# Patient Record
Sex: MALE | HISPANIC OR LATINO | Employment: UNEMPLOYED | URBAN - METROPOLITAN AREA
[De-identification: names, ages, dates, MRNs, and addresses within clinical notes are randomized per-mention and may not be internally consistent; named-entity substitution may affect disease eponyms.]

---

## 2017-08-09 ENCOUNTER — HOSPITAL ENCOUNTER (INPATIENT)
Facility: MEDICAL CENTER | Age: 47
LOS: 31 days | DRG: 673 | End: 2017-09-09
Attending: EMERGENCY MEDICINE | Admitting: INTERNAL MEDICINE
Payer: MEDICAID

## 2017-08-09 ENCOUNTER — APPOINTMENT (OUTPATIENT)
Dept: RADIOLOGY | Facility: MEDICAL CENTER | Age: 47
DRG: 673 | End: 2017-08-09
Attending: INTERNAL MEDICINE
Payer: MEDICAID

## 2017-08-09 ENCOUNTER — RESOLUTE PROFESSIONAL BILLING HOSPITAL PROF FEE (OUTPATIENT)
Dept: MEDSURG UNIT | Facility: MEDICAL CENTER | Age: 47
End: 2017-08-09
Payer: MEDICAID

## 2017-08-09 ENCOUNTER — APPOINTMENT (OUTPATIENT)
Dept: RADIOLOGY | Facility: MEDICAL CENTER | Age: 47
DRG: 673 | End: 2017-08-09
Attending: EMERGENCY MEDICINE
Payer: MEDICAID

## 2017-08-09 DIAGNOSIS — R79.89 ELEVATED TROPONIN: ICD-10-CM

## 2017-08-09 DIAGNOSIS — N17.9 ACUTE RENAL FAILURE, UNSPECIFIED ACUTE RENAL FAILURE TYPE (HCC): ICD-10-CM

## 2017-08-09 DIAGNOSIS — I48.91 NEW ONSET ATRIAL FIBRILLATION (HCC): ICD-10-CM

## 2017-08-09 DIAGNOSIS — E86.0 DEHYDRATION: ICD-10-CM

## 2017-08-09 PROBLEM — E87.8 ELECTROLYTE ABNORMALITY: Status: ACTIVE | Noted: 2017-08-09

## 2017-08-09 PROBLEM — R73.9 HYPERGLYCEMIA: Chronic | Status: ACTIVE | Noted: 2017-08-09

## 2017-08-09 PROBLEM — F10.20 ALCOHOLIC (HCC): Chronic | Status: ACTIVE | Noted: 2017-08-09

## 2017-08-09 PROBLEM — I63.9 CVA (CEREBRAL VASCULAR ACCIDENT) (HCC): Status: ACTIVE | Noted: 2017-08-09

## 2017-08-09 PROBLEM — J96.90 RESPIRATORY FAILURE (HCC): Status: ACTIVE | Noted: 2017-08-09

## 2017-08-09 PROBLEM — I10 ESSENTIAL HYPERTENSION: Status: ACTIVE | Noted: 2017-08-09

## 2017-08-09 LAB
ALBUMIN SERPL BCP-MCNC: 3.3 G/DL (ref 3.2–4.9)
ALBUMIN/GLOB SERPL: 1.3 G/DL
ALP SERPL-CCNC: 79 U/L (ref 30–99)
ALT SERPL-CCNC: 117 U/L (ref 2–50)
ANION GAP SERPL CALC-SCNC: 12 MMOL/L (ref 0–11.9)
APPEARANCE UR: CLEAR
AST SERPL-CCNC: 96 U/L (ref 12–45)
BACTERIA #/AREA URNS HPF: NEGATIVE /HPF
BASOPHILS # BLD AUTO: 1.1 % (ref 0–1.8)
BASOPHILS # BLD: 0.07 K/UL (ref 0–0.12)
BILIRUB SERPL-MCNC: 1.5 MG/DL (ref 0.1–1.5)
BILIRUB UR QL STRIP.AUTO: NEGATIVE
BLOOD CULTURE HOLD CXBCH: NORMAL
BNP SERPL-MCNC: 2203 PG/ML (ref 0–100)
BUN SERPL-MCNC: 65 MG/DL (ref 8–22)
CALCIUM SERPL-MCNC: 8.8 MG/DL (ref 8.5–10.5)
CHLORIDE SERPL-SCNC: 107 MMOL/L (ref 96–112)
CO2 SERPL-SCNC: 11 MMOL/L (ref 20–33)
COLOR UR: YELLOW
CREAT SERPL-MCNC: 4.38 MG/DL (ref 0.5–1.4)
CREAT UR-MCNC: 184.5 MG/DL
EKG IMPRESSION: NORMAL
EOSINOPHIL # BLD AUTO: 0.23 K/UL (ref 0–0.51)
EOSINOPHIL NFR BLD: 3.7 % (ref 0–6.9)
EPI CELLS #/AREA URNS HPF: ABNORMAL /HPF
ERYTHROCYTE [DISTWIDTH] IN BLOOD BY AUTOMATED COUNT: 51.8 FL (ref 35.9–50)
EST. AVERAGE GLUCOSE BLD GHB EST-MCNC: 117 MG/DL
GFR SERPL CREATININE-BSD FRML MDRD: 14 ML/MIN/1.73 M 2
GLOBULIN SER CALC-MCNC: 2.6 G/DL (ref 1.9–3.5)
GLUCOSE BLD-MCNC: 93 MG/DL (ref 65–99)
GLUCOSE SERPL-MCNC: 182 MG/DL (ref 65–99)
GLUCOSE UR STRIP.AUTO-MCNC: NEGATIVE MG/DL
HAV IGM SERPL QL IA: NEGATIVE
HBA1C MFR BLD: 5.7 % (ref 0–5.6)
HBV CORE IGM SER QL: NEGATIVE
HBV SURFACE AB SERPL IA-ACNC: <3.1 MIU/ML (ref 0–10)
HBV SURFACE AG SER QL: NEGATIVE
HCT VFR BLD AUTO: 43.6 % (ref 42–52)
HCV AB SER QL: NEGATIVE
HGB BLD-MCNC: 14.1 G/DL (ref 14–18)
HYALINE CASTS #/AREA URNS LPF: ABNORMAL /LPF
IMM GRANULOCYTES # BLD AUTO: 0.01 K/UL (ref 0–0.11)
IMM GRANULOCYTES NFR BLD AUTO: 0.2 % (ref 0–0.9)
INR PPP: 1.33 (ref 0.87–1.13)
KETONES UR STRIP.AUTO-MCNC: NEGATIVE MG/DL
LEUKOCYTE ESTERASE UR QL STRIP.AUTO: NEGATIVE
LV EJECT FRACT  99904: 25
LV EJECT FRACT MOD 4C 99902: 35.58
LYMPHOCYTES # BLD AUTO: 1.46 K/UL (ref 1–4.8)
LYMPHOCYTES NFR BLD: 23.7 % (ref 22–41)
MAGNESIUM SERPL-MCNC: 2.3 MG/DL (ref 1.5–2.5)
MCH RBC QN AUTO: 30.9 PG (ref 27–33)
MCHC RBC AUTO-ENTMCNC: 32.3 G/DL (ref 33.7–35.3)
MCV RBC AUTO: 95.4 FL (ref 81.4–97.8)
MICRO URNS: ABNORMAL
MONOCYTES # BLD AUTO: 0.62 K/UL (ref 0–0.85)
MONOCYTES NFR BLD AUTO: 10 % (ref 0–13.4)
NEUTROPHILS # BLD AUTO: 3.78 K/UL (ref 1.82–7.42)
NEUTROPHILS NFR BLD: 61.3 % (ref 44–72)
NITRITE UR QL STRIP.AUTO: NEGATIVE
NRBC # BLD AUTO: 0 K/UL
NRBC BLD AUTO-RTO: 0 /100 WBC
PH UR STRIP.AUTO: 5 [PH]
PHOSPHATE SERPL-MCNC: 6.1 MG/DL (ref 2.5–4.5)
PLATELET # BLD AUTO: 270 K/UL (ref 164–446)
PMV BLD AUTO: 10.2 FL (ref 9–12.9)
POTASSIUM SERPL-SCNC: 4.7 MMOL/L (ref 3.6–5.5)
PROT SERPL-MCNC: 5.9 G/DL (ref 6–8.2)
PROT UR QL STRIP: 300 MG/DL
PROT UR-MCNC: 208.1 MG/DL (ref 0–15)
PROTHROMBIN TIME: 16.9 SEC (ref 12–14.6)
RBC # BLD AUTO: 4.57 M/UL (ref 4.7–6.1)
RBC # URNS HPF: ABNORMAL /HPF
RBC UR QL AUTO: NEGATIVE
SODIUM SERPL-SCNC: 130 MMOL/L (ref 135–145)
SODIUM UR-SCNC: 11 MMOL/L
SP GR UR STRIP.AUTO: 1.02
TROPONIN I SERPL-MCNC: 0.28 NG/ML (ref 0–0.04)
TROPONIN I SERPL-MCNC: 0.29 NG/ML (ref 0–0.04)
TSH SERPL DL<=0.005 MIU/L-ACNC: 0.8 UIU/ML (ref 0.3–3.7)
TSH SERPL DL<=0.005 MIU/L-ACNC: 1.21 UIU/ML (ref 0.3–3.7)
UROBILINOGEN UR STRIP.AUTO-MCNC: 1 MG/DL
VIT B12 SERPL-MCNC: 1303 PG/ML (ref 211–911)
WBC # BLD AUTO: 6.2 K/UL (ref 4.8–10.8)
WBC #/AREA URNS HPF: ABNORMAL /HPF

## 2017-08-09 PROCEDURE — 83880 ASSAY OF NATRIURETIC PEPTIDE: CPT

## 2017-08-09 PROCEDURE — 76937 US GUIDE VASCULAR ACCESS: CPT

## 2017-08-09 PROCEDURE — 93306 TTE W/DOPPLER COMPLETE: CPT

## 2017-08-09 PROCEDURE — A9270 NON-COVERED ITEM OR SERVICE: HCPCS | Performed by: EMERGENCY MEDICINE

## 2017-08-09 PROCEDURE — 99291 CRITICAL CARE FIRST HOUR: CPT

## 2017-08-09 PROCEDURE — 5A1D60Z PERFORMANCE OF URINARY FILTRATION, MULTIPLE: ICD-10-PCS | Performed by: INTERNAL MEDICINE

## 2017-08-09 PROCEDURE — 36415 COLL VENOUS BLD VENIPUNCTURE: CPT

## 2017-08-09 PROCEDURE — 83735 ASSAY OF MAGNESIUM: CPT

## 2017-08-09 PROCEDURE — B5131ZA FLUOROSCOPY OF RIGHT JUGULAR VEINS USING LOW OSMOLAR CONTRAST, GUIDANCE: ICD-10-PCS | Performed by: RADIOLOGY

## 2017-08-09 PROCEDURE — 86706 HEP B SURFACE ANTIBODY: CPT

## 2017-08-09 PROCEDURE — 05HM33Z INSERTION OF INFUSION DEVICE INTO RIGHT INTERNAL JUGULAR VEIN, PERCUTANEOUS APPROACH: ICD-10-PCS | Performed by: RADIOLOGY

## 2017-08-09 PROCEDURE — 700101 HCHG RX REV CODE 250: Performed by: INTERNAL MEDICINE

## 2017-08-09 PROCEDURE — 81001 URINALYSIS AUTO W/SCOPE: CPT

## 2017-08-09 PROCEDURE — 90935 HEMODIALYSIS ONE EVALUATION: CPT

## 2017-08-09 PROCEDURE — 700102 HCHG RX REV CODE 250 W/ 637 OVERRIDE(OP): Performed by: INTERNAL MEDICINE

## 2017-08-09 PROCEDURE — 80074 ACUTE HEPATITIS PANEL: CPT

## 2017-08-09 PROCEDURE — 85025 COMPLETE CBC W/AUTO DIFF WBC: CPT

## 2017-08-09 PROCEDURE — 93005 ELECTROCARDIOGRAM TRACING: CPT | Performed by: EMERGENCY MEDICINE

## 2017-08-09 PROCEDURE — 82570 ASSAY OF URINE CREATININE: CPT

## 2017-08-09 PROCEDURE — 96374 THER/PROPH/DIAG INJ IV PUSH: CPT

## 2017-08-09 PROCEDURE — 700111 HCHG RX REV CODE 636 W/ 250 OVERRIDE (IP)

## 2017-08-09 PROCEDURE — 93306 TTE W/DOPPLER COMPLETE: CPT | Mod: 26 | Performed by: INTERNAL MEDICINE

## 2017-08-09 PROCEDURE — 77001 FLUOROGUIDE FOR VEIN DEVICE: CPT

## 2017-08-09 PROCEDURE — 84300 ASSAY OF URINE SODIUM: CPT

## 2017-08-09 PROCEDURE — 96361 HYDRATE IV INFUSION ADD-ON: CPT

## 2017-08-09 PROCEDURE — 84100 ASSAY OF PHOSPHORUS: CPT

## 2017-08-09 PROCEDURE — 84443 ASSAY THYROID STIM HORMONE: CPT

## 2017-08-09 PROCEDURE — 700111 HCHG RX REV CODE 636 W/ 250 OVERRIDE (IP): Performed by: INTERNAL MEDICINE

## 2017-08-09 PROCEDURE — C1752 CATH,HEMODIALYSIS,SHORT-TERM: HCPCS

## 2017-08-09 PROCEDURE — 84156 ASSAY OF PROTEIN URINE: CPT

## 2017-08-09 PROCEDURE — 82607 VITAMIN B-12: CPT

## 2017-08-09 PROCEDURE — 700105 HCHG RX REV CODE 258: Performed by: EMERGENCY MEDICINE

## 2017-08-09 PROCEDURE — 84484 ASSAY OF TROPONIN QUANT: CPT

## 2017-08-09 PROCEDURE — 71010 DX-CHEST-PORTABLE (1 VIEW): CPT

## 2017-08-09 PROCEDURE — 700102 HCHG RX REV CODE 250 W/ 637 OVERRIDE(OP): Performed by: EMERGENCY MEDICINE

## 2017-08-09 PROCEDURE — 93005 ELECTROCARDIOGRAM TRACING: CPT

## 2017-08-09 PROCEDURE — 700111 HCHG RX REV CODE 636 W/ 250 OVERRIDE (IP): Performed by: EMERGENCY MEDICINE

## 2017-08-09 PROCEDURE — 82962 GLUCOSE BLOOD TEST: CPT

## 2017-08-09 PROCEDURE — 85610 PROTHROMBIN TIME: CPT

## 2017-08-09 PROCEDURE — 70450 CT HEAD/BRAIN W/O DYE: CPT

## 2017-08-09 PROCEDURE — 770022 HCHG ROOM/CARE - ICU (200)

## 2017-08-09 PROCEDURE — A9270 NON-COVERED ITEM OR SERVICE: HCPCS | Performed by: INTERNAL MEDICINE

## 2017-08-09 PROCEDURE — 36556 INSERT NON-TUNNEL CV CATH: CPT

## 2017-08-09 PROCEDURE — 94760 N-INVAS EAR/PLS OXIMETRY 1: CPT

## 2017-08-09 PROCEDURE — 80053 COMPREHEN METABOLIC PANEL: CPT

## 2017-08-09 PROCEDURE — 83036 HEMOGLOBIN GLYCOSYLATED A1C: CPT

## 2017-08-09 RX ORDER — HEPARIN SODIUM 5000 [USP'U]/ML
5000 INJECTION, SOLUTION INTRAVENOUS; SUBCUTANEOUS EVERY 8 HOURS
Status: DISCONTINUED | OUTPATIENT
Start: 2017-08-09 | End: 2017-08-09

## 2017-08-09 RX ORDER — MIDAZOLAM HYDROCHLORIDE 1 MG/ML
.5-2 INJECTION INTRAMUSCULAR; INTRAVENOUS PRN
Status: ACTIVE | OUTPATIENT
Start: 2017-08-09 | End: 2017-08-09

## 2017-08-09 RX ORDER — DILTIAZEM HYDROCHLORIDE 5 MG/ML
17 INJECTION INTRAVENOUS ONCE
Status: COMPLETED | OUTPATIENT
Start: 2017-08-09 | End: 2017-08-09

## 2017-08-09 RX ORDER — MIDAZOLAM HYDROCHLORIDE 1 MG/ML
INJECTION INTRAMUSCULAR; INTRAVENOUS
Status: COMPLETED
Start: 2017-08-09 | End: 2017-08-09

## 2017-08-09 RX ORDER — THIAMINE MONONITRATE (VIT B1) 100 MG
100 TABLET ORAL DAILY
Status: DISCONTINUED | OUTPATIENT
Start: 2017-08-09 | End: 2017-09-09 | Stop reason: HOSPADM

## 2017-08-09 RX ORDER — HEPARIN SODIUM 1000 [USP'U]/ML
2200 INJECTION, SOLUTION INTRAVENOUS; SUBCUTANEOUS PRN
Status: DISCONTINUED | OUTPATIENT
Start: 2017-08-09 | End: 2017-08-21

## 2017-08-09 RX ORDER — ASPIRIN 81 MG/1
324 TABLET, CHEWABLE ORAL ONCE
Status: COMPLETED | OUTPATIENT
Start: 2017-08-09 | End: 2017-08-09

## 2017-08-09 RX ORDER — ASPIRIN 300 MG/1
300 SUPPOSITORY RECTAL ONCE
Status: COMPLETED | OUTPATIENT
Start: 2017-08-09 | End: 2017-08-09

## 2017-08-09 RX ORDER — POLYETHYLENE GLYCOL 3350 17 G/17G
1 POWDER, FOR SOLUTION ORAL
Status: DISCONTINUED | OUTPATIENT
Start: 2017-08-09 | End: 2017-09-09 | Stop reason: HOSPADM

## 2017-08-09 RX ORDER — HEPARIN SODIUM 1000 [USP'U]/ML
3000 INJECTION, SOLUTION INTRAVENOUS; SUBCUTANEOUS PRN
Status: DISCONTINUED | OUTPATIENT
Start: 2017-08-09 | End: 2017-08-29

## 2017-08-09 RX ORDER — SODIUM CHLORIDE 9 MG/ML
1000 INJECTION, SOLUTION INTRAVENOUS ONCE
Status: COMPLETED | OUTPATIENT
Start: 2017-08-09 | End: 2017-08-09

## 2017-08-09 RX ORDER — AMOXICILLIN 250 MG
2 CAPSULE ORAL 2 TIMES DAILY
Status: DISCONTINUED | OUTPATIENT
Start: 2017-08-09 | End: 2017-09-09 | Stop reason: HOSPADM

## 2017-08-09 RX ORDER — SODIUM CHLORIDE 9 MG/ML
500 INJECTION, SOLUTION INTRAVENOUS
Status: ACTIVE | OUTPATIENT
Start: 2017-08-09 | End: 2017-08-09

## 2017-08-09 RX ORDER — FOLIC ACID 1 MG/1
1 TABLET ORAL DAILY
Status: DISCONTINUED | OUTPATIENT
Start: 2017-08-09 | End: 2017-09-09 | Stop reason: HOSPADM

## 2017-08-09 RX ORDER — DEXTROSE MONOHYDRATE 25 G/50ML
25 INJECTION, SOLUTION INTRAVENOUS
Status: DISCONTINUED | OUTPATIENT
Start: 2017-08-09 | End: 2017-09-09 | Stop reason: HOSPADM

## 2017-08-09 RX ORDER — BISACODYL 10 MG
10 SUPPOSITORY, RECTAL RECTAL
Status: DISCONTINUED | OUTPATIENT
Start: 2017-08-09 | End: 2017-09-09 | Stop reason: HOSPADM

## 2017-08-09 RX ADMIN — DILTIAZEM HYDROCHLORIDE 5 MG/HR: 5 INJECTION INTRAVENOUS at 20:52

## 2017-08-09 RX ADMIN — METOPROLOL TARTRATE 2.5 MG: 5 INJECTION INTRAVENOUS at 20:22

## 2017-08-09 RX ADMIN — HEPARIN SODIUM 3000 UNITS: 1000 INJECTION, SOLUTION INTRAVENOUS; SUBCUTANEOUS at 20:12

## 2017-08-09 RX ADMIN — THERA TABS 1 TABLET: TAB at 19:13

## 2017-08-09 RX ADMIN — ASPIRIN 324 MG: 81 TABLET, CHEWABLE ORAL at 13:45

## 2017-08-09 RX ADMIN — MIDAZOLAM HYDROCHLORIDE 1 MG: 1 INJECTION INTRAMUSCULAR; INTRAVENOUS at 15:54

## 2017-08-09 RX ADMIN — MIDAZOLAM 1 MG: 1 INJECTION INTRAMUSCULAR; INTRAVENOUS at 15:54

## 2017-08-09 RX ADMIN — FOLIC ACID 1 MG: 1 TABLET ORAL at 19:13

## 2017-08-09 RX ADMIN — SODIUM CHLORIDE 1000 ML: 9 INJECTION, SOLUTION INTRAVENOUS at 14:15

## 2017-08-09 RX ADMIN — HEPARIN: 100 SYRINGE at 15:45

## 2017-08-09 RX ADMIN — DILTIAZEM HYDROCHLORIDE 17 MG: 5 INJECTION INTRAVENOUS at 13:45

## 2017-08-09 RX ADMIN — Medication 100 MG: at 19:13

## 2017-08-09 RX ADMIN — HEPARIN SODIUM 900 UNITS: 5000 INJECTION, SOLUTION INTRAVENOUS at 19:09

## 2017-08-09 ASSESSMENT — LIFESTYLE VARIABLES
HAVE YOU EVER FELT YOU SHOULD CUT DOWN ON YOUR DRINKING: YES
TOTAL SCORE: 3
ALCOHOL_USE: YES
HAVE PEOPLE ANNOYED YOU BY CRITICIZING YOUR DRINKING: YES
EVER HAD A DRINK FIRST THING IN THE MORNING TO STEADY YOUR NERVES TO GET RID OF A HANGOVER: NO
ON A TYPICAL DAY WHEN YOU DRINK ALCOHOL HOW MANY DRINKS DO YOU HAVE: 4
TOTAL SCORE: 3
TOTAL SCORE: 3
CONSUMPTION TOTAL: INCOMPLETE
AVERAGE NUMBER OF DAYS PER WEEK YOU HAVE A DRINK CONTAINING ALCOHOL: 4
EVER FELT BAD OR GUILTY ABOUT YOUR DRINKING: YES

## 2017-08-09 ASSESSMENT — ENCOUNTER SYMPTOMS
ABDOMINAL PAIN: 0
COUGH: 0
DEPRESSION: 0
HEMOPTYSIS: 0
DIZZINESS: 0
CONSTIPATION: 0
HEARTBURN: 0
SHORTNESS OF BREATH: 0
VOMITING: 0
BLURRED VISION: 0
NAUSEA: 0
DOUBLE VISION: 0
ORTHOPNEA: 1
NECK PAIN: 0
TREMORS: 0
SPUTUM PRODUCTION: 0
WEAKNESS: 1
PND: 0
CLAUDICATION: 0
MYALGIAS: 0
PALPITATIONS: 0
FEVER: 0
TINGLING: 0
CHILLS: 0

## 2017-08-09 ASSESSMENT — PAIN SCALES - GENERAL
PAINLEVEL_OUTOF10: 0

## 2017-08-09 NOTE — ASSESSMENT & PLAN NOTE
- New onset vs paroxysmal (vague PMHx of afib and non-compliance with meds).   - CHADS2 VASc score: 4.  - ECHO: severe LVH, global hypokinesis, EF ~20%, severe AI and moderate MR.   - LOBO: slow blood flow but no thrombus; severe AI.    - Placed on amiodarone drip.  Then amiodarone PO.  Metoprolol added.   - Subsequent LOBO done and patient successfully cardioverted.  Metoprolol titrated to 25 mg long-acting.   - Patient bridged from heparin to coumadin.

## 2017-08-09 NOTE — OR SURGEON
Immediate Post- Operative Note        PostOp Diagnosis: renal failure      Procedure(s): Right IJ temp dialysis catheter    Estimated Blood Loss: Less than 5 ml        Complications: None            8/9/2017     4:02 PM     Bert Frankel

## 2017-08-09 NOTE — H&P
[facility] Admitting History and Physical    Name Bhavesh Saini       1970   Age/Sex 47 y.o. male   MRN [id number]   Code Status FULL     After 5PM or if no immediate response to page, please call for cross-coverage  Attending/Team: [facility] team Dr Shin Call [phone] to page   1st Call - Day Intern (R1):   Dr Sandra  2nd Call - Day Sr. Resident (R2/R3):   Dr Curtis       Chief Complaint:  Fatigue/Afib with RVR    HPI:  47 year old male with hx of alcoholism, HTN non compliance with meds came with his family due to weakness and fatigue.  The patient is  and does not speak english,  according to his family the patient has not felt well for last week with some palpitation, SOB on exertion, but no chest pain or syncope and no coughing or fever.  The family has noticed  Slurred speech last week but the patient refused to go and seen by doctor.     The patient is heavy drinker beer no smoking or IV drugs.     IN the ED:  The patient is in bed no acute distress alert and oriented, but sleepy and lethargic, we found Afib with RVR. Dilt has started and then he developed bradycardia HR 20s.  And the labs showed kideny failure and accordin to the nephrologist the patient needs dialysis for severe metabolic acidosis, ((dialysis cath was inserted by IR on the admission)).      Review of Systems   Constitutional: Positive for malaise/fatigue. Negative for fever and chills.   Eyes: Negative for blurred vision and double vision.   Respiratory: Negative for cough, hemoptysis, sputum production and shortness of breath.    Cardiovascular: Positive for orthopnea. Negative for chest pain, palpitations, claudication, leg swelling and PND.   Gastrointestinal: Negative for heartburn, nausea, vomiting, abdominal pain and constipation.   Genitourinary: Negative for dysuria, urgency and frequency.   Musculoskeletal: Negative for myalgias and neck pain.   Skin: Negative for itching and rash.    Neurological: Positive for weakness. Negative for dizziness, tingling and tremors.   Psychiatric/Behavioral: Negative for depression and suicidal ideas.             Past Medical History:   No past medical history on file.    Past Surgical History:  No past surgical history on file.    Current Outpatient Medications:  Home Medications     Reviewed by Madison Oseguera (Pharmacy Tech) on 08/09/17 at 1432  Med List Status: Complete    Medication Last Dose Status          Patient Eitan Taking any Medications                        Medication Allergy/Sensitivities:  No Known Allergies      Family History:  No family history on file.    Social History:  Social History     Social History   • Marital Status:      Spouse Name: N/A   • Number of Children: N/A   • Years of Education: N/A     Occupational History   • Not on file.     Social History Main Topics   • Smoking status: Never Smoker    • Smokeless tobacco: Not on file   • Alcohol Use: 6.0 oz/week     10 Standard drinks or equivalent per week      Comment: three 40oz beers per day   • Drug Use: Not on file   • Sexual Activity: Not on file     Other Topics Concern   • Not on file     Social History Narrative   • No narrative on file         Physical Exam     Filed Vitals:    08/09/17 1605 08/09/17 1730 08/09/17 1800 08/09/17 1830   BP:       Pulse: 92 80 68 68   Temp:   36.3 °C (97.3 °F)    Resp: 20 20 20 16   Height:       Weight:   69.2 kg (152 lb 8.9 oz)    SpO2: 100%  100%      Body mass index is 29.79 kg/(m^2).  /92 mmHg  Pulse 68  Temp(Src) 36.3 °C (97.3 °F)  Resp 16  Ht 1.524 m (5')  Wt 69.2 kg (152 lb 8.9 oz)  BMI 29.79 kg/m2  SpO2 100%  O2 therapy: Pulse Oximetry: 100 %, O2 (LPM): 2, O2 Delivery: Silicone Nasal Cannula    Physical Exam   Constitutional: He is oriented to person, place, and time and well-developed, well-nourished, and in no distress. No distress.   Neck: JVD present. No tracheal deviation present. No thyromegaly  present.   Cardiovascular:   irregular chase    Pulmonary/Chest: No respiratory distress. He has no wheezes. He has no rales.   Abdominal: He exhibits no distension. There is no tenderness.   Musculoskeletal: He exhibits no edema.   Neurological: He is alert and oriented to person, place, and time.   Skin: Skin is warm. No erythema.             Data Review       Old Records Request:   Completed  Current Records review and summary: Completed    Lab Data Review:  Recent Results (from the past 24 hour(s))   CBC w/ Differential    Collection Time: 08/09/17  1:05 PM   Result Value Ref Range    WBC 6.2 4.8 - 10.8 K/uL    RBC 4.57 (L) 4.70 - 6.10 M/uL    Hemoglobin 14.1 14.0 - 18.0 g/dL    Hematocrit 43.6 42.0 - 52.0 %    MCV 95.4 81.4 - 97.8 fL    MCH 30.9 27.0 - 33.0 pg    MCHC 32.3 (L) 33.7 - 35.3 g/dL    RDW 51.8 (H) 35.9 - 50.0 fL    Platelet Count 270 164 - 446 K/uL    MPV 10.2 9.0 - 12.9 fL    Neutrophils-Polys 61.30 44.00 - 72.00 %    Lymphocytes 23.70 22.00 - 41.00 %    Monocytes 10.00 0.00 - 13.40 %    Eosinophils 3.70 0.00 - 6.90 %    Basophils 1.10 0.00 - 1.80 %    Immature Granulocytes 0.20 0.00 - 0.90 %    Nucleated RBC 0.00 /100 WBC    Neutrophils (Absolute) 3.78 1.82 - 7.42 K/uL    Lymphs (Absolute) 1.46 1.00 - 4.80 K/uL    Monos (Absolute) 0.62 0.00 - 0.85 K/uL    Eos (Absolute) 0.23 0.00 - 0.51 K/uL    Baso (Absolute) 0.07 0.00 - 0.12 K/uL    Immature Granulocytes (abs) 0.01 0.00 - 0.11 K/uL    NRBC (Absolute) 0.00 K/uL   Complete Metabolic Panel (CMP)    Collection Time: 08/09/17  1:05 PM   Result Value Ref Range    Sodium 130 (L) 135 - 145 mmol/L    Potassium 4.7 3.6 - 5.5 mmol/L    Chloride 107 96 - 112 mmol/L    Co2 11 (L) 20 - 33 mmol/L    Anion Gap 12.0 (H) 0.0 - 11.9    Glucose 182 (H) 65 - 99 mg/dL    Bun 65 (H) 8 - 22 mg/dL    Creatinine 4.38 (H) 0.50 - 1.40 mg/dL    Calcium 8.8 8.5 - 10.5 mg/dL    AST(SGOT) 96 (H) 12 - 45 U/L    ALT(SGPT) 117 (H) 2 - 50 U/L    Alkaline Phosphatase 79 30 - 99 U/L     Total Bilirubin 1.5 0.1 - 1.5 mg/dL    Albumin 3.3 3.2 - 4.9 g/dL    Total Protein 5.9 (L) 6.0 - 8.2 g/dL    Globulin 2.6 1.9 - 3.5 g/dL    A-G Ratio 1.3 g/dL   Btype Natriuretic Peptide    Collection Time: 08/09/17  1:05 PM   Result Value Ref Range    B Natriuretic Peptide 2203 (H) 0 - 100 pg/mL   Troponin STAT    Collection Time: 08/09/17  1:05 PM   Result Value Ref Range    Troponin I 0.29 (H) 0.00 - 0.04 ng/mL   Magnesium    Collection Time: 08/09/17  1:05 PM   Result Value Ref Range    Magnesium 2.3 1.5 - 2.5 mg/dL   Phosphorus    Collection Time: 08/09/17  1:05 PM   Result Value Ref Range    Phosphorus 6.1 (H) 2.5 - 4.5 mg/dL   PT/INR    Collection Time: 08/09/17  1:05 PM   Result Value Ref Range    PT 16.9 (H) 12.0 - 14.6 sec    INR 1.33 (H) 0.87 - 1.13   TSH (for screening thyroid dysfunction)    Collection Time: 08/09/17  1:05 PM   Result Value Ref Range    TSH 1.210 0.300 - 3.700 uIU/mL   ESTIMATED GFR    Collection Time: 08/09/17  1:05 PM   Result Value Ref Range    GFR If  17 (A) >60 mL/min/1.73 m 2    GFR If Non  14 (A) >60 mL/min/1.73 m 2   BLOOD CULTURE,HOLD    Collection Time: 08/09/17  1:05 PM   Result Value Ref Range    Blood Culture Hold Collected    Hemoglobin A1c    Collection Time: 08/09/17  1:05 PM   Result Value Ref Range    Glycohemoglobin 5.7 (H) 0.0 - 5.6 %    Est Avg Glucose 117 mg/dL   Hep B Surface AB    Collection Time: 08/09/17  1:05 PM   Result Value Ref Range    Hep B Surface Antibody Quant <3.10 0.00 - 10.00 mIU/mL   Hepatitis Panel Acute (4 components)    Collection Time: 08/09/17  1:05 PM   Result Value Ref Range    Hepatitis B Surface Antigen Negative Negative    Hepatitis C Antibody Negative Negative    Hepatitis B Cors Ab,IgM Negative Negative    Hepatitis A Virus Ab, IgM Negative Negative   URINE SODIUM RANDOM    Collection Time: 08/09/17  6:10 PM   Result Value Ref Range    Sodium, Urine -per volume 11 mmol/L   URINE CREATININE RANDOM     Collection Time: 08/09/17  6:10 PM   Result Value Ref Range    Creatinine, Random Urine 184.50 mg/dL   URINE PROTEIN RANDOM    Collection Time: 08/09/17  6:10 PM   Result Value Ref Range    Total Protein, Urine 208.1 (H) 0.0 - 15.0 mg/dL   URINALYSIS    Collection Time: 08/09/17  6:10 PM   Result Value Ref Range    Color Yellow     Character Clear     Specific Gravity 1.018 <1.035    Ph 5.0 5.0-8.0    Glucose Negative Negative mg/dL    Ketones Negative Negative mg/dL    Protein 300 (A) Negative mg/dL    Bilirubin Negative Negative    Urobilinogen, Urine 1.0 Negative    Nitrite Negative Negative    Leukocyte Esterase Negative Negative    Occult Blood Negative Negative    Micro Urine Req Microscopic    URINE MICROSCOPIC (W/UA)    Collection Time: 08/09/17  6:10 PM   Result Value Ref Range    WBC 0-2 (A) /hpf    RBC 2-5 (A) /hpf    Bacteria Negative None /hpf    Epithelial Cells Few /hpf    Hyaline Cast 3-5 (A) /lpf       Imaging/Procedures Review:    ndependant Imaging Review: Completed  IR-CVC NON TUNNELED > AGE 5   Final Result      1. Ultrasound and fluoroscopic guided placement of a right internal jugular 12 [place] triple lumen non-tunneled hemodialysis catheter.      2. The hemodialysis catheter may be used immediately as clinically indicated. Flushes per protocol.      CT-HEAD W/O   Final Result      1.  Cerebral atrophy.      2.  White matter lucencies most consistent with small vessel ischemic change versus demyelination or gliosis.      3.  Otherwise, Head CT without contrast with no acute findings. No evidence of acute cerebral infarction, hemorrhage or mass lesion.      DX-CHEST-PORTABLE (1 VIEW)   Final Result      Cardiomegaly. No consolidation.      US-RENAL    (Results Pending)   ECHOCARDIOGRAM COMP W/O CONT    (Results Pending)               Assessment/Plan     * Atrial fibrillation with RVR (CMS-HCC) (present on admission)  Assessment & Plan  - Previously treated but stopped medications one  year ago.  - came with  and after ditl drip >>>HR 40s >>>d/c dilt drip.  - no home meds  - [id number]: at least 4  - admitted to the ICU  - Echo  - start with heparin drip  - His Hr is controled now around 90s we will give him metoprolol  IV PRN for HR >120 and we will monitor him closely.  - Cardio consulted.   - thyroid and lipid panel.     CVA (cerebral vascular accident) (CMS-HCC) (present on admission)  Assessment & Plan  - slurred speech last week with facial droop.  - no neuro focal deficit.  - CT head no abnormalities.  - started with heparin drip.  - assess him later and order MRI brain.   - check TSH and lipid panel.     Essential hypertension (present on admission)  Assessment & Plan  - no home meds  - came with BP: 130s/80s  - follow up and start with meds if needed.     Respiratory failure (CMS-HCC) (present on admission)  Assessment & Plan  - SOB worsening last couple week with coughing no fever or sputum.  - on small distance.  - no hx of smoking  - BNP: 2203  - Chest x ray: Cardiomegaly.  - Cr 4.3  - clinically: no crackles and no edema.  - no need for lasix at this time  - echo  -- dialysis today.     Electrolyte abnormality (present on admission)  Assessment & Plan  - Na: 130 and K: 4.7  - dialysis on admission.  - Mg normal ad Po4: 6.1  - monitor with BMP.     TREASURE (acute kidney injury) (CMS-HCC) (present on admission)  Assessment & Plan  - came with Cr 4.3 no old labs.  - hx of nephrolithiasis.  - Hco3: 11 and A  - no anemia and Ca: 8.8 and Po4 6.1  - FeNa: less than 1% prerenal.   - likely acute on chronic.  - U/S renal: pending.   - nephro start on dialysis to correct  acidosis and ultrafiltrate as hypervolemic.    Alcoholic (CMS-HCC) (present on admission)  Assessment & Plan  - heavy drinker only beer.   - last time he drank Saturday   - follow up and start with phenobarbital protocol if he develops withdrawal symptoms.  - alcoholic cessation consult.   - started with thiamin and folic  acid and multivitamin.     Hyperglycemia (present on admission)  Assessment & Plan   on the admission.  A1c: 5.4  No home meds SSi and hypoglycemic protocol  Diet and exercise.           Anticipated Hospital stay:  >2 midnights        Quality Measures  Labs reviewed, EKG reviewed, Medications reviewed and Radiology images reviewed        DVT Prophylaxis: Heparin

## 2017-08-09 NOTE — PROGRESS NOTES
IR NOTE: TEMPORARY DIALYSIS CATHETER PLACED BY DR. STREET, PT TOLERATED WELL AND VSS THROUGH OUT, ETCO2 NO ACCURATE, SATS IN 90S

## 2017-08-09 NOTE — ED PROVIDER NOTES
ED Provider Note    CHIEF COMPLAINT  Chief Complaint   Patient presents with   • Tachycardia   • Shortness of Breath       HPI  Bhavesh Saini is a 47 y.o. male with a history of hypertension and heavy, daily alcohol use who presents as a walk-in from urgent care for rapid heartbeat.    Patient states he was sent here due to rapid heartbeat seen on EKG.    Patient's family also reports slurred speech that they noted last week some time. Patient did not want to come to the hospital for evaluation at this time.    Patient reports dyspnea on exertion since last week as well in addition to fatigue.    Patient denies headache, extremity weakness, extremity numbness, head trauma, chest pain, nausea, vomiting, diaphoresis, leg swelling, history of similar symptoms. Patient does not take aspirin therapy but was given aspirin prior to arrival.      ALLERGIES  No Known Allergies    CURRENT MEDICATIONS  Denies (patient noncompliant with his antihypertensive)    PAST MEDICAL HISTORY     Hypertension  Heavy alcohol use    SURGICAL HISTORY  patient denies any surgical history    SOCIAL HISTORY   with children  Lives in [place]  Drinks alcohol daily  Denies cocaine, methamphetamine, and tobacco use    Family Hx:  Denies      REVIEW OF SYSTEMS  See HPI for further details.  All other systems are negative except as above in HPI.    PHYSICAL EXAM  VITAL SIGNS: /92 mmHg  Pulse 124  Temp(Src) 36.4 °C (97.5 °F)  Resp 24  Ht 1.524 m (5')  Wt 68.2 kg (150 lb 5.7 oz)  BMI 29.36 kg/m2  SpO2 95%    General:  WDWN, nontoxic appearing; A+Ox3; V/S as above; tachycardic  Skin: warm and dry; good color; no rash  HEENT: NCAT; left facial droop; EOMs intact; PERRL; no scleral icterus   Neck: FROM; no meningismus, no thyromegaly; no bruits  Cardiovascular: tachy heart rate and irregular rhythm.  No murmurs, rubs, or gallops; pulses 2+ bilaterally radially and DP areas  Lungs: Clear to auscultation with good air movement  bilaterally.  No wheezes, rhonchi, or rales.   Abdomen: BS present; soft; NTND; no rebound, guarding, or rigidity.  No organomegaly or pulsatile mass  Extremities: LOUIS x 4; no e/o trauma; no pedal edema; neg Marybeth's  Neurologic: CNs III-XII grossly intact; speech slightly slurred; distal sensation intact; strength 5/5 UE/LEs; DTRs 2+ bilaterally in patellar/BR areas  Psychiatric: Appropriate affect, normal mood    LABS  Results for orders placed or performed during the hospital encounter of 08/09/17   CBC w/ Differential   Result Value Ref Range    WBC 6.2 4.8 - 10.8 K/uL    RBC 4.57 (L) 4.70 - 6.10 M/uL    Hemoglobin 14.1 14.0 - 18.0 g/dL    Hematocrit 43.6 42.0 - 52.0 %    MCV 95.4 81.4 - 97.8 fL    MCH 30.9 27.0 - 33.0 pg    MCHC 32.3 (L) 33.7 - 35.3 g/dL    RDW 51.8 (H) 35.9 - 50.0 fL    Platelet Count 270 164 - 446 K/uL    MPV 10.2 9.0 - 12.9 fL    Neutrophils-Polys 61.30 44.00 - 72.00 %    Lymphocytes 23.70 22.00 - 41.00 %    Monocytes 10.00 0.00 - 13.40 %    Eosinophils 3.70 0.00 - 6.90 %    Basophils 1.10 0.00 - 1.80 %    Immature Granulocytes 0.20 0.00 - 0.90 %    Nucleated RBC 0.00 /100 WBC    Neutrophils (Absolute) 3.78 1.82 - 7.42 K/uL    Lymphs (Absolute) 1.46 1.00 - 4.80 K/uL    Monos (Absolute) 0.62 0.00 - 0.85 K/uL    Eos (Absolute) 0.23 0.00 - 0.51 K/uL    Baso (Absolute) 0.07 0.00 - 0.12 K/uL    Immature Granulocytes (abs) 0.01 0.00 - 0.11 K/uL    NRBC (Absolute) 0.00 K/uL   Complete Metabolic Panel (CMP)   Result Value Ref Range    Sodium 130 (L) 135 - 145 mmol/L    Potassium 4.7 3.6 - 5.5 mmol/L    Chloride 107 96 - 112 mmol/L    Co2 11 (L) 20 - 33 mmol/L    Anion Gap 12.0 (H) 0.0 - 11.9    Glucose 182 (H) 65 - 99 mg/dL    Bun 65 (H) 8 - 22 mg/dL    Creatinine 4.38 (H) 0.50 - 1.40 mg/dL    Calcium 8.8 8.5 - 10.5 mg/dL    AST(SGOT) 96 (H) 12 - 45 U/L    ALT(SGPT) 117 (H) 2 - 50 U/L    Alkaline Phosphatase 79 30 - 99 U/L    Total Bilirubin 1.5 0.1 - 1.5 mg/dL    Albumin 3.3 3.2 - 4.9 g/dL    Total  Protein 5.9 (L) 6.0 - 8.2 g/dL    Globulin 2.6 1.9 - 3.5 g/dL    A-G Ratio 1.3 g/dL   Btype Natriuretic Peptide   Result Value Ref Range    B Natriuretic Peptide 2203 (H) 0 - 100 pg/mL   Troponin STAT   Result Value Ref Range    Troponin I 0.29 (H) 0.00 - 0.04 ng/mL   Magnesium   Result Value Ref Range    Magnesium 2.3 1.5 - 2.5 mg/dL   Phosphorus   Result Value Ref Range    Phosphorus 6.1 (H) 2.5 - 4.5 mg/dL   PT/INR   Result Value Ref Range    PT 16.9 (H) 12.0 - 14.6 sec    INR 1.33 (H) 0.87 - 1.13   TSH (for screening thyroid dysfunction)   Result Value Ref Range    TSH 1.210 0.300 - 3.700 uIU/mL   ESTIMATED GFR   Result Value Ref Range    GFR If  17 (A) >60 mL/min/1.73 m 2    GFR If Non  14 (A) >60 mL/min/1.73 m 2   BLOOD CULTURE,HOLD   Result Value Ref Range    Blood Culture Hold Collected        EKG  12 Lead EKG obtained at 1234 and interpreted by me to show:  Rhythm: atrial fib  Rate: 128  Intervals:   MN: not measurable   QRS: 98   QTC: 532   All measurable intervals are within normal limits  Normal axis  No ST changes  Clinical Impression: atrial fib with RVR, no acute ST changes  Compared to previous EKG dated earlier today which shows atrial fib at 144  EKG was not available in [facility]     IMAGING  DX-CHEST-PORTABLE (1 VIEW)   Final Result      Cardiomegaly. No consolidation.      CT-HEAD W/O    (Results Pending)     MEDICAL RECORD  I have reviewed patient's medical record and pertinent results are listed below.      COURSE & MEDICAL DECISION MAKING  I have reviewed any medical record information, laboratory studies and radiographic results as noted.    Bhavesh Saini is a 47 y.o. male who presents from urgent care with new onset A. fib and left-sided facial droop noted last week. Patient has had dyspnea on exertion but no current shortness of breath, chest pain, hypotension. Cardioversion is not indicated at this time. Cardizem was initiated to control heart rate. CT  scan was obtained to evaluate for possible stroke.    Labs demonstrate a sodium of 1:30, CO2 of 11, anion gap 12, glucose 182, BUN 65, creatinine 4.3, AST 96, , phosphorus 6.1, and a troponin of 0.29.    Chest x-ray demonstrates no evidence of consolidation, cardiomegaly, pulmonary edema, pneumothorax.    Pt was re-evaluated and found to have HR in 80s and normotensive after Cardizem bolus  Infusion held    2:19 PM  Discussed with [facility] IM resident, Dr. Parks, who agrees to admit the patient  BNP 2200s  CT head pending    The total critical care time on this patient is 40 minutes, resuscitating patient, speaking with admitting physician, and deciphering test results. This 40 minutes is exclusive of separately billable procedures.      FINAL IMPRESSION  1. New onset atrial fibrillation (CMS-HCC)    2. Acute renal failure, unspecified acute renal failure type (CMS-HCC)    3. Dehydration    4. Elevated troponin      Electronically signed by: Maya Michele, 8/9/2017 12:43 PM

## 2017-08-09 NOTE — ED NOTES
"Pt to triage, sent by Urgent for rapid a-fib, pt also incidentally has c/o \" stroke -like sx\" , pt attached to cardiac monitor , IV started, labs drawn and sent   "

## 2017-08-09 NOTE — CONSULTS
DATE OF SERVICE:  08/09/2017    NEPHROLOGY CONSULTATION    REQUESTING PHYSICIAN:  Dr. Amanuel Veloz.    REASON FOR CONSULTATION:  Evaluate patient with acute kidney injury, metabolic   acidosis.    HISTORY OF PRESENT ILLNESS:  The patient is a 47-year-old male with a history   of hypertension and known history of kidney disease who presented to the   emergency room with complaints of not feeling well with worsening shortness of   breath for the past several days.  No fever or chills.  No cough or   hemoptysis.  No sick contacts.  No chest pain.  No abdominal pain, no nausea   or vomiting.  No difficulties to urinate.  No dysuria or hematuria.  No edema.    Upon evaluation in the emergency room, he was found to be in atrial   fibrillation with low blood pressure.  Also laboratory results revealed   elevated BUN and creatinine up to 65 and 4.38, metabolic acidosis with CO2 of   11.    REVIEW OF SYSTEMS:  All 14 points reveal negative except history of present   illness per CMS and AMA criteria.    PAST MEDICAL HISTORY:  Hypertension and alcohol use.    PAST SURGICAL HISTORY:  None.    SOCIAL HISTORY:  Patient lives in [place], , positive for alcohol on a   daily basis.  No tobacco or drug use.    FAMILY HISTORY:  No history of kidney disease.    ALLERGIES:  No known drug allergies.    OUTPATIENT MEDICATIONS:  Reviewed.    PHYSICAL EXAMINATION:  VITAL SIGNS:  Blood pressure is 111/75, heart rate 65, temperature 36.4   degrees Celsius.  GENERAL APPEARANCE:  Well-developed, well-nourished male, in no acute   distress.  HEENT:  Normocephalic and atraumatic.  Pupils are equal, round, and reactive   to light.  Extraocular movements are intact.  Nares are patent.  Oropharynx is   clear, moist mucosa, no erythema or exudate.  NECK:  Supple, no lymphadenopathy, no thyromegaly appreciated.  LUNGS:  Clear to auscultation bilaterally.  _____ no rales, no wheezes.  HEART:  Irregularly irregular rate.  No rub or  gallop.  ABDOMEN:  Soft, nontender, and nondistended.  Bowel sounds are present.  EXTREMITIES:  No cyanosis, no clubbing, no edema.  NEUROLOGIC:  Alert and oriented x3, moving all extremities.    LABORATORY RESULTS:  Reviewed and revealed hemoglobin level 14.1, sodium 130,   potassium 4.7, CO2 of 11, BUN is 65 and creatinine level is 4.38.    Urinalysis is pending.    Chest x-ray is positive for cardiomegaly.  No focal consolidation.    Brain natriuretic peptide is 2200.    ASSESSMENT AND PLAN:  The patient is a 47-year-old male with a history of   hypertension, presented with the onsets of atrial fibrillation, shortness of   breath, and acute kidney injury with severe metabolic acidosis.  1.  Acute kidney injury.  We will start the patient on dialysis to correct   acidosis and ultrafiltrate as hypervolemic.  2.  Electrolytes, noticed mild hyponatremia likely due to hypervolemia.    Potassium is well controlled.  3.  Metabolic acidosis with CO2 of 11, will be correcting with dialysis.  4.  Volume elevated brain natriuretic peptide with cardiomegaly.  Consider   echocardiogram.  We will start ultrafiltration with hemodialysis as blood   pressure tolerates.  5.  Hypertension.  Blood pressure is on the lower side, avoid ACE inhibitor,   angiotensin receptor blocker at present time.    RECOMMENDATIONS:  1.  To start dialysis today, continue daily for now.  Daily basic metabolic   panel, complete urinalysis, complete urine electrolytes.  2.  To complete renal ultrasound.  3.  We will follow up the patient closely.    Thank you for the consultation.       ____________________________________     MD ELROY MULLER / RAQUEL    DD:  08/09/2017 15:11:49  DT:  08/09/2017 16:11:24    D#:  [id number]  Job#:  [id number]

## 2017-08-10 ENCOUNTER — APPOINTMENT (OUTPATIENT)
Dept: RADIOLOGY | Facility: MEDICAL CENTER | Age: 47
DRG: 673 | End: 2017-08-10
Attending: INTERNAL MEDICINE
Payer: MEDICAID

## 2017-08-10 ENCOUNTER — APPOINTMENT (OUTPATIENT)
Dept: RADIOLOGY | Facility: MEDICAL CENTER | Age: 47
DRG: 673 | End: 2017-08-10
Attending: STUDENT IN AN ORGANIZED HEALTH CARE EDUCATION/TRAINING PROGRAM
Payer: MEDICAID

## 2017-08-10 PROBLEM — R94.31 PROLONGED Q-T INTERVAL ON ECG: Status: ACTIVE | Noted: 2017-08-10

## 2017-08-10 PROBLEM — I50.23 ACUTE ON CHRONIC SYSTOLIC HEART FAILURE (HCC): Status: ACTIVE | Noted: 2017-08-10

## 2017-08-10 PROBLEM — R79.89 ELEVATED TROPONIN: Status: ACTIVE | Noted: 2017-08-10

## 2017-08-10 PROBLEM — I34.0 NON-RHEUMATIC MITRAL REGURGITATION: Status: ACTIVE | Noted: 2017-08-10

## 2017-08-10 LAB
ALBUMIN SERPL BCP-MCNC: 2.7 G/DL (ref 3.2–4.9)
ALBUMIN/GLOB SERPL: 1.3 G/DL
ALP SERPL-CCNC: 69 U/L (ref 30–99)
ALT SERPL-CCNC: 104 U/L (ref 2–50)
ANION GAP SERPL CALC-SCNC: 9 MMOL/L (ref 0–11.9)
APTT PPP: 149.8 SEC (ref 24.7–36)
APTT PPP: 222.8 SEC (ref 24.7–36)
APTT PPP: 45.3 SEC (ref 24.7–36)
AST SERPL-CCNC: 74 U/L (ref 12–45)
BASOPHILS # BLD AUTO: 0.7 % (ref 0–1.8)
BASOPHILS # BLD: 0.06 K/UL (ref 0–0.12)
BILIRUB SERPL-MCNC: 1.3 MG/DL (ref 0.1–1.5)
BUN SERPL-MCNC: 43 MG/DL (ref 8–22)
CALCIUM SERPL-MCNC: 8 MG/DL (ref 8.5–10.5)
CHLORIDE SERPL-SCNC: 105 MMOL/L (ref 96–112)
CHOLEST SERPL-MCNC: 104 MG/DL (ref 100–199)
CO2 SERPL-SCNC: 20 MMOL/L (ref 20–33)
CREAT SERPL-MCNC: 3.05 MG/DL (ref 0.5–1.4)
EOSINOPHIL # BLD AUTO: 0.67 K/UL (ref 0–0.51)
EOSINOPHIL NFR BLD: 7.8 % (ref 0–6.9)
ERYTHROCYTE [DISTWIDTH] IN BLOOD BY AUTOMATED COUNT: 50.3 FL (ref 35.9–50)
GFR SERPL CREATININE-BSD FRML MDRD: 22 ML/MIN/1.73 M 2
GLOBULIN SER CALC-MCNC: 2.1 G/DL (ref 1.9–3.5)
GLUCOSE BLD-MCNC: 68 MG/DL (ref 65–99)
GLUCOSE BLD-MCNC: 71 MG/DL (ref 65–99)
GLUCOSE BLD-MCNC: 72 MG/DL (ref 65–99)
GLUCOSE BLD-MCNC: 99 MG/DL (ref 65–99)
GLUCOSE SERPL-MCNC: 120 MG/DL (ref 65–99)
HCT VFR BLD AUTO: 38.2 % (ref 42–52)
HDLC SERPL-MCNC: 44 MG/DL
HGB BLD-MCNC: 12.6 G/DL (ref 14–18)
IMM GRANULOCYTES # BLD AUTO: 0.03 K/UL (ref 0–0.11)
IMM GRANULOCYTES NFR BLD AUTO: 0.4 % (ref 0–0.9)
LDLC SERPL CALC-MCNC: 50 MG/DL
LYMPHOCYTES # BLD AUTO: 1.72 K/UL (ref 1–4.8)
LYMPHOCYTES NFR BLD: 20.1 % (ref 22–41)
MAGNESIUM SERPL-MCNC: 2 MG/DL (ref 1.5–2.5)
MCH RBC QN AUTO: 30.9 PG (ref 27–33)
MCHC RBC AUTO-ENTMCNC: 33 G/DL (ref 33.7–35.3)
MCV RBC AUTO: 93.6 FL (ref 81.4–97.8)
MONOCYTES # BLD AUTO: 0.77 K/UL (ref 0–0.85)
MONOCYTES NFR BLD AUTO: 9 % (ref 0–13.4)
NEUTROPHILS # BLD AUTO: 5.3 K/UL (ref 1.82–7.42)
NEUTROPHILS NFR BLD: 62 % (ref 44–72)
NRBC # BLD AUTO: 0 K/UL
NRBC BLD AUTO-RTO: 0 /100 WBC
PHOSPHATE SERPL-MCNC: 4.7 MG/DL (ref 2.5–4.5)
PLATELET # BLD AUTO: 218 K/UL (ref 164–446)
PMV BLD AUTO: 10.5 FL (ref 9–12.9)
POTASSIUM SERPL-SCNC: 3.5 MMOL/L (ref 3.6–5.5)
PROT SERPL-MCNC: 4.8 G/DL (ref 6–8.2)
RBC # BLD AUTO: 4.08 M/UL (ref 4.7–6.1)
SODIUM SERPL-SCNC: 134 MMOL/L (ref 135–145)
TRIGL SERPL-MCNC: 48 MG/DL (ref 0–149)
TROPONIN I SERPL-MCNC: 0.26 NG/ML (ref 0–0.04)
WBC # BLD AUTO: 8.6 K/UL (ref 4.8–10.8)

## 2017-08-10 PROCEDURE — 84100 ASSAY OF PHOSPHORUS: CPT

## 2017-08-10 PROCEDURE — 70551 MRI BRAIN STEM W/O DYE: CPT

## 2017-08-10 PROCEDURE — 84484 ASSAY OF TROPONIN QUANT: CPT

## 2017-08-10 PROCEDURE — A9270 NON-COVERED ITEM OR SERVICE: HCPCS | Performed by: INTERNAL MEDICINE

## 2017-08-10 PROCEDURE — 80053 COMPREHEN METABOLIC PANEL: CPT

## 2017-08-10 PROCEDURE — A9270 NON-COVERED ITEM OR SERVICE: HCPCS | Performed by: STUDENT IN AN ORGANIZED HEALTH CARE EDUCATION/TRAINING PROGRAM

## 2017-08-10 PROCEDURE — 76775 US EXAM ABDO BACK WALL LIM: CPT

## 2017-08-10 PROCEDURE — 85730 THROMBOPLASTIN TIME PARTIAL: CPT | Mod: 91

## 2017-08-10 PROCEDURE — 90935 HEMODIALYSIS ONE EVALUATION: CPT

## 2017-08-10 PROCEDURE — 93321 DOPPLER ECHO F-UP/LMTD STD: CPT

## 2017-08-10 PROCEDURE — 93325 DOPPLER ECHO COLOR FLOW MAPG: CPT

## 2017-08-10 PROCEDURE — 80061 LIPID PANEL: CPT

## 2017-08-10 PROCEDURE — 700101 HCHG RX REV CODE 250: Performed by: INTERNAL MEDICINE

## 2017-08-10 PROCEDURE — 700102 HCHG RX REV CODE 250 W/ 637 OVERRIDE(OP): Performed by: INTERNAL MEDICINE

## 2017-08-10 PROCEDURE — 700111 HCHG RX REV CODE 636 W/ 250 OVERRIDE (IP): Performed by: INTERNAL MEDICINE

## 2017-08-10 PROCEDURE — 83735 ASSAY OF MAGNESIUM: CPT

## 2017-08-10 PROCEDURE — 700102 HCHG RX REV CODE 250 W/ 637 OVERRIDE(OP): Performed by: STUDENT IN AN ORGANIZED HEALTH CARE EDUCATION/TRAINING PROGRAM

## 2017-08-10 PROCEDURE — 85025 COMPLETE CBC W/AUTO DIFF WBC: CPT

## 2017-08-10 PROCEDURE — 770022 HCHG ROOM/CARE - ICU (200)

## 2017-08-10 PROCEDURE — 93312 ECHO TRANSESOPHAGEAL: CPT

## 2017-08-10 PROCEDURE — 700111 HCHG RX REV CODE 636 W/ 250 OVERRIDE (IP)

## 2017-08-10 PROCEDURE — 82962 GLUCOSE BLOOD TEST: CPT | Mod: 91

## 2017-08-10 RX ORDER — SODIUM CHLORIDE 9 MG/ML
INJECTION, SOLUTION INTRAVENOUS
Status: ACTIVE
Start: 2017-08-10 | End: 2017-08-11

## 2017-08-10 RX ORDER — PROPOFOL 10 MG/ML
200 INJECTION, EMULSION INTRAVENOUS ONCE
Status: DISCONTINUED | OUTPATIENT
Start: 2017-08-10 | End: 2017-08-10

## 2017-08-10 RX ORDER — ASPIRIN 81 MG/1
81 TABLET, CHEWABLE ORAL DAILY
Status: DISCONTINUED | OUTPATIENT
Start: 2017-08-10 | End: 2017-08-22

## 2017-08-10 RX ORDER — POTASSIUM CHLORIDE 7.45 MG/ML
10 INJECTION INTRAVENOUS
Status: COMPLETED | OUTPATIENT
Start: 2017-08-10 | End: 2017-08-10

## 2017-08-10 RX ADMIN — METOPROLOL TARTRATE 5 MG: 5 INJECTION INTRAVENOUS at 11:12

## 2017-08-10 RX ADMIN — METOPROLOL TARTRATE 25 MG: 25 TABLET, FILM COATED ORAL at 15:57

## 2017-08-10 RX ADMIN — METOPROLOL TARTRATE 25 MG: 25 TABLET, FILM COATED ORAL at 20:26

## 2017-08-10 RX ADMIN — METOPROLOL TARTRATE 2.5 MG: 5 INJECTION INTRAVENOUS at 17:14

## 2017-08-10 RX ADMIN — POTASSIUM CHLORIDE 10 MEQ: 10 INJECTION, SOLUTION INTRAVENOUS at 11:11

## 2017-08-10 RX ADMIN — STANDARDIZED SENNA CONCENTRATE AND DOCUSATE SODIUM 2 TABLET: 8.6; 5 TABLET, FILM COATED ORAL at 08:09

## 2017-08-10 RX ADMIN — Medication 100 MG: at 08:09

## 2017-08-10 RX ADMIN — POTASSIUM CHLORIDE 10 MEQ: 10 INJECTION, SOLUTION INTRAVENOUS at 10:22

## 2017-08-10 RX ADMIN — FOLIC ACID 1 MG: 1 TABLET ORAL at 08:09

## 2017-08-10 RX ADMIN — ASPIRIN 81 MG: 81 TABLET, CHEWABLE ORAL at 15:57

## 2017-08-10 RX ADMIN — HEPARIN SODIUM 2200 UNITS: 1000 INJECTION, SOLUTION INTRAVENOUS; SUBCUTANEOUS at 18:32

## 2017-08-10 RX ADMIN — THERA TABS 1 TABLET: TAB at 08:09

## 2017-08-10 RX ADMIN — POTASSIUM CHLORIDE 10 MEQ: 10 INJECTION, SOLUTION INTRAVENOUS at 09:21

## 2017-08-10 RX ADMIN — METOPROLOL TARTRATE 25 MG: 25 TABLET, FILM COATED ORAL at 10:21

## 2017-08-10 RX ADMIN — HEPARIN SODIUM 3000 UNITS: 1000 INJECTION, SOLUTION INTRAVENOUS; SUBCUTANEOUS at 12:00

## 2017-08-10 RX ADMIN — POTASSIUM CHLORIDE 10 MEQ: 10 INJECTION, SOLUTION INTRAVENOUS at 08:09

## 2017-08-10 RX ADMIN — STANDARDIZED SENNA CONCENTRATE AND DOCUSATE SODIUM 2 TABLET: 8.6; 5 TABLET, FILM COATED ORAL at 20:26

## 2017-08-10 ASSESSMENT — ENCOUNTER SYMPTOMS
HEARTBURN: 0
DIARRHEA: 0
HEADACHES: 0
VOMITING: 0
PALPITATIONS: 0
CHILLS: 0
SORE THROAT: 0
ORTHOPNEA: 1
ABDOMINAL PAIN: 0
NAUSEA: 0
HEMOPTYSIS: 0
WHEEZING: 0
COUGH: 0
EYES NEGATIVE: 1
FEVER: 0

## 2017-08-10 ASSESSMENT — COPD QUESTIONNAIRES
COPD SCREENING SCORE: 1
DO YOU EVER COUGH UP ANY MUCUS OR PHLEGM?: NO/ONLY WITH OCCASIONAL COLDS OR INFECTIONS
HAVE YOU SMOKED AT LEAST 100 CIGARETTES IN YOUR ENTIRE LIFE: NO/DON'T KNOW
DURING THE PAST 4 WEEKS HOW MUCH DID YOU FEEL SHORT OF BREATH: SOME OF THE TIME

## 2017-08-10 ASSESSMENT — PAIN SCALES - GENERAL
PAINLEVEL_OUTOF10: 0

## 2017-08-10 ASSESSMENT — LIFESTYLE VARIABLES
ON A TYPICAL DAY WHEN YOU DRINK ALCOHOL HOW MANY DRINKS DO YOU HAVE: 4
HAVE PEOPLE ANNOYED YOU BY CRITICIZING YOUR DRINKING: YES
EVER HAD A DRINK FIRST THING IN THE MORNING TO STEADY YOUR NERVES TO GET RID OF A HANGOVER: NO
TOTAL SCORE: 2
TOTAL SCORE: 2
EVER FELT BAD OR GUILTY ABOUT YOUR DRINKING: NO
AVERAGE NUMBER OF DAYS PER WEEK YOU HAVE A DRINK CONTAINING ALCOHOL: 4
HAVE YOU EVER FELT YOU SHOULD CUT DOWN ON YOUR DRINKING: YES
TOTAL SCORE: 2
CONSUMPTION TOTAL: INCOMPLETE
DO YOU DRINK ALCOHOL: YES
EVER_SMOKED: NEVER

## 2017-08-10 NOTE — ASSESSMENT & PLAN NOTE
- Resolved.   - On adm: , A1c: 5.4.   - Was on correctional insulin and hypoglycemia protocol while inpatient.

## 2017-08-10 NOTE — PROGRESS NOTES
[facility] Progress Note      Admit Date: 8/9/2017  ICU Day: 2    Resident(s): Ziggy Sandra  Attending: GHADA HERNANDEZ/ Dr. Jeremy M Gonda    Date & Time:   8/10/2017   2:56 PM       Patient ID:    Name:             Bhavesh Saini     YOB: 1970  Age:                 47 y.o.  male   MRN:               [id number]    HPI:  Mr Saini is a 47 year old male with PMH of alcoholism and HTN, for which he is currently not taking any medication. He presented to the ED with his family due to weakness and fatigue. He complains of palpitation, SOB on exertion, but denies HA, nausea, vomiting, chest pain, syncope, coughing, or fever.    He has history of similar symptoms.     The family has noticed slurred speech last week but the patient refused to go and seen by doctor.       Consultants:    PMA: Jeremy M. Gonda     Interval Events:    Patient was started on Metoprolol for rate control, Cardiology consulted.   LOBO was ordered along with MRI of the brain for possible CVA last week.  Patient on Dialysis.      Review of Systems   Unable to perform ROS: other       PHYSICAL EXAM  Filed Vitals:    08/10/17 0700 08/10/17 0715 08/10/17 0730 08/10/17 1349   BP:       Pulse: 68 67 65 69   Temp:       Resp: 20 11 16 15   Height:       Weight:       SpO2: 100% 100% 100% 100%     Body mass index is 27.34 kg/(m^2).  /112 mmHg  Pulse 69  Temp(Src) 36.6 °C (97.9 °F)  Resp 15  Ht 1.524 m (5')  Wt 63.5 kg (139 lb 15.9 oz)  BMI 27.34 kg/m2  SpO2 100%  O2 therapy: Pulse Oximetry: 100 %, O2 (LPM): 2, O2 Delivery: Silicone Nasal Cannula    Physical Exam    Respiratory:     Respiration: 15, Pulse Oximetry: 100 %    Chest Tube Drains:          Invalid input(s): [id number]    HemoDynamics:  Pulse: 69, Heart Rate (Monitored): (!) 109 Blood Pressure: 137/112 mmHg, NIBP: 129/88 mmHg      Neuro:      Fluids:    Date 08/10/17 0700 - 08/11/17 0659   Shift 5288-6596 7490-2107 7805-9470 24 Hour Total   I  N  T  A  K  E    Dialysis 400   400      Dialysis Volume (Dialysis Intake) 400   400    Shift Total 400   400   O  U  T  P  U  T   Dialysis 2900   2900      Dialysis Output (Dialysis Output) 2900   2900    Shift Total 2900   2900   NET -2500   -2500        Intake/Output Summary (Last 24 hours) at 08/10/17 1410  Last data filed at 08/10/17 1200   Gross per 24 hour   Intake   1152 ml   Output   5430 ml   Net  -4278 ml       Weight: 63.5 kg (139 lb 15.9 oz)  Body mass index is 27.34 kg/(m^2).    Recent Labs      17   1305  08/10/17   0016   SODIUM  130*  134*   POTASSIUM  4.7  3.5*   CHLORIDE  107  105   CO2  11*  20   BUN  65*  43*   CREATININE  4.38*  3.05*   MAGNESIUM  2.3  2.0   PHOSPHORUS  6.1*  4.7*   CALCIUM  8.8  8.0*       GI/Nutrition:  Recent Labs      17   1305  08/10/17   0016   ALTSGPT  117*  104*   ASTSGOT  96*  74*   ALKPHOSPHAT  79  69   TBILIRUBIN  1.5  1.3   GLUCOSE  182*  120*       Heme:  Recent Labs      17   1305  08/10/17   0016  08/10/17   0620   RBC  4.57*   --   4.08*   HEMOGLOBIN  14.1   --   12.6*   HEMATOCRIT  43.6   --   38.2*   PLATELETCT  270   --   218   PROTHROMBTM  16.9*   --    --    APTT   --   222.8*  149.8*   INR  1.33*   --    --        Infectious Disease:  Temp  Av.6 °C (97.8 °F)  Min: 36.3 °C (97.3 °F)  Max: 36.8 °C (98.2 °F)  Recent Labs      17   1305  08/10/17   0016  08/10/17   0620   WBC  6.2   --   8.6   NEUTSPOLYS  61.30   --   62.00   LYMPHOCYTES  23.70   --   20.10*   MONOCYTES  10.00   --   9.00   EOSINOPHILS  3.70   --   7.80*   BASOPHILS  1.10   --   0.70   ASTSGOT  96*  74*   --    ALTSGPT  117*  104*   --    ALKPHOSPHAT  79  69   --    TBILIRUBIN  1.5  1.3   --        Meds:  • metoprolol  25 mg     • NS       • senna-docusate  2 Tab      And   • polyethylene glycol/lytes  1 Packet      And   • magnesium hydroxide  30 mL      And   • bisacodyl  10 mg     • Respiratory Care per Protocol       • glucose 4 g  16 g      And   • dextrose 50%  25 mL     •  metoprolol  2.5 mg     • heparin 1000 units/mL  2,200 Units      And   • heparin   700 Units/hr (08/10/17 0939)   • folic acid  1 mg     • thiamine  100 mg     • multivitamin  1 Tab     • heparin 1000 units/mL  3,000 Units          Procedures:    Dialysis.     Imaging:  TRANSESOPHAGEAL ECHO W/O CONT   Preliminary Result      US-RENAL   Final Result      1.  No hydronephrosis is identified.      2.  Increased renal echotexture is consistent with medical renal disease.      3.  Left renal cysts.      4.  Small amount of free fluid in Morison's pouch.      ECHOCARDIOGRAM COMP W/O CONT   Final Result      IR-CVC NON TUNNELED > AGE 5   Final Result      1. Ultrasound and fluoroscopic guided placement of a right internal jugular 12 [place] triple lumen non-tunneled hemodialysis catheter.      2. The hemodialysis catheter may be used immediately as clinically indicated. Flushes per protocol.      CT-HEAD W/O   Final Result      1.  Cerebral atrophy.      2.  White matter lucencies most consistent with small vessel ischemic change versus demyelination or gliosis.      3.  Otherwise, Head CT without contrast with no acute findings. No evidence of acute cerebral infarction, hemorrhage or mass lesion.      DX-CHEST-PORTABLE (1 VIEW)   Final Result      Cardiomegaly. No consolidation.      MR-BRAIN-W/O    (Results Pending)       Problem and Plan:      * Atrial fibrillation with RVR (CMS-HCC) (present on admission)  Assessment & Plan  Previously treated but stopped medications one year ago   and after ditl drip >>>HR 40s >>>d/c dilt drip.  [id number]: at least 4  Patient was admitted to the ICU  Cardiology consulted, Patient has had Echo and LOBO  Patient on heparin drip  Patient currently on Metoprolol for rate control       CVA (cerebral vascular accident) (CMS-HCC) (present on admission)  Assessment & Plan  Slurred speech last week with facial droop.  No current neuro focal deficit.  CT head no  abnormalities.  Started with heparin drip.  MRI ordered       Essential hypertension (present on admission)  Assessment & Plan  Patient is currently not taking any medication  Patient on metoprolol in the ICU      Respiratory failure (CMS-HCC) (present on admission)  Assessment & Plan  SOB on exertion, worsening last couple of weeks with coughing  No hx of smoking  BNP: 2203, Cr 4.3  Chest x ray: Cardiomegaly.  Clinically: no crackles and no edema.  Patient on Dialysis    Alcoholic (CMS-HCC) (present on admission)  Assessment & Plan  Drinks beer regularly, his last drink was on Saturday, 2017   Continue to monitor for alcohol withdrawal symptoms  Started on thiamine, folic acid, and multivitamin.     Electrolyte abnormality (present on admission)  Assessment & Plan  Na: 130, K: 4.7, Mg wnl, PO4: 6.1  BMP monitoring   Patient on Dialysis    TREASURE (acute kidney injury) (CMS-HCC) (present on admission)  Assessment & Plan  Arrived with Cr 4.3 no old labs.  History of nephrolithiasis.  HCO3: 11 and A  No anemia and Ca: 8.8 and Po4 6.1  FeNa: less than 1% prerenal.   Likely acute on chronic.  Patient has Renal U/S  Patient on Dialysis       Hyperglycemia (present on admission)  Assessment & Plan  , A1c: 5.4, on admission  Patient was treated with Insulin        DISPO: Patient in A. Fib    CODE STATUS: Full Code    Quality Measures:  T[place]ong Catheter: None in place  DVT Prophylaxis: Heparin  Ulcer Prophylaxis: None   Antibiotics: None  Lines: PIV

## 2017-08-10 NOTE — PROGRESS NOTES
Nephrology Progress Note, Adult, Complex               Author: Katrina Lemusericabony Date & Time created: 8/10/2017  2:11 PM     Interval History:  48 y/o male with severe, CHF  -EF 25 %, Severe AI, A.fib, CVA in TREASURE, metabolic acidosis  Poor UOP  On HD  Doing better, less SOB  Seen and examined during HD -tolerates well  Review of Systems:  Review of Systems   Constitutional: Positive for malaise/fatigue. Negative for fever and chills.   HENT: Negative for congestion, nosebleeds and sore throat.    Eyes: Negative.    Respiratory: Negative for cough, hemoptysis and wheezing.    Cardiovascular: Positive for orthopnea. Negative for chest pain, palpitations and leg swelling.   Gastrointestinal: Negative for heartburn, nausea, vomiting, abdominal pain and diarrhea.   Genitourinary: Negative for dysuria.        Making very little of urine   Neurological: Negative for headaches.   All other systems reviewed and are negative.      Physical Exam:  Physical Exam   Constitutional: He is oriented to person, place, and time. He appears well-developed and well-nourished. No distress.   HENT:   Head: Normocephalic and atraumatic.   Nose: Nose normal.   Mouth/Throat: Oropharynx is clear and moist.   Eyes: Conjunctivae and EOM are normal. Pupils are equal, round, and reactive to light. No scleral icterus.   Neck: Normal range of motion. Neck supple. No thyromegaly present.   Cardiovascular: An irregularly irregular rhythm present. Tachycardia present.  Exam reveals no gallop and no friction rub.    Pulmonary/Chest: Effort normal and breath sounds normal. No respiratory distress. He has no wheezes. He has no rales.   Abdominal: Soft. Bowel sounds are normal. He exhibits no distension. There is no tenderness.   Musculoskeletal:   Trace pedal edema   Lymphadenopathy:     He has no cervical adenopathy.   Neurological: He is alert and oriented to person, place, and time. No cranial nerve deficit. Coordination normal.   Skin: Skin is warm. No rash  noted. No erythema.   Nursing note and vitals reviewed.      Labs:        Invalid input(s): [id number]  Recent Labs      17   1305  179  08/10/17   0016   TROPONINI  0.29*  0.28*  0.26*   BNPBTYPENAT  2203*   --    --      Recent Labs      17   1305  08/10/17   0016   SODIUM  130*  134*   POTASSIUM  4.7  3.5*   CHLORIDE  107  105   CO2  11*  20   BUN  65*  43*   CREATININE  4.38*  3.05*   MAGNESIUM  2.3  2.0   PHOSPHORUS  6.1*  4.7*   CALCIUM  8.8  8.0*     Recent Labs      17   1305  08/10/17   0016   ALTSGPT  117*  104*   ASTSGOT  96*  74*   ALKPHOSPHAT  79  69   TBILIRUBIN  1.5  1.3   GLUCOSE  182*  120*     Recent Labs      17   1305  08/10/17   0016  08/10/17   0620   RBC  4.57*   --   4.08*   HEMOGLOBIN  14.1   --   12.6*   HEMATOCRIT  43.6   --   38.2*   PLATELETCT  270   --   218   PROTHROMBTM  16.9*   --    --    APTT   --   222.8*  149.8*   INR  1.33*   --    --      Recent Labs      17   1305  08/10/17   0016  08/10/17   0620   WBC  6.2   --   8.6   NEUTSPOLYS  61.30   --   62.00   LYMPHOCYTES  23.70   --   20.10*   MONOCYTES  10.00   --   9.00   EOSINOPHILS  3.70   --   7.80*   BASOPHILS  1.10   --   0.70   ASTSGOT  96*  74*   --    ALTSGPT  117*  104*   --    ALKPHOSPHAT  79  69   --    TBILIRUBIN  1.5  1.3   --            Hemodynamics:  Temp (24hrs), Av.6 °C (97.8 °F), Min:36.3 °C (97.3 °F), Max:36.8 °C (98.2 °F)  Temperature: 36.6 °C (97.9 °F)  Pulse  Av.3  Min: 55  Max: 133Heart Rate (Monitored): (!) 109  Blood Pressure: 137/112 mmHg, NIBP: 129/88 mmHg     Respiratory:    Respiration: 15, Pulse Oximetry: 100 %     Work Of Breathing / Effort: Mild  RUL Breath Sounds: Clear, RML Breath Sounds: Clear, RLL Breath Sounds: Diminished;Clear, SABAS Breath Sounds: Clear, LLL Breath Sounds: Diminished  Fluids:    Intake/Output Summary (Last 24 hours) at 08/10/17 1411  Last data filed at 08/10/17 1200   Gross per 24 hour   Intake   1152 ml   Output   5430 ml    Net  -4278 ml     Weight: 63.5 kg (139 lb 15.9 oz)  GI/Nutrition:  Orders Placed This Encounter   Procedures   • DIET NPO     Standing Status: Standing      Number of Occurrences: 1      Standing Expiration Date:      Order Specific Question:  Type:     Answer:  Now [1]     Order Specific Question:  Restrict to:     Answer:  Sips with Medications [3]      Comments:  Except smallest sips of water to take routine meds     Medical Decision Making, by Problem:  Active Hospital Problems    Diagnosis   • *Atrial fibrillation with RVR (CMS-HCC) [I48.91]   • CVA (cerebral vascular accident) (CMS-HCC) [I63.9]   • Essential hypertension [I10]   • Respiratory failure (CMS-HCC) [J96.90]   • Electrolyte abnormality [E87.8]   • Hyperglycemia [R73.9]   • Prolonged Q-T interval on ECG [R94.31]   • Elevated troponin [R74.8]   • Acute on chronic systolic heart failure (CMS-HCC) [I50.23]   • Non-rheumatic mitral regurgitation [I34.0]   • TREASURE (acute kidney injury) (CMS-HCC) [N17.9]   • 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]       Labs reviewed and Medications reviewed                    Assessment and Plan    1.TREASURE -with urine Na 11 -likely in cardiorenal S -continue daily HD  2.HTN: BP well controlled  3.Electrolytes: hyponatremia better  4.Anemia: Hb Hb WNL -to monitor  5.Metabolic acidosis -corrected with HD  6.Volume:overloaded -UF with HD as BP tolerates  7.CHF -cardiology following  Recs: daily dialysis, low Na diet, daily BMP             Will follow

## 2017-08-10 NOTE — PROGRESS NOTES
12 Hour order review and monitor summary:  Patient tolerated first time dialysis yesterday evening as charted.   Vital signs stable. Bedside ECG monitor with heart rate/rhythm revealing atrial fibrillation/atrial flutter. Rate control with Cardizem at 5 mg/hr. [place] interval 0.12 Titrated at one point to 15 mg/hr temporarily. Heart 61 to 131 beats per minute. Heparin infusion as charted (900 units/hr) with next PTT at 0715. No obvious signs of active  bleeding.

## 2017-08-10 NOTE — ASSESSMENT & PLAN NOTE
- Acute vs. chronic.  Likely cardiorenal.   - On adm: Cr: 4.3, AGMA, Ph 6.1. Unknown baseline.  - FeNa <1%  - H/o renal disease x7 years.    - Renal US: no hydronephrosis and calculi.  Left renal cysts.  - Cr and K slowly increasing likely due to poor renal perfusion secondary to atrial fibrillation.    - Patient with temporary dialysis catheter placed and hemodialyzed per Nephrology.   - Upper extremity vein mapping done.  AV fistula placed by Vascular Surgery.    - Patient to received HD outpatient M/W/F.

## 2017-08-10 NOTE — CARE PLAN
Problem: Safety  Goal: Will remain free from falls  Intervention: Implement fall precautions  Fall precautions as charted.       Problem: Respiratory:  Goal: Respiratory status will improve  Intervention: Assess and monitor pulmonary status  Tolerating room air, no s/s resp distress.

## 2017-08-10 NOTE — ASSESSMENT & PLAN NOTE
- History of heavy alcohol abuse.  Last drink 8/5/2017.  - Discharged on thiamine and vitamin B complex/vit C.

## 2017-08-10 NOTE — PROGRESS NOTES
Hemodialysis done today, started @ 0858 and ended @ 1159  tolerated well, with net UF= 2500ml, report given to RN J Reyes, see flow sheet for details

## 2017-08-10 NOTE — ASSESSMENT & PLAN NOTE
SOB on exertion, worsening last couple of weeks with coughing  No hx of smoking  BNP: 2203, Cr 4.3  Chest x ray: Cardiomegaly.  Clinically: no crackles and no edema.  Patient on Dialysis

## 2017-08-10 NOTE — CARE PLAN
Problem: Urinary Elimination:  Goal: Ability to achieve a balanced intake and output will improve  Outcome: PROGRESSING AS EXPECTED

## 2017-08-10 NOTE — DISCHARGE PLANNING
Stroke like symptoms.  Tachy admit.    Getting IP dialysis.  Renal failure.   On room air.     No insurance for post acute needs.    Medical financial hardship.  Single male. [place] resident.     Has sister as ER contact.

## 2017-08-10 NOTE — ASSESSMENT & PLAN NOTE
-H/o left facial droop, slurred speech, and left sided weakness one week before presentation.  Resolved.    - CT: cerebral atrophy and small vessel ischemic changes.   - MRI brain: acute infarction in L frontal periventricular white matter extendeing to subinsular cortex. Punctate area of infarction in R posterior lobe. Numerous microbleeds.   - Patient on statin plus medications for atrial fibrillation.

## 2017-08-10 NOTE — H&P
DATE OF ADMISSION:  08/09/2017    REASON FOR ADMISSION:  Atrial fibrillation with rapid ventricular response,   acute kidney injury.    CHIEF COMPLAINT:  Weakness.    HISTORY OF PRESENT ILLNESS:  This is the attending physician history and   physical, see the resident's history and physical for additional details.  I   was called to the emergency room to see and evaluate this gentleman for ICU   admission.  The history is obtained through a Bengali speaking  as   this gentleman does not speak English.  This is a 47-year-old gentleman who   presented to an urgent care with tachycardia.  He was subsequently sent to the   emergency room.  Additionally, he was complaining of some stroke-like   symptoms.  According to the daughter at the bedside he has had slurred speech   for approximately a week with some left-sided facial droop for the same period   of time.  When these symptoms began, he did not seek any medical care.  He   only came in to urgent care today because he was feeling weak and tired.  When   he arrived in the emergency department, he was found to be in atrial   fibrillation with a rapid ventricular response.  His heart rate was in the   120s.  He received intravenous diltiazem, but he became profoundly   bradycardic.  Cardiology consultation was obtained.  He was found to be in   acute kidney injury.  Subsequently, he has had a dialysis catheter placed.  He   was seen by Dr. Merchant from nephrology and is now undergoing emergent dialysis.    He complains of shortness of breath and cough.  He has no sputum production or   hemoptysis.  He denies any chest pain or chest pressure.  He has no history   of heart disease or lung disease.  He denies any nausea, vomiting, abdominal   pain or diarrhea.  He denies any lower extremity edema.  He has a history of   urolithiasis, but other than that has had no other kidney problems.    CURRENT MEDICATIONS:  None.    ALLERGIES:  No known drug allergies.    PAST  SURGICAL HISTORY:  None.    ILLNESSES:  Systemic arterial hypertension.  He does not take any medications.    He also has history of alcohol abuse and urolithiasis.    SOCIAL HISTORY:  He does not smoke or use drugs.  He works in construction.    He is .  He drinks alcohol.  He only drinks beer.  He currently drinks   one 24 ounce beer every few days.  He used to be a daily drinker, but has   been cutting back for a while.    FAMILY HISTORY:  Noncontributory.    REVIEW OF SYSTEMS:  The AMA and CMS system review does not reveal any   additional significant positive findings.    PHYSICAL EXAMINATION:  VITAL SIGNS:  His temperature is 97.7, his heart rate is 106, his blood   pressure is 115/90.  His respiratory rate is 22.  GENERAL:  He is a well-developed, well-nourished gentleman.  HEENT:  Normocephalic, atraumatic.  Sinuses are nontender.  Nares patent.    Oropharynx with moist mucous membranes.  NECK:  Trachea midline, supple.  CHEST:  Symmetrical.  HEART:  Tachycardic, irregularly irregular rhythm.  LUNGS:  He is tachypneic.  There few crackles at the lung bases, but no   wheezing.  Good air movement.  No dullness to percussion.  ABDOMEN:  Soft, nondistended, nontender.  EXTREMITIES:  No clubbing, cyanosis or edema.  NEUROLOGIC:  He is awake and alert.  He has a mild left facial droop.  He is   moving all extremities.    DIAGNOSTIC DATA:  His white blood cell count is 6200, hemoglobin 14.1,   hematocrit 43.2, platelet count 270,000.  Sodium 130, potassium 4.7, chloride   107, CO2 11, BUN 65, creatinine 4.38, glucose 182, his glycohemoglobin is 5.7.    His troponin I is 0.29.  B-type natriuretic peptide is 2203.  His INR is   1.33.  Hepatitis panel is negative.  CT scan of the head without contrast   shows cerebral atrophy and some small vessel ischemic changes, but no acute   pathology.  Chest x-ray shows significant cardiomegaly.  Renal ultrasound is   pending.    IMPRESSION:  1.  Atrial fibrillation with  rapid ventricular response.  He developed   profound bradycardia with intravenous diltiazem.  2.  Slurred speech with left facial droop, query ischemic stroke approximately   1 week ago based upon his symptoms, his CT scan of the head shows no acute   pathology.  3.  Acute kidney injury.  4.  Hyperglycemia.  5.  Systemic arterial hypertension and he has not been receiving any medical   treatment.  6.  History of urolithiasis.  7.  History of alcohol abuse, apparently he has been cutting back.    PLAN AND MEDICAL DECISION MAKING:  This gentleman is critically ill and will   be admitted to the intensive care unit for continuous electrocardiographic   monitoring and hemodynamic monitoring.  Currently, his heart rate is   controlled.  His CHADS2-VASc score is at least 4 and may be 5.  He therefore   requires anticoagulation and will begin full anticoagulation with a heparin   drip on the weight based protocol.  His heart rate will be controlled.  We   will check his thyroid function as well as an echocardiogram.  We will follow   serial troponins.  Cardiology has been consulted.  Regarding his slurred   speech and left facial droop will obtain an MRI of the brain.  Regarding his   acute kidney injury.  He is undergoing emergent hemodialysis.  We will follow   his blood sugars very closely and place him on sliding scale insulin.  I am   going to place him on thiamine, folate, and multivitamins and observe him for   evidence of alcohol withdrawal.  We will additionally get a renal ultrasound.    At the current time, his prognosis is quite guarded and he is critically ill.    I have spent 35 minutes providing direct critical care services at the   bedside.  There has been no time overlap.  The time spent excludes the time   spent performing procedures ([id number]).    The case has been reviewed with the medical residents as well as with nursing.       ____________________________________     NIKKI BEAUCHAMP MD    DPD /  [facility]    DD:  08/09/2017 18:02:25  DT:  08/09/2017 18:30:41    D#:  [id number]  Job#:  [id number]

## 2017-08-10 NOTE — ASSESSMENT & PLAN NOTE
- PMH of HTN but likely non-compliant with medications.  ECHO showed severe left ventricular hypertrophy.    - On discharge, patient stable on Imdur, lasix, metoprolol.

## 2017-08-10 NOTE — CONSULTS
Reason for Consult:  Asked by Dr Curtis/Berny Shin to see this patient with afib  Patient's PCP: Pcp Pt States None    CC: dyspnea  HPI: 48 yo with CKD minimal medical follow up, prior sig etoh use, but denies dig now who presents with progressive dyspnea with recent history of left sided weakness and CVA        Medications / Drug list prior to admission:  No current facility-administered medications on file prior to encounter.     No current outpatient prescriptions on file prior to encounter.       Current list of administered Medications:    Current facility-administered medications:   •  potassium chloride in water (KCL) ivpb 10 mEq, 10 mEq, Intravenous, Q HOUR, Susanne Nicholas M.D., 10 mEq at 08/10/17 0809  •  diltiazem (CARDIZEM) 100 mg in D5W 100 mL Infusion, 0-15 mg/hr, Intravenous, Continuous, Maya Michele M.D., Last Rate: 2.5 mL/hr at 08/10/17 0608, 2.5 mg/hr at 08/10/17 0608  •  senna-docusate (PERICOLACE or SENOKOT S) 8.6-50 MG per tablet 2 Tab, 2 Tab, Oral, BID, 2 Tab at 08/10/17 0809 **AND** polyethylene glycol/lytes (MIRALAX) PACKET 1 Packet, 1 Packet, Oral, QDAY PRN **AND** magnesium hydroxide (MILK OF MAGNESIA) suspension 30 mL, 30 mL, Oral, QDAY PRN **AND** bisacodyl (DULCOLAX) suppository 10 mg, 10 mg, Rectal, QDAY PRN, Jose Alberto Pope M.D.  •  Respiratory Care per Protocol, , Nebulization, Continuous RT, Jose Alberto Pope M.D.  •  Action is required: Protocol 1073 Hypoglycemia has been implemented, , , Once **AND** Protocol 1073 Inclusion Criteria, , , CONTINUOUS **AND** Protocol 1073 NOTIFY, , , Once **AND** Protocol 1073 Initiate protocol immediately if FSBG is less than or equal to 70 mg/dL, , , CONTINUOUS **AND** glucose 4 g chewable tablet 16 g, 16 g, Oral, Q15 MIN PRN **AND** dextrose 50% (D50W) injection 25 mL, 25 mL, Intravenous, Q15 MIN Jose Alberto MERCER M.D.  •  insulin regular (HUMULIN R) injection 0-18 Units, 0-18 Units, Subcutaneous, 4X/DAY Leticia KOO M.D.,  Stopped at 08/09/17 2100  •  metoprolol (LOPRESSOR) injection 2.5 mg, 2.5 mg, Intravenous, Q6HRS PRN, Leticia Curtis M.D., 2.5 mg at 08/09/17 2022  •  heparin 1000 units/mL injection 2,200 Units, 2,200 Units, Intravenous, PRN **AND** heparin infusion 25,000 units in 500 ml 0.45% nacl, , Intravenous, Continuous, Stopped at 08/10/17 0736 **AND** Action is required: Protocol 440 Heparin Weight Based has been implemented, , , Once **AND** Protocol 440 Heparin Weight Based DO NOT GIVE ANY HEPARIN BOLUS TO STROKE PATIENT, , , CONTINUOUS **AND** Protocol 440 Heparin Weight Based Discontinue Enoxaparin (Lovenox), Dabigatran (Pradaxa), Rivaroxaban (Xarelto), Apixaban (Eliquis), Edoxaban (Savaysa, Lix[place]), Fondaparinux (Arixtra) and Argatroban prior to heparin administration, , , Once **AND** Protocol 440 Heparin Weight Based Draw baseline aPTT, PT, and platelet count if not already done, , , CONTINUOUS **AND** Protocol 440 Heparin Weight Based Draw aPTT 6 hours after beginning infusion. , , , CONTINUOUS **AND** Protocol 440 Heparin Weight Based Record Patient Data, , , CONTINUOUS **AND** Protocol 440 Heparin Weight Based INSTRUCTIONS, , , CONTINUOUS **AND** Protocol 440 Heparin Weight Based Review aPTT results 6 hours after infusion is begun as detailed, , , CONTINUOUS **AND** Protocol 440 Heparin Weight Based Draw Platelet count every three days. Contact MD if platelet is 50% lower than baseline count., , , CONTINUOUS **AND** Protocol 440 Heparin Weight Based Adjust heparin to maintain aPTT between 55-96 sec, , , CONTINUOUS **AND** Protocol 440 Heparin Weight Based Order aPTT 6 hours after any rate change or hold until aPTT is therapeutic (55-96 seconds), , , CONTINUOUS **AND** Protocol 440 Heparin Weight Based Documentation and verification, , , CONTINUOUS, Marhta Cunha, PHARMD  •  folic acid (FOLVITE) tablet 1 mg, 1 mg, Oral, DAILY, Leticia Curtis M.D., 1 mg at 08/10/17 0809  •  thiamine (THIAMINE) tablet 100  mg, 100 mg, Oral, DAILY, Leticia Curtis M.D., 100 mg at 08/10/17 0809  •  multivitamin (THERAGRAN) tablet 1 Tab, 1 Tab, Oral, DAILY, Leticia Curtis M.D., 1 Tab at 08/10/17 0809  •  heparin 1000 units/mL injection 3,000 Units, 3,000 Units, Intravenous, PRN, Katrina Merchant M.D., 3,000 Units at 08/09/17 2012    No past medical history on file. HTN, CKD    No past surgical history on file.    No family history on file. no history of heart issues possible CKD    Social History     Social History   • Marital Status:      Spouse Name: N/A   • Number of Children: N/A   • Years of Education: N/A     Occupational History   • Not on file.     Social History Main Topics   • Smoking status: Never Smoker    • Smokeless tobacco: Not on file   • Alcohol Use: 6.0 oz/week     10 Standard drinks or equivalent per week      Comment: three 40oz beers per day   • Drug Use: Not on file   • Sexual Activity: Not on file     Other Topics Concern   • Not on file     Social History Narrative   • No narrative on file       No Known Allergies    Review of systems:  A detailed review of symptoms was reviewed with patient. This is reviewed in H&P and PMH. Otherwise negative.     Physical exam:  Patient Vitals for the past 24 hrs:   BP Temp Pulse Resp SpO2 Height Weight   08/10/17 0730 - - 65 16 100 % - -   08/10/17 0715 - - 67 (!) 11 100 % - -   08/10/17 0700 - - 68 20 100 % - -   08/10/17 0645 - - 84 13 100 % - -   08/10/17 0630 - - 87 14 100 % - -   08/10/17 0615 - - 64 15 100 % - -   08/10/17 0600 - - (!) 106 14 100 % - -   08/10/17 0545 - - 69 13 100 % - -   08/10/17 0530 - - 61 14 100 % - -   08/10/17 0515 - - (!) 55 12 100 % - -   08/10/17 0500 - - 61 14 100 % - -   08/10/17 0445 - - 73 14 100 % - -   08/10/17 0415 - - 81 12 100 % - -   08/10/17 0400 - 36.6 °C (97.9 °F) 62 13 100 % - -   08/10/17 0345 - - 64 12 100 % - -   08/10/17 0330 - - 67 14 100 % - -   08/10/17 0315 - - 70 15 100 % - -   08/10/17 0300 - - 67 14 100 % -  -   08/10/17 0230 - - 61 13 100 % - -   08/10/17 0200 - - 73 18 100 % - 63.5 kg (139 lb 15.9 oz)   08/10/17 0130 - - 66 18 100 % - -   08/10/17 0100 - - 75 17 100 % - -   08/10/17 0030 - - 71 16 100 % - -   08/10/17 0000 - 36.8 °C (98.2 °F) 84 15 100 % - -   08/09/17 2300 - - 65 (!) 10 100 % - -   08/09/17 2230 - - 88 (!) 11 100 % - -   08/09/17 2200 - - (!) 125 13 100 % - -   08/09/17 2130 - - 72 18 100 % - -   08/09/17 2100 - - - 17 100 % - -   08/09/17 2030 - - 63 18 100 % - -   08/09/17 2022 137/112 mmHg - (!) 133 - - - -   08/09/17 2015 - - 72 12 100 % - -   08/09/17 2000 - - (!) 126 15 98 % - -   08/09/17 1945 - - 78 14 100 % - -   08/09/17 1930 - - 72 15 95 % - -   08/09/17 1915 - - 60 16 100 % - -   08/09/17 1900 - - 68 15 99 % - -   08/09/17 1830 - - 68 16 - - -   08/09/17 1800 - 36.3 °C (97.3 °F) 68 20 100 % - 69.2 kg (152 lb 8.9 oz)   08/09/17 1730 - - 80 20 - - -   08/09/17 1605 - - 92 20 100 % - -   08/09/17 1600 - - 77 20 100 % - -   08/09/17 1555 - - (!) 59 20 100 % - -   08/09/17 1550 - - 87 20 100 % - -   08/09/17 1400 - - 65 20 99 % - -   08/09/17 1351 - - 75 (!) 21 99 % - -   08/09/17 1323 118/92 mmHg 36.4 °C (97.5 °F) (!) 124 (!) 24 95 % - -   08/09/17 1320 - - - - - - 68.2 kg (150 lb 5.7 oz)   08/09/17 1236 110/81 mmHg 36.5 °C (97.7 °F) (!) 122 18 99 % 1.524 m (5') 68.2 kg (150 lb 5.7 oz)       General: No acute distress.   HEENT: OP clear   Neck: No bruits No JVD.   CVS: RRR. S1 + S2. No M/R/G  Resp: CTAB. No wheezing or crackles/rhonchi.  Abdomen: Soft, NT, ND,  Skin: No rashes, erythema or wounds.   Neurological: grossly within normal range   Extremities: Pulse 2+ in b/l LE. No edema. No cyanosis.     Data:  Laboratory studies:  Lab Results   Component Value Date/Time    WBC 8.6 08/10/2017 06:20 AM    RBC 4.08* 08/10/2017 06:20 AM    HEMOGLOBIN 12.6* 08/10/2017 06:20 AM    HEMATOCRIT 38.2* 08/10/2017 06:20 AM    MCV 93.6 08/10/2017 06:20 AM    MCH 30.9 08/10/2017 06:20 AM    MCHC 33.0*  08/10/2017 06:20 AM    MPV 10.5 08/10/2017 06:20 AM    NEUTROPHILS-POLYS 62.00 08/10/2017 06:20 AM    LYMPHOCYTES 20.10* 08/10/2017 06:20 AM    MONOCYTES 9.00 08/10/2017 06:20 AM    EOSINOPHILS 7.80* 08/10/2017 06:20 AM    BASOPHILS 0.70 08/10/2017 06:20 AM        Lab Results   Component Value Date/Time    SODIUM 134* 08/10/2017 12:16 AM    POTASSIUM 3.5* 08/10/2017 12:16 AM    CHLORIDE 105 08/10/2017 12:16 AM    CO2 20 08/10/2017 12:16 AM    GLUCOSE 120* 08/10/2017 12:16 AM    BUN 43* 08/10/2017 12:16 AM    CREATININE 3.05* 08/10/2017 12:16 AM        Recent Labs      08/09/17   1305  08/10/17   0016   ASTSGOT  96*  74*   ALTSGPT  117*  104*   TBILIRUBIN  1.5  1.3   ALKPHOSPHAT  79  69   GLOBULIN  2.6  2.1   INR  1.33*   --        Lab Results   Component Value Date/Time    PT 16.9* 08/09/2017 01:05 PM    INR 1.33* 08/09/2017 01:05 PM        Imaging:  US-RENAL   Final Result      1.  No hydronephrosis is identified.      2.  Increased renal echotexture is consistent with medical renal disease.      3.  Left renal cysts.      4.  Small amount of free fluid in Morison's pouch.      ECHOCARDIOGRAM COMP W/O CONT   Final Result      IR-CVC NON TUNNELED > AGE 5   Final Result      1. Ultrasound and fluoroscopic guided placement of a right internal jugular 12 [place] triple lumen non-tunneled hemodialysis catheter.      2. The hemodialysis catheter may be used immediately as clinically indicated. Flushes per protocol.      CT-HEAD W/O   Final Result      1.  Cerebral atrophy.      2.  White matter lucencies most consistent with small vessel ischemic change versus demyelination or gliosis.      3.  Otherwise, Head CT without contrast with no acute findings. No evidence of acute cerebral infarction, hemorrhage or mass lesion.      DX-CHEST-PORTABLE (1 VIEW)   Final Result      Cardiomegaly. No consolidation.      TRANSESOPHAGEAL ECHO W/O CONT    (Results Pending)       EKG : afib with RVR initially    Tele afib rate  controlled    Echo severe AI moderate MR EF 35%    All pertinent features of laboratory and imaging reviewed including primary images where applicable    Assessment / Plan:  CHF reduced EF volume status reasonable  Agree with HD  LOBO to evaluate valves, possible DCCV    AFib  Switch dilt to metop 5 mg IV then 25 PO q8 for now  Anticoagulation       It is my pleasure to participate in the care of Mr. Saini.  Please do not hesitate to contact me with questions or concerns.    Kvng Gutierrez MD PhD FACC  Cardiologist [facility]    8/10/2017

## 2017-08-10 NOTE — PROGRESS NOTES
Pulmonary Critical Care Progress Note        Date of service: 8/10/2017    Chief Complaint: Tachycardia    History of Present Illness: 47 y.o. male who presented to an urgent care with tachycardia.  He was subsequently sent to the emergency room.  Additionally, he was complaining of some stroke-like   symptoms.  According to the daughter at the bedside he has had slurred speech for approximately a week with some left-sided facial droop for the same period of time.  When these symptoms began, he did not seek any medical care.  He only came in to urgent care today because he was feeling weak and tired.  When he arrived in the emergency department, he was found to be in atrial fibrillation with a rapid ventricular response.  His heart rate was in the 120s.  He received intravenous diltiazem, but he became profoundly bradycardic.  Cardiology consultation was obtained.  He was found to be in acute kidney injury.  Subsequently, he has had a dialysis catheter placed.  He was seen by Dr. Merchant from nephrology and is now undergoing emergent dialysis.     ROS:  Respiratory: positive cough, negative shortness of breath and negative wheezing, Cardiac: negative chest pain and positive leg swelling, GI: negative nausea, negative vomiting and negative abdominal pain.  All other systems negative.    Interval Events:  24 hour interval history reviewed    - Underwent emergent hemodialysis last night and again this morning   - Remains on diltiazem drip   - Renal ultrasound showing medical disease   - Abnormal echocardiogram     PFSH:  No change.    Respiratory:    room air  Pulse Oximetry: 100 %          Exam: unlabored respirations, no intercostal retractions or accessory muscle use and rales bibasilar  ImagingCXR  I have personally reviewed the chest x-ray my impression is  enlarged cardiac silhouette with mild edema        Invalid input(s): [id number]    HemoDynamics:  Pulse: 84, Heart Rate (Monitored): 75  Blood Pressure:  137/112 mmHg, NIBP: 135/77 mmHg    diltiazem drip    Exam: atrial fibrillation  Imaging: Available data reviewed    - TTE 8/9:CONCLUSIONS  No prior study is available for comparison.   Severely reduced left ventricular systolic function.  Global hypokinesis.  Left ventricular ejection fraction is visually estimated to be 25%.  Diastolic function is difficult to assess with atrial fibrillation.  Normal right ventricular size and systolic function.  Moderate mitral regurgitation.  Severe eccentric aortic insufficiency.  Estimated right ventricular systolic pressure  is 60 mmHg.  These findings are known by cardiology consultation.  Recent Labs      08/09/17   1305  08/09/17   1959  08/10/17   0016   TROPONINI  0.29*  0.28*  0.26*   BNPBTYPENAT  2203*   --    --        Neuro:  GCS Total Salyer Coma Score: 15       Exam: ?right facial droop but CN otherwise intact and no extremity weakness mental status intact oriented for age x3  Imaging: Available data reviewed   CT head unremarkable     Fluids:  Intake/Output       08/08/17 0700 - 08/09/17 0659 (Not Admitted) 08/09/17 0700 - 08/10/17 0659 08/10/17 0700 - 08/11/17 0659      [id number] 4619-7032 Total 5233-4256 5146-8923 Total 0392-6741 5215-9734 Total       Intake    I.V.  --  -- --  --  252 252  --  -- --    Heparin Volume -- -- -- -- 198 198 -- -- --    Diltiazem Volume -- -- -- -- 54 54 -- -- --    Dialysis  --  -- --  --  500 500  --  -- --    Dialysis Volume (Dialysis Intake) -- -- -- -- 500 500 -- -- --    Total Intake -- -- -- -- 752 752 -- -- --       Output    Urine  --  -- --  30  -- 30  --  -- --    Number of Times Voided -- -- -- 1 x -- 1 x -- -- --    Void (ml) -- -- -- 30 -- 30 -- -- --    Dialysis  --  -- --  --  2500 2500  --  -- --    Dialysis Output (Dialysis Output) -- -- -- -- 2500 2500 -- -- --    Total Output -- -- -- 30 2500 7143 -- -- --       Net I/O     -- -- -- -30 -7023 -8714 -- -- --        Weight: 63.5 kg (139 lb 15.9 oz)  Recent Labs       17   1305  08/10/17   0016   SODIUM  130*  134*   POTASSIUM  4.7  3.5*   CHLORIDE  107  105   CO2  11*  20   BUN  65*  43*   CREATININE  4.38*  3.05*   MAGNESIUM  2.3  2.0   PHOSPHORUS  6.1*  4.7*   CALCIUM  8.8  8.0*       GI/Nutrition:  Exam: abdomen is soft and non-tender, normal active bowel sounds  Imaging: Available data reviewed  taking PO  Liver Function  Recent Labs      17   1305  08/10/17   0016   ALTSGPT  117*  104*   ASTSGOT  96*  74*   ALKPHOSPHAT  79  69   TBILIRUBIN  1.5  1.3   GLUCOSE  182*  120*       Heme:  Recent Labs      17   1305  08/10/17   0016  08/10/17   0620   RBC  4.57*   --   4.08*   HEMOGLOBIN  14.1   --   12.6*   HEMATOCRIT  43.6   --   38.2*   PLATELETCT  270   --   218   PROTHROMBTM  16.9*   --    --    APTT   --   222.8*  149.8*   INR  1.33*   --    --        Infectious Disease:  Temp  Av.5 °C (97.7 °F)  Min: 36.3 °C (97.3 °F)  Max: 36.8 °C (98.2 °F)  Micro: reviewed  Recent Labs      17   1305  08/10/17   0016  08/10/17   0620   WBC  6.2   --   8.6   NEUTSPOLYS  61.30   --   62.00   LYMPHOCYTES  23.70   --   20.10*   MONOCYTES  10.00   --   9.00   EOSINOPHILS  3.70   --   7.80*   BASOPHILS  1.10   --   0.70   ASTSGOT  96*  74*   --    ALTSGPT  117*  104*   --    ALKPHOSPHAT  79  69   --    TBILIRUBIN  1.5  1.3   --      Current Facility-Administered Medications   Medication Dose Frequency Provider Last Rate Last Dose   • potassium chloride in water (KCL) ivpb 10 mEq  10 mEq Q HOUR Susanne Nicholas M.D.       • diltiazem (CARDIZEM) 100 mg in D5W 100 mL Infusion  0-15 mg/hr Continuous Maya Michele M.D. 2.5 mL/hr at 08/10/17 0608 2.5 mg/hr at 08/10/17 0608   • senna-docusate (PERICOLACE or SENOKOT S) 8.6-50 MG per tablet 2 Tab  2 Tab BID Jose Alberto Pope M.D.   2 Tab at 17 2100    And   • polyethylene glycol/lytes (MIRALAX) PACKET 1 Packet  1 Packet QDAY PRN Jose Alberto Pope M.D.        And   • magnesium hydroxide (MILK OF MAGNESIA) suspension 30  mL  30 mL QDAY PRN Jose Alberto Pope M.D.        And   • bisacodyl (DULCOLAX) suppository 10 mg  10 mg QDAY PRN Jose Alberto Pope M.D.       • Respiratory Care per Protocol   Continuous RT Jose Alberto Pope M.D.       • glucose 4 g chewable tablet 16 g  16 g Q15 MIN PRN Jose Alberto Pope M.D.        And   • dextrose 50% (D50W) injection 25 mL  25 mL Q15 MIN PRN Jose Alberto Pope M.D.       • insulin regular (HUMULIN R) injection 0-18 Units  0-18 Units 4X/DAY ACHS Leticia Curtis M.D.   Stopped at 08/09/17 2100   • metoprolol (LOPRESSOR) injection 2.5 mg  2.5 mg Q6HRS PRN Leticia Curtis M.D.   2.5 mg at 08/09/17 2022   • heparin 1000 units/mL injection 2,200 Units  2,200 Units PRN Martha Cunha PHARMD        And   • heparin infusion 25,000 units in 500 ml 0.45% nacl   Continuous Martha Cunha PHARMD 18 mL/hr at 08/10/17 0123 900 Units/hr at 08/10/17 0123   • folic acid (FOLVITE) tablet 1 mg  1 mg DAILY Leticia Curtis M.D.   1 mg at 08/09/17 1913   • thiamine (THIAMINE) tablet 100 mg  100 mg DAILY Leticia Curtis M.D.   100 mg at 08/09/17 1913   • multivitamin (THERAGRAN) tablet 1 Tab  1 Tab DAILY Leticia Curtis M.D.   1 Tab at 08/09/17 1913   • heparin 1000 units/mL injection 3,000 Units  3,000 Units PRN Katrina Merchant M.D.   3,000 Units at 08/09/17 2012     Last reviewed on 8/9/2017  2:32 PM by Madison Oseguera    Quality  Measures:  Radiology images reviewed, Labs reviewed and Medications reviewed  [place] catheter: Critically Ill - Requiring Accurate Measurement of Urinary Output  Central line in place: Need for access and Dialysis    DVT Prophylaxis: Heparin  DVT prophylaxis - mechanical: SCDs  Ulcer prophylaxis: Not indicated    Assessed for rehab: Patient returned to prior level of function, rehabilitation not indicated at this time    Assessment/Plan:  New onset Atrial fibrillation with rapid ventricular response   - cont dilt gtt, load with BB   - hep gtt to transition to coumadin   -  cardiology following  Slurred speech with left facial droop   - MRI brain, carotid duplex   - Antiplatelets, statin  Acute on chronic (stage III) kidney injury - likely multifactorial including poor profusion/ATN from CHF/A. fib, uncontrolled hypertension   - Nephrology following, HD   - Monitor and replace elect lites as needed  Hyperglycemia without pre-existing history of diabetes - insulin sliding scale  Uncontrolled Systemic arterial hypertension noncompliant with medication ×2 months   - Systolic blood pressure goal less than 160   History of urolithiasis  History of alcohol abuse, apparently he has been cutting back   - Monitor for alcohol withdrawal symptoms, vitamin therapy  Acute decompensated systolic heart failure (EF 25%) with moderate MR and severe AI   - Beta blocker, ACE inhibitor contraindicated given renal failure   - Volume removal with dialysis and eventually diuretics  Acute cardiogenic pulmonary edema   - PUF, diuresis  Moderate pulmonary arterial hypertension  Prophylaxis, diet    Patient remains critically ill at this time requiring active management of his heart rate, blood pressure, fluid management. To remaining intensive care unit for now.    Discussed patient condition and risk of morbidity and/or mortality with RN, RT, Pharmacy, Charge nurse / hot rounds, Patient and [facility].    The patient remains critically ill.  Critical care time = 32 minutes in directly providing and coordinating critical care and extensive data review.  No time overlap and excludes procedures.

## 2017-08-10 NOTE — PROGRESS NOTES
Pt to T604 at 1700. HR 80-90's, vital signs stable. Dialysis nurse at bedside to begin dialysis. Dr. Mercedes at bedside to discuss plan of care. 2 RN skin check complete, bed in low position, bed alarm on, daughter at bedside. Pt and family oriented to CIC. No further questions at this time.

## 2017-08-10 NOTE — CARE PLAN
Problem: Communication  Goal: The ability to communicate needs accurately and effectively will improve  Intervention: [place] patient and significant other/support system to call light to alert staff of needs  Pt and family oriented to floor and current plan of care.       Problem: Safety  Goal: Will remain free from injury  Intervention: Educate patient and significant other/support system about adaptive mobility strategies and safe transfers  Bed alarm on and in low position. Pt educated on use of call light.

## 2017-08-10 NOTE — PROGRESS NOTES
Prior to HD, waited for pt to get a room or to dialyze in ER as ordered by Dr Merchant. Waited for Lisa MCCLELLAND in ER for pts room. Finally got the room room after an hour of wait time. Right IJ newly placed in IR prior to HD was patent with good blood flow rate x 2. Pt in A-fib entire treatment, medications was given by primary RN Jorge as ordered.  Started treatment at 1740 and ended at 2012 with net UF of 2 liters as ordered. See flow sheet for details.

## 2017-08-11 PROBLEM — I35.1 AORTIC REGURGITATION: Status: ACTIVE | Noted: 2017-08-11

## 2017-08-11 PROBLEM — I50.20 HFREF (HEART FAILURE WITH REDUCED EJECTION FRACTION) (HCC): Chronic | Status: ACTIVE | Noted: 2017-08-10

## 2017-08-11 PROBLEM — I10 ESSENTIAL HYPERTENSION: Chronic | Status: ACTIVE | Noted: 2017-08-09

## 2017-08-11 PROBLEM — R73.9 HYPERGLYCEMIA: Status: ACTIVE | Noted: 2017-08-09

## 2017-08-11 PROBLEM — E87.20 METABOLIC ACIDOSIS: Status: ACTIVE | Noted: 2017-08-11

## 2017-08-11 LAB
ALBUMIN SERPL BCP-MCNC: 2.6 G/DL (ref 3.2–4.9)
ALBUMIN/GLOB SERPL: 1 G/DL
ALP SERPL-CCNC: 67 U/L (ref 30–99)
ALT SERPL-CCNC: 98 U/L (ref 2–50)
ANION GAP SERPL CALC-SCNC: 6 MMOL/L (ref 0–11.9)
APTT PPP: 148.2 SEC (ref 24.7–36)
APTT PPP: 37.9 SEC (ref 24.7–36)
APTT PPP: 97 SEC (ref 24.7–36)
AST SERPL-CCNC: 52 U/L (ref 12–45)
BILIRUB SERPL-MCNC: 0.9 MG/DL (ref 0.1–1.5)
BUN SERPL-MCNC: 35 MG/DL (ref 8–22)
CALCIUM SERPL-MCNC: 8.3 MG/DL (ref 8.5–10.5)
CHLORIDE SERPL-SCNC: 104 MMOL/L (ref 96–112)
CO2 SERPL-SCNC: 25 MMOL/L (ref 20–33)
CREAT SERPL-MCNC: 2.5 MG/DL (ref 0.5–1.4)
GFR SERPL CREATININE-BSD FRML MDRD: 28 ML/MIN/1.73 M 2
GLOBULIN SER CALC-MCNC: 2.6 G/DL (ref 1.9–3.5)
GLUCOSE SERPL-MCNC: 101 MG/DL (ref 65–99)
MAGNESIUM SERPL-MCNC: 2.2 MG/DL (ref 1.5–2.5)
PHOSPHATE SERPL-MCNC: 3.1 MG/DL (ref 2.5–4.5)
POTASSIUM SERPL-SCNC: 3.9 MMOL/L (ref 3.6–5.5)
PROT SERPL-MCNC: 5.2 G/DL (ref 6–8.2)
SODIUM SERPL-SCNC: 135 MMOL/L (ref 135–145)
TROPONIN I SERPL-MCNC: 0.14 NG/ML (ref 0–0.04)

## 2017-08-11 PROCEDURE — 700102 HCHG RX REV CODE 250 W/ 637 OVERRIDE(OP): Performed by: INTERNAL MEDICINE

## 2017-08-11 PROCEDURE — A9270 NON-COVERED ITEM OR SERVICE: HCPCS | Performed by: INTERNAL MEDICINE

## 2017-08-11 PROCEDURE — 93010 ELECTROCARDIOGRAM REPORT: CPT | Performed by: INTERNAL MEDICINE

## 2017-08-11 PROCEDURE — 84100 ASSAY OF PHOSPHORUS: CPT

## 2017-08-11 PROCEDURE — 84484 ASSAY OF TROPONIN QUANT: CPT

## 2017-08-11 PROCEDURE — 93005 ELECTROCARDIOGRAM TRACING: CPT | Performed by: INTERNAL MEDICINE

## 2017-08-11 PROCEDURE — 85730 THROMBOPLASTIN TIME PARTIAL: CPT

## 2017-08-11 PROCEDURE — 83735 ASSAY OF MAGNESIUM: CPT

## 2017-08-11 PROCEDURE — 80053 COMPREHEN METABOLIC PANEL: CPT

## 2017-08-11 PROCEDURE — 700111 HCHG RX REV CODE 636 W/ 250 OVERRIDE (IP)

## 2017-08-11 PROCEDURE — A9270 NON-COVERED ITEM OR SERVICE: HCPCS | Performed by: STUDENT IN AN ORGANIZED HEALTH CARE EDUCATION/TRAINING PROGRAM

## 2017-08-11 PROCEDURE — 770022 HCHG ROOM/CARE - ICU (200)

## 2017-08-11 PROCEDURE — 700102 HCHG RX REV CODE 250 W/ 637 OVERRIDE(OP): Performed by: STUDENT IN AN ORGANIZED HEALTH CARE EDUCATION/TRAINING PROGRAM

## 2017-08-11 PROCEDURE — 700101 HCHG RX REV CODE 250: Performed by: STUDENT IN AN ORGANIZED HEALTH CARE EDUCATION/TRAINING PROGRAM

## 2017-08-11 RX ORDER — METOPROLOL TARTRATE 50 MG/1
50 TABLET, FILM COATED ORAL EVERY 8 HOURS
Status: DISCONTINUED | OUTPATIENT
Start: 2017-08-11 | End: 2017-08-13

## 2017-08-11 RX ORDER — ATORVASTATIN CALCIUM 40 MG/1
40 TABLET, FILM COATED ORAL
Status: DISCONTINUED | OUTPATIENT
Start: 2017-08-11 | End: 2017-09-09 | Stop reason: HOSPADM

## 2017-08-11 RX ADMIN — STANDARDIZED SENNA CONCENTRATE AND DOCUSATE SODIUM 2 TABLET: 8.6; 5 TABLET, FILM COATED ORAL at 20:59

## 2017-08-11 RX ADMIN — HEPARIN SODIUM 1100 UNITS/HR: 5000 INJECTION, SOLUTION INTRAVENOUS at 03:44

## 2017-08-11 RX ADMIN — METOPROLOL TARTRATE 5 MG: 5 INJECTION INTRAVENOUS at 02:28

## 2017-08-11 RX ADMIN — ATORVASTATIN CALCIUM 40 MG: 40 TABLET, FILM COATED ORAL at 20:59

## 2017-08-11 RX ADMIN — STANDARDIZED SENNA CONCENTRATE AND DOCUSATE SODIUM 2 TABLET: 8.6; 5 TABLET, FILM COATED ORAL at 08:55

## 2017-08-11 RX ADMIN — METOPROLOL TARTRATE 5 MG: 5 INJECTION INTRAVENOUS at 09:49

## 2017-08-11 RX ADMIN — FOLIC ACID 1 MG: 1 TABLET ORAL at 08:55

## 2017-08-11 RX ADMIN — THERA TABS 1 TABLET: TAB at 08:55

## 2017-08-11 RX ADMIN — DILTIAZEM HYDROCHLORIDE 30 MG: 30 TABLET, FILM COATED ORAL at 12:58

## 2017-08-11 RX ADMIN — DILTIAZEM HYDROCHLORIDE 30 MG: 30 TABLET, FILM COATED ORAL at 17:48

## 2017-08-11 RX ADMIN — METOPROLOL TARTRATE 25 MG: 25 TABLET, FILM COATED ORAL at 05:21

## 2017-08-11 RX ADMIN — ASPIRIN 81 MG: 81 TABLET, CHEWABLE ORAL at 08:55

## 2017-08-11 RX ADMIN — METOPROLOL TARTRATE 50 MG: 50 TABLET, FILM COATED ORAL at 21:00

## 2017-08-11 RX ADMIN — HEPARIN SODIUM 2200 UNITS: 1000 INJECTION, SOLUTION INTRAVENOUS; SUBCUTANEOUS at 03:44

## 2017-08-11 RX ADMIN — Medication 100 MG: at 08:55

## 2017-08-11 RX ADMIN — DILTIAZEM HYDROCHLORIDE 30 MG: 30 TABLET, FILM COATED ORAL at 08:55

## 2017-08-11 RX ADMIN — METOPROLOL TARTRATE 50 MG: 50 TABLET, FILM COATED ORAL at 15:23

## 2017-08-11 ASSESSMENT — ENCOUNTER SYMPTOMS
PALPITATIONS: 0
NAUSEA: 0
ABDOMINAL PAIN: 0
ORTHOPNEA: 1
SHORTNESS OF BREATH: 1
HEADACHES: 0
DIZZINESS: 1
DIZZINESS: 0
SHORTNESS OF BREATH: 0
HEMOPTYSIS: 0
BLURRED VISION: 0
PALPITATIONS: 1
VOMITING: 0
HEARTBURN: 0
DIARRHEA: 0
BRUISES/BLEEDS EASILY: 0
EYES NEGATIVE: 1
CHILLS: 0
COUGH: 0
SORE THROAT: 0
FEVER: 0
FLANK PAIN: 0
WHEEZING: 0

## 2017-08-11 ASSESSMENT — PAIN SCALES - GENERAL
PAINLEVEL_OUTOF10: 0

## 2017-08-11 NOTE — ASSESSMENT & PLAN NOTE
- Severe aortic insufficiency.    - ECHO:  severe LVH, global hypokinesis, EF ~20%, severe AI and moderate MR.  Findings were confirmed by LOBO.  - Fits the criteria for valve replacement.    - Patient plans to return to [facility] for valvular replacement.

## 2017-08-11 NOTE — PROGRESS NOTES
Hospital Medicine Progress Note, Adult, Complex               Author: Kvng Gutierrez Date & Time created: 8/11/2017  11:09 AM     Interval History:  HR remains elevated    Tolerate HD well yesterday    awaiting CTS eval    Review of Systems:  Review of Systems   Constitutional: Positive for malaise/fatigue.   Respiratory: Positive for shortness of breath.    Cardiovascular: Negative for chest pain and palpitations.   Neurological: Negative for dizziness and headaches.   Endo/Heme/Allergies: Does not bruise/bleed easily.       Physical Exam:  Physical Exam  NAD awakable  Chest minmla rales  CV irreg with diastolic murmur  abd s NT  Ext minimal edema    Labs:        Invalid input(s): [id number]  Recent Labs      08/09/17   1305  08/09/17   1959  08/10/17   0016   TROPONINI  0.29*  0.28*  0.26*   BNPBTYPENAT  2203*   --    --      Recent Labs      08/09/17   1305  08/10/17   0016  08/11/17   0649   SODIUM  130*  134*  135   POTASSIUM  4.7  3.5*  3.9   CHLORIDE  107  105  104   CO2  11*  20  25   BUN  65*  43*  35*   CREATININE  4.38*  3.05*  2.50*   MAGNESIUM  2.3  2.0  2.2   PHOSPHORUS  6.1*  4.7*  3.1   CALCIUM  8.8  8.0*  8.3*     Recent Labs      08/09/17   1305  08/10/17   0016  08/11/17   0649   ALTSGPT  117*  104*  98*   ASTSGOT  96*  74*  52*   ALKPHOSPHAT  79  69  67   TBILIRUBIN  1.5  1.3  0.9   GLUCOSE  182*  120*  101*     Recent Labs      08/09/17   1305   08/10/17   0620  08/10/17   1637  08/11/17   0234   RBC  4.57*   --   4.08*   --    --    HEMOGLOBIN  14.1   --   12.6*   --    --    HEMATOCRIT  43.6   --   38.2*   --    --    PLATELETCT  270   --   218   --    --    PROTHROMBTM  16.9*   --    --    --    --    APTT   --    < >  149.8*  45.3*  37.9*   INR  1.33*   --    --    --    --     < > = values in this interval not displayed.     Recent Labs      08/09/17   1305  08/10/17   0016  08/10/17   0620  08/11/17   0649   WBC  6.2   --   8.6   --    NEUTSPOLYS  61.30   --   62.00   --     LYMPHOCYTES  23.70   --   20.10*   --    MONOCYTES  10.00   --   9.00   --    EOSINOPHILS  3.70   --   7.80*   --    BASOPHILS  1.10   --   0.70   --    ASTSGOT  96*  74*   --   52*   ALTSGPT  117*  104*   --   98*   ALKPHOSPHAT  79  69   --   67   TBILIRUBIN  1.5  1.3   --   0.9           Hemodynamics:  Temp (24hrs), Av.7 °C (98 °F), Min:36.6 °C (97.8 °F), Max:36.7 °C (98.1 °F)  Temperature: 36.6 °C (97.8 °F)  Pulse  Av.9  Min: 50  Max: 148Heart Rate (Monitored): (!) 135  NIBP: 133/100 mmHg     Respiratory:    Respiration: 19, Pulse Oximetry: 97 %     Work Of Breathing / Effort: Mild  RUL Breath Sounds: Clear, RML Breath Sounds: Clear, RLL Breath Sounds: Diminished, SABAS Breath Sounds: Clear, LLL Breath Sounds: Diminished  Fluids:    Intake/Output Summary (Last 24 hours) at 17 1109  Last data filed at 17 0200   Gross per 24 hour   Intake 655.93 ml   Output   3900 ml   Net -3244.07 ml        GI/Nutrition:  Orders Placed This Encounter   Procedures   • DIET ORDER     Standing Status: Standing      Number of Occurrences: 1      Standing Expiration Date:      Order Specific Question:  Diet:     Answer:  2 Gram Sodium [7]     Order Specific Question:  Diet:     Answer:  Cardiac [6]     Medical Decision Making, by Problem:  Active Hospital Problems    Diagnosis   • *Atrial fibrillation with RVR (CMS-HCC) [I48.91]   • Aortic regurgitation [I35.1]   • HFrEF (heart failure with reduced ejection fraction) (CMS-HCC) [I50.20]   • CVA (cerebral vascular accident) (CMS-HCC) [I63.9]   • TREASURE (acute kidney injury) (CMS-HCC) [N17.9]   • Prolonged Q-T interval on ECG [R94.31]   • Electrolyte abnormality [E87.8]   • Metabolic acidosis [E87.2]   • Elevated troponin [R74.8]   • Non-rheumatic mitral regurgitation [I34.0]   • Essential hypertension [I10]   • 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]   • Hyperglycemia [R73.9]     SEVERE AI/CHF acute  CTS eval    Afib  Heparin liekly cardioversion with surgery  No clear thrombus on  recent LOBO but did have SEC in the ANDREA, discussed with dtr higher risk for recurrent CVA    ETOH  dtr reports ig use still monitor for withdrawals    It is my pleasure to participate in the care of Mr. Saini.  Please do not hesitate to contact me with questions or concerns.    Kvng Gutierrez MD PhD FAC  Cardiologist [facility]      Core Measures

## 2017-08-11 NOTE — PROGRESS NOTES
Lab called with critical ptt of 148.2, read back to lab.  Patient on heparin gtt protocol, following protocol orders

## 2017-08-11 NOTE — PROGRESS NOTES
Late entry:  0700- MD bedside, notified of HR and prns not affecting rate.  Orders placed and implemented.  HR noted, patient blood pressure stable.  Declines dizziness.  Will continue to monitor.

## 2017-08-11 NOTE — ASSESSMENT & PLAN NOTE
-Presented with SOB, orthopnea, fatique and palpitations. Has h/o HTN and alcoholism in the past. On exam: JVD +., murmurs + but no volume overload.   -BNP 2203 >> 1753 > 2211.   -CXR: cardiomegaly. ECHO: severe LVH, global hypokinesis, EF ~20%, severe atrial insufficiency and moderate mitral insufficiency.  - Patient needs aortic valve replacement, but renal status and recent stroke with hemorrhage prevented replacement in hospital.    - Patient has follow up appointment with Dr. Gutierrez, Cardiology, on September 11.  - Patient's family plans to fly him to [place] for further care after discharge.

## 2017-08-11 NOTE — PROGRESS NOTES
"[facility] ICU Progress Note      Admit Date: 8/9/2017  ICU Day: 3    Resident(s): Dr Nicholas/ Dr Sandra  Attending: GHADA HERNANDEZ/ Dr. Gonda    Date & Time:   8/11/2017   10:17 AM       Patient ID:    Name:             Bhavesh Saini     YOB: 1970  Age:                 47 y.o.  male   MRN:               [id number]    HPI:  48yo M with PMH of alcoholism, HTN and ? afib presented with weakness, fatigue, SOB and palpitations. The family has noticed slurred speech, left facial droop and mild left sided weakness last week but the patient refused to go and seen by doctor. Has been non-compliant [facility] HTN meds.     Consultants:  Nephrology: Dr Merchant  Cardiology: Dr Gutierrez  PMA: Jeremy M. Gonda     Interval Events:  Overnight events: HR 100s to 140s. PRN metoprolol was increased to 5mg q6h. Intermttent hypotension  Subjective: No chest pain, N/V. C/o mild dizziness on standing    - Afib: Per cardiology, increased metoprolol to 50 q8h and diltiazem q6h  - Cardiac surgeon is yet to see the patient to discuss about aortic valve replacement.  - CVA: Started ASA and statin  - MRI brain shows: Small areas of acute infarction involving the left frontal periventricular white matter extending inferiorly to the subinsular cortex. Punctate area of infarction involving the cortex in the right posterior occipital lobe.  Numerous chronic \"microbleed's\" are noted throughout the brain parenchyma in both the supra and infratentorial compartments. Gyriform/curvilinear regions of chronic \"microbleed\" noted in the right posterior frontal region and also the left frontal periventricular white matter.  - Electrolytes stable  - Elevated transaminases, likely congestive hepatopathy.    Review of Systems   Constitutional: Negative for fever and chills.   Eyes: Negative for blurred vision.   Respiratory: Negative for cough and shortness of breath.    Cardiovascular: Positive for palpitations. Negative for chest pain and leg swelling. "   Gastrointestinal: Negative for heartburn and abdominal pain.   Genitourinary: Negative for dysuria.   Neurological: Positive for dizziness. Negative for headaches.       PHYSICAL EXAM  Filed Vitals:    08/11/17 0500 08/11/17 0521 08/11/17 0600 08/11/17 0700   BP:       Pulse: 71 137 68 54   Temp:       Resp: 13  25 12   Height:       Weight:       SpO2: 98%  97% 93%     Body mass index is 27.34 kg/(m^2).  /112 mmHg  Pulse 54  Temp(Src) 36.7 °C (98 °F)  Resp 12  Ht 1.524 m (5')  Wt 63.5 kg (139 lb 15.9 oz)  BMI 27.34 kg/m2  SpO2 93%  O2 therapy: Pulse Oximetry: 93 %, O2 Delivery: None (Room Air)    Physical Exam   Constitutional: He is oriented to person, place, and time and well-developed, well-nourished, and in no distress.   HENT:   Head: Normocephalic and atraumatic.   Eyes: Pupils are equal, round, and reactive to light.   Cardiovascular:   Tachycardia, irregularly irregular. Palpable thrill in the apex. Holosystolic murmur in apex. AI murmur is not appreciable due to tachycardia. JVD +. HJR could not appreciated due to central line. No pedal edema.    Pulmonary/Chest: Effort normal and breath sounds normal.   Abdominal: Soft. Bowel sounds are normal.   Musculoskeletal: Normal range of motion.   Neurological: He is alert and oriented to person, place, and time. He displays normal reflexes. No cranial nerve deficit.   Skin: Skin is warm.       Respiratory:     Respiration: 12, Pulse Oximetry: 93 %    Chest Tube Drains:          Invalid input(s): [id number]    HemoDynamics:  Pulse: (!) 54, Heart Rate (Monitored): (!) 145 NIBP: (!) 97/29 mmHg      Neuro:      Fluids:        Intake/Output Summary (Last 24 hours) at 08/10/17 1410  Last data filed at 08/10/17 1200   Gross per 24 hour   Intake   1152 ml   Output   5430 ml   Net  -4278 ml          Body mass index is 27.34 kg/(m^2).    Recent Labs      08/09/17   1305  08/10/17   0016  08/11/17   0649   SODIUM  130*  134*  135   POTASSIUM  4.7  3.5*   3.9   CHLORIDE  107  105  104   CO2  11*  20  25   BUN  65*  43*  35*   CREATININE  4.38*  3.05*  2.50*   MAGNESIUM  2.3  2.0  2.2   PHOSPHORUS  6.1*  4.7*  3.1   CALCIUM  8.8  8.0*  8.3*       GI/Nutrition:  Recent Labs      17   1305  08/10/17   0016  17   0649   ALTSGPT  117*  104*  98*   ASTSGOT  96*  74*  52*   ALKPHOSPHAT  79  69  67   TBILIRUBIN  1.5  1.3  0.9   GLUCOSE  182*  120*  101*       Heme:  Recent Labs      17   1305   08/10/17   0620  08/10/17   1637  17   0234   RBC  4.57*   --   4.08*   --    --    HEMOGLOBIN  14.1   --   12.6*   --    --    HEMATOCRIT  43.6   --   38.2*   --    --    PLATELETCT  270   --   218   --    --    PROTHROMBTM  16.9*   --    --    --    --    APTT   --    < >  149.8*  45.3*  37.9*   INR  1.33*   --    --    --    --     < > = values in this interval not displayed.       Infectious Disease:  Temp  Av.7 °C (98 °F)  Min: 36.6 °C (97.9 °F)  Max: 36.7 °C (98.1 °F)  Recent Labs      17   1305  08/10/17   0016  08/10/17   0620  17   0649   WBC  6.2   --   8.6   --    NEUTSPOLYS  61.30   --   62.00   --    LYMPHOCYTES  23.70   --   20.10*   --    MONOCYTES  10.00   --   9.00   --    EOSINOPHILS  3.70   --   7.80*   --    BASOPHILS  1.10   --   0.70   --    ASTSGOT  96*  74*   --   52*   ALTSGPT  117*  104*   --   98*   ALKPHOSPHAT  79  69   --   67   TBILIRUBIN  1.5  1.3   --   0.9       Meds:  • atorvastatin  40 mg     • metoprolol  50 mg     • diltiazem  30 mg     • aspirin  81 mg     • metoprolol  5 mg     • senna-docusate  2 Tab      And   • polyethylene glycol/lytes  1 Packet      And   • magnesium hydroxide  30 mL      And   • bisacodyl  10 mg     • Respiratory Care per Protocol       • glucose 4 g  16 g      And   • dextrose 50%  25 mL     • heparin 1000 units/mL  2,200 Units      And   • heparin   1,100 Units/hr (17 0705)   • folic acid  1 mg     • thiamine  100 mg     • multivitamin  1 Tab     • heparin 1000 units/mL   "3,000 Units          Procedures:  Dialysis catheter 8/9  LOBO 8/10    Imaging:  MR-BRAIN-W/O   Final Result         1.  Mild diffuse cerebral atrophy.      2.  Small areas of acute infarction involving the left frontal periventricular white matter extending inferiorly to the subinsular cortex. Punctate area of infarction involving the cortex in the right posterior occipital lobe.      3.  Moderate periventricular white matter changes consistent with chronic microvascular ischemic gliosis.      4.  Small chronic focus of lacunar type infarction in the right frontal periventricular white matter.      5.  Numerous chronic \"microbleed's\" are noted throughout the brain parenchyma in both the supra and infratentorial compartments. Gyriform/curvilinear regions of chronic \"microbleed\" noted in the right posterior frontal region and also the left frontal    periventricular white matter.      TRANSESOPHAGEAL ECHO W/O CONT   Final Result      US-RENAL   Final Result      1.  No hydronephrosis is identified.      2.  Increased renal echotexture is consistent with medical renal disease.      3.  Left renal cysts.      4.  Small amount of free fluid in Morison's pouch.      ECHOCARDIOGRAM COMP W/O CONT   Final Result      IR-CVC NON TUNNELED > AGE 5   Final Result      1. Ultrasound and fluoroscopic guided placement of a right internal jugular 12 [place] triple lumen non-tunneled hemodialysis catheter.      2. The hemodialysis catheter may be used immediately as clinically indicated. Flushes per protocol.      CT-HEAD W/O   Final Result      1.  Cerebral atrophy.      2.  White matter lucencies most consistent with small vessel ischemic change versus demyelination or gliosis.      3.  Otherwise, Head CT without contrast with no acute findings. No evidence of acute cerebral infarction, hemorrhage or mass lesion.      DX-CHEST-PORTABLE (1 VIEW)   Final Result      Cardiomegaly. No consolidation.          Problem and " Plan:      * Atrial fibrillation with RVR (CMS-HCC) (present on admission)  Assessment & Plan  - New onset vs paroxysmal (vague PMH of afib and non-compliance with meds)  - Presented with palpitations and HR in 120s. He had HR in 20s when dilt IV push was given. Later HR stabilized and was started on dilt drip and moved to ICU. When HR was controlled, changed to PO metoprolol 25 q8H. However, HR has been in 110-140s.  - H/O left facial droop and left sided weakness in last week.  - CHADS2: 4  - ECHO: severe LVH, global hypokinesis, EF ~20%, severe AI and mod MR  - LOBO: slow blood flow but no thrombus. Severe AI  - MRI brain: results pending   Plan  - cardilogy on board  - metoprolol 25 q8h, PRN 5mg metoprolol q6h  - On therapeutic heparin  - likely cardioversion during AV replacement.     CVA (cerebral vascular accident) (CMS-HCC) (present on admission)  Assessment & Plan  - h/o left facial droop, slurred speech and left sided weakness. However no neuro deficits currently  - CT: cerebral atrophy and small vessel ischemic changes   - MRI brain: acute infarction in L frontal periventricular white matter extendeing to subinsular cortex. Punctate area of infarction in R posterior lobe. Numerous microbleeds.   Assessment: Afib could have contributed (LOBO showed 'sludge' in the heart)  Plan  - ASA, statin  - Mx of Afib as stated above.    TREASURE (acute kidney injury) (CMS-HCC) (present on admission)  Assessment & Plan  - On adm: BUN/Cr:  /4.3, AGMA, Ph 6.1. No old labs to compare.   - FeNa <1%  - H/o renal disease since 7yrs, FH kidney disease in his father. Has been non-complaint with HTN medications. Has CHF.  - Renal US: no hydronephrosis and calculi. Left renal cysts.  - Acute on chronic. Likely pre-renal vs cardiorenal. HTN is likely the cause of CKD  Plan  - Nephrology on board  - Daily dialysis, low Na diet    HFrEF (heart failure with reduced ejection fraction) (CMS-HCC) (present on admission)  Assessment & Plan  -  Presented with SOB, orthopnea, fatique and palpitations. Has h/o HTN and alcoholism in the past. On exam: JVD +., murmurs + but no volume overload.   - On adm:  BNP 2203   - CXR: cardiomegaly. ECHO showed: severe LVH, global hypokinesis, EF ~20%, severe AI and mod MR  Assessment: Compensated HF likely due to alcoholism vs tachycardia induced heart failure.   Plan  - cardiology on board  - Na restricted diet  - On metoprolol for Afib which helps CHF. Cannot start ACEI and aldactone due to acute on CKD  - Likely AI valve replacement   - He fits the criteria for defibrillator placement but has to be medically optimized for atleast 3 months    Aortic regurgitation (present on admission)  Assessment & Plan  - Severe AR.   - ECHO:  severe LVH, global hypokinesis, EF ~20%, severe AI and mod MR. Findings were confirmed by LOBO  - Fits the criteria for valve replacement. Cardiology and cardiac surgery on board.    Electrolyte abnormality (present on admission)  Assessment & Plan  - Low Na, elevated Ph. Low K. Likely renal causes.  - On dialysis    Prolonged Q-T interval on ECG (present on admission)  Assessment & Plan  - QTc ~530s. Will avoid QTc prolonging meds.    Essential hypertension (present on admission)  Assessment & Plan  - PMH of HTN but likely non-compliant. ECHO showed severe LVH  - On BB for Afib. Will optimize meds when he stabilizes.    Alcoholic (CMS-HCC) (present on admission)  Assessment & Plan  - History is vague but looks like he was drinking heavily in the past. Last drink was 8/5/2017.  - s/p rally bag in ER. On thiamine and folate.   -Tachycardia due to ?alcohol withdrawal symptoms.    Hyperglycemia (present on admission)  Assessment & Plan  - Resolved  - On adm: , A1c: 5.4  - He was started on ISS and hypoglycemia protocol which is discontinued.  CTM with accuchecks    Elevated troponin  Assessment & Plan  - trops ~0.2 w/o ST-TW changes. Likely elevated due to renal disease    Non-rheumatic  mitral regurgitation  Assessment & Plan  - Moderate MR on ECHO    Metabolic acidosis (present on admission)  Assessment & Plan  - AGMA on admission likely due to uremia  - Resolved with dialysis  - nephrology on board      DISPO: In ICU for Afib Mx    CODE STATUS: Full Code    Quality Measures:  Truong Catheter: No  DVT Prophylaxis: Heparin drip  Ulcer Prophylaxis: None   Antibiotics: None  Lines: PIV, RIJ dialysis cath

## 2017-08-11 NOTE — PROGRESS NOTES
"Pulmonary Critical Care Progress Note        Date of service: 8/11/2017    Chief Complaint: Tachycardia    History of Present Illness: 47 y.o. male who presented to an urgent care with tachycardia.  He was subsequently sent to the emergency room.  Additionally, he was complaining of some stroke-like   symptoms.  According to the daughter at the bedside he has had slurred speech for approximately a week with some left-sided facial droop for the same period of time.  When these symptoms began, he did not seek any medical care.  He only came in to urgent care today because he was feeling weak and tired.  When he arrived in the emergency department, he was found to be in atrial fibrillation with a rapid ventricular response.  His heart rate was in the 120s.  He received intravenous diltiazem, but he became profoundly bradycardic.  Cardiology consultation was obtained.  He was found to be in acute kidney injury.  Subsequently, he has had a dialysis catheter placed.  He was seen by Dr. Merchant from nephrology and is now undergoing emergent dialysis.     ROS:  Respiratory: negative cough, negative shortness of breath and negative wheezing, Cardiac: negative chest pain and negative leg swelling, GI: negative nausea, negative vomiting and negative abdominal pain.  All other systems negative.    Interval Events:  24 hour interval history reviewed    - LOBO confirming valve abnormalities and EF 20%, no obvious clot seen --> CVS consulted   - remains on heparin gtt   - MRI showing acute CVA with \"microhemmorhages\"   - remains in A-fib with RVR but off dilt gtt per cards    PFSH:  No change.    Respiratory:    room air  Pulse Oximetry: 93 %          Exam: unlabored respirations, no intercostal retractions or accessory muscle use and rales bibasilar  ImagingCXR  I have personally reviewed the chest x-ray my impression is no film today        Invalid input(s): [id number]    HemoDynamics:  Pulse: (!) 54, Heart Rate (Monitored): (!) " "145  NIBP: (!) 97/29 mmHg    diltiazem and metop PO    Exam: atrial fibrillation, tachycardic, murmur, trace LE edema  Imaging: Available data reviewed    - LOBO 8/10: CONCLUSIONS  Trileaflet aortic valve. The left coronary cusp has a nodule   calcification with poor coaptation resulting in severe central   eccentric aortic regurgitation.  Severely reduced left ventricular systolic function.  Left ventricular ejection fraction is visually estimated to be 20%.  Severe concentric left ventricular hypertrophy.  Normal right ventricular size and systolic function.  Mild mitral regurgitation.  These findings were communicated to the ICU service.  Recent Labs      08/09/17   1305  08/09/17   1959  08/10/17   0016   TROPONINI  0.29*  0.28*  0.26*   BNPBTYPENAT  2203*   --    --        Neuro:  GCS Total Newberg Coma Score: 15       Exam: ?left facial droop but CN otherwise intact and no extremity weakness mental status intact oriented for age x3  Imaging: Available data reviewed   CT head unremarkable   MRI brain 8/10:  1.  Mild diffuse cerebral atrophy.  2.  Small areas of acute infarction involving the left frontal periventricular white matter extending inferiorly to the subinsular cortex. Punctate area of infarction involving the cortex in the right posterior occipital lobe.  3.  Moderate periventricular white matter changes consistent with chronic microvascular ischemic gliosis.  4.  Small chronic focus of lacunar type infarction in the right frontal periventricular white matter.  5.  Numerous chronic \"microbleed's\" are noted throughout the brain parenchyma in both the supra and infratentorial compartments. Gyriform/curvilinear regions of chronic \"microbleed\" noted in the right posterior frontal region and also the left frontal   periventricular white matter.     Fluids:  Intake/Output       08/09/17 0700 - 08/10/17 0659 08/10/17 0700 - 08/11/17 0659 08/11/17 0700 - 08/12/17 0659      1630-2461 3034-3153 Total 7063-1171 " 8066-9873 Total 2463-0737 1968-4003 Total       Intake    I.V.  --  252 252  196  143.9 339.9  --  -- --    Heparin Volume -- 198 198 176 143.9 319.9 -- -- --    Diltiazem Volume -- 54 54 20 -- 20 -- -- --    Dialysis  --  500 500  400  -- 400  --  -- --    Dialysis Volume (Dialysis Intake) -- 500 500 400 -- 400 -- -- --    Total Intake -- 752 752 596 143.9 739.9 -- -- --       Output    Urine  30  -- 30  1150  150 1300  --  -- --    Number of Times Voided 1 x -- 1 x -- -- -- -- -- --    Void (ml) 30 -- 30 9006 875 9685 -- -- --    Dialysis  --  2500 2500  2900  -- 2900  --  -- --    Dialysis Output (Dialysis Output) -- 2500 2500 2900 -- 2900 -- -- --    Total Output 30 2500 2530 4050 150 4200 -- -- --       Net I/O     -30 -1748 -1778 -3454 -6.1 -3460.1 -- -- --           Recent Labs      17   1305  08/10/17   0016   SODIUM  130*  134*   POTASSIUM  4.7  3.5*   CHLORIDE  107  105   CO2  11*  20   BUN  65*  43*   CREATININE  4.38*  3.05*   MAGNESIUM  2.3  2.0   PHOSPHORUS  6.1*  4.7*   CALCIUM  8.8  8.0*       GI/Nutrition:  Exam: abdomen is soft and non-tender, normal active bowel sounds  Imaging: Available data reviewed  taking PO  Liver Function  Recent Labs      17   1305  08/10/17   0016   ALTSGPT  117*  104*   ASTSGOT  96*  74*   ALKPHOSPHAT  79  69   TBILIRUBIN  1.5  1.3   GLUCOSE  182*  120*       Heme:  Recent Labs      17   1305   08/10/17   0620  08/10/17   1637  17   0234   RBC  4.57*   --   4.08*   --    --    HEMOGLOBIN  14.1   --   12.6*   --    --    HEMATOCRIT  43.6   --   38.2*   --    --    PLATELETCT  270   --   218   --    --    PROTHROMBTM  16.9*   --    --    --    --    APTT   --    < >  149.8*  45.3*  37.9*   INR  1.33*   --    --    --    --     < > = values in this interval not displayed.       Infectious Disease:  Temp  Av.7 °C (98.1 °F)  Min: 36.6 °C (97.9 °F)  Max: 36.8 °C (98.3 °F)  Micro: reviewed  Recent Labs      17   1305  08/10/17   0016   08/10/17   0620   WBC  6.2   --   8.6   NEUTSPOLYS  61.30   --   62.00   LYMPHOCYTES  23.70   --   20.10*   MONOCYTES  10.00   --   9.00   EOSINOPHILS  3.70   --   7.80*   BASOPHILS  1.10   --   0.70   ASTSGOT  96*  74*   --    ALTSGPT  117*  104*   --    ALKPHOSPHAT  79  69   --    TBILIRUBIN  1.5  1.3   --      Current Facility-Administered Medications   Medication Dose Frequency Provider Last Rate Last Dose   • atorvastatin (LIPITOR) tablet 40 mg  40 mg QHS Susanne Nicholas M.D.       • metoprolol (LOPRESSOR) tablet 25 mg  25 mg Q8HRS Susanne Nicholas M.D.   25 mg at 08/11/17 0521   • aspirin (ASA) chewable tab 81 mg  81 mg DAILY Susanne Nicholas M.D.   81 mg at 08/10/17 1557   • metoprolol (LOPRESSOR) injection 5 mg  5 mg Q6HRS PRN Wesley Sanchez M.D.   5 mg at 08/11/17 0228   • senna-docusate (PERICOLACE or SENOKOT S) 8.6-50 MG per tablet 2 Tab  2 Tab BID Jose Alberto Pope M.D.   2 Tab at 08/10/17 2026    And   • polyethylene glycol/lytes (MIRALAX) PACKET 1 Packet  1 Packet QDAY PRN Jose Alberto Pope M.D.        And   • magnesium hydroxide (MILK OF MAGNESIA) suspension 30 mL  30 mL QDAY PRN Jose Alberto Pope M.D.        And   • bisacodyl (DULCOLAX) suppository 10 mg  10 mg QDAY PRN Jose Alberto Pope M.D.       • Respiratory Care per Protocol   Continuous RT Jose Alberto Pope M.D.       • glucose 4 g chewable tablet 16 g  16 g Q15 MIN PRN Jose Alberto Pope M.D.        And   • dextrose 50% (D50W) injection 25 mL  25 mL Q15 MIN PRN Jose Alberto Pope M.D.       • heparin 1000 units/mL injection 2,200 Units  2,200 Units PRN Martha Cunha PHARMD   2,200 Units at 08/11/17 0344    And   • heparin infusion 25,000 units in 500 ml 0.45% nacl   Continuous Martha Cunha PHARMD 22 mL/hr at 08/11/17 0705 1,100 Units/hr at 08/11/17 0705   • folic acid (FOLVITE) tablet 1 mg  1 mg DAILY Leticia Curtis M.D.   1 mg at 08/10/17 0809   • thiamine (THIAMINE) tablet 100 mg  100 mg DAILY Leticia Curtis M.D.   100 mg at 08/10/17 0809   •  multivitamin (THERAGRAN) tablet 1 Tab  1 Tab DAILY Leticia Curtis M.D.   1 Tab at 08/10/17 0809   • heparin 1000 units/mL injection 3,000 Units  3,000 Units PRN Katrina Merchant M.D.   3,000 Units at 08/10/17 1200     Last reviewed on 8/9/2017  2:32 PM by Gisele Gaitan, T    Quality  Measures:  Radiology images reviewed, Labs reviewed and Medications reviewed  Truong catheter: No Truong  Central line in place: Need for access and Dialysis    DVT Prophylaxis: Heparin  DVT prophylaxis - mechanical: SCDs  Ulcer prophylaxis: Not indicated    Assessed for rehab: Patient returned to prior level of function, rehabilitation not indicated at this time    Assessment/Plan:  New onset Atrial fibrillation with rapid ventricular response - persistent   - cont dilt and BB PO per cards   - cont hep gtt   - cardiology following  Acute ischemic stroke with diffuse microhemorrhage   - carotid duplex, therapies   - Antiplatelets, statin  Acute on chronic (stage III) kidney injury - cardiorenal   - Nephrology following, HD prn   - avoid nephrotoxins  Hyperglycemia without pre-existing history of diabetes - insulin sliding scale  Uncontrolled Systemic arterial hypertension noncompliant with medication ×2 months   - Systolic blood pressure goal less than 160   History of urolithiasis  History of alcohol abuse, apparently he has been cutting back   - Monitor for alcohol withdrawal symptoms, vitamin therapy  Acute decompensated systolic heart failure (EF 25%) with moderate MR and severe AI   - Beta blocker, ACE inhibitor contraindicated given renal failure   - Volume removal with dialysis and eventually diuretics   - CVS consultation  Acute cardiogenic pulmonary edema   - PUF, diuresis  Moderate pulmonary arterial hypertension  Prophylaxis, diet, therapies    Patient remains critically ill at this time requiring active management of his heart rate, blood pressure, fluid management. To remaining intensive care unit for now.    Discussed  patient condition and risk of morbidity and/or mortality with RN, RT, Pharmacy, Charge nurse / hot rounds, Patient and cardiology, CVS, nephrology and [facility].    The patient remains critically ill.  Critical care time = 34 minutes in directly providing and coordinating critical care and extensive data review.  No time overlap and excludes procedures.

## 2017-08-12 LAB
ALBUMIN SERPL BCP-MCNC: 2.8 G/DL (ref 3.2–4.9)
ALBUMIN/GLOB SERPL: 1.2 G/DL
ALP SERPL-CCNC: 54 U/L (ref 30–99)
ALT SERPL-CCNC: 81 U/L (ref 2–50)
ANION GAP SERPL CALC-SCNC: 8 MMOL/L (ref 0–11.9)
APTT PPP: 82.6 SEC (ref 24.7–36)
APTT PPP: 94.9 SEC (ref 24.7–36)
AST SERPL-CCNC: 38 U/L (ref 12–45)
BASOPHILS # BLD AUTO: 0.6 % (ref 0–1.8)
BASOPHILS # BLD: 0.05 K/UL (ref 0–0.12)
BILIRUB SERPL-MCNC: 0.7 MG/DL (ref 0.1–1.5)
BNP SERPL-MCNC: 2254 PG/ML (ref 0–100)
BUN SERPL-MCNC: 44 MG/DL (ref 8–22)
CALCIUM SERPL-MCNC: 8.4 MG/DL (ref 8.5–10.5)
CHLORIDE SERPL-SCNC: 107 MMOL/L (ref 96–112)
CO2 SERPL-SCNC: 22 MMOL/L (ref 20–33)
CREAT SERPL-MCNC: 3.02 MG/DL (ref 0.5–1.4)
EKG IMPRESSION: NORMAL
EOSINOPHIL # BLD AUTO: 0.81 K/UL (ref 0–0.51)
EOSINOPHIL NFR BLD: 9.7 % (ref 0–6.9)
ERYTHROCYTE [DISTWIDTH] IN BLOOD BY AUTOMATED COUNT: 51.9 FL (ref 35.9–50)
GFR SERPL CREATININE-BSD FRML MDRD: 22 ML/MIN/1.73 M 2
GLOBULIN SER CALC-MCNC: 2.3 G/DL (ref 1.9–3.5)
GLUCOSE SERPL-MCNC: 96 MG/DL (ref 65–99)
HCT VFR BLD AUTO: 38.6 % (ref 42–52)
HGB BLD-MCNC: 12.6 G/DL (ref 14–18)
IMM GRANULOCYTES # BLD AUTO: 0.01 K/UL (ref 0–0.11)
IMM GRANULOCYTES NFR BLD AUTO: 0.1 % (ref 0–0.9)
LYMPHOCYTES # BLD AUTO: 2.55 K/UL (ref 1–4.8)
LYMPHOCYTES NFR BLD: 30.4 % (ref 22–41)
MCH RBC QN AUTO: 30.7 PG (ref 27–33)
MCHC RBC AUTO-ENTMCNC: 32.6 G/DL (ref 33.7–35.3)
MCV RBC AUTO: 94.1 FL (ref 81.4–97.8)
MONOCYTES # BLD AUTO: 0.76 K/UL (ref 0–0.85)
MONOCYTES NFR BLD AUTO: 9.1 % (ref 0–13.4)
NEUTROPHILS # BLD AUTO: 4.21 K/UL (ref 1.82–7.42)
NEUTROPHILS NFR BLD: 50.1 % (ref 44–72)
NRBC # BLD AUTO: 0 K/UL
NRBC BLD AUTO-RTO: 0 /100 WBC
PLATELET # BLD AUTO: 208 K/UL (ref 164–446)
PMV BLD AUTO: 10.4 FL (ref 9–12.9)
POTASSIUM SERPL-SCNC: 4.3 MMOL/L (ref 3.6–5.5)
PROT SERPL-MCNC: 5.1 G/DL (ref 6–8.2)
RBC # BLD AUTO: 4.1 M/UL (ref 4.7–6.1)
SODIUM SERPL-SCNC: 137 MMOL/L (ref 135–145)
WBC # BLD AUTO: 8.4 K/UL (ref 4.8–10.8)

## 2017-08-12 PROCEDURE — 700111 HCHG RX REV CODE 636 W/ 250 OVERRIDE (IP): Performed by: INTERNAL MEDICINE

## 2017-08-12 PROCEDURE — 700102 HCHG RX REV CODE 250 W/ 637 OVERRIDE(OP): Performed by: INTERNAL MEDICINE

## 2017-08-12 PROCEDURE — 85025 COMPLETE CBC W/AUTO DIFF WBC: CPT

## 2017-08-12 PROCEDURE — 80053 COMPREHEN METABOLIC PANEL: CPT

## 2017-08-12 PROCEDURE — 700102 HCHG RX REV CODE 250 W/ 637 OVERRIDE(OP): Performed by: STUDENT IN AN ORGANIZED HEALTH CARE EDUCATION/TRAINING PROGRAM

## 2017-08-12 PROCEDURE — 90935 HEMODIALYSIS ONE EVALUATION: CPT

## 2017-08-12 PROCEDURE — A9270 NON-COVERED ITEM OR SERVICE: HCPCS | Performed by: INTERNAL MEDICINE

## 2017-08-12 PROCEDURE — 93880 EXTRACRANIAL BILAT STUDY: CPT

## 2017-08-12 PROCEDURE — 770020 HCHG ROOM/CARE - TELE (206)

## 2017-08-12 PROCEDURE — 85730 THROMBOPLASTIN TIME PARTIAL: CPT | Mod: 91

## 2017-08-12 PROCEDURE — 700111 HCHG RX REV CODE 636 W/ 250 OVERRIDE (IP)

## 2017-08-12 PROCEDURE — A9270 NON-COVERED ITEM OR SERVICE: HCPCS | Performed by: STUDENT IN AN ORGANIZED HEALTH CARE EDUCATION/TRAINING PROGRAM

## 2017-08-12 PROCEDURE — 83880 ASSAY OF NATRIURETIC PEPTIDE: CPT

## 2017-08-12 RX ADMIN — DILTIAZEM HYDROCHLORIDE 30 MG: 30 TABLET, FILM COATED ORAL at 15:03

## 2017-08-12 RX ADMIN — ASPIRIN 81 MG: 81 TABLET, CHEWABLE ORAL at 07:25

## 2017-08-12 RX ADMIN — ATORVASTATIN CALCIUM 40 MG: 40 TABLET, FILM COATED ORAL at 20:38

## 2017-08-12 RX ADMIN — Medication 100 MG: at 07:25

## 2017-08-12 RX ADMIN — FOLIC ACID 1 MG: 1 TABLET ORAL at 07:25

## 2017-08-12 RX ADMIN — HEPARIN SODIUM 3000 UNITS: 1000 INJECTION, SOLUTION INTRAVENOUS; SUBCUTANEOUS at 16:14

## 2017-08-12 RX ADMIN — STANDARDIZED SENNA CONCENTRATE AND DOCUSATE SODIUM 2 TABLET: 8.6; 5 TABLET, FILM COATED ORAL at 20:38

## 2017-08-12 RX ADMIN — DILTIAZEM HYDROCHLORIDE 30 MG: 30 TABLET, FILM COATED ORAL at 18:17

## 2017-08-12 RX ADMIN — DILTIAZEM HYDROCHLORIDE 30 MG: 30 TABLET, FILM COATED ORAL at 05:03

## 2017-08-12 RX ADMIN — THERA TABS 1 TABLET: TAB at 07:25

## 2017-08-12 RX ADMIN — METOPROLOL TARTRATE 50 MG: 50 TABLET, FILM COATED ORAL at 05:03

## 2017-08-12 RX ADMIN — HEPARIN SODIUM 800 UNITS/HR: 5000 INJECTION, SOLUTION INTRAVENOUS at 10:12

## 2017-08-12 RX ADMIN — DILTIAZEM HYDROCHLORIDE 30 MG: 30 TABLET, FILM COATED ORAL at 00:24

## 2017-08-12 RX ADMIN — METOPROLOL TARTRATE 50 MG: 50 TABLET, FILM COATED ORAL at 20:38

## 2017-08-12 ASSESSMENT — PAIN SCALES - GENERAL
PAINLEVEL_OUTOF10: 0

## 2017-08-12 ASSESSMENT — ENCOUNTER SYMPTOMS
DIZZINESS: 0
PALPITATIONS: 0
HEADACHES: 0
MYALGIAS: 0
COUGH: 0
CHILLS: 0
SPUTUM PRODUCTION: 0
FOCAL WEAKNESS: 1
PALPITATIONS: 1
ORTHOPNEA: 1
WHEEZING: 0
DIZZINESS: 1
SORE THROAT: 0
VOMITING: 0
HEARTBURN: 0
ORTHOPNEA: 0
SHORTNESS OF BREATH: 0
HEMOPTYSIS: 0
NAUSEA: 0
FEVER: 0
EYES NEGATIVE: 1
BRUISES/BLEEDS EASILY: 0
WEAKNESS: 1
DIARRHEA: 0
ABDOMINAL PAIN: 0
SHORTNESS OF BREATH: 1

## 2017-08-12 NOTE — PROGRESS NOTES
CERTIFICATE OF SCHOOL    5/26/2017      Re:   Aliza Funes   [address] [place] [address]      This is to certify that Aliza Funes has been under my care from 5/26/2017 and can return to school on 5/27/2017    RESTRICTIONS: none      REMARKS: none        SIGNATURE:___________________________________________,   5/26/2017      Alcides Vazquez MD          [facility]  [address]  [place] [address] [phone]             Patient seen and films reviewed. Patient needs AVR/MAZE w/ LOBO. I do not recommend early intervention due to recent stroke with hemorrhage and new onset ARF with HD. Will need cardiac catheterization. Will follow-up with him in our office in 6 weeks.

## 2017-08-12 NOTE — PROGRESS NOTES
ICU transfer note    Mr Saini is a 48yo [place]ammarinese speaking male with PMH of alcoholism (vague history, was drinking heavily few years ago), CKD (since 7years), HTN (ran out of medications few months ago) and questionable h/o Afib presented with c/o generalized weakness, fatigue, SOB and palpitations. His family had noticed left facial droop, slurred speech and mild left sided weakness. He had refused to see a doctor. In ER he had Afib w/ RVR. He was given diltiazem IV and immediately he turned bradycardic which improved after few minutes. Cardiology was consulted and was started on Diltiazem drip. He also had Cr 4.38 w/ EGFR 14. Dr Merchant was consulted and arrangements for emergent dialysis was made. Also had AGMA, elevated AST/ALT, , A1C 5.7, BNP 2203. He was transferred to ICU for close monitoring.     In ICU:   Diltiazem drip was changed to oral metoprolol when rate was controlled. However, he continued to have RVR, metoprolol dose was increased and started diltiazem PO. HR has been stable with occasional tachycardia. He was started on heparin drip. ECHO showed EF 25% with severe LVH, severe AI, moderate MR. Cardiology wanted LOBO to plan for cardioversion. LOBO confirmed EF of 20% and showed 'sluggish' blood flow in heart but not thrombus and severe AI. Patient was informed that he will need AVR and cardiac suregry was consulted. As part of work-up of stroke and to plan cardiac surgery, CT head and MRI brain was ordered. CT head showed cerebral atrophy and small vessel ischemic changes. MRI brain showed acute infarction in L frontal periventricular white matter extendeing to subinsular cortex, punctate area of infarction in R posterior lobe, numerous microbleeds. Cardiac surgery opted for elective AVR in 6 weeks given his recent stroke. Cardiology might do PCI on Monday, he will be on heparin drip till then. Have to f/u as to when he will be cardioverted.  He is being followed by Dr Merchant for ESRD, has been  getting daily dialysis. Likely will need permanent dialysis.  Regarding CVA, has no residual neurological deficits currently. ASA and lipitor was started.   Electrolytes were repleted and he got rally bag, folic acid, thiamine and MV for alcoholism.    Of note: He dose not have a good insurance coverage. SW was asked to discuss about options for financial support.     F/U  - with cardiology about PCI, cardioversion and heparin drip. Can bridge to warfarin after PCI.  - SW about his financial issues.   - F/U with cardiac surgery in 6weeks.   - With Dr Merchant about long term dialysis.

## 2017-08-12 NOTE — PROGRESS NOTES
12 hour chart check     12 hour monitor summery:    Rhythm: A fib -/.10/-  Rate:   Ectopy: occasional PVC

## 2017-08-12 NOTE — CARE PLAN
Problem: Safety  Goal: Will remain free from injury  Patient ambulates well.  Calls appropriately.  No assistance needed, educated patient on importance of calling RN only for assistance of disconnecting/reconnecting cables and attaching tele box.      Problem: Respiratory:  Goal: Respiratory status will improve  Patient remains on room air.  Tolerates well.  Declines shortness of breath

## 2017-08-12 NOTE — PROGRESS NOTES
Hemodialysis done today, started @ 1313 and ended @ 1614 with net UF= 3000 ml, report given to KRYSTIN Charles, see flow sheet for details

## 2017-08-12 NOTE — CARE PLAN
Problem: Communication  Goal: The ability to communicate needs accurately and effectively will improve  Outcome: PROGRESSING AS EXPECTED  Assessed pt communication needs. Citizen of [place] is his first language. He can understand english but doesn't speak English well.  Citizen of [place] language educational metrial at bedside. Speech is clear and appropriate. Pt is A & O x 4. Pt is oriented to environment, to his room and the unit. Pt reminded to use call light when needs help/ before getting out of bed.  Call light and personal belonging within reach. Spoke with pt about concerns and safety measurers.  Went over plan of care, long term goal (avoiding additional hospitalizations, reducing his alcohol intake) and answered any questions.         Problem: Safety  Goal: Will remain free from falls  Outcome: PROGRESSING AS EXPECTED  Assessed risk for falls and implmented precautions in order to keep pt safe. Unnessery line are SL/ disconnected. Pt reminded to call for help when wanting to get out of bed.  Went over plan of care with pt and wife and answered any questions. Call light and personal belonging within reach at all time. Bed locked, in low position, and 3/4 side rails up, bed alarm on. Pt re-oriented and offered fluids/ go to bethroom frequently. 1 staff s/b assist while ambulating.  Room near nursing station. PT was free of falls while ambulating.

## 2017-08-12 NOTE — PROGRESS NOTES
Pulmonary Critical Care Progress Note        Date of service: 8/12/2017    Chief Complaint: Tachycardia    History of Present Illness: 47 y.o. male who presented to an urgent care with tachycardia.  He was subsequently sent to the emergency room.  Additionally, he was complaining of some stroke-like   symptoms.  According to the daughter at the bedside he has had slurred speech for approximately a week with some left-sided facial droop for the same period of time.  When these symptoms began, he did not seek any medical care.  He only came in to urgent care today because he was feeling weak and tired.  When he arrived in the emergency department, he was found to be in atrial fibrillation with a rapid ventricular response.  His heart rate was in the 120s.  He received intravenous diltiazem, but he became profoundly bradycardic.  Cardiology consultation was obtained.  He was found to be in acute kidney injury.  Subsequently, he has had a dialysis catheter placed.  He was seen by Dr. Merchant from nephrology and is now undergoing emergent dialysis.     ROS:  Respiratory: negative cough, negative shortness of breath and negative wheezing, Cardiac: negative chest pain and negative leg swelling, GI: negative nausea, negative vomiting and negative abdominal pain.  All other systems negative.    Interval Events:  24 hour interval history reviewed    - Evaluated by cardiothoracic surgery and deemed not to be an immediate operative candidate given recent stroke and petechial hemorrhage   - Remains alert and oriented ×4    PFSH:  No change.    Respiratory:    room air  Pulse Oximetry: 99 %          Exam: unlabored respirations, no intercostal retractions or accessory muscle use and clear to auscultation without rales or wheezes  ImagingCXR  I have personally reviewed the chest x-ray my impression is no film today        Invalid input(s): [id number]    HemoDynamics:  Pulse: 70, Heart Rate (Monitored): 92  Blood Pressure: 105/85  "mmHg, NIBP: 141/82 mmHg    diltiazem and metop PO    Exam: atrial fibrillation, rate controlled, murmur, trace LE edema  Imaging: Available data reviewed    - LOBO 8/10: CONCLUSIONS  Trileaflet aortic valve. The left coronary cusp has a nodule   calcification with poor coaptation resulting in severe central   eccentric aortic regurgitation.  Severely reduced left ventricular systolic function.  Left ventricular ejection fraction is visually estimated to be 20%.  Severe concentric left ventricular hypertrophy.  Normal right ventricular size and systolic function.  Mild mitral regurgitation.  These findings were communicated to the ICU service.  Recent Labs      08/09/17   1305  08/09/17   1959  08/10/17   0016  08/11/17   1931  08/12/17   0357   TROPONINI  0.29*  0.28*  0.26*  0.14*   --    BNPBTYPENAT  2203*   --    --    --   2254*       Neuro:  GCS Total Lizzie Coma Score: 15       Exam: ?left facial droop but CN otherwise intact and no extremity weakness mental status intact oriented for age x3  Imaging: Available data reviewed   CT head unremarkable   MRI brain 8/10:  1.  Mild diffuse cerebral atrophy.  2.  Small areas of acute infarction involving the left frontal periventricular white matter extending inferiorly to the subinsular cortex. Punctate area of infarction involving the cortex in the right posterior occipital lobe.  3.  Moderate periventricular white matter changes consistent with chronic microvascular ischemic gliosis.  4.  Small chronic focus of lacunar type infarction in the right frontal periventricular white matter.  5.  Numerous chronic \"microbleed's\" are noted throughout the brain parenchyma in both the supra and infratentorial compartments. Gyriform/curvilinear regions of chronic \"microbleed\" noted in the right posterior frontal region and also the left frontal   periventricular white matter.     Fluids:  Intake/Output       08/10/17 0700 - 08/11/17 0659 08/11/17 0700 - 08/12/17 0659 08/12/17 " 0700 - 08/13/17 0659      0700-1859 1900-0659 Total 0700-1859 1900-0659 Total 0700-1859 1900-0659 Total       Intake    P.O.  --  -- --  500  300 800  --  -- --    P.O. -- -- -- 500 300 800 -- -- --    I.V.  196  143.9 339.9  125.8  257.3 383.1  --  -- --    Heparin Volume 176 143.9 319.9 125.8 257.3 383.1 -- -- --    Diltiazem Volume 20 -- 20 -- -- -- -- -- --    Other  --  -- --  --  60 60  --  -- --    Medications (P.O./ Enteral Liquids) -- -- -- -- 60 60 -- -- --    Dialysis  400  -- 400  --  -- --  --  -- --    Dialysis Volume (Dialysis Intake) 400 -- 400 -- -- -- -- -- --    Total Intake 596 143.9 739.9 625.8 617.3 1243.1 -- -- --       Output    Urine  1150  150 1300  75  155 230  --  -- --    Number of Times Voided -- -- -- 2 x 1 x 3 x -- -- --    Void (ml) 0275 866 7902 75 155 230 -- -- --    Dialysis  2900  -- 2900  --  -- --  --  -- --    Dialysis Output (Dialysis Output) 2900 -- 2900 -- -- -- -- -- --    Stool  --  -- --  --  -- --  --  -- --    Number of Times Stooled -- -- -- -- 0 x 0 x -- -- --    Total Output 4050 150 4200 75 155 230 -- -- --       Net I/O     -3454 -6.1 -3460.1 550.8 462.3 1013.1 -- -- --        Weight: 65.9 kg (145 lb 4.5 oz)  Recent Labs      08/09/17   1305  08/10/17   0016  08/11/17   0649  08/12/17   0357   SODIUM  130*  134*  135  137   POTASSIUM  4.7  3.5*  3.9  4.3   CHLORIDE  107  105  104  107   CO2  11*  20  25  22   BUN  65*  43*  35*  44*   CREATININE  4.38*  3.05*  2.50*  3.02*   MAGNESIUM  2.3  2.0  2.2   --    PHOSPHORUS  6.1*  4.7*  3.1   --    CALCIUM  8.8  8.0*  8.3*  8.4*       GI/Nutrition:  Exam: abdomen is soft and non-tender, normal active bowel sounds  Imaging: Available data reviewed  taking PO  Liver Function  Recent Labs      08/10/17   0016  08/11/17   0649  08/12/17   0357   ALTSGPT  104*  98*  81*   ASTSGOT  74*  52*  38   ALKPHOSPHAT  69  67  54   TBILIRUBIN  1.3  0.9  0.7   GLUCOSE  120*  101*  96       Heme:  Recent Labs      08/09/17   1305    08/10/17   0620   17   0945  17   1804  17   0054  17   0357   RBC  4.57*   --   4.08*   --    --    --    --   4.10*   HEMOGLOBIN  14.1   --   12.6*   --    --    --    --   12.6*   HEMATOCRIT  43.6   --   38.2*   --    --    --    --   38.6*   PLATELETCT  270   --   218   --    --    --    --   208   PROTHROMBTM  16.9*   --    --    --    --    --    --    --    APTT   --    < >  149.8*   < >  148.2*  97.0*  94.9*   --    INR  1.33*   --    --    --    --    --    --    --     < > = values in this interval not displayed.       Infectious Disease:  Temp  Av.8 °C (98.2 °F)  Min: 36.4 °C (97.5 °F)  Max: 36.9 °C (98.4 °F)  Micro: reviewed  Recent Labs      17   1305  08/10/17   0016  08/10/17   0620  17   0649  17   0357   WBC  6.2   --   8.6   --   8.4   NEUTSPOLYS  61.30   --   62.00   --   50.10   LYMPHOCYTES  23.70   --   20.10*   --   30.40   MONOCYTES  10.00   --   9.00   --   9.10   EOSINOPHILS  3.70   --   7.80*   --   9.70*   BASOPHILS  1.10   --   0.70   --   0.60   ASTSGOT  96*  74*   --   52*  38   ALTSGPT  117*  104*   --   98*  81*   ALKPHOSPHAT  79  69   --   67  54   TBILIRUBIN  1.5  1.3   --   0.9  0.7     Current Facility-Administered Medications   Medication Dose Frequency Provider Last Rate Last Dose   • atorvastatin (LIPITOR) tablet 40 mg  40 mg QHS Susanne Nicholas M.D.   40 mg at 17   • metoprolol (LOPRESSOR) tablet 50 mg  50 mg Q8HRS Kvng Gutierrez M.D.   50 mg at 17 0503   • diltiazem (CARDIZEM) tablet 30 mg  30 mg Q6HRS Kvng Gutierrez M.D.   30 mg at 17 0503   • aspirin (ASA) chewable tab 81 mg  81 mg DAILY Susanne Nicholas M.D.   81 mg at 17 0725   • metoprolol (LOPRESSOR) injection 5 mg  5 mg Q6HRS PRN Wesley Sanchez M.D.   5 mg at 17 0949   • senna-docusate (PERICOLACE or SENOKOT S) 8.6-50 MG per tablet 2 Tab  2 Tab BID Jose Alberto Pope M.D.   2 Tab at 17    And   • polyethylene glycol/lytes  (MIRALAX) PACKET 1 Packet  1 Packet QDAY PRN Jose Alberto Pope M.D.        And   • magnesium hydroxide (MILK OF MAGNESIA) suspension 30 mL  30 mL QDAY PRN Jose Alberto Pope M.D.        And   • bisacodyl (DULCOLAX) suppository 10 mg  10 mg QDAY PRN Jose Alberto Pope M.D.       • Respiratory Care per Protocol   Continuous RT Jose Alberto Pope M.D.       • glucose 4 g chewable tablet 16 g  16 g Q15 MIN PRN Jose Alberto Pope M.D.        And   • dextrose 50% (D50W) injection 25 mL  25 mL Q15 MIN PRN Jose Alberto Pope M.D.       • heparin 1000 units/mL injection 2,200 Units  2,200 Units PRN Martha Cunha PHARMD   2,200 Units at 08/11/17 0344    And   • heparin infusion 25,000 units in 500 ml 0.45% nacl   Continuous Martha Cunha PHARMD 16 mL/hr at 08/12/17 0706 800 Units/hr at 08/12/17 0706   • folic acid (FOLVITE) tablet 1 mg  1 mg DAILY Leticia Curtis M.D.   1 mg at 08/12/17 0725   • thiamine (THIAMINE) tablet 100 mg  100 mg DAILY Leticia Curtis M.D.   100 mg at 08/12/17 0725   • multivitamin (THERAGRAN) tablet 1 Tab  1 Tab DAILY Leticia Curtis M.D.   1 Tab at 08/12/17 0725   • heparin 1000 units/mL injection 3,000 Units  3,000 Units PRN Katrina Merchant M.D.   3,000 Units at 08/10/17 1200     Last reviewed on 8/9/2017  2:32 PM by Madison Oseguera    Quality  Measures:  Radiology images reviewed, Labs reviewed and Medications reviewed  Truong catheter: No Truong  Central line in place: Need for access and Dialysis    DVT Prophylaxis: Heparin  DVT prophylaxis - mechanical: SCDs  Ulcer prophylaxis: Not indicated    Assessed for rehab: Patient returned to prior level of function, rehabilitation not indicated at this time    Assessment/Plan:  New onset Atrial fibrillation with rapid ventricular response - improved   - cont dilt and BB PO per cards   - cont hep gtt --> transition to Coumadin   - cardiology following  Acute ischemic stroke with diffuse microhemorrhage   - carotid duplex, therapies   - Antiplatelets,  statin  Acute on chronic (stage III) kidney injury - cardiorenal   - Nephrology following, HD prn   - avoid nephrotoxins  Hyperglycemia without pre-existing history of diabetes - insulin sliding scale  Uncontrolled Systemic arterial hypertension noncompliant with medication ×2 months   - Systolic blood pressure goal less than 160   History of urolithiasis  History of alcohol abuse, apparently he has been cutting back   - Monitor for alcohol withdrawal symptoms, vitamin therapy  Acute decompensated systolic heart failure (EF 25%) with moderate MR and severe AI   - Beta blocker, ACE inhibitor contraindicated given renal failure   - Volume removal with dialysis and eventually diuretics   - [facility] plans for operative intervention in approximately 6 weeks, will need cath  Acute cardiogenic pulmonary edema   - PUF, diuresis  Moderate pulmonary arterial hypertension  Prophylaxis, diet, therapies    Ok to transfer patient out of ICU today. [facility] will sign off at transfer. Please call with questions.    Discussed patient condition and risk of morbidity and/or mortality with RN, RT, Pharmacy, Charge nurse / hot rounds, Patient and cardiology, CVS, nephrology and [facility].

## 2017-08-12 NOTE — PROGRESS NOTES
[facility] Progress Note      Admit Date: 8/9/2017  ICU Day: 4    Resident(s): Ziggy Sandra  Attending: GHADA HERNANDEZ/ Dr. Jeremy M Gonda    Date & Time:   8/12/2017   11:12 AM       Patient ID:    Name:             Bhavesh Saini     YOB: 1970  Age:                 47 y.o.  male   MRN:               [id number]    HPI:    Mr Saini is a 47 year old male with PMH of alcoholism and HTN, for which he is currently not taking any medication. He presented to the ED with his family due to weakness and fatigue. He complains of palpitation, SOB on exertion, but denies HA, nausea, vomiting, chest pain, syncope, coughing, or fever. He has history of similar symptoms. The family has noticed slurred speech last week but the patient refused to go and seen by doctor.     Consultants:  Cardiology, Cardiac Surgery, and Nephrology  PMA: Dr. Jeremy M. Gonda    Interval Events:    Patient states he is feeling good, no issues overnight.     His BP was in the 100-140 range for SBP and HR .    Cardiac surgery spoke with Mr Saini and family, he needs AVR/MAZE w LOBO, but due to his renal status and recent stroke with hemorrhage, they do not recommend at this time. They will follow up outpatient in 6 weeks, but will have a heart cath done during his hospital stay.      MRI scan revealed several infarctions and chronic micro-bleeds, (see MRI results for full description). Along with his TREASURE, this could hinder his surgery.      was contacted by nurse to help family with insurance options.     Review of Systems   Constitutional: Negative for fever and chills.   HENT: Negative for hearing loss.    Respiratory: Negative for cough, sputum production and shortness of breath.    Cardiovascular: Positive for palpitations. Negative for chest pain, orthopnea and leg swelling.   Gastrointestinal: Negative for heartburn.   Musculoskeletal: Negative for myalgias.   Neurological: Positive for dizziness and weakness.  Negative for headaches.       PHYSICAL EXAM  Filed Vitals:    08/12/17 0300 08/12/17 0400 08/12/17 0431 08/12/17 0500   BP:       Pulse: 71 61  70   Temp:  36.7 °C (98.1 °F)  36.4 °C (97.5 °F)   Resp: 16 23     Height:       Weight:   65.9 kg (145 lb 4.5 oz)    SpO2:  99%       Body mass index is 28.37 kg/(m^2).  /85 mmHg  Pulse 70  Temp(Src) 36.4 °C (97.5 °F)  Resp 23  Ht 1.524 m (5')  Wt 65.9 kg (145 lb 4.5 oz)  BMI 28.37 kg/m2  SpO2 99%  O2 therapy: Pulse Oximetry: 99 %, O2 Delivery: None (Room Air)    Physical Exam   Constitutional: He is oriented to person, place, and time and well-developed, well-nourished, and in no distress.   HENT:   Head: Normocephalic and atraumatic.   Right Ear: External ear normal.   Left Ear: External ear normal.   Eyes: EOM are normal. Pupils are equal, round, and reactive to light. Right eye exhibits no discharge. Left eye exhibits no discharge.   Neck: Normal range of motion. Neck supple.   Cardiovascular: An irregularly irregular rhythm present.   Murmur heard.  Pulmonary/Chest: Effort normal and breath sounds normal. No respiratory distress. He has no wheezes. He has no rales. He exhibits no tenderness.   Abdominal: Soft. Bowel sounds are normal. He exhibits distension. There is no tenderness. There is no rebound and no guarding.   Neurological: He is alert and oriented to person, place, and time.   Skin: Skin is warm.   Psychiatric: Mood, affect and judgment normal.       Respiratory:     Respiration: (!) 23, Pulse Oximetry: 99 %    Chest Tube Drains:          Invalid input(s): [id number]    HemoDynamics:  Pulse: 70, Heart Rate (Monitored): 92 Blood Pressure: 105/85 mmHg, NIBP: 141/82 mmHg      Neuro:      Fluids:        Intake/Output Summary (Last 24 hours) at 08/12/17 0923  Last data filed at 08/12/17 0400   Gross per 24 hour   Intake 1199.07 ml   Output    230 ml   Net 969.07 ml       Weight: 65.9 kg (145 lb 4.5 oz)  Body mass index is 28.37  kg/(m^2).    Recent Labs      17   1305  08/10/17   0016  1749  177   SODIUM  130*  134*  135  137   POTASSIUM  4.7  3.5*  3.9  4.3   CHLORIDE  107  105  104  107   CO2  11*  20  25  22   BUN  65*  43*  35*  44*   CREATININE  4.38*  3.05*  2.50*  3.02*   MAGNESIUM  2.3  2.0  2.2   --    PHOSPHORUS  6.1*  4.7*  3.1   --    CALCIUM  8.8  8.0*  8.3*  8.4*       GI/Nutrition:  Recent Labs      08/10/17   0016  17   0649  17   ALTSGPT  104*  98*  81*   ASTSGOT  74*  52*  38   ALKPHOSPHAT  69  67  54   TBILIRUBIN  1.3  0.9  0.7   GLUCOSE  120*  101*  96       Heme:  Recent Labs      17   1305   08/10/17   0620   17   1804  17   0054  17   0357  17   0745   RBC  4.57*   --   4.08*   --    --    --   4.10*   --    HEMOGLOBIN  14.1   --   12.6*   --    --    --   12.6*   --    HEMATOCRIT  43.6   --   38.2*   --    --    --   38.6*   --    PLATELETCT  270   --   218   --    --    --   208   --    PROTHROMBTM  16.9*   --    --    --    --    --    --    --    APTT   --    < >  149.8*   < >  97.0*  94.9*   --   82.6*   INR  1.33*   --    --    --    --    --    --    --     < > = values in this interval not displayed.       Infectious Disease:  Temp  Av.8 °C (98.2 °F)  Min: 36.4 °C (97.5 °F)  Max: 36.9 °C (98.4 °F)  Recent Labs      17   1305  08/10/17   0016  08/10/17   0620  1749  177   WBC  6.2   --   8.6   --   8.4   NEUTSPOLYS  61.30   --   62.00   --   50.10   LYMPHOCYTES  23.70   --   20.10*   --   30.40   MONOCYTES  10.00   --   9.00   --   9.10   EOSINOPHILS  3.70   --   7.80*   --   9.70*   BASOPHILS  1.10   --   0.70   --   0.60   ASTSGOT  96*  74*   --   52*  38   ALTSGPT  117*  104*   --   98*  81*   ALKPHOSPHAT  79  69   --   67  54   TBILIRUBIN  1.5  1.3   --   0.9  0.7       Meds:  • atorvastatin  40 mg     • metoprolol  50 mg     • diltiazem  30 mg     • aspirin  81 mg     • metoprolol  5 mg     •  "senna-docusate  2 Tab      And   • polyethylene glycol/lytes  1 Packet      And   • magnesium hydroxide  30 mL      And   • bisacodyl  10 mg     • Respiratory Care per Protocol       • glucose 4 g  16 g      And   • dextrose 50%  25 mL     • heparin 1000 units/mL  2,200 Units      And   • heparin   800 Units/hr (08/12/17 1012)   • folic acid  1 mg     • thiamine  100 mg     • multivitamin  1 Tab     • heparin 1000 units/mL  3,000 Units          Procedures:      Imaging:  MR-BRAIN-W/O   Final Result         1.  Mild diffuse cerebral atrophy.      2.  Small areas of acute infarction involving the left frontal periventricular white matter extending inferiorly to the subinsular cortex. Punctate area of infarction involving the cortex in the right posterior occipital lobe.      3.  Moderate periventricular white matter changes consistent with chronic microvascular ischemic gliosis.      4.  Small chronic focus of lacunar type infarction in the right frontal periventricular white matter.      5.  Numerous chronic \"microbleed's\" are noted throughout the brain parenchyma in both the supra and infratentorial compartments. Gyriform/curvilinear regions of chronic \"microbleed\" noted in the right posterior frontal region and also the left frontal    periventricular white matter.      TRANSESOPHAGEAL ECHO W/O CONT   Final Result      US-RENAL   Final Result      1.  No hydronephrosis is identified.      2.  Increased renal echotexture is consistent with medical renal disease.      3.  Left renal cysts.      4.  Small amount of free fluid in Morison's pouch.      ECHOCARDIOGRAM COMP W/O CONT   Final Result      IR-CVC NON TUNNELED > AGE 5   Final Result      1. Ultrasound and fluoroscopic guided placement of a right internal jugular 12 [place] Mahurkar triple lumen non-tunneled hemodialysis catheter.      2. The hemodialysis catheter may be used immediately as clinically indicated. Flushes per protocol.      CT-HEAD W/O   Final " Result      1.  Cerebral atrophy.      2.  White matter lucencies most consistent with small vessel ischemic change versus demyelination or gliosis.      3.  Otherwise, Head CT without contrast with no acute findings. No evidence of acute cerebral infarction, hemorrhage or mass lesion.      DX-CHEST-PORTABLE (1 VIEW)   Final Result      Cardiomegaly. No consolidation.      CAROTID DUPLEX ([facility] Only)    (Results Pending)       Problem and Plan:      * Atrial fibrillation with RVR (CMS-HCC) (present on admission)  Assessment & Plan  - New onset vs paroxysmal (vague PMH of afib and non-compliance with meds)  - Presented with palpitations and HR in 120s. He had HR in 20s when dilt IV push was given. Later HR stabilized and was started on dilt drip and moved to ICU. Currently on 50 mg of metoprolol. However, HR has been in 80-140s.  - H/O left facial droop and left sided weakness in the week prior to arrival at ED.  - CHADS2: 4  - ECHO: severe LVH, global hypokinesis, EF ~20%, severe AI and mod MR  - LOBO: slow blood flow but no thrombus. Severe AI  - MRI brain: acute infarction in L frontal periventricular white matter extendeing to subinsular cortex. Punctate area of infarction in R posterior lobe. Numerous microbleeds.  Plan  - Cardiac surgery spoke with Mr Saini and family, he needs AVR/MAZE w LOBO, but due to his renal status and recent stroke with hemorrhage, they do not recommend at this time. They will follow up outpatient in 6 weeks, but will have a heart cath done during his hospital stay.   - Na restricted diet  - metoprolol increased to 50 q8h, PRN 5mg metoprolol q6h. Started carvedilol 30mg q6h  - On therapeutic heparin dose, will consult with Dr. Gonda as to when to switch to coumadin  -Cardioversion will likely be done during AV replacement.     CVA (cerebral vascular accident) (CMS-HCC) (present on admission)  Assessment & Plan  - h/o left facial droop, slurred speech and left sided  weakness. However no neuro deficits currently  - CT: cerebral atrophy and small vessel ischemic changes   - MRI brain: acute infarction in L frontal periventricular white matter extendeing to subinsular cortex. Punctate area of infarction in R posterior lobe. Numerous microbleeds.   Assessment: Afib could have contributed (LOBO showed 'sludge' in the heart)  Plan  - ASA, statin  - Mx of Afib as stated above.    TREASURE (acute kidney injury) (CMS-HCC) (present on admission)  Assessment & Plan  - On adm: BUN/Cr:  /4.3, AGMA, Ph 6.1. No old labs to compare.   - FeNa <1%  - H/o renal disease since 7yrs, FH kidney disease in his father. Has been non-complaint with HTN medications. Has CHF.  - Renal US: no hydronephrosis and calculi. Left renal cysts.  - Acute on chronic. Likely pre-renal vs cardiorenal. HTN is likely the cause of CKD  Plan  - Nephrology on board  - Daily dialysis, low Na diet  -Electrolytes wnl today    HFrEF (heart failure with reduced ejection fraction) (CMS-HCC) (present on admission)  Assessment & Plan  - Presented with SOB, orthopnea, fatique and palpitations. Has h/o HTN and alcoholism in the past. On exam: JVD +., murmurs + but no volume overload.   - On adm:  BNP 2203, 2254 on 08/12/17  - CXR: cardiomegaly. ECHO showed: severe LVH, global hypokinesis, EF ~20%, severe AI and mod MR  Assessment: Compensated HF likely due to alcoholism vs tachycardia induced heart failure.   Plan  - Cardiac surgery spoke with Mr Saini and family, he needs AVR/MAZE w LOBO, but due to his renal status and recent stroke with hemorrhage, they do not recommend at this time. They will follow up outpatient in 6 weeks, but will have a heart cath done during his hospital stay.  - Na restricted diet  - On metoprolol for Afib which helps CHF. Cannot start ACEI and aldactone due to acute on CKD  - Likely AI valve replacement  -Nurse contacted to Social work to help with insurance   - He fits the criteria for defibrillator placement  but has to be medically optimized for atleast 3 months    Aortic regurgitation (present on admission)  Assessment & Plan  - Severe AR.   - ECHO:  severe LVH, global hypokinesis, EF ~20%, severe AI and mod MR. Findings were confirmed by LOBO  - Fits the criteria for valve replacement. Cardiology and cardiac surgery on board.    Electrolyte abnormality (present on admission)  Assessment & Plan  -Electrolytes wnl today  - On dialysis    Prolonged Q-T interval on ECG (present on admission)  Assessment & Plan  - QTc ~530s. Will avoid QTc prolonging meds.    Essential hypertension (present on admission)  Assessment & Plan  - PMH of HTN but likely non-compliant. ECHO showed severe LVH  - On BB for Afib. Will optimize meds when he stabilizes.    Alcoholic (CMS-HCC) (present on admission)  Assessment & Plan  - History is vague but looks like he was drinking heavily in the past. Last drink was 8/5/2017.  - s/p rally bag in ER. On thiamine and folate.   -Tachycardia due to ?alcohol withdrawal symptoms.    Hyperglycemia (present on admission)  Assessment & Plan  - Resolved  - On adm: , A1c: 5.4  - He was started on ISS and hypoglycemia protocol which is discontinued.  CTM with accuchecks    Elevated troponin  Assessment & Plan  - trops ~0.2 w/o ST-TW changes. Likely elevated due to renal disease    Non-rheumatic mitral regurgitation  Assessment & Plan  - Moderate MR on ECHO    Metabolic acidosis (present on admission)  Assessment & Plan  - AGMA on admission likely due to uremia  - Resolved with dialysis  - nephrology on board        DISPO: was in ICU awating possible surgery, consider transfer to floor    CODE STATUS: Full Code    Quality Measures:  T[place]ong Catheter: None in place  DVT Prophylaxis: Heparin  Ulcer Prophylaxis: None  Antibiotics: None  Lines: PIV

## 2017-08-12 NOTE — PROGRESS NOTES
Pt is is reporting 6/10 new pressure chest pain. Stat EKG ordered, troponin sent to lab. Compered EKG with previous EKG, no change is noted at this time. Called Dr Zamora who said he was familiar with pt. Dr Zamora informed me that his troponin is probably going to remain elevated (was elevated on previous lab) and that he will continue to monitor pt, asked to not call back about elevated troponin results. Pt pain is now resolved. Will continue to closely monitor.

## 2017-08-12 NOTE — CARE PLAN
Problem: Venous Thromboembolism (VTW)/Deep Vein Thrombosis (DVT) Prevention:  Goal: Patient will participate in Venous Thrombosis (VTE)/Deep Vein Thrombosis (DVT)Prevention Measures  Heparin gtt per protocol.  See MAR for details.  q6h appt checks.     Problem: Skin Integrity  Goal: Risk for impaired skin integrity will decrease  Patient self turns adequately.    Waffle cushion in use for chair while patient in chair.

## 2017-08-12 NOTE — PROGRESS NOTES
Nephrology Progress Note, Adult, Complex               Author: Katrina Lemusericabony Date & Time created: 8/12/2017  2:20 PM     Interval History:  48 y/o male with severe, CHF  -EF 25 %, Severe AI, A.fib, CVA in TREASURE, metabolic acidosis  Poor UOP  On HD  Doing better, less SOB  Seen and examined during HD -tolerates well  Review of Systems:  Review of Systems   Constitutional: Positive for malaise/fatigue. Negative for fever and chills.   HENT: Negative for congestion, nosebleeds and sore throat.    Eyes: Negative.    Respiratory: Negative for cough, hemoptysis and wheezing.    Cardiovascular: Positive for orthopnea. Negative for chest pain, palpitations and leg swelling.   Gastrointestinal: Negative for heartburn, nausea, vomiting, abdominal pain and diarrhea.   Genitourinary: Negative for dysuria.   Neurological: Negative for headaches.   All other systems reviewed and are negative.      Physical Exam:  Physical Exam   Constitutional: He is oriented to person, place, and time. He appears well-developed and well-nourished. No distress.   HENT:   Head: Normocephalic and atraumatic.   Nose: Nose normal.   Mouth/Throat: Oropharynx is clear and moist.   Eyes: Conjunctivae and EOM are normal. Pupils are equal, round, and reactive to light. No scleral icterus.   Neck: Normal range of motion. Neck supple. No thyromegaly present.   Cardiovascular: An irregularly irregular rhythm present. Tachycardia present.  Exam reveals no gallop and no friction rub.    Pulmonary/Chest: Effort normal and breath sounds normal. No respiratory distress. He has no wheezes. He has no rales.   Abdominal: Soft. Bowel sounds are normal. He exhibits no distension. There is no tenderness.   Musculoskeletal:   Trace pedal edema   Lymphadenopathy:     He has no cervical adenopathy.   Neurological: He is alert and oriented to person, place, and time. No cranial nerve deficit. Coordination normal.   Skin: Skin is warm. No rash noted. No erythema.   Nursing note  and vitals reviewed.      Labs:        Invalid input(s): [id number]  Recent Labs      08/09/17   1959  08/10/17   0016  08/11/17   1931  17   TROPONINI  0.28*  0.26*  0.14*   --    BNPBTYPENAT   --    --    --   2254*     Recent Labs      08/10/17   0016  08/11/17   0649  17   SODIUM  134*  135  137   POTASSIUM  3.5*  3.9  4.3   CHLORIDE  105  104  107   CO2  20  25  22   BUN  43*  35*  44*   CREATININE  3.05*  2.50*  3.02*   MAGNESIUM  2.0  2.2   --    PHOSPHORUS  4.7*  3.1   --    CALCIUM  8.0*  8.3*  8.4*     Recent Labs      08/10/17   0016  08/11/17   0649  17   ALTSGPT  104*  98*  81*   ASTSGOT  74*  52*  38   ALKPHOSPHAT  69  67  54   TBILIRUBIN  1.3  0.9  0.7   GLUCOSE  120*  101*  96     Recent Labs      08/10/17   0620   08/11/17   1804  17   0054  177  17   0745   RBC  4.08*   --    --    --   4.10*   --    HEMOGLOBIN  12.6*   --    --    --   12.6*   --    HEMATOCRIT  38.2*   --    --    --   38.6*   --    PLATELETCT  218   --    --    --   208   --    APTT  149.8*   < >  97.0*  94.9*   --   82.6*    < > = values in this interval not displayed.     Recent Labs      08/10/17   0016  08/10/17   0620  08/11/17   0649  08/12/17   0357   WBC   --   8.6   --   8.4   NEUTSPOLYS   --   62.00   --   50.10   LYMPHOCYTES   --   20.10*   --   30.40   MONOCYTES   --   9.00   --   9.10   EOSINOPHILS   --   7.80*   --   9.70*   BASOPHILS   --   0.70   --   0.60   ASTSGOT  74*   --   52*  38   ALTSGPT  104*   --   98*  81*   ALKPHOSPHAT  69   --   67  54   TBILIRUBIN  1.3   --   0.9  0.7           Hemodynamics:  Temp (24hrs), Av.8 °C (98.2 °F), Min:36.4 °C (97.5 °F), Max:36.9 °C (98.4 °F)  Temperature: 36.4 °C (97.5 °F)  Pulse  Av.9  Min: 50  Max: 148Heart Rate (Monitored): (!) 130  Blood Pressure: 105/85 mmHg, NIBP: 153/88 mmHg     Respiratory:    Respiration: 18, Pulse Oximetry: 99 %        RUL Breath Sounds: Clear, RML Breath Sounds: Clear,  RLL Breath Sounds: Clear, SABAS Breath Sounds: Clear, LLL Breath Sounds: Clear  Fluids:    Intake/Output Summary (Last 24 hours) at 08/12/17 1420  Last data filed at 08/12/17 1200   Gross per 24 hour   Intake 1063.3 ml   Output    305 ml   Net  758.3 ml     Weight: 65.9 kg (145 lb 4.5 oz)  GI/Nutrition:  Orders Placed This Encounter   Procedures   • DIET ORDER     Standing Status: Standing      Number of Occurrences: 1      Standing Expiration Date:      Order Specific Question:  Diet:     Answer:  2 Gram Sodium [7]     Order Specific Question:  Diet:     Answer:  Cardiac [6]     Medical Decision Making, by Problem:  Active Hospital Problems    Diagnosis   • *Atrial fibrillation with RVR (CMS-HCC) [I48.91]   • CVA (cerebral vascular accident) (CMS-HCC) [I63.9]   • Essential hypertension [I10]   • Respiratory failure (CMS-HCC) [J96.90]   • Electrolyte abnormality [E87.8]   • Hyperglycemia [R73.9]   • Prolonged Q-T interval on ECG [R94.31]   • Elevated troponin [R74.8]   • Acute on chronic systolic heart failure (CMS-HCC) [I50.23]   • Non-rheumatic mitral regurgitation [I34.0]   • TREASURE (acute kidney injury) (CMS-HCC) [N17.9]   • 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]       Labs reviewed and Medications reviewed                    Assessment and Plan    1.TREASURE / cardiorenal S /HD -seen and examined during dialysis -please see dialysis flow sheet for details  2.HTN: BP well controlled  3.Electrolytes: well controlled  4.Anemia: Hb Hb WNL -to monitor  5.Metabolic acidosis -corrected with HD  6.Volume:overloaded -UF with HD as BP tolerates  7.CHF -cardiology following  Recs: HD TTS , PUF prn, daily BMP             Will follow

## 2017-08-12 NOTE — PROGRESS NOTES
Received report from KRYSTIN Vital and assumed care of pt. . Spoke with pt and wife about concerns and safety measurers.  Went over plan of care with pt and wife and answered any questions. Verified lines and ensured patency, drips are set at the correct rate. Safety measurers implemented. Call light and personal belonging within reach

## 2017-08-13 PROBLEM — K76.7 HEPATORENAL SYNDROME (HCC): Status: ACTIVE | Noted: 2017-08-13

## 2017-08-13 PROBLEM — I13.10 CARDIORENAL SYNDROME: Status: ACTIVE | Noted: 2017-08-13

## 2017-08-13 LAB
ANION GAP SERPL CALC-SCNC: 5 MMOL/L (ref 0–11.9)
APTT PPP: 53.2 SEC (ref 24.7–36)
APTT PPP: 87.4 SEC (ref 24.7–36)
APTT PPP: 97.1 SEC (ref 24.7–36)
BASOPHILS # BLD AUTO: 0.4 % (ref 0–1.8)
BASOPHILS # BLD: 0.03 K/UL (ref 0–0.12)
BNP SERPL-MCNC: 2737 PG/ML (ref 0–100)
BUN SERPL-MCNC: 27 MG/DL (ref 8–22)
CALCIUM SERPL-MCNC: 8.5 MG/DL (ref 8.5–10.5)
CHLORIDE SERPL-SCNC: 100 MMOL/L (ref 96–112)
CO2 SERPL-SCNC: 28 MMOL/L (ref 20–33)
CREAT SERPL-MCNC: 2.26 MG/DL (ref 0.5–1.4)
EOSINOPHIL # BLD AUTO: 0.74 K/UL (ref 0–0.51)
EOSINOPHIL NFR BLD: 10.6 % (ref 0–6.9)
ERYTHROCYTE [DISTWIDTH] IN BLOOD BY AUTOMATED COUNT: 51.5 FL (ref 35.9–50)
GFR SERPL CREATININE-BSD FRML MDRD: 31 ML/MIN/1.73 M 2
GLUCOSE SERPL-MCNC: 112 MG/DL (ref 65–99)
HCT VFR BLD AUTO: 38.3 % (ref 42–52)
HGB BLD-MCNC: 12.5 G/DL (ref 14–18)
IMM GRANULOCYTES # BLD AUTO: 0.03 K/UL (ref 0–0.11)
IMM GRANULOCYTES NFR BLD AUTO: 0.4 % (ref 0–0.9)
INR PPP: 1.04 (ref 0.87–1.13)
LYMPHOCYTES # BLD AUTO: 1.54 K/UL (ref 1–4.8)
LYMPHOCYTES NFR BLD: 22.1 % (ref 22–41)
MCH RBC QN AUTO: 31.1 PG (ref 27–33)
MCHC RBC AUTO-ENTMCNC: 32.6 G/DL (ref 33.7–35.3)
MCV RBC AUTO: 95.3 FL (ref 81.4–97.8)
MONOCYTES # BLD AUTO: 0.55 K/UL (ref 0–0.85)
MONOCYTES NFR BLD AUTO: 7.9 % (ref 0–13.4)
NEUTROPHILS # BLD AUTO: 4.08 K/UL (ref 1.82–7.42)
NEUTROPHILS NFR BLD: 58.6 % (ref 44–72)
NRBC # BLD AUTO: 0 K/UL
NRBC BLD AUTO-RTO: 0 /100 WBC
PLATELET # BLD AUTO: 211 K/UL (ref 164–446)
PMV BLD AUTO: 10.6 FL (ref 9–12.9)
POTASSIUM SERPL-SCNC: 3.9 MMOL/L (ref 3.6–5.5)
PROTHROMBIN TIME: 13.9 SEC (ref 12–14.6)
RBC # BLD AUTO: 4.02 M/UL (ref 4.7–6.1)
SODIUM SERPL-SCNC: 133 MMOL/L (ref 135–145)
WBC # BLD AUTO: 7 K/UL (ref 4.8–10.8)

## 2017-08-13 PROCEDURE — 85610 PROTHROMBIN TIME: CPT

## 2017-08-13 PROCEDURE — 700102 HCHG RX REV CODE 250 W/ 637 OVERRIDE(OP): Performed by: INTERNAL MEDICINE

## 2017-08-13 PROCEDURE — 80048 BASIC METABOLIC PNL TOTAL CA: CPT

## 2017-08-13 PROCEDURE — A9270 NON-COVERED ITEM OR SERVICE: HCPCS | Performed by: INTERNAL MEDICINE

## 2017-08-13 PROCEDURE — 85025 COMPLETE CBC W/AUTO DIFF WBC: CPT

## 2017-08-13 PROCEDURE — 700102 HCHG RX REV CODE 250 W/ 637 OVERRIDE(OP): Performed by: STUDENT IN AN ORGANIZED HEALTH CARE EDUCATION/TRAINING PROGRAM

## 2017-08-13 PROCEDURE — A9270 NON-COVERED ITEM OR SERVICE: HCPCS | Performed by: STUDENT IN AN ORGANIZED HEALTH CARE EDUCATION/TRAINING PROGRAM

## 2017-08-13 PROCEDURE — 770020 HCHG ROOM/CARE - TELE (206)

## 2017-08-13 PROCEDURE — 700111 HCHG RX REV CODE 636 W/ 250 OVERRIDE (IP): Performed by: INTERNAL MEDICINE

## 2017-08-13 PROCEDURE — 85730 THROMBOPLASTIN TIME PARTIAL: CPT | Mod: 91

## 2017-08-13 PROCEDURE — 83880 ASSAY OF NATRIURETIC PEPTIDE: CPT

## 2017-08-13 PROCEDURE — 700111 HCHG RX REV CODE 636 W/ 250 OVERRIDE (IP): Performed by: STUDENT IN AN ORGANIZED HEALTH CARE EDUCATION/TRAINING PROGRAM

## 2017-08-13 PROCEDURE — 700101 HCHG RX REV CODE 250: Performed by: STUDENT IN AN ORGANIZED HEALTH CARE EDUCATION/TRAINING PROGRAM

## 2017-08-13 PROCEDURE — 700111 HCHG RX REV CODE 636 W/ 250 OVERRIDE (IP)

## 2017-08-13 PROCEDURE — 90935 HEMODIALYSIS ONE EVALUATION: CPT

## 2017-08-13 RX ORDER — FUROSEMIDE 10 MG/ML
40 INJECTION INTRAMUSCULAR; INTRAVENOUS ONCE
Status: COMPLETED | OUTPATIENT
Start: 2017-08-13 | End: 2017-08-13

## 2017-08-13 RX ORDER — WARFARIN SODIUM 5 MG/1
5 TABLET ORAL
Status: DISCONTINUED | OUTPATIENT
Start: 2017-08-13 | End: 2017-08-13

## 2017-08-13 RX ADMIN — DILTIAZEM HYDROCHLORIDE 30 MG: 30 TABLET, FILM COATED ORAL at 23:45

## 2017-08-13 RX ADMIN — FOLIC ACID 1 MG: 1 TABLET ORAL at 09:45

## 2017-08-13 RX ADMIN — STANDARDIZED SENNA CONCENTRATE AND DOCUSATE SODIUM 2 TABLET: 8.6; 5 TABLET, FILM COATED ORAL at 09:45

## 2017-08-13 RX ADMIN — FUROSEMIDE 40 MG: 10 INJECTION, SOLUTION INTRAMUSCULAR; INTRAVENOUS at 12:40

## 2017-08-13 RX ADMIN — HEPARIN SODIUM 900 UNITS/HR: 5000 INJECTION, SOLUTION INTRAVENOUS at 17:16

## 2017-08-13 RX ADMIN — DILTIAZEM HYDROCHLORIDE 30 MG: 30 TABLET, FILM COATED ORAL at 12:40

## 2017-08-13 RX ADMIN — ATORVASTATIN CALCIUM 40 MG: 40 TABLET, FILM COATED ORAL at 21:34

## 2017-08-13 RX ADMIN — ASPIRIN 81 MG: 81 TABLET, CHEWABLE ORAL at 09:45

## 2017-08-13 RX ADMIN — Medication 100 MG: at 09:45

## 2017-08-13 RX ADMIN — DILTIAZEM HYDROCHLORIDE 30 MG: 30 TABLET, FILM COATED ORAL at 00:34

## 2017-08-13 RX ADMIN — METOPROLOL TARTRATE 75 MG: 25 TABLET, FILM COATED ORAL at 21:34

## 2017-08-13 RX ADMIN — METOPROLOL TARTRATE 50 MG: 50 TABLET, FILM COATED ORAL at 05:43

## 2017-08-13 RX ADMIN — DILTIAZEM HYDROCHLORIDE 30 MG: 30 TABLET, FILM COATED ORAL at 05:51

## 2017-08-13 RX ADMIN — HEPARIN SODIUM 3000 UNITS: 1000 INJECTION, SOLUTION INTRAVENOUS; SUBCUTANEOUS at 15:45

## 2017-08-13 RX ADMIN — METOPROLOL TARTRATE 5 MG: 5 INJECTION INTRAVENOUS at 13:47

## 2017-08-13 RX ADMIN — THERA TABS 1 TABLET: TAB at 09:45

## 2017-08-13 RX ADMIN — HEPARIN SODIUM 2200 UNITS: 1000 INJECTION, SOLUTION INTRAVENOUS; SUBCUTANEOUS at 05:44

## 2017-08-13 RX ADMIN — DILTIAZEM HYDROCHLORIDE 30 MG: 30 TABLET, FILM COATED ORAL at 17:19

## 2017-08-13 ASSESSMENT — ENCOUNTER SYMPTOMS
HEMOPTYSIS: 0
EYES NEGATIVE: 1
WHEEZING: 0
BACK PAIN: 0
SHORTNESS OF BREATH: 0
COUGH: 0
NECK PAIN: 0
PALPITATIONS: 0
SORE THROAT: 0
FEVER: 0
DIARRHEA: 0
CONSTIPATION: 0
HEADACHES: 0
COUGH: 1
ORTHOPNEA: 1
MYALGIAS: 0
WEAKNESS: 0
NAUSEA: 0
ABDOMINAL PAIN: 0
HEARTBURN: 0
CHILLS: 0
VOMITING: 0

## 2017-08-13 ASSESSMENT — CHA2DS2 SCORE
AGE 65 TO 74: NO
AGE 75 OR GREATER: NO
CHF OR LEFT VENTRICULAR DYSFUNCTION: YES
HYPERTENSION: YES
SEX: MALE
CHA2DS2 VASC SCORE: 4
DIABETES: NO
PRIOR STROKE OR TIA OR THROMBOEMBOLISM: YES
VASCULAR DISEASE: NO

## 2017-08-13 ASSESSMENT — PAIN SCALES - GENERAL
PAINLEVEL_OUTOF10: 0

## 2017-08-13 ASSESSMENT — COPD QUESTIONNAIRES
HAVE YOU SMOKED AT LEAST 100 CIGARETTES IN YOUR ENTIRE LIFE: NO/DON'T KNOW
DURING THE PAST 4 WEEKS HOW MUCH DID YOU FEEL SHORT OF BREATH: SOME OF THE TIME
DO YOU EVER COUGH UP ANY MUCUS OR PHLEGM?: NO/ONLY WITH OCCASIONAL COLDS OR INFECTIONS
COPD SCREENING SCORE: 1

## 2017-08-13 ASSESSMENT — LIFESTYLE VARIABLES
TOTAL SCORE: 2
DO YOU DRINK ALCOHOL: YES
HAVE PEOPLE ANNOYED YOU BY CRITICIZING YOUR DRINKING: YES
EVER FELT BAD OR GUILTY ABOUT YOUR DRINKING: NO
HAVE YOU EVER FELT YOU SHOULD CUT DOWN ON YOUR DRINKING: YES
CONSUMPTION TOTAL: INCOMPLETE
ON A TYPICAL DAY WHEN YOU DRINK ALCOHOL HOW MANY DRINKS DO YOU HAVE: 4
TOTAL SCORE: 2
AVERAGE NUMBER OF DAYS PER WEEK YOU HAVE A DRINK CONTAINING ALCOHOL: 4
TOTAL SCORE: 2
EVER HAD A DRINK FIRST THING IN THE MORNING TO STEADY YOUR NERVES TO GET RID OF A HANGOVER: NO

## 2017-08-13 NOTE — PROGRESS NOTES
"On assessment pt was asked what month it is and his answer was January. He was unable to name the [place] president. Per family, this is baseline for him, even when he is not activity drinking. Daughter stated that he gets confused at home and ask \"what day it is\" a few times a week. Per daughter his speech is less slurred and almost normal. Pt is Citizen of [place] speaking and therefore assessing his orientation was done with assistance of family members.   "

## 2017-08-13 NOTE — PROGRESS NOTES
Pt had PUF tx today from 1340 to 1540. Net UF 2.6L. Pt tolerated tx well. CVC dsg dry and intact. See paper dialysis flow sheet for details.  Report given to LUIS ENRIQUE Bustillo RN.

## 2017-08-13 NOTE — PROGRESS NOTES
Hospital Medicine Progress Note, Adult, Complex               Author: Kvng Gutierrez Date & Time created: 8/12/2017  11:06 PM     Interval History:      No sig events was up in bathroom this AM dtr at bedside    Review of Systems:  Review of Systems   Constitutional: Positive for malaise/fatigue.   Respiratory: Positive for shortness of breath.    Cardiovascular: Negative for chest pain and palpitations.   Neurological: Positive for focal weakness and weakness. Negative for dizziness.   Endo/Heme/Allergies: Does not bruise/bleed easily.       Physical Exam:  Physical Exam  NAD  No edema    Labs:        Invalid input(s): [id number]  Recent Labs      08/10/17   0016  08/11/17   1931  08/12/17 0357   TROPONINI  0.26*  0.14*   --    BNPBTYPENAT   --    --   2254*     Recent Labs      08/10/17   0016  08/11/17 0649  08/12/17 0357   SODIUM  134*  135  137   POTASSIUM  3.5*  3.9  4.3   CHLORIDE  105  104  107   CO2  20  25  22   BUN  43*  35*  44*   CREATININE  3.05*  2.50*  3.02*   MAGNESIUM  2.0  2.2   --    PHOSPHORUS  4.7*  3.1   --    CALCIUM  8.0*  8.3*  8.4*     Recent Labs      08/10/17   0016  08/11/17 0649  08/12/17 0357   ALTSGPT  104*  98*  81*   ASTSGOT  74*  52*  38   ALKPHOSPHAT  69  67  54   TBILIRUBIN  1.3  0.9  0.7   GLUCOSE  120*  101*  96     Recent Labs      08/10/17   0620   08/11/17   1804  08/12/17   0054  08/12/17   0357  08/12/17   0745   RBC  4.08*   --    --    --   4.10*   --    HEMOGLOBIN  12.6*   --    --    --   12.6*   --    HEMATOCRIT  38.2*   --    --    --   38.6*   --    PLATELETCT  218   --    --    --   208   --    APTT  149.8*   < >  97.0*  94.9*   --   82.6*    < > = values in this interval not displayed.     Recent Labs      08/10/17   0016  08/10/17   0620  08/11/17 0649  08/12/17 0357   WBC   --   8.6   --   8.4   NEUTSPOLYS   --   62.00   --   50.10   LYMPHOCYTES   --   20.10*   --   30.40   MONOCYTES   --   9.00   --   9.10   EOSINOPHILS   --   7.80*    --   9.70*   BASOPHILS   --   0.70   --   0.60   ASTSGOT  74*   --   52*  38   ALTSGPT  104*   --   98*  81*   ALKPHOSPHAT  69   --   67  54   TBILIRUBIN  1.3   --   0.9  0.7           Hemodynamics:  Temp (24hrs), Av.7 °C (98.1 °F), Min:36.4 °C (97.5 °F), Max:36.9 °C (98.4 °F)  Temperature: 36.6 °C (97.8 °F)  Pulse  Av.9  Min: 50  Max: 148Heart Rate (Monitored): (!) 139  NIBP: 143/103 mmHg     Respiratory:    Respiration: 20, Pulse Oximetry: 100 %        RUL Breath Sounds: Clear, RML Breath Sounds: Clear, RLL Breath Sounds: Clear, SABAS Breath Sounds: Clear, LLL Breath Sounds: Clear  Fluids:    Intake/Output Summary (Last 24 hours) at 17 2306  Last data filed at 17 2000   Gross per 24 hour   Intake   1803 ml   Output   3705 ml   Net  -1902 ml     Weight: 65.9 kg (145 lb 4.5 oz)  GI/Nutrition:  Orders Placed This Encounter   Procedures   • DIET ORDER     Standing Status: Standing      Number of Occurrences: 1      Standing Expiration Date:      Order Specific Question:  Diet:     Answer:  2 Gram Sodium [7]     Order Specific Question:  Diet:     Answer:  Cardiac [6]     Medical Decision Making, by Problem:  Active Hospital Problems    Diagnosis   • *Atrial fibrillation with RVR (CMS-HCC) [I48.91]   • Aortic regurgitation [I35.1]   • HFrEF (heart failure with reduced ejection fraction) (CMS-HCC) [I50.20]   • CVA (cerebral vascular accident) (CMS-HCC) [I63.9]   • TREASURE (acute kidney injury) (CMS-HCC) [N17.9]   • Prolonged Q-T interval on ECG [R94.31]   • Electrolyte abnormality [E87.8]   • Metabolic acidosis [E87.2]   • Elevated troponin [R74.8]   • Non-rheumatic mitral regurgitation [I34.0]   • Essential hypertension [I10]   • 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 Saini.  Please do not hesitate to contact me with questions or concerns.    Kvng Gutierrez MD PhD [facility]  Cardiologist [facility]    Core Measures

## 2017-08-13 NOTE — PROGRESS NOTES
Pulmonary Critical Care Progress Note        Date of service: 8/13/2017    Chief Complaint: Tachycardia    History of Present Illness: 47 y.o. male who presented to an urgent care with tachycardia.  He was subsequently sent to the emergency room.  Additionally, he was complaining of some stroke-like   symptoms.  According to the daughter at the bedside he has had slurred speech for approximately a week with some left-sided facial droop for the same period of time.  When these symptoms began, he did not seek any medical care.  He only came in to urgent care today because he was feeling weak and tired.  When he arrived in the emergency department, he was found to be in atrial fibrillation with a rapid ventricular response.  His heart rate was in the 120s.  He received intravenous diltiazem, but he became profoundly bradycardic.  Cardiology consultation was obtained.  He was found to be in acute kidney injury.  Subsequently, he has had a dialysis catheter placed.  He was seen by Dr. Merchant from nephrology and is now undergoing emergent dialysis.     ROS:  Respiratory: negative cough, negative shortness of breath and negative wheezing, Cardiac: negative chest pain and negative leg swelling, GI: negative nausea, negative vomiting and negative abdominal pain.  All other systems negative.    Interval Events:  24 hour interval history reviewed    - occasional tachycardic with A-fib   - heparin gtt   - afebrile    PFSH:  No change.    Respiratory:    room air  Pulse Oximetry: 98 %          Exam: unlabored respirations, no intercostal retractions or accessory muscle use and clear to auscultation without rales or wheezes  ImagingCXR  I have personally reviewed the chest x-ray my impression is no film today        Invalid input(s): [id number]    HemoDynamics:  Pulse: (!) 58, Heart Rate (Monitored): (!) 139  NIBP: 101/69 mmHg    diltiazem and metop PO    Exam: atrial fibrillation, occasional tachycardia, murmur, no LE  "edema  Imaging: Available data reviewed    - LOBO 8/10: CONCLUSIONS  Trileaflet aortic valve. The left coronary cusp has a nodule   calcification with poor coaptation resulting in severe central   eccentric aortic regurgitation.  Severely reduced left ventricular systolic function.  Left ventricular ejection fraction is visually estimated to be 20%.  Severe concentric left ventricular hypertrophy.  Normal right ventricular size and systolic function.  Mild mitral regurgitation.  These findings were communicated to the ICU service.  Recent Labs      08/11/17   1931  08/12/17   0357  08/13/17   0415   TROPONINI  0.14*   --    --    BNPBTYPENAT   --   2254*  2737*       Neuro:  GCS Total Lizzie Coma Score: 15       Exam: ?left facial droop but CN otherwise intact and no extremity weakness mental status intact oriented for age x3  Imaging: Available data reviewed   CT head unremarkable   MRI brain 8/10:  1.  Mild diffuse cerebral atrophy.  2.  Small areas of acute infarction involving the left frontal periventricular white matter extending inferiorly to the subinsular cortex. Punctate area of infarction involving the cortex in the right posterior occipital lobe.  3.  Moderate periventricular white matter changes consistent with chronic microvascular ischemic gliosis.  4.  Small chronic focus of lacunar type infarction in the right frontal periventricular white matter.  5.  Numerous chronic \"microbleed's\" are noted throughout the brain parenchyma in both the supra and infratentorial compartments. Gyriform/curvilinear regions of chronic \"microbleed\" noted in the right posterior frontal region and also the left frontal   periventricular white matter.     Fluids:  Intake/Output       08/11/17 0700 - 08/12/17 0659 08/12/17 0700 - 08/13/17 0659 08/13/17 0700 - 08/14/17 0659      7121-1419 2124-9263 Total 0316-9677 3923-2494 Total 5488-7339 0423-4598 Total       Intake    P.O.  500  300 800  600  500 1100  --  -- --    P.O. " 500 300 800  -- -- --    I.V.  125.8  257.3 383.1  195  160 355  --  -- --    Heparin Volume 125.8 257.3 383.1 195 160 355 -- -- --    Other  --  60 60  --  -- --  --  -- --    Medications (P.O./ Enteral Liquids) -- 60 60 -- -- -- -- -- --    Dialysis  --  -- --  400  -- 400  --  -- --    Dialysis Volume (Dialysis Intake) -- -- -- 400 -- 400 -- -- --    Total Intake 625.8 617.3 1243.1 6633 189 0291 -- -- --       Output    Urine  75  155 230  150  60 210  --  -- --    Number of Times Voided 2 x 1 x 3 x -- 2 x 2 x -- -- --    Void (ml) 75 155 230 150 60 210 -- -- --    Dialysis  --  -- --  3400  -- 3400  --  -- --    Dialysis Output (Dialysis Output) -- -- -- 3400 -- 3400 -- -- --    Stool  --  -- --  --  -- --  --  -- --    Number of Times Stooled -- 0 x 0 x -- 1 x 1 x -- -- --    Total Output 75  60 3610 -- -- --       Net I/O     550.8 462.3 1013.1 -2355 600 -1755 -- -- --        Weight: 63.9 kg (140 lb 14 oz)  Recent Labs      08/11/17 0649 08/12/17 0357 08/13/17 0415   SODIUM  135  137  133*   POTASSIUM  3.9  4.3  3.9   CHLORIDE  104  107  100   CO2  25  22  28   BUN  35*  44*  27*   CREATININE  2.50*  3.02*  2.26*   MAGNESIUM  2.2   --    --    PHOSPHORUS  3.1   --    --    CALCIUM  8.3*  8.4*  8.5       GI/Nutrition:  Exam: abdomen is soft and non-tender, normal active bowel sounds  Imaging: Available data reviewed  taking PO  Liver Function  Recent Labs      08/11/17 0649 08/12/17 0357 08/13/17 0415   ALTSGPT  98*  81*   --    ASTSGOT  52*  38   --    ALKPHOSPHAT  67  54   --    TBILIRUBIN  0.9  0.7   --    GLUCOSE  101*  96  112*       Heme:  Recent Labs      08/10/17   0620   08/12/17   0054  08/12/17   0357  08/12/17   0745  08/13/17   0415  08/13/17   0445   RBC  4.08*   --    --   4.10*   --   4.02*   --    HEMOGLOBIN  12.6*   --    --   12.6*   --   12.5*   --    HEMATOCRIT  38.2*   --    --   38.6*   --   38.3*   --    PLATELETCT  218   --    --   208   --   211    --    APTT  149.8*   < >  94.9*   --   82.6*   --   53.2*    < > = values in this interval not displayed.       Infectious Disease:  Temp  Av.7 °C (98.1 °F)  Min: 36.6 °C (97.8 °F)  Max: 36.9 °C (98.4 °F)  Micro: reviewed  Recent Labs      08/10/17   0620  17   0649  17   0357  17   0415   WBC  8.6   --   8.4  7.0   NEUTSPOLYS  62.00   --   50.10  58.60   LYMPHOCYTES  20.10*   --   30.40  22.10   MONOCYTES  9.00   --   9.10  7.90   EOSINOPHILS  7.80*   --   9.70*  10.60*   BASOPHILS  0.70   --   0.60  0.40   ASTSGOT   --   52*  38   --    ALTSGPT   --   98*  81*   --    ALKPHOSPHAT   --   67  54   --    TBILIRUBIN   --   0.9  0.7   --      Current Facility-Administered Medications   Medication Dose Frequency Provider Last Rate Last Dose   • atorvastatin (LIPITOR) tablet 40 mg  40 mg QHS Susanne Nicholas M.D.   40 mg at 17   • metoprolol (LOPRESSOR) tablet 50 mg  50 mg Q8HRS Kvng Gutierrez M.D.   50 mg at 17 0543   • diltiazem (CARDIZEM) tablet 30 mg  30 mg Q6HRS Kvng Gutierrez M.D.   30 mg at 17 0551   • aspirin (ASA) chewable tab 81 mg  81 mg DAILY Susanne Nicholas M.D.   81 mg at 17 0725   • metoprolol (LOPRESSOR) injection 5 mg  5 mg Q6HRS PRN Wesley Sanchez M.D.   5 mg at 17 0949   • senna-docusate (PERICOLACE or SENOKOT S) 8.6-50 MG per tablet 2 Tab  2 Tab BID Jose Alberto Pope M.D.   2 Tab at 17    And   • polyethylene glycol/lytes (MIRALAX) PACKET 1 Packet  1 Packet QDAY PRN Jose Alberto Pope M.D.        And   • magnesium hydroxide (MILK OF MAGNESIA) suspension 30 mL  30 mL QDAY PRN Jose Alberto Pope M.D.        And   • bisacodyl (DULCOLAX) suppository 10 mg  10 mg QDAY PRN Jose lAberto Pope M.D.       • Respiratory Care per Protocol   Continuous RT Jose Alberto Pope M.D.       • glucose 4 g chewable tablet 16 g  16 g Q15 MIN PRN Jose Alberto Pope M.D.        And   • dextrose 50% (D50W) injection 25 mL  25 mL Q15 MIN PRN Jose Alberto Pope M.D.        • heparin 1000 units/mL injection 2,200 Units  2,200 Units PRN Martha Cunha PHARMD   2,200 Units at 08/13/17 0544    And   • heparin infusion 25,000 units in 500 ml 0.45% nacl   Continuous Martha Cunha PHARMD 18 mL/hr at 08/13/17 0547 900 Units/hr at 08/13/17 0547   • folic acid (FOLVITE) tablet 1 mg  1 mg DAILY Leticia Curtis M.D.   1 mg at 08/12/17 0725   • thiamine (THIAMINE) tablet 100 mg  100 mg DAILY Leticia Curtis M.D.   100 mg at 08/12/17 0725   • multivitamin (THERAGRAN) tablet 1 Tab  1 Tab DAILY Leticia Curtis M.D.   1 Tab at 08/12/17 0725   • heparin 1000 units/mL injection 3,000 Units  3,000 Units PRN Katrina Merchant M.D.   3,000 Units at 08/12/17 1614     Last reviewed on 8/9/2017  2:32 PM by Gisele Gaitan [facility]    Quality  Measures:  Radiology images reviewed, Labs reviewed and Medications reviewed  Truong catheter: No Truong  Central line in place: Need for access and Dialysis    DVT Prophylaxis: Heparin  DVT prophylaxis - mechanical: SCDs  Ulcer prophylaxis: Not indicated    Assessed for rehab: Patient returned to prior level of function, rehabilitation not indicated at this time    Assessment/Plan:  New onset Atrial fibrillation with rapid ventricular response - improving   - cont dilt and BB PO per cards (may need to increase given tachycardia today)   - cont hep gtt --> transition to Coumadin   - cardiology following  Acute ischemic stroke with diffuse microhemorrhage   - carotid duplex ok, therapies   - Antiplatelets, statin  Acute on chronic (stage III) kidney injury - cardiorenal   - Nephrology following, HD prn   - avoid nephrotoxins  Hyperglycemia without pre-existing history of diabetes - insulin sliding scale  Uncontrolled Systemic arterial hypertension noncompliant with medication ×2 months   - Systolic blood pressure goal less than 160   History of urolithiasis  History of alcohol abuse, apparently he has been cutting back   - vitamin therapy  Acute decompensated  systolic heart failure (EF 25%) with moderate MR and severe AI   - Beta blocker, ACE inhibitor contraindicated given renal failure   - Volume removal with dialysis   - CVS plans for operative intervention in approximately 6 weeks, will need cath  Acute cardiogenic pulmonary edema   - PUF, diuresis  Moderate pulmonary arterial hypertension  Prophylaxis, diet, therapies    Awaiting transfer of patient out of ICU today. [facility] will sign off at transfer. Please call with questions.    Discussed patient condition and risk of morbidity and/or mortality with RN, RT, Pharmacy, Charge nurse / hot rounds, Patient and cardiology, CVS, nephrology and [facility].

## 2017-08-13 NOTE — CARE PLAN
Problem: Infection  Goal: Will remain free from infection  Outcome: PROGRESSING AS EXPECTED  Assessed pt for S & S of infection, none apparent at this time. VSS and no fever.   Performed hand hygiene before / after pt contact and when entering the room, and reminded pt to wash his hands after using the bathroom.   Pt central line dressing and CHG was done by AM nurse. Dressing observed- clean, dry, intact.         Review lab results. Assessed for removal of potential routes of infection, central line is to stay in place at this time.    Problem: Venous Thromboembolism (VTW)/Deep Vein Thrombosis (DVT) Prevention:  Goal: Patient will participate in Venous Thrombosis (VTE)/Deep Vein Thrombosis (DVT)Prevention Measures  Assessed and monitored for anticoagulation medication complication/ contraindications. None apparent at this time. Is on heparin drip at 800 units per hour ans has been therapeutic x 2. APTT lab scheduled at am per per protocol.   Pt is able to preform active ROM. Mobilized to bathroom. Educated pt about importance of Q 2 turning, foot exercises every hour while awake.

## 2017-08-13 NOTE — ASSESSMENT & PLAN NOTE
- On adm: Cr: 4.3, AGMA, Ph 6.1. Unknown baseline.  - BNP 2203>> 2039   - FeNa <1%  - H/o renal disease x7 years.    - Renal US: no hydronephrosis and calculi.  Left renal cysts.  - Cr and K slowly increasing likely due to poor renal perfusion secondary to atrial fibrillation.    - Patient with temporary dialysis catheter placed and hemodialyzed per Nephrology.   - Upper extremity vein mapping done.  AV fistula placed by Vascular Surgery.    - Patient to received HD outpatient M/W/F.

## 2017-08-13 NOTE — PROGRESS NOTES
Nephrology Progress Note, Adult, Complex               Author: Katrina Lemusericabony Date & Time created: 8/13/2017  11:44 AM     Interval History:  48 y/o male with severe, CHF  -EF 25 %, Severe AI, A.fib, CVA in TREASURE, metabolic acidosis  Poor UOP  On HD  Doing well , no complaints  With sig elevated BNP scheduled PUF today    Review of Systems:  Review of Systems   Constitutional: Positive for malaise/fatigue. Negative for fever and chills.   HENT: Negative for congestion, nosebleeds and sore throat.    Eyes: Negative.    Respiratory: Negative for cough, hemoptysis and wheezing.    Cardiovascular: Positive for orthopnea. Negative for chest pain, palpitations and leg swelling.   Gastrointestinal: Negative for heartburn, nausea, vomiting, abdominal pain and diarrhea.   Genitourinary: Negative for dysuria.   Neurological: Negative for headaches.   All other systems reviewed and are negative.      Physical Exam:  Physical Exam   Constitutional: He is oriented to person, place, and time. He appears well-developed and well-nourished. No distress.   HENT:   Head: Normocephalic and atraumatic.   Nose: Nose normal.   Mouth/Throat: Oropharynx is clear and moist.   Eyes: Conjunctivae and EOM are normal. Pupils are equal, round, and reactive to light. No scleral icterus.   Neck: Normal range of motion. Neck supple. No thyromegaly present.   Cardiovascular: An irregularly irregular rhythm present. Tachycardia present.  Exam reveals no gallop and no friction rub.    Pulmonary/Chest: Effort normal and breath sounds normal. No respiratory distress. He has no wheezes. He has no rales.   Abdominal: Soft. Bowel sounds are normal. He exhibits no distension. There is no tenderness.   Musculoskeletal:   Trace pedal edema   Lymphadenopathy:     He has no cervical adenopathy.   Neurological: He is alert and oriented to person, place, and time. No cranial nerve deficit. Coordination normal.   Skin: Skin is warm. No rash noted. No erythema.   Nursing  note and vitals reviewed.      Labs:        Invalid input(s): [id number]  Recent Labs      17   1931  17   TROPONINI  0.14*   --    --    BNPBTYPENAT   --   2254*  2737*     Recent Labs      1749  17   SODIUM  135  137  133*   POTASSIUM  3.9  4.3  3.9   CHLORIDE  104  107  100   CO2  25  22  28   BUN  35*  44*  27*   CREATININE  2.50*  3.02*  2.26*   MAGNESIUM  2.2   --    --    PHOSPHORUS  3.1   --    --    CALCIUM  8.3*  8.4*  8.5     Recent Labs      17   ALTSGPT  98*  81*   --    ASTSGOT  52*  38   --    ALKPHOSPHAT  67  54   --    TBILIRUBIN  0.9  0.7   --    GLUCOSE  101*  96  112*     Recent Labs      17   0054  17   0745  17   RBC   --   4.10*   --   4.02*   --    HEMOGLOBIN   --   12.6*   --   12.5*   --    HEMATOCRIT   --   38.6*   --   38.3*   --    PLATELETCT   --   208   --   211   --    APTT  94.9*   --   82.6*   --   53.2*     Recent Labs      17   WBC   --   8.4  7.0   NEUTSPOLYS   --   50.10  58.60   LYMPHOCYTES   --   30.40  22.10   MONOCYTES   --   9.10  7.90   EOSINOPHILS   --   9.70*  10.60*   BASOPHILS   --   0.60  0.40   ASTSGOT  52*  38   --    ALTSGPT  98*  81*   --    ALKPHOSPHAT  67  54   --    TBILIRUBIN  0.9  0.7   --            Hemodynamics:  Temp (24hrs), Av.7 °C (98.1 °F), Min:36.6 °C (97.8 °F), Max:36.9 °C (98.4 °F)  Temperature: 36.8 °C (98.2 °F)  Pulse  Av.6  Min: 50  Max: 148Heart Rate (Monitored): (!) 139  NIBP: 135/89 mmHg     Respiratory:    Respiration: 20, Pulse Oximetry: 98 %     Work Of Breathing / Effort: Mild  RUL Breath Sounds: Clear, RML Breath Sounds: Clear, RLL Breath Sounds: Clear, SABAS Breath Sounds: Clear, LLL Breath Sounds: Clear  Fluids:    Intake/Output Summary (Last 24 hours) at 17 1144  Last data filed at 17  0800   Gross per 24 hour   Intake 1831.63 ml   Output   3560 ml   Net -1728.37 ml     Weight: 63.9 kg (140 lb 14 oz)  GI/Nutrition:  Orders Placed This Encounter   Procedures   • DIET ORDER     Standing Status: Standing      Number of Occurrences: 1      Standing Expiration Date:      Order Specific Question:  Diet:     Answer:  2 Gram Sodium [7]     Order Specific Question:  Diet:     Answer:  Cardiac [6]     Medical Decision Making, by Problem:  Active Hospital Problems    Diagnosis   • *Atrial fibrillation with RVR (CMS-HCC) [I48.91]   • CVA (cerebral vascular accident) (CMS-HCC) [I63.9]   • Essential hypertension [I10]   • Respiratory failure (CMS-HCC) [J96.90]   • Electrolyte abnormality [E87.8]   • Hyperglycemia [R73.9]   • Prolonged Q-T interval on ECG [R94.31]   • Elevated troponin [R74.8]   • Acute on chronic systolic heart failure (CMS-HCC) [I50.23]   • Non-rheumatic mitral regurgitation [I34.0]   • TREASURE (acute kidney injury) (CMS-HCC) [N17.9]   • 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

## 2017-08-13 NOTE — PROGRESS NOTES
Inpatient Anticoagulation Service Note    Date: 8/13/2017  Reason for Anticoagulation: Stroke, Atrial Fibrillation   [id number] VASc Score: 4    Hemoglobin Value: 12.5  Hematocrit Value: 38.3  Lab Platelet Value: 211  Target INR: 2.0 to 3.0    INR from last 7 days     Date/Time INR Value    08/09/17 1305 (!)1.33        Dose from last 7 days     Date/Time Dose (mg)    08/13/17 0900 5        Average Dose (mg):  (new start this admission)  Significant Interactions: Aspirin  Bridge Therapy: Yes (heparin weight based protocol )    Comments: New start warfarin for new onset stroke/atrial fibrillation. Warfarin and heparin drip in setting of TREASURE on CKD, now dialysis dependent. Eventually needs AVR and MAZE procedure, but not planned acutely. No S/S bleeding currently noted. Will start with 5 mg daily and trend INR. Warfarin protocol ordered. Interactions noted. Will follow.     Plan:  Initiate 5 mg daily     Pharmacist suggested discharge dosing: TBD     Edilia Abraham, PHARMD      ADDENDUM:  Hold warfarin tonight per nephrology. Unlikely one dose of warfarin will make a difference in placement of tunneled catheter tomorrow, but will hold per their request.    Edilia Abraham, PHARMD

## 2017-08-13 NOTE — PROGRESS NOTES
[facility] Progress Note      Admit Date: 8/9/2017  ICU Day: 5    Resident(s): Ziggy Sandra  Attending: GHADA HERNANDEZ/ Dr. Jeremy M. Gonda    Date & Time:   8/13/2017   10:47 AM       Patient ID:    Name:             Bhavesh Saini     YOB: 1970  Age:                 47 y.o.  male   MRN:               [id number]    HPI:  Mr Saini is a 47 year old male with PMH of alcoholism and HTN, for which he is currently not taking any medication. He presented to the ED with his family due to weakness and fatigue. He complains of palpitation, SOB on exertion, but denies HA, nausea, vomiting, chest pain, syncope, coughing, or fever. He has history of similar symptoms. The family has noticed slurred speech last week but the patient refused to go and seen by doctor.      Consultants:    Consultants:  Cardiology, Cardiac Surgery, Nephrology, and Pulmonology   PMA: Dr. Jeremy M Gonda    Interval Events:  Patient states he feels great, a lot better than before.  No complaints over night.     Patient was transitioned to Coumadin from heparin.  Patient was tachycardic this morning, considered increasing metoprolol or diltiazem. Patient stable at the moment, patient has lopressor PRN.      Per Cardiology, patient to have Heart Cath in a couple of weeks.   Per Cardia Surgery, patient to follow up in 6 weeks as outpatient.  Per Nephrology, patient to have tunnel catheter for HD. Patient will be on HD for the for seeable future until his valve is replaced, at which time his renal status can be reevaluated. Patient to be started on a trial of Lasix, 40 mg, to see if Urine output is increased.     Review of Systems   Constitutional: Negative for fever, chills and malaise/fatigue.   HENT: Negative for sore throat.    Respiratory: Positive for cough. Negative for shortness of breath and wheezing.    Cardiovascular: Negative for chest pain and palpitations.   Gastrointestinal: Negative for heartburn, nausea, vomiting,  diarrhea and constipation.   Musculoskeletal: Negative for myalgias, back pain, joint pain and neck pain.   Neurological: Negative for weakness and headaches.       PHYSICAL EXAM  Filed Vitals:    08/13/17 0000 08/13/17 0400 08/13/17 0800 08/13/17 1000   BP:       Pulse: 96 85 88 98   Temp: 36.7 °C (98.1 °F) 36.8 °C (98.2 °F) 36.8 °C (98.2 °F)    Resp:       Height:       Weight:  63.9 kg (140 lb 14 oz)     SpO2: 99% 98% 98%      Body mass index is 27.51 kg/(m^2).  /85 mmHg  Pulse 98  Temp(Src) 36.8 °C (98.2 °F)  Resp 20  Ht 1.524 m (5')  Wt 63.9 kg (140 lb 14 oz)  BMI 27.51 kg/m2  SpO2 98%  O2 therapy: Pulse Oximetry: 98 %, O2 Delivery: None (Room Air)    Physical Exam   Constitutional: He is oriented to person, place, and time and well-developed, well-nourished, and in no distress. No distress.   HENT:   Head: Normocephalic and atraumatic.   Right Ear: External ear normal.   Left Ear: External ear normal.   Nose: Nose normal.   Eyes: EOM are normal. Pupils are equal, round, and reactive to light. Right eye exhibits no discharge. Left eye exhibits no discharge. No scleral icterus.   Neck: Normal range of motion. Neck supple.   Cardiovascular: An irregularly irregular rhythm present.   Murmur heard.  Pulmonary/Chest: Effort normal. No respiratory distress. He has wheezes. He has no rales. He exhibits no tenderness.   Abdominal: Soft. Bowel sounds are normal. He exhibits no distension. There is no tenderness.   Neurological: He is alert and oriented to person, place, and time.   Skin: He is not diaphoretic.   Psychiatric: Mood, memory, affect and judgment normal.       Respiratory:     Respiration: 20, Pulse Oximetry: 98 %    Chest Tube Drains:          Invalid input(s): [id number]    HemoDynamics:  Pulse: 98, Heart Rate (Monitored): (!) 139 NIBP: 135/89 mmHg      Neuro:      Fluids:    Date 08/13/17 0700 - 08/14/17 0659   Shift 8747-6308 0021-7068 5924-4833 24 Hour Total   I  N  T  A  K  E   P.O.  120   120      P.O. 120   120    I.V. 36   36      Heparin Volume 36   36    Shift Total 156   156   O  U  T  P  U  T   Urine          Number of Times Voided 2 x   2 x    Shift Total          156        Intake/Output Summary (Last 24 hours) at 17 1015  Last data filed at 17 0800   Gross per 24 hour   Intake 1831.63 ml   Output   3560 ml   Net -1728.37 ml       Weight: 63.9 kg (140 lb 14 oz)  Body mass index is 27.51 kg/(m^2).    Recent Labs      17   SODIUM  135  137  133*   POTASSIUM  3.9  4.3  3.9   CHLORIDE  104  107  100   CO2  25  22  28   BUN  35*  44*  27*   CREATININE  2.50*  3.02*  2.26*   MAGNESIUM  2.2   --    --    PHOSPHORUS  3.1   --    --    CALCIUM  8.3*  8.4*  8.5       GI/Nutrition:  Recent Labs      17   ALTSGPT  98*  81*   --    ASTSGOT  52*  38   --    ALKPHOSPHAT  67  54   --    TBILIRUBIN  0.9  0.7   --    GLUCOSE  101*  96  112*       Heme:  Recent Labs      17   0054  17   0745  175   RBC   --   4.10*   --   4.02*   --    HEMOGLOBIN   --   12.6*   --   12.5*   --    HEMATOCRIT   --   38.6*   --   38.3*   --    PLATELETCT   --   208   --   211   --    APTT  94.9*   --   82.6*   --   53.2*       Infectious Disease:  Temp  Av.7 °C (98.1 °F)  Min: 36.6 °C (97.8 °F)  Max: 36.9 °C (98.4 °F)  Recent Labs      17   WBC   --   8.4  7.0   NEUTSPOLYS   --   50.10  58.60   LYMPHOCYTES   --   30.40  22.10   MONOCYTES   --   9.10  7.90   EOSINOPHILS   --   9.70*  10.60*   BASOPHILS   --   0.60  0.40   ASTSGOT  52*  38   --    ALTSGPT  98*  81*   --    ALKPHOSPHAT  67  54   --    TBILIRUBIN  0.9  0.7   --        Meds:  • MD ALERT... warfarin       • warfarin  5 mg     • atorvastatin  40 mg     • metoprolol  50 mg     • diltiazem  30 mg     • aspirin  81 mg     • metoprolol  5 mg     •  "senna-docusate  2 Tab      And   • polyethylene glycol/lytes  1 Packet      And   • magnesium hydroxide  30 mL      And   • bisacodyl  10 mg     • Respiratory Care per Protocol       • glucose 4 g  16 g      And   • dextrose 50%  25 mL     • heparin 1000 units/mL  2,200 Units      And   • heparin   900 Units/hr (08/13/17 0547)   • folic acid  1 mg     • thiamine  100 mg     • multivitamin  1 Tab     • heparin 1000 units/mL  3,000 Units          Procedures:   Nephrology - HD    Imaging:  CAROTID DUPLEX ([facility] Only)   Preliminary Result      MR-BRAIN-W/O   Final Result         1.  Mild diffuse cerebral atrophy.      2.  Small areas of acute infarction involving the left frontal periventricular white matter extending inferiorly to the subinsular cortex. Punctate area of infarction involving the cortex in the right posterior occipital lobe.      3.  Moderate periventricular white matter changes consistent with chronic microvascular ischemic gliosis.      4.  Small chronic focus of lacunar type infarction in the right frontal periventricular white matter.      5.  Numerous chronic \"microbleed's\" are noted throughout the brain parenchyma in both the supra and infratentorial compartments. Gyriform/curvilinear regions of chronic \"microbleed\" noted in the right posterior frontal region and also the left frontal    periventricular white matter.      TRANSESOPHAGEAL ECHO W/O CONT   Final Result      US-RENAL   Final Result      1.  No hydronephrosis is identified.      2.  Increased renal echotexture is consistent with medical renal disease.      3.  Left renal cysts.      4.  Small amount of free fluid in Morison's pouch.      ECHOCARDIOGRAM COMP W/O CONT   Final Result      IR-CVC NON TUNNELED > AGE 5   Final Result      1. Ultrasound and fluoroscopic guided placement of a right internal jugular 12 [place] triple lumen non-tunneled hemodialysis catheter.      2. The hemodialysis catheter may be " used immediately as clinically indicated. Flushes per protocol.      CT-HEAD W/O   Final Result      1.  Cerebral atrophy.      2.  White matter lucencies most consistent with small vessel ischemic change versus demyelination or gliosis.      3.  Otherwise, Head CT without contrast with no acute findings. No evidence of acute cerebral infarction, hemorrhage or mass lesion.      DX-CHEST-PORTABLE (1 VIEW)   Final Result      Cardiomegaly. No consolidation.          Problem and Plan:      * Atrial fibrillation with RVR (CMS-[facility]) (present on admission)  Assessment & Plan  -New onset vs paroxysmal (vague PMH of afib and non-compliance with meds)  -Presented with palpitations and HR in 120s. He had HR in 20s when dilt IV push was given. Later HR stabilized and was started on dilt drip and moved to ICU. Currently on 50 mg of metoprolol. However, HR has been in 80-140s.  -H/O left facial droop and left sided weakness in the week prior to arrival at ED.  -CHADS2: 4  -ECHO: severe LVH, global hypokinesis, EF ~20%, severe AI and mod MR  -LOBO: slow blood flow but no thrombus. Severe AI  -MRI brain: acute infarction in L frontal periventricular white matter extendeing to subinsular cortex. Punctate area of infarction in R posterior lobe. Numerous microbleeds.  Plan  -Cardiac surgery spoke with Mr Saini and family, he needs AVR/MAZE w LOBO, but due to his renal status and recent stroke with hemorrhage, they do not recommend at this time. They will follow up outpatient in 6 weeks.   -Per Cardiology, he is to have left heart cath in a couple orf weeks, and can be switched to coumadin.   -Na restricted diet  -Metoprolol increased to 50 q8h, PRN 5mg metoprolol q6h. Started diltiazem 30mg q6h. Patient was tachycardic today, will CTM for possible increase in dose.    -Cardioversion will likely be done during AV replacement.     CVA (cerebral vascular accident) (CMS-[facility]) (present on admission)  Assessment & Plan  -h/o left facial  droop, slurred speech and left sided weakness. However no neuro deficits currently  -CT: cerebral atrophy and small vessel ischemic changes   -MRI brain: acute infarction in L frontal periventricular white matter extendeing to subinsular cortex. Punctate area of infarction in R posterior lobe. Numerous microbleeds.   Assessment: Afib could have contributed (LOBO showed 'sludge' in the heart)  Plan  -ASA, statin  -Mx of Afib as stated above.    TREASURE (acute kidney injury) (CMS-HCC) (present on admission)  Assessment & Plan  -On adm: BUN/Cr:  /4.3, AGMA, Ph 6.1. No old labs to compare.   -FeNa <1%  -H/o renal disease since 5yrs, FH kidney disease in his father. Has been non-complaint with HTN medications. Has CHF.  -Renal US: no hydronephrosis and calculi. Left renal cysts.  -Acute on chronic. Likely pre-renal vs cardiorenal. HTN is likely the cause of CKD  Plan  -Nephrology on board, discussed with Dr. Merchant possibility of starting Ace Inh and spironolactone. She does not believe they will be beneficial at this time, due to his poor urine output. Patient will be on dialysis for the foreseeable future.    -Daily dialysis, low Na diet  -Electrolytes wnl today    HFrEF (heart failure with reduced ejection fraction) (CMS-HCC) (present on admission)  Assessment & Plan  -Presented with SOB, orthopnea, fatique and palpitations. Has h/o HTN and alcoholism in the past. On exam: JVD +., murmurs + but no volume overload.   -BNP 2203 on admission, 2254 on 08/12/17, 2737 on 08/13/17  -CXR: cardiomegaly. ECHO showed: severe LVH, global hypokinesis, EF ~20%, severe AI and mod MR  Assessment: Compensated HF likely due to alcoholism vs tachycardia induced heart failure.   Plan  -Cardiac surgery spoke with Mr Saini and family, he needs AVR/MAZE w LOBO, but due to his renal status and recent stroke with hemorrhage, they do not recommend at this time. They will follow up outpatient in 6 weeks, but will have a heart cath done during his  hospital stay.  -Per Cardiology, he is to have left heart cath in a couple of weeks, and can be switched to coumadin.   -Na restricted diet  -On metoprolol for Afib which helps CHF. Cannot start ACEI and aldactone due to acute on CKD, will contact Nephro about renal status and starting these meds.   -Nurse contacted to [employer] work to help with insurance       Aortic regurgitation (present on admission)  Assessment & Plan  -Severe AR.   -ECHO:  severe LVH, global hypokinesis, EF ~20%, severe AI and mod MR. Findings were confirmed by LOBO  -Fits the criteria for valve replacement. Cardiology and cardiac surgery on board.    Cardiorenal syndrome  Assessment & Plan  -Diagnosis given by Nephrology  -Patient is on HD, and is urinated <500 ml yesterday. Per Nephrology will try a single dose of Lasix 40mg, to see if urine output increases  -Creatinine was 4.38 on arrival to ED. Total protein Urine 208. Estimated GFR was 14. BUN/Cr  65/4.38 FENA 0.2%  -Presented with SOB, orthopnea, fatique and palpitations. Has h/o HTN and alcoholism in the past. On exam: JVD +., murmurs + but no volume overload.   -BNP 2203 on admission, 2254 on 08/12/17, 2737 on 08/13/17  -CXR: cardiomegaly. ECHO showed: severe LVH, global hypokinesis, EF ~20%, AR and mod MR  Assessment: Cardiorenal syndrome  Plan  -Patient needs AVR/MAZE w LOBO per Cardiac Surgery, but due to his renal status and recent stroke with hemorrhage, they do not recommend at this time. They will follow up outpatient in 6 weeks.   -Patient will be counseled on cessation of alcohol intake.   -Per Nephrology, patient will likely be on HD for the forseeable future until his valve is replaced. At which time his renal status can be reassessed.     Electrolyte abnormality (present on admission)  Assessment & Plan  -Electrolytes wnl today  -On dialysis    Prolonged Q-T interval on ECG (present on admission)  Assessment & Plan  -QTc ~530s. Will avoid QTc prolonging meds.    Essential  hypertension (present on admission)  Assessment & Plan  -PMH of HTN but likely non-compliant. ECHO showed severe LVH  -On BB for Afib. Will optimize meds when he stabilizes.  -SBP today ranged from 100-140    Alcoholic (CMS-HCC) (present on admission)  Assessment & Plan  -History is vague but looks like he was drinking heavily in the past. Last drink was 8/5/2017.  -S/p rally bag in ER. On thiamine and folate.   -Tachycardia due to ?alcohol withdrawal symptoms.    Hyperglycemia (present on admission)  Assessment & Plan  -Resolved  -On adm: , A1c: 5.4  -He was started on ISS and hypoglycemia protocol which is discontinued.  CTM with accuchecks    Elevated troponin  Assessment & Plan  -Trops ~0.2 w/o ST-TW changes. Likely elevated due to renal disease    Non-rheumatic mitral regurgitation  Assessment & Plan  -Moderate MR on ECHO    Metabolic acidosis (present on admission)  Assessment & Plan  -AGMA on admission likely due to uremia  -Resolved with dialysis  -Nephrology on board        DISPO: Transfer to the floor    CODE STATUS: Full Code    Quality Measures:  T[place]ong Catheter: None  DVT Prophylaxis: Coumadin  Ulcer Prophylaxis: None  Antibiotics: None  Lines: PIV

## 2017-08-13 NOTE — PROGRESS NOTES
Received report from KRYSTIN Vital and assumed care of pt. . Spoke with pt and family about concerns and safety measurers. Went over plan of care with pt and answered any questions. Verified lines and ensured patency, drips are set at the correct rate. Safety measurers implemented. Call light and personal belonging within reach. All needs met at this time

## 2017-08-14 LAB
ALBUMIN SERPL BCP-MCNC: 3.2 G/DL (ref 3.2–4.9)
ALBUMIN/GLOB SERPL: 1.2 G/DL
ALP SERPL-CCNC: 61 U/L (ref 30–99)
ALT SERPL-CCNC: 53 U/L (ref 2–50)
ANION GAP SERPL CALC-SCNC: 9 MMOL/L (ref 0–11.9)
APTT PPP: 61.8 SEC (ref 24.7–36)
APTT PPP: 68.3 SEC (ref 24.7–36)
AST SERPL-CCNC: 18 U/L (ref 12–45)
BILIRUB SERPL-MCNC: 0.8 MG/DL (ref 0.1–1.5)
BNP SERPL-MCNC: 2755 PG/ML (ref 0–100)
BUN SERPL-MCNC: 33 MG/DL (ref 8–22)
CALCIUM SERPL-MCNC: 8.8 MG/DL (ref 8.5–10.5)
CHLORIDE SERPL-SCNC: 103 MMOL/L (ref 96–112)
CO2 SERPL-SCNC: 23 MMOL/L (ref 20–33)
CREAT SERPL-MCNC: 2.82 MG/DL (ref 0.5–1.4)
GFR SERPL CREATININE-BSD FRML MDRD: 24 ML/MIN/1.73 M 2
GLOBULIN SER CALC-MCNC: 2.7 G/DL (ref 1.9–3.5)
GLUCOSE SERPL-MCNC: 144 MG/DL (ref 65–99)
INR PPP: 1.06 (ref 0.87–1.13)
INR PPP: 1.06 (ref 0.87–1.13)
POTASSIUM SERPL-SCNC: 3.6 MMOL/L (ref 3.6–5.5)
PROT SERPL-MCNC: 5.9 G/DL (ref 6–8.2)
PROTHROMBIN TIME: 14.1 SEC (ref 12–14.6)
PROTHROMBIN TIME: 14.1 SEC (ref 12–14.6)
SODIUM SERPL-SCNC: 135 MMOL/L (ref 135–145)

## 2017-08-14 PROCEDURE — A9270 NON-COVERED ITEM OR SERVICE: HCPCS | Performed by: INTERNAL MEDICINE

## 2017-08-14 PROCEDURE — 700102 HCHG RX REV CODE 250 W/ 637 OVERRIDE(OP): Performed by: STUDENT IN AN ORGANIZED HEALTH CARE EDUCATION/TRAINING PROGRAM

## 2017-08-14 PROCEDURE — A9270 NON-COVERED ITEM OR SERVICE: HCPCS | Performed by: STUDENT IN AN ORGANIZED HEALTH CARE EDUCATION/TRAINING PROGRAM

## 2017-08-14 PROCEDURE — 85730 THROMBOPLASTIN TIME PARTIAL: CPT

## 2017-08-14 PROCEDURE — 80053 COMPREHEN METABOLIC PANEL: CPT

## 2017-08-14 PROCEDURE — 85610 PROTHROMBIN TIME: CPT

## 2017-08-14 PROCEDURE — 770020 HCHG ROOM/CARE - TELE (206)

## 2017-08-14 PROCEDURE — A9270 NON-COVERED ITEM OR SERVICE: HCPCS

## 2017-08-14 PROCEDURE — 700111 HCHG RX REV CODE 636 W/ 250 OVERRIDE (IP): Performed by: STUDENT IN AN ORGANIZED HEALTH CARE EDUCATION/TRAINING PROGRAM

## 2017-08-14 PROCEDURE — 700102 HCHG RX REV CODE 250 W/ 637 OVERRIDE(OP)

## 2017-08-14 PROCEDURE — 700102 HCHG RX REV CODE 250 W/ 637 OVERRIDE(OP): Performed by: INTERNAL MEDICINE

## 2017-08-14 PROCEDURE — 83880 ASSAY OF NATRIURETIC PEPTIDE: CPT

## 2017-08-14 RX ORDER — WARFARIN SODIUM 5 MG/1
5 TABLET ORAL
Status: DISCONTINUED | OUTPATIENT
Start: 2017-08-14 | End: 2017-08-17

## 2017-08-14 RX ORDER — FUROSEMIDE 10 MG/ML
40 INJECTION INTRAMUSCULAR; INTRAVENOUS ONCE
Status: COMPLETED | OUTPATIENT
Start: 2017-08-14 | End: 2017-08-14

## 2017-08-14 RX ADMIN — THERA TABS 1 TABLET: TAB at 08:16

## 2017-08-14 RX ADMIN — FOLIC ACID 1 MG: 1 TABLET ORAL at 08:16

## 2017-08-14 RX ADMIN — METOPROLOL TARTRATE 75 MG: 25 TABLET, FILM COATED ORAL at 06:23

## 2017-08-14 RX ADMIN — METOPROLOL TARTRATE 75 MG: 25 TABLET, FILM COATED ORAL at 21:24

## 2017-08-14 RX ADMIN — METOPROLOL TARTRATE 75 MG: 25 TABLET, FILM COATED ORAL at 14:36

## 2017-08-14 RX ADMIN — FUROSEMIDE 40 MG: 10 INJECTION, SOLUTION INTRAMUSCULAR; INTRAVENOUS at 11:26

## 2017-08-14 RX ADMIN — WARFARIN SODIUM 5 MG: 5 TABLET ORAL at 17:46

## 2017-08-14 RX ADMIN — DILTIAZEM HYDROCHLORIDE 30 MG: 30 TABLET, FILM COATED ORAL at 11:22

## 2017-08-14 RX ADMIN — DILTIAZEM HYDROCHLORIDE 30 MG: 30 TABLET, FILM COATED ORAL at 17:45

## 2017-08-14 RX ADMIN — DILTIAZEM HYDROCHLORIDE 30 MG: 30 TABLET, FILM COATED ORAL at 05:25

## 2017-08-14 RX ADMIN — Medication 100 MG: at 08:16

## 2017-08-14 RX ADMIN — ATORVASTATIN CALCIUM 40 MG: 40 TABLET, FILM COATED ORAL at 21:24

## 2017-08-14 RX ADMIN — ASPIRIN 81 MG: 81 TABLET, CHEWABLE ORAL at 08:16

## 2017-08-14 ASSESSMENT — ENCOUNTER SYMPTOMS
BACK PAIN: 0
WEAKNESS: 0
MYALGIAS: 0
COUGH: 0
FEVER: 0
DIZZINESS: 0
EYE PAIN: 0
WEAKNESS: 1
HEADACHES: 0
SORE THROAT: 0
SHORTNESS OF BREATH: 1
BRUISES/BLEEDS EASILY: 0
SHORTNESS OF BREATH: 0
HEMOPTYSIS: 0
ABDOMINAL PAIN: 0
CONSTIPATION: 0
VOMITING: 0
NAUSEA: 0
ORTHOPNEA: 0
COUGH: 1
CHILLS: 0
NECK PAIN: 0
DIARRHEA: 0
HEARTBURN: 0
BLURRED VISION: 0
PALPITATIONS: 0

## 2017-08-14 ASSESSMENT — PAIN SCALES - GENERAL
PAINLEVEL_OUTOF10: 0

## 2017-08-14 NOTE — PROGRESS NOTES
Hospital Medicine Progress Note, Adult, Complex               Author: Kvng Gutierrez Date & Time created: 8/13/2017  11:48 PM     Interval History:    No significant events he doesn't make a little bit of urine today    Review of Systems:  Review of Systems   Constitutional: Positive for malaise/fatigue.   Respiratory: Positive for shortness of breath.    Cardiovascular: Negative for chest pain and palpitations.   Neurological: Negative for dizziness and weakness.   Endo/Heme/Allergies: Does not bruise/bleed easily.       Physical Exam:  Physical Exam  No acute distress  Dialysis cath or clean dry intact  Heart irregular irregular diastolic murmur  Abdomen soft nontender  Extremity minimal edema  Labs:        Invalid input(s): [id number]  Recent Labs      08/11/17   1931 08/12/17 0357 08/13/17 0415   TROPONINI  0.14*   --    --    BNPBTYPENAT   --   2254*  2737*     Recent Labs      08/11/17   0649  08/12/17 0357 08/13/17 0415   SODIUM  135  137  133*   POTASSIUM  3.9  4.3  3.9   CHLORIDE  104  107  100   CO2  25  22  28   BUN  35*  44*  27*   CREATININE  2.50*  3.02*  2.26*   MAGNESIUM  2.2   --    --    PHOSPHORUS  3.1   --    --    CALCIUM  8.3*  8.4*  8.5     Recent Labs      08/11/17 0649 08/12/17 0357 08/13/17 0415   ALTSGPT  98*  81*   --    ASTSGOT  52*  38   --    ALKPHOSPHAT  67  54   --    TBILIRUBIN  0.9  0.7   --    GLUCOSE  101*  96  112*     Recent Labs      08/12/17 0357 08/13/17   0415  08/13/17   0445  08/13/17   1240  08/13/17   1315  08/13/17 2012   RBC  4.10*   --   4.02*   --    --    --    --    HEMOGLOBIN  12.6*   --   12.5*   --    --    --    --    HEMATOCRIT  38.6*   --   38.3*   --    --    --    --    PLATELETCT  208   --   211   --    --    --    --    PROTHROMBTM   --    --    --    --   13.9   --    --    APTT   --    < >   --   53.2*   --   87.4*  97.1*   INR   --    --    --    --   1.04   --    --     < > = values in this interval not displayed.      Recent Labs      17   0649  17   0357  17   0415   WBC   --   8.4  7.0   NEUTSPOLYS   --   50.10  58.60   LYMPHOCYTES   --   30.40  22.10   MONOCYTES   --   9.10  7.90   EOSINOPHILS   --   9.70*  10.60*   BASOPHILS   --   0.60  0.40   ASTSGOT  52*  38   --    ALTSGPT  98*  81*   --    ALKPHOSPHAT  67  54   --    TBILIRUBIN  0.9  0.7   --            Hemodynamics:  Temp (24hrs), Av.8 °C (98.2 °F), Min:36.7 °C (98.1 °F), Max:37.1 °C (98.7 °F)  Temperature: 37.1 °C (98.7 °F)  Pulse  Av.8  Min: 50  Max: 148Heart Rate (Monitored): (!) 113  NIBP: 125/78 mmHg     Respiratory:    Respiration: 20, Pulse Oximetry: 100 %     Work Of Breathing / Effort: Mild  RUL Breath Sounds: Clear, RML Breath Sounds: Clear, RLL Breath Sounds: Clear, SABAS Breath Sounds: Clear, LLL Breath Sounds: Clear  Fluids:    Intake/Output Summary (Last 24 hours) at 17 2348  Last data filed at 17 2200   Gross per 24 hour   Intake 1376.63 ml   Output   3950 ml   Net -2573.37 ml     Weight: 63.9 kg (140 lb 14 oz)  GI/Nutrition:  Orders Placed This Encounter   Procedures   • DIET ORDER     Standing Status: Standing      Number of Occurrences: 1      Standing Expiration Date:      Order Specific Question:  Diet:     Answer:  2 Gram Sodium [7]     Order Specific Question:  Diet:     Answer:  Cardiac [6]     Medical Decision Making, by Problem:  Active Hospital Problems    Diagnosis   • *Atrial fibrillation with RVR (CMS-[facility]) [I48.91]   • Cardiorenal syndrome [I13.10]   • Aortic regurgitation [I35.1]   • HFrEF (heart failure with reduced ejection fraction) (CMS-[facility]) [I50.20]   • CVA (cerebral vascular accident) (CMS-HCC) [I63.9]   • TREASURE (acute kidney injury) (CMS-HCC) [N17.9]   • Prolonged Q-T interval on ECG [R94.31]   • Electrolyte abnormality [E87.8]   • Metabolic acidosis [E87.2]   • Elevated troponin [R74.8]   • Non-rheumatic mitral regurgitation [I34.0]   • Essential hypertension [I10]   • Alcoholic (CMS-HCC)  [F10.20]   • Hyperglycemia [R73.9]     Sever AI,    Heparin to coumadin  Given some urine output I would defer left heart catheterization for at least a couple weeks to maximize potential for kidney improvement  Follow up with CT surgery and 6 weeks    afib  Start coumadin  Rate controlled with metop and dilt    chf  Volume management per hd    dispo pending hd spot    Will sign off but I left a message to arrange close follow-up with cardiology clinic    I did discuss with his daughter the concerns about his undocumented immigration status they may need to pursue care in [place] near [facility] she understands the complexities around his medical care    It is my pleasure to participate in the care of Mr. Saini.  Please do not hesitate to contact me with questions or concerns.    Kvng Gutierrez MD PhD FACC  Cardiologist [facility]  Core Measures

## 2017-08-14 NOTE — PROGRESS NOTES
Pulmonary Critical Care Progress Note        Date of service: 8/14/2017    Chief Complaint: Tachycardia    History of Present Illness: 47 y.o. male who presented to an urgent care with tachycardia.  He was subsequently sent to the emergency room.  Additionally, he was complaining of some stroke-like   symptoms.  According to the daughter at the bedside he has had slurred speech for approximately a week with some left-sided facial droop for the same period of time.  When these symptoms began, he did not seek any medical care.  He only came in to urgent care today because he was feeling weak and tired.  When he arrived in the emergency department, he was found to be in atrial fibrillation with a rapid ventricular response.  His heart rate was in the 120s.  He received intravenous diltiazem, but he became profoundly bradycardic.  Cardiology consultation was obtained.  He was found to be in acute kidney injury.  Subsequently, he has had a dialysis catheter placed.  He was seen by Dr. Merchant from nephrology and is now undergoing emergent dialysis.     ROS:    Respiratory: negative cough, negative shortness of breath and negative wheezing,   Cardiac: negative chest pain and negative leg swelling,   GI: negative nausea, negative vomiting and negative abdominal pain.      Interval Events:  24 hour interval history reviewed      - no bleeding on H drip   - neuro checks ok - starting to mobilize   - hemodynamics ok   - remains in AF with controlled rate   - BMP pending    PFSH:  No change.    Respiratory:    room air  Pulse Oximetry: 100 %          Exam: unlabored respirations, no intercostal retractions or accessory muscle use and clear to auscultation without rales or wheezes  ImagingAvailable data reviewed   no film today        Invalid input(s): [id number]    HemoDynamics:  Pulse: (!) 56, Heart Rate (Monitored): 69  NIBP: 110/71 mmHg    diltiazem and metop PO    Exam: atrial fibrillation, occasional tachycardia, murmur,  "no LE edema  Imaging: Available data reviewed    - LOBO 8/10: CONCLUSIONS  Trileaflet aortic valve. The left coronary cusp has a nodule   calcification with poor coaptation resulting in severe central   eccentric aortic regurgitation.  Severely reduced left ventricular systolic function.  Left ventricular ejection fraction is visually estimated to be 20%.  Severe concentric left ventricular hypertrophy.  Normal right ventricular size and systolic function.  Mild mitral regurgitation.  These findings were communicated to the ICU service.  Recent Labs      08/11/17   1931  08/12/17   0357  08/13/17   0415   TROPONINI  0.14*   --    --    BNPBTYPENAT   --   2254*  2737*       Neuro:  GCS Total [place] Coma Score: 15       Exam: ?left facial droop but CN otherwise intact and no extremity weakness mental status intact oriented for age x3  Imaging: Available data reviewed   CT head unremarkable   MRI brain 8/10:  1.  Mild diffuse cerebral atrophy.  2.  Small areas of acute infarction involving the left frontal periventricular white matter extending inferiorly to the subinsular cortex. Punctate area of infarction involving the cortex in the right posterior occipital lobe.  3.  Moderate periventricular white matter changes consistent with chronic microvascular ischemic gliosis.  4.  Small chronic focus of lacunar type infarction in the right frontal periventricular white matter.  5.  Numerous chronic \"microbleed's\" are noted throughout the brain parenchyma in both the supra and infratentorial compartments. Gyriform/curvilinear regions of chronic \"microbleed\" noted in the right posterior frontal region and also the left frontal   periventricular white matter.     Fluids:  Intake/Output       08/12/17 0700 - 08/13/17 0659 08/13/17 0700 - 08/14/17 0659 08/14/17 0700 - 08/15/17 0659      [id number] 8540-6587 Total 4057-4842 4091-9977 Total 5560-4182 9369-6298 Total       Intake    P.O.  600  500 1100  360  150 510  --  -- --    " P.O.  360 150 510 -- -- --    I.V.  195  194.6 389.6  36  148 184  --  -- --    Heparin Volume 195 194.6 389.6 36 148 184 -- -- --    Dialysis  400  -- 400  500  -- 500  --  -- --    Dialysis Volume (Dialysis Intake) 400 -- 400 500 -- 500 -- -- --    Total Intake 1195 694.6 1889.6  -- -- --       Output    Urine  150  60 210  600  375 975  --  -- --    Number of Times Voided -- 4 x 4 x 6 x 4 x 10 x -- -- --    Void (ml) 150 60 210 600 375 975 -- -- --    Dialysis  3400  -- 3400  3100  -- 3100  --  -- --    Dialysis Output (Dialysis Output) 3400 -- 3400 3100 -- 3100 -- -- --    Stool  --  -- --  --  -- --  --  -- --    Number of Times Stooled -- 1 x 1 x -- 2 x 2 x -- -- --    Total Output 3550 60 3610 3700 375 4075 -- -- --       Net I/O     -2355 634.6 -1720.4 -2804 -77 -2881 -- -- --           Recent Labs      08/11/17   0649  08/12/17 0357 08/13/17 0415   SODIUM  135  137  133*   POTASSIUM  3.9  4.3  3.9   CHLORIDE  104  107  100   CO2  25  22  28   BUN  35*  44*  27*   CREATININE  2.50*  3.02*  2.26*   MAGNESIUM  2.2   --    --    PHOSPHORUS  3.1   --    --    CALCIUM  8.3*  8.4*  8.5       GI/Nutrition:  Exam: abdomen is soft and non-tender, normal active bowel sounds  Imaging: Available data reviewed  taking PO  Liver Function  Recent Labs      08/11/17   0649  08/12/17 0357 08/13/17 0415   ALTSGPT  98*  81*   --    ASTSGOT  52*  38   --    ALKPHOSPHAT  67  54   --    TBILIRUBIN  0.9  0.7   --    GLUCOSE  101*  96  112*       Heme:  Recent Labs      08/12/17 0357   08/13/17   0415   08/13/17   1240  08/13/17   1315  08/13/17 2012 08/14/17   0322   RBC  4.10*   --   4.02*   --    --    --    --    --    HEMOGLOBIN  12.6*   --   12.5*   --    --    --    --    --    HEMATOCRIT  38.6*   --   38.3*   --    --    --    --    --    PLATELETCT  208   --   211   --    --    --    --    --    PROTHROMBTM   --    --    --    --   13.9   --    --   14.1   APTT   --    < >   --    < >    --   87.4*  97.1*  68.3*   INR   --    --    --    --   1.04   --    --   1.06    < > = values in this interval not displayed.       Infectious Disease:  Temp  Av.9 °C (98.4 °F)  Min: 36.7 °C (98.1 °F)  Max: 37.2 °C (98.9 °F)  Micro: reviewed  Recent Labs      17   0649  17   0357  17   0415   WBC   --   8.4  7.0   NEUTSPOLYS   --   50.10  58.60   LYMPHOCYTES   --   30.40  22.10   MONOCYTES   --   9.10  7.90   EOSINOPHILS   --   9.70*  10.60*   BASOPHILS   --   0.60  0.40   ASTSGOT  52*  38   --    ALTSGPT  98*  81*   --    ALKPHOSPHAT  67  54   --    TBILIRUBIN  0.9  0.7   --      Current Facility-Administered Medications   Medication Dose Frequency Provider Last Rate Last Dose   • MD ALERT... warfarin (COUMADIN) per pharmacy protocol   pharmacy to dose Ziggy Sandra M.D.       • metoprolol (LOPRESSOR) tablet 75 mg  75 mg Q8HRS Ziggy Sandra M.D.   75 mg at 17   • atorvastatin (LIPITOR) tablet 40 mg  40 mg QHS Susanne Nicholas M.D.   40 mg at 174   • diltiazem (CARDIZEM) tablet 30 mg  30 mg Q6HRS Kvng Gutierrez M.D.   30 mg at 17 0525   • aspirin (ASA) chewable tab 81 mg  81 mg DAILY Susanne Nicholas M.D.   81 mg at 17 0945   • metoprolol (LOPRESSOR) injection 5 mg  5 mg Q6HRS PRN Wesley Sanchez M.D.   5 mg at 17 1347   • senna-docusate (PERICOLACE or SENOKOT S) 8.6-50 MG per tablet 2 Tab  2 Tab BID Jose Alberto Pope M.D.   Stopped at 17 2100    And   • polyethylene glycol/lytes (MIRALAX) PACKET 1 Packet  1 Packet QDAY PRN Jose Alberto Pope M.D.        And   • magnesium hydroxide (MILK OF MAGNESIA) suspension 30 mL  30 mL QDAY PRN Jose Alberto Pope M.D.        And   • bisacodyl (DULCOLAX) suppository 10 mg  10 mg QDAY PRN Jose Alberto Pope M.D.       • Respiratory Care per Protocol   Continuous RT Jose Alberto Pope M.D.       • glucose 4 g chewable tablet 16 g  16 g Q15 MIN PRN Jose Alberto Pope M.D.        And   • dextrose 50% (D50W) injection 25  mL  25 mL Q15 MIN PRN Jose Alberto Pope M.D.       • heparin 1000 units/mL injection 2,200 Units  2,200 Units PRN Martha Cunha PHARMD   2,200 Units at 08/13/17 0544    And   • heparin infusion 25,000 units in 500 ml 0.45% nacl   Continuous Martha Cunha PHARMD 16 mL/hr at 08/14/17 0420 800 Units/hr at 08/14/17 0420   • folic acid (FOLVITE) tablet 1 mg  1 mg DAILY Leticia Curtis M.D.   1 mg at 08/13/17 0945   • thiamine (THIAMINE) tablet 100 mg  100 mg DAILY Leticia Curtis M.D.   100 mg at 08/13/17 0945   • multivitamin (THERAGRAN) tablet 1 Tab  1 Tab DAILY Leticia Curtis M.D.   1 Tab at 08/13/17 0945   • heparin 1000 units/mL injection 3,000 Units  3,000 Units PRN Katrina Merchant M.D.   3,000 Units at 08/13/17 1545     Last reviewed on 8/9/2017  2:32 PM by Gisele Gaitan, [facility]    Quality  Measures:  Radiology images reviewed, Labs reviewed and Medications reviewed  Truong catheter: No Truong  Central line in place: Need for access and Dialysis    DVT Prophylaxis: Heparin  DVT prophylaxis - mechanical: SCDs  Ulcer prophylaxis: Not indicated    Assessed for rehab: Patient returned to prior level of function, rehabilitation not indicated at this time    Assessment/Plan:  New onset Atrial fibrillation with rapid ventricular response - improving   - cont dilt and BB PO per cards (may need to increase given tachycardia today)   - cont hep gtt per Cards --> transition to Coumadin - monitor for bleeding   - cardiology following  Acute ischemic stroke with diffuse microhemorrhage   - carotid duplex ok, therapies   - Antiplatelets, statin  Acute on chronic (stage III) kidney injury - cardiorenal   - Nephrology following, HD prn   - avoid nephrotoxins  Hyperglycemia without pre-existing history of diabetes - insulin sliding scale  Uncontrolled Systemic arterial hypertension noncompliant with medication ×2 months   - Systolic blood pressure goal less than 160   History of urolithiasis  History of alcohol abuse,  apparently he has been cutting back   - vitamin therapy   - monitor for DTs   - cessation to be encouraged  Acute decompensated systolic heart failure (EF 25%) with moderate MR and severe AI   - Beta blocker, ACE inhibitor contraindicated given renal failure   - Volume removal with dialysis   - CVS plans for operative intervention in approximately 6 weeks, will need cath  Acute cardiogenic pulmonary edema   - PUF, diuresis, LATASHA-renal diet  Moderate pulmonary arterial hypertension  Prophylaxis, diet, therapies  Mobilize    Awaiting transfer of patient out of ICU today.   [facility] will sign off at transfer.   Please call with questions.    No HD cath planned  BMP pending - renal function worse    Discussed patient condition and risk of morbidity and/or mortality with RN, RT, Pharmacy, Charge nurse / hot rounds, Patient and cardiology, CVS, nephrology and [facility].

## 2017-08-14 NOTE — DISCHARGE PLANNING
Up to chair.  No issues overnight.  Hep drip.  AFIB controlled.   Reg diet.  Room air.     No insurance for post acute needs.    DC to home. [place].   Has Family.

## 2017-08-14 NOTE — CARE PLAN
Problem: Safety  Goal: Will remain free from falls  Outcome: PROGRESSING AS EXPECTED  Pt. Is ambulatory and steady. Standby assist. Pt. Remains free from falls at this time.     Problem: Venous Thromboembolism (VTW)/Deep Vein Thrombosis (DVT) Prevention:  Goal: Patient will participate in Venous Thrombosis (VTE)/Deep Vein Thrombosis (DVT)Prevention Measures  Outcome: PROGRESSING AS EXPECTED  Patient is being anticoagulated with a heparin drip. Pt. Is standby assist and walks occasionally

## 2017-08-14 NOTE — PROGRESS NOTES
Inpatient Anticoagulation Service Note    Date: 8/14/2017  Reason for Anticoagulation: Stroke, Atrial Fibrillation   [id number] VASc Score: 4    Hemoglobin Value: 12.5  Hematocrit Value: 38.3  Lab Platelet Value: 211  Target INR: 2.0 to 3.0    INR from last 7 days     Date/Time INR Value    08/14/17 0855 1.06    08/14/17 0322 1.06    08/13/17 1240 1.04    08/09/17 1305 (!)1.33        Dose from last 7 days     Date/Time Dose (mg)    08/14/17 1100 5    08/13/17 0900 0        Average Dose (mg):  (new start this admission)  Significant Interactions: Aspirin  Bridge Therapy: Yes (heparin weight based protocol )     Comments: Warfarin held yesterday per the request of nephrology for possible permanent dialysis catheter placement. However, UOP now improving. Holding off on permanent catheter placement for now, okay to start warfarin tonight. No changes to interacting medications. Remains on heparin drip. No S/S bleeding noted. Will start 5 mg daily. Will continue to follow.    Plan:  5 mg tonight, trend INR     Pharmacist suggested discharge dosing: TBD, likely 5 mg daily at this point with INR within 72 hours of dicharge     Edilia Abraham, PHARMD

## 2017-08-14 NOTE — CARE PLAN
Problem: Communication  Goal: The ability to communicate needs accurately and effectively will improve  Outcome: PROGRESSING AS EXPECTED  Able to communicate with patient with Montenegrin      Problem: Infection  Goal: Will remain free from infection  Outcome: PROGRESSING AS EXPECTED  No nosocomial infection this admission    Problem: Bowel/Gastric:  Goal: Normal bowel function is maintained or improved  Outcome: PROGRESSING AS EXPECTED  Patient had BM today

## 2017-08-14 NOTE — PROGRESS NOTES
Nephrology Progress Note, Adult, Complex               Author:Oni Avilesjjar Date & Time created: 8/14/2017  2:22 PM     Interval History:  48 y/o male with severe, CHF  -EF 25 %, Severe AI, A.fib, CVA in TREASURE, metabolic acidosis  Poor UOP  Doing well , no complaints      Review of Systems:  Review of Systems   Constitutional: Positive for malaise/fatigue. Negative for fever and chills.   Respiratory: Negative for cough and hemoptysis.    Cardiovascular: Negative for chest pain, palpitations and orthopnea.   Gastrointestinal: Negative for heartburn, nausea, vomiting, abdominal pain and diarrhea.   Genitourinary: Negative for dysuria.       Physical Exam:  Physical Exam   Constitutional: He is oriented to person, place, and time. No distress.   HENT:   Head: Normocephalic and atraumatic.   Nose: Nose normal.   Eyes: No scleral icterus.   Cardiovascular: An irregularly irregular rhythm present. Exam reveals no gallop and no friction rub.    Pulmonary/Chest: Effort normal and breath sounds normal. No respiratory distress. He has no wheezes.   Musculoskeletal: He exhibits no edema.   Trace pedal edema   Neurological: He is alert and oriented to person, place, and time. A cranial nerve deficit is present.   Skin: Skin is warm. He is not diaphoretic.   Nursing note and vitals reviewed.      Labs:        Invalid input(s): [id number]  Recent Labs      08/11/17   1931 08/12/17 0357 08/13/17 0415 08/14/17   0855   TROPONINI  0.14*   --    --    --    BNPBTYPENAT   --   2254*  2737*  2755*     Recent Labs      08/12/17 0357 08/13/17 0415  08/14/17   0855   SODIUM  137  133*  135   POTASSIUM  4.3  3.9  3.6   CHLORIDE  107  100  103   CO2  22  28  23   BUN  44*  27*  33*   CREATININE  3.02*  2.26*  2.82*   CALCIUM  8.4*  8.5  8.8     Recent Labs      08/12/17 0357 08/13/17 0415  08/14/17   0855   ALTSGPT  81*   --   53*   ASTSGOT  38   --   18   ALKPHOSPHAT  54   --   61   TBILIRUBIN  0.7   --   0.8   GLUCOSE   96  112*  144*     Recent Labs      17   1240   17   0322  17   0855   RBC  4.10*   --   4.02*   --    --    --    --    --    --    HEMOGLOBIN  12.6*   --   12.5*   --    --    --    --    --    --    HEMATOCRIT  38.6*   --   38.3*   --    --    --    --    --    --    PLATELETCT  208   --   211   --    --    --    --    --    --    PROTHROMBTM   --    --    --    --   13.9   --    --   14.1  14.1   APTT   --    < >   --    < >   --    < >  97.1*  68.3*  61.8*   INR   --    --    --    --   1.04   --    --   1.06  1.06    < > = values in this interval not displayed.     Recent Labs      17   0855   WBC  8.4  7.0   --    NEUTSPOLYS  50.10  58.60   --    LYMPHOCYTES  30.40  22.10   --    MONOCYTES  9.10  7.90   --    EOSINOPHILS  9.70*  10.60*   --    BASOPHILS  0.60  0.40   --    ASTSGOT  38   --   18   ALTSGPT  81*   --   53*   ALKPHOSPHAT  54   --   61   TBILIRUBIN  0.7   --   0.8           Hemodynamics:  Temp (24hrs), Av.6 °C (97.9 °F), Min:35.9 °C (96.6 °F), Max:37.2 °C (98.9 °F)  Temperature: 36.1 °C (97 °F)  Pulse  Av.6  Min: 50  Max: 148Heart Rate (Monitored): 85  NIBP: 127/91 mmHg     Respiratory:    Respiration: 14, Pulse Oximetry: 100 %        RUL Breath Sounds: Clear, RML Breath Sounds: Clear, RLL Breath Sounds: Clear, SABAS Breath Sounds: Clear, LLL Breath Sounds: Clear  Fluids:    Intake/Output Summary (Last 24 hours) at 17 1422  Last data filed at 17 1300   Gross per 24 hour   Intake   1442 ml   Output   4475 ml   Net  -3033 ml        GI/Nutrition:  Orders Placed This Encounter   Procedures   • DIET ORDER     Standing Status: Standing      Number of Occurrences: 1      Standing Expiration Date:      Order Specific Question:  Diet:     Answer:  2 Gram Sodium [7]     Order Specific Question:  Diet:     Answer:  Cardiac [6]     Medical Decision Making, by Problem:  Active  Hospital Problems    Diagnosis   • *Atrial fibrillation with RVR (CMS-HCC) [I48.91]   • CVA (cerebral vascular accident) (CMS-HCC) [I63.9]   • Essential hypertension [I10]   • Respiratory failure (CMS-HCC) [J96.90]   • Electrolyte abnormality [E87.8]   • Hyperglycemia [R73.9]   • Prolonged Q-T interval on ECG [R94.31]   • Elevated troponin [R74.8]   • Acute on chronic systolic heart failure (CMS-HCC) [I50.23]   • Non-rheumatic mitral regurgitation [I34.0]   • TREASURE (acute kidney injury) (CMS-HCC) [N17.9]   • 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  D/W Dr Sandra  Over 50% of this 35 minute visit was spent on counseling and coordination of care regarding diet, side effects, and complication.

## 2017-08-14 NOTE — PROGRESS NOTES
Assumed care at 1900, bedside report received from day shift RNMalvin. Patient is AOx4 and Sinhala speaking. Daughter at bedside to translate. No signs of distress noted. Pt. Is atrial fib on the monitor. Initial assessment completed, orders reviewed, call light within reach, bed alarm in use, and hourly rounding in place. POC addressed with patient, no additional questions at this time.

## 2017-08-14 NOTE — CARE PLAN
Problem: Safety  Goal: Will remain free from injury  Outcome: PROGRESSING AS EXPECTED  Safety maintained: Bed locked/lowest position/alarm on, safety checks complete, night light on, no slip footwear on, belongings/call light in reach, no injuries during shift; will continue to monitor.

## 2017-08-14 NOTE — PROGRESS NOTES
[facility] Progress Note      Admit Date: 8/9/2017  ICU Day: 5    Resident(s): Ziggy Sandra  Attending: GHADA HERNANDEZ/ Dr. Jan Vaz    Date & Time:   8/14/2017   11:26 AM       Patient ID:    Name:             Bhavesh Saini     YOB: 1970  Age:                 47 y.o.  male   MRN:               [id number]    HPI:  Mr Saini is a 47 year old male with PMH of alcoholism and HTN, for which he is currently not taking any medication. He presented to the ED with his family due to weakness and fatigue. He complains of palpitation, SOB on exertion, but denies HA, nausea, vomiting, chest pain, syncope, coughing, or fever. He has history of similar symptoms. The family has noticed slurred speech last week but the patient refused to go and seen by doctor.    Consultants:  Cardiology, Cardiac Surgery, and Nephrology  PMA: Jan Vaz    Interval Events:    Patient states he feels good, has no complaints.     Per Nursing patient was tachycardic over night, his SBP was 110-150. He has been under 100's for HR since midnight.     Patient is to be bridged to coumadin today.    Nephrology on board, discussed with Dr. Merchant, she suggested giving him a one time dose of Lasix, 40mg, to see if his urine output would increase. Urine output was about ~1L yesterday, up from ~400mL the day before. Spoke with Dr. Najjar today, he suggests giving his another dose of lasix today and monitor his kidney function, depending on results will evaluate need for tunnel catheter for dialysis.    Patient to be transferred today to the floor.        Review of Systems   Constitutional: Negative for fever and chills.   HENT: Negative for sore throat.    Eyes: Negative for blurred vision and pain.   Respiratory: Positive for cough. Negative for hemoptysis and shortness of breath.    Cardiovascular: Negative for chest pain, palpitations, orthopnea and leg swelling.   Gastrointestinal: Negative for heartburn, nausea,  vomiting, diarrhea and constipation.   Musculoskeletal: Negative for myalgias, back pain and neck pain.   Neurological: Positive for weakness. Negative for dizziness and headaches.       PHYSICAL EXAM  Filed Vitals:    08/14/17 0500 08/14/17 0600 08/14/17 0700 08/14/17 0800   BP:       Pulse:   67 81   Temp:    35.9 °C (96.6 °F)   Resp:   10 36   Height:       Weight:       SpO2: 95% 99% 99% 99%     Body mass index is 27.51 kg/(m^2).  /85 mmHg  Pulse 81  Temp(Src) 35.9 °C (96.6 °F)  Resp 36  Ht 1.524 m (5')  Wt 63.9 kg (140 lb 14 oz)  BMI 27.51 kg/m2  SpO2 99%  O2 therapy: Pulse Oximetry: 99 %, O2 (LPM): 0, O2 Delivery: None (Room Air)    Physical Exam   Constitutional: He is oriented to person, place, and time. No distress.   HENT:   Head: Normocephalic and atraumatic.   Right Ear: External ear normal.   Left Ear: External ear normal.   Nose: Nose normal.   Eyes: EOM are normal. Pupils are equal, round, and reactive to light. Right eye exhibits no discharge. Left eye exhibits no discharge.   Neck: Normal range of motion. Neck supple.   Cardiovascular: Normal rate and normal heart sounds.  An irregularly irregular rhythm present.   Abdominal: Soft. Bowel sounds are normal. He exhibits no distension. There is no tenderness. There is no rebound and no guarding.   Musculoskeletal: Normal range of motion.   Neurological: He is alert and oriented to person, place, and time.   Skin: Skin is warm and dry. He is not diaphoretic.   Psychiatric: Memory, affect and judgment normal.       Respiratory:     Respiration: (!) 36, Pulse Oximetry: 99 %    Chest Tube Drains:          Invalid input(s): [id number]    HemoDynamics:  Pulse: 81, Heart Rate (Monitored): 86 NIBP: 125/91 mmHg      Neuro:      Fluids:    Date 08/14/17 0700 - 08/15/17 0659   Shift 7182-9410 6728-3985 7681-9187 24 Hour Total   I  N  T  A  K  E   I.V. 32   32      Heparin Volume 32   32    Shift Total 32   32   O  U  T  P  U  T   Shift Total        NET 32   32        Intake/Output Summary (Last 24 hours) at 17 1113  Last data filed at 17 0800   Gross per 24 hour   Intake   1202 ml   Output   4075 ml   Net  -2873 ml          Body mass index is 27.51 kg/(m^2).    Recent Labs      17   08   SODIUM  137  133*  135   POTASSIUM  4.3  3.9  3.6   CHLORIDE  107  100  103   CO2  22  28  23   BUN  44*  27*  33*   CREATININE  3.02*  2.26*  2.82*   CALCIUM  8.4*  8.5  8.8       GI/Nutrition:  Recent Labs      17   0855   ALTSGPT  81*   --   53*   ASTSGOT  38   --   18   ALKPHOSPHAT  54   --   61   TBILIRUBIN  0.7   --   0.8   GLUCOSE  96  112*  144*       Heme:  Recent Labs      17   1240   17   0855   RBC  4.10*   --   4.02*   --    --    --    --    --    --    HEMOGLOBIN  12.6*   --   12.5*   --    --    --    --    --    --    HEMATOCRIT  38.6*   --   38.3*   --    --    --    --    --    --    PLATELETCT  208   --   211   --    --    --    --    --    --    PROTHROMBTM   --    --    --    --   13.9   --    --   14.1  14.1   APTT   --    < >   --    < >   --    < >  97.1*  68.3*  61.8*   INR   --    --    --    --   1.04   --    --   1.06  1.06    < > = values in this interval not displayed.       Infectious Disease:  Temp  Av.7 °C (98.1 °F)  Min: 35.9 °C (96.6 °F)  Max: 37.2 °C (98.9 °F)  Recent Labs      17   08   WBC  8.4  7.0   --    NEUTSPOLYS  50.10  58.60   --    LYMPHOCYTES  30.40  22.10   --    MONOCYTES  9.10  7.90   --    EOSINOPHILS  9.70*  10.60*   --    BASOPHILS  0.60  0.40   --    ASTSGOT  38   --   18   ALTSGPT  81*   --   53*   ALKPHOSPHAT  54   --   61   TBILIRUBIN  0.7   --   0.8       Meds:  • furosemide  40 mg     • warfarin  5 mg     • MD ALERT... warfarin       • metoprolol  75 mg     • atorvastatin  40 mg     • diltiazem   "30 mg     • aspirin  81 mg     • metoprolol  5 mg     • senna-docusate  2 Tab      And   • polyethylene glycol/lytes  1 Packet      And   • magnesium hydroxide  30 mL      And   • bisacodyl  10 mg     • Respiratory Care per Protocol       • glucose 4 g  16 g      And   • dextrose 50%  25 mL     • heparin 1000 units/mL  2,200 Units      And   • heparin   800 Units/hr (08/14/17 1022)   • folic acid  1 mg     • thiamine  100 mg     • multivitamin  1 Tab     • heparin 1000 units/mL  3,000 Units          Procedures:      Imaging:  CAROTID DUPLEX ([facility] Only)   Preliminary Result      MR-BRAIN-W/O   Final Result         1.  Mild diffuse cerebral atrophy.      2.  Small areas of acute infarction involving the left frontal periventricular white matter extending inferiorly to the subinsular cortex. Punctate area of infarction involving the cortex in the right posterior occipital lobe.      3.  Moderate periventricular white matter changes consistent with chronic microvascular ischemic gliosis.      4.  Small chronic focus of lacunar type infarction in the right frontal periventricular white matter.      5.  Numerous chronic \"microbleed's\" are noted throughout the brain parenchyma in both the supra and infratentorial compartments. Gyriform/curvilinear regions of chronic \"microbleed\" noted in the right posterior frontal region and also the left frontal    periventricular white matter.      TRANSESOPHAGEAL ECHO W/O CONT   Final Result      US-RENAL   Final Result      1.  No hydronephrosis is identified.      2.  Increased renal echotexture is consistent with medical renal disease.      3.  Left renal cysts.      4.  Small amount of free fluid in Morison's pouch.      ECHOCARDIOGRAM COMP W/O CONT   Final Result      IR-CVC NON TUNNELED > AGE 5   Final Result      1. Ultrasound and fluoroscopic guided placement of a right internal jugular 12 [place] of [place] triple lumen non-tunneled hemodialysis catheter.    "   2. The hemodialysis catheter may be used immediately as clinically indicated. Flushes per protocol.      CT-HEAD W/O   Final Result      1.  Cerebral atrophy.      2.  White matter lucencies most consistent with small vessel ischemic change versus demyelination or gliosis.      3.  Otherwise, Head CT without contrast with no acute findings. No evidence of acute cerebral infarction, hemorrhage or mass lesion.      DX-CHEST-PORTABLE (1 VIEW)   Final Result      Cardiomegaly. No consolidation.          Problem and Plan:      * Atrial fibrillation with RVR (CMS-[facility]) (present on admission)  Assessment & Plan  -New onset vs paroxysmal (vague PMH of afib and non-compliance with meds)  -Presented with palpitations and HR in 120s. He had HR in 20s when dilt IV push was given. Later HR stabilized and was started on dilt drip and moved to ICU. Currently on 50 mg of metoprolol. However, HR has been in 80-140s.  -H/O left facial droop and left sided weakness in the week prior to arrival at ED.  -CHADS2: 4  -ECHO: severe LVH, global hypokinesis, EF ~20%, severe AI and mod MR  -LOBO: slow blood flow but no thrombus. Severe AI  -MRI brain: acute infarction in L frontal periventricular white matter extendeing to subinsular cortex. Punctate area of infarction in R posterior lobe. Numerous microbleeds.  Plan  -Cardiac surgery spoke with Mr Saini and family, he needs AVR/MAZE w LOBO, but due to his renal status and recent stroke with hemorrhage, they do not recommend at this time. They will follow up outpatient in 6 weeks.   -Per Cardiology, he is to have left heart cath in a couple orf weeks, and can be switched to coumadin.   -Na restricted diet  -Metoprolol increased to 50 q8h, PRN 5mg metoprolol q6h. Started diltiazem 30mg q6h. Patient was tachycardic today, will CTM for possible increase in dose.    -Cardioversion will likely be done during AV replacement.     CVA (cerebral vascular accident) (CMS-HCC) (present on  admission)  Assessment & Plan  -h/o left facial droop, slurred speech and left sided weakness. However no neuro deficits currently  -CT: cerebral atrophy and small vessel ischemic changes   -MRI brain: acute infarction in L frontal periventricular white matter extendeing to subinsular cortex. Punctate area of infarction in R posterior lobe. Numerous microbleeds.   Assessment: Afib could have contributed (LOBO showed 'sludge' in the heart)  Plan  -ASA, statin  -Mx of Afib as stated above.    TREASURE (acute kidney injury) (CMS-HCC) (present on admission)  Assessment & Plan  -On adm: BUN/Cr:  /4.3, AGMA, Ph 6.1. No old labs to compare.   -FeNa <1%  -H/o renal disease since 7yrs, FH kidney disease in his father. Has been non-complaint with HTN medications. Has CHF.  -Renal US: no hydronephrosis and calculi. Left renal cysts.  -Acute on chronic. Likely pre-renal vs cardiorenal. HTN is likely the cause of CKD  Plan  -Nephrology on board, discussed with Dr. Merchant, she suggested giving him a one time dose of Lasix, 40mg, to see if his urine output would increase. Urine output was about ~1L yesterday, up from ~400mL the day before. Spoke with Dr. Najjar today, he suggests giving his another dose of lasix today and monitor his kidney function, depending on results will evaluate need for dialysis.   -On dialysis, low Na diet  -Electrolytes wnl today    HFrEF (heart failure with reduced ejection fraction) (CMS-HCC) (present on admission)  Assessment & Plan  -Presented with SOB, orthopnea, fatique and palpitations. Has h/o HTN and alcoholism in the past. On exam: JVD +., murmurs + but no volume overload.   -BNP 2203 on admission, 2254 on 08/12/17, 2737 on 08/13/17, 2755 on 08/14/17  -CXR: cardiomegaly. ECHO showed: severe LVH, global hypokinesis, EF ~20%, severe AI and mod MR  Assessment: Compensated HF likely due to alcoholism vs tachycardia induced heart failure.   Plan  -Cardiac surgery spoke with Mr Saini and family, he needs  AVR/MAZE w LOBO, but due to his renal status and recent stroke with hemorrhage, they do not recommend at this time. They will follow up outpatient in 6 weeks, but will have a heart cath done during his hospital stay.  -Per Cardiology, he is to have left heart cath in a couple of weeks, and can be switched to coumadin.   -Na restricted diet  -On metoprolol for Afib which helps CHF. Cannot start ACEI and aldactone due to acute on CKD, will contact Nephro about renal status and starting these meds.   -Nurse contacted to [employer] to help with insurance       Aortic regurgitation (present on admission)  Assessment & Plan  -Severe AR.   -ECHO:  severe LVH, global hypokinesis, EF ~20%, severe AI and mod MR. Findings were confirmed by LOBO  -Fits the criteria for valve replacement. Cardiology and cardiac surgery on board.    Cardiorenal syndrome  Assessment & Plan  -Diagnosis given by Nephrology  -Patient is on HD, and is urinated <500 ml yesterday. Per Nephrology will try a single dose of Lasix 40mg, to see if urine output increases  -Creatinine was 4.38 on arrival to ED. Total protein Urine 208. Estimated GFR was 14. BUN/Cr  65/4.38 FENA 0.2%  -Presented with SOB, orthopnea, fatique and palpitations. Has h/o HTN and alcoholism in the past. On exam: JVD +., murmurs + but no volume overload.   -BNP 2203 on admission, 2254 on 08/12/17, 2737 on 08/13/17  -CXR: cardiomegaly. ECHO showed: severe LVH, global hypokinesis, EF ~20%, AR and mod MR  Assessment: Cardiorenal syndrome  Plan  -Patient needs AVR/MAZE w LOBO per Cardiac Surgery, but due to his renal status and recent stroke with hemorrhage, they do not recommend at this time.  They will follow up outpatient in 6 weeks.   -Patient will be counseled on cessation of alcohol intake.   -Nephrology on board, discussed with Dr. Merchant, she suggested giving him a one time dose of Lasix, 40mg, to see if his urine output would increase. Urine output was about ~1L yesterday, up from  ~400mL the day before. Spoke with Dr. Najjar today, he suggests giving him another dose of lasix today and monitor his kidney function, depending on results will evaluate need for dialysis.       Electrolyte abnormality (present on admission)  Assessment & Plan  -Electrolytes wnl today  -On dialysis    Prolonged Q-T interval on ECG (present on admission)  Assessment & Plan  -QTc ~530s. Will avoid QTc prolonging meds.    Essential hypertension (present on admission)  Assessment & Plan  -PMH of HTN but likely non-compliant. ECHO showed severe LVH  -On BB for Afib. Will optimize meds when he stabilizes.  -SBP today ranged from 100-140    Alcoholic (CMS-HCC) (present on admission)  Assessment & Plan  -History is vague but looks like he was drinking heavily in the past. Last drink was 8/5/2017.  -S/p rally bag in ER. On thiamine and folate.   -Tachycardia due to ?alcohol withdrawal symptoms.    Hyperglycemia (present on admission)  Assessment & Plan  -Resolved  -On adm: , A1c: 5.4  -He was started on ISS and hypoglycemia protocol which is discontinued.  CTM with accuchecks    Elevated troponin  Assessment & Plan  -Trops ~0.2 w/o ST-TW changes. Likely elevated due to renal disease    Non-rheumatic mitral regurgitation  Assessment & Plan  -Moderate MR on ECHO    Metabolic acidosis (present on admission)  Assessment & Plan  -AGMA on admission likely due to uremia  -Resolved with dialysis  -Nephrology on board        DISPO: Patient to be transferred to the floor    CODE STATUS: Full Code    Quality Measures:  [place] Catheter: None in place  DVT Prophylaxis: Heparin, bridging to Coumadin  Ulcer Prophylaxis: None  Antibiotics: None  Lines: PIV

## 2017-08-15 LAB
ALBUMIN SERPL BCP-MCNC: 3.2 G/DL (ref 3.2–4.9)
ALBUMIN/GLOB SERPL: 1.2 G/DL
ALP SERPL-CCNC: 60 U/L (ref 30–99)
ALT SERPL-CCNC: 43 U/L (ref 2–50)
ANION GAP SERPL CALC-SCNC: 7 MMOL/L (ref 0–11.9)
ANION GAP SERPL CALC-SCNC: 8 MMOL/L (ref 0–11.9)
APTT PPP: 53.7 SEC (ref 24.7–36)
APTT PPP: 61 SEC (ref 24.7–36)
AST SERPL-CCNC: 16 U/L (ref 12–45)
BILIRUB SERPL-MCNC: 0.7 MG/DL (ref 0.1–1.5)
BNP SERPL-MCNC: 1753 PG/ML (ref 0–100)
BUN SERPL-MCNC: 43 MG/DL (ref 8–22)
BUN SERPL-MCNC: 45 MG/DL (ref 8–22)
CALCIUM SERPL-MCNC: 8.6 MG/DL (ref 8.5–10.5)
CALCIUM SERPL-MCNC: 8.9 MG/DL (ref 8.5–10.5)
CHLORIDE SERPL-SCNC: 100 MMOL/L (ref 96–112)
CHLORIDE SERPL-SCNC: 103 MMOL/L (ref 96–112)
CO2 SERPL-SCNC: 24 MMOL/L (ref 20–33)
CO2 SERPL-SCNC: 28 MMOL/L (ref 20–33)
CREAT SERPL-MCNC: 2.65 MG/DL (ref 0.5–1.4)
CREAT SERPL-MCNC: 2.83 MG/DL (ref 0.5–1.4)
GFR SERPL CREATININE-BSD FRML MDRD: 24 ML/MIN/1.73 M 2
GFR SERPL CREATININE-BSD FRML MDRD: 26 ML/MIN/1.73 M 2
GLOBULIN SER CALC-MCNC: 2.6 G/DL (ref 1.9–3.5)
GLUCOSE SERPL-MCNC: 115 MG/DL (ref 65–99)
GLUCOSE SERPL-MCNC: 83 MG/DL (ref 65–99)
INR PPP: 1.04 (ref 0.87–1.13)
POTASSIUM SERPL-SCNC: 4.3 MMOL/L (ref 3.6–5.5)
POTASSIUM SERPL-SCNC: 4.5 MMOL/L (ref 3.6–5.5)
PROT SERPL-MCNC: 5.8 G/DL (ref 6–8.2)
PROTHROMBIN TIME: 13.9 SEC (ref 12–14.6)
SODIUM SERPL-SCNC: 135 MMOL/L (ref 135–145)
SODIUM SERPL-SCNC: 135 MMOL/L (ref 135–145)

## 2017-08-15 PROCEDURE — 80048 BASIC METABOLIC PNL TOTAL CA: CPT

## 2017-08-15 PROCEDURE — A9270 NON-COVERED ITEM OR SERVICE: HCPCS | Performed by: INTERNAL MEDICINE

## 2017-08-15 PROCEDURE — 99233 SBSQ HOSP IP/OBS HIGH 50: CPT | Mod: GC | Performed by: INTERNAL MEDICINE

## 2017-08-15 PROCEDURE — 700102 HCHG RX REV CODE 250 W/ 637 OVERRIDE(OP)

## 2017-08-15 PROCEDURE — 700111 HCHG RX REV CODE 636 W/ 250 OVERRIDE (IP)

## 2017-08-15 PROCEDURE — 700102 HCHG RX REV CODE 250 W/ 637 OVERRIDE(OP): Performed by: STUDENT IN AN ORGANIZED HEALTH CARE EDUCATION/TRAINING PROGRAM

## 2017-08-15 PROCEDURE — 83880 ASSAY OF NATRIURETIC PEPTIDE: CPT

## 2017-08-15 PROCEDURE — 80053 COMPREHEN METABOLIC PANEL: CPT

## 2017-08-15 PROCEDURE — A9270 NON-COVERED ITEM OR SERVICE: HCPCS | Performed by: STUDENT IN AN ORGANIZED HEALTH CARE EDUCATION/TRAINING PROGRAM

## 2017-08-15 PROCEDURE — 36415 COLL VENOUS BLD VENIPUNCTURE: CPT

## 2017-08-15 PROCEDURE — 85610 PROTHROMBIN TIME: CPT

## 2017-08-15 PROCEDURE — 700102 HCHG RX REV CODE 250 W/ 637 OVERRIDE(OP): Performed by: INTERNAL MEDICINE

## 2017-08-15 PROCEDURE — A9270 NON-COVERED ITEM OR SERVICE: HCPCS

## 2017-08-15 PROCEDURE — 85730 THROMBOPLASTIN TIME PARTIAL: CPT

## 2017-08-15 PROCEDURE — 770020 HCHG ROOM/CARE - TELE (206)

## 2017-08-15 RX ADMIN — METOPROLOL TARTRATE 75 MG: 25 TABLET, FILM COATED ORAL at 06:39

## 2017-08-15 RX ADMIN — DILTIAZEM HYDROCHLORIDE 30 MG: 30 TABLET, FILM COATED ORAL at 00:58

## 2017-08-15 RX ADMIN — HEPARIN SODIUM 800 UNITS/HR: 5000 INJECTION, SOLUTION INTRAVENOUS at 04:07

## 2017-08-15 RX ADMIN — METOPROLOL TARTRATE 75 MG: 25 TABLET, FILM COATED ORAL at 22:59

## 2017-08-15 RX ADMIN — THERA TABS 1 TABLET: TAB at 07:55

## 2017-08-15 RX ADMIN — ASPIRIN 81 MG: 81 TABLET, CHEWABLE ORAL at 07:55

## 2017-08-15 RX ADMIN — Medication 100 MG: at 07:55

## 2017-08-15 RX ADMIN — ATORVASTATIN CALCIUM 40 MG: 40 TABLET, FILM COATED ORAL at 19:42

## 2017-08-15 RX ADMIN — DILTIAZEM HYDROCHLORIDE 30 MG: 30 TABLET, FILM COATED ORAL at 17:30

## 2017-08-15 RX ADMIN — FOLIC ACID 1 MG: 1 TABLET ORAL at 07:55

## 2017-08-15 RX ADMIN — WARFARIN SODIUM 5 MG: 5 TABLET ORAL at 18:00

## 2017-08-15 RX ADMIN — METOPROLOL TARTRATE 75 MG: 25 TABLET, FILM COATED ORAL at 15:25

## 2017-08-15 RX ADMIN — DILTIAZEM HYDROCHLORIDE 30 MG: 30 TABLET, FILM COATED ORAL at 12:30

## 2017-08-15 RX ADMIN — DILTIAZEM HYDROCHLORIDE 30 MG: 30 TABLET, FILM COATED ORAL at 05:34

## 2017-08-15 RX ADMIN — STANDARDIZED SENNA CONCENTRATE AND DOCUSATE SODIUM 2 TABLET: 8.6; 5 TABLET, FILM COATED ORAL at 07:55

## 2017-08-15 ASSESSMENT — ENCOUNTER SYMPTOMS
DIARRHEA: 0
HEARTBURN: 0
SHORTNESS OF BREATH: 0
NAUSEA: 0
HEMOPTYSIS: 0
COUGH: 0
WEAKNESS: 0
DIZZINESS: 0
ABDOMINAL PAIN: 0
CHILLS: 0
HEADACHES: 0
VOMITING: 0
PALPITATIONS: 0
ORTHOPNEA: 0
FEVER: 0

## 2017-08-15 ASSESSMENT — COGNITIVE AND FUNCTIONAL STATUS - GENERAL
MOBILITY SCORE: 24
DAILY ACTIVITIY SCORE: 24
SUGGESTED CMS G CODE MODIFIER DAILY ACTIVITY: CH
SUGGESTED CMS G CODE MODIFIER MOBILITY: CH

## 2017-08-15 ASSESSMENT — PAIN SCALES - GENERAL
PAINLEVEL_OUTOF10: 0

## 2017-08-15 NOTE — PROGRESS NOTES
Nephrology Progress Note, Adult, Complex               Author:Oni Avilesjjar Date & Time created: 8/15/2017  3:26 PM     Interval History:  48 y/o male with severe, CHF  -EF 25 %, Severe AI, A.fib, CVA in TREASURE, metabolic acidosis  Poor UOP  Doing well , no complaints    Review of Systems:  Review of Systems   Constitutional: Positive for malaise/fatigue. Negative for fever and chills.   Respiratory: Negative for cough and hemoptysis.    Cardiovascular: Negative for chest pain, palpitations and orthopnea.   Gastrointestinal: Negative for heartburn, nausea, vomiting, abdominal pain and diarrhea.   Genitourinary: Negative for dysuria.   All other systems reviewed and are negative.      Physical Exam:  Physical Exam   Constitutional: He is oriented to person, place, and time. No distress.   HENT:   Head: Normocephalic and atraumatic.   Nose: Nose normal.   Eyes: Right eye exhibits no discharge. Left eye exhibits no discharge. No scleral icterus.   Neck: No JVD present. No tracheal deviation present. No thyromegaly present.   Cardiovascular: An irregularly irregular rhythm present. Exam reveals no gallop and no friction rub.    Pulmonary/Chest: Effort normal and breath sounds normal. No respiratory distress. He has no wheezes.   Abdominal: Soft. Bowel sounds are normal. He exhibits no distension. There is no tenderness. There is no rebound and no guarding.   Musculoskeletal: He exhibits no edema or tenderness.   Trace pedal edema   Neurological: He is alert and oriented to person, place, and time. A cranial nerve deficit is present.   Skin: Skin is warm. He is not diaphoretic.   Psychiatric: He has a normal mood and affect. His behavior is normal.   Nursing note and vitals reviewed.      Labs:        Invalid input(s): [id number]  Recent Labs      08/13/17   0415  08/14/17   0855  08/15/17   0913   BNPBTYPENAT  2737*  2755*  1753*     Recent Labs      08/14/17   0855  08/15/17   0441  08/15/17   0913   SODIUM  135  135   135   POTASSIUM  3.6  4.3  4.5   CHLORIDE  103  103  100   CO2  23  24  28   BUN  33*  45*  43*   CREATININE  2.82*  2.65*  2.83*   CALCIUM  8.8  8.6  8.9     Recent Labs      08/14/17   0855  08/15/17   0441  08/15/17   0913   ALTSGPT  53*   --   43   ASTSGOT  18   --   16   ALKPHOSPHAT  61   --   60   TBILIRUBIN  0.8   --   0.7   GLUCOSE  144*  83  115*     Recent Labs      17   0322  08/14/17   0855  08/15/17   0441  08/15/17   0913   RBC  4.02*   --    --    --    --    --    HEMOGLOBIN  12.5*   --    --    --    --    --    HEMATOCRIT  38.3*   --    --    --    --    --    PLATELETCT  211   --    --    --    --    --    PROTHROMBTM   --    < >  14.1  14.1  13.9   --    APTT   --    < >  68.3*  61.8*  53.7*  61.0*   INR   --    < >  1.06  1.06  1.04   --     < > = values in this interval not displayed.     Recent Labs      08/13/17   0415  08/14/17   0855  08/15/17   0913   WBC  7.0   --    --    NEUTSPOLYS  58.60   --    --    LYMPHOCYTES  22.10   --    --    MONOCYTES  7.90   --    --    EOSINOPHILS  10.60*   --    --    BASOPHILS  0.40   --    --    ASTSGOT   --   18  16   ALTSGPT   --   53*  43   ALKPHOSPHAT   --   61  60   TBILIRUBIN   --   0.8  0.7           Hemodynamics:  Temp (24hrs), Av.4 °C (97.5 °F), Min:36 °C (96.8 °F), Max:37.2 °C (98.9 °F)  Temperature: 36.3 °C (97.3 °F)  Pulse  Av.1  Min: 50  Max: 148Heart Rate (Monitored): 66  Blood Pressure: 126/84 mmHg, NIBP: 126/85 mmHg     Respiratory:    Respiration: 19, Pulse Oximetry: 96 %        RUL Breath Sounds: Clear, RML Breath Sounds: Clear, RLL Breath Sounds: Clear, SABAS Breath Sounds: Clear, LLL Breath Sounds: Clear  Fluids:    Intake/Output Summary (Last 24 hours) at 08/15/17 1526  Last data filed at 08/15/17 0700   Gross per 24 hour   Intake    624 ml   Output   1100 ml   Net   -476 ml     Weight: 62.8 kg (138 lb 7.2 oz)  GI/Nutrition:  Orders Placed This Encounter   Procedures   • DIET ORDER     Standing Status:  Standing      Number of Occurrences: 1      Standing Expiration Date:      Order Specific Question:  Diet:     Answer:  2 Gram Sodium [7]     Order Specific Question:  Diet:     Answer:  Cardiac [6]     Medical Decision Making, by Problem:  Active Hospital Problems    Diagnosis   • *Atrial fibrillation with RVR (CMS-HCC) [I48.91]   • CVA (cerebral vascular accident) (CMS-HCC) [I63.9]   • Essential hypertension [I10]   • Respiratory failure (CMS-HCC) [J96.90]   • Electrolyte abnormality [E87.8]   • Hyperglycemia [R73.9]   • Prolonged Q-T interval on ECG [R94.31]   • Elevated troponin [R74.8]   • Acute on chronic systolic heart failure (CMS-HCC) [I50.23]   • Non-rheumatic mitral regurgitation [I34.0]   • TREASURE (acute kidney injury) (CMS-HCC) [N17.9]   • 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]       Labs reviewed and Medications reviewed    Central line in place: Dialysis                Assessment and Plan    1.TREASURE :sec to cardiorenal Syndrome, UO increasing  2.HTN: BP well controlled  3.Electrolytes: well controlled  4.Anemia: Hb WNL -to monitor  5.Metabolic acidosis -corrected with HD  6.Volume overloaded:improving with Lasix  7.CHF -cardiology following  Plan  no acute need for HD  Continue Lasix  Daily labs  Renal dose all meds  Avoid nephrotoxins  Prognosis guarded

## 2017-08-15 NOTE — PROGRESS NOTES
Shauna Downing Fall Risk Assessment:     Last Known Fall: No falls  Mobility: No limitations  Medications: Cardiovascular or central nervous system meds  Mental Status/LOC/Awareness: Awake, alert, and oriented to date, place, and person  Toileting Needs: No needs  Volume/Electrolyte Status: Use of IV fluids/tube feeds  Communication/Sensory: Non-English patient/unable to speak/slurred speech  Behavior: Appropriate behavior  Shauna Downing Fall Risk Total: 7  Fall Risk Level: LOW RISK    Universal Fall Precautions:  call light/belongings in reach, bed in low position and locked, wheelchairs and assistive devices out of sight, siderails up x 2, use non-slip footwear, educate on level of risk, educate to call for assistance, clutter free and spill free environment, adequate lighting    Fall Risk Level Interventions:   TRIAL (TELE 8, NEURO, MED AZALEA 5) Low Fall Risk Interventions  Place yellow fall risk ID band on patient: refused  Provide patient/family education based on risk assessment: completed  Educate patient/family to call staff for assistance when getting out of bed: completed  Place fall precaution signage outside patient door: verified      Patient Specific Interventions:     Medication: not applicable  Mental Status/LOC/Awareness: check on patient hourly and reinforce the use of call light  Toileting: instruct patient/family on the need to call for assistance when toileting  Volume/Electrolyte Status: ensure patient remains hydrated and monitor abnormal lab values  Communication/Sensory: update plan of care on whiteboard  Behavioral: not applicable  Mobility: ensure bed is locked and in lowest position

## 2017-08-15 NOTE — CARE PLAN
Problem: Safety  Goal: Will remain free from injury  Outcome: PROGRESSING AS EXPECTED  Pt remains free from falls or injuries. Pt up self and calls for assistance appropriately.     Problem: Venous Thromboembolism (VTW)/Deep Vein Thrombosis (DVT) Prevention:  Goal: Patient will participate in Venous Thrombosis (VTE)/Deep Vein Thrombosis (DVT)Prevention Measures  Outcome: PROGRESSING AS EXPECTED  Pt free from s/sx of DVT. Pt receiving heparin drip for VTE prophylaxis and Afib.

## 2017-08-15 NOTE — PROGRESS NOTES
Received report from nightshift RN, assumed care of patient. Patient is A&O x 4, no bed alarm indicated at this time. Patient educated on importance of calling if in need of assistance. Verbalizes understanding. Patient declines pain at this time. Patient updated on plan of care, voices no concerns at this time. Will continue to monitor for safety and comfort.

## 2017-08-15 NOTE — PROGRESS NOTES
Pt arrived to floor by wheelchair via ICU nurse. Monitor room notified. Pt care assumed. Patient assessed. Pt lying comfortably in bed. A&O x 4. Pt Syriac speaking, phone  used for communication. No distress present; no pain. Call light, phone, and bedside table within reach. White board updated and plan of care discussed with patient. Bed alarm and strip alarm set and in place. Pt calls for assistance appropriately. No concerns present at this time. Will continue to monitor.

## 2017-08-16 PROBLEM — R79.89 ELEVATED TROPONIN: Status: RESOLVED | Noted: 2017-08-10 | Resolved: 2017-08-16

## 2017-08-16 LAB
ALBUMIN SERPL BCP-MCNC: 3.3 G/DL (ref 3.2–4.9)
ALBUMIN/GLOB SERPL: 1.3 G/DL
ALP SERPL-CCNC: 61 U/L (ref 30–99)
ALT SERPL-CCNC: 37 U/L (ref 2–50)
ANION GAP SERPL CALC-SCNC: 9 MMOL/L (ref 0–11.9)
APTT PPP: 53.2 SEC (ref 24.7–36)
APTT PPP: 71.6 SEC (ref 24.7–36)
AST SERPL-CCNC: 19 U/L (ref 12–45)
BILIRUB SERPL-MCNC: 0.6 MG/DL (ref 0.1–1.5)
BNP SERPL-MCNC: 2034 PG/ML (ref 0–100)
BUN SERPL-MCNC: 48 MG/DL (ref 8–22)
CALCIUM SERPL-MCNC: 8.9 MG/DL (ref 8.5–10.5)
CHLORIDE SERPL-SCNC: 104 MMOL/L (ref 96–112)
CO2 SERPL-SCNC: 23 MMOL/L (ref 20–33)
CREAT SERPL-MCNC: 2.79 MG/DL (ref 0.5–1.4)
GFR SERPL CREATININE-BSD FRML MDRD: 24 ML/MIN/1.73 M 2
GLOBULIN SER CALC-MCNC: 2.5 G/DL (ref 1.9–3.5)
GLUCOSE SERPL-MCNC: 184 MG/DL (ref 65–99)
INR PPP: 1.04 (ref 0.87–1.13)
POTASSIUM SERPL-SCNC: 4.9 MMOL/L (ref 3.6–5.5)
PROT SERPL-MCNC: 5.8 G/DL (ref 6–8.2)
PROTHROMBIN TIME: 13.9 SEC (ref 12–14.6)
SODIUM SERPL-SCNC: 136 MMOL/L (ref 135–145)

## 2017-08-16 PROCEDURE — 700102 HCHG RX REV CODE 250 W/ 637 OVERRIDE(OP): Performed by: INTERNAL MEDICINE

## 2017-08-16 PROCEDURE — 83880 ASSAY OF NATRIURETIC PEPTIDE: CPT

## 2017-08-16 PROCEDURE — 700102 HCHG RX REV CODE 250 W/ 637 OVERRIDE(OP): Performed by: STUDENT IN AN ORGANIZED HEALTH CARE EDUCATION/TRAINING PROGRAM

## 2017-08-16 PROCEDURE — 80053 COMPREHEN METABOLIC PANEL: CPT

## 2017-08-16 PROCEDURE — A9270 NON-COVERED ITEM OR SERVICE: HCPCS | Performed by: INTERNAL MEDICINE

## 2017-08-16 PROCEDURE — 36415 COLL VENOUS BLD VENIPUNCTURE: CPT

## 2017-08-16 PROCEDURE — A9270 NON-COVERED ITEM OR SERVICE: HCPCS | Performed by: STUDENT IN AN ORGANIZED HEALTH CARE EDUCATION/TRAINING PROGRAM

## 2017-08-16 PROCEDURE — 700111 HCHG RX REV CODE 636 W/ 250 OVERRIDE (IP)

## 2017-08-16 PROCEDURE — 85730 THROMBOPLASTIN TIME PARTIAL: CPT | Mod: 91

## 2017-08-16 PROCEDURE — 99233 SBSQ HOSP IP/OBS HIGH 50: CPT | Mod: GC | Performed by: INTERNAL MEDICINE

## 2017-08-16 PROCEDURE — 700102 HCHG RX REV CODE 250 W/ 637 OVERRIDE(OP)

## 2017-08-16 PROCEDURE — 770020 HCHG ROOM/CARE - TELE (206)

## 2017-08-16 PROCEDURE — A9270 NON-COVERED ITEM OR SERVICE: HCPCS

## 2017-08-16 PROCEDURE — 700101 HCHG RX REV CODE 250: Performed by: STUDENT IN AN ORGANIZED HEALTH CARE EDUCATION/TRAINING PROGRAM

## 2017-08-16 PROCEDURE — 85610 PROTHROMBIN TIME: CPT

## 2017-08-16 RX ORDER — FUROSEMIDE 40 MG/1
40 TABLET ORAL
Status: DISCONTINUED | OUTPATIENT
Start: 2017-08-16 | End: 2017-09-09 | Stop reason: HOSPADM

## 2017-08-16 RX ORDER — HYDRALAZINE HYDROCHLORIDE 10 MG/1
10 TABLET, FILM COATED ORAL EVERY 8 HOURS
Status: DISCONTINUED | OUTPATIENT
Start: 2017-08-16 | End: 2017-08-18

## 2017-08-16 RX ORDER — ISOSORBIDE MONONITRATE 30 MG/1
30 TABLET, EXTENDED RELEASE ORAL
Status: DISCONTINUED | OUTPATIENT
Start: 2017-08-16 | End: 2017-08-19

## 2017-08-16 RX ADMIN — FUROSEMIDE 40 MG: 40 TABLET ORAL at 12:16

## 2017-08-16 RX ADMIN — HEPARIN SODIUM 800 UNITS/HR: 5000 INJECTION, SOLUTION INTRAVENOUS at 08:12

## 2017-08-16 RX ADMIN — METOPROLOL TARTRATE 75 MG: 25 TABLET, FILM COATED ORAL at 21:43

## 2017-08-16 RX ADMIN — DILTIAZEM HYDROCHLORIDE 30 MG: 30 TABLET, FILM COATED ORAL at 01:00

## 2017-08-16 RX ADMIN — METOPROLOL TARTRATE 75 MG: 25 TABLET, FILM COATED ORAL at 13:46

## 2017-08-16 RX ADMIN — STANDARDIZED SENNA CONCENTRATE AND DOCUSATE SODIUM 2 TABLET: 8.6; 5 TABLET, FILM COATED ORAL at 08:16

## 2017-08-16 RX ADMIN — FOLIC ACID 1 MG: 1 TABLET ORAL at 08:16

## 2017-08-16 RX ADMIN — STANDARDIZED SENNA CONCENTRATE AND DOCUSATE SODIUM 2 TABLET: 8.6; 5 TABLET, FILM COATED ORAL at 21:47

## 2017-08-16 RX ADMIN — ISOSORBIDE MONONITRATE 30 MG: 30 TABLET, EXTENDED RELEASE ORAL at 17:19

## 2017-08-16 RX ADMIN — Medication 100 MG: at 08:15

## 2017-08-16 RX ADMIN — ATORVASTATIN CALCIUM 40 MG: 40 TABLET, FILM COATED ORAL at 21:43

## 2017-08-16 RX ADMIN — HYDRALAZINE HYDROCHLORIDE 10 MG: 10 TABLET, FILM COATED ORAL at 17:19

## 2017-08-16 RX ADMIN — DILTIAZEM HYDROCHLORIDE 30 MG: 30 TABLET, FILM COATED ORAL at 12:16

## 2017-08-16 RX ADMIN — METOPROLOL TARTRATE 5 MG: 5 INJECTION INTRAVENOUS at 06:32

## 2017-08-16 RX ADMIN — HYDRALAZINE HYDROCHLORIDE 10 MG: 10 TABLET, FILM COATED ORAL at 21:43

## 2017-08-16 RX ADMIN — DILTIAZEM HYDROCHLORIDE 30 MG: 30 TABLET, FILM COATED ORAL at 06:04

## 2017-08-16 RX ADMIN — WARFARIN SODIUM 5 MG: 5 TABLET ORAL at 17:19

## 2017-08-16 RX ADMIN — METOPROLOL TARTRATE 75 MG: 25 TABLET, FILM COATED ORAL at 06:04

## 2017-08-16 RX ADMIN — THERA TABS 1 TABLET: TAB at 08:16

## 2017-08-16 RX ADMIN — ASPIRIN 81 MG: 81 TABLET, CHEWABLE ORAL at 08:15

## 2017-08-16 ASSESSMENT — ENCOUNTER SYMPTOMS
VOMITING: 0
ABDOMINAL PAIN: 0
FOCAL WEAKNESS: 0
NAUSEA: 0
DIZZINESS: 0
FEVER: 0
DIARRHEA: 0
HEARTBURN: 0
PALPITATIONS: 0
HEADACHES: 0
HEMOPTYSIS: 0
ORTHOPNEA: 0
COUGH: 0
CHILLS: 0

## 2017-08-16 ASSESSMENT — PAIN SCALES - GENERAL
PAINLEVEL_OUTOF10: 0

## 2017-08-16 ASSESSMENT — PATIENT HEALTH QUESTIONNAIRE - PHQ9
2. FEELING DOWN, DEPRESSED, IRRITABLE, OR HOPELESS: NOT AT ALL
SUM OF ALL RESPONSES TO PHQ9 QUESTIONS 1 AND 2: 0
1. LITTLE INTEREST OR PLEASURE IN DOING THINGS: NOT AT ALL
SUM OF ALL RESPONSES TO PHQ QUESTIONS 1-9: 0

## 2017-08-16 NOTE — PROGRESS NOTES
Internal Medicine Interval Note    Name Bhavesh Saini       1970   Age/Sex 47 y.o. male   MRN [id number]   Code Status FULL.     After 5PM or if no immediate response to page, please call for cross-coverage  Attending/Team: Dr. Barnard/[place]. See Patient List for primary contact information  Call [phone] to page    1st Call - Day Intern (R1):   Dr. Porter 2nd Call - Day Sr. Resident (R2/R3):   Dr. Jackson         Reason for interval visit  (Principal Problem)   Atrial fibrillation with RVR (CMS-[facility])    Interval Problem Daily Status Update  (24 hours)   Patient transferred from ICU early this morning.  No complaints overnight.  Denies chest pain, abdominal pain, difficulty breathing.  Eating well.      Per Dr. Gutierrez, Cardiology, patient has follow up appointment with him on , which is needed before any cardiac procedure is done.  However, patient has significant barrier to care in his undocumented alien status.      Review of Systems   Constitutional: Negative for fever and chills.   Respiratory: Negative for cough and shortness of breath.    Cardiovascular: Negative for chest pain.   Gastrointestinal: Negative for nausea, vomiting and abdominal pain.   Skin: Negative for rash.   Neurological: Negative for dizziness, weakness and headaches.       Consultants/Specialty  Cardiology  Cardiac Surgery  Nephrology    Disposition  Home.    Quality Measures  Labs reviewed and Medications reviewed  Truong catheter: No Truong      DVT Prophylaxis: Warfarin (Coumadin)                  Physical Exam       Filed Vitals:    08/15/17 0713 08/15/17 1119 08/15/17 1526 08/15/17 2016   BP: 134/89 126/84 97/66 128/80   Pulse: 92 88 95 70   Temp: 36.2 °C (97.2 °F) 36.3 °C (97.3 °F) 36.6 °C (97.9 °F) 36.1 °C (96.9 °F)   Resp: 20 19 20 18   Height:       Weight:    63.5 kg (139 lb 15.9 oz)   SpO2: 97% 96% 96% 99%     Body mass index is 27.34 kg/(m^2). Weight: 63.5 kg (139 lb 15.9 oz)  Oxygen Therapy:  Pulse  Oximetry: 99 %, O2 (LPM): 0, O2 Delivery: None (Room Air)    Physical Exam   Constitutional: He is oriented to person, place, and time.   Lying in hospital bed, in no apparent distress.    Eyes: EOM are normal. Pupils are equal, round, and reactive to light.   Cardiovascular:   Has an irregularly irregular rhythm.     Pulmonary/Chest: Breath sounds normal. No respiratory distress. He has no wheezes. He has no rales.   Abdominal: Soft. Bowel sounds are normal. He exhibits no distension. There is no tenderness.   Musculoskeletal: He exhibits no edema.   Neurological: He is alert and oriented to person, place, and time. He has normal reflexes. No cranial nerve deficit. Coordination normal.   Upper and lower extremity b/l: motor +5/5.  Sensation intact.    Psychiatric: Affect normal.       Lab Data Review:     8/15/2017  11:32 PM    Recent Labs      08/14/17   0855  08/15/17   0441  08/15/17   0913   SODIUM  135  135  135   POTASSIUM  3.6  4.3  4.5   CHLORIDE  103  103  100   CO2  23  24  28   BUN  33*  45*  43*   CREATININE  2.82*  2.65*  2.83*   CALCIUM  8.8  8.6  8.9       Recent Labs      08/14/17   0855  08/15/17   0441  08/15/17   0913   ALTSGPT  53*   --   43   ASTSGOT  18   --   16   ALKPHOSPHAT  61   --   60   TBILIRUBIN  0.8   --   0.7   GLUCOSE  144*  83  115*       Recent Labs      08/13/17 0415 08/14/17   0322  08/14/17   0855  08/15/17   0441  08/15/17   0913   RBC  4.02*   --    --    --    --    --    HEMOGLOBIN  12.5*   --    --    --    --    --    HEMATOCRIT  38.3*   --    --    --    --    --    PLATELETCT  211   --    --    --    --    --    PROTHROMBTM   --    < >  14.1  14.1  13.9   --    APTT   --    < >  68.3*  61.8*  53.7*  61.0*   INR   --    < >  1.06  1.06  1.04   --     < > = values in this interval not displayed.       Recent Labs      08/13/17   0415  08/14/17   0855  08/15/17   0913   WBC  7.0   --    --    NEUTSPOLYS  58.60   --    --    LYMPHOCYTES  22.10   --    --    MONOCYTES   7.90   --    --    EOSINOPHILS  10.60*   --    --    BASOPHILS  0.40   --    --    ASTSGOT   --   18  16   ALTSGPT   --   53*  43   ALKPHOSPHAT   --   61  60   TBILIRUBIN   --   0.8  0.7           Assessment/Plan     * Atrial fibrillation with RVR (CMS-HCC) (present on admission)  Assessment & Plan  -New onset vs paroxysmal (vague PMH of afib and non-compliance with meds)  -Presented with palpitations and HR in 120s. He had HR in 20s when dilt IV push was given. Later HR stabilized and was started on dilt drip and moved to ICU. Currently on 50 mg of metoprolol. However, HR has been in 80-140s.  -H/O left facial droop and left sided weakness in the week prior to arrival at ED.  -CHADS2: 4  -ECHO: severe LVH, global hypokinesis, EF ~20%, severe AI and mod MR  -LOBO: slow blood flow but no thrombus. Severe AI  -MRI brain: acute infarction in L frontal periventricular white matter extendeing to subinsular cortex. Punctate area of infarction in R posterior lobe. Numerous microbleeds.  Plan  -Cardiac surgery spoke with Mr Saini and family, he needs AVR/MAZE w LOBO, but due to his renal status and recent stroke with hemorrhage, they do not recommend at this time. They will follow up outpatient in 6 weeks.   - Will need to discuss options with Cardiac Surgery in light of patient's alien status.  -Na restricted diet  -Metoprolol increased to 75 q8h, PRN 5mg metoprolol q6h. Started diltiazem 30mg q6h. Patient was tachycardic today, will CTM for possible increase in dose.    -Cardioversion will likely be done during AV replacement.     CVA (cerebral vascular accident) (CMS-HCC) (present on admission)  Assessment & Plan  -h/o left facial droop, slurred speech and left sided weakness. However no neuro deficits currently  -CT: cerebral atrophy and small vessel ischemic changes   -MRI brain: acute infarction in L frontal periventricular white matter extendeing to subinsular cortex. Punctate area of infarction in R posterior lobe.  Numerous microbleeds.   Assessment: Afib could have contributed (LOBO showed 'sludge' in the heart)  Plan  -ASA, statin  -Mx of Afib as stated above.    TREASURE (acute kidney injury) (CMS-HCC) (present on admission)  Assessment & Plan  -On adm: BUN/Cr:  /4.3, AGMA, Ph 6.1. No old labs to compare.   -FeNa <1%  -H/o renal disease since 7yrs, FH kidney disease in his father. Has been non-complaint with HTN medications. Has CHF.  -Renal US: no hydronephrosis and calculi. Left renal cysts.  -Acute on chronic. Likely pre-renal vs cardiorenal. HTN is likely the cause of CKD  Plan  -Nephrology on board, discussed with Dr. Merchant, she suggested giving him a one time dose of Lasix, 40mg, to see if his urine output would increase. Urine output was about ~1L yesterday, up from ~400mL the day before. Spoke with Dr. Najjar today, he suggests giving his another dose of lasix today and monitor his kidney function, depending on results will evaluate need for dialysis.   -On dialysis, low Na diet  -Electrolytes wnl today    HFrEF (heart failure with reduced ejection fraction) (CMS-HCC) (present on admission)  Assessment & Plan  -Presented with SOB, orthopnea, fatique and palpitations. Has h/o HTN and alcoholism in the past. On exam: JVD +., murmurs + but no volume overload.   -BNP 2203 on admission, 2254 on 08/12/17, 2737 on 08/13/17, 2755 on 08/14/17  -CXR: cardiomegaly. ECHO showed: severe LVH, global hypokinesis, EF ~20%, severe AI and mod MR  Assessment: Compensated HF likely due to alcoholism vs tachycardia induced heart failure.   Plan  -Cardiac surgery spoke with Mr Saini and family, he needs AVR/MAZE w LOBO, but due to his renal status and recent stroke with hemorrhage, they do not recommend at this time. They will follow up outpatient in 6 weeks, but will have a heart cath done during his hospital stay.  -Per Dr. Gutierrez, Cardiology, patient has follow up appointment with him on September 11, which is needed before any cardiac  procedure is done.  However, patient has significant barrier to care in his undocumented alien status.    -Na restricted diet  -On metoprolol for Afib which helps CHF. Cannot start ACEI and aldactone due to acute on CKD, will contact Nephro about renal status and starting these meds.   -Nurse contacted to Social work to help with insurance.    Aortic regurgitation (present on admission)  Assessment & Plan  -Severe AR.   -ECHO:  severe LVH, global hypokinesis, EF ~20%, severe AI and mod MR. Findings were confirmed by LOBO  -Fits the criteria for valve replacement. Cardiology and cardiac surgery on board.    Cardiorenal syndrome  Assessment & Plan  -Diagnosis given by Nephrology  -Patient is on HD, and is urinated <500 ml yesterday. Per Nephrology will try a single dose of Lasix 40mg, to see if urine output increases  -Creatinine was 4.38 on arrival to ED. Total protein Urine 208. Estimated GFR was 14. BUN/Cr  65/4.38 FENA 0.2%  -Presented with SOB, orthopnea, fatique and palpitations. Has h/o HTN and alcoholism in the past. On exam: JVD +., murmurs + but no volume overload.   -BNP 2203 on admission, 2254 on 08/12/17, 2737 on 08/13/17  -CXR: cardiomegaly. ECHO showed: severe LVH, global hypokinesis, EF ~20%, AR and mod MR  Assessment: Cardiorenal syndrome  Plan  -Patient needs AVR/MAZE w LOBO per Cardiac Surgery, but due to his renal status and recent stroke with hemorrhage, they do not recommend at this time.  They will follow up outpatient in 6 weeks.   -Patient will be counseled on cessation of alcohol intake.   -Nephrology on board, discussed with Dr. Merchant, she suggested giving him a one time dose of Lasix, 40mg, to see if his urine output would increase. Urine output was about ~1L yesterday, up from ~400mL the day before. Spoke with Dr. Najjar today, he suggests giving him another dose of lasix today and monitor his kidney function, depending on results will evaluate need for dialysis.       Electrolyte abnormality  (present on admission)  Assessment & Plan  -Electrolytes wnl today  -On dialysis    Prolonged Q-T interval on ECG (present on admission)  Assessment & Plan  -QTc ~530s. Will avoid QTc prolonging meds.    Essential hypertension (present on admission)  Assessment & Plan  -PMH of HTN but likely non-compliant. ECHO showed severe LVH  -On BB for Afib. Will optimize meds when he stabilizes.  -SBP today ranged from 100-140    Alcoholic (CMS-HCC) (present on admission)  Assessment & Plan  -History is vague but looks like he was drinking heavily in the past. Last drink was 8/5/2017.  -S/p rally bag in ER. On thiamine and folate.   -Tachycardia due to ?alcohol withdrawal symptoms.    Hyperglycemia (present on admission)  Assessment & Plan  -Resolved  -On adm: , A1c: 5.4  -He was started on ISS and hypoglycemia protocol which is discontinued.  CTM with accuchecks    Elevated troponin  Assessment & Plan  -Trops ~0.2 w/o ST-TW changes. Likely elevated due to renal disease    Non-rheumatic mitral regurgitation  Assessment & Plan  -Moderate MR on ECHO    Metabolic acidosis (present on admission)  Assessment & Plan  -AGMA on admission likely due to uremia  -Resolved with dialysis  -Nephrology on board

## 2017-08-16 NOTE — PROGRESS NOTES
[facility] [place] ATTENDING NOTE:   Brendan Barnard MD      Visit Time:   Attending/resident bedside rounds 9-11:30 AM     PATIENT ID  Name:             Bhavesh Saini     YOB: 1970  Age:                 47 y.o.  male   MRN:               [id number]  Admit:  8/9/2017    The patient was evaluated with the resident staff.  I reviewed the resident's note and agree with the resident's findings and plan as documented in the resident's note except as documented in the attending note. Please reference resident daily note for complete information.    The chart was reviewed and summarized.  Available labs, imaging, O2 sats ,  EKGs were reviewed. Available nursing, consultant, and resident notes were reviewed. I am actively involved in the patient's care.                                                                          ________________________________________________________________________             47(  admit Aug 9th   )  INTERVAL:  Chart reviewed/summarized,       Aug 15PM AF, H R88 (109),   NURSING: AFIB VR , low 42/pauses , [facility]      AUG 15AM: AF, HR 92, , 97% RA  DATA: Na 135, K 4.3, CO2 24, BS 83 (144), Bun 45, C R2.65 (2.26), ALT 53, AST 18, AKP 61, alb 3.2, GLOB 2.7, INR 1.04   NURSING: permacath RIGHT IJ, PIV X 2 , Neuro: OA4, Left 4/5, Right 5/5, no mobility score      Aug 14PM: AF, RH 60, -130s, 99% RA  DATA: WBC 7, HB 12, , Na 137 , K 3.6, CO2 23, , Bun 33, CR 2.82 (3.02), LT nwl , INR 1.06     Impression:  1) multiple acute/chronic cerebral infarcts, microhemorrhages, ASVD/AFIB/DM2  (no carotid surgical disease, has AFIB/cardiomyopathy as possible source, HTN for lacunar/thrombotic disease)  -- ASA/statin , coumadin  2) AFIB with RVR -- fluid/lyte repair, rate control, watch for tachy chase syndrome  --> lopressor,  PACER/MAZE procedure? (if cant tolerate rate control meds) , on coumadin  -- higher AV risks with BBL + CCB   3) HFrEF ,  20s,  decompensated  hypertensive cardiomyopathy ?, valvular pathology vs myopathy , mod pulm HTN --> AVR , rate control   4) TREASURE vs CKD (DM, HTN) , on HD  5) DM2 by hx, controlled without meds, HBA1C < 6      MEDS: CV (UFH, coumadin, asa, statin, Lopresor, CCB)      CORE:  Code Status (   FULL  --------------------------------------------------------------------------------------------------  Hospital Summary/ Patient System Review      NP:   *admit(  dysarthria, transient facial droop --> resolved , MRI: acute/crhonic infarcts, micorbleeds, lacunes, carotid duplex wnl  Impresison: cerebral infarct (AFIB) , acute/chronic, chronic ETOH disorder  Plan: ASA, statin --> coumadin     MRI brain 8/10: mild diffuse atrophy, small areas of acute infarction left frontal periventricular white matter, extending inferiorly to subinsular cortex, punctate infarction involving the cortex in rigth posterior occipital lobe, SVID, small chronic lacunar infarction right frontal perventricular white matter, chronic lacune right frntal white matter, chronic mocrobleeds, supra/infratentorial comparmetns  carotids US: (8/12): ASVD, nonstenotic      EENT:   *admit(       MSK/PAIN:   *admit(       CVS:   *admit(  tachycardia --> AFIB/RVR, SB post Diltiazem, trop 0.29 --> 0.14  , , INR 1.06 , EKG: AFIB, QTc 532  Impression: AFIB, acute RVR  (BB, CCB, coumadin/UFH bridge) , HFrEF (25%), mod MR, severe AI  Plan: elective cath, BBL     ECHO LOBO (8/10): left coronary cusp nodule calcification --> severe eccentric aortic regurgitation, trileaflet aortic valve, EF 20s, severe concentric LVH, normal RV, 1+MR, moderate PULM HTN     PULM:   *admit(  pCXR: CM: no infiltrates     GI:   *admit(  AST 96, , ALP 79, BR 1.5, alb 3.3, glob 2.6     RENAL:   *admit(  TREASURE/HD, na 130, K 4.7, CO2 11 -> 20, , BUN 65, CR 4.38, Jessica 11, , UPROT  208/184 = 1.13 grams, UA: -2 WBc, 2-5 RBC, US: no hydro, RK 10.2cm, LK 9.29cm, echogenic, multiple LK  cysts largest 1.9cm , small perihepatic fluid  Imrpession: CKD3 vs cardiorenal, chronic nephrolithiasis  Plan: HD     HEME/ONC:   *admit(  WBC 6.2, BH 14, , B12 1303,      ENDO:   *admit(  BS 90s,  HBA1C 5.7H, CHOL 104, TG 48, HDL 44, LDL 50, TSH 1.201  Imrpession: DM2     DERM:   *admit(       ID:   *admit(  Hep ABC negative

## 2017-08-16 NOTE — PROGRESS NOTES
Nephrology Progress Note, Adult, Complex               Author:Oni Najjar Date & Time created: 8/16/2017  3:08 PM     Interval History:  48 y/o male with severe, CHF  -EF 25 %, Severe AI, A.fib, CVA in TREASURE, metabolic acidosis  Poor UOP  Doing well, no complaints  Events last 24h noted  Review of Systems:  Review of Systems   Constitutional: Positive for malaise/fatigue. Negative for fever and chills.   Respiratory: Negative for cough and hemoptysis.    Cardiovascular: Negative for chest pain, palpitations and orthopnea.   Gastrointestinal: Negative for heartburn, nausea, vomiting, abdominal pain and diarrhea.   Genitourinary: Negative for dysuria.   All other systems reviewed and are negative.      Physical Exam:  Physical Exam   Constitutional: He is oriented to person, place, and time. No distress.   HENT:   Head: Normocephalic and atraumatic.   Nose: Nose normal.   Eyes: Right eye exhibits no discharge. Left eye exhibits no discharge. No scleral icterus.   Neck: Neck supple. No JVD present. No tracheal deviation present. No thyromegaly present.   Cardiovascular: An irregularly irregular rhythm present. Exam reveals no gallop and no friction rub.    Pulmonary/Chest: Effort normal and breath sounds normal. No stridor. No respiratory distress. He has no wheezes.   Abdominal: Soft. Bowel sounds are normal. He exhibits no distension. There is no tenderness. There is no rebound and no guarding.   Musculoskeletal: He exhibits no edema or tenderness.   Trace pedal edema   Lymphadenopathy:     He has no cervical adenopathy.   Neurological: He is alert and oriented to person, place, and time. A cranial nerve deficit is present.   Skin: Skin is warm and dry. No rash noted. He is not diaphoretic. No erythema.   Psychiatric: He has a normal mood and affect. His behavior is normal.   Nursing note and vitals reviewed.      Labs:        Invalid input(s): [id number]  Recent Labs      08/14/17   0855  08/15/17   0913   17   0854   BNPBTYPENAT  2755*  1753*  2034*     Recent Labs      08/15/17   0441  08/15/17   0913  17   0854   SODIUM  135  135  136   POTASSIUM  4.3  4.5  4.9   CHLORIDE  103  100  104   CO2  24  28  23   BUN  45*  43*  48*   CREATININE  2.65*  2.83*  2.79*   CALCIUM  8.6  8.9  8.9     Recent Labs      17   0855  08/15/17   0441  08/15/17   0913  17   0854   ALTSGPT  53*   --   43  37   ASTSGOT  18   --   16  19   ALKPHOSPHAT  61   --   60  61   TBILIRUBIN  0.8   --   0.7  0.6   GLUCOSE  144*  83  115*  184*     Recent Labs      17   0855  08/15/17   0441  08/15/17   0913  17   0547  17   0854   PROTHROMBTM  14.1  13.9   --   13.9   --    APTT  61.8*  53.7*  61.0*  53.2*  71.6*   INR  1.06  1.04   --   1.04   --      Recent Labs      17   0855  08/15/17   0913  17   0854   ASTSGOT  18  16  19   ALTSGPT  53*  43  37   ALKPHOSPHAT  61  60  61   TBILIRUBIN  0.8  0.7  0.6           Hemodynamics:  Temp (24hrs), Av.4 °C (97.5 °F), Min:36.1 °C (96.9 °F), Max:36.7 °C (98 °F)  Temperature: 36.7 °C (98 °F)  Pulse  Av  Min: 50  Max: 148   Blood Pressure: 133/93 mmHg     Respiratory:    Respiration: 14, Pulse Oximetry: 98 %        RUL Breath Sounds: Diminished, RML Breath Sounds: Diminished, RLL Breath Sounds: Diminished, SABAS Breath Sounds: Diminished, LLL Breath Sounds: Diminished  Fluids:    Intake/Output Summary (Last 24 hours) at 17 1508  Last data filed at 17 1400   Gross per 24 hour   Intake    780 ml   Output    900 ml   Net   -120 ml     Weight: 63.5 kg (139 lb 15.9 oz)  GI/Nutrition:  Orders Placed This Encounter   Procedures   • DIET ORDER     Standing Status: Standing      Number of Occurrences: 1      Standing Expiration Date:      Order Specific Question:  Diet:     Answer:  2 Gram Sodium [7]     Order Specific Question:  Diet:     Answer:  Cardiac [6]     Medical Decision Making, by Problem:  Active Hospital Problems    Diagnosis   •  *Atrial fibrillation with RVR (CMS-HCC) [I48.91]   • CVA (cerebral vascular accident) (CMS-HCC) [I63.9]   • Essential hypertension [I10]   • Respiratory failure (CMS-HCC) [J96.90]   • Electrolyte abnormality [E87.8]   • Hyperglycemia [R73.9]   • Prolonged Q-T interval on ECG [R94.31]   • Elevated troponin [R74.8]   • Acute on chronic systolic heart failure (CMS-HCC) [I50.23]   • Non-rheumatic mitral regurgitation [I34.0]   • TREASURE (acute kidney injury) (CMS-HCC) [N17.9]   • 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]       Labs reviewed and Medications reviewed    Central line in place: Dialysis                Assessment and Plan    1.TREASURE :sec to cardiorenal Syndrome, UO increasing,cr still elevated at 2.79 mg /dl  2.HTN: BP well controlled  3.Electrolytes: well controlled  4.Anemia: Hb WNL -to monitor  5.Metabolic acidosis -corrected with HD  6.Volume overloaded:improving with Lasix  7.CHF -cardiology following  Plan  no acute need for HD, reevaluate tomorrow   Continue Lasix  Daily labs  Renal dose all meds  Avoid nephrotoxins  Prognosis guarded

## 2017-08-16 NOTE — PROGRESS NOTES
Internal Medicine Interval Note    Name Bhavesh Saini       1970   Age/Sex 47 y.o. male   MRN [id number]   Code Status FULL.      After 5PM or if no immediate response to page, please call for cross-coverage  Attending/Team: Dr. Barnard/[place]. See Patient List for primary contact information  Call [phone] to page    1st Call - Day Intern (R1):   Dr. Porter 2nd Call - Day Sr. Resident (R2/R3):   Dr. Jackson         Reason for interval visit  (Principal Problem)   Atrial fibrillation with RVR (CMS-[facility])    Interval Problem Daily Status Update  (24 hours)   No acute problems overnight.  This morning, patient continues to deny pain.  Endorses walking stably, but HR sometimes increases to 160s with ambulation.  HR has also dropped to 40-50s transiently.  Cardiology on call will assist with determining if patient's rate control medications need to be modified.      Nephrology saw patient today, reporting no acute need for HD; they will reevaluate tomorrow.  They also continued Lasix today.      Patient eating well.  Denies chest pain, abdominal pain, dyspnea, headaches.      Review of Systems   Constitutional: Negative for fever and chills.   Respiratory: Negative for cough.    Cardiovascular: Negative for chest pain and palpitations.   Gastrointestinal: Negative for abdominal pain.   Neurological: Negative for dizziness, focal weakness and headaches.       Consultants/Specialty  Cardiology  Cardiac Surgery  Nephrology    Disposition  Home    Quality Measures  Labs reviewed, Medications reviewed and Radiology images reviewed  Trunog catheter: No Truong      DVT Prophylaxis: Warfarin (Coumadin) and Heparin                  Physical Exam       Filed Vitals:    08/15/17 2316 17 0322 17 0835 17 1225   BP: 122/79 125/72 126/76 133/93   Pulse: 67 75 64 66   Temp: 36.3 °C (97.4 °F) 36.1 °C (97 °F) 36.6 °C (97.8 °F) 36.7 °C (98 °F)   Resp: 16 15 14 14   Height:       Weight:       SpO2: 93%  96% 97% 98%     Body mass index is 27.34 kg/(m^2). Weight: 63.5 kg (139 lb 15.9 oz)  Oxygen Therapy:  Pulse Oximetry: 98 %, O2 (LPM): 0, O2 Delivery: None (Room Air)    Physical Exam   Constitutional: He is oriented to person, place, and time.   Lying in bed, in no apparent distress.    Eyes: EOM are normal. Pupils are equal, round, and reactive to light.   Cardiovascular:   Has an irregularly irregular rhythm.     Pulmonary/Chest: Effort normal and breath sounds normal. No respiratory distress. He has no wheezes. He has no rales.   Abdominal: Soft. Bowel sounds are normal. He exhibits no distension. There is no tenderness.   Musculoskeletal: He exhibits no edema.   Neurological: He is alert and oriented to person, place, and time. No cranial nerve deficit.   Psychiatric: Affect normal.       Lab Data Review:       8/16/2017  3:30 PM    Recent Labs      08/15/17   0441  08/15/17   0913  08/16/17   0854   SODIUM  135  135  136   POTASSIUM  4.3  4.5  4.9   CHLORIDE  103  100  104   CO2  24  28  23   BUN  45*  43*  48*   CREATININE  2.65*  2.83*  2.79*   CALCIUM  8.6  8.9  8.9       Recent Labs      08/14/17   0855  08/15/17   0441  08/15/17   0913  08/16/17   0854   ALTSGPT  53*   --   43  37   ASTSGOT  18   --   16  19   ALKPHOSPHAT  61   --   60  61   TBILIRUBIN  0.8   --   0.7  0.6   GLUCOSE  144*  83  115*  184*       Recent Labs      08/14/17   0855  08/15/17   0441  08/15/17   0913  08/16/17   0547  08/16/17   0854   PROTHROMBTM  14.1  13.9   --   13.9   --    APTT  61.8*  53.7*  61.0*  53.2*  71.6*   INR  1.06  1.04   --   1.04   --        Recent Labs      08/14/17   0855  08/15/17   0913  08/16/17   0854   ASTSGOT  18  16  19   ALTSGPT  53*  43  37   ALKPHOSPHAT  61  60  61   TBILIRUBIN  0.8  0.7  0.6           Assessment/Plan     * Atrial fibrillation with RVR (CMS-HCC) (present on admission)  Assessment & Plan  -New onset vs paroxysmal (vague PMH of afib and non-compliance with meds)  -Presented with  palpitations and HR in 120s. He had HR in 20s when dilt IV push was given. Later HR stabilized and was started on dilt drip and moved to ICU. Currently on 50 mg of metoprolol. However, HR has been in 80-140s.  -H/O left facial droop and left sided weakness in the week prior to arrival at ED.  -CHADS2: 4  -ECHO: severe LVH, global hypokinesis, EF ~20%, severe AI and mod MR  -LOBO: slow blood flow but no thrombus. Severe AI  -MRI brain: acute infarction in L frontal periventricular white matter extendeing to subinsular cortex. Punctate area of infarction in R posterior lobe. Numerous microbleeds.  Plan  -Cardiac surgery spoke with Mr Saini and family, he needs AVR/MAZE w LOBO, but due to his renal status and recent stroke with hemorrhage, they do not recommend at this time. They will follow up outpatient in 6 weeks.   - Will need to discuss options with Cardiac Surgery in light of patient's alien status.  - Na restricted diet  - Continue metoprolol 75 q8h, PRN 5mg metoprolol q6h.  Presently on diltiazem 30mg q8h, but Cardiology advising against.  Awaiting Cardiology's note re: recommendations.    -Cardioversion will likely be done during AV replacement.     CVA (cerebral vascular accident) (CMS-[facility]) (present on admission)  Assessment & Plan  -h/o left facial droop, slurred speech and left sided weakness. However no neuro deficits currently  -CT: cerebral atrophy and small vessel ischemic changes   -MRI brain: acute infarction in L frontal periventricular white matter extendeing to subinsular cortex. Punctate area of infarction in R posterior lobe. Numerous microbleeds.   Assessment: Afib could have contributed (LOBO showed 'sludge' in the heart)  Plan  -ASA, statin  -Mx of Afib as stated above.    TREASURE (acute kidney injury) (CMS-HCC) (present on admission)  Assessment & Plan  -On adm: BUN/Cr:  /4.3, AGMA, Ph 6.1. No old labs to compare.   -FeNa <1%  -H/o renal disease since 7yrs, FH kidney disease in his father. Has  been non-complaint with HTN medications. Has CHF.  -Renal US: no hydronephrosis and calculi. Left renal cysts.  -Acute on chronic. Likely pre-renal vs cardiorenal. HTN is likely the cause of CKD  Plan  -Nephrology on board, discussed with Dr. Merchant, she suggested giving him a one time dose of Lasix, 40mg, to see if his urine output would increase. Urine output was about ~1L yesterday, up from ~400mL the day before. Spoke with Dr. Najjar today, he suggests giving his another dose of lasix today and monitor his kidney function, depending on results will evaluate need for dialysis.   -On dialysis, low Na diet  -Electrolytes wnl today    HFrEF (heart failure with reduced ejection fraction) (CMS-[facility]) (present on admission)  Assessment & Plan  -Presented with SOB, orthopnea, fatique and palpitations. Has h/o HTN and alcoholism in the past. On exam: JVD +., murmurs + but no volume overload.   -BNP 2203 on admission, now 1753.   -CXR: cardiomegaly. ECHO showed: severe LVH, global hypokinesis, EF ~20%, severe AI and mod MR  Assessment: Compensated HF likely due to alcoholism vs tachycardia induced heart failure.   Plan  -Cardiac surgery spoke with Mr Saini and family, he needs AVR/MAZE w LOBO, but due to his renal status and recent stroke with hemorrhage, they do not recommend at this time. They will follow up outpatient in 6 weeks, but will first need a heart cath done during his hospital stay.  - Given patient's undocumented yamileth status, patient likely not able to get heart cath.  Awaiting Cardiology's input regarding obtaining nuclear myocardial perfusion imaging during this admission in lieu of heart cath.    - Patient does have follow up appointment with Dr. Gutierrez, Cardiology, on September 11.  - Na restricted diet    Aortic regurgitation (present on admission)  Assessment & Plan  -Severe AR.   -ECHO:  severe LVH, global hypokinesis, EF ~20%, severe AI and mod MR. Findings were confirmed by LOBO  -Fits the criteria for  valve replacement. Cardiology and cardiac surgery on board.    Cardiorenal syndrome  Assessment & Plan  -Diagnosis given by Nephrology  -Patient is on HD, and is urinated <500 ml yesterday. Per Nephrology will try a single dose of Lasix 40mg, to see if urine output increases  -Creatinine was 4.38 on arrival to ED. Total protein Urine 208. Estimated GFR was 14. BUN/Cr  65/4.38 FENA 0.2%  -Presented with SOB, orthopnea, fatique and palpitations. Has h/o HTN and alcoholism in the past. On exam: JVD +., murmurs + but no volume overload.   -BNP 2203 on admission, 2254 on 08/12/17, 2737 on 08/13/17  -CXR: cardiomegaly. ECHO showed: severe LVH, global hypokinesis, EF ~20%, AR and mod MR  Assessment: Cardiorenal syndrome  Plan  -Patient needs AVR/MAZE w LOBO per Cardiac Surgery, but due to his renal status and recent stroke with hemorrhage, they do not recommend at this time.  They will follow up outpatient in 6 weeks.   -Patient will be counseled on cessation of alcohol intake.   -Nephrology on board.  Lasix restarted today.        Electrolyte abnormality (present on admission)  Assessment & Plan  -Electrolytes wnl today  -On dialysis    Prolonged Q-T interval on ECG (present on admission)  Assessment & Plan  -QTc ~530s. Will avoid QTc prolonging meds.    Essential hypertension (present on admission)  Assessment & Plan  -PMH of HTN but likely non-compliant. ECHO showed severe LVH  -On BB for Afib. Will optimize meds when he stabilizes.  -SBP today ranged from 100-140    Alcoholic (CMS-HCC) (present on admission)  Assessment & Plan  -History is vague but looks like he was drinking heavily in the past. Last drink was 8/5/2017.  -S/p rally bag in ER. On thiamine and folate.   -Tachycardia due to ?alcohol withdrawal symptoms.    Hyperglycemia (present on admission)  Assessment & Plan  -Resolved  -On adm: , A1c: 5.4  -He was started on ISS and hypoglycemia protocol which is discontinued.  CTM with  accuchecks    Non-rheumatic mitral regurgitation  Assessment & Plan  -Moderate MR on ECHO    Metabolic acidosis (present on admission)  Assessment & Plan  -AGMA on admission likely due to uremia  -Resolved with dialysis  -Nephrology on board

## 2017-08-16 NOTE — PROGRESS NOTES
Cardiology Progress Note      ID:   47 year old male who was admitted for TREASURE, afib RVR s/p IV diltiazem, acute CVA, found to have severe AI, CHF EF 20%. Cardiology was involved in this patient's care last week then signed off.       Interval Update:   We revisited this patient per primary team request.     Afib,  with rare PVCs, HR up to 160s with ambulation.     TREASURE improving, BUN, Cr still high, patient probably has CKD.    Denies chest pain, palpitation or dyspnea.       Physical Exam       Filed Vitals:    08/16/17 0322 08/16/17 0835 08/16/17 1225 08/16/17 1552   BP: 125/72 126/76 133/93 120/80   Pulse: 75 64 66 72   Temp: 36.1 °C (97 °F) 36.6 °C (97.8 °F) 36.7 °C (98 °F) 35.9 °C (96.7 °F)   Resp: 15 14 14 14   Height:       Weight:       SpO2: 96% 97% 98% 95%     Body mass index is 27.34 kg/(m^2). Weight: 63.5 kg (139 lb 15.9 oz)  Oxygen Therapy:  Pulse Oximetry: 95 %, O2 (LPM): 0, O2 Delivery: None (Room Air)    Physical Exam   Constitutional: He is oriented to person, place, and time. No distress.   Cardiovascular: Normal rate.    Irregularly irregular rhythm. Loud S2, EDM over aortic area.    Pulmonary/Chest: Effort normal and breath sounds normal. No respiratory distress.   Abdominal: Soft. Bowel sounds are normal. He exhibits no distension. There is no tenderness.   Musculoskeletal: He exhibits no edema.   Neurological: He is alert and oriented to person, place, and time.   Skin: He is not diaphoretic.   Nursing note and vitals reviewed.        Intake/Output Summary (Last 24 hours) at 08/16/17 1737  Last data filed at 08/16/17 1400   Gross per 24 hour   Intake    780 ml   Output    600 ml   Net    180 ml         Lab Data Review:       Recent Labs      08/15/17   0441  08/15/17   0913  08/16/17   0854   SODIUM  135  135  136   POTASSIUM  4.3  4.5  4.9   CHLORIDE  103  100  104   CO2  24  28  23   BUN  45*  43*  48*   CREATININE  2.65*  2.83*   2.79*   CALCIUM  8.6  8.9  8.9       Recent Labs      08/14/17   0855  08/15/17   0441  08/15/17   0913  08/16/17   0854   ALTSGPT  53*   --   43  37   ASTSGOT  18   --   16  19   ALKPHOSPHAT  61   --   60  61   TBILIRUBIN  0.8   --   0.7  0.6   GLUCOSE  144*  83  115*  184*       Recent Labs      08/14/17   0855  08/15/17   0441  08/15/17   0913  08/16/17   0547  08/16/17   0854   PROTHROMBTM  14.1  13.9   --   13.9   --    APTT  61.8*  53.7*  61.0*  53.2*  71.6*   INR  1.06  1.04   --   1.04   --        Recent Labs      08/14/17   0855  08/15/17   0913  08/16/17   0854   ASTSGOT  18  16  19   ALTSGPT  53*  43  37   ALKPHOSPHAT  61  60  61   TBILIRUBIN  0.8  0.7  0.6         Inpatient Medications :  Current Facility-Administered Medications   Medication Last Dose   • furosemide (LASIX) tablet 40 mg 40 mg at 08/16/17 1216   • hydrALAZINE (APRESOLINE) tablet 10 mg 10 mg at 08/16/17 1719   • isosorbide mononitrate SR (IMDUR) tablet 30 mg 30 mg at 08/16/17 1719   • warfarin (COUMADIN) tablet 5 mg 5 mg at 08/16/17 1719   • MD ALERT... warfarin (COUMADIN) per pharmacy protocol     • metoprolol (LOPRESSOR) tablet 75 mg 75 mg at 08/16/17 1346   • atorvastatin (LIPITOR) tablet 40 mg 40 mg at 08/15/17 1942   • aspirin (ASA) chewable tab 81 mg 81 mg at 08/16/17 0815   • metoprolol (LOPRESSOR) injection 5 mg 5 mg at 08/16/17 0632   • senna-docusate (PERICOLACE or SENOKOT S) 8.6-50 MG per tablet 2 Tab 2 Tab at 08/16/17 0816    And   • polyethylene glycol/lytes (MIRALAX) PACKET 1 Packet      And   • magnesium hydroxide (MILK OF MAGNESIA) suspension 30 mL      And   • bisacodyl (DULCOLAX) suppository 10 mg     • Respiratory Care per Protocol     • glucose 4 g chewable tablet 16 g      And   • dextrose 50% (D50W) injection 25 mL     • heparin 1000 units/mL injection 2,200 Units 2,200 Units at 08/13/17 0544    And   • heparin infusion 25,000 units in 500 ml 0.45% nacl 800 Units/hr at 08/16/17 1030   • folic acid (FOLVITE) tablet 1  mg 1 mg at 08/16/17 0816   • thiamine (THIAMINE) tablet 100 mg 100 mg at 08/16/17 0815   • multivitamin (THERAGRAN) tablet 1 Tab 1 Tab at 08/16/17 0816   • heparin 1000 units/mL injection 3,000 Units 3,000 Units at 08/13/17 1545         Medications reviewed and Labs reviewed        DVT Prophylaxis: Warfarin (Coumadin)            Assessment/Plan     47 year old male who was admitted for TREASURE, afib RVR s/p IV diltiazem, acute CVA, found to have severe AI, CHF EF 20%.     Afib,  with rare PVCs, HR up to 160s with ambulation. Currently on po diltiazem and lopressor.    CHF severely reduced LV systolic function, LV EF 20%, severe concentric LVH, severe central eccentric aortic regurgitation.   BNP 2034.     TREASURE improving, BUN 48, Cr 1.79, patient probably has CKD.      Impression:   - Atrial fibrillation  - HFrEF  EF 20%  - Severe AI   - TREASURE vs CKD  - Acute CVA      Recommendation:   - continue lopressor for rate control for afib, currently on 75 mg tid.   - discontinued po diltiazem given his EF 25%.  - started on po hydralazine and Imdur for after-load reduction.      Hydralazine 10 mg tid, Imdur 30 mg qd.   - continue warfarin.  - continue ASA 81 mg qd and Lipitor 40 mg qhs.   - no plan for diagnostic cardiac cath at this point given patient's kidney dysfunction and he is probably not a candidate for CT surgery because of acute CVA.    - will follow.         Maria Magaña M.D.  PGY 2  Attending addendum/additions to follow.

## 2017-08-16 NOTE — PROGRESS NOTES
Inpatient Anticoagulation Service Note    Date: 8/15/2017  Reason for Anticoagulation: Atrial Fibrillation, Stroke   [id number] VASc Score: 4    Hemoglobin Value: 12.5  Hematocrit Value: 38.3  Lab Platelet Value: 211  Target INR: 2.0 to 3.0    INR from last 7 days     Date/Time INR Value    08/15/17 0441 1.04    08/14/17 0855 1.06    08/14/17 0322 1.06    08/13/17 1240 1.04    08/09/17 1305 (!)1.33        Dose from last 7 days     Date/Time Dose (mg)    08/15/17 1600 5    08/14/17 1100 5    08/13/17 0900 0        Average Dose (mg):  (new start this admission)  Significant Interactions: Aspirin, Statin, Other (Comments) (MVI)  Bridge Therapy: Yes (heparin weight based protocol )  Bridge Therapy Start Date: 08/14/17  Days of Overlap Therapy: 1   INR Value Greater than 2 Prior to Discontinuation of Parenteral Anticoagulation: Not Applicable     Reversal Agent Administered: Not Applicable  Comments: INR subtherapeutic as expected given warfarin was started yestereday. Continue Warfarin 5mg and heparin bridge. DDI identified above. NNL to assess H/H; no indication of bleeding noted per chart reveiw.     Plan:  Warfarin 5mg with INR check tomorrow  Education Material Provided?: No  Pharmacist suggested discharge dosing: Warfarin 5mg PO daily with close f/u within 3 days       Delma Collier, PharmD.

## 2017-08-16 NOTE — PROGRESS NOTES
Shauna Downing Fall Risk Assessment:     Last Known Fall: No falls  Mobility: No limitations  Medications: Cardiovascular or central nervous system meds  Mental Status/LOC/Awareness: Awake, alert, and oriented to date, place, and person  Toileting Needs: No needs  Volume/Electrolyte Status: No problems  Communication/Sensory: Non-English patient/unable to speak/slurred speech  Behavior: Appropriate behavior  Shauna Downing Fall Risk Total: 5  Fall Risk Level: NO RISK    Universal Fall Precautions:  call light/belongings in reach, bed in low position and locked, wheelchairs and assistive devices out of sight, siderails up x 2, use non-slip footwear, adequate lighting, clutter free and spill free environment, educate on level of risk, educate to call for assistance    Fall Risk Level Interventions:   TRIAL (TELE 8, NEURO, MED AZALEA 5) Low Fall Risk Interventions  Place yellow fall risk ID band on patient: refused  Provide patient/family education based on risk assessment: completed  Educate patient/family to call staff for assistance when getting out of bed: completed  Place fall precaution signage outside patient door: verified      Patient Specific Interventions:     Medication: review medications with patient and family  Mental Status/LOC/Awareness: reinforce the use of call light  Toileting: not applicable  Volume/Electrolyte Status: monitor abnormal lab values  Communication/Sensory: update plan of care on whiteboard  Behavioral: not applicable  Mobility: ensure bed is locked and in lowest position

## 2017-08-16 NOTE — PROGRESS NOTES
Inpatient Anticoagulation Service Note    Date: 8/16/2017  Reason for Anticoagulation: Atrial Fibrillation, Stroke   [id number] VASc Score: 4    Hemoglobin Value: 12.5  Hematocrit Value: 38.3  Lab Platelet Value: 211  Target INR: 2.0 to 3.0    INR from last 7 days     Date/Time INR Value    08/16/17 0547 1.04    08/15/17 0441 1.04    08/14/17 0855 1.06    08/14/17 0322 1.06    08/13/17 1240 1.04    08/09/17 1305 (!)1.33        Dose from last 7 days     Date/Time Dose (mg)    08/16/17 1000 5    08/15/17 1600 5    08/14/17 1100 5    08/13/17 0900 0        Average Dose (mg):  (new start)  Significant Interactions: Aspirin, Statin, Other (Comments) (MVI)  Bridge Therapy: Yes (heparin weight based protocol )  Bridge Therapy Start Date: 08/14/17  Days of Overlap Therapy: 2   INR Value Greater than 2 Prior to Discontinuation of Parenteral Anticoagulation: Not Applicable     Reversal Agent Administered: Not Applicable  Comments: INR subtherapeutic and relatively unchanged. NNL to assess H/H; no indication of bleeding noted. No new DDI. Continue 5mg daily dosing with heparin bridge. Consider increasing dose tomorrow if INR remains unchanged.     Plan:  Warfarin 5mg with INR check tomorrow  Education Material Provided?: No  Pharmacist suggested discharge dosing: Warfarin 5mg PO daily with close f/u within 3 days       Delma Collier, PharmD.

## 2017-08-16 NOTE — PROGRESS NOTES
Received bedside report. Assumed patient care. White board updated. Heparin drip verified at bedside. Assessment complete. Medications given per MAR. Pt denies pain. Pt reports all needs are met at this time. Fall precautions in place, treaded socks on pt, bed in low position.  Pt educated on use of call light.  Call light within reach.

## 2017-08-17 PROBLEM — R73.9 HYPERGLYCEMIA: Status: RESOLVED | Noted: 2017-08-09 | Resolved: 2017-08-17

## 2017-08-17 PROBLEM — E87.20 METABOLIC ACIDOSIS: Status: RESOLVED | Noted: 2017-08-11 | Resolved: 2017-08-17

## 2017-08-17 LAB
ALBUMIN SERPL BCP-MCNC: 3.3 G/DL (ref 3.2–4.9)
ALBUMIN/GLOB SERPL: 1.2 G/DL
ALP SERPL-CCNC: 60 U/L (ref 30–99)
ALT SERPL-CCNC: 40 U/L (ref 2–50)
ANION GAP SERPL CALC-SCNC: 6 MMOL/L (ref 0–11.9)
APPEARANCE UR: CLEAR
APTT PPP: 59.2 SEC (ref 24.7–36)
AST SERPL-CCNC: 25 U/L (ref 12–45)
BACTERIA #/AREA URNS HPF: NEGATIVE /HPF
BILIRUB SERPL-MCNC: 0.6 MG/DL (ref 0.1–1.5)
BILIRUB UR QL STRIP.AUTO: NEGATIVE
BNP SERPL-MCNC: 2039 PG/ML (ref 0–100)
BUN SERPL-MCNC: 52 MG/DL (ref 8–22)
CALCIUM SERPL-MCNC: 9 MG/DL (ref 8.5–10.5)
CHLORIDE SERPL-SCNC: 103 MMOL/L (ref 96–112)
CO2 SERPL-SCNC: 26 MMOL/L (ref 20–33)
COLOR UR: YELLOW
CREAT SERPL-MCNC: 3.21 MG/DL (ref 0.5–1.4)
EPI CELLS #/AREA URNS HPF: NEGATIVE /HPF
GFR SERPL CREATININE-BSD FRML MDRD: 21 ML/MIN/1.73 M 2
GLOBULIN SER CALC-MCNC: 2.7 G/DL (ref 1.9–3.5)
GLUCOSE SERPL-MCNC: 137 MG/DL (ref 65–99)
GLUCOSE UR STRIP.AUTO-MCNC: NEGATIVE MG/DL
HYALINE CASTS #/AREA URNS LPF: ABNORMAL /LPF
INR PPP: 1.1 (ref 0.87–1.13)
KETONES UR STRIP.AUTO-MCNC: NEGATIVE MG/DL
LEUKOCYTE ESTERASE UR QL STRIP.AUTO: NEGATIVE
MICRO URNS: ABNORMAL
NITRITE UR QL STRIP.AUTO: NEGATIVE
PH UR STRIP.AUTO: 5.5 [PH]
POTASSIUM SERPL-SCNC: 5.2 MMOL/L (ref 3.6–5.5)
PROT SERPL-MCNC: 6 G/DL (ref 6–8.2)
PROT UR QL STRIP: 100 MG/DL
PROTHROMBIN TIME: 14.6 SEC (ref 12–14.6)
RBC # URNS HPF: ABNORMAL /HPF
RBC UR QL AUTO: NEGATIVE
SODIUM SERPL-SCNC: 135 MMOL/L (ref 135–145)
SP GR UR STRIP.AUTO: 1.01
UROBILINOGEN UR STRIP.AUTO-MCNC: 0.2 MG/DL
WBC #/AREA URNS HPF: ABNORMAL /HPF

## 2017-08-17 PROCEDURE — 99233 SBSQ HOSP IP/OBS HIGH 50: CPT | Mod: GC | Performed by: INTERNAL MEDICINE

## 2017-08-17 PROCEDURE — 83735 ASSAY OF MAGNESIUM: CPT

## 2017-08-17 PROCEDURE — A9270 NON-COVERED ITEM OR SERVICE: HCPCS

## 2017-08-17 PROCEDURE — 700102 HCHG RX REV CODE 250 W/ 637 OVERRIDE(OP): Performed by: INTERNAL MEDICINE

## 2017-08-17 PROCEDURE — 700102 HCHG RX REV CODE 250 W/ 637 OVERRIDE(OP): Performed by: STUDENT IN AN ORGANIZED HEALTH CARE EDUCATION/TRAINING PROGRAM

## 2017-08-17 PROCEDURE — A9270 NON-COVERED ITEM OR SERVICE: HCPCS | Performed by: INTERNAL MEDICINE

## 2017-08-17 PROCEDURE — 82575 CREATININE CLEARANCE TEST: CPT

## 2017-08-17 PROCEDURE — 80053 COMPREHEN METABOLIC PANEL: CPT

## 2017-08-17 PROCEDURE — 82570 ASSAY OF URINE CREATININE: CPT

## 2017-08-17 PROCEDURE — 84156 ASSAY OF PROTEIN URINE: CPT

## 2017-08-17 PROCEDURE — 81050 URINALYSIS VOLUME MEASURE: CPT

## 2017-08-17 PROCEDURE — 81001 URINALYSIS AUTO W/SCOPE: CPT

## 2017-08-17 PROCEDURE — 85730 THROMBOPLASTIN TIME PARTIAL: CPT

## 2017-08-17 PROCEDURE — A9270 NON-COVERED ITEM OR SERVICE: HCPCS | Performed by: STUDENT IN AN ORGANIZED HEALTH CARE EDUCATION/TRAINING PROGRAM

## 2017-08-17 PROCEDURE — 700102 HCHG RX REV CODE 250 W/ 637 OVERRIDE(OP)

## 2017-08-17 PROCEDURE — 700111 HCHG RX REV CODE 636 W/ 250 OVERRIDE (IP): Performed by: HOSPITALIST

## 2017-08-17 PROCEDURE — 700101 HCHG RX REV CODE 250: Performed by: STUDENT IN AN ORGANIZED HEALTH CARE EDUCATION/TRAINING PROGRAM

## 2017-08-17 PROCEDURE — 80048 BASIC METABOLIC PNL TOTAL CA: CPT

## 2017-08-17 PROCEDURE — 85027 COMPLETE CBC AUTOMATED: CPT

## 2017-08-17 PROCEDURE — 85610 PROTHROMBIN TIME: CPT

## 2017-08-17 PROCEDURE — 770020 HCHG ROOM/CARE - TELE (206)

## 2017-08-17 PROCEDURE — 700111 HCHG RX REV CODE 636 W/ 250 OVERRIDE (IP)

## 2017-08-17 PROCEDURE — 83880 ASSAY OF NATRIURETIC PEPTIDE: CPT

## 2017-08-17 PROCEDURE — 36415 COLL VENOUS BLD VENIPUNCTURE: CPT

## 2017-08-17 RX ORDER — DILTIAZEM HYDROCHLORIDE 5 MG/ML
0.25 INJECTION INTRAVENOUS ONCE
Status: COMPLETED | OUTPATIENT
Start: 2017-08-17 | End: 2017-08-17

## 2017-08-17 RX ORDER — METOPROLOL TARTRATE 50 MG/1
100 TABLET, FILM COATED ORAL EVERY 8 HOURS
Status: DISCONTINUED | OUTPATIENT
Start: 2017-08-17 | End: 2017-08-17

## 2017-08-17 RX ORDER — WARFARIN SODIUM 10 MG/1
10 TABLET ORAL
Status: COMPLETED | OUTPATIENT
Start: 2017-08-17 | End: 2017-08-17

## 2017-08-17 RX ORDER — WARFARIN SODIUM 7.5 MG/1
7.5 TABLET ORAL
Status: DISCONTINUED | OUTPATIENT
Start: 2017-08-17 | End: 2017-08-17

## 2017-08-17 RX ADMIN — ATORVASTATIN CALCIUM 40 MG: 40 TABLET, FILM COATED ORAL at 21:09

## 2017-08-17 RX ADMIN — FUROSEMIDE 40 MG: 40 TABLET ORAL at 08:57

## 2017-08-17 RX ADMIN — HYDRALAZINE HYDROCHLORIDE 10 MG: 10 TABLET, FILM COATED ORAL at 21:09

## 2017-08-17 RX ADMIN — METOPROLOL TARTRATE 75 MG: 25 TABLET, FILM COATED ORAL at 21:09

## 2017-08-17 RX ADMIN — METOPROLOL TARTRATE 75 MG: 25 TABLET, FILM COATED ORAL at 06:00

## 2017-08-17 RX ADMIN — METOPROLOL TARTRATE 75 MG: 25 TABLET, FILM COATED ORAL at 13:09

## 2017-08-17 RX ADMIN — Medication 100 MG: at 08:56

## 2017-08-17 RX ADMIN — FOLIC ACID 1 MG: 1 TABLET ORAL at 08:57

## 2017-08-17 RX ADMIN — METOPROLOL TARTRATE 5 MG: 5 INJECTION INTRAVENOUS at 23:24

## 2017-08-17 RX ADMIN — ASPIRIN 81 MG: 81 TABLET, CHEWABLE ORAL at 08:56

## 2017-08-17 RX ADMIN — HYDRALAZINE HYDROCHLORIDE 10 MG: 10 TABLET, FILM COATED ORAL at 06:00

## 2017-08-17 RX ADMIN — DILTIAZEM HYDROCHLORIDE 15.8 MG: 5 INJECTION INTRAVENOUS at 04:06

## 2017-08-17 RX ADMIN — THERA TABS 1 TABLET: TAB at 08:56

## 2017-08-17 RX ADMIN — STANDARDIZED SENNA CONCENTRATE AND DOCUSATE SODIUM 2 TABLET: 8.6; 5 TABLET, FILM COATED ORAL at 21:09

## 2017-08-17 RX ADMIN — METOPROLOL TARTRATE 5 MG: 5 INJECTION INTRAVENOUS at 01:01

## 2017-08-17 RX ADMIN — WARFARIN SODIUM 10 MG: 10 TABLET ORAL at 17:25

## 2017-08-17 RX ADMIN — HYDRALAZINE HYDROCHLORIDE 10 MG: 10 TABLET, FILM COATED ORAL at 13:08

## 2017-08-17 RX ADMIN — HEPARIN SODIUM 800 UNITS/HR: 5000 INJECTION, SOLUTION INTRAVENOUS at 17:25

## 2017-08-17 RX ADMIN — ISOSORBIDE MONONITRATE 30 MG: 30 TABLET, EXTENDED RELEASE ORAL at 08:56

## 2017-08-17 ASSESSMENT — ENCOUNTER SYMPTOMS
FOCAL WEAKNESS: 0
COUGH: 0
ORTHOPNEA: 0
ABDOMINAL PAIN: 0
VOMITING: 0
DIZZINESS: 0
HEADACHES: 0
HEMOPTYSIS: 0
NAUSEA: 0
FEVER: 0
CHILLS: 0
DIARRHEA: 0
PALPITATIONS: 0
HEARTBURN: 0

## 2017-08-17 ASSESSMENT — PAIN SCALES - GENERAL
PAINLEVEL_OUTOF10: 0

## 2017-08-17 NOTE — PROGRESS NOTES
Inpatient Anticoagulation Service Note    Date: 2017  Reason for Anticoagulation: Atrial Fibrillation, Stroke   [id number] VASc Score: 4    Hemoglobin Value: 12.5  Hematocrit Value: 38.3  Lab Platelet Value: 211  Target INR: 2.0 to 3.0    INR from last 7 days     Date/Time INR Value    17 0928 1.1    17 0547 1.04    08/15/17 0441 1.04    17 0855 1.06    17 0322 1.06    17 1240 1.04        Dose from last 7 days     Date/Time Dose (mg)    17 1200 10    17 1000 5    08/15/17 1600 5    17 1100 5    17 0900 0        Average Dose (mg):  (new start)  Significant Interactions: Aspirin, Statin  Bridge Therapy: Yes (heparin weight based)  Bridge Therapy Start Date: 17  Days of Overlap Therapy: 3     Reversal Agent Administered: Not Applicable  Comments: No change in INR following 3 days of new start warfarin. No new CBC x4 days, will order for AM labs. Heparin bridge in place, needs to continue at least 5 days and until INR >2 at least 24 hours. No bleeding reported, no new drug interactions. Will provide bolus dose for tonight and follow INRs.    Plan:  Warfarin 10 mg tonight. INR in AM.  Education Material Provided?: No  Pharmacist suggested discharge dosin mg daily and check INR within 48 hours of discharge    Gordon Nassar, VitorD

## 2017-08-17 NOTE — PROGRESS NOTES
Pts HR sustaining at 120's - 160's w/ afib, despite scheduled and PRN metoprolol doses given.   [facility] Resident on call notified, will wait for new orders.

## 2017-08-17 NOTE — PROGRESS NOTES
Nephrology Progress Note, Adult, Complex               Author:Oni Avilesjjar Date & Time created: 8/17/2017  2:14 PM     Interval History:  48 y/o male with severe, CHF  -EF 25 %, Severe AI, A.fib, CVA in TREASURE, metabolic acidosis  Poor UOP  Doing well, no complaints  Events last 24h noted    Review of Systems:  Review of Systems   Constitutional: Positive for malaise/fatigue. Negative for fever and chills.   Respiratory: Negative for cough and hemoptysis.    Cardiovascular: Negative for chest pain, palpitations and orthopnea.   Gastrointestinal: Negative for heartburn, nausea, vomiting, abdominal pain and diarrhea.   Genitourinary: Negative for dysuria.   All other systems reviewed and are negative.      Physical Exam:  Physical Exam   Constitutional: He is oriented to person, place, and time. No distress.   HENT:   Head: Normocephalic and atraumatic.   Nose: Nose normal.   Eyes: Right eye exhibits no discharge. Left eye exhibits no discharge. No scleral icterus.   Neck: Neck supple. No JVD present. No tracheal deviation present. No thyromegaly present.   Cardiovascular: An irregularly irregular rhythm present. Exam reveals no gallop and no friction rub.    Pulmonary/Chest: Effort normal and breath sounds normal. No stridor. No respiratory distress. He has no wheezes.   Abdominal: Soft. Bowel sounds are normal. He exhibits no distension. There is no tenderness. There is no rebound and no guarding.   Musculoskeletal: He exhibits no edema or tenderness.   Trace pedal edema   Lymphadenopathy:     He has no cervical adenopathy.   Neurological: He is alert and oriented to person, place, and time. A cranial nerve deficit is present.   Skin: Skin is warm and dry. No rash noted. He is not diaphoretic. No erythema.   Psychiatric: He has a normal mood and affect. His behavior is normal.   Nursing note and vitals reviewed.      Labs:        Invalid input(s): [id number]  Recent Labs      08/15/17   0913  08/16/17   0854   17   0928   BNPBTYPENAT  1753*  2034*  2039*     Recent Labs      08/15/17   0913  17   0854  17   0928   SODIUM  135  136  135   POTASSIUM  4.5  4.9  5.2   CHLORIDE  100  104  103   CO2  28  23  26   BUN  43*  48*  52*   CREATININE  2.83*  2.79*  3.21*   CALCIUM  8.9  8.9  9.0     Recent Labs      08/15/17   0913  17   0854  17   0928   ALTSGPT  43  37  40   ASTSGOT  16  19  25   ALKPHOSPHAT  60  61  60   TBILIRUBIN  0.7  0.6  0.6   GLUCOSE  115*  184*  137*     Recent Labs      08/15/17   0441   17   0547  17   0854  17   0928   PROTHROMBTM  13.9   --   13.9   --   14.6   APTT  53.7*   < >  53.2*  71.6*  59.2*   INR  1.04   --   1.04   --   1.10    < > = values in this interval not displayed.     Recent Labs      08/15/17   0913  17   0854  17   0928   ASTSGOT  16  19  25   ALTSGPT  43  37  40   ALKPHOSPHAT  60  61  60   TBILIRUBIN  0.7  0.6  0.6           Hemodynamics:  Temp (24hrs), Av.5 °C (97.7 °F), Min:35.9 °C (96.7 °F), Max:36.8 °C (98.3 °F)  Temperature: 36.6 °C (97.9 °F)  Pulse  Av.7  Min: 50  Max: 148   Blood Pressure: 115/62 mmHg     Respiratory:    Respiration: 16, Pulse Oximetry: 99 %        RUL Breath Sounds: Clear, RML Breath Sounds: Diminished, RLL Breath Sounds: Diminished, SABAS Breath Sounds: Clear, LLL Breath Sounds: Diminished  Fluids:    Intake/Output Summary (Last 24 hours) at 17 1414  Last data filed at 17 0400   Gross per 24 hour   Intake    120 ml   Output    425 ml   Net   -305 ml     Weight: 63.2 kg (139 lb 5.3 oz)  GI/Nutrition:  Orders Placed This Encounter   Procedures   • DIET ORDER     Standing Status: Standing      Number of Occurrences: 1      Standing Expiration Date:      Order Specific Question:  Diet:     Answer:  2 Gram Sodium [7]     Order Specific Question:  Diet:     Answer:  Cardiac [6]     Medical Decision Making, by Problem:  Active Hospital Problems    Diagnosis   • *Atrial fibrillation with  RVR (CMS-HCC) [I48.91]   • CVA (cerebral vascular accident) (CMS-HCC) [I63.9]   • Essential hypertension [I10]   • Respiratory failure (CMS-HCC) [J96.90]   • Electrolyte abnormality [E87.8]   • Hyperglycemia [R73.9]   • Prolonged Q-T interval on ECG [R94.31]   • Elevated troponin [R74.8]   • Acute on chronic systolic heart failure (CMS-HCC) [I50.23]   • Non-rheumatic mitral regurgitation [I34.0]   • TREASURE (acute kidney injury) (CMS-HCC) [N17.9]   • 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

## 2017-08-17 NOTE — CARE PLAN
Problem: Infection  Goal: Will remain free from infection  Intervention: Assess signs and symptoms of infection  Pt exhibits no s/sx of infection.

## 2017-08-17 NOTE — PROGRESS NOTES
Assumed care of pt after receiving report from NOC nurse. A&O x 4, no c/o pain. No grimacing noted. Fluent in Kinyarwanda only. Pt ambulated to sofa in room, steady gait. Heparin still infusing, verified Heparin with another RN (see MAR).

## 2017-08-17 NOTE — PROGRESS NOTES
Assumed care at 1900. Bedside report received from Marlee. Patient's chart and MAR reviewed. Pt denies pain at this time. Pt is A & O 4, currently on heparin to coumadin bridge for A-fib. Patient was updated on plan of care for the day. Questions answered and concerns addressed.  Pt denies any additional needs at this time. White board updated. Call light, phone and personal belongings within reach.

## 2017-08-17 NOTE — CARE PLAN
Problem: Urinary Elimination:  Goal: Ability to achieve a balanced intake and output will improve  Intervention: Monitor amount and/or characteristics of urine  Monitoring I&O's since pt is on Lasix.

## 2017-08-17 NOTE — PROGRESS NOTES
Monitor Summary  A fib   with non-sustaining HR 160s  R PVC  1.1 sec pause at 0000; 1.9 sec pause at 0615  -08-

## 2017-08-17 NOTE — PROGRESS NOTES
Cardiology Progress Note      ID:   47 year old male who was admitted for TREASURE, afib RVR s/p IV diltiazem, acute CVA, found to have severe AI, CHF EF 20%.       Interval Update:   Overnight tele : Afib , non-sustained , 1.9 sec pause.   IV metoprolol push and IV diltiazem was given overnight as his HR sustaining at 120s - 160's. Patient was asymptomatic.   BUN, Cr trending up.   Denies chest pain, palpitation or dyspnea.       Physical Exam       Filed Vitals:    08/17/17 0408 08/17/17 0747 08/17/17 1200 08/17/17 1600   BP: 113/79 124/90 115/62 133/61   Pulse: 147 54 132 131   Temp: 36.6 °C (97.9 °F) 36.8 °C (98.3 °F) 36.6 °C (97.9 °F) 36.7 °C (98 °F)   Resp: 16 16 16 16   Height:       Weight:       SpO2: 97% 98% 99% 96%     Body mass index is 27.21 kg/(m^2). Weight: 63.2 kg (139 lb 5.3 oz)  Oxygen Therapy:  Pulse Oximetry: 96 %, O2 (LPM): 0, O2 Delivery: None (Room Air)    Physical Exam   Constitutional: He is oriented to person, place, and time. No distress.   Cardiovascular: Normal rate.    Irregularly irregular rhythm. Loud S2, EDM over aortic area.    Pulmonary/Chest: Effort normal and breath sounds normal. No respiratory distress.   Abdominal: Soft. Bowel sounds are normal. He exhibits no distension. There is no tenderness.   Musculoskeletal: He exhibits no edema.   Neurological: He is alert and oriented to person, place, and time.   Skin: He is not diaphoretic.   Nursing note and vitals reviewed.        Intake/Output Summary (Last 24 hours) at 08/17/17 1736  Last data filed at 08/17/17 0400   Gross per 24 hour   Intake    120 ml   Output    425 ml   Net   -305 ml         Lab Data Review:       Recent Labs      08/15/17   0913  08/16/17   0854  08/17/17   0928   SODIUM  135  136  135   POTASSIUM  4.5  4.9  5.2   CHLORIDE  100  104  103   CO2  28  23  26   BUN  43*  48*  52*   CREATININE  2.83*  2.79*  3.21*   CALCIUM  8.9  8.9  9.0        Recent Labs      08/15/17   0913  08/16/17   0854  08/17/17   0928   ALTSGPT  43  37  40   ASTSGOT  16  19  25   ALKPHOSPHAT  60  61  60   TBILIRUBIN  0.7  0.6  0.6   GLUCOSE  115*  184*  137*       Recent Labs      08/15/17   0441   08/16/17   0547  08/16/17   0854  08/17/17   0928   PROTHROMBTM  13.9   --   13.9   --   14.6   APTT  53.7*   < >  53.2*  71.6*  59.2*   INR  1.04   --   1.04   --   1.10    < > = values in this interval not displayed.       Recent Labs      08/15/17   0913  08/16/17   0854  08/17/17   0928   ASTSGOT  16  19  25   ALTSGPT  43  37  40   ALKPHOSPHAT  60  61  60   TBILIRUBIN  0.7  0.6  0.6         Inpatient Medications :  Current Facility-Administered Medications   Medication Last Dose   • metoprolol (LOPRESSOR) tablet 75 mg 75 mg at 08/17/17 1309   • furosemide (LASIX) tablet 40 mg 40 mg at 08/17/17 0857   • hydrALAZINE (APRESOLINE) tablet 10 mg 10 mg at 08/17/17 1308   • isosorbide mononitrate SR (IMDUR) tablet 30 mg 30 mg at 08/17/17 0856   • MD ALERT... warfarin (COUMADIN) per pharmacy protocol     • atorvastatin (LIPITOR) tablet 40 mg 40 mg at 08/16/17 2143   • aspirin (ASA) chewable tab 81 mg 81 mg at 08/17/17 0856   • metoprolol (LOPRESSOR) injection 5 mg 5 mg at 08/17/17 0101   • senna-docusate (PERICOLACE or SENOKOT S) 8.6-50 MG per tablet 2 Tab 2 Tab at 08/16/17 2147    And   • polyethylene glycol/lytes (MIRALAX) PACKET 1 Packet      And   • magnesium hydroxide (MILK OF MAGNESIA) suspension 30 mL      And   • bisacodyl (DULCOLAX) suppository 10 mg     • Respiratory Care per Protocol     • glucose 4 g chewable tablet 16 g      And   • dextrose 50% (D50W) injection 25 mL     • heparin 1000 units/mL injection 2,200 Units 2,200 Units at 08/13/17 0544    And   • heparin infusion 25,000 units in 500 ml 0.45% nacl 800 Units/hr at 08/17/17 1725   • folic acid (FOLVITE) tablet 1 mg 1 mg at 08/17/17 0857   • thiamine (THIAMINE) tablet 100 mg 100 mg at 08/17/17 0856   • multivitamin  (THERAGRAN) tablet 1 Tab 1 Tab at 08/17/17 0856   • heparin 1000 units/mL injection 3,000 Units 3,000 Units at 08/13/17 1545         Medications reviewed and Labs reviewed        DVT Prophylaxis: Warfarin (Coumadin)            Assessment/Plan     47 year old male who was admitted for TREASURE, afib RVR s/p IV diltiazem, acute CVA, found to have severe AI, CHF EF 20%.     Echo (8/10/2017) severely reduced LV systolic function, LV EF 20%, severe concentric LVH, severe central eccentric aortic regurgitation.   BNP 2034.     Kidney dysfunction: s/p hemodialysis, now on lasix per nephro       Impression:   - Atrial fibrillation  - HFrEF  EF 20%  - Severe AI   - TREASURE vs CKD  - Acute CVA      Recommendation:   - continue lopressor for rate control for afib, currently on 75 mg tid.   - continue hydralazine and Imdur for after-load reduction.      Hydralazine 10 mg tid, Imdur 30 mg qd.   - continue warfarin.  - continue ASA 81 mg qd and Lipitor 40 mg qhs.   - no plan for stress test or diagnostic cardiac cath at this point given patient's kidney dysfunction and he is probably not a candidate for CT surgery because of acute CVA.    - will follow.         Maria Magaña M.D.  PGY 2  Attending addendum/additions to follow.

## 2017-08-17 NOTE — PROGRESS NOTES
Internal Medicine Interval Note    Name Bhavesh Saini       1970   Age/Sex 47 y.o. male   MRN [id number]   Code Status FULL.      After 5PM or if no immediate response to page, please call for cross-coverage  Attending/Team: Dr. Barnard/[place]. See Patient List for primary contact information  Call [phone] to page    1st Call - Day Intern (R1):   Dr. Porter 2nd Call - Day Sr. Resident (R2/R3):   Dr. Jackson         Reason for interval visit  (Principal Problem)   Atrial fibrillation with RVR (CMS-[facility])    Interval Problem Daily Status Update  (24 hours)   -overnight He was given diltiazem by NF due to Afib with nonsustained HR in 160s. - now in SR  -Discussed going to [place] to complete his therapy due to insurance issues here and he understands and wants to go back to [place].   -INR  1.1   -Nephro ordered 24 hr urine Cr  -pending cards recs    Review of Systems   Constitutional: Negative for fever and chills.   Respiratory: Negative for cough.    Cardiovascular: Negative for chest pain and palpitations.   Gastrointestinal: Negative for abdominal pain.   Neurological: Negative for dizziness, focal weakness and headaches.       Consultants/Specialty  Cardiology  Cardiac Surgery  Nephrology    Disposition  Home    Quality Measures  Labs reviewed, Medications reviewed and Radiology images reviewed  Truong catheter: No Truong      DVT Prophylaxis: Warfarin (Coumadin) and Heparin            Physical Exam       Filed Vitals:    17 2355 17 0408 17 0747 17 1200   BP: 134/62 113/79 124/90 115/62   Pulse: 62 147 54 132   Temp: 36.3 °C (97.4 °F) 36.6 °C (97.9 °F) 36.8 °C (98.3 °F) 36.6 °C (97.9 °F)   Resp: 16 16 16 16   Height:       Weight:       SpO2: 95% 97% 98% 99%     Body mass index is 27.21 kg/(m^2). Weight: 63.2 kg (139 lb 5.3 oz)  Oxygen Therapy:  Pulse Oximetry: 99 %, O2 (LPM): 0, O2 Delivery: None (Room Air)    Physical Exam   Constitutional: He is oriented to person, place,  and time.   Lying in bed, in no apparent distress.    Eyes: EOM are normal. Pupils are equal, round, and reactive to light.   Cardiovascular:   Has an irregularly irregular rhythm.     Pulmonary/Chest: Effort normal and breath sounds normal. No respiratory distress. He has no wheezes. He has no rales.   Abdominal: Soft. Bowel sounds are normal. He exhibits no distension. There is no tenderness.   Musculoskeletal: He exhibits no edema.   Neurological: He is alert and oriented to person, place, and time. No cranial nerve deficit.   Psychiatric: Affect normal.       Lab Data Review:       8/16/2017  3:30 PM    Recent Labs      08/15/17   0913  08/16/17   0854  08/17/17   0928   SODIUM  135  136  135   POTASSIUM  4.5  4.9  5.2   CHLORIDE  100  104  103   CO2  28  23  26   BUN  43*  48*  52*   CREATININE  2.83*  2.79*  3.21*   CALCIUM  8.9  8.9  9.0       Recent Labs      08/15/17   0913  08/16/17   0854  08/17/17   0928   ALTSGPT  43  37  40   ASTSGOT  16  19  25   ALKPHOSPHAT  60  61  60   TBILIRUBIN  0.7  0.6  0.6   GLUCOSE  115*  184*  137*       Recent Labs      08/15/17   0441   08/16/17   0547  08/16/17   0854  08/17/17   0928   PROTHROMBTM  13.9   --   13.9   --   14.6   APTT  53.7*   < >  53.2*  71.6*  59.2*   INR  1.04   --   1.04   --   1.10    < > = values in this interval not displayed.       Recent Labs      08/15/17   0913  08/16/17   0854  08/17/17   0928   ASTSGOT  16  19  25   ALTSGPT  43  37  40   ALKPHOSPHAT  60  61  60   TBILIRUBIN  0.7  0.6  0.6           Assessment/Plan     * Atrial fibrillation with RVR (CMS-HCC) (present on admission)  Assessment & Plan  -New onset vs paroxysmal (vague PMH of afib and non-compliance with meds)  -overnight He was given diltiazem by NF due to Afib with nonsustained HR in 160s. - now in SR  -CHADS2: 4  -ECHO: severe LVH, global hypokinesis, EF ~20%, severe AI and mod MR  -LOBO: slow blood flow but no thrombus. Severe AI    Plan  - Na restricted diet  - Continue  scheduled metoprolol, PRN 5mg metoprolol q6h.  No longer on diltiazem.   - Awaiting Cardiology's note re: recommendations.    -Cardioversion will likely be done during AV replacement.     CVA (cerebral vascular accident) (CMS-HCC) (present on admission)  Assessment & Plan  -h/o left facial droop, slurred speech and left sided weakness. However no neuro deficits currently  -CT: cerebral atrophy and small vessel ischemic changes   -MRI brain: acute infarction in L frontal periventricular white matter extendeing to subinsular cortex. Punctate area of infarction in R posterior lobe. Numerous microbleeds.   Assessment: Afib could have contributed (LOBO showed 'sludge' in the heart)  Plan  -ASA, statin  -Mx of Afib as stated above.    TREASURE (acute kidney injury) (CMS-HCC) (present on admission)  Assessment & Plan  -Acute on chronic. Likely pre-renal vs cardiorenal. HTN is likely the cause of CKD  -On adm: Cr: 4.3, AGMA, Ph 6.1. Unknown baseline...  -FeNa <1%  -H/o renal disease since 7yrs, FH kidney disease in his father. Has been non-complaint with HTN medications. - has CHF  -Renal US: no hydronephrosis and calculi. Left renal cysts.  - Cr and K slowly increasing    Plan  -Nephrology on board - no HD for now    HFrEF (heart failure with reduced ejection fraction) (CMS-HCC) (present on admission)  Assessment & Plan  -Presented with SOB, orthopnea, fatique and palpitations. Has h/o HTN and alcoholism in the past. On exam: JVD +., murmurs + but no volume overload.   -BNP 2203 >> 1753.   -CXR: cardiomegaly. ECHO: severe LVH, global hypokinesis, EF ~20%, severe AI and mod MR  Assessment: Compensated HF likely due to alcoholism vs tachycardia induced heart failure.   Plan  -Cardiac surgery spoke with Mr Saini and family, he needs ischemic work up and AVR/MAZE w LOBO, but due to his renal status and recent stroke with hemorrhage, they do not recommend it at this time.   - Patient's undocumented yamileth status is another reason  having a heart cath is diifficult.    - Patient does have follow up appointment with Dr. Gutierrez, Cardiology, on September 11.  - Na restricted diet    Aortic regurgitation (present on admission)  Assessment & Plan  -Severe AR.   -ECHO:  severe LVH, global hypokinesis, EF ~20%, severe AI and mod MR. Findings were confirmed by LOBO  -Fits the criteria for valve replacement. Cardiology and cardiac surgery on board.    Cardiorenal syndrome  Assessment & Plan  -Diagnosis given by Nephrology - likely since urine Na is low  -Patient is on HD, and is urinated <500 ml yesterday.  -Creatinine in ED 4.38, Total protein Urine 208. Estimated GFR was 14.   -FENA 0.2%  -BNP 2203>> 2039   -CXR: cardiomegaly. ECHO showed: severe LVH, global hypokinesis, EF ~20%, AR and mod MR  Assessment: Cardiorenal syndrome  Plan  -Patient needs AVR/MAZE w LOBO per Cardiac Surgery, but due to his renal status and recent stroke with hemorrhage, they do not recommend at this time.  They will follow up outpatient in 6 weeks.   -Patient will be counseled on cessation of alcohol intake.   -Nephrology on board.         Electrolyte abnormality (present on admission)  Assessment & Plan  -Electrolytes wnl today  -On dialysis    Prolonged Q-T interval on ECG (present on admission)  Assessment & Plan  -QTc ~530s. Will avoid QTc prolonging meds.    Essential hypertension (present on admission)  Assessment & Plan  -PMH of HTN but likely non-compliant. ECHO showed severe LVH  -Stable on BB, hydralazine, nitrate, lasix     Alcoholic (CMS-HCC) (present on admission)  Assessment & Plan  -History is vague but looks like he was drinking heavily in the past. Last drink was 8/5/2017.  -On thiamine and folate.       Hyperglycemia (present on admission)  Assessment & Plan  -Resolved  -On adm: , A1c: 5.4  -He was started on ISS and hypoglycemia protocol which is discontinued.  CTM with accuchecks    Non-rheumatic mitral regurgitation  Assessment & Plan  -Moderate MR  on ECHO

## 2017-08-17 NOTE — PROGRESS NOTES
Shauna Downing Fall Risk Assessment:     Last Known Fall: No falls  Mobility: Dizziness/generalized weakness  Medications: Cardiovascular or central nervous system meds  Mental Status/LOC/Awareness: Awake, alert, and oriented to date, place, and person  Toileting Needs: No needs  Volume/Electrolyte Status: No problems  Communication/Sensory: Non-English patient/unable to speak/slurred speech  Behavior: Appropriate behavior  Shauna Downing Fall Risk Total: 6  Fall Risk Level: NO RISK    Universal Fall Precautions:  call light/belongings in reach, bed in low position and locked, use non-slip footwear, siderails up x 2, adequate lighting, clutter free and spill free environment, educate on level of risk, educate to call for assistance    Fall Risk Level Interventions:   TRIAL (TELE 8, NEURO, MED AZALEA 5) Low Fall Risk Interventions  Place yellow fall risk ID band on patient: refused  Provide patient/family education based on risk assessment: completed  Educate patient/family to call staff for assistance when getting out of bed: completed  Place fall precaution signage outside patient door: verified      Patient Specific Interventions:     Medication: limit combination of prn medications  Mental Status/LOC/Awareness: reinforce falls education, check on patient hourly and reinforce the use of call light  Toileting: provide frquent toileting and instruct male patients prone to dizziness to void while sitting  Volume/Electrolyte Status: ensure patient remains hydrated and monitor abnormal lab values  Communication/Sensory: update plan of care on whiteboard and ensure proper positioning when transferrng/ambulating  Behavioral: encourage patient to voice feelings and administer medication as ordered  Mobility: schedule physical activity throughout the day, dangle prior to standing, ensure bed is locked and in lowest position and provide appropriate assistive device

## 2017-08-17 NOTE — PROGRESS NOTES
Monitor Summary:  A-fib 67-96, up to 154, down to 43  R PVC, 2.0 second pause, 1.5 second pause  -/08/-

## 2017-08-18 LAB
ALBUMIN SERPL BCP-MCNC: 3.3 G/DL (ref 3.2–4.9)
ALBUMIN/GLOB SERPL: 1.3 G/DL
ALP SERPL-CCNC: 59 U/L (ref 30–99)
ALT SERPL-CCNC: 35 U/L (ref 2–50)
ANION GAP SERPL CALC-SCNC: 8 MMOL/L (ref 0–11.9)
ANION GAP SERPL CALC-SCNC: 9 MMOL/L (ref 0–11.9)
APTT PPP: 81.5 SEC (ref 24.7–36)
AST SERPL-CCNC: 24 U/L (ref 12–45)
BILIRUB SERPL-MCNC: 0.5 MG/DL (ref 0.1–1.5)
BNP SERPL-MCNC: 2211 PG/ML (ref 0–100)
BUN SERPL-MCNC: 55 MG/DL (ref 8–22)
BUN SERPL-MCNC: 59 MG/DL (ref 8–22)
CALCIUM SERPL-MCNC: 8.8 MG/DL (ref 8.5–10.5)
CALCIUM SERPL-MCNC: 8.9 MG/DL (ref 8.5–10.5)
CHLORIDE SERPL-SCNC: 105 MMOL/L (ref 96–112)
CHLORIDE SERPL-SCNC: 105 MMOL/L (ref 96–112)
CO2 SERPL-SCNC: 20 MMOL/L (ref 20–33)
CO2 SERPL-SCNC: 22 MMOL/L (ref 20–33)
CREAT 24H UR-MSRATE: 964 MG/24 HR (ref 1000–2000)
CREAT SERPL-MCNC: 3.01 MG/DL (ref 0.5–1.4)
CREAT SERPL-MCNC: 3.24 MG/DL (ref 0.5–1.4)
CREAT UR-MCNC: 51.4 MG/DL
CREAT UR-MCNC: 52.1 MG/DL
ERYTHROCYTE [DISTWIDTH] IN BLOOD BY AUTOMATED COUNT: 49 FL (ref 35.9–50)
GFR SERPL CREATININE-BSD FRML MDRD: 20 ML/MIN/1.73 M 2
GFR SERPL CREATININE-BSD FRML MDRD: 22 ML/MIN/1.73 M 2
GLOBULIN SER CALC-MCNC: 2.5 G/DL (ref 1.9–3.5)
GLUCOSE SERPL-MCNC: 155 MG/DL (ref 65–99)
GLUCOSE SERPL-MCNC: 93 MG/DL (ref 65–99)
HCT VFR BLD AUTO: 42 % (ref 42–52)
HGB BLD-MCNC: 13.6 G/DL (ref 14–18)
INR PPP: 1.22 (ref 0.87–1.13)
MAGNESIUM SERPL-MCNC: 2 MG/DL (ref 1.5–2.5)
MCH RBC QN AUTO: 29.7 PG (ref 27–33)
MCHC RBC AUTO-ENTMCNC: 32.4 G/DL (ref 33.7–35.3)
MCV RBC AUTO: 91.7 FL (ref 81.4–97.8)
PLATELET # BLD AUTO: 221 K/UL (ref 164–446)
PMV BLD AUTO: 10.5 FL (ref 9–12.9)
POTASSIUM SERPL-SCNC: 4.6 MMOL/L (ref 3.6–5.5)
POTASSIUM SERPL-SCNC: 4.7 MMOL/L (ref 3.6–5.5)
PROT SERPL-MCNC: 5.8 G/DL (ref 6–8.2)
PROT UR-MCNC: 28.2 MG/DL (ref 0–15)
PROT/CREAT UR: 549 MG/G (ref 15–68)
PROTHROMBIN TIME: 15.8 SEC (ref 12–14.6)
RBC # BLD AUTO: 4.58 M/UL (ref 4.7–6.1)
SODIUM SERPL-SCNC: 133 MMOL/L (ref 135–145)
SODIUM SERPL-SCNC: 136 MMOL/L (ref 135–145)
SPECIMEN VOL UR: 1850 ML
WBC # BLD AUTO: 8 K/UL (ref 4.8–10.8)

## 2017-08-18 PROCEDURE — 99233 SBSQ HOSP IP/OBS HIGH 50: CPT | Mod: GC | Performed by: INTERNAL MEDICINE

## 2017-08-18 PROCEDURE — A9270 NON-COVERED ITEM OR SERVICE: HCPCS | Performed by: STUDENT IN AN ORGANIZED HEALTH CARE EDUCATION/TRAINING PROGRAM

## 2017-08-18 PROCEDURE — 700102 HCHG RX REV CODE 250 W/ 637 OVERRIDE(OP)

## 2017-08-18 PROCEDURE — 36415 COLL VENOUS BLD VENIPUNCTURE: CPT

## 2017-08-18 PROCEDURE — 700102 HCHG RX REV CODE 250 W/ 637 OVERRIDE(OP): Performed by: STUDENT IN AN ORGANIZED HEALTH CARE EDUCATION/TRAINING PROGRAM

## 2017-08-18 PROCEDURE — A9270 NON-COVERED ITEM OR SERVICE: HCPCS | Performed by: INTERNAL MEDICINE

## 2017-08-18 PROCEDURE — 85730 THROMBOPLASTIN TIME PARTIAL: CPT

## 2017-08-18 PROCEDURE — 700111 HCHG RX REV CODE 636 W/ 250 OVERRIDE (IP): Performed by: INTERNAL MEDICINE

## 2017-08-18 PROCEDURE — 700102 HCHG RX REV CODE 250 W/ 637 OVERRIDE(OP): Performed by: INTERNAL MEDICINE

## 2017-08-18 PROCEDURE — 80053 COMPREHEN METABOLIC PANEL: CPT

## 2017-08-18 PROCEDURE — 770020 HCHG ROOM/CARE - TELE (206)

## 2017-08-18 PROCEDURE — 83880 ASSAY OF NATRIURETIC PEPTIDE: CPT

## 2017-08-18 PROCEDURE — A9270 NON-COVERED ITEM OR SERVICE: HCPCS

## 2017-08-18 RX ORDER — WARFARIN SODIUM 7.5 MG/1
7.5 TABLET ORAL
Status: DISCONTINUED | OUTPATIENT
Start: 2017-08-18 | End: 2017-08-23

## 2017-08-18 RX ORDER — DILTIAZEM HYDROCHLORIDE 5 MG/ML
10 INJECTION INTRAVENOUS
Status: DISCONTINUED | OUTPATIENT
Start: 2017-08-18 | End: 2017-08-18

## 2017-08-18 RX ORDER — HYDRALAZINE HYDROCHLORIDE 25 MG/1
25 TABLET, FILM COATED ORAL EVERY 8 HOURS
Status: DISCONTINUED | OUTPATIENT
Start: 2017-08-18 | End: 2017-08-19

## 2017-08-18 RX ORDER — METOPROLOL SUCCINATE 50 MG/1
250 TABLET, EXTENDED RELEASE ORAL
Status: DISCONTINUED | OUTPATIENT
Start: 2017-08-18 | End: 2017-08-29

## 2017-08-18 RX ADMIN — METOPROLOL SUCCINATE 250 MG: 50 TABLET, EXTENDED RELEASE ORAL at 09:59

## 2017-08-18 RX ADMIN — ASPIRIN 81 MG: 81 TABLET, CHEWABLE ORAL at 08:03

## 2017-08-18 RX ADMIN — HYDRALAZINE HYDROCHLORIDE 10 MG: 10 TABLET, FILM COATED ORAL at 06:01

## 2017-08-18 RX ADMIN — METOPROLOL TARTRATE 75 MG: 25 TABLET, FILM COATED ORAL at 06:01

## 2017-08-18 RX ADMIN — ATORVASTATIN CALCIUM 40 MG: 40 TABLET, FILM COATED ORAL at 22:25

## 2017-08-18 RX ADMIN — STANDARDIZED SENNA CONCENTRATE AND DOCUSATE SODIUM 2 TABLET: 8.6; 5 TABLET, FILM COATED ORAL at 08:05

## 2017-08-18 RX ADMIN — ISOSORBIDE MONONITRATE 30 MG: 30 TABLET, EXTENDED RELEASE ORAL at 08:04

## 2017-08-18 RX ADMIN — FOLIC ACID 1 MG: 1 TABLET ORAL at 08:05

## 2017-08-18 RX ADMIN — WARFARIN SODIUM 7.5 MG: 7.5 TABLET ORAL at 18:24

## 2017-08-18 RX ADMIN — Medication 100 MG: at 08:04

## 2017-08-18 RX ADMIN — HYDRALAZINE HYDROCHLORIDE 25 MG: 25 TABLET, FILM COATED ORAL at 22:26

## 2017-08-18 RX ADMIN — STANDARDIZED SENNA CONCENTRATE AND DOCUSATE SODIUM 2 TABLET: 8.6; 5 TABLET, FILM COATED ORAL at 22:25

## 2017-08-18 RX ADMIN — DILTIAZEM HYDROCHLORIDE 10 MG: 5 INJECTION INTRAVENOUS at 01:49

## 2017-08-18 RX ADMIN — HYDRALAZINE HYDROCHLORIDE 25 MG: 25 TABLET, FILM COATED ORAL at 15:10

## 2017-08-18 RX ADMIN — THERA TABS 1 TABLET: TAB at 08:04

## 2017-08-18 RX ADMIN — FUROSEMIDE 40 MG: 40 TABLET ORAL at 08:05

## 2017-08-18 ASSESSMENT — ENCOUNTER SYMPTOMS
ORTHOPNEA: 0
HEARTBURN: 0
PALPITATIONS: 0
VOMITING: 0
NAUSEA: 0
COUGH: 0
ABDOMINAL PAIN: 0
HEADACHES: 0
DIARRHEA: 0
HEMOPTYSIS: 0
FOCAL WEAKNESS: 0
SEIZURES: 0
DIZZINESS: 0
FEVER: 0
CHILLS: 0

## 2017-08-18 ASSESSMENT — PAIN SCALES - GENERAL
PAINLEVEL_OUTOF10: 0

## 2017-08-18 NOTE — PROGRESS NOTES
Pt 's - 160's, not responding to oral or prn meds per MAR. [employer] Resident on call notified. No new orders rec'd. Pt in no distress, currently sleeping.

## 2017-08-18 NOTE — DISCHARGE PLANNING
SW received consult from cardiovascular nurse navigator requesting assistance with medications until pt can be set up with [facility]. SW spoke to GHADA ROBLEDO, who stated that pt is not discharging today and MD is unsure what meds pt will be discharging on at this point. SW expressed that it will be challenging (if not impossible) to provide meds to the pt over the weekend as the [facility] is closed. MD to follow up with her team and call this SW back.     Nurse Navigator's note also indicates that pt should apply for medicaid, however [facility] has already screened this pt and found him to be in excess of income as well as an undocumented immigrant so he will not qualify for medicaid.

## 2017-08-18 NOTE — PROGRESS NOTES
Internal Medicine Interval Note    Name Bhavesh Saini       1970   Age/Sex 47 y.o. male   MRN [id number]   Code Status FULL.     After 5PM or if no immediate response to page, please call for cross-coverage  Attending/Team: Akil/Troy See Patient List for primary contact information  Call [phone] to page    1st Call - Day Intern (R1):   Charlotte 2nd Call - Day Sr. Resident (R2/R3):   Manuel         Reason for interval visit  (Principal Problem)   Atrial fibrillation with RVR (CMS-HCC)    Interval Problem Daily Status Update  (24 hours)   - Given diltiazem overnight due to Afib with non-sustained HR in 130-140s.  Patient remains asymptomatic.      Review of Systems   Constitutional: Negative for fever and chills.   Respiratory: Negative for cough.    Cardiovascular: Negative for chest pain and palpitations.   Gastrointestinal: Negative for abdominal pain.   Neurological: Negative for dizziness, focal weakness, seizures and headaches.       Consultants/Specialty  Cardiology  Cardiac Surgery  Nephrology    Disposition  Home    Quality Measures  Medications reviewed, Labs reviewed and Radiology images reviewed  Truong catheter: No Truong      DVT Prophylaxis: Heparin and Warfarin (Coumadin)                  Physical Exam       Filed Vitals:    17 1937 17 2330 17 0324 17 0800   BP: 112/63 119/53 122/87 121/64   Pulse: 71 130 98 129   Temp: 36.6 °C (97.8 °F) 36.4 °C (97.6 °F) 36.3 °C (97.4 °F) 36.9 °C (98.4 °F)   Resp: 15 16 17 16   Height:       Weight:       SpO2: 98% 99% 95% 98%     Body mass index is 26.69 kg/(m^2). Weight: 62 kg (136 lb 11 oz)  Oxygen Therapy:  Pulse Oximetry: 98 %, O2 (LPM): 0, O2 Delivery: None (Room Air)    Physical Exam   Constitutional:   Sitting up, in no apparent distress.  Alert and oriented to person, place, time, and situation.     Eyes: EOM are normal. Pupils are equal, round, and reactive to light.   Cardiovascular:   Irregularly irregular  rhythm.     Pulmonary/Chest: Effort normal and breath sounds normal. No respiratory distress. He has no wheezes. He has no rales.   Abdominal: Soft. Bowel sounds are normal. He exhibits no distension. There is no tenderness.   Neurological: No cranial nerve deficit.   Psychiatric: Affect normal.       Lab Data Review:         8/18/2017  9:05 AM    Recent Labs      08/16/17   0854  08/17/17   0928  08/17/17   2346   SODIUM  136  135  133*   POTASSIUM  4.9  5.2  4.7   CHLORIDE  104  103  105   CO2  23  26  20   BUN  48*  52*  59*   CREATININE  2.79*  3.21*  3.24*   MAGNESIUM   --    --   2.0   CALCIUM  8.9  9.0  8.9       Recent Labs      08/15/17   0913  08/16/17   0854  08/17/17   0928  08/17/17   2346   ALTSGPT  43  37  40   --    ASTSGOT  16  19  25   --    ALKPHOSPHAT  60  61  60   --    TBILIRUBIN  0.7  0.6  0.6   --    GLUCOSE  115*  184*  137*  93       Recent Labs      08/16/17   0547  08/16/17   0854  08/17/17   0928  08/17/17   2346   RBC   --    --    --   4.58*   HEMOGLOBIN   --    --    --   13.6*   HEMATOCRIT   --    --    --   42.0   PLATELETCT   --    --    --   221   PROTHROMBTM  13.9   --   14.6  15.8*   APTT  53.2*  71.6*  59.2*   --    INR  1.04   --   1.10  1.22*       Recent Labs      08/15/17   0913  08/16/17   0854  08/17/17   0928 08/17/17   2346   WBC   --    --    --   8.0   ASTSGOT  16  19  25   --    ALTSGPT  43  37  40   --    ALKPHOSPHAT  60  61  60   --    TBILIRUBIN  0.7  0.6  0.6   --            Assessment/Plan     * Atrial fibrillation with RVR (CMS-HCC) (present on admission)  Assessment & Plan  -New onset vs paroxysmal (vague PMH of afib and non-compliance with meds)  -overnight He was given diltiazem by NF due to Afib with nonsustained HR in 160s. - now in SR  -CHADS2: 4  -ECHO: severe LVH, global hypokinesis, EF ~20%, severe AI and mod MR  -LOBO: slow blood flow but no thrombus. Severe AI    Plan  - Na restricted diet  - Continue scheduled metoprolol, PRN 5mg metoprolol q6h.     - If HR < 50 or > 120s , is PERSISTENT and SYMPTOMATIC, will administer diltiazem 10 mg.   -Cardioversion will likely be done during AV replacement.     CVA (cerebral vascular accident) (CMS-HCC) (present on admission)  Assessment & Plan  -h/o left facial droop, slurred speech and left sided weakness. However no neuro deficits currently  -CT: cerebral atrophy and small vessel ischemic changes   -MRI brain: acute infarction in L frontal periventricular white matter extendeing to subinsular cortex. Punctate area of infarction in R posterior lobe. Numerous microbleeds.   Assessment: Afib could have contributed (LOBO showed 'sludge' in the heart)  Plan  -ASA, statin  -Mx of Afib as stated above.    TREASURE (acute kidney injury) (CMS-HCC) (present on admission)  Assessment & Plan  -Acute on chronic. Likely pre-renal vs cardiorenal. HTN is likely the cause of CKD  -On adm: Cr: 4.3, AGMA, Ph 6.1. Unknown baseline.  -FeNa <1%  -H/o renal disease since 7yrs, FH kidney disease in his father. Has been non-complaint with HTN medications. - has CHF  -Renal US: no hydronephrosis and calculi. Left renal cysts.  - Cr and K slowly increasing    Plan  -Nephrology on board - no HD for now.  Awaiting recommendations re: need for/frequency of HD.      HFrEF (heart failure with reduced ejection fraction) (CMS-HCC) (present on admission)  Assessment & Plan  -Presented with SOB, orthopnea, fatique and palpitations. Has h/o HTN and alcoholism in the past. On exam: JVD +., murmurs + but no volume overload.   -BNP 2203 >> 1753 > 2211.   -CXR: cardiomegaly. ECHO: severe LVH, global hypokinesis, EF ~20%, severe AI and mod MR  -Assessment: Compensated HF likely due to alcoholism vs tachycardia induced heart failure.   -Cardiac surgery spoke with Mr Saini and family, he needs ischemic work up and AVR/MAZE w LOBO, but due to his renal status and recent stroke with hemorrhage, they do not recommend it at this time.   - Patient's undocumented yamileth  status is another reason having a heart cath is diifficult.    - Team spoke with patient yesterday re: going home to [place] for procedure.    Plan  - Na restricted diet.   - Patient does have follow up appointment with Dr. Gutierrez, Cardiology, on September 11.  - Patient can likely move back to [place] in 3 months.  Will coordinate with Social Work to see if this can be expedited.        Aortic regurgitation (present on admission)  Assessment & Plan  -Severe AR.   -ECHO:  severe LVH, global hypokinesis, EF ~20%, severe AI and mod MR. Findings were confirmed by LOBO  -Fits the criteria for valve replacement. Cardiology and cardiac surgery on board.    Cardiorenal syndrome  Assessment & Plan  -Diagnosis given by Nephrology - likely since urine Na is low  -Patient is on HD, and is urinated <500 ml yesterday.  -Creatinine in ED 4.38, Total protein Urine 208. Estimated GFR was 14.   -FENA 0.2%  -BNP 2203>> 2039   -CXR: cardiomegaly. ECHO showed: severe LVH, global hypokinesis, EF ~20%, AR and mod MR  Assessment: Cardiorenal syndrome  Plan  -Patient needs AVR/MAZE w LOBO per Cardiac Surgery, but due to his renal status and recent stroke with hemorrhage, they do not recommend at this time.  They will follow up outpatient in 6 weeks.   -Patient will be counseled on cessation of alcohol intake.   -Nephrology on board.         Electrolyte abnormality (present on admission)  Assessment & Plan  -Electrolytes wnl today.     Prolonged Q-T interval on ECG (present on admission)  Assessment & Plan  -QTc ~530s. Will avoid QTc prolonging meds.    Essential hypertension (present on admission)  Assessment & Plan  -PMH of HTN but likely non-compliant. ECHO showed severe LVH  -Stable on BB, hydralazine, nitrate, lasix     Alcoholic (CMS-HCC) (present on admission)  Assessment & Plan  -History is vague but looks like he was drinking heavily in the past. Last drink was 8/5/2017.  -On thiamine and folate.       Hyperglycemia (present on  admission)  Assessment & Plan  -Resolved  -On adm: , A1c: 5.4  -He was started on ISS and hypoglycemia protocol which is discontinued.  CTM with accuchecks    Non-rheumatic mitral regurgitation  Assessment & Plan  -Moderate MR on ECHO

## 2017-08-18 NOTE — PROGRESS NOTES
Monitor Summary  A fib  w/ many jumps to 160's/170's  /08/    Note:  HR did not drop below 1 teens until after IV diltiazem administered per MAR.

## 2017-08-18 NOTE — PROGRESS NOTES
Assumed care of pt after receiving report from NOC nurse. A&O x 4, no c/o pain. No grimacing noted. Bed locked and in lowest position with controls on, upper bed rails up, treaded socks on, call light in place. Assessment completed.

## 2017-08-18 NOTE — PROGRESS NOTES
Assumed care at 1900. Bedside report received from Ariane. Patient's chart and MAR reviewed. Pt denies pain at this time. Pt is A & O 3, initially stating the year was 2016. Pt re-oriented w/out issue.  Patient was updated on plan of care for the day. Questions answered and concerns addressed.  Pt denies any additional needs at this time. White board updated. Call light, phone and personal belongings within reach.

## 2017-08-18 NOTE — PROGRESS NOTES
Cardiology Progress Note               Author: Vu Hale Date & Time created: 2017  4:45 PM     Interval History:      Consultation for atrial fib RVR, developed profound bradycardia with IV diltiazem    Admitted with progressive dyspnea, slurred speech ×1 week, left-sided facial droop ×1 week, found to be in A. fib, heart rate 120, acute kidney injury (Cr= 4.3) requiring dialysis    History of  hypertension, kidney stones, alcohol abuse, medical noncompliance    Labs reviewed  Na, K stable   Cr=3.16  EZF=2740    BP =128/69  HR =ATrial fib,  120-130, touched 170's       Echocardiogram 17, LVEF 25% moderate MR, severe aortic insufficiency, RVSP 60 mmHg        LOBO 8/10/17 aortic valve (left coronary cusp has a nodule calcification with poor coaptation) resulting in severe central eccentric aortic regurgitation    Review of Systems   Respiratory: Negative for cough and shortness of breath.    Cardiovascular: Negative for chest pain, palpitations, orthopnea, claudication, leg swelling and PND.   Swedish speaking only  Used interpretor service    Physical Exam   Constitutional: He is oriented to person, place, and time.   HENT:   Head: Normocephalic.   Neck: No JVD present. No thyromegaly present.   Cardiovascular: An irregularly irregular rhythm present. Tachycardia present.    Pulses:       Carotid pulses are 2+ on the right side, and 2+ on the left side.       Radial pulses are 2+ on the right side, and 2+ on the left side.   Pulmonary/Chest: He has no wheezes.   Abdominal: Soft.   Musculoskeletal: He exhibits no edema.   Neurological: He is alert and oriented to person, place, and time.   Skin: Skin is warm and dry.       Hemodynamics:  Temp (24hrs), Av.6 °C (97.9 °F), Min:36.3 °C (97.4 °F), Max:36.9 °C (98.4 °F)  Temperature: 36.7 °C (98.1 °F)  Pulse  Av  Min: 50  Max: 148   Blood Pressure: 117/76 mmHg     Respiratory:    Respiration: 16, Pulse Oximetry: 95 %        RUL Breath Sounds: Clear,  RML Breath Sounds: Clear, RLL Breath Sounds: Clear, SABAS Breath Sounds: Clear, LLL Breath Sounds: Clear  Fluids:  Date 08/18/17 0700 - 08/19/17 0659   Shift 6228-9235 2935-0287 9706-4996 24 Hour Total   I  N  T  A  K  E   Shift Total       O  U  T  P  U  T   Urine  120  120    Shift Total  120  120   Weight (kg) 62 62 62 62       Weight: 62 kg (136 lb 11 oz)  GI/Nutrition:  Orders Placed This Encounter   Procedures   • DIET ORDER     Standing Status: Standing      Number of Occurrences: 1      Standing Expiration Date:      Order Specific Question:  Diet:     Answer:  2 Gram Sodium [7]     Order Specific Question:  Diet:     Answer:  Cardiac [6]     Lab Results:  Recent Labs      08/17/17 2346   WBC  8.0   RBC  4.58*   HEMOGLOBIN  13.6*   HEMATOCRIT  42.0   MCV  91.7   MCH  29.7   MCHC  32.4*   RDW  49.0   PLATELETCT  221   MPV  10.5     Recent Labs      08/17/17   0928  08/17/17   2346  08/18/17   0919   SODIUM  135  133*  136   POTASSIUM  5.2  4.7  4.6   CHLORIDE  103  105  105   CO2  26  20  22   GLUCOSE  137*  93  155*   BUN  52*  59*  55*   CREATININE  3.21*  3.24*  3.01*   CALCIUM  9.0  8.9  8.8     Recent Labs      08/16/17   0547  08/16/17   0854  08/17/17   0928  08/17/17   2346  08/18/17   0919   APTT  53.2*  71.6*  59.2*   --   81.5*   INR  1.04   --   1.10  1.22*   --      Recent Labs      08/16/17   0854  08/17/17   0928  08/18/17   0919   BNPBTYPENAT  2034*  2039*  2211*     Recent Labs      08/16/17   0854  08/17/17   0928  08/18/17   0919   BNPBTYPENAT  2034*  2039*  2211*             Medical Decision Making, by Problem:  Active Hospital Problems    Diagnosis   • *Atrial fibrillation with RVR (CMS-[facility]) [I48.91]   • Cardiorenal syndrome [I13.10]   • Aortic regurgitation [I35.1]   • HFrEF (heart failure with reduced ejection fraction) (CMS-HCC) [I50.20]   • CVA (cerebral vascular accident) (CMS-HCC) [I63.9]   • TREASURE (acute kidney injury) (CMS-[facility]) [N17.9]   • Prolonged Q-T interval on ECG [R94.31]   •  Electrolyte abnormality [E87.8]   • Non-rheumatic mitral regurgitation [I34.0]   • Essential hypertension [I10]   • 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]   • Hyperglycemia [R73.9]       Plan:      Atrial fibrillation RVR, new diagnosis, on IV heparin, Coumadin, on Toprol- mg  , remains in RVR, D/W RN will continue with PRN  IV Metoprolol , asymptomatic      Severe cardiomyopathy, EF 25%, on hydralazine/Imdur       Severe aortic insufficiency by recent LOBO on 8/10/17, appreciate CTS, hold for surgery at this time secondary to new CVA with hemorrhage    Systolic heart failure, new, on furosemide 40, I/O= - 10,000 since admit, - 600 x 24 h , at baseline, no dyspnea, no LE edema    TREASURE, appreciate nephrology, secondary to cardiorenal syndrome    Hypertension, controlled 128/69    CVA, (MRI brain, acute infarction in left frontal periventricular white matter extending to subinsular cortex, punctate area of infarction in right posterior lobe, numerous microbleed        History of significant alcohol use      No plan for ischemic workup secondary to recent CVA and kidney injury    Medications reviewed and Labs reviewed

## 2017-08-18 NOTE — PROGRESS NOTES
Nephrology Progress Note, Adult, Complex               Author:Oni Avilesjjar Date & Time created: 8/18/2017  2:10 PM     Interval History:  46 y/o male with severe, CHF  -EF 25 %, Severe AI, A.fib, CVA in TREASURE, metabolic acidosis  Poor UOP  Doing well, no complaints  Events last 24h noted    Review of Systems:  Review of Systems   Constitutional: Positive for malaise/fatigue. Negative for fever and chills.   Respiratory: Negative for cough and hemoptysis.    Cardiovascular: Negative for chest pain, palpitations and orthopnea.   Gastrointestinal: Negative for heartburn, nausea, vomiting, abdominal pain and diarrhea.   Genitourinary: Negative for dysuria.   All other systems reviewed and are negative.      Physical Exam:  Physical Exam   Constitutional: He is oriented to person, place, and time. No distress.   HENT:   Head: Normocephalic and atraumatic.   Nose: Nose normal.   Eyes: Right eye exhibits no discharge. Left eye exhibits no discharge. No scleral icterus.   Neck: Neck supple. No JVD present. No tracheal deviation present. No thyromegaly present.   Cardiovascular: An irregularly irregular rhythm present. Exam reveals no gallop and no friction rub.    Pulmonary/Chest: Effort normal and breath sounds normal. No stridor. No respiratory distress. He has no wheezes.   Abdominal: Soft. Bowel sounds are normal. He exhibits no distension. There is no tenderness. There is no rebound and no guarding.   Musculoskeletal: He exhibits no edema or tenderness.   Trace pedal edema   Lymphadenopathy:     He has no cervical adenopathy.   Neurological: He is alert and oriented to person, place, and time. A cranial nerve deficit is present.   Skin: Skin is warm and dry. No rash noted. He is not diaphoretic. No erythema.   Psychiatric: He has a normal mood and affect. His behavior is normal.   Nursing note and vitals reviewed.      Labs:        Invalid input(s): [id number]  Recent Labs      08/16/17   0854  08/17/17   0954   17   BNPBTYPENAT  2034*  2039*  2211*     Recent Labs      17   0928  17   SODIUM  135  133*  136   POTASSIUM  5.2  4.7  4.6   CHLORIDE  103  105  105   CO2  26  20  22   BUN  52*  59*  55*   CREATININE  3.21*  3.24*  3.01*   MAGNESIUM   --   2.0   --    CALCIUM  9.0  8.9  8.8     Recent Labs      17   0854  17   0928  17   ALTSGPT  37  40   --   35   ASTSGOT  19  25   --   24   ALKPHOSPHAT  61  60   --   59   TBILIRUBIN  0.6  0.6   --   0.5   GLUCOSE  184*  137*  93  155*     Recent Labs      17   0547  17   0854  1728  17   RBC   --    --    --   4.58*   --    HEMOGLOBIN   --    --    --   13.6*   --    HEMATOCRIT   --    --    --   42.0   --    PLATELETCT   --    --    --   221   --    PROTHROMBTM  13.9   --   14.6  15.8*   --    APTT  53.2*  71.6*  59.2*   --   81.5*   INR  1.04   --   1.10  1.22*   --      Recent Labs      1754  17   WBC   --    --   8.0   --    ASTSGOT  19  25   --   24   ALTSGPT  37  40   --   35   ALKPHOSPHAT  61  60   --   59   TBILIRUBIN  0.6  0.6   --   0.5           Hemodynamics:  Temp (24hrs), Av.6 °C (97.9 °F), Min:36.3 °C (97.4 °F), Max:36.9 °C (98.4 °F)  Temperature: 36.7 °C (98.1 °F)  Pulse  Av  Min: 50  Max: 148   Blood Pressure: 117/76 mmHg     Respiratory:    Respiration: 16, Pulse Oximetry: 95 %        RUL Breath Sounds: Clear, RML Breath Sounds: Clear, RLL Breath Sounds: Clear, SABAS Breath Sounds: Clear, LLL Breath Sounds: Clear  Fluids:    Intake/Output Summary (Last 24 hours) at 17 1410  Last data filed at 17 2100   Gross per 24 hour   Intake      0 ml   Output    350 ml   Net   -350 ml     Weight: 62 kg (136 lb 11 oz)  GI/Nutrition:  Orders Placed This Encounter   Procedures   • DIET ORDER     Standing Status: Standing      Number of Occurrences: 1       Standing Expiration Date:      Order Specific Question:  Diet:     Answer:  2 Gram Sodium [7]     Order Specific Question:  Diet:     Answer:  Cardiac [6]     Medical Decision Making, by Problem:  Active Hospital Problems    Diagnosis   • *Atrial fibrillation with RVR (CMS-HCC) [I48.91]   • CVA (cerebral vascular accident) (CMS-HCC) [I63.9]   • Essential hypertension [I10]   • Respiratory failure (CMS-HCC) [J96.90]   • Electrolyte abnormality [E87.8]   • Hyperglycemia [R73.9]   • Prolonged Q-T interval on ECG [R94.31]   • Elevated troponin [R74.8]   • Acute on chronic systolic heart failure (CMS-HCC) [I50.23]   • Non-rheumatic mitral regurgitation [I34.0]   • TREASURE (acute kidney injury) (CMS-HCC) [N17.9]   • 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]       Labs reviewed and Medications reviewed    Central line in place: Dialysis                Assessment and Plan    1.TREASURE :sec to cardiorenal Syndrome, UO increasing,cr still elevated at 3.01 mg /dl  2.HTN: BP well controlled  3.Electrolytes: well controlled  4.Anemia: Hb WNL -to monitor  5.Metabolic acidosis -corrected with HD  6.Volume overloaded:improving with Lasix  7.CHF -cardiology following  Plan  no acute need for HD, reevaluate daily  Continue Lasix  Daily labs  Renal dose all meds  Avoid nephrotoxins  Prognosis guarded  24h urine for cr clearence pending

## 2017-08-18 NOTE — PROGRESS NOTES
Notified [facility] resident that pt was sustaining in a fib. from 150's - 160's for an hour, after the administration of Metoprolol SR (see MAR). Pt asymptomatic. Denies SOB, palpitations, chest pain. No new orders received at this time. Per [facility] resident, notify if pt becomes symptomatic. Will continue to monitor pt closely.

## 2017-08-18 NOTE — PROGRESS NOTES
Inpatient Anticoagulation Service Note    Date: 2017  Reason for Anticoagulation: Atrial Fibrillation, Stroke   [id number] VASc Score: 4    Hemoglobin Value: 13.6  Hematocrit Value: 42  Lab Platelet Value: 221  Target INR: 2.0 to 3.0    INR from last 7 days     Date/Time INR Value    17 2346 (!)1.22    17 0928 1.1    17 0547 1.04    08/15/17 0441 1.04    17 0855 1.06    17 0322 1.06    17 1240 1.04        Dose from last 7 days     Date/Time Dose (mg)    17 1100 7.5    17 1200 10    17 1000 5    08/15/17 1600 5    17 1100 5    17 0900 0        Average Dose (mg):  (new start)  Significant Interactions: Aspirin, Statin  Bridge Therapy: Yes (heparin weight based)  Bridge Therapy Start Date: 17  Days of Overlap Therapy: 4       Comments: INR beginning to increase following dose increase yesterday. CBC improved from previous. No active bleeding reported and no changes to drug interactions. Will not repeat 10 mg dose due to recent intracranial bleed but patient did not respond quickly to 5 mg daily - will plan to give 7.5 mg daily until INR closer to goal range.    Plan:  Warfarin 7.5 mg tonight. INR in AM.  Education Material Provided?: No  Pharmacist suggested discharge dosin-6 mg daily and check INR within 48 hours of discharge    Gordon Nassar, VitorD

## 2017-08-18 NOTE — PROGRESS NOTES
Cardiology Progress Note      ID:   47 year old male who was admitted for TREASURE, afib RVR s/p IV diltiazem, acute CVA, found to have severe AI, CHF EF 20%.       Interval Update:   Overnight tele : Afib 120 - 170. Patient was asymptomatic. IV diltiazem 10 mg inj and IV metoprolol push was given.   Currently MI ~110.  Kidney dysfunction not improving.   Denies chest pain, palpitation or dyspnea.       Physical Exam       Filed Vitals:    08/17/17 2330 08/18/17 0324 08/18/17 0800 08/18/17 1155   BP: 119/53 122/87 121/64 117/76   Pulse: 130 98 129 113   Temp: 36.4 °C (97.6 °F) 36.3 °C (97.4 °F) 36.9 °C (98.4 °F) 36.7 °C (98.1 °F)   Resp: 16 17 16    Height:       Weight:       SpO2: 99% 95% 98% 95%     Body mass index is 26.69 kg/(m^2). Weight: 62 kg (136 lb 11 oz)  Oxygen Therapy:  Pulse Oximetry: 95 %, O2 (LPM): 0, O2 Delivery: None (Room Air)    Physical Exam   Constitutional: He is oriented to person, place, and time. No distress.   Cardiovascular: Normal rate.    Irregularly irregular rhythm. Loud S2, EDM over aortic area.    Pulmonary/Chest: Effort normal and breath sounds normal. No respiratory distress.   Abdominal: Soft. Bowel sounds are normal. He exhibits no distension. There is no tenderness.   Musculoskeletal: He exhibits no edema.   Neurological: He is alert and oriented to person, place, and time.   Skin: He is not diaphoretic.   Nursing note and vitals reviewed.        Intake/Output Summary (Last 24 hours) at 08/18/17 1433  Last data filed at 08/17/17 2100   Gross per 24 hour   Intake      0 ml   Output    350 ml   Net   -350 ml         Lab Data Review:       Recent Labs      08/17/17   0928  08/17/17   2346  08/18/17   0919   SODIUM  135  133*  136   POTASSIUM  5.2  4.7  4.6   CHLORIDE  103  105  105   CO2  26  20  22   BUN  52*  59*  55*   CREATININE  3.21*  3.24*  3.01*   MAGNESIUM   --   2.0   --    CALCIUM  9.0  8.9  8.8       Recent Labs       08/16/17   0854  08/17/17   0928  08/17/17   2346  08/18/17 0919   ALTSGPT  37  40   --   35   ASTSGOT  19  25   --   24   ALKPHOSPHAT  61  60   --   59   TBILIRUBIN  0.6  0.6   --   0.5   GLUCOSE  184*  137*  93  155*       Recent Labs      08/16/17   0547  08/16/17   0854  08/17/17   0928  08/17/17   2346  08/18/17 0919   RBC   --    --    --   4.58*   --    HEMOGLOBIN   --    --    --   13.6*   --    HEMATOCRIT   --    --    --   42.0   --    PLATELETCT   --    --    --   221   --    PROTHROMBTM  13.9   --   14.6  15.8*   --    APTT  53.2*  71.6*  59.2*   --   81.5*   INR  1.04   --   1.10  1.22*   --        Recent Labs      08/16/17   0854  08/17/17 0928  08/17/17 2346 08/18/17 0919   WBC   --    --   8.0   --    ASTSGOT  19  25   --   24   ALTSGPT  37  40   --   35   ALKPHOSPHAT  61  60   --   59   TBILIRUBIN  0.6  0.6   --   0.5         Inpatient Medications :  Current Facility-Administered Medications   Medication Last Dose   • metoprolol SR (TOPROL XL) tablet 250 mg 250 mg at 08/18/17 0959   • hydrALAZINE (APRESOLINE) tablet 25 mg     • warfarin (COUMADIN) tablet 7.5 mg     • furosemide (LASIX) tablet 40 mg 40 mg at 08/18/17 0805   • isosorbide mononitrate SR (IMDUR) tablet 30 mg 30 mg at 08/18/17 0804   • MD ALERT... warfarin (COUMADIN) per pharmacy protocol     • atorvastatin (LIPITOR) tablet 40 mg 40 mg at 08/17/17 2109   • aspirin (ASA) chewable tab 81 mg 81 mg at 08/18/17 0803   • metoprolol (LOPRESSOR) injection 5 mg 5 mg at 08/17/17 2324   • senna-docusate (PERICOLACE or SENOKOT S) 8.6-50 MG per tablet 2 Tab 2 Tab at 08/18/17 0805    And   • polyethylene glycol/lytes (MIRALAX) PACKET 1 Packet      And   • magnesium hydroxide (MILK OF MAGNESIA) suspension 30 mL      And   • bisacodyl (DULCOLAX) suppository 10 mg     • Respiratory Care per Protocol     • glucose 4 g chewable tablet 16 g      And   • dextrose 50% (D50W) injection 25 mL     • heparin 1000 units/mL injection 2,200 Units 2,200  Units at 08/13/17 0544    And   • heparin infusion 25,000 units in 500 ml 0.45% nacl 800 Units/hr at 08/18/17 0900   • folic acid (FOLVITE) tablet 1 mg 1 mg at 08/18/17 0805   • thiamine (THIAMINE) tablet 100 mg 100 mg at 08/18/17 0804   • multivitamin (THERAGRAN) tablet 1 Tab 1 Tab at 08/18/17 0804   • heparin 1000 units/mL injection 3,000 Units 3,000 Units at 08/13/17 1545         Medications reviewed and Labs reviewed        DVT Prophylaxis: Warfarin (Coumadin)            Assessment/Plan     47 year old male who was admitted for TREASURE, afib RVR s/p IV diltiazem, acute CVA, found to have severe AI, CHF EF 20%.     Echo (8/10/2017) severely reduced LV systolic function, LV EF 20%, severe concentric LVH, severe central eccentric aortic regurgitation.   BNP 2211.     Kidney dysfunction: s/p hemodialysis, now on lasix, nephrology following.       Impression:   - Atrial fibrillation  - HFrEF  EF 20%  - Severe AI   - TREASURE vs CKD  - Acute CVA      Recommendation:   - change Lopressor to Toprol  mg qd.   - cannot give ACEI / ARB and Aldactone because of kidney dysfunction.   - continue hydralazine and Imdur for after-load reduction.      Increase Hydralazine to 25 mg tid, continue Imdur 30 mg qd.   - continue warfarin.  - continue ASA 81 mg qd and Lipitor 40 mg qhs.   - no plan for diagnostic cardiac cath at this point given patient's kidney dysfunction and he is probably not a candidate for CT surgery because of acute CVA.    - will follow.         Maria Magaña M.D.  PGY 2  Attending addendum/additions to follow.

## 2017-08-18 NOTE — HEART FAILURE PROGRAM
"Cardiovascular Nurse Navigator () Progress Note:     Please note this is a HF pt. Patient is newly diagnosed with HF, severe AI, AF and is uninsured. Pt resides in [place]. This [facility] has requested that the hospital schedulers arrange follow up for him at the [facility].     This [facility] has also placed an order for social work to see patient. He will require medications in hand upon discharge until he can get established with the [facility] at which time they can provide medications on a sliding scale fee.     It would also be a good idea for patient to begin application for medicaid given that in addition to the abovementioned diagnoses, patient has also suffered an acute CVA and has TREASURE vs CKD.    Pt must have an appointment scheduled within 7 days of discharge.      Bedside nursing to please provide patient with HF packet and begin teaching and documenting in education tab, each shift should teach sections until all are covered.     When completing the after visit summary (discharge instructions) please select \"Cardiac Diagnosis, and Heart Failure\" in the special instructions section so that the heart failure discharge instructions will populate.     Thank you and please call CHRIS Haile with any questions: [phone].   "

## 2017-08-19 LAB
ALBUMIN SERPL BCP-MCNC: 3.5 G/DL (ref 3.2–4.9)
ALBUMIN/GLOB SERPL: 1.3 G/DL
ALP SERPL-CCNC: 67 U/L (ref 30–99)
ALT SERPL-CCNC: 35 U/L (ref 2–50)
ANION GAP SERPL CALC-SCNC: 12 MMOL/L (ref 0–11.9)
ANION GAP SERPL CALC-SCNC: 6 MMOL/L (ref 0–11.9)
ANION GAP SERPL CALC-SCNC: 9 MMOL/L (ref 0–11.9)
APTT PPP: 113.9 SEC (ref 24.7–36)
APTT PPP: 115.8 SEC (ref 24.7–36)
APTT PPP: 85.9 SEC (ref 24.7–36)
AST SERPL-CCNC: 23 U/L (ref 12–45)
BILIRUB SERPL-MCNC: 0.5 MG/DL (ref 0.1–1.5)
BUN SERPL-MCNC: 57 MG/DL (ref 8–22)
BUN SERPL-MCNC: 59 MG/DL (ref 8–22)
BUN SERPL-MCNC: 63 MG/DL (ref 8–22)
CALCIUM SERPL-MCNC: 8.9 MG/DL (ref 8.5–10.5)
CALCIUM SERPL-MCNC: 8.9 MG/DL (ref 8.5–10.5)
CALCIUM SERPL-MCNC: 9.1 MG/DL (ref 8.5–10.5)
CHLORIDE SERPL-SCNC: 103 MMOL/L (ref 96–112)
CHLORIDE SERPL-SCNC: 104 MMOL/L (ref 96–112)
CHLORIDE SERPL-SCNC: 105 MMOL/L (ref 96–112)
CO2 SERPL-SCNC: 19 MMOL/L (ref 20–33)
CO2 SERPL-SCNC: 20 MMOL/L (ref 20–33)
CO2 SERPL-SCNC: 21 MMOL/L (ref 20–33)
COLLECT DURATION TIME UR: 24 HR
CREAT CL/1.73 SQ M ?TM UR+SERPL-ARVRAT: 21 ML/MIN (ref 97–137)
CREAT SERPL-MCNC: 3.16 MG/DL (ref 0.5–1.4)
CREAT SERPL-MCNC: 3.24 MG/DL (ref 0.5–1.4)
CREAT SERPL-MCNC: 3.28 MG/DL (ref 0.5–1.4)
CREAT SERPL-MCNC: 3.5 MG/DL (ref 0.5–1.4)
CREAT UR-MCNC: 48 MG/DL
GFR SERPL CREATININE-BSD FRML MDRD: 19 ML/MIN/1.73 M 2
GFR SERPL CREATININE-BSD FRML MDRD: 20 ML/MIN/1.73 M 2
GFR SERPL CREATININE-BSD FRML MDRD: 21 ML/MIN/1.73 M 2
GLOBULIN SER CALC-MCNC: 2.7 G/DL (ref 1.9–3.5)
GLUCOSE SERPL-MCNC: 218 MG/DL (ref 65–99)
GLUCOSE SERPL-MCNC: 84 MG/DL (ref 65–99)
GLUCOSE SERPL-MCNC: 85 MG/DL (ref 65–99)
INR PPP: 1.8 (ref 0.87–1.13)
INR PPP: 2.15 (ref 0.87–1.13)
POTASSIUM SERPL-SCNC: 4.7 MMOL/L (ref 3.6–5.5)
POTASSIUM SERPL-SCNC: 4.7 MMOL/L (ref 3.6–5.5)
POTASSIUM SERPL-SCNC: 4.8 MMOL/L (ref 3.6–5.5)
PROT SERPL-MCNC: 6.2 G/DL (ref 6–8.2)
PROTHROMBIN TIME: 21.4 SEC (ref 12–14.6)
PROTHROMBIN TIME: 24.7 SEC (ref 12–14.6)
SODIUM SERPL-SCNC: 131 MMOL/L (ref 135–145)
SODIUM SERPL-SCNC: 133 MMOL/L (ref 135–145)
SODIUM SERPL-SCNC: 135 MMOL/L (ref 135–145)
SPECIMEN VOL UR: 1850 ML
URINE CREATININE EXCRETED 1125: 888 MG/24 HR

## 2017-08-19 PROCEDURE — 700102 HCHG RX REV CODE 250 W/ 637 OVERRIDE(OP): Performed by: INTERNAL MEDICINE

## 2017-08-19 PROCEDURE — 700111 HCHG RX REV CODE 636 W/ 250 OVERRIDE (IP)

## 2017-08-19 PROCEDURE — 85730 THROMBOPLASTIN TIME PARTIAL: CPT | Mod: 91

## 2017-08-19 PROCEDURE — A9270 NON-COVERED ITEM OR SERVICE: HCPCS | Performed by: INTERNAL MEDICINE

## 2017-08-19 PROCEDURE — A9270 NON-COVERED ITEM OR SERVICE: HCPCS

## 2017-08-19 PROCEDURE — 99233 SBSQ HOSP IP/OBS HIGH 50: CPT | Mod: GC | Performed by: INTERNAL MEDICINE

## 2017-08-19 PROCEDURE — 700102 HCHG RX REV CODE 250 W/ 637 OVERRIDE(OP)

## 2017-08-19 PROCEDURE — 85610 PROTHROMBIN TIME: CPT | Mod: 91

## 2017-08-19 PROCEDURE — 700101 HCHG RX REV CODE 250: Performed by: NURSE PRACTITIONER

## 2017-08-19 PROCEDURE — 770020 HCHG ROOM/CARE - TELE (206)

## 2017-08-19 PROCEDURE — A9270 NON-COVERED ITEM OR SERVICE: HCPCS | Performed by: STUDENT IN AN ORGANIZED HEALTH CARE EDUCATION/TRAINING PROGRAM

## 2017-08-19 PROCEDURE — 700102 HCHG RX REV CODE 250 W/ 637 OVERRIDE(OP): Performed by: STUDENT IN AN ORGANIZED HEALTH CARE EDUCATION/TRAINING PROGRAM

## 2017-08-19 PROCEDURE — 36415 COLL VENOUS BLD VENIPUNCTURE: CPT

## 2017-08-19 PROCEDURE — 80053 COMPREHEN METABOLIC PANEL: CPT

## 2017-08-19 PROCEDURE — 700101 HCHG RX REV CODE 250: Performed by: STUDENT IN AN ORGANIZED HEALTH CARE EDUCATION/TRAINING PROGRAM

## 2017-08-19 PROCEDURE — 80048 BASIC METABOLIC PNL TOTAL CA: CPT

## 2017-08-19 RX ORDER — ISOSORBIDE MONONITRATE 30 MG/1
60 TABLET, EXTENDED RELEASE ORAL
Status: DISCONTINUED | OUTPATIENT
Start: 2017-08-19 | End: 2017-09-09 | Stop reason: HOSPADM

## 2017-08-19 RX ORDER — HYDRALAZINE HYDROCHLORIDE 50 MG/1
50 TABLET, FILM COATED ORAL EVERY 8 HOURS
Status: DISCONTINUED | OUTPATIENT
Start: 2017-08-19 | End: 2017-08-21

## 2017-08-19 RX ORDER — METOPROLOL TARTRATE 1 MG/ML
5 INJECTION, SOLUTION INTRAVENOUS EVERY 4 HOURS PRN
Status: DISCONTINUED | OUTPATIENT
Start: 2017-08-19 | End: 2017-09-07

## 2017-08-19 RX ADMIN — FUROSEMIDE 40 MG: 40 TABLET ORAL at 08:06

## 2017-08-19 RX ADMIN — HYDRALAZINE HYDROCHLORIDE 50 MG: 50 TABLET, FILM COATED ORAL at 13:44

## 2017-08-19 RX ADMIN — Medication 100 MG: at 08:06

## 2017-08-19 RX ADMIN — STANDARDIZED SENNA CONCENTRATE AND DOCUSATE SODIUM 2 TABLET: 8.6; 5 TABLET, FILM COATED ORAL at 08:06

## 2017-08-19 RX ADMIN — METOPROLOL SUCCINATE 250 MG: 50 TABLET, EXTENDED RELEASE ORAL at 08:05

## 2017-08-19 RX ADMIN — METOPROLOL TARTRATE 5 MG: 5 INJECTION INTRAVENOUS at 08:05

## 2017-08-19 RX ADMIN — FOLIC ACID 1 MG: 1 TABLET ORAL at 08:05

## 2017-08-19 RX ADMIN — HYDRALAZINE HYDROCHLORIDE 50 MG: 50 TABLET, FILM COATED ORAL at 21:25

## 2017-08-19 RX ADMIN — METOPROLOL TARTRATE 5 MG: 5 INJECTION INTRAVENOUS at 14:17

## 2017-08-19 RX ADMIN — ASPIRIN 81 MG: 81 TABLET, CHEWABLE ORAL at 08:05

## 2017-08-19 RX ADMIN — HYDRALAZINE HYDROCHLORIDE 25 MG: 25 TABLET, FILM COATED ORAL at 05:06

## 2017-08-19 RX ADMIN — METOPROLOL TARTRATE 5 MG: 5 INJECTION INTRAVENOUS at 21:27

## 2017-08-19 RX ADMIN — STANDARDIZED SENNA CONCENTRATE AND DOCUSATE SODIUM 2 TABLET: 8.6; 5 TABLET, FILM COATED ORAL at 21:00

## 2017-08-19 RX ADMIN — THERA TABS 1 TABLET: TAB at 08:06

## 2017-08-19 RX ADMIN — ISOSORBIDE MONONITRATE 60 MG: 30 TABLET ORAL at 08:05

## 2017-08-19 RX ADMIN — ATORVASTATIN CALCIUM 40 MG: 40 TABLET, FILM COATED ORAL at 21:25

## 2017-08-19 RX ADMIN — WARFARIN SODIUM 7.5 MG: 7.5 TABLET ORAL at 16:48

## 2017-08-19 RX ADMIN — HEPARIN SODIUM 800 UNITS/HR: 5000 INJECTION, SOLUTION INTRAVENOUS at 01:29

## 2017-08-19 ASSESSMENT — ENCOUNTER SYMPTOMS
CARDIOVASCULAR NEGATIVE: 1
HEARTBURN: 0
CONSTITUTIONAL NEGATIVE: 1
HEADACHES: 0
PND: 0
NAUSEA: 0
CHILLS: 0
VOMITING: 0
SEIZURES: 0
RESPIRATORY NEGATIVE: 1
FEVER: 0
SHORTNESS OF BREATH: 0
DIARRHEA: 0
GASTROINTESTINAL NEGATIVE: 1
DIZZINESS: 0
ORTHOPNEA: 0
HEMOPTYSIS: 0
MUSCULOSKELETAL NEGATIVE: 1
EYES NEGATIVE: 1
ABDOMINAL PAIN: 0
NEUROLOGICAL NEGATIVE: 1
COUGH: 0
PALPITATIONS: 0
CLAUDICATION: 0

## 2017-08-19 ASSESSMENT — PAIN SCALES - GENERAL
PAINLEVEL_OUTOF10: 0

## 2017-08-19 NOTE — PROGRESS NOTES
Inpatient Anticoagulation Service Note    Date: 8/19/2017  Reason for Anticoagulation: Atrial Fibrillation, Stroke   [id number] VASc Score: 4    Hemoglobin Value: 13.6  Hematocrit Value: 42  Lab Platelet Value: 221  Target INR: 2.0 to 3.0    INR from last 7 days     Date/Time INR Value    08/19/17 0226 (!)1.8    08/17/17 2346 (!)1.22    08/17/17 0928 1.1    08/16/17 0547 1.04    08/15/17 0441 1.04    08/14/17 0855 1.06    08/14/17 0322 1.06    08/13/17 1240 1.04        Dose from last 7 days     Date/Time Dose (mg)    08/19/17 1000 7.5    08/18/17 1100 7.5    08/17/17 1200 10    08/16/17 1000 5    08/15/17 1600 5    08/14/17 1100 5    08/13/17 0900 0        Average Dose (mg):  (new start)  Significant Interactions: Aspirin, Statin (MVI)  Bridge Therapy: Yes (heparin weight based)  Bridge Therapy Start Date: 08/14/17  Days of Overlap Therapy: 5  INR Value Greater than 2 Prior to Discontinuation of Parenteral Anticoagulation: Not Applicable     Reversal Agent Administered: Not Applicable  Comments: INR subtherapeutic but increased signficantly. Continue 7.5mg daily dosing and heparin bridge at this time. NNL to assess H/H; no indication of bleeding noted. New DDI identified with MVI as noted above.     Plan:  Warfarin 7.5mg with INR check tomorrow  Education Material Provided?: No  Pharmacist suggested discharge dosing: Warfarin 5-7.5mg PO daily with close f/u within 3 days       Delma Collier, PharmD.

## 2017-08-19 NOTE — CARE PLAN
Problem: Safety  Goal: Will remain free from injury  Outcome: PROGRESSING AS EXPECTED  Pt educated on the importance fall prevention methods, such as treaded socks. Ambulatory ability assessed, treaded socks in place, bed locked and in low position, frequent trips to bathroom offered, and call light and phone within reach.    Problem: Knowledge Deficit  Goal: Knowledge of the prescribed therapeutic regimen will improve  Outcome: PROGRESSING AS EXPECTED  Pt educated regarding plan of care and medications. All questions answered.

## 2017-08-19 NOTE — PROGRESS NOTES
Internal Medicine Interval Note    Name Bhavesh Saini       1970   Age/Sex 47 y.o. male   MRN [id number]   Code Status FULL.     After 5PM or if no immediate response to page, please call for cross-coverage  Attending/Team: Akil/Troy. See Patient List for primary contact information  Call [phone] to page    1st Call - Day Intern (R1):   Charlotte 2nd Call - Day Sr. Resident (R2/R3):   Fish         Reason for interval visit  (Principal Problem)   Atrial fibrillation with RVR (CMS-HCC)    Interval Problem Daily Status Update  (24 hours)   - Overnight, patient with HRs mostly in 120s-140s, peaks to 170.  Remained asymptomatic.    - No complaints this morning.  Denies chest pain, palpitations.    - Tolerating regular diet.  Ambulating around hospital floors.      Review of Systems   Constitutional: Negative for fever and chills.   Respiratory: Negative for cough.    Cardiovascular: Negative for chest pain and palpitations.   Gastrointestinal: Negative for abdominal pain.   Neurological: Negative for dizziness, seizures and headaches.       Consultants/Specialty  Cardiology  Cardiac Surgery  Nephrology    Disposition  Home    Quality Measures  Medications reviewed, Labs reviewed and Radiology images reviewed  Truong catheter: No Truong      DVT Prophylaxis: Warfarin (Coumadin) and Heparin                  Physical Exam       Filed Vitals:    17 0059 17 0354 17 0800 17 0805   BP: 140/92 127/87  128/69   Pulse: 109 101  155   Temp: 36.2 °C (97.1 °F) 36.1 °C (96.9 °F) 36.2 °C (97.2 °F)    Resp: 16 16 17    Height:       Weight:       SpO2: 96% 98% 100%      Body mass index is 27.47 kg/(m^2). Weight: 63.8 kg (140 lb 10.5 oz)  Oxygen Therapy:  Pulse Oximetry: 100 %, O2 (LPM): 0, O2 Delivery: None (Room Air)    Physical Exam   Constitutional:   Sitting up, in no apparent distress.  Alert and oriented to person, place, time, and situation.     Eyes: Pupils are equal, round, and  reactive to light.   Cardiovascular:   Irregularly, irregular rhythm.     Pulmonary/Chest: Effort normal and breath sounds normal. No respiratory distress.   Abdominal: Soft. Bowel sounds are normal. He exhibits no distension. There is no tenderness.   Psychiatric: Affect normal.     Lab Data Review:       8/19/2017  11:12 AM    Recent Labs      08/17/17 2346 08/18/17 0919 08/19/17 0226 08/19/17   0836   SODIUM  133*  136  133*  135   POTASSIUM  4.7  4.6  4.7  4.7   CHLORIDE  105  105  103  104   CO2  20  22  21  19*   BUN  59*  55*  59*  57*   CREATININE  3.24*  3.01*  3.50*  3.16*   MAGNESIUM  2.0   --    --    --    CALCIUM  8.9  8.8  9.1  8.9       Recent Labs      08/17/17 0928 08/18/17 0919 08/19/17 0226 08/19/17   0836   ALTSGPT  40   --   35   --   35   ASTSGOT  25   --   24   --   23   ALKPHOSPHAT  60   --   59   --   67   TBILIRUBIN  0.6   --   0.5   --   0.5   GLUCOSE  137*   < >  155*  84  218*    < > = values in this interval not displayed.       Recent Labs      08/17/17 0928 08/17/17 2346 08/18/17 0919 08/19/17 0226 08/19/17   0836   RBC   --   4.58*   --    --    --    HEMOGLOBIN   --   13.6*   --    --    --    HEMATOCRIT   --   42.0   --    --    --    PLATELETCT   --   221   --    --    --    PROTHROMBTM  14.6  15.8*   --   21.4*   --    APTT  59.2*   --   81.5*   --   113.9*   INR  1.10  1.22*   --   1.80*   --        Recent Labs      08/17/17 0928 08/17/17 2346 08/18/17 0919 08/19/17   0836   WBC   --   8.0   --    --    ASTSGOT  25   --   24  23   ALTSGPT  40   --   35  35   ALKPHOSPHAT  60   --   59  67   TBILIRUBIN  0.6   --   0.5  0.5           Assessment/Plan     * Atrial fibrillation with RVR (CMS-HCC) (present on admission)  Assessment & Plan  -New onset vs paroxysmal (vague PMH of afib and non-compliance with meds)  -overnight He was given diltiazem by NF due to Afib with nonsustained HR in 160s. - now in SR  -CHADS2: 4  -ECHO: severe LVH, global  hypokinesis, EF ~20%, severe AI and mod MR  -LOBO: slow blood flow but no thrombus. Severe AI    Plan  - Na restricted diet  - Continue scheduled metoprolol, PRN 5mg metoprolol q6h.    - If HR < 50 or > 120s , is PERSISTENT and SYMPTOMATIC, will administer diltiazem 10 mg.   -Cardioversion will likely be done during AV replacement.     CVA (cerebral vascular accident) (CMS-HCC) (present on admission)  Assessment & Plan  -h/o left facial droop, slurred speech and left sided weakness. However no neuro deficits currently  -CT: cerebral atrophy and small vessel ischemic changes   -MRI brain: acute infarction in L frontal periventricular white matter extendeing to subinsular cortex. Punctate area of infarction in R posterior lobe. Numerous microbleeds.   Assessment: Afib could have contributed (LOBO showed 'sludge' in the heart)  Plan  -ASA, statin  -Mx of Afib as stated above.    TREASURE (acute kidney injury) (CMS-HCC) (present on admission)  Assessment & Plan  -Acute on chronic. Likely pre-renal vs cardiorenal. HTN is likely the cause of CKD  -On adm: Cr: 4.3, AGMA, Ph 6.1. Unknown baseline.  -FeNa <1%  -H/o renal disease since 7yrs, FH kidney disease in his father. Has been non-complaint with HTN medications. - has CHF  -Renal US: no hydronephrosis and calculi. Left renal cysts.  - Cr and K slowly increasing    Plan  -Nephrology on board - no HD for now.  Awaiting recommendations re: need for/frequency of HD.      HFrEF (heart failure with reduced ejection fraction) (CMS-HCC) (present on admission)  Assessment & Plan  -Presented with SOB, orthopnea, fatique and palpitations. Has h/o HTN and alcoholism in the past. On exam: JVD +., murmurs + but no volume overload.   -BNP 2203 >> 1753 > 2211.   -CXR: cardiomegaly. ECHO: severe LVH, global hypokinesis, EF ~20%, severe AI and mod MR  -Assessment: Compensated HF likely due to alcoholism vs tachycardia induced heart failure.   -Cardiac surgery spoke with Mr Saini and family,  he needs ischemic work up and AVR/MAZE w LOBO, but due to his renal status and recent stroke with hemorrhage, they do not recommend it at this time.   - Patient's undocumented yamileth status is another reason having a heart cath is diifficult.      Plan  - Na restricted diet.   - Patient does have follow up appointment with Dr. Gutierrez, Cardiology, on September 11.  - Patient unable to move back to [place] for another 3-4 months due to finances.  Will coordinate with Social Work to see if this can be expedited.        Aortic regurgitation (present on admission)  Assessment & Plan  -Severe AR.   -ECHO:  severe LVH, global hypokinesis, EF ~20%, severe AI and mod MR. Findings were confirmed by LOBO  -Fits the criteria for valve replacement. Cardiology and cardiac surgery on board.    Cardiorenal syndrome  Assessment & Plan  -Diagnosis given by Nephrology - likely since urine Na is low  -Patient is on HD, and is urinated <500 ml yesterday.  -Creatinine in ED 4.38, Total protein Urine 208. Estimated GFR was 14.   -FENA 0.2%  -BNP 2203>> 2039   -CXR: cardiomegaly. ECHO showed: severe LVH, global hypokinesis, EF ~20%, AR and mod MR  Assessment: Cardiorenal syndrome  Plan  -Patient needs AVR/MAZE w LOBO per Cardiac Surgery, but due to his renal status and recent stroke with hemorrhage, they do not recommend at this time.  They will follow up outpatient in 6 weeks.   -Patient will be counseled on cessation of alcohol intake.   -Nephrology on board.         Electrolyte abnormality (present on admission)  Assessment & Plan  -Electrolytes wnl today.     Prolonged Q-T interval on ECG (present on admission)  Assessment & Plan  -QTc ~530s. Will avoid QTc prolonging meds.    Essential hypertension (present on admission)  Assessment & Plan  -PMH of HTN but likely non-compliant. ECHO showed severe LVH  -Stable on BB, hydralazine, nitrate, lasix     Alcoholic (CMS-HCC) (present on admission)  Assessment & Plan  -History is vague but looks  like he was drinking heavily in the past. Last drink was 8/5/2017.  -On thiamine and folate.       Hyperglycemia (present on admission)  Assessment & Plan  -Resolved  -On adm: , A1c: 5.4  -He was started on ISS and hypoglycemia protocol which is discontinued.  CTM with accuchecks    Non-rheumatic mitral regurgitation  Assessment & Plan  -Moderate MR on ECHO

## 2017-08-19 NOTE — PROGRESS NOTES
Monitor Summary:  Afib 95 - 124  HR hit 180, non sustained. Pt continues to be asymptomatic throughout shift.   -/.08/-    Per MD, notify team if pt sustains over 120 HR and is symptomatic.

## 2017-08-19 NOTE — PROGRESS NOTES
Notified by monitor room, patient has been sustaining above 120 for greater than 10 minutes. Checked monitor. Patient sustaining 140's, touching into 160's. Will give PRN dose IV metoprolol.

## 2017-08-19 NOTE — PROGRESS NOTES
Internal Medicine Medical Student Note    Name Bhavesh Saini       1970   Age/Sex 47 y.o. male   MRN [id number]   Code Status Full      After 5PM or if no immediate response to page, please call for cross-coverage  Attending/Team: Akil/Oniel  See Patient List for primary contact information  Call [phone] to page after hours   1st Call - Day Intern (R1):   Charlotte  2nd Call - Day Sr. Resident (R2/R3):   Manuel          Reason for interval visit  (Principal Problem)   Atrial fibrillation with RVR (CMS-[facility])    Interval Problem Daily Status Update  (24 hours)   Afib w/ RVR - -170's asymptomatic - no dilt required (to be given only >120 w/ symptoms); lopressor -->Toprol XL 250mg qd per cards; continue ASA 81mg, atorvastatin 40mg, coumadin 7.5mg (INR 1.8 today); metoprolol 5mg q6h prn  HFrEF - asymptomatic;  continue Toprol XL; increased hydralazine 10-->25-->50mg tid for afterload reduction + Imdur 30mg qd for preload/afterload reduction; no plan for stress test or cardiac cath given renal dysfunction/CVA per cards; pt plans to return home to [place] for AVR/possible MAZE procedure   TREASURE on CKD - Cr 3.5 today; UA shows proteinuria (100) - likely chronic nephrolithiasis from DM/HTN;  24hr urine Cr clearance ~20ml/min; no need for HD today per neph  Discharge barriers - pt does not qualify for medicaid - wishes to return to [place] for elective valve repair but will have to wait 4-5 months in order to save enough money to do so; pt can receive medications from [facility] in [place] in meantime; SW to assist with discharge meds and expediting return to [place]?; pending cards recs for discharge meds; pending nephro recs for outpt CKD mgmt (currently HD TTS)     Review of Systems   Constitutional: Negative.    HENT: Negative.    Eyes: Negative.    Respiratory: Negative.    Cardiovascular: Negative.    Gastrointestinal: Negative.    Genitourinary: Negative.    Musculoskeletal: Negative.    Skin:  Negative.    Neurological: Negative.    Endo/Heme/Allergies: Negative.                Physical Exam       Filed Vitals:    08/19/17 0059 08/19/17 0354 08/19/17 0800 08/19/17 0805   BP: 140/92 127/87  128/69   Pulse: 109 101  155   Temp: 36.2 °C (97.1 °F) 36.1 °C (96.9 °F) 36.2 °C (97.2 °F)    Resp: 16 16 17    Height:       Weight:       SpO2: 96% 98% 100%      Body mass index is 27.47 kg/(m^2). Weight: 63.8 kg (140 lb 10.5 oz)  Oxygen Therapy:  Pulse Oximetry: 100 %, O2 (LPM): 0, O2 Delivery: None (Room Air)    Physical Exam   Constitutional: He is oriented to person, place, and time and well-developed, well-nourished, and in no distress.   HENT:   Head: Normocephalic and atraumatic.   Eyes: Conjunctivae and EOM are normal.   Neck: Normal range of motion.   Cardiovascular: Intact distal pulses.    Tachycardic, irregularly irregular rhythm    Pulmonary/Chest: Effort normal and breath sounds normal. No respiratory distress. He has no wheezes. He has no rales.   Abdominal: Soft. Bowel sounds are normal. He exhibits no distension. There is no tenderness. There is no rebound and no guarding.   Musculoskeletal: He exhibits no edema.   Neurological: He is alert and oriented to person, place, and time.   Skin: Skin is warm and dry.             Assessment/Plan     46yo M with h/o alcoholism and HTN noncompliant with meds presented with afib w/ RVR. Found to have HFrEF (20%), TREASURE on CKD, cerebrovascular disease.     Afib with RVR  -New onset vs paroxysmal (vague PMH of afib and non-compliance with meds) vs persistent (if nonremitting >7 days)   -continued rate control failure on BBL- asymptomatic tachy chase problems 120-170's overnight  -CHADS2: 4  -ECHO: severe LVH, global hypokinesis, EF ~20%, severe AI and mod MR  -LOBO: slow blood flow but no thrombus. Severe AI  -INR 1.8 today    Plan:   -switch Lopressor 75mg TID-->Toprol XL 250mg qd per cards  -continue metoprolol 5mg q6h PRN   -diltiazem only for persistent HR >120  with symptoms (avoid CCB with EF 20%)  -continue anticoagulation on warfarin and UF    HFrEF   -likely decompensated hypertensive vs tachycardia induced vs alcoholic cardiomyopathy vs calcific aortic valve disease (LOBO shows left coronary cusp with nodule calcification resulting in severe central eccentric aortic regurg)      -JVD +., murmurs + but no volume overload.    -BNP 2203 >> 1753 > 2211   -CXR: cardiomegaly. ECHO: severe LVH, global hypokinesis, EF ~20%, severe AI and mod MR  -Cardiac surgery spoke with Mr Saini and family, he needs ischemic work up and AVR/MAZE w LOBO, but due to his renal status and recent stroke with hemorrhage, they do not recommend it at this time.       Plan:  - Na restricted diet.    -continue to follow cardiology recommendations   -pt plans to move back to [place] in 4-5mo once he has saved enough money to undergo elective valve repair   -continue lasix per nephrology     TREASURE on CKD  -likely pre-renal vs cardiorenal on CKD (h/o HTN noncompliant with meds)  -On adm: Cr: 3.5 today - Unknown baseline.  -FeNa <1%  -Renal US: no hydronephrosis and calculi. Left renal cysts.    Plan  -Nephrology on board - no HD for now.  Awaiting recommendations re: need for/frequency of HD.    -continue to monitor K     CVA   -possibly related to afib (LOBO showed sludge in heart) vs HTN  -h/o left facial droop, slurred speech and left sided weakness. However no neuro deficits currently  -CT: cerebral atrophy and small vessel ischemic changes    -MRI brain: acute infarction in L frontal periventricular white matter extendeing to subinsular cortex. Punctate area of infarction in R posterior lobe. Numerous microbleeds.      Plan:  -ASA, statin    Essential hypertension   -PMH of HTN but likely non-compliant. ECHO showed severe LVH  -Stable (-140)    Plan:   -continue hydralazine, imdur, BBL, lasix     Disposition:   Inpatient for afib medication optimization and HD.

## 2017-08-19 NOTE — PROGRESS NOTES
Monitor Summary  Afib 101-140  Numerous peaks in 120-170 range throughout the night; pt remained asymptomatic  /10/

## 2017-08-19 NOTE — CARE PLAN
Problem: Mobility  Goal: Risk for activity intolerance will decrease  Intervention: Encourage patient to increase activity level in collaboration with Interdisciplinary Team  Pt ambulated earlier; up-self. Steady gait. No s/sx of shortness of breath or dizziness exhibited.

## 2017-08-19 NOTE — PROGRESS NOTES
Nephrology Progress Note, Adult, Complex               Author:Oni Najjar Date & Time created: 8/19/2017  2:00 PM     Interval History:  48 y/o male with severe, CHF  -EF 25 %, Severe AI, A.fib, CVA in TREASURE, metabolic acidosis  Poor UOP  Doing well, no complaints  Events last 24h noted    Review of Systems:  Review of Systems   Constitutional: Positive for malaise/fatigue. Negative for fever and chills.   Respiratory: Negative for cough and hemoptysis.    Cardiovascular: Negative for chest pain, palpitations and orthopnea.   Gastrointestinal: Negative for heartburn, nausea, vomiting, abdominal pain and diarrhea.   Genitourinary: Negative for dysuria.   All other systems reviewed and are negative.      Physical Exam:  Physical Exam   Constitutional: He is oriented to person, place, and time. No distress.   HENT:   Head: Normocephalic and atraumatic.   Nose: Nose normal.   Eyes: Right eye exhibits no discharge. Left eye exhibits no discharge. No scleral icterus.   Neck: Neck supple. No JVD present. No tracheal deviation present. No thyromegaly present.   Cardiovascular: An irregularly irregular rhythm present. Exam reveals no gallop and no friction rub.    Pulmonary/Chest: Effort normal and breath sounds normal. No stridor. No respiratory distress. He has no wheezes.   Abdominal: Soft. Bowel sounds are normal. He exhibits no distension. There is no tenderness. There is no rebound and no guarding.   Musculoskeletal: He exhibits no edema or tenderness.   Trace pedal edema   Lymphadenopathy:     He has no cervical adenopathy.   Neurological: He is alert and oriented to person, place, and time. A cranial nerve deficit is present.   Skin: Skin is warm and dry. No rash noted. He is not diaphoretic. No erythema.   Psychiatric: He has a normal mood and affect. His behavior is normal.   Nursing note and vitals reviewed.      Labs:        Invalid input(s): [id number]  Recent Labs      08/17/17   0928  08/18/17   0919    BNPBTYPENAT  2039*  2211*     Recent Labs      17   0836   SODIUM  133*  136  133*  135   POTASSIUM  4.7  4.6  4.7  4.7   CHLORIDE  105  105  103  104   CO2  20  22  21  19*   BUN  59*  55*  59*  57*   CREATININE  3.24*  3.01*  3.50*  3.16*   MAGNESIUM  2.0   --    --    --    CALCIUM  8.9  8.8  9.1  8.9     Recent Labs      17   0836   ALTSGPT  40   --   35   --   35   ASTSGOT  25   --   24   --   23   ALKPHOSPHAT  60   --   59   --   67   TBILIRUBIN  0.6   --   0.5   --   0.5   GLUCOSE  137*   < >  155*  84  218*    < > = values in this interval not displayed.     Recent Labs      1736   RBC   --   4.58*   --    --    --    HEMOGLOBIN   --   13.6*   --    --    --    HEMATOCRIT   --   42.0   --    --    --    PLATELETCT   --   221   --    --    --    PROTHROMBTM  14.6  15.8*   --   21.4*   --    APTT  59.2*   --   81.5*   --   113.9*   INR  1.10  1.22*   --   1.80*   --      Recent Labs      17   0836   WBC   --   8.0   --    --    ASTSGOT  25   --   24  23   ALTSGPT  40   --   35  35   ALKPHOSPHAT  60   --   59  67   TBILIRUBIN  0.6   --   0.5  0.5           Hemodynamics:  Temp (24hrs), Av.2 °C (97.2 °F), Min:36.1 °C (96.9 °F), Max:36.5 °C (97.7 °F)  Temperature: 36.1 °C (96.9 °F)  Pulse  Av  Min: 50  Max: 155   Blood Pressure: 105/70 mmHg     Respiratory:    Respiration: 16, Pulse Oximetry: 96 %        RUL Breath Sounds: Clear, RML Breath Sounds: Clear, RLL Breath Sounds: Diminished, SABAS Breath Sounds: Clear, LLL Breath Sounds: Diminished  Fluids:    Intake/Output Summary (Last 24 hours) at 17 1400  Last data filed at 17 0100   Gross per 24 hour   Intake    120 ml   Output    720 ml   Net   -600 ml     Weight: 63.8 kg (140 lb 10.5  oz)  GI/Nutrition:  Orders Placed This Encounter   Procedures   • DIET ORDER     Standing Status: Standing      Number of Occurrences: 1      Standing Expiration Date:      Order Specific Question:  Diet:     Answer:  2 Gram Sodium [7]     Order Specific Question:  Diet:     Answer:  Cardiac [6]     Medical Decision Making, by Problem:  Active Hospital Problems    Diagnosis   • *Atrial fibrillation with RVR (CMS-HCC) [I48.91]   • CVA (cerebral vascular accident) (CMS-HCC) [I63.9]   • Essential hypertension [I10]   • Respiratory failure (CMS-HCC) [J96.90]   • Electrolyte abnormality [E87.8]   • Hyperglycemia [R73.9]   • Prolonged Q-T interval on ECG [R94.31]   • Elevated troponin [R74.8]   • Acute on chronic systolic heart failure (CMS-HCC) [I50.23]   • Non-rheumatic mitral regurgitation [I34.0]   • TREASURE (acute kidney injury) (CMS-HCC) [N17.9]   • 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]       Labs reviewed and Medications reviewed    Central line in place: Dialysis                Assessment and Plan    1.TREASURE :sec to cardiorenal Syndrome, UO increasing,cr still elevated at 3.01 mg /dl, but 24 h urine showed cr clearance ~20ml/min  2.HTN: BP well controlled  3.Electrolytes: well controlled  4.Anemia: Hb WNL -to monitor  5.Metabolic acidosis -corrected with HD  6.Volume overloaded:improving with Lasix  7.CHF -cardiology following  Plan  no acute need for HD, reevaluate daily  Continue Lasix  Daily labs  Renal dose all meds  Avoid nephrotoxins  Prognosis guarded  Remove HD cath

## 2017-08-19 NOTE — PROGRESS NOTES
hSauna Downing Fall Risk Assessment:     Last Known Fall: No falls  Mobility: No limitations  Medications: Cardiovascular or central nervous system meds  Mental Status/LOC/Awareness: Awake, alert, and oriented to date, place, and person  Toileting Needs: No needs  Volume/Electrolyte Status: No problems  Communication/Sensory: Non-English patient/unable to speak/slurred speech  Behavior: Appropriate behavior  Shauna Downing Fall Risk Total: 5  Fall Risk Level: NO RISK    Universal Fall Precautions:  call light/belongings in reach, bed in low position and locked, siderails up x 2, wheelchairs and assistive devices out of sight, use non-slip footwear, adequate lighting, clutter free and spill free environment, educate on level of risk, educate to call for assistance    Fall Risk Level Interventions:   TRIAL (TELE 8, NEURO, MED AZALEA 5) Low Fall Risk Interventions  Place yellow fall risk ID band on patient: verified  Provide patient/family education based on risk assessment: completed  Educate patient/family to call staff for assistance when getting out of bed: completed  Place fall precaution signage outside patient door: verified      Patient Specific Interventions:     Medication: not applicable  Mental Status/LOC/Awareness: check on patient hourly  Toileting: not applicable  Volume/Electrolyte Status: ensure patient remains hydrated  Communication/Sensory: update plan of care on whiteboard and provide communication alternatives/  Behavioral: not applicable  Mobility: ensure bed is locked and in lowest position

## 2017-08-19 NOTE — PROGRESS NOTES
Assumed care at 1900. Bedside report received from Mamie. Patient's chart and MAR reviewed. Pt denies pain at this time. Pt is A & O x 3, disoriented to the year (2016).  Pt in Afib w/ RVR, but denies chest pain and does not seem to notice when his HR increases.  Patient was updated on plan of care for the day. Questions answered and concerns addressed.  Pt denies any additional needs at this time. White board updated. Call light, phone and personal belongings within reach.

## 2017-08-19 NOTE — CARE PLAN
Problem: Acute Care of the Heart Failure Patient  Goal: Optimal Outcome for the HF Patient  Intervention: Medication management (Aspirin, Beta Blockers, Diuretics, ACE, ARBs, Statins, Anticoagulants)  Pt was educated on medication indications and therapeutic effect(s).

## 2017-08-20 ENCOUNTER — PATIENT OUTREACH (OUTPATIENT)
Dept: HEALTH INFORMATION MANAGEMENT | Facility: OTHER | Age: 47
End: 2017-08-20

## 2017-08-20 LAB
ALBUMIN SERPL BCP-MCNC: 3.6 G/DL (ref 3.2–4.9)
ALBUMIN/GLOB SERPL: 1.3 G/DL
ALP SERPL-CCNC: 66 U/L (ref 30–99)
ALT SERPL-CCNC: 32 U/L (ref 2–50)
ANION GAP SERPL CALC-SCNC: 9 MMOL/L (ref 0–11.9)
APTT PPP: 72.4 SEC (ref 24.7–36)
AST SERPL-CCNC: 21 U/L (ref 12–45)
BILIRUB SERPL-MCNC: 0.5 MG/DL (ref 0.1–1.5)
BUN SERPL-MCNC: 58 MG/DL (ref 8–22)
CALCIUM SERPL-MCNC: 9.1 MG/DL (ref 8.5–10.5)
CHLORIDE SERPL-SCNC: 106 MMOL/L (ref 96–112)
CO2 SERPL-SCNC: 18 MMOL/L (ref 20–33)
CREAT SERPL-MCNC: 2.98 MG/DL (ref 0.5–1.4)
GFR SERPL CREATININE-BSD FRML MDRD: 23 ML/MIN/1.73 M 2
GLOBULIN SER CALC-MCNC: 2.7 G/DL (ref 1.9–3.5)
GLUCOSE SERPL-MCNC: 126 MG/DL (ref 65–99)
POTASSIUM SERPL-SCNC: 4.9 MMOL/L (ref 3.6–5.5)
PROT SERPL-MCNC: 6.3 G/DL (ref 6–8.2)
SODIUM SERPL-SCNC: 133 MMOL/L (ref 135–145)

## 2017-08-20 PROCEDURE — A9270 NON-COVERED ITEM OR SERVICE: HCPCS | Performed by: INTERNAL MEDICINE

## 2017-08-20 PROCEDURE — 700102 HCHG RX REV CODE 250 W/ 637 OVERRIDE(OP): Performed by: INTERNAL MEDICINE

## 2017-08-20 PROCEDURE — 99233 SBSQ HOSP IP/OBS HIGH 50: CPT | Mod: GC | Performed by: INTERNAL MEDICINE

## 2017-08-20 PROCEDURE — A9270 NON-COVERED ITEM OR SERVICE: HCPCS | Performed by: STUDENT IN AN ORGANIZED HEALTH CARE EDUCATION/TRAINING PROGRAM

## 2017-08-20 PROCEDURE — 85730 THROMBOPLASTIN TIME PARTIAL: CPT

## 2017-08-20 PROCEDURE — A9270 NON-COVERED ITEM OR SERVICE: HCPCS

## 2017-08-20 PROCEDURE — 36415 COLL VENOUS BLD VENIPUNCTURE: CPT

## 2017-08-20 PROCEDURE — 700102 HCHG RX REV CODE 250 W/ 637 OVERRIDE(OP)

## 2017-08-20 PROCEDURE — 700111 HCHG RX REV CODE 636 W/ 250 OVERRIDE (IP)

## 2017-08-20 PROCEDURE — 700101 HCHG RX REV CODE 250: Performed by: NURSE PRACTITIONER

## 2017-08-20 PROCEDURE — 80053 COMPREHEN METABOLIC PANEL: CPT

## 2017-08-20 PROCEDURE — 770020 HCHG ROOM/CARE - TELE (206)

## 2017-08-20 PROCEDURE — 700102 HCHG RX REV CODE 250 W/ 637 OVERRIDE(OP): Performed by: STUDENT IN AN ORGANIZED HEALTH CARE EDUCATION/TRAINING PROGRAM

## 2017-08-20 RX ADMIN — THERA TABS 1 TABLET: TAB at 08:38

## 2017-08-20 RX ADMIN — Medication 100 MG: at 08:39

## 2017-08-20 RX ADMIN — HEPARIN SODIUM 25000 UNITS: 5000 INJECTION, SOLUTION INTRAVENOUS at 14:43

## 2017-08-20 RX ADMIN — WARFARIN SODIUM 7.5 MG: 7.5 TABLET ORAL at 17:59

## 2017-08-20 RX ADMIN — ISOSORBIDE MONONITRATE 60 MG: 30 TABLET ORAL at 08:38

## 2017-08-20 RX ADMIN — METOPROLOL TARTRATE 5 MG: 5 INJECTION INTRAVENOUS at 01:49

## 2017-08-20 RX ADMIN — STANDARDIZED SENNA CONCENTRATE AND DOCUSATE SODIUM 2 TABLET: 8.6; 5 TABLET, FILM COATED ORAL at 08:40

## 2017-08-20 RX ADMIN — ATORVASTATIN CALCIUM 40 MG: 40 TABLET, FILM COATED ORAL at 20:35

## 2017-08-20 RX ADMIN — FUROSEMIDE 40 MG: 40 TABLET ORAL at 08:38

## 2017-08-20 RX ADMIN — HYDRALAZINE HYDROCHLORIDE 50 MG: 50 TABLET, FILM COATED ORAL at 14:35

## 2017-08-20 RX ADMIN — STANDARDIZED SENNA CONCENTRATE AND DOCUSATE SODIUM 2 TABLET: 8.6; 5 TABLET, FILM COATED ORAL at 20:39

## 2017-08-20 RX ADMIN — HYDRALAZINE HYDROCHLORIDE 50 MG: 50 TABLET, FILM COATED ORAL at 23:10

## 2017-08-20 RX ADMIN — ASPIRIN 81 MG: 81 TABLET, CHEWABLE ORAL at 08:38

## 2017-08-20 RX ADMIN — METOPROLOL SUCCINATE 250 MG: 50 TABLET, EXTENDED RELEASE ORAL at 08:40

## 2017-08-20 RX ADMIN — HYDRALAZINE HYDROCHLORIDE 50 MG: 50 TABLET, FILM COATED ORAL at 06:25

## 2017-08-20 RX ADMIN — FOLIC ACID 1 MG: 1 TABLET ORAL at 08:38

## 2017-08-20 ASSESSMENT — ENCOUNTER SYMPTOMS
CLAUDICATION: 0
PND: 0
PALPITATIONS: 0
VOMITING: 0
SHORTNESS OF BREATH: 0
DIZZINESS: 0
FEVER: 0
DIARRHEA: 0
NAUSEA: 0
CHILLS: 0
COUGH: 0
HEMOPTYSIS: 0
HEARTBURN: 0
SEIZURES: 0
ORTHOPNEA: 0
HEADACHES: 0
ABDOMINAL PAIN: 0

## 2017-08-20 ASSESSMENT — PAIN SCALES - GENERAL
PAINLEVEL_OUTOF10: 0

## 2017-08-20 NOTE — PROGRESS NOTES
Cardiology Progress Note               Author: Vu Hale Date & Time created: 2017  1:07 PM     Interval History:      Consultation for atrial fib RVR, developed profound bradycardia with IV diltiazem    Admitted with progressive dyspnea, slurred speech ×1 week, left-sided facial droop ×1 week, found to be in A. fib, heart rate 120, acute kidney injury (Cr= 4.3) requiring dialysis    History of  hypertension, kidney stones, alcohol abuse, medical noncompliance    Labs reviewed  Na, K stable   Cr=2.98  LHY=3394 17     BP =103/60  HR =ATrial fib,  120-130, touched 170's       Echocardiogram 17, LVEF 25% moderate MR, severe aortic insufficiency, RVSP 60 mmHg        LOBO 8/10/17 aortic valve (left coronary cusp has a nodule calcification with poor coaptation) resulting in severe central eccentric aortic regurgitation    Review of Systems   Respiratory: Negative for cough and shortness of breath.    Cardiovascular: Negative for chest pain, palpitations, orthopnea, claudication, leg swelling and PND.   Thai speaking only  Used interpretor service    Physical Exam   Constitutional: He is oriented to person, place, and time.   HENT:   Head: Normocephalic.   Neck: No JVD present. No thyromegaly present.   Cardiovascular: An irregularly irregular rhythm present. Tachycardia present.    Pulses:       Carotid pulses are 2+ on the right side, and 2+ on the left side.       Radial pulses are 2+ on the right side, and 2+ on the left side.   Pulmonary/Chest: He has no wheezes.   Abdominal: Soft.   Musculoskeletal: He exhibits no edema.   Neurological: He is alert and oriented to person, place, and time.   Skin: Skin is warm and dry.       Hemodynamics:  Temp (24hrs), Av.5 °C (97.7 °F), Min:36.3 °C (97.3 °F), Max:36.7 °C (98.1 °F)  Temperature: 36.6 °C (97.9 °F)  Pulse  Av.1  Min: 50  Max: 155   Blood Pressure: 103/62 mmHg     Respiratory:    Respiration: 18, Pulse Oximetry: 97 %        RUL Breath  Sounds: Clear, RML Breath Sounds: Clear, RLL Breath Sounds: Clear, SABAS Breath Sounds: Clear, LLL Breath Sounds: Clear  Fluids:  Date 08/18/17 0700 - 08/19/17 0659   Shift 3825-6623 6362-3528 7413-8838 24 Hour Total   I  N  T  A  K  E   Shift Total       O  U  T  P  U  T   Urine  120  120    Shift Total  120  120   Weight (kg) 62 62 62 62       Weight: 62.5 kg (137 lb 12.6 oz)  GI/Nutrition:  Orders Placed This Encounter   Procedures   • DIET ORDER     Standing Status: Standing      Number of Occurrences: 1      Standing Expiration Date:      Order Specific Question:  Diet:     Answer:  2 Gram Sodium [7]     Order Specific Question:  Diet:     Answer:  Cardiac [6]     Lab Results:  Recent Labs      08/17/17 2346   WBC  8.0   RBC  4.58*   HEMOGLOBIN  13.6*   HEMATOCRIT  42.0   MCV  91.7   MCH  29.7   MCHC  32.4*   RDW  49.0   PLATELETCT  221   MPV  10.5     Recent Labs      08/19/17   0836  08/19/17   2314  08/20/17   0826   SODIUM  135  131*  133*   POTASSIUM  4.7  4.8  4.9   CHLORIDE  104  105  106   CO2  19*  20  18*   GLUCOSE  218*  85  126*   BUN  57*  63*  58*   CREATININE  3.16*  3.28*  2.98*   CALCIUM  8.9  8.9  9.1     Recent Labs      08/17/17   2346   08/19/17   0226   08/19/17   1636  08/19/17   2314  08/20/17   0517   APTT   --    < >   --    < >  115.8*  85.9*  72.4*   INR  1.22*   --   1.80*   --    --   2.15*   --     < > = values in this interval not displayed.     Recent Labs      08/18/17   0919   BNPBTYPENAT  2211*     Recent Labs      08/18/17   0919   BNPBTYPENAT  2211*             Medical Decision Making, by Problem:  Active Hospital Problems    Diagnosis   • *Atrial fibrillation with RVR (CMS-HCC) [I48.91]   • Cardiorenal syndrome [I13.10]   • Aortic regurgitation [I35.1]   • HFrEF (heart failure with reduced ejection fraction) (CMS-HCC) [I50.20]   • CVA (cerebral vascular accident) (CMS-HCC) [I63.9]   • TREASURE (acute kidney injury) (CMS-[facility]) [N17.9]   • Prolonged Q-T interval on ECG [R94.31]    • Electrolyte abnormality [E87.8]   • Non-rheumatic mitral regurgitation [I34.0]   • Essential hypertension [I10]   • 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]   • Hyperglycemia [R73.9]       Plan:  Overnight RVR at rest w/o symptoms ( ZV=430-422, touched 170 )    Atrial fibrillation RVR, new diagnosis, on IV heparin, Coumadin, on Toprol- mg  , remains in RVR, D/W RN will continue with PRN  IV Metoprolol      On coumadin ( 8/13/17 ), on IV Heparin INR=2 8/19/17 at 2300     Severe cardiomyopathy, EF 25%, on hydralazine/Imdur /Toprol XL, stable    Severe aortic insufficiency by recent LOBO on 8/10/17, appreciate CTS, hold for surgery at this time secondary to new CVA with hemorrhage    Systolic heart failure, new, on furosemide 40, I/O= - 10,000 since admit, - 900 x 24 h , at baseline, no dyspnea, no LE edema    TREASURE, appreciate nephrology,  cardiorenal syndrome, Cr=2.98, (  last HD on 8/14/17 )    Hypertension, controlled 103/60    CVA, (MRI brain, acute infarction in left frontal periventricular white matter extending to subinsular cortex, punctate area of infarction in right posterior lobe, numerous microbleed, on ASA, Lipitor, ? neuro consult      History of significant alcohol use, needs reenforcement on abstinence, thiamine and folate       No plan for ischemic workup secondary to recent CVA and kidney injury    Will revaluate rate control with Dr Pyle    Medications reviewed and Labs reviewed

## 2017-08-20 NOTE — PROGRESS NOTES
Nephrology Progress Note, Adult, Complex               Author:Oni Najjar Date & Time created: 8/20/2017  1:12 PM     Interval History:  46 y/o male with severe, CHF  -EF 25 %, Severe AI, A.fib, CVA in TREASURE, metabolic acidosis  Poor UOP  Doing well, no complaints  Events last 24h noted    Review of Systems:  Review of Systems   Constitutional: Positive for malaise/fatigue. Negative for fever and chills.   Respiratory: Negative for cough and hemoptysis.    Cardiovascular: Negative for chest pain, palpitations and orthopnea.   Gastrointestinal: Negative for heartburn, nausea, vomiting, abdominal pain and diarrhea.   Genitourinary: Negative for dysuria.   All other systems reviewed and are negative.      Physical Exam:  Physical Exam   Constitutional: He is oriented to person, place, and time. No distress.   HENT:   Head: Normocephalic and atraumatic.   Nose: Nose normal.   Eyes: Left eye exhibits no discharge. No scleral icterus.   Cardiovascular: An irregularly irregular rhythm present.   Pulmonary/Chest: Effort normal and breath sounds normal. No respiratory distress. He has no wheezes.   Musculoskeletal: He exhibits no edema or tenderness.   Trace pedal edema   Neurological: He is alert and oriented to person, place, and time.   Skin: Skin is warm and dry. He is not diaphoretic.   Psychiatric: He has a normal mood and affect.   Nursing note and vitals reviewed.      Labs:        Invalid input(s): [id number]  Recent Labs      08/18/17   0919   BNPBTYPENAT  2211*     Recent Labs      08/17/17   2346   08/19/17   0836  08/19/17   2314  08/20/17   0826   SODIUM  133*   < >  135  131*  133*   POTASSIUM  4.7   < >  4.7  4.8  4.9   CHLORIDE  105   < >  104  105  106   CO2  20   < >  19*  20  18*   BUN  59*   < >  57*  63*  58*   CREATININE  3.24*   < >  3.16*  3.28*  2.98*   MAGNESIUM  2.0   --    --    --    --    CALCIUM  8.9   < >  8.9  8.9  9.1    < > = values in this interval not displayed.     Recent Labs       17   0919   17   0836  17   2314  17   0826   ALTSGPT  35   --   35   --   32   ASTSGOT  24   --   23   --   21   ALKPHOSPHAT  59   --   67   --   66   TBILIRUBIN  0.5   --   0.5   --   0.5   GLUCOSE  155*   < >  218*  85  126*    < > = values in this interval not displayed.     Recent Labs      17   2346   17   0226   17   1636  17   2314  17   0517   RBC  4.58*   --    --    --    --    --    --    HEMOGLOBIN  13.6*   --    --    --    --    --    --    HEMATOCRIT  42.0   --    --    --    --    --    --    PLATELETCT  221   --    --    --    --    --    --    PROTHROMBTM  15.8*   --   21.4*   --    --   24.7*   --    APTT   --    < >   --    < >  115.8*  85.9*  72.4*   INR  1.22*   --   1.80*   --    --   2.15*   --     < > = values in this interval not displayed.     Recent Labs      17   2346  1736  17   0826   WBC  8.0   --    --    --    ASTSGOT   --   24  23  21   ALTSGPT   --   35  35  32   ALKPHOSPHAT   --   59  67  66   TBILIRUBIN   --   0.5  0.5  0.5           Hemodynamics:  Temp (24hrs), Av.5 °C (97.7 °F), Min:36.3 °C (97.3 °F), Max:36.7 °C (98.1 °F)  Temperature: 36.6 °C (97.9 °F)  Pulse  Av.1  Min: 50  Max: 155   Blood Pressure: 103/62 mmHg     Respiratory:    Respiration: 18, Pulse Oximetry: 97 %        RUL Breath Sounds: Clear, RML Breath Sounds: Clear, RLL Breath Sounds: Clear, SABAS Breath Sounds: Clear, LLL Breath Sounds: Clear  Fluids:    Intake/Output Summary (Last 24 hours) at 17 1312  Last data filed at 17 0600   Gross per 24 hour   Intake      0 ml   Output    700 ml   Net   -700 ml     Weight: 62.5 kg (137 lb 12.6 oz)  GI/Nutrition:  Orders Placed This Encounter   Procedures   • DIET ORDER     Standing Status: Standing      Number of Occurrences: 1      Standing Expiration Date:      Order Specific Question:  Diet:     Answer:  2 Gram Sodium [7]     Order Specific Question:   Diet:     Answer:  Cardiac [6]     Medical Decision Making, by Problem:  Active Hospital Problems    Diagnosis   • *Atrial fibrillation with RVR (CMS-HCC) [I48.91]   • CVA (cerebral vascular accident) (CMS-HCC) [I63.9]   • Essential hypertension [I10]   • Respiratory failure (CMS-HCC) [J96.90]   • Electrolyte abnormality [E87.8]   • Hyperglycemia [R73.9]   • Prolonged Q-T interval on ECG [R94.31]   • Elevated troponin [R74.8]   • Acute on chronic systolic heart failure (CMS-HCC) [I50.23]   • Non-rheumatic mitral regurgitation [I34.0]   • TREASURE (acute kidney injury) (CMS-HCC) [N17.9]   • 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]       Labs reviewed                    Assessment and Plan    1.TREASURE :sec to cardiorenal Syndrome, UO increasing,cr improving.  2.HTN: BP well controlled  3.Electrolytes: well controlled  4.Anemia: Hb WNL -to monitor  5.Metabolic acidosis -corrected with HD  6.Volume overloaded:improving with Lasix  7.CHF -cardiology following  Plan  no acute need for HD.  Continue Lasix  Daily labs  Renal dose all meds  Avoid nephrotoxins  Prognosis guarded

## 2017-08-20 NOTE — PROGRESS NOTES
Pt HR sustaining above 120 despite prn medication per MAR, with HR touching 170s.  Pt remains asymptomatic. Cardiology updated, no new orders rec'd.

## 2017-08-20 NOTE — PROGRESS NOTES
Inpatient Anticoagulation Service Note    Date: 8/20/2017  Reason for Anticoagulation: Atrial Fibrillation, Stroke   [id number] VASc Score: 4    Hemoglobin Value: 13.6  Hematocrit Value: 42  Lab Platelet Value: 221  Target INR: 2.0 to 3.0    INR from last 7 days     Date/Time INR Value    08/19/17 2314 (!)2.15    08/19/17 0226 (!)1.8    08/17/17 2346 (!)1.22    08/17/17 0928 1.1    08/16/17 0547 1.04    08/15/17 0441 1.04    08/14/17 0855 1.06    08/14/17 0322 1.06        Dose from last 7 days     Date/Time Dose (mg)    08/20/17 1400 7.5    08/19/17 1000 7.5    08/18/17 1100 7.5    08/17/17 1200 10    08/16/17 1000 5    08/15/17 1600 5    08/14/17 1100 5        Average Dose (mg):  (new start - appears to be ~ 7.5mg daily)  Significant Interactions: Aspirin, Statin (MVI)  Bridge Therapy: Yes (heparin weight based)  Bridge Therapy Start Date: 08/14/17  Days of Overlap Therapy: 6   INR Value Greater than 2 Prior to Discontinuation of Parenteral Anticoagulation: Not Applicable     Reversal Agent Administered: Not Applicable  Comments: INR therapeutic x1 day. If INR therapeutic tomorrow - discontinue the heparin drip. Give 7.5mg dose tonight. No new DDI identified. NNL to assess H/H; No indication of bleeding noted. Provided pt with booklet and print out in [place]. Used  line for education.     Plan:  Warfarin 7.5mg with INR check tomorrow  Education Material Provided?: Yes (SG - 8/20)  Pharmacist suggested discharge dosing: Warfarin 7.5mg PO daily with close f/u within 3 days       Delma Collier, PharmD.

## 2017-08-20 NOTE — CARE PLAN
Problem: Knowledge Deficit  Goal: Knowledge of disease process/condition, treatment plan, diagnostic tests, and medications will improve  Outcome: PROGRESSING AS EXPECTED  Discussed plan of care with patient. Pt verbalized understanding.

## 2017-08-20 NOTE — CARE PLAN
Problem: Mobility  Goal: Risk for activity intolerance will decrease  Outcome: PROGRESSING AS EXPECTED  Pt encouraged to continue to get out of bed for meals.

## 2017-08-20 NOTE — PROGRESS NOTES
Assumed care at 1900. Bedside report received from Marlee. Patient's chart and MAR reviewed. Pt denies pain at this time. Pt is A & O 4. Patient was updated on plan of care for the day. Questions answered and concerns addressed.  Pt denies any additional needs at this time. White board updated. Call light, phone and personal belongings within reach.

## 2017-08-20 NOTE — PROGRESS NOTES
Internal Medicine Interval Note    Name Bhavesh Saini       1970   Age/Sex 47 y.o. male   MRN [id number]   Code Status FULL.     After 5PM or if no immediate response to page, please call for cross-coverage  Attending/Team: Akil/Troy. See Patient List for primary contact information  Call [phone] to page    1st Call - Day Intern (R1):   Charlotte 2nd Call - Day Sr. Resident (R2/R3):   Manuel         Reason for interval visit  (Principal Problem)   Atrial fibrillation with RVR (CMS-[facility])    Interval Problem Daily Status Update  (24 hours)   - Overnight patient with -150s.  Remained asymptomatic.   - No complaints this morning.  Continues to deny chest pain, palpitations.    - Tolerating regular diet.  Continuing to ambulate on hospital floors.    - INR 2.15 today.       Review of Systems   Constitutional: Negative for fever and chills.   Respiratory: Negative for cough and shortness of breath.    Cardiovascular: Negative for chest pain and palpitations.   Gastrointestinal: Negative for abdominal pain.   Neurological: Negative for dizziness, seizures and headaches.       Consultants/Specialty  Cardiology  Cardiac Surgery  Nephrology    Disposition  Home.     Quality Measures  Labs reviewed, Medications reviewed and Radiology images reviewed  Truong catheter: No Truong      DVT Prophylaxis: Warfarin (Coumadin) and Heparin                Physical Exam       Filed Vitals:    17 0130 17 0430 17 0615 17 0714   BP: 130/70 116/91 112/61 137/75   Pulse: 152 120  154   Temp:  36.3 °C (97.3 °F)  36.6 °C (97.9 °F)   Resp:  16  16   Height:       Weight:       SpO2:  96%  95%     Body mass index is 26.91 kg/(m^2). Weight: 62.5 kg (137 lb 12.6 oz)  Oxygen Therapy:  Pulse Oximetry: 95 %, O2 (LPM): 0, O2 Delivery: None (Room Air)    Physical Exam   Constitutional: He is oriented to person, place, and time.   Lying in bed, in no apparent distress.     Eyes: Pupils are equal, round,  and reactive to light.   Neck: No JVD present.   Cardiovascular:   Irregularly irregular rhythm.    Pulmonary/Chest: Breath sounds normal. No respiratory distress. He has no wheezes. He has no rales.   Abdominal: Soft. Bowel sounds are normal. He exhibits no distension. There is no tenderness.   Neurological: He is alert and oriented to person, place, and time.   Psychiatric: Affect normal.         Lab Data Review:         8/20/2017  12:54 PM    Recent Labs      08/17/17   2346   08/19/17   0836  08/19/17   2314  08/20/17   0826   SODIUM  133*   < >  135  131*  133*   POTASSIUM  4.7   < >  4.7  4.8  4.9   CHLORIDE  105   < >  104  105  106   CO2  20   < >  19*  20  18*   BUN  59*   < >  57*  63*  58*   CREATININE  3.24*   < >  3.16*  3.28*  2.98*   MAGNESIUM  2.0   --    --    --    --    CALCIUM  8.9   < >  8.9  8.9  9.1    < > = values in this interval not displayed.       Recent Labs      08/18/17   0919   08/19/17   0836  08/19/17   2314  08/20/17   0826   ALTSGPT  35   --   35   --   32   ASTSGOT  24   --   23   --   21   ALKPHOSPHAT  59   --   67   --   66   TBILIRUBIN  0.5   --   0.5   --   0.5   GLUCOSE  155*   < >  218*  85  126*    < > = values in this interval not displayed.       Recent Labs      08/17/17   2346   08/19/17   0226   08/19/17   1636  08/19/17   2314  08/20/17   0517   RBC  4.58*   --    --    --    --    --    --    HEMOGLOBIN  13.6*   --    --    --    --    --    --    HEMATOCRIT  42.0   --    --    --    --    --    --    PLATELETCT  221   --    --    --    --    --    --    PROTHROMBTM  15.8*   --   21.4*   --    --   24.7*   --    APTT   --    < >   --    < >  115.8*  85.9*  72.4*   INR  1.22*   --   1.80*   --    --   2.15*   --     < > = values in this interval not displayed.       Recent Labs      08/17/17   2346  08/18/17   0919  08/19/17   0836  08/20/17   0826   WBC  8.0   --    --    --    ASTSGOT   --   24  23  21   ALTSGPT   --   35  35  32   ALKPHOSPHAT   --   59  67  66    TBILIRUBIN   --   0.5  0.5  0.5           Assessment/Plan     * Atrial fibrillation with RVR (CMS-HCC) (present on admission)  Assessment & Plan  -New onset vs paroxysmal (vague PMH of afib and non-compliance with meds)  -overnight He was given diltiazem by NF due to Afib with nonsustained HR in 160s. - now in SR  -CHADS2: 4  -ECHO: severe LVH, global hypokinesis, EF ~20%, severe AI and mod MR  -LOBO: slow blood flow but no thrombus. Severe AI    Plan  - Na restricted diet  - Continue scheduled metoprolol, PRN 5mg metoprolol q6h.    - If HR < 50 or > 120s , is PERSISTENT and SYMPTOMATIC, will administer diltiazem 10 mg.   -Cardioversion will likely be done during AV replacement.     CVA (cerebral vascular accident) (CMS-HCC) (present on admission)  Assessment & Plan  -h/o left facial droop, slurred speech and left sided weakness. However no neuro deficits currently  -CT: cerebral atrophy and small vessel ischemic changes   -MRI brain: acute infarction in L frontal periventricular white matter extendeing to subinsular cortex. Punctate area of infarction in R posterior lobe. Numerous microbleeds.   Assessment: Afib could have contributed (LOBO showed 'sludge' in the heart)  Plan  -ASA, statin  -Mx of Afib as stated above.    TREASURE (acute kidney injury) (CMS-HCC) (present on admission)  Assessment & Plan  -Acute on chronic. Likely pre-renal vs cardiorenal. HTN is likely the cause of CKD  -On adm: Cr: 4.3, AGMA, Ph 6.1. Unknown baseline.  -FeNa <1%  -H/o renal disease since 7yrs, FH kidney disease in his father. Has been non-complaint with HTN medications. - has CHF  -Renal US: no hydronephrosis and calculi. Left renal cysts.  - Cr and K slowly increasing    Plan  -Nephrology on board - no need for HD today.  Awaiting recommendations re: need for/frequency of HD.    - Continue Lasix.    HFrEF (heart failure with reduced ejection fraction) (CMS-HCC) (present on admission)  Assessment & Plan  -Presented with SOB, orthopnea,  fatique and palpitations. Has h/o HTN and alcoholism in the past. On exam: JVD +., murmurs + but no volume overload.   -BNP 2203 >> 1753 > 2211.   -CXR: cardiomegaly. ECHO: severe LVH, global hypokinesis, EF ~20%, severe AI and mod MR  -Assessment: Compensated HF likely due to alcoholism vs tachycardia induced heart failure.   -Cardiac surgery spoke with Mr Saini and family, he needs ischemic work up and AVR/MAZE w LOBO, but due to his renal status and recent stroke with hemorrhage, they do not recommend it at this time.   - Patient's undocumented alien status is another reason having a heart cath is diifficult.      Plan  - Na restricted diet.   - Patient does have follow up appointment with Dr. Gutierrez, Cardiology, on September 11.  - Patient unable to move back to [place] for another 3-4 months due to finances.  Will coordinate with Social Work to see if this can be expedited.        Aortic regurgitation (present on admission)  Assessment & Plan  -Severe AR.   -ECHO:  severe LVH, global hypokinesis, EF ~20%, severe AI and mod MR. Findings were confirmed by LOBO  -Fits the criteria for valve replacement. Cardiology and cardiac surgery on board.    Cardiorenal syndrome  Assessment & Plan  -Diagnosis given by Nephrology - likely since urine Na is low  -Patient is on HD, and is urinated <500 ml yesterday.  -Creatinine in ED 4.38, Total protein Urine 208. Estimated GFR was 14.   -FENA 0.2%  -BNP 2203>> 2039   -CXR: cardiomegaly. ECHO showed: severe LVH, global hypokinesis, EF ~20%, AR and mod MR  Assessment: Cardiorenal syndrome  Plan  -Patient needs AVR/MAZE w LOBO per Cardiac Surgery, but due to his renal status and recent stroke with hemorrhage, they do not recommend at this time.  They will follow up outpatient in 6 weeks.   -Patient will be counseled on cessation of alcohol intake.   -Nephrology on board.         Electrolyte abnormality (present on admission)  Assessment & Plan  -Electrolytes wnl today.      Prolonged Q-T interval on ECG (present on admission)  Assessment & Plan  -QTc ~530s. Will avoid QTc prolonging meds.    Essential hypertension (present on admission)  Assessment & Plan  -PMH of HTN but likely non-compliant. ECHO showed severe LVH  -Stable on BB, hydralazine, nitrate, lasix     Alcoholic (CMS-HCC) (present on admission)  Assessment & Plan  -History is vague but looks like he was drinking heavily in the past. Last drink was 8/5/2017.  -On thiamine and folate.       Hyperglycemia (present on admission)  Assessment & Plan  -Resolved  -On adm: , A1c: 5.4  -He was started on ISS and hypoglycemia protocol which is discontinued.  CTM with accuchecks    Non-rheumatic mitral regurgitation  Assessment & Plan  -Moderate MR on ECHO

## 2017-08-21 LAB
ALBUMIN SERPL BCP-MCNC: 3.5 G/DL (ref 3.2–4.9)
ALBUMIN/GLOB SERPL: 1.3 G/DL
ALP SERPL-CCNC: 67 U/L (ref 30–99)
ALT SERPL-CCNC: 32 U/L (ref 2–50)
ANION GAP SERPL CALC-SCNC: 10 MMOL/L (ref 0–11.9)
APTT PPP: 57.8 SEC (ref 24.7–36)
APTT PPP: 98.5 SEC (ref 24.7–36)
AST SERPL-CCNC: 24 U/L (ref 12–45)
BILIRUB SERPL-MCNC: 0.4 MG/DL (ref 0.1–1.5)
BUN SERPL-MCNC: 61 MG/DL (ref 8–22)
CALCIUM SERPL-MCNC: 9.1 MG/DL (ref 8.5–10.5)
CHLORIDE SERPL-SCNC: 104 MMOL/L (ref 96–112)
CO2 SERPL-SCNC: 18 MMOL/L (ref 20–33)
CREAT SERPL-MCNC: 3.3 MG/DL (ref 0.5–1.4)
GFR SERPL CREATININE-BSD FRML MDRD: 20 ML/MIN/1.73 M 2
GLOBULIN SER CALC-MCNC: 2.6 G/DL (ref 1.9–3.5)
GLUCOSE SERPL-MCNC: 73 MG/DL (ref 65–99)
INR PPP: 2.48 (ref 0.87–1.13)
POTASSIUM SERPL-SCNC: 4.5 MMOL/L (ref 3.6–5.5)
PROT SERPL-MCNC: 6.1 G/DL (ref 6–8.2)
PROTHROMBIN TIME: 27.6 SEC (ref 12–14.6)
SODIUM SERPL-SCNC: 132 MMOL/L (ref 135–145)

## 2017-08-21 PROCEDURE — A9270 NON-COVERED ITEM OR SERVICE: HCPCS

## 2017-08-21 PROCEDURE — 700102 HCHG RX REV CODE 250 W/ 637 OVERRIDE(OP): Performed by: INTERNAL MEDICINE

## 2017-08-21 PROCEDURE — 85610 PROTHROMBIN TIME: CPT

## 2017-08-21 PROCEDURE — 80053 COMPREHEN METABOLIC PANEL: CPT

## 2017-08-21 PROCEDURE — 700102 HCHG RX REV CODE 250 W/ 637 OVERRIDE(OP): Performed by: STUDENT IN AN ORGANIZED HEALTH CARE EDUCATION/TRAINING PROGRAM

## 2017-08-21 PROCEDURE — 36415 COLL VENOUS BLD VENIPUNCTURE: CPT

## 2017-08-21 PROCEDURE — A9270 NON-COVERED ITEM OR SERVICE: HCPCS | Performed by: INTERNAL MEDICINE

## 2017-08-21 PROCEDURE — 770020 HCHG ROOM/CARE - TELE (206)

## 2017-08-21 PROCEDURE — 85730 THROMBOPLASTIN TIME PARTIAL: CPT

## 2017-08-21 PROCEDURE — 99232 SBSQ HOSP IP/OBS MODERATE 35: CPT | Mod: GC | Performed by: INTERNAL MEDICINE

## 2017-08-21 PROCEDURE — A9270 NON-COVERED ITEM OR SERVICE: HCPCS | Performed by: STUDENT IN AN ORGANIZED HEALTH CARE EDUCATION/TRAINING PROGRAM

## 2017-08-21 PROCEDURE — 700102 HCHG RX REV CODE 250 W/ 637 OVERRIDE(OP)

## 2017-08-21 RX ADMIN — STANDARDIZED SENNA CONCENTRATE AND DOCUSATE SODIUM 2 TABLET: 8.6; 5 TABLET, FILM COATED ORAL at 08:09

## 2017-08-21 RX ADMIN — METOPROLOL SUCCINATE 250 MG: 50 TABLET, EXTENDED RELEASE ORAL at 08:09

## 2017-08-21 RX ADMIN — Medication 100 MG: at 08:09

## 2017-08-21 RX ADMIN — ATORVASTATIN CALCIUM 40 MG: 40 TABLET, FILM COATED ORAL at 20:32

## 2017-08-21 RX ADMIN — FUROSEMIDE 40 MG: 40 TABLET ORAL at 08:09

## 2017-08-21 RX ADMIN — WARFARIN SODIUM 7.5 MG: 7.5 TABLET ORAL at 17:56

## 2017-08-21 RX ADMIN — THERA TABS 1 TABLET: TAB at 08:09

## 2017-08-21 RX ADMIN — FOLIC ACID 1 MG: 1 TABLET ORAL at 08:09

## 2017-08-21 RX ADMIN — STANDARDIZED SENNA CONCENTRATE AND DOCUSATE SODIUM 2 TABLET: 8.6; 5 TABLET, FILM COATED ORAL at 20:32

## 2017-08-21 RX ADMIN — ASPIRIN 81 MG: 81 TABLET, CHEWABLE ORAL at 08:09

## 2017-08-21 RX ADMIN — HYDRALAZINE HYDROCHLORIDE 50 MG: 50 TABLET, FILM COATED ORAL at 05:47

## 2017-08-21 RX ADMIN — ISOSORBIDE MONONITRATE 60 MG: 30 TABLET ORAL at 08:09

## 2017-08-21 ASSESSMENT — PAIN SCALES - GENERAL
PAINLEVEL_OUTOF10: 0

## 2017-08-21 ASSESSMENT — ENCOUNTER SYMPTOMS
ABDOMINAL PAIN: 0
CLAUDICATION: 0
MUSCULOSKELETAL NEGATIVE: 1
VOMITING: 0
SHORTNESS OF BREATH: 0
FEVER: 0
COUGH: 0
CHILLS: 0
NAUSEA: 0
PND: 0
PSYCHIATRIC NEGATIVE: 1
HEADACHES: 0
GASTROINTESTINAL NEGATIVE: 1
EYES NEGATIVE: 1
ORTHOPNEA: 0
CARDIOVASCULAR NEGATIVE: 1
DIZZINESS: 0
HEARTBURN: 0
RESPIRATORY NEGATIVE: 1
CONSTITUTIONAL NEGATIVE: 1
NEUROLOGICAL NEGATIVE: 1
DIARRHEA: 0
HEMOPTYSIS: 0
PALPITATIONS: 0

## 2017-08-21 NOTE — PROGRESS NOTES
Inpatient Anticoagulation Service Note    Date: 8/21/2017  Reason for Anticoagulation: Atrial Fibrillation, Stroke   [id number] VASc Score: 4    Hemoglobin Value: 13.6  Hematocrit Value: 42  Lab Platelet Value: 221  Target INR: 2.0 to 3.0    INR from last 7 days     Date/Time INR Value    08/21/17 0041 (!)2.48    08/19/17 2314 (!)2.15    08/19/17 0226 (!)1.8    08/17/17 2346 (!)1.22    08/17/17 0928 1.1    08/16/17 0547 1.04    08/15/17 0441 1.04        Dose from last 7 days     Date/Time Dose (mg)    08/21/17 1100 7.5    08/20/17 1400 7.5    08/19/17 1000 7.5    08/18/17 1100 7.5    08/17/17 1200 10    08/16/17 1000 5    08/15/17 1600 5        Average Dose (mg):  (new start - appears to be ~ 7.5mg daily)  Significant Interactions: Aspirin, Statin (MVI)  Bridge Therapy: Yes (complete)  Bridge Therapy Start Date: 08/14/17  Days of Overlap Therapy: 6   INR Value Greater than 2 Prior to Discontinuation of Parenteral Anticoagulation: Yes     Reversal Agent Administered: Not Applicable  Comments: INR therapeutic for 2 days in a row and has received at least 5 days of bridge therapy. Heparin bridge was discontinued this AM. Continue 7.5mg daily dosing. No new DDI identified. NNL to assess H/H; no indication of bleeding noted.     Plan:  Warfarin 7.5mg with INR check tomorrow   Education Material Provided?: Yes (SG - 8/20)  Pharmacist suggested discharge dosing: Warfarin 7.5mg PO daily with close f/u within 3 days       Delma Collier, PharmD.

## 2017-08-21 NOTE — PROGRESS NOTES
Assumed care of patient at 0715, received bedside report from night shift RN. Bed is locked and in lowest position with call light within reach. Treaded socks in place. Patient updated on plan of care, no complaints or pain at this time. White board updated. Assessment completed. Pt A&Ox4- Bulgarian speaking.  service used. Tele monitor in place and cardiac rhythm being monitored. All needs met at this time.

## 2017-08-21 NOTE — PROGRESS NOTES
Internal Medicine Medical Student Note    Name Bhavesh Saini       1970   Age/Sex 47 y.o. male   MRN [id number]   Code Status Full     After 5PM or if no immediate response to page, please call for cross-coverage  Attending/Team: Akil/Oniel  See Patient List for primary contact information  Call [phone] to page after hours   1st Call - Day Intern (R1):  Charlotte  2nd Call - Day Sr. Resident (R2/R3):   Nando         Reason for interval visit  (Principal Problem)   Atrial fibrillation with RVR (CMS-[facility])    Interval Problem Daily Status Update  (24 hours)   AFIB w/ RVR - HR 's overnight; asymptomatic; INR 2.48 today; SBP 's;  per cards: continue with lopressor until BP improves then titrate up with Toprol XL, will consider amiodarone if continued failure of rate control - (refer to cardiology addendum by Dr. Marrufo); no cardioversion indicated due to recent stroke; continue warfarin 7.5mg qd  Acute/chronic HFrEF - Tachycardia-induced cardiomyopathy versus valvular heart disease versus ischemic cardiomyopathy; discontinue hydralazine for now per cards due to low bp; not currently candidate for AVR given recent stroke; discussed with SW who will investigate resources to expedite return to [place] for elective valve replacement   TREASURE - likely cardiorenal syndrome; Cr up slightly to 3.3 today; continue lasix 40mg qd; per nephrology - no further HD planned, will need outpt f/u with CKD clinic  CVA - no neurologic deficits; continue ASA/lipitor       Review of Systems   Constitutional: Negative.    HENT: Negative.    Eyes: Negative.    Respiratory: Negative.    Cardiovascular: Negative.    Gastrointestinal: Negative.    Genitourinary: Negative.    Musculoskeletal: Negative.    Skin: Negative.    Neurological: Negative.    Endo/Heme/Allergies: Negative.                Physical Exam       Filed Vitals:    17 0437 17 0758 17 1105 17 1312   BP: 117/77 119/85 88/67  118/72   Pulse: 70 104 75 120   Temp: 36.3 °C (97.4 °F) 36.4 °C (97.6 °F) 36.7 °C (98 °F) 36.7 °C (98 °F)   Resp: 16 16 16 16   Height:       Weight:       SpO2: 98% 93% 97% 97%     Body mass index is 27.25 kg/(m^2). Weight: 63.3 kg (139 lb 8.8 oz)  Oxygen Therapy:  Pulse Oximetry: 97 %, O2 (LPM): 0, O2 Delivery: None (Room Air)    Physical Exam   Constitutional: He is oriented to person, place, and time and well-developed, well-nourished, and in no distress.   HENT:   Head: Normocephalic and atraumatic.   Eyes: Conjunctivae and EOM are normal.   Neck: Normal range of motion. Neck supple.   Cardiovascular:   Tachycardic, irregularly irregular    Pulmonary/Chest: Effort normal and breath sounds normal. No respiratory distress. He has no wheezes. He has no rales.   Abdominal: Soft. Bowel sounds are normal. He exhibits no distension. There is no tenderness. There is no rebound.   Musculoskeletal: He exhibits no edema or tenderness.   Neurological: He is alert and oriented to person, place, and time.   Skin: Skin is warm and dry.             Assessment/Plan       48yo M with h/o alcoholism and HTN noncompliant with meds presented with afib w/ RVR. Found to have HFrEF (20%), TREASURE on CKD, cerebrovascular disease.     Afib with RVR  -New onset vs paroxysmal (vague PMH of afib and non-compliance with meds) vs persistent (if nonremitting >7 days)    -continued rate control failure on BBL- asymptomatic tachy chase problems    - 's overnight  -CHADS2: 4  -ECHO: severe LVH, global hypokinesis, EF ~20%, severe AI and mod MR  -LOBO: slow blood flow but no thrombus. Severe AI  -INR 2.48 today    Plan:    -per cards: continue with lopressor until BP improves then titrate up with Toprol XL, will consider amiodarone if continued failure of rate control - (refer to cardiology addendum by Dr. Marrufo)  -continue metoprolol 5mg q6h PRN    -diltiazem only for persistent HR >120 with symptoms (avoid CCB with EF 20%)  -continue  anticoagulation on warfarin and UF    HFrEF    -likely decompensated hypertensive vs tachycardia induced vs alcoholic cardiomyopathy vs calcific aortic valve disease (LOBO shows left coronary cusp with nodule calcification resulting in severe central eccentric aortic regurg)       -JVD + but no volume overload (no pulm edema, ascites, pedal edema)  -BNP 2203 >> 1753 > 2211   -ECHO: severe LVH, global hypokinesis, EF ~20%, severe AI and mod MR  -Cardiac surgery spoke with Mr Saini and family, he needs ischemic work up and AVR/MAZE w LOBO, but due to his renal status and recent stroke with hemorrhage, they do not recommend it at this time.       Plan:  - Na restricted diet.    -continue to follow cardiology recommendations    -pt plans to move back to [place] in 4-5mo once he has saved enough money to undergo elective valve repair - SW looking in to expediting process   -continue lasix per nephrology     TREASURE on CKD  -likely pre-renal vs cardiorenal on CKD (h/o HTN noncompliant with meds)  -On adm: Cr: 3.3 today - Unknown baseline.  -FeNa <1%  -Renal US: no hydronephrosis and calculi. Left renal cysts.    Plan  -Nephrology on board - no further HD, continue lasix, will need outpatient follow up with CKD clinic  -continue to monitor K     CVA    -possibly related to afib (LOBO showed sludge in heart) vs HTN  -h/o left facial droop, slurred speech and left sided weakness. However no neuro deficits currently  -CT: cerebral atrophy and small vessel ischemic changes    -MRI brain: acute infarction in L frontal periventricular white matter extendeing to subinsular cortex. Punctate area of infarction in R posterior lobe. Numerous microbleeds.      Plan:  -ASA, statin    Essential hypertension    -PMH of HTN but likely non-compliant. ECHO showed severe LVH  -SBP     Plan:    -hold hydralazine until BP improves (88/62 this am)  -continue  imdur, BBL, lasix     Disposition:    Inpatient for afib medication optimization and  social barriers to discharge.

## 2017-08-21 NOTE — PROGRESS NOTES
Shauna Downing Fall Risk Assessment:     Last Known Fall: No falls  Mobility: No limitations  Medications: Cardiovascular or central nervous system meds  Mental Status/LOC/Awareness: Awake, alert, and oriented to date, place, and person  Toileting Needs: No needs  Volume/Electrolyte Status: No problems  Communication/Sensory: Non-English patient/unable to speak/slurred speech  Behavior: Appropriate behavior  Shauna Downing Fall Risk Total: 5  Fall Risk Level: NO RISK    Universal Fall Precautions:  call light/belongings in reach, bed in low position and locked, wheelchairs and assistive devices out of sight, siderails up x 2, use non-slip footwear, adequate lighting, clutter free and spill free environment, educate on level of risk, educate to call for assistance    Fall Risk Level Interventions:   TRIAL (TELE 8, NEURO, MED AZALEA 5) Low Fall Risk Interventions  Place yellow fall risk ID band on patient: verified  Provide patient/family education based on risk assessment: completed  Educate patient/family to call staff for assistance when getting out of bed: completed  Place fall precaution signage outside patient door: verified      Patient Specific Interventions:     Medication: review medications with patient and family  Mental Status/LOC/Awareness: reinforce falls education  Toileting: monitor intake and output/use of appropriate interventions  Volume/Electrolyte Status: advance diet as tolerated  Communication/Sensory: update plan of care on whiteboard  Behavioral: engage patient in daily activities  Mobility: dangle prior to standing

## 2017-08-21 NOTE — PROGRESS NOTES
Nephrology Progress Note, Adult, Complex               Author:Bandar Saavedra   Date & Time created: 8/21/2017  12:09 PM     Interval History:  48 y/o male with severe, CHF  -EF 25 %, Severe AI, A.fib, CVA in TREASURE, metabolic acidosis  Poor UOP  Cr up slightly to 3.3  Feeling about the same    Review of Systems:  Review of Systems   Constitutional: Positive for malaise/fatigue. Negative for fever and chills.   Respiratory: Negative for cough and hemoptysis.    Cardiovascular: Negative for chest pain, palpitations and orthopnea.   Gastrointestinal: Negative for heartburn, nausea, vomiting, abdominal pain and diarrhea.   Genitourinary: Negative for dysuria.   All other systems reviewed and are negative.      Physical Exam:  Physical Exam   Constitutional: He is oriented to person, place, and time. No distress.   HENT:   Head: Normocephalic and atraumatic.   Nose: Nose normal.   Eyes: Left eye exhibits no discharge. No scleral icterus.   Cardiovascular: An irregularly irregular rhythm present.   Pulmonary/Chest: Effort normal and breath sounds normal. No respiratory distress. He has no wheezes.   Musculoskeletal: He exhibits no edema or tenderness.   Trace pedal edema   Neurological: He is alert and oriented to person, place, and time.   Skin: Skin is warm and dry. He is not diaphoretic.   Psychiatric: He has a normal mood and affect.   Nursing note and vitals reviewed.      Labs:        Invalid input(s): [id number]      Recent Labs      08/19/17   2314  08/20/17 0826 08/21/17   0041   SODIUM  131*  133*  132*   POTASSIUM  4.8  4.9  4.5   CHLORIDE  105  106  104   CO2  20  18*  18*   BUN  63*  58*  61*   CREATININE  3.28*  2.98*  3.30*   CALCIUM  8.9  9.1  9.1     Recent Labs      08/19/17   0836  08/19/17   2314  08/20/17   0826  08/21/17   0041   ALTSGPT  35   --   32  32   ASTSGOT  23   --   21  24   ALKPHOSPHAT  67   --   66  67   TBILIRUBIN  0.5   --   0.5  0.4   GLUCOSE  218*  85  126*  73     Recent Labs       17   0226   17   2314  17   0517  17   0041  17   0705   PROTHROMBTM  21.4*   --   24.7*   --   27.6*   --    APTT   --    < >  85.9*  72.4*  57.8*  98.5*   INR  1.80*   --   2.15*   --   2.48*   --     < > = values in this interval not displayed.     Recent Labs      17   0836  17   0826  17   0041   ASTSGOT  23  21  24   ALTSGPT  35  32  32   ALKPHOSPHAT  67  66  67   TBILIRUBIN  0.5  0.5  0.4           Hemodynamics:  Temp (24hrs), Av.6 °C (97.8 °F), Min:36.3 °C (97.4 °F), Max:36.7 °C (98.1 °F)  Temperature: 36.7 °C (98 °F)  Pulse  Av.5  Min: 50  Max: 155   Blood Pressure: (!) 88/67 mmHg (notified Rn)     Respiratory:    Respiration: 16, Pulse Oximetry: 97 %        RUL Breath Sounds: Clear, RML Breath Sounds: Clear, RLL Breath Sounds: Clear, SABAS Breath Sounds: Clear, LLL Breath Sounds: Clear  Fluids:  No intake or output data in the 24 hours ending 17 1209  Weight: 63.3 kg (139 lb 8.8 oz)  GI/Nutrition:  Orders Placed This Encounter   Procedures   • DIET ORDER     Standing Status: Standing      Number of Occurrences: 1      Standing Expiration Date:      Order Specific Question:  Diet:     Answer:  2 Gram Sodium [7]     Order Specific Question:  Diet:     Answer:  Cardiac [6]     Medical Decision Making, by Problem:  Active Hospital Problems    Diagnosis   • *Atrial fibrillation with RVR (CMS-HCC) [I48.91]   • CVA (cerebral vascular accident) (CMS-HCC) [I63.9]   • Essential hypertension [I10]   • Respiratory failure (CMS-HCC) [J96.90]   • Electrolyte abnormality [E87.8]   • Hyperglycemia [R73.9]   • Prolonged Q-T interval on ECG [R94.31]   • Elevated troponin [R74.8]   • Acute on chronic systolic heart failure (CMS-HCC) [I50.23]   • Non-rheumatic mitral regurgitation [I34.0]   • TREASURE (acute kidney injury) (CMS-[facility]) [N17.9]   • Alcoholic (CMS-[facility]) [F10.20]       Labs reviewed                    Assessment and Plan    1.TREASURE :sec to cardiorenal Syndrome,  Cr up slightly to 3.3   2.HTN: BP well controlled  3.Electrolytes: mild hyponatremia  4.Anemia: Hb WNL -to monitor  5.Metabolic acidosis -corrected with HD  6.Volume overloaded:improving with Lasix  7.CHF -cardiology following  Plan  No further HD planned  Monitoring Cr  Will need outpatient follow up with CKD clinic

## 2017-08-21 NOTE — PROGRESS NOTES
Cardiology Progress Note               Author: Bee Marrufo Date & Time created: 2017  11:08 AM     Interval History:   no acute overnight events. Patient daughter translated for us today denied any complaints of chest pain with activity.    Telemetry: Atrial fibrillation with heart rate in 140s.      Review of Systems   Constitutional: Negative.    HENT: Negative.    Eyes: Negative.    Respiratory: Negative for cough and shortness of breath.    Cardiovascular: Negative for chest pain, palpitations, orthopnea, claudication, leg swelling and PND.   Gastrointestinal: Negative.    Genitourinary: Negative.    Musculoskeletal: Negative.    Skin: Negative.    Neurological: Negative.    Psychiatric/Behavioral: Negative.       Physical Exam   Constitutional: He is oriented to person, place, and time.   HENT:   Head: Normocephalic.   Neck: No JVD present. No thyromegaly present.   Cardiovascular: An irregularly irregular rhythm present. Tachycardia present.    Pulses:       Carotid pulses are 2+ on the right side, and 2+ on the left side.       Radial pulses are 2+ on the right side, and 2+ on the left side.   Pulmonary/Chest: He has no wheezes.   Abdominal: Soft.   Musculoskeletal: He exhibits no edema.   Neurological: He is alert and oriented to person, place, and time.   Skin: Skin is warm and dry.       Hemodynamics:  Temp (24hrs), Av.6 °C (97.8 °F), Min:36.3 °C (97.4 °F), Max:36.7 °C (98.1 °F)  Temperature: 36.7 °C (98 °F)  Pulse  Av.5  Min: 50  Max: 155   Blood Pressure: (!) 88/67 mmHg (notified Rn)     Respiratory:    Respiration: 16, Pulse Oximetry: 97 %        RUL Breath Sounds: Clear, RML Breath Sounds: Clear, RLL Breath Sounds: Clear, SABAS Breath Sounds: Clear, LLL Breath Sounds: Clear  Fluids:  Date 17 0700 - 17 0659   Shift 1660-2271 [id number] 24 Hour Total   I  N  T  A  K  E   Shift Total       O  U  T  P  U  T   Urine  120  120    Shift Total  120  120   Weight (kg) 62  62 62 62       Weight: 63.3 kg (139 lb 8.8 oz)  GI/Nutrition:  Orders Placed This Encounter   Procedures   • DIET ORDER     Standing Status: Standing      Number of Occurrences: 1      Standing Expiration Date:      Order Specific Question:  Diet:     Answer:  2 Gram Sodium [7]     Order Specific Question:  Diet:     Answer:  Cardiac [6]     Lab Results:      Recent Labs      08/19/17   2314  08/20/17   0826  08/21/17   0041   SODIUM  131*  133*  132*   POTASSIUM  4.8  4.9  4.5   CHLORIDE  105  106  104   CO2  20  18*  18*   GLUCOSE  85  126*  73   BUN  63*  58*  61*   CREATININE  3.28*  2.98*  3.30*   CALCIUM  8.9  9.1  9.1     Recent Labs      08/19/17   0226   08/19/17   2314  08/20/17   0517  08/21/17   0041  08/21/17   0705   APTT   --    < >  85.9*  72.4*  57.8*  98.5*   INR  1.80*   --   2.15*   --   2.48*   --     < > = values in this interval not displayed.                   TTE: 8/10/17  Trileaflet aortic valve. The left coronary cusp has a nodule calcification with poor coaptation resulting in severe central eccentric aortic regurgitation.  Severely reduced left ventricular systolic function.  Left ventricular ejection fraction is visually estimated to be 20%.  Severe concentric left ventricular hypertrophy.  Normal right ventricular size and systolic function.  Mild mitral regurgitation.  These findings were communicated to the ICU service.    TTE: 8/9/17  No prior study is available for comparison.   Severely reduced left ventricular systolic function. Global hypokinesis.  Left ventricular ejection fraction is visually estimated to be 25%.  Diastolic function is difficult to assess with atrial fibrillation.  Normal right ventricular size and systolic function.  Moderate mitral regurgitation.  Severe eccentric aortic insufficiency.  Estimated right ventricular systolic pressure  is 60 mmHg.  These findings are known by cardiology consultation    Brain MRI: 8/10/17  1.  Mild diffuse cerebral atrophy.  2.  " Small areas of acute infarction involving the left frontal periventricular white matter extending inferiorly to the subinsular cortex. Punctate area of infarction involving the cortex in the right posterior occipital lobe.  3.  Moderate periventricular white matter changes consistent with chronic microvascular ischemic gliosis.  4.  Small chronic focus of lacunar type infarction in the right frontal periventricular white matter.  5.  Numerous chronic \"microbleed's\" are noted throughout the brain parenchyma in both the supra and infratentorial compartments. Gyriform/curvilinear regions of chronic \"microbleed\" noted in the right posterior frontal region and also the left frontal   periventricular white matter.  Medical Decision Making, by Problem:  Active Hospital Problems    Diagnosis   • *Atrial fibrillation with RVR (CMS-HCC) [I48.91]   • Cardiorenal syndrome [I13.10]   • Aortic regurgitation [I35.1]   • HFrEF (heart failure with reduced ejection fraction) (CMS-HCC) [I50.20]   • CVA (cerebral vascular accident) (CMS-HCC) [I63.9]   • TREASURE (acute kidney injury) (CMS-HCC) [N17.9]   • Prolonged Q-T interval on ECG [R94.31]   • Electrolyte abnormality [E87.8]   • Non-rheumatic mitral regurgitation [I34.0]   • Essential hypertension [I10]   • 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]   • Hyperglycemia [R73.9]       Plan:    Atrial fibrillation RVR:  We'll discontinue hydralazine for now.  Continue Toprol-XL to 50 mg daily.  Once blood pressure improves will titrate beta blockers up.  Secondary to recent stroke cardioversion was not performed.  Continue Coumadin with target INR of 2-3.    Cardiomyopathy LVEF of 20-25%:  Tachycardia-induced cardiomyopathy versus valvular heart disease versus ischemic cardiomyopathy.  Unfortunately unable to control his heart rate.  For now will discontinue hydralazine once blood pressure improves will titrate beta blockers up.    Severe aortic insufficiency:  Due to recent stroke with hemorrhage not a " candidate for surgery now.    TREASURE, appreciate nephrology,  cardiorenal syndrome, Cr=2.98, (  last HD on 8/14/17 )    CVA, (MRI brain, acute infarction in left frontal periventricular white matter extending to subinsular cortex, punctate area of infarction in right posterior lobe, numerous microbleed, on ASA, Lipitor, ? neuro consult    Thank you for allowing me to participate in taking care of patient.    Bee Marrufo. MD.    Medications reviewed and Labs reviewed

## 2017-08-21 NOTE — PROGRESS NOTES
Internal Medicine Interval Note    Name Bhavesh Saini       1970   Age/Sex 47 y.o. male   MRN [id number]   Code Status FULL.     After 5PM or if no immediate response to page, please call for cross-coverage  Attending/Team: Akil/Troy.  See Patient List for primary contact information  Call [phone] to page    1st Call - Day Intern (R1):   Charlotte 2nd Call - Day Sr. Resident (R2/R3):   Nando         Reason for interval visit  (Principal Problem)   Atrial fibrillation with RVR (CMS-HCC)    Interval Problem Daily Status Update  (24 hours)     - No acute events overnight.  Denies chest pain, palpitations.  - Per Nephrology, no further HD planned.  Patient will need outpatient follow up with CKD clinic.   - Per Cardiology, discontinue hydralazine for now; will make further recommendations tonight/tomorrow.  - INR today 2.48; therapeutic.     Review of Systems   Constitutional: Negative for fever and chills.   Respiratory: Negative for cough and shortness of breath.    Cardiovascular: Negative for chest pain and palpitations.   Gastrointestinal: Negative for abdominal pain.   Neurological: Negative for dizziness and headaches.       Consultants/Specialty  Cardiology  Cardiac Surgery  Nephrology    Disposition  Home    Quality Measures  Labs reviewed, Medications reviewed and Radiology images reviewed  Truong catheter: No Truong      DVT Prophylaxis: Warfarin (Coumadin)                Physical Exam       Filed Vitals:    17 1523 17 2057 17 2349 17 0437   BP: 114/73 135/93 115/70 117/77   Pulse: 100 109 99 70   Temp: 36.7 °C (98 °F) 36.7 °C (98.1 °F) 36.6 °C (97.9 °F) 36.3 °C (97.4 °F)   Resp: 18 16 16 16   Height:       Weight:  63.3 kg (139 lb 8.8 oz)     SpO2: 99% 100% 97% 98%     Body mass index is 27.25 kg/(m^2). Weight: 63.3 kg (139 lb 8.8 oz)  Oxygen Therapy:  Pulse Oximetry: 98 %, O2 (LPM): 0, O2 Delivery: None (Room Air)    Physical Exam   Constitutional: He is  oriented to person, place, and time.   Lying in bed, in no apparent distress.     Neck: No JVD present.   Cardiovascular: Intact distal pulses.    Tachycardic.  Irregularly irregular rhythm.     Pulmonary/Chest: Effort normal and breath sounds normal. No respiratory distress. He has no wheezes. He has no rales.   Abdominal: Soft. Bowel sounds are normal. He exhibits no distension. There is no tenderness.   Neurological: He is alert and oriented to person, place, and time.   Psychiatric: Affect normal.           Lab Data Review:         8/21/2017  5:15 AM    Recent Labs      08/19/17   0836  08/19/17   2314  08/20/17   0826   SODIUM  135  131*  133*   POTASSIUM  4.7  4.8  4.9   CHLORIDE  104  105  106   CO2  19*  20  18*   BUN  57*  63*  58*   CREATININE  3.16*  3.28*  2.98*   CALCIUM  8.9  8.9  9.1       Recent Labs      08/18/17   0919   08/19/17 0836  08/19/17   2314  08/20/17   0826   ALTSGPT  35   --   35   --   32   ASTSGOT  24   --   23   --   21   ALKPHOSPHAT  59   --   67   --   66   TBILIRUBIN  0.5   --   0.5   --   0.5   GLUCOSE  155*   < >  218*  85  126*    < > = values in this interval not displayed.       Recent Labs      08/19/17   0226   08/19/17   2314  08/20/17   0517  08/21/17   0041   PROTHROMBTM  21.4*   --   24.7*   --   27.6*   APTT   --    < >  85.9*  72.4*  57.8*   INR  1.80*   --   2.15*   --   2.48*    < > = values in this interval not displayed.       Recent Labs      08/18/17   0919  08/19/17   0836  08/20/17   0826   ASTSGOT  24  23  21   ALTSGPT  35  35  32   ALKPHOSPHAT  59  67  66   TBILIRUBIN  0.5  0.5  0.5           Assessment/Plan     * Atrial fibrillation with RVR (CMS-HCC) (present on admission)  Assessment & Plan  -New onset vs paroxysmal (vague PMH of afib and non-compliance with meds)  -overnight He was given diltiazem by NF due to Afib with nonsustained HR in 160s. - now in SR  -CHADS2: 4  -ECHO: severe LVH, global hypokinesis, EF ~20%, severe AI and mod MR  -LOBO: slow  blood flow but no thrombus. Severe AI    Plan  - Na restricted diet.  - Continue scheduled metoprolol, PRN 5mg metoprolol q6h.    - Hydralazine discontinued.  Follow up with Cardiology re: further recommendations tonight/tomorrow.  - If HR < 50 or > 120s , is PERSISTENT and SYMPTOMATIC, will administer diltiazem 10 mg.   -Cardioversion will likely be done during AV replacement.     CVA (cerebral vascular accident) (CMS-HCC) (present on admission)  Assessment & Plan  -h/o left facial droop, slurred speech and left sided weakness. However no neuro deficits currently  -CT: cerebral atrophy and small vessel ischemic changes   -MRI brain: acute infarction in L frontal periventricular white matter extendeing to subinsular cortex. Punctate area of infarction in R posterior lobe. Numerous microbleeds.   Assessment: Afib could have contributed (LOBO showed 'sludge' in the heart)  Plan  -ASA, statin  -Mx of Afib as stated above.    TREASURE (acute kidney injury) (CMS-HCC) (present on admission)  Assessment & Plan  -Acute on chronic. Likely pre-renal vs cardiorenal. HTN is likely the cause of CKD  -On adm: Cr: 4.3, AGMA, Ph 6.1. Unknown baseline.  -FeNa <1%  -H/o renal disease since 7yrs, FH kidney disease in his father. Has been non-complaint with HTN medications. - has CHF  -Renal US: no hydronephrosis and calculi. Left renal cysts.  - Cr and K slowly increasing    Plan  - Per Nephrology, no further HD planned inpatient.  Outpatient follow up in CKD clinic.   - Continue Lasix.    HFrEF (heart failure with reduced ejection fraction) (CMS-HCC) (present on admission)  Assessment & Plan  -Presented with SOB, orthopnea, fatique and palpitations. Has h/o HTN and alcoholism in the past. On exam: JVD +., murmurs + but no volume overload.   -BNP 2203 >> 1753 > 2211.   -CXR: cardiomegaly. ECHO: severe LVH, global hypokinesis, EF ~20%, severe AI and mod MR  -Assessment: Compensated HF likely due to alcoholism vs tachycardia induced heart  failure.   -Cardiac surgery spoke with Mr Saini and family, he needs ischemic work up and AVR/MAZE w LOBO, but due to his renal status and recent stroke with hemorrhage, they do not recommend it at this time.   - Patient's undocumented alien status is another reason having a heart cath is diifficult.      Plan  - Na restricted diet.   - Patient does have follow up appointment with Dr. Gutierrez, Cardiology, on September 11.  - Patient unable to move back to [place] for further treatment for another 3-4 months due to finances.      Aortic regurgitation (present on admission)  Assessment & Plan  -Severe AR.   -ECHO:  severe LVH, global hypokinesis, EF ~20%, severe AI and mod MR. Findings were confirmed by LOBO  -Fits the criteria for valve replacement. Cardiology and cardiac surgery on board.    Cardiorenal syndrome  Assessment & Plan  -Diagnosis given by Nephrology - likely since urine Na is low  -Patient is on HD, and is urinated <500 ml yesterday.  -Creatinine in ED 4.38, Total protein Urine 208. Estimated GFR was 14.   -FENA 0.2%  -BNP 2203>> 2039   -CXR: cardiomegaly. ECHO showed: severe LVH, global hypokinesis, EF ~20%, AR and mod MR  Assessment: Cardiorenal syndrome  Plan  -Patient needs AVR/MAZE w LOBO per Cardiac Surgery, but due to his renal status and recent stroke with hemorrhage, they do not recommend at this time.  They will follow up outpatient in 6 weeks.   -Patient will be counseled on cessation of alcohol intake.   -Nephrology on board.         Electrolyte abnormality (present on admission)  Assessment & Plan  -Electrolytes wnl today.     Prolonged Q-T interval on ECG (present on admission)  Assessment & Plan  -QTc ~530s. Will avoid QTc prolonging meds.    Essential hypertension (present on admission)  Assessment & Plan  -PMH of HTN but likely non-compliant. ECHO showed severe LVH  -Stable on BB, hydralazine, nitrate, lasix     Alcoholic (CMS-HCC) (present on admission)  Assessment & Plan  -History is  vague but looks like he was drinking heavily in the past. Last drink was 8/5/2017.  -On thiamine and folate.       Hyperglycemia (present on admission)  Assessment & Plan  -Resolved  -On adm: , A1c: 5.4  -He was started on ISS and hypoglycemia protocol which is discontinued.  CTM with accuchecks    Non-rheumatic mitral regurgitation  Assessment & Plan  -Moderate MR on ECHO

## 2017-08-22 LAB
ANION GAP SERPL CALC-SCNC: 7 MMOL/L (ref 0–11.9)
BUN SERPL-MCNC: 61 MG/DL (ref 8–22)
CALCIUM SERPL-MCNC: 9.5 MG/DL (ref 8.5–10.5)
CHLORIDE SERPL-SCNC: 104 MMOL/L (ref 96–112)
CO2 SERPL-SCNC: 22 MMOL/L (ref 20–33)
CREAT SERPL-MCNC: 3.55 MG/DL (ref 0.5–1.4)
GFR SERPL CREATININE-BSD FRML MDRD: 18 ML/MIN/1.73 M 2
GLUCOSE SERPL-MCNC: 140 MG/DL (ref 65–99)
INR PPP: 2.83 (ref 0.87–1.13)
POTASSIUM SERPL-SCNC: 5.3 MMOL/L (ref 3.6–5.5)
PROTHROMBIN TIME: 30.6 SEC (ref 12–14.6)
SODIUM SERPL-SCNC: 133 MMOL/L (ref 135–145)

## 2017-08-22 PROCEDURE — 700102 HCHG RX REV CODE 250 W/ 637 OVERRIDE(OP): Performed by: INTERNAL MEDICINE

## 2017-08-22 PROCEDURE — A9270 NON-COVERED ITEM OR SERVICE: HCPCS | Performed by: INTERNAL MEDICINE

## 2017-08-22 PROCEDURE — 700102 HCHG RX REV CODE 250 W/ 637 OVERRIDE(OP): Performed by: STUDENT IN AN ORGANIZED HEALTH CARE EDUCATION/TRAINING PROGRAM

## 2017-08-22 PROCEDURE — 700102 HCHG RX REV CODE 250 W/ 637 OVERRIDE(OP)

## 2017-08-22 PROCEDURE — 36415 COLL VENOUS BLD VENIPUNCTURE: CPT

## 2017-08-22 PROCEDURE — 770020 HCHG ROOM/CARE - TELE (206)

## 2017-08-22 PROCEDURE — 80048 BASIC METABOLIC PNL TOTAL CA: CPT

## 2017-08-22 PROCEDURE — 85610 PROTHROMBIN TIME: CPT

## 2017-08-22 PROCEDURE — A9270 NON-COVERED ITEM OR SERVICE: HCPCS

## 2017-08-22 PROCEDURE — A9270 NON-COVERED ITEM OR SERVICE: HCPCS | Performed by: STUDENT IN AN ORGANIZED HEALTH CARE EDUCATION/TRAINING PROGRAM

## 2017-08-22 PROCEDURE — 99232 SBSQ HOSP IP/OBS MODERATE 35: CPT | Mod: GC | Performed by: INTERNAL MEDICINE

## 2017-08-22 RX ADMIN — METOPROLOL SUCCINATE 250 MG: 50 TABLET, EXTENDED RELEASE ORAL at 08:01

## 2017-08-22 RX ADMIN — STANDARDIZED SENNA CONCENTRATE AND DOCUSATE SODIUM 2 TABLET: 8.6; 5 TABLET, FILM COATED ORAL at 08:01

## 2017-08-22 RX ADMIN — Medication 100 MG: at 08:01

## 2017-08-22 RX ADMIN — ATORVASTATIN CALCIUM 40 MG: 40 TABLET, FILM COATED ORAL at 21:47

## 2017-08-22 RX ADMIN — FUROSEMIDE 40 MG: 40 TABLET ORAL at 08:01

## 2017-08-22 RX ADMIN — FOLIC ACID 1 MG: 1 TABLET ORAL at 08:01

## 2017-08-22 RX ADMIN — ISOSORBIDE MONONITRATE 60 MG: 30 TABLET ORAL at 08:01

## 2017-08-22 RX ADMIN — THERA TABS 1 TABLET: TAB at 08:01

## 2017-08-22 RX ADMIN — WARFARIN SODIUM 7.5 MG: 7.5 TABLET ORAL at 17:23

## 2017-08-22 ASSESSMENT — ENCOUNTER SYMPTOMS
PSYCHIATRIC NEGATIVE: 1
HEADACHES: 0
CONSTITUTIONAL NEGATIVE: 1
DIZZINESS: 0
EYES NEGATIVE: 1
CARDIOVASCULAR NEGATIVE: 1
NEUROLOGICAL NEGATIVE: 1
HEARTBURN: 0
VOMITING: 0
SHORTNESS OF BREATH: 0
HEMOPTYSIS: 0
PALPITATIONS: 0
PND: 0
WHEEZING: 0
CHILLS: 0
ABDOMINAL PAIN: 0
COUGH: 0
NAUSEA: 0
DIARRHEA: 0
ORTHOPNEA: 0
GASTROINTESTINAL NEGATIVE: 1
RESPIRATORY NEGATIVE: 1
CLAUDICATION: 0
MUSCULOSKELETAL NEGATIVE: 1
FEVER: 0

## 2017-08-22 ASSESSMENT — PAIN SCALES - GENERAL
PAINLEVEL_OUTOF10: 0

## 2017-08-22 NOTE — CARE PLAN
"Problem: Infection  Goal: Will remain free from infection  Outcome: PROGRESSING AS EXPECTED  VSS, afebrile, no s/s new infection, pt feels \"fine\"         "

## 2017-08-22 NOTE — PROGRESS NOTES
Shauna Downing Fall Risk Assessment:     Last Known Fall: No falls  Mobility: No limitations  Medications: Cardiovascular or central nervous system meds  Mental Status/LOC/Awareness: Awake, alert, and oriented to date, place, and person  Toileting Needs: No needs  Volume/Electrolyte Status: No problems  Communication/Sensory: No deficits  Behavior: Appropriate behavior  Shauna Downing Fall Risk Total: 3  Fall Risk Level: NO RISK    Universal Fall Precautions:  bed in low position and locked, call light/belongings in reach, siderails up x 2, wheelchairs and assistive devices out of sight, use non-slip footwear, adequate lighting, clutter free and spill free environment, educate on level of risk, educate to call for assistance    Fall Risk Level Interventions:   TRIAL (TELE 8, NEURO, MED AZALEA 5) Low Fall Risk Interventions  Place yellow fall risk ID band on patient: verified  Provide patient/family education based on risk assessment: verified  Educate patient/family to call staff for assistance when getting out of bed: verified  Place fall precaution signage outside patient door: verified      Patient Specific Interventions:     Medication: review medications with patient and family  Mental Status/LOC/Awareness: reinforce falls education  Toileting: monitor intake and output/use of appropriate interventions  Volume/Electrolyte Status: ensure patient remains hydrated  Communication/Sensory: update plan of care on whiteboard  Behavioral: engage patient in daily activities  Mobility: dangle prior to standing

## 2017-08-22 NOTE — PROGRESS NOTES
Nephrology Progress Note, Adult, Complex               Author:Katrina Lemusericabony   Date & Time created: 8/22/2017  3:19 PM     Interval History:  48 y/o male with severe, CHF  -EF 25 %, Severe AI, A.fib, CVA in TREASURE, metabolic acidosis  Off HD  Cr up slightly to 3.3 -3.5  No acute events, no complaints    Review of Systems:  Review of Systems   Constitutional: Positive for malaise/fatigue. Negative for fever and chills.   Respiratory: Negative for cough and hemoptysis.    Cardiovascular: Negative for chest pain, palpitations and orthopnea.   Gastrointestinal: Negative for heartburn, nausea, vomiting, abdominal pain and diarrhea.   Genitourinary: Negative for dysuria.   All other systems reviewed and are negative.      Physical Exam:  Physical Exam   Constitutional: He is oriented to person, place, and time. No distress.   HENT:   Head: Normocephalic and atraumatic.   Nose: Nose normal.   Eyes: Left eye exhibits no discharge. No scleral icterus.   Cardiovascular: An irregularly irregular rhythm present.   Pulmonary/Chest: Effort normal and breath sounds normal. No respiratory distress. He has no wheezes.   Musculoskeletal: He exhibits no edema or tenderness.   Trace pedal edema   Neurological: He is alert and oriented to person, place, and time.   Skin: Skin is warm and dry. He is not diaphoretic.   Psychiatric: He has a normal mood and affect.   Nursing note and vitals reviewed.      Labs:        Invalid input(s): [id number]      Recent Labs      08/20/17   0826 08/21/17   0041  08/22/17   0837   SODIUM  133*  132*  133*   POTASSIUM  4.9  4.5  5.3   CHLORIDE  106  104  104   CO2  18*  18*  22   BUN  58*  61*  61*   CREATININE  2.98*  3.30*  3.55*   CALCIUM  9.1  9.1  9.5     Recent Labs      08/20/17   0826  08/21/17   0041  08/22/17   0837   ALTSGPT  32  32   --    ASTSGOT  21  24   --    ALKPHOSPHAT  66  67   --    TBILIRUBIN  0.5  0.4   --    GLUCOSE  126*  73  140*     Recent Labs      08/19/17   2314  08/20/17    0517  17   0041  17   0705  17   0233   PROTHROMBTM  24.7*   --   27.6*   --   30.6*   APTT  85.9*  72.4*  57.8*  98.5*   --    INR  2.15*   --   2.48*   --   2.83*     Recent Labs      17   0826  17   0041   ASTSGOT  21  24   ALTSGPT  32  32   ALKPHOSPHAT  66  67   TBILIRUBIN  0.5  0.4           Hemodynamics:  Temp (24hrs), Av.7 °C (98 °F), Min:36.4 °C (97.6 °F), Max:36.9 °C (98.4 °F)  Temperature: 36.7 °C (98 °F)  Pulse  Av.3  Min: 50  Max: 155   Blood Pressure: 146/73 mmHg     Respiratory:    Respiration: 16, Pulse Oximetry: 94 %        RUL Breath Sounds: Clear, RML Breath Sounds: Clear, RLL Breath Sounds: Clear, SABAS Breath Sounds: Clear, LLL Breath Sounds: Clear  Fluids:    Intake/Output Summary (Last 24 hours) at 17 1519  Last data filed at 17 0800   Gross per 24 hour   Intake      0 ml   Output    600 ml   Net   -600 ml     Weight: 63 kg (138 lb 14.2 oz)  GI/Nutrition:  Orders Placed This Encounter   Procedures   • DIET ORDER     Standing Status: Standing      Number of Occurrences: 1      Standing Expiration Date:      Order Specific Question:  Diet:     Answer:  2 Gram Sodium [7]     Order Specific Question:  Diet:     Answer:  Cardiac [6]     Medical Decision Making, by Problem:  Active Hospital Problems    Diagnosis   • *Atrial fibrillation with RVR (CMS-[facility]) [I48.91]   • CVA (cerebral vascular accident) (CMS-HCC) [I63.9]   • Essential hypertension [I10]   • Respiratory failure (CMS-[facility]) [J96.90]   • Electrolyte abnormality [E87.8]   • Hyperglycemia [R73.9]   • Prolonged Q-T interval on ECG [R94.31]   • Elevated troponin [R74.8]   • Acute on chronic systolic heart failure (CMS-[facility]) [I50.23]   • Non-rheumatic mitral regurgitation [I34.0]   • TREASURE (acute kidney injury) (CMS-HCC) [N17.9]   • 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]       Labs reviewed                    Assessment and Plan    1.TREASURE :sec to cardiorenal Syndrome, Cr up slightly to 3.3   2.HTN: BP well  controlled  3.Electrolytes: mild hyponatremia  4.Anemia: Hb WNL -to monitor  5.Metabolic acidosis -corrected with HD  6.Volume overloaded:improving with Lasix  7.CHF -cardiology following  Plan  No further HD planned  Monitoring Cr  Will need outpatient follow up with nephrology clinic

## 2017-08-22 NOTE — PROGRESS NOTES
Internal Medicine Interval Note    Name Bhavesh Saini       1970   Age/Sex 47 y.o. male   MRN [id number]   Code Status FULL.     After 5PM or if no immediate response to page, please call for cross-coverage  Attending/Team: Akil/Troy.  See Patient List for primary contact information  Call [phone] to page    1st Call - Day Intern (R1):   Charlotte 2nd Call - Day Sr. Resident (R2/R3):   Nando         Reason for interval visit  (Principal Problem)   Atrial fibrillation with RVR (CMS-HCC)    Subjective: Patient reported feeling fine with no complaints.    Interval Problem Daily Status Update  (24 hours)     - No acute events overnight.  Heart rate still running fast intermittently with a range from .  - Per Nephrology, no further HD planned.  Patient will need outpatient follow up with CKD clinic.   - Per Cardiology, discontinue hydralazine for now. Continue beta blockers at current dose. Plan on chemical cardioversion after reviewing LOBO results.  - INR therapeutic. Aspirin stopped    Review of Systems   Constitutional: Negative for fever and chills.   Respiratory: Negative for cough and shortness of breath.    Cardiovascular: Negative for chest pain and palpitations.   Gastrointestinal: Negative for abdominal pain.   Neurological: Negative for dizziness and headaches.       Consultants/Specialty  Cardiology  Cardiac Surgery  Nephrology    Disposition  Inpatient for atrial fibrillation with RVR    Quality Measures  Labs reviewed, Medications reviewed and Radiology images reviewed  Truong catheter: No Truong      DVT Prophylaxis: Warfarin (Coumadin)                Physical Exam       Filed Vitals:    17 2300 17 0400 17 0658 17 1221   BP: 93/66 106/74 116/70 146/73   Pulse: 115 93 110 89   Temp: 36.7 °C (98 °F) 36.6 °C (97.8 °F) 36.4 °C (97.6 °F) 36.7 °C (98 °F)   Resp: 16 16 18 16   Height:       Weight:       SpO2: 98% 99% 99% 94%     Body mass index is 27.13  kg/(m^2). Weight: 63 kg (138 lb 14.2 oz)  Oxygen Therapy:  Pulse Oximetry: 94 %, O2 (LPM): 0, O2 Delivery: None (Room Air)    Physical Exam   Constitutional: He is oriented to person, place, and time.   Lying in bed, in no apparent distress.     Neck: No JVD present.   Cardiovascular: Intact distal pulses.    Tachycardic.  Irregularly irregular rhythm.     Pulmonary/Chest: Effort normal and breath sounds normal. No respiratory distress. He has no wheezes. He has no rales.   Abdominal: Soft. Bowel sounds are normal. He exhibits no distension. There is no tenderness.   Neurological: He is alert and oriented to person, place, and time.   Psychiatric: Affect normal.           Lab Data Review:         8/21/2017  5:15 AM    Recent Labs      08/20/17   0826 08/21/17 0041  08/22/17   0837   SODIUM  133*  132*  133*   POTASSIUM  4.9  4.5  5.3   CHLORIDE  106  104  104   CO2  18*  18*  22   BUN  58*  61*  61*   CREATININE  2.98*  3.30*  3.55*   CALCIUM  9.1  9.1  9.5       Recent Labs      08/20/17   0826  08/21/17   0041  08/22/17   0837   ALTSGPT  32  32   --    ASTSGOT  21  24   --    ALKPHOSPHAT  66  67   --    TBILIRUBIN  0.5  0.4   --    GLUCOSE  126*  73  140*       Recent Labs      08/19/17   2314  08/20/17   0517  08/21/17   0041  08/21/17   0705  08/22/17   0233   PROTHROMBTM  24.7*   --   27.6*   --   30.6*   APTT  85.9*  72.4*  57.8*  98.5*   --    INR  2.15*   --   2.48*   --   2.83*       Recent Labs      08/20/17   0826  08/21/17   0041   ASTSGOT  21  24   ALTSGPT  32  32   ALKPHOSPHAT  66  67   TBILIRUBIN  0.5  0.4           Assessment/Plan     * Atrial fibrillation with RVR (CMS-HCC) (present on admission)  Assessment & Plan  -New onset vs paroxysmal (vague PMH of afib and non-compliance with meds)  -Still goes into episodes of fast ventricular rate up to 150.  -CHADS2: 4  -ECHO: severe LVH, global hypokinesis, EF ~20%, severe AI and mod MR  -LOBO: slow blood flow but no thrombus. Severe AI    Plan  - Na  restricted diet.  - Continue scheduled metoprolol, PRN 5mg metoprolol q6h.  when necessary diltiazem with control.  - Hydralazine discontinued per cardiology. Plan also to review LOBO images and probably starting amiodarone infusion for chemical cardioversion/rate control.  -Anticoagulation with warfarin. INR therapeutic.    CVA (cerebral vascular accident) (CMS-HCC) (present on admission)  Assessment & Plan  -h/o left facial droop, slurred speech and left sided weakness. However no neuro deficits currently  -CT: cerebral atrophy and small vessel ischemic changes   -MRI brain: acute infarction in L frontal periventricular white matter extendeing to subinsular cortex. Punctate area of infarction in R posterior lobe. Numerous microbleeds.   Assessment: Afib could have contributed (LOBO showed 'sludge' in the heart)  Plan  -Started on ASA, statin initially. Aspirin stopped as his INR therapeutic now on Coumadin.  -Mx of Afib as stated above.    TREASURE (acute kidney injury) (CMS-HCC) (present on admission)  Assessment & Plan  -Acute on chronic. Likely pre-renal vs cardiorenal. HTN is likely the cause of CKD  -On adm: Cr: 4.3, AGMA, Ph 6.1. Unknown baseline.  -FeNa <1%  -H/o renal disease since 7yrs, FH kidney disease in his father. Has been non-complaint with HTN medications. - has CHF  -Renal US: no hydronephrosis and calculi. Left renal cysts.  - Cr and K slowly increasing    Plan  - Per Nephrology, no further HD planned inpatient.  Outpatient follow up in CKD clinic.   - Continue Lasix.    HFrEF (heart failure with reduced ejection fraction) (CMS-HCC) (present on admission)  Assessment & Plan  -Presented with SOB, orthopnea, fatique and palpitations. Has h/o HTN and alcoholism in the past. On exam: JVD +., murmurs + but no volume overload.   -BNP 2203 >> 1753 > 2211.   -CXR: cardiomegaly. ECHO: severe LVH, global hypokinesis, EF ~20%, severe AI and mod MR  -Assessment: Compensated HF likely due to alcoholism vs  tachycardia induced heart failure.   -Cardiac surgery spoke with Mr Saini and family, he needs ischemic work up and AVR/MAZE w LOBO, but due to his renal status and recent stroke with hemorrhage, they do not recommend it at this time.   - Patient's undocumented alien status is another reason having a heart cath is diifficult.      Plan  - Na restricted diet.   - Patient does have follow up appointment with Dr. Gutierrez, Cardiology, on September 11.  - Patient unable to move back to [place] for further treatment for another 3-4 months due to finances.      Aortic regurgitation (present on admission)  Assessment & Plan  -Severe AR.   -ECHO:  severe LVH, global hypokinesis, EF ~20%, severe AI and mod MR. Findings were confirmed by LOBO  -Fits the criteria for valve replacement. Cardiology and cardiac surgery on board.    Cardiorenal syndrome  Assessment & Plan  -Diagnosis given by Nephrology - likely since urine Na is low  -Patient is on HD, and is urinated <500 ml yesterday.  -Creatinine in ED 4.38, Total protein Urine 208. Estimated GFR was 14.   -FENA 0.2%  -BNP 2203>> 2039   -CXR: cardiomegaly. ECHO showed: severe LVH, global hypokinesis, EF ~20%, AR and mod MR  Assessment: Cardiorenal syndrome  Plan  -Patient needs AVR/MAZE w LOBO per Cardiac Surgery, but due to his renal status and recent stroke with hemorrhage, they do not recommend at this time.  They will follow up outpatient in 6 weeks.   -Patient will be counseled on cessation of alcohol intake.   -Nephrology on board.         Electrolyte abnormality (present on admission)  Assessment & Plan  -Monitoring and replacement as needed.    Prolonged Q-T interval on ECG (present on admission)  Assessment & Plan  -QTc ~530s. Will avoid QTc prolonging meds.    Essential hypertension (present on admission)  Assessment & Plan  -PMH of HTN but likely non-compliant. ECHO showed severe LVH  -Stable on BB, nitrate, lasix     Alcoholic (CMS-HCC) (present on  admission)  Assessment & Plan  -History is vague but looks like he was drinking heavily in the past. Last drink was 8/5/2017.  -On thiamine and folate.       Hyperglycemia (present on admission)  Assessment & Plan  -Resolved  -On adm: , A1c: 5.4  -He was started on ISS and hypoglycemia protocol which is discontinued.  CTM with accuchecks    Aortic insufficiency:  -Cardiology following.  -No plans for surgery until stabilization of acute issue.    Non-rheumatic mitral regurgitation  Assessment & Plan  -Moderate MR on ECHO

## 2017-08-22 NOTE — PROGRESS NOTES
Inpatient Anticoagulation Service Note    Date: 8/22/2017  Reason for Anticoagulation: Atrial Fibrillation, Stroke   [id number] VASc Score: 4    Hemoglobin Value: 13.6  Hematocrit Value: 42  Lab Platelet Value: 221  Target INR: 2.0 to 3.0    INR from last 7 days     Date/Time INR Value    08/22/17 0233 (!)2.83    08/21/17 0041 (!)2.48    08/19/17 2314 (!)2.15    08/19/17 0226 (!)1.8    08/17/17 2346 (!)1.22    08/17/17 0928 1.1    08/16/17 0547 1.04        Dose from last 7 days     Date/Time Dose (mg)    08/22/17 1200 7.5    08/21/17 1100 7.5    08/20/17 1400 7.5    08/19/17 1000 7.5    08/18/17 1100 7.5    08/17/17 1200 10    08/16/17 1000 5    08/15/17 1600 5        Average Dose (mg):  (new start - appears to be ~ 7.5mg daily)  Significant Interactions: Aspirin, Statin (MVI)  Bridge Therapy: Yes (complete)  Bridge Therapy Start Date: 08/14/17  Days of Overlap Therapy: 6   INR Value Greater than 2 Prior to Discontinuation of Parenteral Anticoagulation: Yes     Reversal Agent Administered: Not Applicable  Comments: INR therapeutic. Continue current dose. No new DDI. NNL to assess h/h; no indication of bleeding noted. If INR continues to be therapeutic, recommend twice weekly INR's.     Plan:  Warfarin 7.5mg with INR check tomorrow  Education Material Provided?: Yes (SG - 8/20)  Pharmacist suggested discharge dosing: Warfarin 7.5mg PO daily with close f/u within 3 days       Delma Collier, PharmD.

## 2017-08-22 NOTE — PROGRESS NOTES
Internal Medicine Medical Student Note    Name Bhavesh Saini       1970   Age/Sex 47 y.o. male   MRN [id number]   Code Status Full      After 5PM or if no immediate response to page, please call for cross-coverage  Attending/Team: Akil/Oniel  See Patient List for primary contact information  Call [phone] to page after hours   1st Call - Day Intern (R1):   [place]  2nd Call - Day Sr. Resident (R2/R3):   Nando          Reason for interval visit  (Principal Problem)   Atrial fibrillation with RVR (CMS-HCC)    Interval Problem Daily Status Update  (24 hours)   Afib w/ RVR - HR 's; asymptomatic; INR 2.83; per cards - dc hydralazine, continue Toprol-XL 250mg daily; consider pharmacologic cardioversion with amiodarone drip after discussion of LOBO with Dr Gutierrez; continue heparin and warfarin   HFrEF - asymptomatic; euvolemic; continue BBL, lasix, Imdur  TREASURE - no further HD per nephrology; continue to monitor Cr; arrange follow up with nephrology clinic   Difficult Discharge - pt has appt at [facility] on  to establish and receive medications; pending discharge meds per cards      Review of Systems   Constitutional: Negative.    HENT: Negative.    Eyes: Negative.    Respiratory: Negative.    Cardiovascular: Negative.    Gastrointestinal: Negative.    Genitourinary: Negative.    Musculoskeletal: Negative.    Skin: Negative.    Neurological: Negative.    Endo/Heme/Allergies: Negative.                Physical Exam       Filed Vitals:    17 2300 17 0400 17 0658 17 1221   BP: 93/66 106/74 116/70 146/73   Pulse: 115 93 110 89   Temp: 36.7 °C (98 °F) 36.6 °C (97.8 °F) 36.4 °C (97.6 °F) 36.7 °C (98 °F)   Resp: 16 16 18 16   Height:       Weight:       SpO2: 98% 99% 99% 94%     Body mass index is 27.13 kg/(m^2). Weight: 63 kg (138 lb 14.2 oz)  Oxygen Therapy:  Pulse Oximetry: 94 %, O2 (LPM): 0, O2 Delivery: None (Room Air)    Physical Exam   Constitutional: He is oriented to  person, place, and time and well-developed, well-nourished, and in no distress.   HENT:   Head: Normocephalic and atraumatic.   Eyes: Conjunctivae and EOM are normal.   Neck: Normal range of motion. Neck supple.   Cardiovascular:   Tachycardic, irregularly irregular    Pulmonary/Chest: Effort normal and breath sounds normal. No respiratory distress. He has no wheezes. He has no rales.   Abdominal: Soft. Bowel sounds are normal. He exhibits no distension. There is no tenderness. There is no rebound and no guarding.   Musculoskeletal: He exhibits no edema.   Neurological: He is alert and oriented to person, place, and time.   Skin: Skin is warm and dry.             Assessment/Plan     46yo M with HFrEF due to alcoholism vs uncontrolled HTN presents with afib RVR uncontrolled with rate control therapy.     Afib with RVR  -New onset vs paroxysmal (vague PMH of afib and non-compliance with meds) vs persistent (if nonremitting >7 days)    -continued rate control failure on BBL- asymptomatic tachy chase problems     - 's overnight  -CHADS2: 4  -ECHO: severe LVH, global hypokinesis, EF ~20%, severe AI and mod MR  -LOBO: slow blood flow but no thrombus. Severe AI  -INR 2.83 today    Plan:    -per cards: dc hydralazine, continue Toprol XL 250mg, consider amiodarone after discussing risk based on LOBO interpretation with Dr. Gutierrez   -continue metoprolol 5mg q6h PRN    -continue anticoagulation on warfarin and UF    HFrEF    -likely decompensated hypertensive vs tachycardia induced vs alcoholic cardiomyopathy vs calcific aortic valve disease (LOBO shows left coronary cusp with nodule calcification resulting in severe central eccentric aortic regurg)       -JVD + but no volume overload (no pulm edema, ascites, pedal edema)  -BNP 2203 >> 1753 > 2211   -ECHO: severe LVH, global hypokinesis, EF ~20%, severe AI and mod MR  -Cardiac surgery spoke with Mr Saini and family, he needs ischemic work up and AVR/MAZE w LOBO, but due to  his renal status and recent stroke with hemorrhage, they do not recommend it at this time     Plan:  - Na restricted diet.    -continue to follow cardiology recommendations    -pt plans to move back to [place] in 4-5mo once he has saved enough money to undergo elective valve repair  -continue lasix per nephrology     TREASURE on CKD  -likely pre-renal vs cardiorenal on CKD (h/o HTN noncompliant with meds)  -On adm: Cr: 3.55 today- Unknown baseline.  -FeNa <1%  -Renal US: no hydronephrosis and calculi. Left renal cysts.    Plan  -Nephrology on board - no further HD, continue lasix, will need outpatient follow up with nephrology clinic  -continue to monitor K     CVA    -possibly related to afib (LOBO showed sludge in heart) vs HTN  -h/o left facial droop, slurred speech and left sided weakness. However no neuro deficits currently  -CT: cerebral atrophy and small vessel ischemic changes    -MRI brain: acute infarction in L frontal periventricular white matter extendeing to subinsular cortex. Punctate area of infarction in R posterior lobe. Numerous microbleeds.      Plan:  -ASA, statin    Essential hypertension    -PMH of HTN but likely non-compliant. ECHO showed severe LVH  -SBP     Plan:    -dc hydralazine   -continue  imdur, BBL, lasix     Disposition:    Inpatient for afib medication optimization and social barriers to discharge.

## 2017-08-22 NOTE — PROGRESS NOTES
Shauna Downing Fall Risk Assessment:     Last Known Fall: No falls  Mobility: No limitations  Medications: Cardiovascular or central nervous system meds, Diuretics  Mental Status/LOC/Awareness: Oriented to person and place  Toileting Needs: No needs  Volume/Electrolyte Status: No problems  Communication/Sensory: No deficits  Behavior: Appropriate behavior  Shauna Downing Fall Risk Total: 4  Fall Risk Level: NO RISK    Universal Fall Precautions:  use non-slip footwear, clutter free and spill free environment, educate on level of risk, bed in low position and locked, call light/belongings in reach    Fall Risk Level Interventions:   TRIAL (TELE 8, NEURO, MED AZALEA 5) Low Fall Risk Interventions  Place yellow fall risk ID band on patient: verified  Provide patient/family education based on risk assessment: verified  Educate patient/family to call staff for assistance when getting out of bed: verified  Place fall precaution signage outside patient door: verified      Patient Specific Interventions:     Medication: review medications with patient and family  Mental Status/LOC/Awareness: reinforce the use of call light  Toileting: do not leave patient unattended in bathroom/refer to toileting scripting  Volume/Electrolyte Status: ensure patient remains hydrated  Communication/Sensory: update plan of care on whiteboard  Behavioral: encourage patient to voice feelings  Mobility: ensure bed is locked and in lowest position

## 2017-08-22 NOTE — PROGRESS NOTES
"Cardiology Progress Note               Author: Ifrah Flores Date & Time created: 8/22/2017  9:00 AM     Interval History:  47 years old male present with dyspnea. He has history of CKD and prior sig etoh use. Recent history of left sided weakness and CVA.      Consultation: dyspnea     Echocardiography Laboratory  Trileaflet aortic valve. The left coronary cusp has a nodule   calcification with poor coaptation resulting in severe central   eccentric aortic regurgitation.  Severely reduced left ventricular systolic function.  Left ventricular ejection fraction is visually estimated to be 20%.  Severe concentric left ventricular hypertrophy.  Normal right ventricular size and systolic function.  Mild mitral regurgitation.  These findings were communicated to the ICU service.    Brain MRI: 8/10/17  1.  Mild diffuse cerebral atrophy.  2.  Small areas of acute infarction involving the left frontal periventricular white matter extending inferiorly to the subinsular cortex. Punctate area of infarction involving the cortex in the right posterior occipital lobe.  3.  Moderate periventricular white matter changes consistent with chronic microvascular ischemic gliosis.  4.  Small chronic focus of lacunar type infarction in the right frontal periventricular white matter.  5.  Numerous chronic \"microbleed's\" are noted throughout the brain parenchyma in both the supra and infratentorial compartments. Gyriform/curvilinear regions of chronic \"microbleed\" noted in the right posterior frontal region and also the left frontal   periventricular white matter.    8/22/17:  /70      telemetry monitor afib     Review of Systems   Respiratory: Negative for cough, shortness of breath and wheezing.    Cardiovascular: Negative for chest pain, palpitations, orthopnea and leg swelling.   All other systems reviewed and are negative.      Physical Exam   Constitutional: He is oriented to person, place, and time. He appears " well-developed and well-nourished.   HENT:   Head: Normocephalic.   Cardiovascular: Normal rate and normal heart sounds.  An irregularly irregular rhythm present.   No murmur heard.  Pulmonary/Chest: Effort normal and breath sounds normal. No respiratory distress. He has no wheezes.   Abdominal: Bowel sounds are normal.   Musculoskeletal: He exhibits no edema or tenderness.   Neurological: He is alert and oriented to person, place, and time.   Skin: Skin is warm and dry.   Psychiatric: His behavior is normal. Judgment normal.   Nursing note and vitals reviewed.      Hemodynamics:  Temp (24hrs), Av.6 °C (97.9 °F), Min:36.4 °C (97.5 °F), Max:36.9 °C (98.4 °F)  Temperature: 36.4 °C (97.6 °F)  Pulse  Av.2  Min: 50  Max: 155   Blood Pressure: 116/70 mmHg     Respiratory:    Respiration: 18, Pulse Oximetry: 99 %        RUL Breath Sounds: Clear, RML Breath Sounds: Clear, RLL Breath Sounds: Clear, SABAS Breath Sounds: Clear, LLL Breath Sounds: Clear  Fluids:     Weight: 63 kg (138 lb 14.2 oz)  GI/Nutrition:  Orders Placed This Encounter   Procedures   • DIET ORDER     Standing Status: Standing      Number of Occurrences: 1      Standing Expiration Date:      Order Specific Question:  Diet:     Answer:  2 Gram Sodium [7]     Order Specific Question:  Diet:     Answer:  Cardiac [6]     Lab Results:      Recent Labs      17   2314  17   0826  17   0041   SODIUM  131*  133*  132*   POTASSIUM  4.8  4.9  4.5   CHLORIDE  105  106  104   CO2  20  18*  18*   GLUCOSE  85  126*  73   BUN  63*  58*  61*   CREATININE  3.28*  2.98*  3.30*   CALCIUM  8.9  9.1  9.1     Recent Labs      17   2314  17   0517  17   0041  17   0705  17   0233   APTT  85.9*  72.4*  57.8*  98.5*   --    INR  2.15*   --   2.48*   --   2.83*                     Medical Decision Making, by Problem:  Active Hospital Problems    Diagnosis   • *Atrial fibrillation with RVR (CMS-HCC) [I48.91]   • Cardiorenal  syndrome [I13.10]   • Aortic regurgitation [I35.1]   • HFrEF (heart failure with reduced ejection fraction) (CMS-HCC) [I50.20]   • CVA (cerebral vascular accident) (CMS-HCC) [I63.9]   • TREASURE (acute kidney injury) (CMS-HCC) [N17.9]   • Prolonged Q-T interval on ECG [R94.31]   • Electrolyte abnormality [E87.8]   • Non-rheumatic mitral regurgitation [I34.0]   • Essential hypertension [I10]   • 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 Atrial fibrillation:  - continue toprol XL 250mg qd  - telemetry monitor afib   - oral anticoagulation with coumadin, started heparin gtt on 8/9 then bridged over to coumadin       2. Cardiomyopathy:  - ECHO ef 20-25%, Severe concentric left ventricular hypertrophy.  - diuresis furosemide 40mg qd  - I/O -11k    3. Severe aortic insufficiency:  - recent CVA not a candidate for surgery now    4. TREASURE:  - followed by nephrology   - Cr: 3.30    5. CVA:  - MRI brain, acute infarction in left frontal periventricular white matter extending to subinsular cortex, punctate area of infarction in right posterior lobe, numerous microbleed    Core Measures

## 2017-08-22 NOTE — PROGRESS NOTES
Patient A+Ox4, independently walking around room with steady gait. Macedonian speaking only, used phone interpretor services this AM with this RN. Pt denies any discomfort. Using call bell approp, neuros strong bilaterally, no deficits. Bed locked and in lowest position. RN and CNA numbers on the board. Hourly rounding in place.

## 2017-08-22 NOTE — PROGRESS NOTES
Cardiology Progress Note               Author: Bee Marrufo Date & Time created: 2017  3:10 PM     Interval History:   no acute overnight events. Patient daughter translated for us today denied any complaints of chest pain with activity.   In A. fib with heart rates up to 120s.  Telemetry: Atrial fibrillation       Review of Systems   Constitutional: Negative.    HENT: Negative.    Eyes: Negative.    Respiratory: Negative for cough and shortness of breath.    Cardiovascular: Negative for chest pain, palpitations, orthopnea, claudication, leg swelling and PND.   Gastrointestinal: Negative.    Genitourinary: Negative.    Musculoskeletal: Negative.    Skin: Negative.    Neurological: Negative.    Psychiatric/Behavioral: Negative.       Physical Exam   Constitutional: He is oriented to person, place, and time.   HENT:   Head: Normocephalic.   Neck: No JVD present. No thyromegaly present.   Cardiovascular: An irregularly irregular rhythm present. Tachycardia present.    Pulses:       Carotid pulses are 2+ on the right side, and 2+ on the left side.       Radial pulses are 2+ on the right side, and 2+ on the left side.   Pulmonary/Chest: He has no wheezes.   Abdominal: Soft.   Musculoskeletal: He exhibits no edema.   Neurological: He is alert and oriented to person, place, and time.   Skin: Skin is warm and dry.       Hemodynamics:  Temp (24hrs), Av.6 °C (97.9 °F), Min:36.4 °C (97.5 °F), Max:36.9 °C (98.4 °F)  Temperature: 36.7 °C (98 °F)  Pulse  Av.3  Min: 50  Max: 155   Blood Pressure: 146/73 mmHg     Respiratory:    Respiration: 16, Pulse Oximetry: 94 %        RUL Breath Sounds: Clear, RML Breath Sounds: Clear, RLL Breath Sounds: Clear, SABAS Breath Sounds: Clear, LLL Breath Sounds: Clear  Fluids:  Date 17 0700 - 17 0659   Shift 3675-3741 5142-6299 5751-2200 24 Hour Total   I  N  T  A  K  E   Shift Total       O  U  T  P  U  T   Urine  120  120    Shift Total  120  120   Weight (kg) 62  62 62 62       Weight: 63 kg (138 lb 14.2 oz)  GI/Nutrition:  Orders Placed This Encounter   Procedures   • DIET ORDER     Standing Status: Standing      Number of Occurrences: 1      Standing Expiration Date:      Order Specific Question:  Diet:     Answer:  2 Gram Sodium [7]     Order Specific Question:  Diet:     Answer:  Cardiac [6]     Lab Results:      Recent Labs      08/20/17   0826  08/21/17   0041  08/22/17   0837   SODIUM  133*  132*  133*   POTASSIUM  4.9  4.5  5.3   CHLORIDE  106  104  104   CO2  18*  18*  22   GLUCOSE  126*  73  140*   BUN  58*  61*  61*   CREATININE  2.98*  3.30*  3.55*   CALCIUM  9.1  9.1  9.5     Recent Labs      08/19/17   2314  08/20/17   0517  08/21/17   0041  08/21/17   0705  08/22/17   0233   APTT  85.9*  72.4*  57.8*  98.5*   --    INR  2.15*   --   2.48*   --   2.83*                   TTE: 8/10/17  Trileaflet aortic valve. The left coronary cusp has a nodule calcification with poor coaptation resulting in severe central eccentric aortic regurgitation.  Severely reduced left ventricular systolic function.  Left ventricular ejection fraction is visually estimated to be 20%.  Severe concentric left ventricular hypertrophy.  Normal right ventricular size and systolic function.  Mild mitral regurgitation.  These findings were communicated to the ICU service.    TTE: 8/9/17  No prior study is available for comparison.   Severely reduced left ventricular systolic function. Global hypokinesis.  Left ventricular ejection fraction is visually estimated to be 25%.  Diastolic function is difficult to assess with atrial fibrillation.  Normal right ventricular size and systolic function.  Moderate mitral regurgitation.  Severe eccentric aortic insufficiency.  Estimated right ventricular systolic pressure  is 60 mmHg.  These findings are known by cardiology consultation    Brain MRI: 8/10/17  1.  Mild diffuse cerebral atrophy.  2.  Small areas of acute infarction involving the left frontal  "periventricular white matter extending inferiorly to the subinsular cortex. Punctate area of infarction involving the cortex in the right posterior occipital lobe.  3.  Moderate periventricular white matter changes consistent with chronic microvascular ischemic gliosis.  4.  Small chronic focus of lacunar type infarction in the right frontal periventricular white matter.  5.  Numerous chronic \"microbleed's\" are noted throughout the brain parenchyma in both the supra and infratentorial compartments. Gyriform/curvilinear regions of chronic \"microbleed\" noted in the right posterior frontal region and also the left frontal   periventricular white matter.  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  Continue Coumadin with target INR of 2-3.  Will discuss LOBO images with Dr. Gutierrez if no evidence of left atrial appendage clot plan to start IV amiodarone drip.    Cardiomyopathy LVEF of 20-25%:  Tachycardia-induced cardiomyopathy versus valvular heart disease versus ischemic cardiomyopathy.  Unfortunately unable to control his heart rate.    Severe aortic insufficiency:  Due to recent stroke with hemorrhage not a candidate for surgery now.    TREASURE, appreciate nephrology,  cardiorenal syndrome, Cr=2.98, (  last HD on 8/14/17 )    CVA, (MRI brain, acute infarction in left frontal periventricular white matter " extending to subinsular cortex, punctate area of infarction in right posterior lobe, numerous microbleed, on ASA, Lipitor, ? neuro consult    Thank you for allowing me to participate in taking care of patient.    Bee Marrufo. MD.    Medications reviewed and Labs reviewed

## 2017-08-22 NOTE — HEART FAILURE PROGRAM
Cardiovascular Nurse Navigator () Progress Note:     Patient has the following appointment in three days:    DAVID Siu Go on 8/25/2017 Follow up with primary care. Please arrive at 1:00 pm for a 1:30 pm appointment. Thank you. [facility]  [address]  [phone]    Disposition is still unclear. There are no therapy notes and no discharge planning notes since 8/18.    Thank you and please call CHRIS Haile with any questions: [phone].

## 2017-08-23 LAB
ANION GAP SERPL CALC-SCNC: 11 MMOL/L (ref 0–11.9)
BUN SERPL-MCNC: 65 MG/DL (ref 8–22)
CALCIUM SERPL-MCNC: 8.8 MG/DL (ref 8.5–10.5)
CHLORIDE SERPL-SCNC: 106 MMOL/L (ref 96–112)
CO2 SERPL-SCNC: 16 MMOL/L (ref 20–33)
CREAT SERPL-MCNC: 3.57 MG/DL (ref 0.5–1.4)
GFR SERPL CREATININE-BSD FRML MDRD: 18 ML/MIN/1.73 M 2
GLUCOSE SERPL-MCNC: 84 MG/DL (ref 65–99)
INR PPP: 3.14 (ref 0.87–1.13)
LV EJECT FRACT  99904: 20
MAGNESIUM SERPL-MCNC: 2.2 MG/DL (ref 1.5–2.5)
PHOSPHATE SERPL-MCNC: 5.8 MG/DL (ref 2.5–4.5)
POTASSIUM SERPL-SCNC: 4.8 MMOL/L (ref 3.6–5.5)
PROTHROMBIN TIME: 33.2 SEC (ref 12–14.6)
SODIUM SERPL-SCNC: 133 MMOL/L (ref 135–145)

## 2017-08-23 PROCEDURE — 36415 COLL VENOUS BLD VENIPUNCTURE: CPT

## 2017-08-23 PROCEDURE — 80048 BASIC METABOLIC PNL TOTAL CA: CPT

## 2017-08-23 PROCEDURE — 770020 HCHG ROOM/CARE - TELE (206)

## 2017-08-23 PROCEDURE — 700102 HCHG RX REV CODE 250 W/ 637 OVERRIDE(OP): Performed by: INTERNAL MEDICINE

## 2017-08-23 PROCEDURE — 700111 HCHG RX REV CODE 636 W/ 250 OVERRIDE (IP): Performed by: INTERNAL MEDICINE

## 2017-08-23 PROCEDURE — 99232 SBSQ HOSP IP/OBS MODERATE 35: CPT | Mod: GC | Performed by: INTERNAL MEDICINE

## 2017-08-23 PROCEDURE — 83735 ASSAY OF MAGNESIUM: CPT

## 2017-08-23 PROCEDURE — 84100 ASSAY OF PHOSPHORUS: CPT

## 2017-08-23 PROCEDURE — A9270 NON-COVERED ITEM OR SERVICE: HCPCS | Performed by: STUDENT IN AN ORGANIZED HEALTH CARE EDUCATION/TRAINING PROGRAM

## 2017-08-23 PROCEDURE — 85610 PROTHROMBIN TIME: CPT

## 2017-08-23 PROCEDURE — A9270 NON-COVERED ITEM OR SERVICE: HCPCS | Performed by: INTERNAL MEDICINE

## 2017-08-23 PROCEDURE — 700105 HCHG RX REV CODE 258: Performed by: INTERNAL MEDICINE

## 2017-08-23 PROCEDURE — 700102 HCHG RX REV CODE 250 W/ 637 OVERRIDE(OP): Performed by: STUDENT IN AN ORGANIZED HEALTH CARE EDUCATION/TRAINING PROGRAM

## 2017-08-23 RX ORDER — DEXTROSE MONOHYDRATE 50 MG/ML
500 INJECTION, SOLUTION INTRAVENOUS CONTINUOUS
Status: DISCONTINUED | OUTPATIENT
Start: 2017-08-23 | End: 2017-09-05 | Stop reason: ALTCHOICE

## 2017-08-23 RX ORDER — WARFARIN SODIUM 5 MG/1
5 TABLET ORAL
Status: DISCONTINUED | OUTPATIENT
Start: 2017-08-24 | End: 2017-08-24

## 2017-08-23 RX ADMIN — Medication 100 MG: at 07:31

## 2017-08-23 RX ADMIN — THERA TABS 1 TABLET: TAB at 07:32

## 2017-08-23 RX ADMIN — METOPROLOL SUCCINATE 250 MG: 50 TABLET, EXTENDED RELEASE ORAL at 07:31

## 2017-08-23 RX ADMIN — AMIODARONE HYDROCHLORIDE 1 MG/MIN: 50 INJECTION, SOLUTION INTRAVENOUS at 14:53

## 2017-08-23 RX ADMIN — FUROSEMIDE 40 MG: 40 TABLET ORAL at 07:32

## 2017-08-23 RX ADMIN — DEXTROSE MONOHYDRATE 500 ML: 50 INJECTION, SOLUTION INTRAVENOUS at 14:29

## 2017-08-23 RX ADMIN — AMIODARONE HYDROCHLORIDE 150 MG: 50 INJECTION, SOLUTION INTRAVENOUS at 14:28

## 2017-08-23 RX ADMIN — FOLIC ACID 1 MG: 1 TABLET ORAL at 07:32

## 2017-08-23 RX ADMIN — ISOSORBIDE MONONITRATE 60 MG: 30 TABLET ORAL at 07:32

## 2017-08-23 RX ADMIN — ATORVASTATIN CALCIUM 40 MG: 40 TABLET, FILM COATED ORAL at 20:18

## 2017-08-23 RX ADMIN — STANDARDIZED SENNA CONCENTRATE AND DOCUSATE SODIUM 2 TABLET: 8.6; 5 TABLET, FILM COATED ORAL at 20:18

## 2017-08-23 ASSESSMENT — ENCOUNTER SYMPTOMS
ORTHOPNEA: 0
ABDOMINAL PAIN: 0
RESPIRATORY NEGATIVE: 1
CLAUDICATION: 0
CHILLS: 0
WHEEZING: 0
CARDIOVASCULAR NEGATIVE: 1
VOMITING: 0
HEADACHES: 0
NAUSEA: 0
MUSCULOSKELETAL NEGATIVE: 1
NEUROLOGICAL NEGATIVE: 1
DIARRHEA: 0
COUGH: 0
PND: 0
FEVER: 0
PSYCHIATRIC NEGATIVE: 1
CONSTITUTIONAL NEGATIVE: 1
HEMOPTYSIS: 0
HEARTBURN: 0
SHORTNESS OF BREATH: 0
PALPITATIONS: 0
EYES NEGATIVE: 1
GASTROINTESTINAL NEGATIVE: 1

## 2017-08-23 ASSESSMENT — PAIN SCALES - GENERAL
PAINLEVEL_OUTOF10: 0
PAINLEVEL_OUTOF10: 0

## 2017-08-23 NOTE — PROGRESS NOTES
Inpatient Anticoagulation Service Note    Date: 8/23/2017  Reason for Anticoagulation: Atrial Fibrillation   [id number] VASc Score: 4    Hemoglobin Value: 13.6  Hematocrit Value: 42  Lab Platelet Value: 221  Target INR: 2.0 to 3.0    INR from last 7 days     Date/Time INR Value    08/23/17 0214 (!)3.14    08/22/17 0233 (!)2.83    08/21/17 0041 (!)2.48    08/19/17 2314 (!)2.15    08/19/17 0226 (!)1.8    08/17/17 2346 (!)1.22    08/17/17 0928 1.1        Dose from last 7 days     Date/Time Dose (mg)    08/22/17 1200 7.5    08/21/17 1100 7.5    08/20/17 1400 7.5    08/19/17 1000 7.5    08/18/17 1100 7.5    08/17/17 1200 10        Average Dose (mg):  (new start - appears to be ~ 7.5mg daily)  Significant Interactions: Amiodarone, Aspirin, Statin (MVI)  Bridge Therapy: Yes (complete)  Bridge Therapy Start Date: 08/14/17  Days of Overlap Therapy: 6   INR Value Greater than 2 Prior to Discontinuation of Parenteral Anticoagulation: Yes    Reversal Agent Administered: Not Applicable  Comments: INR slightly supra-therapeutic. Cardiology started a Amiodarone gtt protocol today. Will hold today's dose and decrease daily dosing to 5mg. New DDI with amiodarone. NNL to assess H/H; no indication of bleeding noted.     Plan:  Hold warfarin x1 dose with INR check tomorrow  Education Material Provided?: Yes (SG - 8/20)  Pharmacist suggested discharge dosing: Warfarin 5mg PO daily with close f/u within 3 days       Delma Collier, PharmD.

## 2017-08-23 NOTE — PROGRESS NOTES
Amiodarone drip initiated, pharmacy aware and Charge RN Brenda aware. Peripheral site without s/s extravasation or phlebitis. /71, HR ranges from , AFib. Pt asymptomatic

## 2017-08-23 NOTE — CARE PLAN
Problem: Communication  Goal: The ability to communicate needs accurately and effectively will improve  Outcome: PROGRESSING AS EXPECTED  Ongoing communication concerning tests, labs, procedures and disease process.         Problem: Safety  Goal: Will remain free from injury  Outcome: PROGRESSING AS EXPECTED  Hourly rounding, bed low and locked. Call light within reach. Alarms on for safety.

## 2017-08-23 NOTE — PROGRESS NOTES
Patient A+Ox4, sitting up on couch eating breakfast. Used interpretor services this AM, patient understands that his heart rate is high and we are treating it with medication. HR at time of AM meds hitting 160's but not sustaining, pt asymptomatic. Using call bell approp for needs, walking independently in hallway. Bed locked and in lowest position, RN and CNA numbers on whiteboard. Hourly rounding in place, this RN spoke with [employer] resident and updated him on cardiac status.

## 2017-08-23 NOTE — CARE PLAN
Problem: Skin Integrity  Goal: Risk for impaired skin integrity will decrease  Outcome: PROGRESSING AS EXPECTED  Skin intact, patient repositioning herself in bed, turning by staff, frequent skin checks and gaby care given, nutrition has been optimized, patient drinking 100% of Boost

## 2017-08-23 NOTE — PROGRESS NOTES
"Cardiology Progress Note               Author: Ifrah Flores Date & Time created: 8/23/2017  9:57 AM     Interval History:  47 years old male present with dyspnea. He has history of CKD and prior sig etoh use. Recent history of left sided weakness and CVA.      Consultation: dyspnea     Echocardiography Laboratory  Trileaflet aortic valve. The left coronary cusp has a nodule   calcification with poor coaptation resulting in severe central   eccentric aortic regurgitation.  Severely reduced left ventricular systolic function.  Left ventricular ejection fraction is visually estimated to be 20%.  Severe concentric left ventricular hypertrophy.  Normal right ventricular size and systolic function.  Mild mitral regurgitation.  These findings were communicated to the ICU service.    Brain MRI: 8/10/17  1.  Mild diffuse cerebral atrophy.  2.  Small areas of acute infarction involving the left frontal periventricular white matter extending inferiorly to the subinsular cortex. Punctate area of infarction involving the cortex in the right posterior occipital lobe.  3.  Moderate periventricular white matter changes consistent with chronic microvascular ischemic gliosis.  4.  Small chronic focus of lacunar type infarction in the right frontal periventricular white matter.  5.  Numerous chronic \"microbleed's\" are noted throughout the brain parenchyma in both the supra and infratentorial compartments. Gyriform/curvilinear regions of chronic \"microbleed\" noted in the right posterior frontal region and also the left frontal   periventricular white matter.    8/23/17: denies any chest pain or CHAVARRIA  /78  HR 88    telemetry monitor afib 110-161    Labs reviewed   Na 133  K 4.8  Creatinine 3.57  BUN 65    Review of Systems   Respiratory: Negative for cough, shortness of breath and wheezing.    Cardiovascular: Negative for chest pain, palpitations, orthopnea and leg swelling.   All other systems reviewed and are " negative.      Physical Exam   Constitutional: He is oriented to person, place, and time. He appears well-developed and well-nourished.   HENT:   Head: Normocephalic.   Cardiovascular: Normal rate and normal heart sounds.  An irregularly irregular rhythm present.   No murmur heard.  Pulmonary/Chest: Effort normal and breath sounds normal. No respiratory distress. He has no wheezes.   Abdominal: Bowel sounds are normal.   Musculoskeletal: He exhibits no edema or tenderness.   Neurological: He is alert and oriented to person, place, and time.   Skin: Skin is warm and dry.   Psychiatric: His behavior is normal. Judgment normal.   Nursing note and vitals reviewed.      Hemodynamics:  Temp (24hrs), Av.6 °C (97.9 °F), Min:36.4 °C (97.5 °F), Max:36.9 °C (98.4 °F)  Temperature: 36.4 °C (97.5 °F)  Pulse  Av.5  Min: 50  Max: 155   Blood Pressure: 109/78 mmHg     Respiratory:    Respiration: 16, Pulse Oximetry: 98 %        RUL Breath Sounds: Clear, RML Breath Sounds: Clear, RLL Breath Sounds: Clear, SABAS Breath Sounds: Clear, LLL Breath Sounds: Clear  Fluids:     Weight: 63 kg (138 lb 14.2 oz)  GI/Nutrition:  Orders Placed This Encounter   Procedures   • DIET ORDER     Standing Status: Standing      Number of Occurrences: 1      Standing Expiration Date:      Order Specific Question:  Diet:     Answer:  2 Gram Sodium [7]     Order Specific Question:  Diet:     Answer:  Cardiac [6]     Lab Results:      Recent Labs      17   00417   0837  17   0214   SODIUM  132*  133*  133*   POTASSIUM  4.5  5.3  4.8   CHLORIDE  104  104  106   CO2  18*  22  16*   GLUCOSE  73  140*  84   BUN  61*  61*  65*   CREATININE  3.30*  3.55*  3.57*   CALCIUM  9.1  9.5  8.8     Recent Labs      17   0041  17   0705  17   0233  17   0214   APTT  57.8*  98.5*   --    --    INR  2.48*   --   2.83*  3.14*                     Medical Decision Making, by Problem:  Active Hospital Problems    Diagnosis   •  *Atrial fibrillation with RVR (CMS-HCC) [I48.91]   • Cardiorenal syndrome [I13.10]   • Aortic regurgitation [I35.1]   • HFrEF (heart failure with reduced ejection fraction) (CMS-HCC) [I50.20]   • CVA (cerebral vascular accident) (CMS-HCC) [I63.9]   • TREASURE (acute kidney injury) (CMS-HCC) [N17.9]   • Prolonged Q-T interval on ECG [R94.31]   • Electrolyte abnormality [E87.8]   • Non-rheumatic mitral regurgitation [I34.0]   • Essential hypertension [I10]   • 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 Atrial fibrillation:  - continue toprol XL 250mg qd  - telemetry monitor afib   - oral anticoagulation with coumadin, started heparin gtt on 8/9 then bridged over to coumadin   - pending IV amiodarone drip if there is no evidence of left atrial appendage clot       2. Cardiomyopathy:  - ECHO ef 20-25%, Severe concentric left ventricular hypertrophy.  - diuresis furosemide 40mg qd  - I/O -11k    3. Severe aortic insufficiency:  - recent CVA not a candidate for surgery now    4. TREASURE:  - followed by nephrology   - Cr: 3.30    5. CVA:  - MRI brain, acute infarction in left frontal periventricular white matter extending to subinsular cortex, punctate area of infarction in right posterior lobe, numerous microbleed    Core Measures

## 2017-08-23 NOTE — CARE PLAN
"Problem: Infection  Goal: Will remain free from infection  Outcome: PROGRESSING AS EXPECTED  VSS, afebrile, no s/s infection, patient feels \"good\"        "

## 2017-08-23 NOTE — HEART FAILURE PROGRAM
Cardiovascular Nurse Navigator () Progress Note:  Requested that hospital schedulers push patient's appointment with the [facility] [place] until next week. Was scheduled for 8/25 but with difficulties controlling HR, seems unlikely that this is a reasonable appointment.    Thank you and please call with questions, Lazara

## 2017-08-23 NOTE — PROGRESS NOTES
Internal Medicine Interval Note    Name Bhavesh Saini       1970   Age/Sex 47 y.o. male   MRN [id number]   Code Status FULL.     After 5PM or if no immediate response to page, please call for cross-coverage  Attending/Team: Akil/Troy. See Patient List for primary contact information  Call [phone] to page    1st Call - Day Intern (R1):   Charlotte 2nd Call - Day Sr. Resident (R2/R3):   Nando         Reason for interval visit  (Principal Problem)   Atrial fibrillation with RVR (CMS-[facility])    Interval Problem Daily Status Update  (24 hours)   - No acute events overnight.  Heart rate 70s-161.  Remained asymptomatic.    - Per Nephrology, no further HD planned.  Patient will need outpatient follow up with CKD clinic.   - Per Cardiology, discontinue hydralazine for now.  Began amiodarone drip.    - INR 3.14.    - Ambulating and eating well.      Review of Systems   Constitutional: Negative for fever and chills.   Respiratory: Negative for cough and shortness of breath.    Cardiovascular: Negative for chest pain and palpitations.   Gastrointestinal: Negative for abdominal pain.   Neurological: Negative for headaches.       Consultants/Specialty  Cardiology  Cardiac Surgery  Nephrology    Disposition  Inpatient for atrial fibrillation with RVR.    Quality Measures  Radiology images reviewed, Labs reviewed and Medications reviewed  Truong catheter: No Truong      DVT Prophylaxis: Warfarin (Coumadin)                  Physical Exam       Filed Vitals:    17 1625 17 1902 17 0002 17 0402   BP: 113/88 128/107 116/79 109/85   Pulse: 147 74 74 88   Temp: 36.4 °C (97.6 °F) 36.6 °C (97.9 °F) 36.9 °C (98.4 °F) 36.6 °C (97.8 °F)   Resp: 18 16 16 16   Height:       Weight:  63 kg (138 lb 14.2 oz)     SpO2: 98% 92% 94% 99%     Body mass index is 27.13 kg/(m^2). Weight: 63 kg (138 lb 14.2 oz)  Oxygen Therapy:  Pulse Oximetry: 99 %, O2 (LPM): 0, O2 Delivery: None (Room Air)    Physical Exam    Constitutional: He is oriented to person, place, and time.   Lying in bed, in no apparent distress.    Neck: No JVD present.   Cardiovascular:   Tachycardic.   Irregularly irregular rhythm.   Pulmonary/Chest: Effort normal and breath sounds normal. No respiratory distress.   Abdominal: Soft. Bowel sounds are normal. He exhibits no distension.   Musculoskeletal: He exhibits no edema.   Neurological: He is alert and oriented to person, place, and time.   Psychiatric: Affect normal.        Lab Data Review:         8/23/2017  5:00 AM    Recent Labs      08/21/17 0041 08/22/17   0837  08/23/17   0214   SODIUM  132*  133*  133*   POTASSIUM  4.5  5.3  4.8   CHLORIDE  104  104  106   CO2  18*  22  16*   BUN  61*  61*  65*   CREATININE  3.30*  3.55*  3.57*   MAGNESIUM   --    --   2.2   PHOSPHORUS   --    --   5.8*   CALCIUM  9.1  9.5  8.8       Recent Labs      08/20/17 0826 08/21/17 0041 08/22/17 0837  08/23/17   0214   ALTSGPT  32  32   --    --    ASTSGOT  21  24   --    --    ALKPHOSPHAT  66  67   --    --    TBILIRUBIN  0.5  0.4   --    --    GLUCOSE  126*  73  140*  84       Recent Labs      08/20/17   0517  08/21/17   0041  08/21/17   0705  08/22/17   0233  08/23/17   0214   PROTHROMBTM   --   27.6*   --   30.6*  33.2*   APTT  72.4*  57.8*  98.5*   --    --    INR   --   2.48*   --   2.83*  3.14*       Recent Labs      08/20/17   0826  08/21/17   0041   ASTSGOT  21  24   ALTSGPT  32  32   ALKPHOSPHAT  66  67   TBILIRUBIN  0.5  0.4           Assessment/Plan     * Atrial fibrillation with RVR (CMS-HCC) (present on admission)  Assessment & Plan  -New onset vs paroxysmal (vague PMH of afib and non-compliance with meds)  -overnight He was given diltiazem by NF due to Afib with nonsustained HR in 160s. - now in SR  -CHADS2: 4  -ECHO: severe LVH, global hypokinesis, EF ~20%, severe AI and mod MR  -LOBO: slow blood flow but no thrombus. Severe AI    Plan  - Na restricted diet.  - Continue scheduled metoprolol,  PRN 5mg metoprolol q6h.    - Hydralazine discontinued.  Patient started on amiodarone drip.   - If HR < 50 or > 120s , is PERSISTENT and SYMPTOMATIC, will administer diltiazem 10 mg.   -Cardioversion will likely be done during AV replacement.     CVA (cerebral vascular accident) (CMS-HCC) (present on admission)  Assessment & Plan  -h/o left facial droop, slurred speech and left sided weakness. However no neuro deficits currently  -CT: cerebral atrophy and small vessel ischemic changes   -MRI brain: acute infarction in L frontal periventricular white matter extendeing to subinsular cortex. Punctate area of infarction in R posterior lobe. Numerous microbleeds.   Assessment: Afib could have contributed (LOBO showed 'sludge' in the heart)  Plan  -ASA, statin  -Mx of Afib as stated above.    TREASURE (acute kidney injury) (CMS-HCC) (present on admission)  Assessment & Plan  -Acute on chronic. Likely pre-renal vs cardiorenal. HTN is likely the cause of CKD  -On adm: Cr: 4.3, AGMA, Ph 6.1. Unknown baseline.  -FeNa <1%  -H/o renal disease since 7yrs, FH kidney disease in his father. Has been non-complaint with HTN medications. - has CHF  -Renal US: no hydronephrosis and calculi. Left renal cysts.  - Cr and K slowly increasing    Plan  - Per Nephrology, no further HD planned inpatient.  Outpatient follow up in CKD clinic.   - Continue Lasix.    HFrEF (heart failure with reduced ejection fraction) (CMS-HCC) (present on admission)  Assessment & Plan  -Presented with SOB, orthopnea, fatique and palpitations. Has h/o HTN and alcoholism in the past. On exam: JVD +., murmurs + but no volume overload.   -BNP 2203 >> 1753 > 2211.   -CXR: cardiomegaly. ECHO: severe LVH, global hypokinesis, EF ~20%, severe AI and mod MR  -Assessment: Compensated HF likely due to alcoholism vs tachycardia induced heart failure.   -Cardiac surgery spoke with Mr Saini and family, he needs ischemic work up and AVR/MAZE w LOBO, but due to his renal status and  recent stroke with hemorrhage, they do not recommend it at this time.   - Patient's undocumented alien status is another reason having a heart cath is diifficult.      Plan  - Na restricted diet.   - Patient does have follow up appointment with Dr. Gutierrez, Cardiology, on September 11.  - Patient's family will fly him to [place] for further care after discharge.      Aortic regurgitation (present on admission)  Assessment & Plan  -Severe AR.   -ECHO:  severe LVH, global hypokinesis, EF ~20%, severe AI and mod MR. Findings were confirmed by LOBO  -Fits the criteria for valve replacement. Cardiology and cardiac surgery on board.    Cardiorenal syndrome  Assessment & Plan  -Diagnosis given by Nephrology - likely since urine Na is low  -Patient is on HD, and is urinated <500 ml yesterday.  -Creatinine in ED 4.38, Total protein Urine 208. Estimated GFR was 14.   -FENA 0.2%  -BNP 2203>> 2039   -CXR: cardiomegaly. ECHO showed: severe LVH, global hypokinesis, EF ~20%, AR and mod MR  Assessment: Cardiorenal syndrome  Plan  -Patient needs AVR/MAZE w LOBO per Cardiac Surgery, but due to his renal status and recent stroke with hemorrhage, they do not recommend at this time.  They will follow up outpatient in 6 weeks.   -Patient will be counseled on cessation of alcohol intake.   -Nephrology on board.         Electrolyte abnormality (present on admission)  Assessment & Plan  -Electrolytes wnl today.     Prolonged Q-T interval on ECG (present on admission)  Assessment & Plan  -QTc ~530s. Will avoid QTc prolonging meds.    Essential hypertension (present on admission)  Assessment & Plan  -PMH of HTN but likely non-compliant. ECHO showed severe LVH  -Stable on BB, hydralazine, nitrate, lasix     Alcoholic (CMS-HCC) (present on admission)  Assessment & Plan  -History is vague but looks like he was drinking heavily in the past. Last drink was 8/5/2017.  -On thiamine and folate.       Hyperglycemia (present on admission)  Assessment &  Plan  -Resolved  -On adm: , A1c: 5.4  -He was started on ISS and hypoglycemia protocol which is discontinued.  CTM with accuchecks    Non-rheumatic mitral regurgitation  Assessment & Plan  -Moderate MR on ECHO

## 2017-08-23 NOTE — PROGRESS NOTES
Shauna Downing Fall Risk Assessment:     Last Known Fall: No falls  Mobility: No limitations  Medications: Cardiovascular or central nervous system meds  Mental Status/LOC/Awareness: Awake, alert, and oriented to date, place, and person  Toileting Needs: No needs  Volume/Electrolyte Status: No problems  Communication/Sensory: No deficits  Behavior: Appropriate behavior  Shauna Downing Fall Risk Total: 3  Fall Risk Level: NO RISK    Universal Fall Precautions:  call light/belongings in reach, bed in low position and locked, adequate lighting, use non-slip footwear, clutter free and spill free environment    Fall Risk Level Interventions:   TRIAL (TELE 8, NEURO, MED AZALEA 5) Low Fall Risk Interventions  Place yellow fall risk ID band on patient: verified  Provide patient/family education based on risk assessment: verified  Educate patient/family to call staff for assistance when getting out of bed: verified  Place fall precaution signage outside patient door: verified      Patient Specific Interventions:     Medication: review medications with patient and family  Mental Status/LOC/Awareness: provide activity  Toileting: do not leave patient unattended in bathroom/refer to toileting scripting  Volume/Electrolyte Status: monitor abnormal lab values  Communication/Sensory: update plan of care on whiteboard  Behavioral: engage patient in daily activities  Mobility: ensure bed is locked and in lowest position

## 2017-08-23 NOTE — PROGRESS NOTES
Pt awakened for bp. Pt hR increased to 161- non sustained. Pt denies pain, denies nausea, cont to monitor.

## 2017-08-23 NOTE — OR SURGEON
Met with patient during bedside report. Pt denies pain, no needs at this time. Bed low and locked, call light within reach. Instructed to call for assistance.

## 2017-08-23 NOTE — PROGRESS NOTES
Cardiology Progress Note               Author: Bee Marrufo Date & Time created: 2017  1:26 PM     Interval History:   no acute overnight events. Patient daughter translated for us today denied any complaints of chest pain with activity.   In A. fib with heart rates up to 120s.   no acute overnight events. Still A. fib with heart rates up to 120.  Telemetry: Atrial fibrillation       Review of Systems   Constitutional: Negative.    HENT: Negative.    Eyes: Negative.    Respiratory: Negative for cough and shortness of breath.    Cardiovascular: Negative for chest pain, palpitations, orthopnea, claudication, leg swelling and PND.   Gastrointestinal: Negative.    Genitourinary: Negative.    Musculoskeletal: Negative.    Skin: Negative.    Neurological: Negative.    Psychiatric/Behavioral: Negative.       Physical Exam   Constitutional: He is oriented to person, place, and time.   HENT:   Head: Normocephalic.   Neck: No JVD present. No thyromegaly present.   Cardiovascular: An irregularly irregular rhythm present. Tachycardia present.    Pulses:       Carotid pulses are 2+ on the right side, and 2+ on the left side.       Radial pulses are 2+ on the right side, and 2+ on the left side.   Pulmonary/Chest: He has no wheezes.   Abdominal: Soft.   Musculoskeletal: He exhibits no edema.   Neurological: He is alert and oriented to person, place, and time.   Skin: Skin is warm and dry.       Hemodynamics:  Temp (24hrs), Av.6 °C (97.9 °F), Min:36.4 °C (97.5 °F), Max:36.9 °C (98.4 °F)  Temperature: 36.9 °C (98.4 °F)  Pulse  Av.9  Min: 50  Max: 155   Blood Pressure: 108/81 mmHg     Respiratory:    Respiration: 18, Pulse Oximetry: 98 %        RUL Breath Sounds: Clear, RML Breath Sounds: Clear, RLL Breath Sounds: Clear, SABAS Breath Sounds: Clear, LLL Breath Sounds: Clear  Fluids:  Date 17 0700 - 17 0659   Shift 4606-1313 0684-0181 7859-9906 24 Hour Total   I  N  T  A  K  E   Shift Total        O  U  T  P  U  T   Urine  120  120    Shift Total  120  120   Weight (kg) 62 62 62 62       Weight: 63 kg (138 lb 14.2 oz)  GI/Nutrition:  Orders Placed This Encounter   Procedures   • DIET ORDER     Standing Status: Standing      Number of Occurrences: 1      Standing Expiration Date:      Order Specific Question:  Diet:     Answer:  2 Gram Sodium [7]     Order Specific Question:  Diet:     Answer:  Cardiac [6]     Lab Results:      Recent Labs      08/21/17   0041  08/22/17   0837  08/23/17   0214   SODIUM  132*  133*  133*   POTASSIUM  4.5  5.3  4.8   CHLORIDE  104  104  106   CO2  18*  22  16*   GLUCOSE  73  140*  84   BUN  61*  61*  65*   CREATININE  3.30*  3.55*  3.57*   CALCIUM  9.1  9.5  8.8     Recent Labs      08/21/17   0041  08/21/17   0705  08/22/17   0233  08/23/17   0214   APTT  57.8*  98.5*   --    --    INR  2.48*   --   2.83*  3.14*                   TTE: 8/10/17  Trileaflet aortic valve. The left coronary cusp has a nodule calcification with poor coaptation resulting in severe central eccentric aortic regurgitation.  Severely reduced left ventricular systolic function.  Left ventricular ejection fraction is visually estimated to be 20%.  Severe concentric left ventricular hypertrophy.  Normal right ventricular size and systolic function.  Mild mitral regurgitation.  These findings were communicated to the ICU service.    TTE: 8/9/17  No prior study is available for comparison.   Severely reduced left ventricular systolic function. Global hypokinesis.  Left ventricular ejection fraction is visually estimated to be 25%.  Diastolic function is difficult to assess with atrial fibrillation.  Normal right ventricular size and systolic function.  Moderate mitral regurgitation.  Severe eccentric aortic insufficiency.  Estimated right ventricular systolic pressure  is 60 mmHg.  These findings are known by cardiology consultation    Brain MRI: 8/10/17  1.  Mild diffuse cerebral atrophy.  2.  Small areas  "of acute infarction involving the left frontal periventricular white matter extending inferiorly to the subinsular cortex. Punctate area of infarction involving the cortex in the right posterior occipital lobe.  3.  Moderate periventricular white matter changes consistent with chronic microvascular ischemic gliosis.  4.  Small chronic focus of lacunar type infarction in the right frontal periventricular white matter.  5.  Numerous chronic \"microbleed's\" are noted throughout the brain parenchyma in both the supra and infratentorial compartments. Gyriform/curvilinear regions of chronic \"microbleed\" noted in the right posterior frontal region and also the left frontal   periventricular white matter.  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  Continue Coumadin with target INR of 2-3.  Reviewed the images with Dr. Gutierrez evidence of left atrial smoke but no evidence of clot   Patient has been on anticoagulation either on heparin or on Coumadin since LOBO was done.  Start IV amiodarone drip today more for rate control.    Cardiomyopathy valvular heart disease versus A. fib with RVR  Patient appears euvolemic.  He will also be started on IV amiodarone for A. fib rate control     Severe aortic insufficiency   Due to acute CVA with hemorrhage not a candidate for surgery now.    TREASURE, appreciate nephrology,  " cardiorenal syndrome, Cr=2.98, (  last HD on 8/14/17 )    CVA, (MRI brain, acute infarction in left frontal periventricular white matter extending to subinsular cortex, punctate area of infarction in right posterior lobe, numerous microbleed, on ASA, Lipitor, ? neuro consult    Thank you for allowing me to participate in taking care of patient.    Bee Marrufo. MD.    Medications reviewed and Labs reviewed

## 2017-08-23 NOTE — PROGRESS NOTES
Internal Medicine Medical Student Note    Name Bhavesh Saini       1970   Age/Sex 47 y.o. male   MRN [id number]   Code Status Full      After 5PM or if no immediate response to page, please call for cross-coverage  Attending/Team: Akil/Oniel  See Patient List for primary contact information  Call [phone] to page after hours   1st Call - Day Intern (R1):   Charlotte  2nd Call - Day Sr. Resident (R2/R3):   Nando         Reason for interval visit  (Principal Problem)   Atrial fibrillation with RVR (CMS-HCC)    Interval Problem Daily Status Update  (24 hours)   Afib RVR - HR tele 101-161; asymptomatic; INR therapeutic (3.14); DC ASA; continue Toprol-XL 250mg qd; per cards will start chemical cardioversion with amiodarone IV as no evidence of atrial appendage on LOBO  HFrEF - asymptomatic; euvolemic; continue BBL, lasix, Imdur; continue to hold hydralazine  CKD - Cr increased to 3.57 today; Phos 5.8; pending nephrology evaluation for need for HD    Review of Systems   Constitutional: Negative.    HENT: Negative.    Eyes: Negative.    Respiratory: Negative.    Cardiovascular: Negative.    Gastrointestinal: Negative.    Musculoskeletal: Negative.    Skin: Negative.    Neurological: Negative.    Endo/Heme/Allergies: Negative.                Physical Exam       Filed Vitals:    17 1625 17 1902 17 0002 17 0402   BP: 113/88 128/107 116/79 109/85   Pulse: 147 74 74 88   Temp: 36.4 °C (97.6 °F) 36.6 °C (97.9 °F) 36.9 °C (98.4 °F) 36.6 °C (97.8 °F)   Resp: 18 16 16 16   Height:       Weight:  63 kg (138 lb 14.2 oz)     SpO2: 98% 92% 94% 99%     Body mass index is 27.13 kg/(m^2). Weight: 63 kg (138 lb 14.2 oz)  Oxygen Therapy:  Pulse Oximetry: 99 %, O2 (LPM): 0, O2 Delivery: None (Room Air)    Physical Exam   Constitutional: He is oriented to person, place, and time and well-developed, well-nourished, and in no distress.   HENT:   Head: Normocephalic and atraumatic.   Eyes:  Conjunctivae and EOM are normal.   Neck: Normal range of motion.   Cardiovascular:   Irregularly irregular    Pulmonary/Chest: Effort normal and breath sounds normal. No respiratory distress. He has no wheezes. He has no rales.   Abdominal: Soft. Bowel sounds are normal. He exhibits no distension. There is no tenderness. There is no rebound and no guarding.   Musculoskeletal: He exhibits no edema.   Neurological: He is alert and oriented to person, place, and time.   Skin: Skin is warm and dry.             Assessment/Plan     48yo M with HFrEF secondary to alcoholism vs uncontrolled HTN inpatient for persistent afib w/ RVR uncontrolled on BBL and CKD.     Afib with RVR  -Paroxysmal (vague PMH of afib and non-compliance with meds) vs persistent ( nonremitting >7 days)    -continued rate control failure on BBL- asymptomatic tachy chase problems     - 101-160's overnight  -CHADS2: 4  -ECHO: severe LVH, global hypokinesis, EF ~20%, severe AI and mod MR  -LOBO: slow blood flow but no thrombus. Severe AI  -INR 3.14 today    Plan:    -per cards: start IV amiodarone after discussing risk based on LOBO interpretation with Dr. Gutierrez, no evidence of left atrial appendage clots   -continue Toprol-XL 250mg qd,  metoprolol 5mg q6h PRN    -continue anticoagulation on warfarin 5mg discharge dose, DC ASA    HFrEF    -likely decompensated hypertensive vs tachycardia induced vs alcoholic cardiomyopathy vs calcific aortic valve disease (LOBO shows left coronary cusp with nodule calcification resulting in severe central eccentric aortic regurg)       -JVD + but no volume overload (no pulm edema, ascites, pedal edema)  -BNP 2203 >> 1753 > 2211   -ECHO: severe LVH, global hypokinesis, EF ~20%, severe AI and mod MR  -needs ischemic work up and AVR/MAZE w LOBO, but due to his renal status and recent stroke with hemorrhage, cards does not recommend it at this time     Plan:  - Na restricted diet.    -continue to follow cardiology  recommendations    -pt plans to move back to [place] in 4-5mo once he has saved enough money to undergo elective valve repair  -continue lasix per nephrology     TREASURE on CKD  -likely pre-renal vs cardiorenal on CKD (h/o HTN noncompliant with meds)  -Cr: 3.57 today- Unknown baseline.  -UA proteinuria (500mg - 1.2 Grams) - likely hypertensive nephrosclerosis   -Phos 5.8 today  -Renal US: no hydronephrosis and calculi. Left renal cysts.    Plan  -Nephrology on board - pending recs for further HD  -continue lasix, will need outpatient follow up with nephrology clinic  -continue to monitor K     CVA    -possibly related to afib (LOBO showed sludge in heart) vs HTN  -h/o left facial droop, slurred speech and left sided weakness. However no neuro deficits currently  -CT: cerebral atrophy and small vessel ischemic changes    -MRI brain: acute infarction in L frontal periventricular white matter extendeing to subinsular cortex. Punctate area of infarction in R posterior lobe. Numerous microbleeds.      Plan:  -DC ASA as INR therapeutic on warfarin and UF - risks of bleeding>benefit on dual therapy with no ACS or stent/prosthetic valve  -continue statin       Essential hypertension    -PMH of HTN but likely non-compliant. ECHO showed severe LVH  --128 - stable    Plan:    -dc hydralazine    -continue  imdur, BBL, lasix       Disposition:   Inpatient for medication optimization for rate/rhythm control.

## 2017-08-24 ENCOUNTER — APPOINTMENT (OUTPATIENT)
Dept: RADIOLOGY | Facility: MEDICAL CENTER | Age: 47
DRG: 673 | End: 2017-08-24
Attending: STUDENT IN AN ORGANIZED HEALTH CARE EDUCATION/TRAINING PROGRAM
Payer: MEDICAID

## 2017-08-24 PROBLEM — E87.5 HYPERKALEMIA: Status: ACTIVE | Noted: 2017-08-24

## 2017-08-24 LAB
ANION GAP SERPL CALC-SCNC: 10 MMOL/L (ref 0–11.9)
ANION GAP SERPL CALC-SCNC: 11 MMOL/L (ref 0–11.9)
BUN SERPL-MCNC: 77 MG/DL (ref 8–22)
BUN SERPL-MCNC: 78 MG/DL (ref 8–22)
CALCIUM SERPL-MCNC: 9 MG/DL (ref 8.5–10.5)
CALCIUM SERPL-MCNC: 9.1 MG/DL (ref 8.5–10.5)
CHLORIDE SERPL-SCNC: 100 MMOL/L (ref 96–112)
CHLORIDE SERPL-SCNC: 100 MMOL/L (ref 96–112)
CO2 SERPL-SCNC: 18 MMOL/L (ref 20–33)
CO2 SERPL-SCNC: 18 MMOL/L (ref 20–33)
CREAT SERPL-MCNC: 4.15 MG/DL (ref 0.5–1.4)
CREAT SERPL-MCNC: 4.54 MG/DL (ref 0.5–1.4)
EKG IMPRESSION: NORMAL
GFR SERPL CREATININE-BSD FRML MDRD: 14 ML/MIN/1.73 M 2
GFR SERPL CREATININE-BSD FRML MDRD: 15 ML/MIN/1.73 M 2
GLUCOSE SERPL-MCNC: 104 MG/DL (ref 65–99)
GLUCOSE SERPL-MCNC: 91 MG/DL (ref 65–99)
INR PPP: 2.99 (ref 0.87–1.13)
POTASSIUM SERPL-SCNC: 5 MMOL/L (ref 3.6–5.5)
POTASSIUM SERPL-SCNC: 6.5 MMOL/L (ref 3.6–5.5)
POTASSIUM SERPL-SCNC: 6.5 MMOL/L (ref 3.6–5.5)
PROTHROMBIN TIME: 32 SEC (ref 12–14.6)
SODIUM SERPL-SCNC: 128 MMOL/L (ref 135–145)
SODIUM SERPL-SCNC: 129 MMOL/L (ref 135–145)

## 2017-08-24 PROCEDURE — 700111 HCHG RX REV CODE 636 W/ 250 OVERRIDE (IP): Performed by: INTERNAL MEDICINE

## 2017-08-24 PROCEDURE — 700105 HCHG RX REV CODE 258: Performed by: INTERNAL MEDICINE

## 2017-08-24 PROCEDURE — A9270 NON-COVERED ITEM OR SERVICE: HCPCS | Performed by: STUDENT IN AN ORGANIZED HEALTH CARE EDUCATION/TRAINING PROGRAM

## 2017-08-24 PROCEDURE — A9270 NON-COVERED ITEM OR SERVICE: HCPCS | Performed by: INTERNAL MEDICINE

## 2017-08-24 PROCEDURE — 36556 INSERT NON-TUNNEL CV CATH: CPT

## 2017-08-24 PROCEDURE — 36558 INSERT TUNNELED CV CATH: CPT

## 2017-08-24 PROCEDURE — 93930 UPPER EXTREMITY STUDY: CPT | Mod: 26 | Performed by: SURGERY

## 2017-08-24 PROCEDURE — 90935 HEMODIALYSIS ONE EVALUATION: CPT

## 2017-08-24 PROCEDURE — 700102 HCHG RX REV CODE 250 W/ 637 OVERRIDE(OP): Performed by: INTERNAL MEDICINE

## 2017-08-24 PROCEDURE — 93970 EXTREMITY STUDY: CPT | Mod: 26 | Performed by: SURGERY

## 2017-08-24 PROCEDURE — 770020 HCHG ROOM/CARE - TELE (206)

## 2017-08-24 PROCEDURE — 05HM33Z INSERTION OF INFUSION DEVICE INTO RIGHT INTERNAL JUGULAR VEIN, PERCUTANEOUS APPROACH: ICD-10-PCS | Performed by: RADIOLOGY

## 2017-08-24 PROCEDURE — 93005 ELECTROCARDIOGRAM TRACING: CPT | Performed by: STUDENT IN AN ORGANIZED HEALTH CARE EDUCATION/TRAINING PROGRAM

## 2017-08-24 PROCEDURE — 93010 ELECTROCARDIOGRAM REPORT: CPT | Performed by: INTERNAL MEDICINE

## 2017-08-24 PROCEDURE — 94760 N-INVAS EAR/PLS OXIMETRY 1: CPT

## 2017-08-24 PROCEDURE — 85610 PROTHROMBIN TIME: CPT

## 2017-08-24 PROCEDURE — B5131ZA FLUOROSCOPY OF RIGHT JUGULAR VEINS USING LOW OSMOLAR CONTRAST, GUIDANCE: ICD-10-PCS | Performed by: RADIOLOGY

## 2017-08-24 PROCEDURE — 84132 ASSAY OF SERUM POTASSIUM: CPT

## 2017-08-24 PROCEDURE — 700111 HCHG RX REV CODE 636 W/ 250 OVERRIDE (IP)

## 2017-08-24 PROCEDURE — 83735 ASSAY OF MAGNESIUM: CPT

## 2017-08-24 PROCEDURE — 700101 HCHG RX REV CODE 250: Performed by: STUDENT IN AN ORGANIZED HEALTH CARE EDUCATION/TRAINING PROGRAM

## 2017-08-24 PROCEDURE — 700102 HCHG RX REV CODE 250 W/ 637 OVERRIDE(OP): Performed by: STUDENT IN AN ORGANIZED HEALTH CARE EDUCATION/TRAINING PROGRAM

## 2017-08-24 PROCEDURE — 93970 EXTREMITY STUDY: CPT

## 2017-08-24 PROCEDURE — 77001 FLUOROGUIDE FOR VEIN DEVICE: CPT

## 2017-08-24 PROCEDURE — 700105 HCHG RX REV CODE 258: Performed by: STUDENT IN AN ORGANIZED HEALTH CARE EDUCATION/TRAINING PROGRAM

## 2017-08-24 PROCEDURE — 99233 SBSQ HOSP IP/OBS HIGH 50: CPT | Mod: GC | Performed by: INTERNAL MEDICINE

## 2017-08-24 PROCEDURE — 700111 HCHG RX REV CODE 636 W/ 250 OVERRIDE (IP): Performed by: STUDENT IN AN ORGANIZED HEALTH CARE EDUCATION/TRAINING PROGRAM

## 2017-08-24 PROCEDURE — 76937 US GUIDE VASCULAR ACCESS: CPT

## 2017-08-24 PROCEDURE — 99152 MOD SED SAME PHYS/QHP 5/>YRS: CPT

## 2017-08-24 PROCEDURE — 36415 COLL VENOUS BLD VENIPUNCTURE: CPT

## 2017-08-24 PROCEDURE — 80048 BASIC METABOLIC PNL TOTAL CA: CPT

## 2017-08-24 RX ORDER — CALCIUM GLUCONATE 94 MG/ML
1 INJECTION, SOLUTION INTRAVENOUS ONCE
Status: DISCONTINUED | OUTPATIENT
Start: 2017-08-24 | End: 2017-08-24

## 2017-08-24 RX ORDER — HEPARIN SODIUM 1000 [USP'U]/ML
INJECTION, SOLUTION INTRAVENOUS; SUBCUTANEOUS
Status: COMPLETED
Start: 2017-08-24 | End: 2017-08-24

## 2017-08-24 RX ORDER — MIDAZOLAM HYDROCHLORIDE 1 MG/ML
.5-2 INJECTION INTRAMUSCULAR; INTRAVENOUS PRN
Status: ACTIVE | OUTPATIENT
Start: 2017-08-24 | End: 2017-08-24

## 2017-08-24 RX ORDER — SODIUM CHLORIDE 9 MG/ML
500 INJECTION, SOLUTION INTRAVENOUS PRN
Status: COMPLETED | OUTPATIENT
Start: 2017-08-24 | End: 2017-08-27

## 2017-08-24 RX ORDER — SODIUM POLYSTYRENE SULFONATE 15 G/60ML
15 SUSPENSION ORAL; RECTAL ONCE
Status: COMPLETED | OUTPATIENT
Start: 2017-08-24 | End: 2017-08-24

## 2017-08-24 RX ORDER — DEXTROSE MONOHYDRATE 25 G/50ML
25 INJECTION, SOLUTION INTRAVENOUS ONCE
Status: COMPLETED | OUTPATIENT
Start: 2017-08-24 | End: 2017-08-24

## 2017-08-24 RX ORDER — WARFARIN SODIUM 7.5 MG/1
3.75 TABLET ORAL
Status: DISCONTINUED | OUTPATIENT
Start: 2017-08-24 | End: 2017-08-26

## 2017-08-24 RX ORDER — MIDAZOLAM HYDROCHLORIDE 1 MG/ML
INJECTION INTRAMUSCULAR; INTRAVENOUS
Status: COMPLETED
Start: 2017-08-24 | End: 2017-08-24

## 2017-08-24 RX ADMIN — DEXTROSE MONOHYDRATE 50 ML: 25 INJECTION, SOLUTION INTRAVENOUS at 07:00

## 2017-08-24 RX ADMIN — HEPARIN SODIUM 3000 UNITS: 1000 INJECTION, SOLUTION INTRAVENOUS; SUBCUTANEOUS at 22:18

## 2017-08-24 RX ADMIN — MIDAZOLAM 1 MG: 1 INJECTION INTRAMUSCULAR; INTRAVENOUS at 17:46

## 2017-08-24 RX ADMIN — FENTANYL CITRATE 50 MCG: 50 INJECTION, SOLUTION INTRAMUSCULAR; INTRAVENOUS at 17:46

## 2017-08-24 RX ADMIN — FUROSEMIDE 40 MG: 40 TABLET ORAL at 08:28

## 2017-08-24 RX ADMIN — FOLIC ACID 1 MG: 1 TABLET ORAL at 09:00

## 2017-08-24 RX ADMIN — ISOSORBIDE MONONITRATE 60 MG: 30 TABLET ORAL at 08:27

## 2017-08-24 RX ADMIN — Medication 100 MG: at 08:28

## 2017-08-24 RX ADMIN — THERA TABS 1 TABLET: TAB at 08:27

## 2017-08-24 RX ADMIN — SODIUM POLYSTYRENE SULFONATE 15 G: 15 SUSPENSION ORAL; RECTAL at 09:44

## 2017-08-24 RX ADMIN — WARFARIN SODIUM 3.75 MG: 7.5 TABLET ORAL at 18:37

## 2017-08-24 RX ADMIN — AMIODARONE HYDROCHLORIDE 0.5 MG/MIN: 50 INJECTION, SOLUTION INTRAVENOUS at 16:09

## 2017-08-24 RX ADMIN — MIDAZOLAM HYDROCHLORIDE 1 MG: 1 INJECTION INTRAMUSCULAR; INTRAVENOUS at 17:46

## 2017-08-24 RX ADMIN — AMIODARONE HYDROCHLORIDE 0.51 MG/MIN: 50 INJECTION, SOLUTION INTRAVENOUS at 00:43

## 2017-08-24 RX ADMIN — METOPROLOL SUCCINATE 250 MG: 50 TABLET, EXTENDED RELEASE ORAL at 08:27

## 2017-08-24 RX ADMIN — CALCIUM GLUCONATE 1 G: 94 INJECTION, SOLUTION INTRAVENOUS at 06:49

## 2017-08-24 RX ADMIN — HEPARIN: 100 SYRINGE at 17:30

## 2017-08-24 RX ADMIN — INSULIN HUMAN 10 UNITS: 100 INJECTION, SOLUTION PARENTERAL at 07:00

## 2017-08-24 ASSESSMENT — ENCOUNTER SYMPTOMS
DIARRHEA: 0
FEVER: 0
SHORTNESS OF BREATH: 0
COUGH: 0
HEADACHES: 0
NEUROLOGICAL NEGATIVE: 1
ORTHOPNEA: 0
HEARTBURN: 0
ABDOMINAL PAIN: 0
PND: 0
EYES NEGATIVE: 1
CHILLS: 0
HEMOPTYSIS: 0
VOMITING: 0
PSYCHIATRIC NEGATIVE: 1
CONSTITUTIONAL NEGATIVE: 1
PALPITATIONS: 0
GASTROINTESTINAL NEGATIVE: 1
TREMORS: 0
DIZZINESS: 0
NAUSEA: 0
TINGLING: 0
CLAUDICATION: 0
MUSCULOSKELETAL NEGATIVE: 1

## 2017-08-24 ASSESSMENT — PAIN SCALES - GENERAL
PAINLEVEL_OUTOF10: 0

## 2017-08-24 NOTE — PROGRESS NOTES
Nephrology Progress Note, Adult, Complex               Author:Katrina Merchant   Date & Time created: 8/24/2017  4:41 PM     Interval History:  48 y/o male with severe, CHF  -EF 25 %, Severe AI, A.fib, CVA in TREASURE, metabolic acidosis  Off HD  Cr at 3.5  No edema  No difficulties to urinate  No acute events, no complaints    Review of Systems:  Review of Systems   Constitutional: Positive for malaise/fatigue. Negative for fever and chills.   Respiratory: Negative for cough and hemoptysis.    Cardiovascular: Negative for chest pain, palpitations and orthopnea.   Gastrointestinal: Negative for heartburn, nausea, vomiting, abdominal pain and diarrhea.   Genitourinary: Negative for dysuria.   All other systems reviewed and are negative.      Physical Exam:  Physical Exam   Constitutional: He is oriented to person, place, and time. No distress.   HENT:   Head: Normocephalic and atraumatic.   Nose: Nose normal.   Eyes: Left eye exhibits no discharge. No scleral icterus.   Cardiovascular: An irregularly irregular rhythm present.   Pulmonary/Chest: Effort normal and breath sounds normal. No respiratory distress. He has no wheezes.   Musculoskeletal: He exhibits no edema or tenderness.   Trace pedal edema   Neurological: He is alert and oriented to person, place, and time.   Skin: Skin is warm and dry. He is not diaphoretic.   Psychiatric: He has a normal mood and affect.   Nursing note and vitals reviewed.      Labs:        Invalid input(s): [id number]      Recent Labs      08/23/17 0214 08/24/17 0213 08/24/17   0621  08/24/17   0816   SODIUM  133*  129*  128*   --    POTASSIUM  4.8  6.5*  6.5*  5.0   CHLORIDE  106  100  100   --    CO2  16*  18*  18*   --    BUN  65*  78*  77*   --    CREATININE  3.57*  4.15*  4.54*   --    MAGNESIUM  2.2   --    --    --    PHOSPHORUS  5.8*   --    --    --    CALCIUM  8.8  9.0  9.1   --      Recent Labs      08/23/17 0214 08/24/17   0213  08/24/17   0621   GLUCOSE  84  91  104*      Recent Labs      17   0233  17   0214  17   0213   PROTHROMBTM  30.6*  33.2*  32.0*   INR  2.83*  3.14*  2.99*               Hemodynamics:  Temp (24hrs), Av.3 °C (97.4 °F), Min:36.1 °C (96.9 °F), Max:36.7 °C (98 °F)  Temperature: 36.1 °C (96.9 °F)  Pulse  Av.6  Min: 50  Max: 155   Blood Pressure: 138/84 mmHg     Respiratory:    Respiration: 14, Pulse Oximetry: 100 %     Work Of Breathing / Effort: Mild  RUL Breath Sounds: Clear, RML Breath Sounds: Clear, RLL Breath Sounds: Diminished, SABAS Breath Sounds: Clear, LLL Breath Sounds: Diminished  Fluids:    Intake/Output Summary (Last 24 hours) at 17 1641  Last data filed at 17 0820   Gross per 24 hour   Intake    120 ml   Output      0 ml   Net    120 ml     Weight: 62.1 kg (136 lb 14.5 oz)  GI/Nutrition:  Orders Placed This Encounter   Procedures   • DIET NPO     Standing Status: Standing      Number of Occurrences: 1      Standing Expiration Date:      Order Specific Question:  Restrict to:     Answer:  Strict [1]     Medical Decision Making, by Problem:  Active Hospital Problems    Diagnosis   • *Atrial fibrillation with RVR (CMS-HCC) [I48.91]   • CVA (cerebral vascular accident) (CMS-HCC) [I63.9]   • Essential hypertension [I10]   • Respiratory failure (CMS-HCC) [J96.90]   • Electrolyte abnormality [E87.8]   • Hyperglycemia [R73.9]   • Prolonged Q-T interval on ECG [R94.31]   • Elevated troponin [R74.8]   • Acute on chronic systolic heart failure (CMS-HCC) [I50.23]   • Non-rheumatic mitral regurgitation [I34.0]   • TREASURE (acute kidney injury) (CMS-HCC) [N17.9]   • 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

## 2017-08-24 NOTE — PROGRESS NOTES
Nephrology Progress Note, Adult, Complex               Author:Katrina Lemusericabony   Date & Time created: 8/23/2017  5:18 PM     Interval History:  46 y/o male with severe, CHF  -EF 25 %, Severe AI, A.fib, CVA in TREASURE, metabolic acidosis  Off HD  Cr at 3.5  No edema  No difficulties to urinate  No acute events, no complaints    Review of Systems:  Review of Systems   Constitutional: Positive for malaise/fatigue. Negative for fever and chills.   Respiratory: Negative for cough and hemoptysis.    Cardiovascular: Negative for chest pain, palpitations and orthopnea.   Gastrointestinal: Negative for heartburn, nausea, vomiting, abdominal pain and diarrhea.   Genitourinary: Negative for dysuria.   All other systems reviewed and are negative.      Physical Exam:  Physical Exam   Constitutional: He is oriented to person, place, and time. No distress.   HENT:   Head: Normocephalic and atraumatic.   Nose: Nose normal.   Eyes: Left eye exhibits no discharge. No scleral icterus.   Cardiovascular: An irregularly irregular rhythm present.   Pulmonary/Chest: Effort normal and breath sounds normal. No respiratory distress. He has no wheezes.   Musculoskeletal: He exhibits no edema or tenderness.   Trace pedal edema   Neurological: He is alert and oriented to person, place, and time.   Skin: Skin is warm and dry. He is not diaphoretic.   Psychiatric: He has a normal mood and affect.   Nursing note and vitals reviewed.      Labs:        Invalid input(s): [id number]      Recent Labs      08/21/17 0041 08/22/17 0837 08/23/17 0214   SODIUM  132*  133*  133*   POTASSIUM  4.5  5.3  4.8   CHLORIDE  104  104  106   CO2  18*  22  16*   BUN  61*  61*  65*   CREATININE  3.30*  3.55*  3.57*   MAGNESIUM   --    --   2.2   PHOSPHORUS   --    --   5.8*   CALCIUM  9.1  9.5  8.8     Recent Labs      08/21/17   0041  08/22/17 0837  08/23/17 0214   ALTSGPT  32   --    --    ASTSGOT  24   --    --    ALKPHOSPHAT  67   --    --    TBILIRUBIN  0.4    --    --    GLUCOSE  73  140*  84     Recent Labs      17   0041  17   0705  17   0233  17   0214   PROTHROMBTM  27.6*   --   30.6*  33.2*   APTT  57.8*  98.5*   --    --    INR  2.48*   --   2.83*  3.14*     Recent Labs      17   0041   ASTSGOT  24   ALTSGPT  32   ALKPHOSPHAT  67   TBILIRUBIN  0.4           Hemodynamics:  Temp (24hrs), Av.7 °C (98 °F), Min:36.4 °C (97.5 °F), Max:36.9 °C (98.4 °F)  Temperature: 36.7 °C (98.1 °F)  Pulse  Av.3  Min: 50  Max: 155   Blood Pressure: 102/80 mmHg     Respiratory:    Respiration: 16, Pulse Oximetry: 100 %        RUL Breath Sounds: Clear, RML Breath Sounds: Clear, RLL Breath Sounds: Clear, SABAS Breath Sounds: Clear, LLL Breath Sounds: Clear  Fluids:    Intake/Output Summary (Last 24 hours) at 17 1718  Last data filed at 17 0800   Gross per 24 hour   Intake   1240 ml   Output      0 ml   Net   1240 ml     Weight: 63 kg (138 lb 14.2 oz)  GI/Nutrition:  Orders Placed This Encounter   Procedures   • DIET ORDER     Standing Status: Standing      Number of Occurrences: 1      Standing Expiration Date:      Order Specific Question:  Diet:     Answer:  2 Gram Sodium [7]     Order Specific Question:  Diet:     Answer:  Cardiac [6]     Medical Decision Making, by Problem:  Active Hospital Problems    Diagnosis   • *Atrial fibrillation with RVR (CMS-[facility]) [I48.91]   • CVA (cerebral vascular accident) (CMS-HCC) [I63.9]   • Essential hypertension [I10]   • Respiratory failure (CMS-HCC) [J96.90]   • Electrolyte abnormality [E87.8]   • Hyperglycemia [R73.9]   • Prolonged Q-T interval on ECG [R94.31]   • Elevated troponin [R74.8]   • Acute on chronic systolic heart failure (CMS-HCC) [I50.23]   • Non-rheumatic mitral regurgitation [I34.0]   • TREASURE (acute kidney injury) (CMS-HCC) [N17.9]   • 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

## 2017-08-24 NOTE — PROGRESS NOTES
Report received from PM RN, care of patient assumed. POC discussed at bedside, no immediate concerns stated at this time. Safety measures in place/call light in reach. Will continue to round hourly.

## 2017-08-24 NOTE — CARE PLAN
Problem: Communication  Goal: The ability to communicate needs accurately and effectively will improve  Outcome: PROGRESSING AS EXPECTED  POC discussed at bedside - patient verbalizes understanding.  Education on medications and procedures provided.   White board updated, patient encouraged to call for all needs. Calls appropriately. No immediate concerns at this time.         Problem: Safety  Goal: Will remain free from falls  Outcome: PROGRESSING AS EXPECTED  Shauna Downing Fall Risk Assessment:     Last Known Fall: No falls  Mobility: No limitations  Medications: Cardiovascular or central nervous system meds  Mental Status/LOC/Awareness: Awake, alert, and oriented to date, place, and person  Toileting Needs: No needs  Volume/Electrolyte Status: No problems  Communication/Sensory: No deficits  Behavior: Appropriate behavior  Shauna Downing Fall Risk Total: 3  Fall Risk Level: NO RISK    Universal Fall Precautions:  call light/belongings in reach, bed in low position and locked, siderails up x 2, use non-slip footwear, adequate lighting, clutter free and spill free environment, educate to call for assistance, educate on level of risk    Fall Risk Level Interventions:   TRIAL (TELE 8, NEURO, MED AZALEA 5) Low Fall Risk Interventions  Place yellow fall risk ID band on patient:  (NO RISK )  Provide patient/family education based on risk assessment: verified  Educate patient/family to call staff for assistance when getting out of bed: verified  Place fall precaution signage outside patient door: verified      Patient Specific Interventions:     Medication: review medications with patient and family  Mental Status/LOC/Awareness: check on patient hourly  Toileting: not applicable  Volume/Electrolyte Status: ensure patient remains hydrated  Communication/Sensory: update plan of care on whiteboard and provide communication alternatives/  Behavioral: not applicable  Mobility: ensure bed is locked and in lowest  position

## 2017-08-24 NOTE — PROGRESS NOTES
Cardiology Progress Note               Author: Dr. Nuñez Date & Time created: 2017  7:36 AM     Interval History:  No acute events overnight.  No telemetry events. Still in atrial fib.    Review of Systems   Constitutional: Negative.    HENT: Negative.    Eyes: Negative.    Respiratory: Negative for cough and shortness of breath.    Cardiovascular: Negative for chest pain, palpitations, orthopnea, claudication, leg swelling and PND.   Gastrointestinal: Negative.    Genitourinary: Negative.    Musculoskeletal: Negative.    Skin: Negative.    Neurological: Negative.    Psychiatric/Behavioral: Negative.       Physical Exam   Constitutional: He is oriented to person, place, and time.   HENT:   Head: Normocephalic.   Neck: No JVD present. No thyromegaly present.   Cardiovascular: An irregularly irregular rhythm present. Tachycardia present.    Pulses:       Carotid pulses are 2+ on the right side, and 2+ on the left side.       Radial pulses are 2+ on the right side, and 2+ on the left side.   Pulmonary/Chest: He has no wheezes.   Abdominal: Soft.   Musculoskeletal: He exhibits no edema.   Neurological: He is alert and oriented to person, place, and time.   Skin: Skin is warm and dry.       Hemodynamics:  Temp (24hrs), Av.6 °C (97.9 °F), Min:36.2 °C (97.2 °F), Max:36.9 °C (98.4 °F)  Temperature: 36.5 °C (97.7 °F)  Pulse  Av.5  Min: 50  Max: 155   Blood Pressure: 121/81 mmHg     Respiratory:    Respiration: 16, Pulse Oximetry: 98 %     Work Of Breathing / Effort: Mild  RUL Breath Sounds: Clear, RML Breath Sounds: Clear, RLL Breath Sounds: Diminished, SABAS Breath Sounds: Clear, LLL Breath Sounds: Diminished  Fluids:  Date 17 0700 - 17 0659   Shift 8391-1030 0758-8170 6148-8561 24 Hour Total   I  N  T  A  K  E   Shift Total       O  U  T  P  U  T   Urine  120  120    Shift Total  120  120   Weight (kg) 62 62 62 62       Weight: 62.1 kg (136 lb 14.5 oz)  GI/Nutrition:  Orders Placed This Encounter    Procedures   • DIET ORDER     Standing Status: Standing      Number of Occurrences: 1      Standing Expiration Date:      Order Specific Question:  Diet:     Answer:  2 Gram Sodium [7]     Order Specific Question:  Diet:     Answer:  Cardiac [6]     Lab Results:      Recent Labs      08/23/17   0214  08/24/17   0213  08/24/17   0621   SODIUM  133*  129*  128*   POTASSIUM  4.8  6.5*  6.5*   CHLORIDE  106  100  100   CO2  16*  18*  18*   GLUCOSE  84  91  104*   BUN  65*  78*  77*   CREATININE  3.57*  4.15*  4.54*   CALCIUM  8.8  9.0  9.1     Recent Labs      08/22/17   0233  08/23/17   0214  08/24/17   0213   INR  2.83*  3.14*  2.99*                   LOBO: 8/10/17  Trileaflet aortic valve. The left coronary cusp has a nodule calcification with poor coaptation resulting in severe central eccentric aortic regurgitation.  Severely reduced left ventricular systolic function.  Left ventricular ejection fraction is visually estimated to be 20%.  Severe concentric left ventricular hypertrophy.  Normal right ventricular size and systolic function.  Mild mitral regurgitation.  These findings were communicated to the ICU service.    TTE: 8/9/17  No prior study is available for comparison.   Severely reduced left ventricular systolic function. Global hypokinesis.  Left ventricular ejection fraction is visually estimated to be 25%.  Diastolic function is difficult to assess with atrial fibrillation.  Normal right ventricular size and systolic function.  Moderate mitral regurgitation.  Severe eccentric aortic insufficiency.  Estimated right ventricular systolic pressure  is 60 mmHg.  These findings are known by cardiology consultation    Brain MRI: 8/10/17  1.  Mild diffuse cerebral atrophy.  2.  Small areas of acute infarction involving the left frontal periventricular white matter extending inferiorly to the subinsular cortex. Punctate area of infarction involving the cortex in the right posterior occipital lobe.  3.  Moderate  "periventricular white matter changes consistent with chronic microvascular ischemic gliosis.  4.  Small chronic focus of lacunar type infarction in the right frontal periventricular white matter.  5.  Numerous chronic \"microbleed's\" are noted throughout the brain parenchyma in both the supra and infratentorial compartments. Gyriform/curvilinear regions of chronic \"microbleed\" noted in the right posterior frontal region and also the left frontal   periventricular white matter.  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  Continue Coumadin with target INR of 2-3.  Continue IV amiodarone.    Cardiomyopathy valvular heart disease versus A. fib with RVR  Patient appears euvolemic.  He will also be started on IV amiodarone for A. fib rate control   No ACE-I and Spironolactone due to renal issue.    Severe aortic insufficiency   Due to acute CVA with hemorrhage not a candidate for surgery now.    Thank you for referring this patient to our cardiology service.  We will follow patient with you.    Edson Nuñez MD.  [facility].      Medications reviewed and Labs reviewed                    "

## 2017-08-24 NOTE — ASSESSMENT & PLAN NOTE
- K 3.7 today after hemodialysis last night.    - Patient now on low K diet.   - Continue to monitor.

## 2017-08-24 NOTE — PROGRESS NOTES
Inpatient Anticoagulation Service Note  Date: 8/24/2017  Reason for Anticoagulation: Atrial Fibrillation   [id number] VASc Score: 4  Hemoglobin Value: 13.6  Hematocrit Value: 42  Lab Platelet Value: 221  Target INR: 2.0 to 3.0  INR from last 7 days     Date/Time INR Value    08/24/17 0213 (!)2.99    08/23/17 0214 (!)3.14    08/22/17 0233 (!)2.83    08/21/17 0041 (!)2.48    08/19/17 2314 (!)2.15    08/19/17 0226 (!)1.8    08/17/17 2346 (!)1.22        Dose from last 7 days     Date/Time Dose (mg)    08/24/17 1100 3.75    08/22/17 1200 7.5    08/21/17 1100 7.5    08/20/17 1400 7.5    08/19/17 1000 7.5    08/18/17 1100 7.5    08/17/17 1200 10        Average Dose (mg): ~7.5 (New start VKA this admission, dosing ~7.5 mg daily )  Significant Interactions: Amiodarone, Aspirin, Statin (MVI)  Bridge Therapy: Yes (heparin weight based protocol)  Bridge Therapy Start Date: 08/14/17  Days of Overlap Therapy: 6  INR Value Greater than 2 Prior to Discontinuation of Parenteral Anticoagulation: Yes   Reversal Agent Administered: Not Applicable  Comments: INR at goal today (dose 8/23 held). New addition of amiodarone noted. No new H/H or PLT. No overt bleeding noted. Given new DDI noted yesterday will decrease dose by ~50% and trend INR with AM labs. Likely to need dose adjustments.    Plan:  Warfarin 3.75 mg 8/24/17  Education Material Provided?: No (given prior)  Pharmacist suggested discharge dosing: warfarin 3.75 mg daily (recommend INR within 24 hours of discharge)    Darío Li, PHARMD

## 2017-08-24 NOTE — DISCHARGE PLANNING
Outpatient Dialysis Placement Notification    Received notification from Dr. Merchant for outpatient dialysis placement. Will meet with patient tomorrow 8/24 and provided education materials and list of HD centers.     Dialysis Coordinator to follow up.    Dialysis Coordinator- [facility],  Damon Hammer  [phone]

## 2017-08-24 NOTE — PROGRESS NOTES
Internal Medicine Interval Note    Name Bhavesh Saini       1970   Age/Sex 47 y.o. male   MRN [id number]   Code Status FULL.     After 5PM or if no immediate response to page, please call for cross-coverage  Attending/Team: Akil/Troy. See Patient List for primary contact information  Call [phone] to page    1st Call - Day Intern (R1):   [place] 2nd Call - Day Sr. Resident (R2/R3):   Nando         Reason for interval visit  (Principal Problem)   Atrial fibrillation with RVR (CMS-[facility])    Interval Problem Daily Status Update  (24 hours)   - Heart rate 70s-110s overnight.  BMP this morning showed K 6.5, up from 4.8 yesterday.  Patient remained asymptomatic.  EKG showed some T wave peaking.  Patient given calcium gluconate, insulin with dextrose, and Kayexalate.  Follow up K was 5.0.  Spoke with Dr. Merchant, Nephrology, re: hemodialysis.  Patient currently has no HD access.  Placed order for IR-CVC tunneled catheter placement.  Confirmed order with Dr. Valentin, IR.    - Amiodarone drip began yesterday per Cardiology.  - INR 2.99.     Review of Systems   Constitutional: Negative for fever and chills.   Respiratory: Negative for shortness of breath.    Cardiovascular: Negative for chest pain and palpitations.   Gastrointestinal: Negative for abdominal pain.   Neurological: Negative for dizziness, tingling, tremors and headaches.       Consultants/Specialty  Cardiology  Cardiac Surgery  Nephrology    Disposition  Inpatient for atrial fibrillation with RVR.    Quality Measures  Labs reviewed, Medications reviewed, EKG reviewed and Radiology images reviewed  Truong catheter: No Truong      DVT Prophylaxis: Warfarin (Coumadin)              Physical Exam       Filed Vitals:    17 2342 17 0314 17 0800 17 1210   BP: 117/84 121/81 145/91 138/84   Pulse: 96 98 100 80   Temp: 36.2 °C (97.2 °F) 36.5 °C (97.7 °F) 36.3 °C (97.3 °F) 36.1 °C (96.9 °F)   Resp: 15 16 14 14   Height:        Weight:       SpO2: 93% 98% 100% 100%     Body mass index is 26.74 kg/(m^2). Weight: 62.1 kg (136 lb 14.5 oz)  Oxygen Therapy:  Pulse Oximetry: 100 %, O2 (LPM): 0, O2 Delivery: None (Room Air)    Physical Exam   Constitutional: He is oriented to person, place, and time.   Lying in bed, in no apparent distress.    Neck: No JVD present.   Cardiovascular:   Irregularly irregular rhythm.     Pulmonary/Chest: Effort normal and breath sounds normal. No respiratory distress.   Abdominal: Soft. Bowel sounds are normal. He exhibits no distension. There is no tenderness.   Neurological: He is alert and oriented to person, place, and time.   Skin: Skin is warm.   Psychiatric: Affect normal.         Lab Data Review:         8/24/2017  4:03 PM    Recent Labs      08/23/17 0214 08/24/17 0213 08/24/17   0621  08/24/17   0816   SODIUM  133*  129*  128*   --    POTASSIUM  4.8  6.5*  6.5*  5.0   CHLORIDE  106  100  100   --    CO2  16*  18*  18*   --    BUN  65*  78*  77*   --    CREATININE  3.57*  4.15*  4.54*   --    MAGNESIUM  2.2   --    --    --    PHOSPHORUS  5.8*   --    --    --    CALCIUM  8.8  9.0  9.1   --        Recent Labs      08/23/17 0214 08/24/17 0213  08/24/17   0621   GLUCOSE  84  91  104*       Recent Labs      08/22/17   0233  08/23/17 0214 08/24/17 0213   PROTHROMBTM  30.6*  33.2*  32.0*   INR  2.83*  3.14*  2.99*                 Assessment/Plan     * Atrial fibrillation with RVR (CMS-HCC) (present on admission)  Assessment & Plan  -New onset vs paroxysmal (vague PMH of afib and non-compliance with meds)  -overnight He was given diltiazem by NF due to Afib with nonsustained HR in 160s. - now in SR  -CHADS2: 4  -ECHO: severe LVH, global hypokinesis, EF ~20%, severe AI and mod MR  -LOBO: slow blood flow but no thrombus. Severe AI    Plan  - Na restricted diet.  - Continue scheduled metoprolol, PRN 5mg metoprolol q6h.    - Hydralazine discontinued.  Patient continued on amiodarone drip.   - If HR <  50 or > 120s , is PERSISTENT and SYMPTOMATIC, will administer diltiazem 10 mg.   -Cardioversion will likely be done during AV replacement.     Hyperkalemia  Assessment & Plan  - K 6.5 this morning, up from 4.8.  Creat 4.15.  Blood glucose 91.  Patient remained asymptomatic.  Likely due to worsening kidney perfusion secondary to atrial fibrillation.    - EKG showed some T wave peaking.    - Patient given calcium gluconate, insulin with dextrose, and Kayexalate.    - Follow up K was 5.0.    - Spoke with Dr. Merchant, Nephrology, re: hemodialysis.  Patient currently has no HD access.  Placed order for IR-CVC tunneled catheter placement.    - Confirmed order with Dr. Valentin, IR.  Patient will get procedure this afternoon.  If procedure delayed, patient will get temporary access bedside.  - Hemodialysis after access established.        CVA (cerebral vascular accident) (CMS-HCC) (present on admission)  Assessment & Plan  -h/o left facial droop, slurred speech and left sided weakness. However no neuro deficits currently  -CT: cerebral atrophy and small vessel ischemic changes   -MRI brain: acute infarction in L frontal periventricular white matter extendeing to subinsular cortex. Punctate area of infarction in R posterior lobe. Numerous microbleeds.   Assessment: Afib could have contributed (LOBO showed 'sludge' in the heart)  Plan  -ASA, statin  -Mx of Afib as stated above.    TREASURE (acute kidney injury) (CMS-HCC) (present on admission)  Assessment & Plan  -Acute on chronic. Likely pre-renal vs cardiorenal. HTN is likely the cause of CKD  -On adm: Cr: 4.3, AGMA, Ph 6.1. Unknown baseline.  -FeNa <1%  -H/o renal disease since 7yrs, FH kidney disease in his father. Has been non-complaint with HTN medications. - has CHF  -Renal US: no hydronephrosis and calculi. Left renal cysts.  - Cr and K slowly increasing    Plan  - Per Nephrology, no further HD planned inpatient.  Outpatient follow up in CKD clinic.   - Continue  Lasix.    Prolonged Q-T interval on ECG (present on admission)  Assessment & Plan  -QTc ~530s. Will avoid QTc prolonging meds.    HFrEF (heart failure with reduced ejection fraction) (CMS-[facility]) (present on admission)  Assessment & Plan  -Presented with SOB, orthopnea, fatique and palpitations. Has h/o HTN and alcoholism in the past. On exam: JVD +., murmurs + but no volume overload.   -BNP 2203 >> 1753 > 2211.   -CXR: cardiomegaly. ECHO: severe LVH, global hypokinesis, EF ~20%, severe AI and mod MR  -Assessment: Compensated HF likely due to alcoholism vs tachycardia induced heart failure.   -Cardiac surgery spoke with Mr Saini and family, he needs ischemic work up and AVR/MAZE w LOBO, but due to his renal status and recent stroke with hemorrhage, they do not recommend it at this time.   - Patient's undocumented alien status is another reason having a heart cath is diifficult.      Plan  - Na restricted diet.   - Patient does have follow up appointment with Dr. Gutierrez, Cardiology, on September 11.  - Patient's family will fly him to [place] for further care after discharge.      Aortic regurgitation (present on admission)  Assessment & Plan  -Severe AR.   -ECHO:  severe LVH, global hypokinesis, EF ~20%, severe AI and mod MR. Findings were confirmed by LOBO  -Fits the criteria for valve replacement. Cardiology and cardiac surgery on board.    Cardiorenal syndrome  Assessment & Plan  -Diagnosis given by Nephrology - likely since urine Na is low  -Patient is on HD, and is urinated <500 ml yesterday.  -Creatinine in ED 4.38, Total protein Urine 208. Estimated GFR was 14.   -FENA 0.2%  -BNP 2203>> 2039   -CXR: cardiomegaly. ECHO showed: severe LVH, global hypokinesis, EF ~20%, AR and mod MR  Assessment: Cardiorenal syndrome  Plan  -Patient needs AVR/MAZE w LOBO per Cardiac Surgery, but due to his renal status and recent stroke with hemorrhage, they do not recommend at this time.  They will follow up outpatient in 6 weeks.    -Patient will be counseled on cessation of alcohol intake.   -Nephrology on board.         Essential hypertension (present on admission)  Assessment & Plan  -PMH of HTN but likely non-compliant. ECHO showed severe LVH  -Stable on BB, hydralazine, nitrate, lasix     Alcoholic (CMS-HCC) (present on admission)  Assessment & Plan  -History is vague but looks like he was drinking heavily in the past. Last drink was 8/5/2017.  -On thiamine and folate.       Hyperglycemia (present on admission)  Assessment & Plan  -Resolved  -On adm: , A1c: 5.4  -He was started on ISS and hypoglycemia protocol which is discontinued.  CTM with accuchecks    Non-rheumatic mitral regurgitation  Assessment & Plan  -Moderate MR on ECHO

## 2017-08-25 ENCOUNTER — PATIENT OUTREACH (OUTPATIENT)
Dept: HEALTH INFORMATION MANAGEMENT | Facility: OTHER | Age: 47
End: 2017-08-25

## 2017-08-25 LAB
ANION GAP SERPL CALC-SCNC: 9 MMOL/L (ref 0–11.9)
BUN SERPL-MCNC: 33 MG/DL (ref 8–22)
CALCIUM SERPL-MCNC: 8.8 MG/DL (ref 8.5–10.5)
CHLORIDE SERPL-SCNC: 100 MMOL/L (ref 96–112)
CO2 SERPL-SCNC: 25 MMOL/L (ref 20–33)
CREAT SERPL-MCNC: 2.39 MG/DL (ref 0.5–1.4)
GFR SERPL CREATININE-BSD FRML MDRD: 29 ML/MIN/1.73 M 2
GLUCOSE SERPL-MCNC: 88 MG/DL (ref 65–99)
INR PPP: 2.93 (ref 0.87–1.13)
MAGNESIUM SERPL-MCNC: 1.9 MG/DL (ref 1.5–2.5)
POTASSIUM SERPL-SCNC: 3.2 MMOL/L (ref 3.6–5.5)
PROTHROMBIN TIME: 31.5 SEC (ref 12–14.6)
SODIUM SERPL-SCNC: 134 MMOL/L (ref 135–145)

## 2017-08-25 PROCEDURE — 700105 HCHG RX REV CODE 258: Performed by: INTERNAL MEDICINE

## 2017-08-25 PROCEDURE — A9270 NON-COVERED ITEM OR SERVICE: HCPCS | Performed by: STUDENT IN AN ORGANIZED HEALTH CARE EDUCATION/TRAINING PROGRAM

## 2017-08-25 PROCEDURE — 99233 SBSQ HOSP IP/OBS HIGH 50: CPT | Mod: GC | Performed by: INTERNAL MEDICINE

## 2017-08-25 PROCEDURE — 700102 HCHG RX REV CODE 250 W/ 637 OVERRIDE(OP): Performed by: INTERNAL MEDICINE

## 2017-08-25 PROCEDURE — 700111 HCHG RX REV CODE 636 W/ 250 OVERRIDE (IP): Performed by: INTERNAL MEDICINE

## 2017-08-25 PROCEDURE — 700102 HCHG RX REV CODE 250 W/ 637 OVERRIDE(OP): Performed by: STUDENT IN AN ORGANIZED HEALTH CARE EDUCATION/TRAINING PROGRAM

## 2017-08-25 PROCEDURE — 36415 COLL VENOUS BLD VENIPUNCTURE: CPT

## 2017-08-25 PROCEDURE — 86480 TB TEST CELL IMMUN MEASURE: CPT

## 2017-08-25 PROCEDURE — A9270 NON-COVERED ITEM OR SERVICE: HCPCS | Performed by: INTERNAL MEDICINE

## 2017-08-25 PROCEDURE — 770020 HCHG ROOM/CARE - TELE (206)

## 2017-08-25 RX ORDER — HEPARIN SODIUM 1000 [USP'U]/ML
INJECTION, SOLUTION INTRAVENOUS; SUBCUTANEOUS
Status: DISPENSED
Start: 2017-08-25 | End: 2017-08-25

## 2017-08-25 RX ADMIN — ISOSORBIDE MONONITRATE 60 MG: 30 TABLET ORAL at 08:41

## 2017-08-25 RX ADMIN — ATORVASTATIN CALCIUM 40 MG: 40 TABLET, FILM COATED ORAL at 00:32

## 2017-08-25 RX ADMIN — AMIODARONE HYDROCHLORIDE 0.5 MG/MIN: 50 INJECTION, SOLUTION INTRAVENOUS at 00:30

## 2017-08-25 RX ADMIN — FOLIC ACID 1 MG: 1 TABLET ORAL at 08:41

## 2017-08-25 RX ADMIN — AMIODARONE HYDROCHLORIDE 0.51 MG/MIN: 50 INJECTION, SOLUTION INTRAVENOUS at 13:43

## 2017-08-25 RX ADMIN — Medication 100 MG: at 08:40

## 2017-08-25 RX ADMIN — THERA TABS 1 TABLET: TAB at 08:41

## 2017-08-25 RX ADMIN — STANDARDIZED SENNA CONCENTRATE AND DOCUSATE SODIUM 2 TABLET: 8.6; 5 TABLET, FILM COATED ORAL at 22:27

## 2017-08-25 RX ADMIN — STANDARDIZED SENNA CONCENTRATE AND DOCUSATE SODIUM 2 TABLET: 8.6; 5 TABLET, FILM COATED ORAL at 08:40

## 2017-08-25 RX ADMIN — METOPROLOL SUCCINATE 250 MG: 50 TABLET, EXTENDED RELEASE ORAL at 08:41

## 2017-08-25 RX ADMIN — FUROSEMIDE 40 MG: 40 TABLET ORAL at 08:41

## 2017-08-25 RX ADMIN — ATORVASTATIN CALCIUM 40 MG: 40 TABLET, FILM COATED ORAL at 22:27

## 2017-08-25 RX ADMIN — DEXTROSE MONOHYDRATE 500 ML: 50 INJECTION, SOLUTION INTRAVENOUS at 00:30

## 2017-08-25 RX ADMIN — WARFARIN SODIUM 3.75 MG: 7.5 TABLET ORAL at 16:57

## 2017-08-25 ASSESSMENT — ENCOUNTER SYMPTOMS
PSYCHIATRIC NEGATIVE: 1
CHILLS: 0
HEMOPTYSIS: 0
ABDOMINAL PAIN: 0
COUGH: 0
PND: 0
HEARTBURN: 0
NEUROLOGICAL NEGATIVE: 1
BLURRED VISION: 0
TREMORS: 0
VOMITING: 0
CONSTITUTIONAL NEGATIVE: 1
DIARRHEA: 0
TINGLING: 0
NAUSEA: 0
FEVER: 0
GASTROINTESTINAL NEGATIVE: 1
EYES NEGATIVE: 1
SHORTNESS OF BREATH: 0
DIZZINESS: 0
CLAUDICATION: 0
MUSCULOSKELETAL NEGATIVE: 1
PALPITATIONS: 0
ORTHOPNEA: 0
HEADACHES: 0

## 2017-08-25 ASSESSMENT — COGNITIVE AND FUNCTIONAL STATUS - GENERAL
DAILY ACTIVITIY SCORE: 24
SUGGESTED CMS G CODE MODIFIER MOBILITY: CH
MOBILITY SCORE: 24
SUGGESTED CMS G CODE MODIFIER DAILY ACTIVITY: CH

## 2017-08-25 ASSESSMENT — PAIN SCALES - GENERAL
PAINLEVEL_OUTOF10: 0

## 2017-08-25 NOTE — PROGRESS NOTES
Internal Medicine Interval Note    Name Bhavesh Saini       1970   Age/Sex 47 y.o. male   MRN [id number]   Code Status FULL.     After 5PM or if no immediate response to page, please call for cross-coverage  Attending/Team: Akil/Troy. See Patient List for primary contact information  Call [phone] to page    1st Call - Day Intern (R1):   Charlotte 2nd Call - Day Sr. Resident (R2/R3):   Nando         Reason for interval visit  (Principal Problem)   Atrial fibrillation with RVR (CMS-[facility])    Interval Problem Daily Status Update  (24 hours)   - Temporary dialysis catheter placed via IR yesterday afternoon.  - Upper extremity vein mapping completed in anticipation of AV fistula.  Nephrology to discuss possibility of AVF placement with Vascular Surgery.    - Patient hemodialyzed last night.  Net UF = 1500 mL.    - No acute events overnight.  No complaints this morning.   - Amiodarone drip continuing to run.    - INR 2.93.      Review of Systems   Constitutional: Negative for fever and chills.   Eyes: Negative for blurred vision.   Respiratory: Negative for cough and shortness of breath.    Cardiovascular: Negative for chest pain and palpitations.   Gastrointestinal: Negative for abdominal pain.   Neurological: Negative for dizziness, tingling, tremors and headaches.       Consultants/Specialty  Cardiology  Cardiac Surgery  Nephrology    Disposition  Inpatient for atrial fibrillation with RVR.    Quality Measures  Labs reviewed, Medications reviewed, Radiology images reviewed and EKG reviewed  Truong catheter: No Truong      DVT Prophylaxis: Warfarin (Coumadin)              Physical Exam       Filed Vitals:    17 2300 17 0503 17 0707 17 1123   BP: 122/84 131/90 144/97 121/80   Pulse: 92 120 98 83   Temp: 36.8 °C (98.3 °F) 37.1 °C (98.8 °F) 37.2 °C (98.9 °F) 36.2 °C (97.2 °F)   Resp: 16 16 16 16   Height:       Weight:       SpO2: 96% 96% 96% 97%     Body mass index is 26.74  kg/(m^2). Weight:  (PT off floor at dialysis)  Oxygen Therapy:  Pulse Oximetry: 97 %, O2 (LPM): 0, O2 Delivery: None (Room Air)      Physical Exam   Constitutional: He is oriented to person, place, and time.   Lying in bed, in no apparent distress.    Neck: No JVD present.   Cardiovascular:   No murmur heard.  Irregularly irregular rhythm.     Pulmonary/Chest: Effort normal and breath sounds normal. No respiratory distress.   Abdominal: Soft. Bowel sounds are normal. He exhibits no distension. There is no tenderness.   Neurological: He is alert and oriented to person, place, and time.   Skin: Skin is warm.   Nursing note and vitals reviewed.      Lab Data Review:         8/25/2017  1:59 PM    Recent Labs      08/23/17 0214 08/24/17 0213 08/24/17   0621  08/24/17   0816  08/24/17   2342   SODIUM  133*  129*  128*   --   134*   POTASSIUM  4.8  6.5*  6.5*  5.0  3.2*   CHLORIDE  106  100  100   --   100   CO2  16*  18*  18*   --   25   BUN  65*  78*  77*   --   33*   CREATININE  3.57*  4.15*  4.54*   --   2.39*   MAGNESIUM  2.2   --    --    --   1.9   PHOSPHORUS  5.8*   --    --    --    --    CALCIUM  8.8  9.0  9.1   --   8.8       Recent Labs      08/24/17 0213 08/24/17   0621  08/24/17   2342   GLUCOSE  91  104*  88       Recent Labs      08/23/17 0214 08/24/17 0213  08/24/17   2342   PROTHROMBTM  33.2*  32.0*  31.5*   INR  3.14*  2.99*  2.93*                 Assessment/Plan     * Atrial fibrillation with RVR (CMS-HCC) (present on admission)  Assessment & Plan  -New onset vs paroxysmal (vague PMH of afib and non-compliance with meds)  -overnight He was given diltiazem by NF due to Afib with nonsustained HR in 160s. - now in SR  -CHADS2: 4  -ECHO: severe LVH, global hypokinesis, EF ~20%, severe AI and mod MR  -LOBO: slow blood flow but no thrombus. Severe AI    Plan  - Na restricted diet.  - Continue scheduled metoprolol, PRN 5mg metoprolol q6h.    - Hydralazine discontinued.  Patient continued on  amiodarone drip.   - If HR < 50 or > 120s , is PERSISTENT and SYMPTOMATIC, will administer diltiazem 10 mg.   -Cardioversion will likely be done during AV replacement.     Hyperkalemia  Assessment & Plan  - K 3.2 today after hemodialysis last night.    - Patient now on low K diet.   - Continue to monitor.      CVA (cerebral vascular accident) (CMS-HCC) (present on admission)  Assessment & Plan  -h/o left facial droop, slurred speech and left sided weakness. However no neuro deficits currently  -CT: cerebral atrophy and small vessel ischemic changes   -MRI brain: acute infarction in L frontal periventricular white matter extendeing to subinsular cortex. Punctate area of infarction in R posterior lobe. Numerous microbleeds.   Assessment: Afib could have contributed (LOBO showed 'sludge' in the heart)  Plan  -ASA, statin  -Mx of Afib as stated above.    TREASURE (acute kidney injury) (CMS-HCC) (present on admission)  Assessment & Plan  -Acute on chronic. Likely pre-renal vs cardiorenal. HTN is likely the cause of CKD  -On adm: Cr: 4.3, AGMA, Ph 6.1. Unknown baseline.  -FeNa <1%  -H/o renal disease since 7yrs, FH kidney disease in his father. Has been non-complaint with HTN medications. - has CHF  -Renal US: no hydronephrosis and calculi. Left renal cysts.  - Cr and K slowly increasing likely due to poor renal perfusion secondary to atrial fibrillation.      Plan  - Upper extremity vein mapping completed in anticipation of AV fistula.   - Nephrology to discuss possibility of AVF placement with Vascular Surgery.  Appreciate the assistance.   - Continue Lasix.  No HD today.  Will be reassessed tomorrow.     Prolonged Q-T interval on ECG (present on admission)  Assessment & Plan  -QTc ~530s. Will avoid QTc prolonging meds.    HFrEF (heart failure with reduced ejection fraction) (CMS-HCC) (present on admission)  Assessment & Plan  -Presented with SOB, orthopnea, fatique and palpitations. Has h/o HTN and alcoholism in the past. On  exam: JVD +., murmurs + but no volume overload.   -BNP 2203 >> 1753 > 2211.   -CXR: cardiomegaly. ECHO: severe LVH, global hypokinesis, EF ~20%, severe AI and mod MR  -Assessment: Compensated HF likely due to alcoholism vs tachycardia induced heart failure.   -Cardiac surgery spoke with Mr Saini and family, he needs ischemic work up and AVR/MAZE w LOBO, but due to his renal status and recent stroke with hemorrhage, they do not recommend it at this time.   - Patient's undocumented alien status is another reason having a heart cath is diifficult.      Plan  - Na restricted diet.   - Patient does have follow up appointment with Dr. Gutierrez, Cardiology, on September 11.  - Patient's family will fly him to [place] for further care after discharge.      Aortic regurgitation (present on admission)  Assessment & Plan  -Severe AR.   -ECHO:  severe LVH, global hypokinesis, EF ~20%, severe AI and mod MR. Findings were confirmed by LOBO  -Fits the criteria for valve replacement. Cardiology and cardiac surgery on board.    Cardiorenal syndrome  Assessment & Plan  -Diagnosis given by Nephrology - likely since urine Na is low  -Patient is on HD, and is urinated <500 ml yesterday.  -Creatinine in ED 4.38, Total protein Urine 208. Estimated GFR was 14.   -FENA 0.2%  -BNP 2203>> 2039   -CXR: cardiomegaly. ECHO showed: severe LVH, global hypokinesis, EF ~20%, AR and mod MR  Assessment: Cardiorenal syndrome  Plan  -Patient needs AVR/MAZE w LOBO per Cardiac Surgery, but due to his renal status and recent stroke with hemorrhage, they do not recommend at this time.  They will follow up outpatient in 6 weeks.   -Patient will be counseled on cessation of alcohol intake.   -Nephrology on board.         Essential hypertension (present on admission)  Assessment & Plan  -PMH of HTN but likely non-compliant. ECHO showed severe LVH  -Stable on BB, hydralazine, nitrate, lasix     Alcoholic (CMS-HCC) (present on admission)  Assessment & Plan  -History  is vague but looks like he was drinking heavily in the past. Last drink was 8/5/2017.  -On thiamine and folate.       Hyperglycemia (present on admission)  Assessment & Plan  -Resolved  -On adm: , A1c: 5.4  -He was started on ISS and hypoglycemia protocol which is discontinued.  CTM with accuchecks    Non-rheumatic mitral regurgitation  Assessment & Plan  -Moderate MR on ECHO

## 2017-08-25 NOTE — PROGRESS NOTES
Gown soiled and sheets. Linens changed. Pt denies pain., IV R forearm, painful. D/c'd. Iv L forearm intact but painful when flushing. New iv started and amiodorone cont's at .5mg/hr.

## 2017-08-25 NOTE — PROGRESS NOTES
Cardiology Progress Note               Author: Dr. Nuñez Date & Time created: 2017  10:09 AM     Interval History:  No acute events overnight.  No telemetry events. Still in atrial fib.    Review of Systems   Constitutional: Negative.    HENT: Negative.    Eyes: Negative.    Respiratory: Negative for cough and shortness of breath.    Cardiovascular: Negative for chest pain, palpitations, orthopnea, claudication, leg swelling and PND.   Gastrointestinal: Negative.    Genitourinary: Negative.    Musculoskeletal: Negative.    Skin: Negative.    Neurological: Negative.    Psychiatric/Behavioral: Negative.       Physical Exam   Constitutional: He is oriented to person, place, and time.   HENT:   Head: Normocephalic.   Neck: No JVD present. No thyromegaly present.   Cardiovascular: An irregularly irregular rhythm present. Tachycardia present.    Pulses:       Carotid pulses are 2+ on the right side, and 2+ on the left side.       Radial pulses are 2+ on the right side, and 2+ on the left side.   Pulmonary/Chest: He has no wheezes.   Abdominal: Soft.   Musculoskeletal: He exhibits no edema.   Neurological: He is alert and oriented to person, place, and time.   Skin: Skin is warm and dry.       Hemodynamics:  Temp (24hrs), Av.7 °C (98 °F), Min:36.1 °C (96.9 °F), Max:37.2 °C (98.9 °F)  Temperature: 37.2 °C (98.9 °F)  Pulse  Av  Min: 50  Max: 155Heart Rate (Monitored): 82  Blood Pressure: 144/97 mmHg, NIBP: 135/95 mmHg     Respiratory:    Respiration: 16, Pulse Oximetry: 96 %, O2 Daily Delivery Respiratory : Silicone Nasal Cannula     Work Of Breathing / Effort: Mild  RUL Breath Sounds: Clear, RML Breath Sounds: Clear, RLL Breath Sounds: Diminished, SABAS Breath Sounds: Clear, LLL Breath Sounds: Diminished  Fluids:  Date 17 0700 - 17 0659   Shift 0476-3468 3828-0395 5409-6930 24 Hour Total   I  N  T  A  K  E   Shift Total       O  U  T  P  U  T   Urine  120  120    Shift Total  120  120   Weight (kg) 62  62 62 62       Weight:  (PT off floor at dialysis)  GI/Nutrition:  Orders Placed This Encounter   Procedures   • DIET ORDER     Standing Status: Standing      Number of Occurrences: 1      Standing Expiration Date:      Order Specific Question:  Diet:     Answer:  Diabetic [3]     Order Specific Question:  Diet:     Answer:  Renal [8]     Order Specific Question:  Diet:     Answer:  2 Gram Sodium [7]     Order Specific Question:  Calorie modifications:     Answer:  1800 kcals [4]     Order Specific Question:  Electrolyte modifications:     Answer:  Low Potassium [3]     Lab Results:      Recent Labs      08/24/17   0213  08/24/17   0621  08/24/17   0816  08/24/17   2342   SODIUM  129*  128*   --   134*   POTASSIUM  6.5*  6.5*  5.0  3.2*   CHLORIDE  100  100   --   100   CO2  18*  18*   --   25   GLUCOSE  91  104*   --   88   BUN  78*  77*   --   33*   CREATININE  4.15*  4.54*   --   2.39*   CALCIUM  9.0  9.1   --   8.8     Recent Labs      08/23/17   0214  08/24/17   0213  08/24/17   2342   INR  3.14*  2.99*  2.93*                   LOBO: 8/10/17  Trileaflet aortic valve. The left coronary cusp has a nodule calcification with poor coaptation resulting in severe central eccentric aortic regurgitation.  Severely reduced left ventricular systolic function.  Left ventricular ejection fraction is visually estimated to be 20%.  Severe concentric left ventricular hypertrophy.  Normal right ventricular size and systolic function.  Mild mitral regurgitation.  These findings were communicated to the ICU service.    TTE: 8/9/17  No prior study is available for comparison.   Severely reduced left ventricular systolic function. Global hypokinesis.  Left ventricular ejection fraction is visually estimated to be 25%.  Diastolic function is difficult to assess with atrial fibrillation.  Normal right ventricular size and systolic function.  Moderate mitral regurgitation.  Severe eccentric aortic insufficiency.  Estimated right  "ventricular systolic pressure  is 60 mmHg.  These findings are known by cardiology consultation    Brain MRI: 8/10/17  1.  Mild diffuse cerebral atrophy.  2.  Small areas of acute infarction involving the left frontal periventricular white matter extending inferiorly to the subinsular cortex. Punctate area of infarction involving the cortex in the right posterior occipital lobe.  3.  Moderate periventricular white matter changes consistent with chronic microvascular ischemic gliosis.  4.  Small chronic focus of lacunar type infarction in the right frontal periventricular white matter.  5.  Numerous chronic \"microbleed's\" are noted throughout the brain parenchyma in both the supra and infratentorial compartments. Gyriform/curvilinear regions of chronic \"microbleed\" noted in the right posterior frontal region and also the left frontal   periventricular white matter.  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  Continue Coumadin with target INR of 2-3.  Continue IV amiodarone. Will switch to PO therapy.    Cardiomyopathy valvular heart disease versus A. fib with RVR  Patient appears euvolemic.  No ACE-I and Spironolactone due to renal issue.    Severe aortic insufficiency   Due to acute CVA with hemorrhage not a candidate for surgery now.    Thank you for referring this patient to our cardiology " service.  We will follow patient with you.    Edson Nuñez MD.  [facility].      Medications reviewed and Labs reviewed

## 2017-08-25 NOTE — PROGRESS NOTES
Imelda with IR called to give report after Right dialysis cath placement. Per report, patient is stable with /95. Imelda with IR will call HD so patient is able to go to HD now. Will pass onto night shift.

## 2017-08-25 NOTE — OR SURGEON
Immediate Post- Operative Note        PostOp Diagnosis: TREASURE      Procedure(s): temp dialysis catheter      Estimated Blood Loss: Less than 5 ml        Complications: None            8/24/2017     5:56 PM     James Valentin

## 2017-08-25 NOTE — PROGRESS NOTES
Inpatient Anticoagulation Service Note  Date: 8/25/2017  Reason for Anticoagulation: Atrial Fibrillation   [id number] VASc Score: 4  Hemoglobin Value: 13.6  Hematocrit Value: 42  Lab Platelet Value: 221  Target INR: 2.0 to 3.0  INR from last 7 days     Date/Time INR Value    08/24/17 2342 (!)2.93    08/24/17 0213 (!)2.99    08/23/17 0214 (!)3.14    08/22/17 0233 (!)2.83    08/21/17 0041 (!)2.48    08/19/17 2314 (!)2.15    08/19/17 0226 (!)1.8        Dose from last 7 days     Date/Time Dose (mg)    08/25/17 1300 3.75    08/24/17 1100 3.75    08/22/17 1200 7.5    08/21/17 1100 7.5    08/20/17 1400 7.5    08/19/17 1000 7.5        Average Dose (mg):  (New start VKA this admission, dosing ~7.5 mg daily )  Significant Interactions: Amiodarone, Aspirin, Statin (MVI)  Bridge Therapy: Yes (heparin weight based protocol)  Bridge Therapy Start Date: 08/14/17  Days of Overlap Therapy: 6   INR Value Greater than 2 Prior to Discontinuation of Parenteral Anticoagulation: Yes  Reversal Agent Administered: Not Applicable  Comments: INR at goal today. New amiodarone start continued today. No new H/H or PLT. No overt bleeding noted. Continued warfarin interactions noted. Will give 3.75 mg (reduced dose) and trend INR with AM labs. Likely to need dose adjustments.    Plan:  Warfarin 3.75 mg 8/25/17  Education Material Provided?: No (given prior)  Pharmacist suggested discharge dosing: warfarin 3.75 mg daily (recommend INR within 24 hours of discharge)    Darío Li, PHARMD

## 2017-08-25 NOTE — CARE PLAN
Problem: Communication  Goal: The ability to communicate needs accurately and effectively will improve  Outcome: PROGRESSING AS EXPECTED  Patient understands how to use  language line to communicate all his needs, questions, concerns about his stay and POC.    Problem: Infection  Goal: Will remain free from infection  Outcome: PROGRESSING AS EXPECTED  Patient demonstrates proper hand hygiene.

## 2017-08-25 NOTE — PROGRESS NOTES
PT to floor with dialysis RN. Report received. Pt alert, no c/o pain, assessment complete with aid of .

## 2017-08-25 NOTE — PROGRESS NOTES
Imelda with IR called and said HD cannot take him at this time. Patient getting sent back up to unit. Will begin post-procedure vitals upon arrival.

## 2017-08-25 NOTE — PROGRESS NOTES
Hemodialysis done today, started @ 1917 and  Ended @ 2220 with net UF = 1500 ml, report given to KRYSTIN Benjamin, see flow sheet for details

## 2017-08-25 NOTE — PROGRESS NOTES
Met with patient during bedside report. Pt denies pain, no needs at this time. Bed low and locked, call light within reach. Instructed to call for assistance.  Pt taken to HD with staff via stretcher.

## 2017-08-25 NOTE — DISCHARGE PLANNING
Medical Social Work    UPDATE: [employer] was contacted to re-screen pt for Emergency Medicaid since pt is no longer working.

## 2017-08-25 NOTE — PROGRESS NOTES
Internal Medicine Interval Note    Name Bhavesh Saini       1970   Age/Sex 47 y.o. male   MRN [id number]   Code Status FULL.     After 5PM or if no immediate response to page, please call for cross-coverage  Attending/Team: Akil/Troy. See Patient List for primary contact information  Call [phone] to page    1st Call - Day Intern (R1):   Charlotte 2nd Call - Day Sr. Resident (R2/R3):   Nando         Reason for interval visit  (Principal Problem)   Atrial fibrillation with RVR (CMS-[facility])    Interval Problem Daily Status Update  (24 hours)   - No acute events overnight.  No complaints this morning.    - Amiodarone drip continuing to run.   - INR 2.84.  - Social Work assisting patient with application for Emergency Medicaid.     Review of Systems   Constitutional: Negative for chills and fever.   Respiratory: Negative for shortness of breath.    Cardiovascular: Negative for chest pain.   Gastrointestinal: Negative for abdominal pain.   Neurological: Negative for dizziness and headaches.       Consultants/Specialty  Cardiology  Cardiac Surgery  Nephrology    Disposition  Inpatient for atrial fibrillation with RVR.    Quality Measures  Labs reviewed, Medications reviewed, Radiology images reviewed and EKG reviewed  Truong catheter: No Truong      DVT Prophylaxis: Warfarin (Coumadin)              Physical Exam       Vitals:    17 0019 17 0500 17 0830 17 1240   BP:  115/82 121/90 128/80   Pulse:  73 67 72   Resp:  15 18 18   Temp: 36.2 °C (97.1 °F) 36.2 °C (97.1 °F) 36.3 °C (97.4 °F) 36.3 °C (97.3 °F)   SpO2:  98% 99% 98%   Weight:       Height:         Body mass index is 26.78 kg/m². Weight: 62.2 kg (137 lb 2 oz)  Oxygen Therapy:  Pulse Oximetry: 98 %, O2 (LPM): 0, O2 Delivery: None (Room Air)    Physical Exam   Constitutional: He is oriented to person, place, and time. No distress.   Lying in bed.   Neck: No JVD present.   Cardiovascular:   No murmur heard.  Irregularly  irregular rhythm.     Pulmonary/Chest: Effort normal and breath sounds normal. No respiratory distress.   Abdominal: Soft. Bowel sounds are normal. He exhibits no distension.   Neurological: He is alert and oriented to person, place, and time.   Skin: Skin is warm.   Psychiatric: Affect normal.       Lab Data Review:       8/26/2017  1:28 PM    Recent Labs      08/24/17   0621  08/24/17   0816  08/24/17 2342 08/26/17   0757   SODIUM  128*   --   134*  130*   POTASSIUM  6.5*  5.0  3.2*  4.0   CHLORIDE  100   --   100  98   CO2  18*   --   25  23   BUN  77*   --   33*  42*   CREATININE  4.54*   --   2.39*  3.30*   MAGNESIUM   --    --   1.9  2.0   CALCIUM  9.1   --   8.8  8.8       Recent Labs      08/24/17   0621 08/24/17 2342 08/26/17   0757   GLUCOSE  104*  88  98       Recent Labs      08/24/17   0213  08/24/17 2342 08/26/17   0249   PROTHROMBTM  32.0*  31.5*  30.7*   INR  2.99*  2.93*  2.84*                 Assessment/Plan     * Atrial fibrillation with RVR (CMS-HCC)- (present on admission)   Assessment & Plan    -New onset vs paroxysmal (vague PMH of afib and non-compliance with meds)  -overnight He was given diltiazem by NF due to Afib with nonsustained HR in 160s. - now in SR  -CHADS2: 4  -ECHO: severe LVH, global hypokinesis, EF ~20%, severe AI and mod MR  -LOBO: slow blood flow but no thrombus. Severe AI    Plan  - Na restricted diet.  - Continue scheduled metoprolol, PRN 5mg metoprolol q6h.    - Patient continued on amiodarone drip.   - If HR < 50 or > 120s , is PERSISTENT and SYMPTOMATIC, will administer diltiazem 10 mg.   -Cardioversion will likely be done during AV replacement.         Hyperkalemia   Assessment & Plan    - K 3.2 today after hemodialysis last night.    - Patient now on low K diet.   - Continue to monitor.          Cardiorenal syndrome   Assessment & Plan    -Diagnosis given by Nephrology - likely since urine Na is low  -Patient is on HD, and is urinated <500 ml  yesterday.  -Creatinine in ED 4.38, Total protein Urine 208. Estimated GFR was 14.   -FENA 0.2%  -BNP 2203>> 2039   -CXR: cardiomegaly. ECHO showed: severe LVH, global hypokinesis, EF ~20%, AR and mod MR  Assessment: Cardiorenal syndrome  Plan  -Patient needs AVR/MAZE w LOBO per Cardiac Surgery, but due to his renal status and recent stroke with hemorrhage, they do not recommend at this time.  They will follow up outpatient in 6 weeks.   -Patient will be counseled on cessation of alcohol intake.   -Nephrology on board.             Aortic regurgitation- (present on admission)   Assessment & Plan    -Severe AR.   -ECHO:  severe LVH, global hypokinesis, EF ~20%, severe AI and mod MR. Findings were confirmed by LOBO  -Fits the criteria for valve replacement. Cardiology and cardiac surgery on board.        HFrEF (heart failure with reduced ejection fraction) (CMS-HCC)- (present on admission)   Assessment & Plan    -Presented with SOB, orthopnea, fatique and palpitations. Has h/o HTN and alcoholism in the past. On exam: JVD +., murmurs + but no volume overload.   -BNP 2203 >> 1753 > 2211.   -CXR: cardiomegaly. ECHO: severe LVH, global hypokinesis, EF ~20%, severe AI and mod MR  -Assessment: Compensated HF likely due to alcoholism vs tachycardia induced heart failure.   -Cardiac surgery spoke with Mr Saini and family, he needs ischemic work up and AVR/MAZE w LOBO, but due to his renal status and recent stroke with hemorrhage, they do not recommend it at this time.   - Patient's undocumented alien status is another reason having a heart cath is diifficult.      Plan  - Na restricted diet.   - Patient does have follow up appointment with Dr. Gutierrez, Cardiology, on September 11.  - Patient's family will fly him to [place] for further care after discharge.          Prolonged Q-T interval on ECG- (present on admission)   Assessment & Plan    -QTc ~530s. Will avoid QTc prolonging meds.        TREASURE (acute kidney injury) (CMS-HCC)-  (present on admission)   Assessment & Plan    -Acute on chronic. Likely pre-renal vs cardiorenal. HTN is likely the cause of CKD  -On adm: Cr: 4.3, AGMA, Ph 6.1. Unknown baseline.  -FeNa <1%  -H/o renal disease since 7yrs, FH kidney disease in his father. Has been non-complaint with HTN medications. - has CHF  -Renal US: no hydronephrosis and calculi. Left renal cysts.  - Cr and K slowly increasing likely due to poor renal perfusion secondary to atrial fibrillation.    - Upper extremity vein mapping completed in anticipation of AV fistula.     Plan  - Nephrology to discuss possibility of AVF placement with Vascular Surgery.  Appreciate the assistance.   - Continue Lasix.  Assessed daily by Nephrology for need for HD.          CVA (cerebral vascular accident) (CMS-HCC)- (present on admission)   Assessment & Plan    -h/o left facial droop, slurred speech and left sided weakness. However no neuro deficits currently  -CT: cerebral atrophy and small vessel ischemic changes   -MRI brain: acute infarction in L frontal periventricular white matter extendeing to subinsular cortex. Punctate area of infarction in R posterior lobe. Numerous microbleeds.   Assessment: Afib could have contributed (LOBO showed 'sludge' in the heart)  Plan  -ASA, statin  -Mx of Afib as stated above.        Non-rheumatic mitral regurgitation   Assessment & Plan    -Moderate MR on ECHO        Hyperglycemia- (present on admission)   Assessment & Plan    -Resolved  -On adm: , A1c: 5.4  -He was started on ISS and hypoglycemia protocol which is discontinued.  CTM with accuchecks        Alcoholic (CMS-HCC)- (present on admission)   Assessment & Plan    -History is vague but looks like he was drinking heavily in the past. Last drink was 8/5/2017.  -On thiamine and folate.           Essential hypertension- (present on admission)   Assessment & Plan    -PMH of HTN but likely non-compliant. ECHO showed severe LVH  -Stable on BB, hydralazine, nitrate,  lasix

## 2017-08-25 NOTE — PROGRESS NOTES
Pt underwent a Temporary dialysis catheter placement performed by Dr Valentin. Pt remained hemodynamically stable throughout the procedure. No adverse reaction noted. Catheter sutured in place and bandage placed; cdi. Report to KRYSTIN Baraajs. Pt taken back to room in stable condition.     Stefania Boykin - 12F x 16 cm, ref 3 [id number], lot # [id number]

## 2017-08-25 NOTE — DISCHARGE PLANNING
Outpatient Dialysis Placement Update    Met with patient bedside via phone . Confirmed demographics information. Patient was not aware would need to continue dialysis post discharge. Relayed, was requested to see by Nephrology for outpatient placement. Provided patient with education materials and list of HD centers. Per request and continuity of care- referral to be initiated to [facility] (closest facility).    Working on financial information; unclear at this time. Partnering with hospital financial team.    Will continue to follow up.    Dialysis Coordinator- [facility]  Damon Hammer  [phone]

## 2017-08-25 NOTE — PROGRESS NOTES
Viviana with Pharmacy said its OK for patient to get his Coumadin late when he returns to room from HD. Will pass onto night shift that patient needs his Coumadin once he is back to [facility].

## 2017-08-25 NOTE — DISCHARGE PLANNING
Medical Social Work    Miriam Cid with [place] [employer] met w/ pt at bedside and completed an application for Emergency Medicaid. Pt's dtr expected to bring a copy of pt's I.D. to give to Miriam. Miriam also explained to pt what Emergency Medicaid covers.

## 2017-08-25 NOTE — PROGRESS NOTES
Patient picked up and brought down to IR by transport via hospital bed with chart and consent for temp dilaysis cath placement. Monitor Room aware.

## 2017-08-25 NOTE — PROGRESS NOTES
Received bedside shift report from night RN, Sally.  Pt is AOx4.  Discussed POC and goal for the day via , language line, with patient and he verbalized understanding.  I-TRACE performed on Amiodarone gtt.  Eduated pt on white board and call light.  Bed locked and in lowest position with bilateral bedside rails up.  Will resume care and continue to monitor.

## 2017-08-26 LAB
ANION GAP SERPL CALC-SCNC: 9 MMOL/L (ref 0–11.9)
BUN SERPL-MCNC: 42 MG/DL (ref 8–22)
CALCIUM SERPL-MCNC: 8.8 MG/DL (ref 8.5–10.5)
CHLORIDE SERPL-SCNC: 98 MMOL/L (ref 96–112)
CO2 SERPL-SCNC: 23 MMOL/L (ref 20–33)
CREAT SERPL-MCNC: 3.3 MG/DL (ref 0.5–1.4)
GFR SERPL CREATININE-BSD FRML MDRD: 20 ML/MIN/1.73 M 2
GLUCOSE SERPL-MCNC: 98 MG/DL (ref 65–99)
INR PPP: 2.84 (ref 0.87–1.13)
M TB TUBERC IFN-G BLD QL: NEGATIVE
M TB TUBERC IFN-G/MITOGEN IGNF BLD: -0.01
M TB TUBERC IGNF/MITOGEN IGNF CONTROL: 82.26 [IU]/ML
MAGNESIUM SERPL-MCNC: 2 MG/DL (ref 1.5–2.5)
MITOGEN IGNF BCKGRD COR BLD-ACNC: 0.03 [IU]/ML
POTASSIUM SERPL-SCNC: 4 MMOL/L (ref 3.6–5.5)
PROTHROMBIN TIME: 30.7 SEC (ref 12–14.6)
SODIUM SERPL-SCNC: 130 MMOL/L (ref 135–145)

## 2017-08-26 PROCEDURE — 700102 HCHG RX REV CODE 250 W/ 637 OVERRIDE(OP): Performed by: INTERNAL MEDICINE

## 2017-08-26 PROCEDURE — 90935 HEMODIALYSIS ONE EVALUATION: CPT

## 2017-08-26 PROCEDURE — 36415 COLL VENOUS BLD VENIPUNCTURE: CPT

## 2017-08-26 PROCEDURE — 700105 HCHG RX REV CODE 258: Performed by: INTERNAL MEDICINE

## 2017-08-26 PROCEDURE — 700102 HCHG RX REV CODE 250 W/ 637 OVERRIDE(OP): Performed by: STUDENT IN AN ORGANIZED HEALTH CARE EDUCATION/TRAINING PROGRAM

## 2017-08-26 PROCEDURE — 700111 HCHG RX REV CODE 636 W/ 250 OVERRIDE (IP)

## 2017-08-26 PROCEDURE — 700102 HCHG RX REV CODE 250 W/ 637 OVERRIDE(OP): Performed by: NURSE PRACTITIONER

## 2017-08-26 PROCEDURE — A9270 NON-COVERED ITEM OR SERVICE: HCPCS | Performed by: NURSE PRACTITIONER

## 2017-08-26 PROCEDURE — A9270 NON-COVERED ITEM OR SERVICE: HCPCS | Performed by: INTERNAL MEDICINE

## 2017-08-26 PROCEDURE — 80048 BASIC METABOLIC PNL TOTAL CA: CPT

## 2017-08-26 PROCEDURE — 770020 HCHG ROOM/CARE - TELE (206)

## 2017-08-26 PROCEDURE — 700111 HCHG RX REV CODE 636 W/ 250 OVERRIDE (IP): Performed by: INTERNAL MEDICINE

## 2017-08-26 PROCEDURE — 85610 PROTHROMBIN TIME: CPT

## 2017-08-26 PROCEDURE — A9270 NON-COVERED ITEM OR SERVICE: HCPCS | Performed by: STUDENT IN AN ORGANIZED HEALTH CARE EDUCATION/TRAINING PROGRAM

## 2017-08-26 PROCEDURE — 83735 ASSAY OF MAGNESIUM: CPT

## 2017-08-26 RX ORDER — HEPARIN SODIUM 1000 [USP'U]/ML
INJECTION, SOLUTION INTRAVENOUS; SUBCUTANEOUS
Status: COMPLETED
Start: 2017-08-26 | End: 2017-08-26

## 2017-08-26 RX ORDER — PHYTONADIONE 5 MG/1
5 TABLET ORAL ONCE
Status: COMPLETED | OUTPATIENT
Start: 2017-08-26 | End: 2017-08-26

## 2017-08-26 RX ADMIN — THERA TABS 1 TABLET: TAB at 10:19

## 2017-08-26 RX ADMIN — STANDARDIZED SENNA CONCENTRATE AND DOCUSATE SODIUM 2 TABLET: 8.6; 5 TABLET, FILM COATED ORAL at 20:34

## 2017-08-26 RX ADMIN — DEXTROSE MONOHYDRATE 500 ML: 50 INJECTION, SOLUTION INTRAVENOUS at 05:00

## 2017-08-26 RX ADMIN — ATORVASTATIN CALCIUM 40 MG: 40 TABLET, FILM COATED ORAL at 20:34

## 2017-08-26 RX ADMIN — PHYTONADIONE 5 MG: 5 TABLET ORAL at 16:41

## 2017-08-26 RX ADMIN — METOPROLOL SUCCINATE 250 MG: 50 TABLET, EXTENDED RELEASE ORAL at 11:10

## 2017-08-26 RX ADMIN — FUROSEMIDE 40 MG: 40 TABLET ORAL at 10:19

## 2017-08-26 RX ADMIN — HEPARIN SODIUM 3000 UNITS: 1000 INJECTION, SOLUTION INTRAVENOUS; SUBCUTANEOUS at 15:10

## 2017-08-26 RX ADMIN — ISOSORBIDE MONONITRATE 60 MG: 30 TABLET ORAL at 10:19

## 2017-08-26 RX ADMIN — FOLIC ACID 1 MG: 1 TABLET ORAL at 10:20

## 2017-08-26 RX ADMIN — AMIODARONE HYDROCHLORIDE 0.5 MG/MIN: 50 INJECTION, SOLUTION INTRAVENOUS at 20:34

## 2017-08-26 RX ADMIN — STANDARDIZED SENNA CONCENTRATE AND DOCUSATE SODIUM 2 TABLET: 8.6; 5 TABLET, FILM COATED ORAL at 10:19

## 2017-08-26 RX ADMIN — AMIODARONE HYDROCHLORIDE 0.51 MG/MIN: 50 INJECTION, SOLUTION INTRAVENOUS at 05:00

## 2017-08-26 RX ADMIN — Medication 100 MG: at 10:19

## 2017-08-26 ASSESSMENT — PAIN SCALES - GENERAL
PAINLEVEL_OUTOF10: 0
PAINLEVEL_OUTOF10: 0

## 2017-08-26 ASSESSMENT — ENCOUNTER SYMPTOMS
EYES NEGATIVE: 1
CHILLS: 0
PALPITATIONS: 0
PND: 0
SHORTNESS OF BREATH: 0
EYE DISCHARGE: 0
NEUROLOGICAL NEGATIVE: 1
MYALGIAS: 0
HEMOPTYSIS: 0
BLURRED VISION: 0
COUGH: 0
CLAUDICATION: 0
MUSCULOSKELETAL NEGATIVE: 1
ORTHOPNEA: 0
CONSTITUTIONAL NEGATIVE: 1
GASTROINTESTINAL NEGATIVE: 1
PSYCHIATRIC NEGATIVE: 1
VOMITING: 0
FEVER: 0
SORE THROAT: 0
HEADACHES: 0
DIZZINESS: 0
DIARRHEA: 0
HEARTBURN: 0
ABDOMINAL PAIN: 0
NAUSEA: 0

## 2017-08-26 NOTE — CONSULTS
DATE OF SERVICE:  08/26/2017    REQUESTED BY:  Katrina Merchant MD    REASON FOR CONSULTATION:  Dialysis access.    HISTORY OF PRESENT ILLNESS:  This 47-year-old man with chronic hypertension   and history of kidney disease, presented with worsening renal failure to the   emergency room.  He has been started on dialysis through a temporary right   neck catheter.  He has had IVs in both upper extremities.  The right cephalic   vein is full of clots and not suitable.  The right basilic vein has thrombus   in the proximal forearm.  The left greater saphenous vein prior to a   subsequent IV was clear of any blood clots and adequate in diameter measuring   0.34 at the wrist, 0.51 in the mid forearm, 0.40 proximal forearm and 0.47   elbow.  In the low biceps region is 0.53 and 0.52 little proximal to that.  It   then becomes smaller taper down to 0.25 and 0.28.  The left basilic vein has   a branch off the mid biceps, which is not ideal for fistula creation.  There   is echolucent material in the basilic vein at the proximal forearm that   extends into the mid forearm and has a branch that joins the cephalic vein and   thrombus does not extend into that branch.  Essentially, the only visible   site for fistulas is left cephalic vein.  Yesterday, the nurse put an IV in   that vein after the noninvasive test.  I have asked the nurse to remove it   now.  His left radial artery is patent and measurements are not provided.    Tony's test is positive laterally.    The patient has a current creatinine of 3.30 and a BUN of 42.  Potassium is   4.0.  INR is elevated at 2.84.  The patient is on warfarin.    PAST MEDICAL HISTORY:  Atrial fibrillation identified in the emergency room.    He has hypertension.  History of alcohol use.  Cardiology has seen the patient   and the patient was started on anticoagulation    REVIEW OF SYSTEMS:  Review of systems are negative except as under present   illness.    PAST SURGICAL HISTORY:   None.    SOCIAL HISTORY:  He drinks alcohol on a daily basis.  He is .  He has a   daughter in town that speaks English well and I have discussed his situation   with her.    FAMILY HISTORY:  The patient is not aware of any family illnesses or kidney   disease.    ALLERGIES:  None known.    MEDICATIONS:  1. Imdur 60 mg daily.  2.  Coumadin per pharmacy dosing.  3.  Toprol- mg daily.  4.  Lopressor 5 mg q.4 hours as needed for hypertension.  5.  Stool softeners and laxatives as needed.  6.  Thiamine 100 mg daily.    PHYSICAL EXAMINATION:  VITAL SIGNS:  Weight 62 kg, height 5 feet.  GENERAL DESCRIPTION:  He is a thin man, alert and in no distress, lying in the   hospital bed.  Blood pressure is 121/90, heart rate 67, oxygen saturation on   room air is 99%.  HEAD:  Normocephalic, atraumatic.  Eyes, pupils equal, round and reactive.    Sclerae and conjunctiva are clear.  NECK:  Right low IJ triple lumen dialysis catheter is in place.  No jugular   venous distention.  No cervical adenopathy.  CHEST:  Normal chest wall.  Breath sounds clear bilaterally.  No rales or   rhonchi.  HEART:  Irregularly irregular rhythm.  No murmur or gallop.  ABDOMEN:  Flat, nontender, no masses.  EXTREMITIES:  No deformity.  NEUROLOGIC:  He is alert and oriented.  His left upper extremity has a good   radial pulse, easily palpable.  His cephalic vein is faintly visible.  There   is an IV in his proximal forearm in the cephalic vein.  Right upper extremity   has multiple bandages from IV sites and phlebotomy sites.  No swelling.    Radial pulse normal.    ASSESSMENT:  A 47-year-old man who needs hemodialysis chronically.  He has a   temporary catheter presently.  His upper extremity dialysis sites are limited   to the left cephalic vein.  Hopefully, that is not harmed by this recent IV.    Additional health problems include atrial fibrillation and is on warfarin for   that problem.  We will have to determine the bridging steps to  this stop his   anticoagulation for fistula creation and tunneled catheter placement.  The   patient through his daughter understands the operation of a left distal   radiocephalic arteriovenous fistula creation and replacement of the right neck   triple lumen catheter with a tunneled hemodialysis catheter.  The timing of   this surgery is going to depend on his anticoagulation status.       ____________________________________     MD SOLOMON Wyatt / RAQUEL    DD:  08/26/2017 13:00:21  DT:  08/26/2017 14:56:31    D#:  [id number]  Job#:  [id number]

## 2017-08-26 NOTE — PROGRESS NOTES
Cardiology Progress Note               Author: Dr. Nuñez Date & Time created: 2017  9:06 AM     Interval History:  No acute events overnight.  No telemetry events. Still in atrial fib.    Review of Systems   Constitutional: Negative.    HENT: Negative.    Eyes: Negative.    Respiratory: Negative for cough and shortness of breath.    Cardiovascular: Negative for chest pain, palpitations, orthopnea, claudication, leg swelling and PND.   Gastrointestinal: Negative.    Genitourinary: Negative.    Musculoskeletal: Negative.    Skin: Negative.    Neurological: Negative.    Psychiatric/Behavioral: Negative.       Physical Exam   Constitutional: He is oriented to person, place, and time.   HENT:   Head: Normocephalic.   Neck: No JVD present. No thyromegaly present.   Cardiovascular: An irregularly irregular rhythm present. Tachycardia present.    Pulses:       Carotid pulses are 2+ on the right side, and 2+ on the left side.       Radial pulses are 2+ on the right side, and 2+ on the left side.   Pulmonary/Chest: He has no wheezes.   Abdominal: Soft.   Musculoskeletal: He exhibits no edema.   Neurological: He is alert and oriented to person, place, and time.   Skin: Skin is warm and dry.       Hemodynamics:  Temp (24hrs), Av.3 °C (97.3 °F), Min:35.8 °C (96.5 °F), Max:36.9 °C (98.4 °F)  Temperature: 36.3 °C (97.4 °F)  Pulse  Av.6  Min: 50  Max: 155   Blood Pressure: 121/90     Respiratory:    Respiration: 18, Pulse Oximetry: 99 %        RUL Breath Sounds: Clear, RML Breath Sounds: Clear, RLL Breath Sounds: Diminished, SABAS Breath Sounds: Clear, LLL Breath Sounds: Diminished  Fluids:  Date 17 0700 - 17 0659   Shift 1137-6603 5211-0512 [id number] 24 Hour Total   I  N  T  A  K  E   Shift Total       O  U  T  P  U  T   Urine  120  120    Shift Total  120  120   Weight (kg) 62 62 62 62       Weight: 62.2 kg (137 lb 2 oz)  GI/Nutrition:  Orders Placed This Encounter   Procedures   • DIET ORDER     Standing  Status:   Standing     Number of Occurrences:   1     Order Specific Question:   Diet:     Answer:   Diabetic [3]     Order Specific Question:   Diet:     Answer:   Renal [8]     Order Specific Question:   Calorie modifications:     Answer:   1800 kcals [4]   • DIET NPO     Standing Status:   Standing     Number of Occurrences:   8     Order Specific Question:   Restrict to:     Answer:   Strict [1]     Lab Results:      Recent Labs      08/24/17   0621  08/24/17   0816  08/24/17   2342  08/26/17   0757   SODIUM  128*   --   134*  130*   POTASSIUM  6.5*  5.0  3.2*  4.0   CHLORIDE  100   --   100  98   CO2  18*   --   25  23   GLUCOSE  104*   --   88  98   BUN  77*   --   33*  42*   CREATININE  4.54*   --   2.39*  3.30*   CALCIUM  9.1   --   8.8  8.8     Recent Labs      08/24/17   0213  08/24/17   2342  08/26/17   0249   INR  2.99*  2.93*  2.84*                   LOBO: 8/10/17  Trileaflet aortic valve. The left coronary cusp has a nodule calcification with poor coaptation resulting in severe central eccentric aortic regurgitation.  Severely reduced left ventricular systolic function.  Left ventricular ejection fraction is visually estimated to be 20%.  Severe concentric left ventricular hypertrophy.  Normal right ventricular size and systolic function.  Mild mitral regurgitation.  These findings were communicated to the ICU service.    TTE: 8/9/17  No prior study is available for comparison.   Severely reduced left ventricular systolic function. Global hypokinesis.  Left ventricular ejection fraction is visually estimated to be 25%.  Diastolic function is difficult to assess with atrial fibrillation.  Normal right ventricular size and systolic function.  Moderate mitral regurgitation.  Severe eccentric aortic insufficiency.  Estimated right ventricular systolic pressure  is 60 mmHg.  These findings are known by cardiology consultation    Brain MRI: 8/10/17  1.  Mild diffuse cerebral atrophy.  2.  Small areas of acute  "infarction involving the left frontal periventricular white matter extending inferiorly to the subinsular cortex. Punctate area of infarction involving the cortex in the right posterior occipital lobe.  3.  Moderate periventricular white matter changes consistent with chronic microvascular ischemic gliosis.  4.  Small chronic focus of lacunar type infarction in the right frontal periventricular white matter.  5.  Numerous chronic \"microbleed's\" are noted throughout the brain parenchyma in both the supra and infratentorial compartments. Gyriform/curvilinear regions of chronic \"microbleed\" noted in the right posterior frontal region and also the left frontal   periventricular white matter.  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  Continue Coumadin with target INR of 2-3.  Continue amiodarone PO therapy.    Cardiomyopathy valvular heart disease versus A. fib with RVR  Patient appears euvolemic.  No ACE-I and Spironolactone due to renal issue.    LOBO and cardioversion tomorrow.    Severe aortic insufficiency   Due to acute CVA with hemorrhage not a candidate for surgery now.    Thank you for referring this patient to our cardiology service.  We will follow patient with you.    Edson Nuñez MD.  [facility].      Core Measures  "

## 2017-08-26 NOTE — PROGRESS NOTES
Nephrology Progress Note, Adult, Complex               Author:Katrina Lemusericabony   Date & Time created: 8/25/2017  7:20 PM     Interval History:  46 y/o male with severe, CHF  -EF 25 %, Severe AI, A.fib, CVA in TREASURE, metabolic acidosis  Off HD  Cr at 3.5  No edema  No difficulties to urinate  No acute events, no complaints    Review of Systems:  Review of Systems   Constitutional: Positive for malaise/fatigue. Negative for fever and chills.   Respiratory: Negative for cough and hemoptysis.    Cardiovascular: Negative for chest pain, palpitations and orthopnea.   Gastrointestinal: Negative for heartburn, nausea, vomiting, abdominal pain and diarrhea.   Genitourinary: Negative for dysuria.   All other systems reviewed and are negative.      Physical Exam:  Physical Exam   Constitutional: He is oriented to person, place, and time. No distress.   HENT:   Head: Normocephalic and atraumatic.   Nose: Nose normal.   Eyes: Left eye exhibits no discharge. No scleral icterus.   Cardiovascular: An irregularly irregular rhythm present.   Pulmonary/Chest: Effort normal and breath sounds normal. No respiratory distress. He has no wheezes.   Musculoskeletal: He exhibits no edema or tenderness.   Trace pedal edema   Neurological: He is alert and oriented to person, place, and time.   Skin: Skin is warm and dry. He is not diaphoretic.   Psychiatric: He has a normal mood and affect.   Nursing note and vitals reviewed.      Labs:        Invalid input(s): [id number]      Recent Labs      08/23/17   0214  08/24/17   0213  08/24/17   0621  08/24/17   0816  08/24/17   2342   SODIUM  133*  129*  128*   --   134*   POTASSIUM  4.8  6.5*  6.5*  5.0  3.2*   CHLORIDE  106  100  100   --   100   CO2  16*  18*  18*   --   25   BUN  65*  78*  77*   --   33*   CREATININE  3.57*  4.15*  4.54*   --   2.39*   MAGNESIUM  2.2   --    --    --   1.9   PHOSPHORUS  5.8*   --    --    --    --    CALCIUM  8.8  9.0  9.1   --   8.8     Recent Labs      08/24/17    0213  17   0621  17   2342   GLUCOSE  91  104*  88     Recent Labs      17   0214  17   0213  17   2342   PROTHROMBTM  33.2*  32.0*  31.5*   INR  3.14*  2.99*  2.93*               Hemodynamics:  Temp (24hrs), Av.6 °C (97.9 °F), Min:35.8 °C (96.5 °F), Max:37.2 °C (98.9 °F)  Temperature: 35.8 °C (96.5 °F)  Pulse  Av.8  Min: 50  Max: 155   Blood Pressure: 125/80 mmHg     Respiratory:    Respiration: 16, Pulse Oximetry: 94 %     Work Of Breathing / Effort: Mild  RUL Breath Sounds: Clear, RML Breath Sounds: Clear, RLL Breath Sounds: Diminished, SABAS Breath Sounds: Clear, LLL Breath Sounds: Diminished  Fluids:    Intake/Output Summary (Last 24 hours) at 17 1920  Last data filed at 17 0600   Gross per 24 hour   Intake   1117 ml   Output   2250 ml   Net  -1133 ml     Weight:  (PT off floor at dialysis)  GI/Nutrition:  Orders Placed This Encounter   Procedures   • DIET ORDER     Standing Status: Standing      Number of Occurrences: 1      Standing Expiration Date:      Order Specific Question:  Diet:     Answer:  Diabetic [3]     Order Specific Question:  Diet:     Answer:  Renal [8]     Order Specific Question:  Calorie modifications:     Answer:  1800 kcals [4]     Medical Decision Making, by Problem:  Active Hospital Problems    Diagnosis   • *Atrial fibrillation with RVR (CMS-HCC) [I48.91]   • CVA (cerebral vascular accident) (CMS-HCC) [I63.9]   • Essential hypertension [I10]   • Respiratory failure (CMS-HCC) [J96.90]   • Electrolyte abnormality [E87.8]   • Hyperglycemia [R73.9]   • Prolonged Q-T interval on ECG [R94.31]   • Elevated troponin [R74.8]   • Acute on chronic systolic heart failure (CMS-HCC) [I50.23]   • Non-rheumatic mitral regurgitation [I34.0]   • TREASURE (acute kidney injury) (CMS-HCC) [N17.9]   • 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

## 2017-08-26 NOTE — CARE PLAN
Problem: Safety  Goal: Will remain free from injury  Outcome: PROGRESSING AS EXPECTED  Pt remains free from falls or injuries. Pt up self and calls for assistance appropriately.     Problem: Venous Thromboembolism (VTW)/Deep Vein Thrombosis (DVT) Prevention:  Goal: Patient will participate in Venous Thrombosis (VTE)/Deep Vein Thrombosis (DVT)Prevention Measures  Outcome: PROGRESSING AS EXPECTED  Pt free from s/sx of DVT. Pt receiving Coumadin for VTE prophylaxis.

## 2017-08-26 NOTE — PROGRESS NOTES
Inpatient Anticoagulation Service Note  Date: 8/26/2017  Reason for Anticoagulation: Atrial Fibrillation   [id number] VASc Score: 4  Hemoglobin Value: (!) 13.6  Hematocrit Value: 42  Lab Platelet Value: 221  Target INR: 2.0 to 3.0  INR from last 7 days     Date/Time INR Value    08/26/17 0249 (!)  2.84    08/24/17 2342 (!)  2.93    08/24/17 0213 (!)  2.99    08/23/17 0214 (!)  3.14    08/22/17 0233 (!)  2.83    08/21/17 0041 (!)  2.48    08/19/17 2314 (!)  2.15        Dose from last 7 days     Date/Time Dose (mg)    08/26/17 1200  3.75    08/25/17 1300  3.75    08/24/17 1100  3.75    08/22/17 1200  7.5    08/21/17 1100  7.5    08/20/17 1400  7.5        Average Dose (mg): ~3.75 after amiodarone start (New start VKA this admission, dosing ~7.5 mg daily prior to amiodarone)  Significant Interactions: Amiodarone, Aspirin, Statin (MVI)  Bridge Therapy: Yes (heparin weight based protocol)  Bridge Therapy Start Date: 08/14/17  Days of Overlap Therapy: 6   INR Value Greater than 2 Prior to Discontinuation of Parenteral Anticoagulation: Yes  Reversal Agent Administered: Not Applicable  Comments: INR at goal today. New amiodarone start continued today. No new H/H or PLT. No overt bleeding noted. Continued warfarin interactions noted. Will give 3.75 mg (reduced dose) and trend INR with AM labs. Likely to need dose adjustments.    Plan:  Warfarin 3.75 mg 8/26/17  Education Material Provided?: No (given prior)  Pharmacist suggested discharge dosing: warfarin 3.75 mg daily (recommend INR within 24 hours of discharge)    Darío Li, PharmD

## 2017-08-26 NOTE — PROGRESS NOTES
Pt care assumed. Pt lying comfortably in bed. A&O x 4;  used via language line for communication. No distress present; no pain. Call light, phone, and bedside table within reach. White board updated and plan of care discussed with patient. Pt up self and calls for assistance appropriately. No concerns present at this time. Will continue to monitor.

## 2017-08-26 NOTE — PROGRESS NOTES
Nephrology Progress Note, Adult, Complex               Author:Jamia Díazpta   Date & Time created: 8/26/2017  1:17 PM     Interval History:  48 y/o male with severe, CHF  -EF 25 %, Severe AI, A.fib, CVA in TREASURE  Patient restarted HD  Sitting up eating lunch  No edema  No difficulties to urinate  No acute events, no complaints    Review of Systems:  Review of Systems   Constitutional: Negative for chills and fever.   HENT: Negative for congestion and sore throat.    Eyes: Negative for blurred vision and discharge.   Respiratory: Negative for cough and hemoptysis.    Cardiovascular: Negative for chest pain, palpitations, orthopnea and leg swelling.   Gastrointestinal: Negative for abdominal pain, diarrhea, heartburn, nausea and vomiting.   Genitourinary: Negative for dysuria.   Musculoskeletal: Negative for myalgias.   Neurological: Negative for headaches.   All other systems reviewed and are negative.      Physical Exam:  Physical Exam   Constitutional: He is oriented to person, place, and time. No distress.   HENT:   Head: Normocephalic and atraumatic.   Nose: Nose normal.   Eyes: Conjunctivae are normal. Pupils are equal, round, and reactive to light. Left eye exhibits no discharge. No scleral icterus.   Neck: Normal range of motion. Neck supple.   Cardiovascular: An irregularly irregular rhythm present.   Pulmonary/Chest: Effort normal and breath sounds normal. No respiratory distress. He has no wheezes.   Abdominal: Soft. Bowel sounds are normal. He exhibits no distension. There is no tenderness.   Musculoskeletal: He exhibits no tenderness.   Trace pedal edema   Neurological: He is alert and oriented to person, place, and time.   Skin: Skin is warm and dry. He is not diaphoretic.   Psychiatric: He has a normal mood and affect.   Nursing note and vitals reviewed.      Labs:        Invalid input(s): [id number]      Recent Labs      08/24/17   0621  08/24/17   0816  08/24/17   2342  08/26/17   0757   SODIUM  128*    --   134*  130*   POTASSIUM  6.5*  5.0  3.2*  4.0   CHLORIDE  100   --   100  98   CO2  18*   --   25  23   BUN  77*   --   33*  42*   CREATININE  4.54*   --   2.39*  3.30*   MAGNESIUM   --    --   1.9  2.0   CALCIUM  9.1   --   8.8  8.8     Recent Labs      17   0621  17   2342  17   0757   GLUCOSE  104*  88  98     Recent Labs      17   0213  17   2342  17   0249   PROTHROMBTM  32.0*  31.5*  30.7*   INR  2.99*  2.93*  2.84*               Hemodynamics:  Temp (24hrs), Av.3 °C (97.3 °F), Min:35.8 °C (96.5 °F), Max:36.9 °C (98.4 °F)  Temperature: 36.3 °C (97.4 °F)  Pulse  Av.6  Min: 50  Max: 155   Blood Pressure: 121/90     Respiratory:    Respiration: 18, Pulse Oximetry: 99 %        RUL Breath Sounds: Clear, RML Breath Sounds: Clear, RLL Breath Sounds: Diminished, SABAS Breath Sounds: Clear, LLL Breath Sounds: Diminished  Fluids:    Intake/Output Summary (Last 24 hours) at 17 1317  Last data filed at 17 0800   Gross per 24 hour   Intake              564 ml   Output              850 ml   Net             -286 ml     Weight: 62.2 kg (137 lb 2 oz)  GI/Nutrition:  Orders Placed This Encounter   Procedures   • DIET ORDER     Standing Status:   Standing     Number of Occurrences:   1     Order Specific Question:   Diet:     Answer:   Diabetic [3]     Order Specific Question:   Diet:     Answer:   Renal [8]     Order Specific Question:   Calorie modifications:     Answer:   1800 kcals [4]   • DIET NPO     Standing Status:   Standing     Number of Occurrences:   8     Order Specific Question:   Restrict to:     Answer:   Strict [1]     Medical Decision Making, by Problem:  Active Hospital Problems    Diagnosis   • *Atrial fibrillation with RVR (CMS-[facility]) [I48.91]   • CVA (cerebral vascular accident) (CMS-[facility]) [I63.9]   • Essential hypertension [I10]   • Respiratory failure (CMS-[facility]) [J96.90]   • Electrolyte abnormality [E87.8]   • Hyperglycemia [R73.9]   • Prolonged Q-T  interval on ECG [R94.31]   • Elevated troponin [R74.8]   • Acute on chronic systolic heart failure (CMS-[facility]) [I50.23]   • Non-rheumatic mitral regurgitation [I34.0]   • TREASURE (acute kidney injury) (CMS-HCC) [N17.9]   • 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 Hyponatremia- free water restriction  4.Anemia: Hb -to monitor  5.Metabolic acidosis - corrected  6.Volume: well controlled  7.CHF , AI, Afib -cardiology following  Plan  Monitor BMP  To restart HD  AVF creation   Vein map

## 2017-08-26 NOTE — PROGRESS NOTES
Shauna Downing Fall Risk Assessment:     Last Known Fall: No falls  Mobility: No limitations  Medications: Cardiovascular or central nervous system meds, Diuretics  Mental Status/LOC/Awareness: Awake, alert, and oriented to date, place, and person  Toileting Needs: No needs  Volume/Electrolyte Status: Use of IV fluids/tube feeds, Low blood sugar/electrolyte imbalances  Communication/Sensory: Visual (Glasses)/hearing deficit, Non-English patient/unable to speak/slurred speech  Behavior: Appropriate behavior  Shauna Downing Fall Risk Total: 15  Fall Risk Level: HIGH RISK    Universal Fall Precautions:  call light/belongings in reach, siderails up x 2, use non-slip footwear, adequate lighting, clutter free and spill free environment, educate on level of risk, educate to call for assistance, bed in low position and locked    Fall Risk Level Interventions:   TRIAL ([employer], NEURO, MED [place] 5) Low Fall Risk Interventions  Place yellow fall risk ID band on patient: verified  Provide patient/family education based on risk assessment: verified  Educate patient/family to call staff for assistance when getting out of bed: verified  Place fall precaution signage outside patient door: verified TRIAL ([address], NEURO, MED [place] 5) High Fall Risk Interventions  Place yellow fall risk ID band on patient: verified  Provide patient/family education based on risk assessment: verified  Educate patient/family to call staff for assistance when getting out of bed: verified  Place fall precaution signage outside patient door: verified  Place patient in room close to nursing station: verified  Utilize bed/chair fall alarm: verified  Notify charge of high risk for huddle: verified    Patient Specific Interventions:     Medication: review medications with patient and family  Mental Status/LOC/Awareness: check on patient hourly, reinforce the use of call light and provide activity  Toileting: monitor intake and output/use of appropriate  interventions and instruct patient/family on the use of grab bars  Volume/Electrolyte Status: ensure patient remains hydrated, monitor abnormal lab values and ensure IV fluids are appropriate  Communication/Sensory: update plan of care on whiteboard and provide communication alternatives/  Behavioral: encourage patient to voice feelings, engage patient in daily activities and administer medication as ordered  Mobility: provide comfort measures during transport, dangle prior to standing and ensure bed is locked and in lowest position

## 2017-08-27 ENCOUNTER — APPOINTMENT (OUTPATIENT)
Dept: RADIOLOGY | Facility: MEDICAL CENTER | Age: 47
DRG: 673 | End: 2017-08-27
Attending: SURGERY
Payer: MEDICAID

## 2017-08-27 LAB
ANION GAP SERPL CALC-SCNC: 9 MMOL/L (ref 0–11.9)
BASOPHILS # BLD AUTO: 0.8 % (ref 0–1.8)
BASOPHILS # BLD: 0.06 K/UL (ref 0–0.12)
BUN SERPL-MCNC: 28 MG/DL (ref 8–22)
CALCIUM SERPL-MCNC: 8.7 MG/DL (ref 8.5–10.5)
CHLORIDE SERPL-SCNC: 99 MMOL/L (ref 96–112)
CO2 SERPL-SCNC: 23 MMOL/L (ref 20–33)
CREAT SERPL-MCNC: 2.94 MG/DL (ref 0.5–1.4)
EOSINOPHIL # BLD AUTO: 0.39 K/UL (ref 0–0.51)
EOSINOPHIL NFR BLD: 5.3 % (ref 0–6.9)
ERYTHROCYTE [DISTWIDTH] IN BLOOD BY AUTOMATED COUNT: 45.1 FL (ref 35.9–50)
GFR SERPL CREATININE-BSD FRML MDRD: 23 ML/MIN/1.73 M 2
GLUCOSE SERPL-MCNC: 91 MG/DL (ref 65–99)
HCT VFR BLD AUTO: 39.4 % (ref 42–52)
HGB BLD-MCNC: 13.3 G/DL (ref 14–18)
IMM GRANULOCYTES # BLD AUTO: 0.02 K/UL (ref 0–0.11)
IMM GRANULOCYTES NFR BLD AUTO: 0.3 % (ref 0–0.9)
INR PPP: 1.54 (ref 0.87–1.13)
INR PPP: 2.12 (ref 0.87–1.13)
LYMPHOCYTES # BLD AUTO: 1.95 K/UL (ref 1–4.8)
LYMPHOCYTES NFR BLD: 26.5 % (ref 22–41)
MCH RBC QN AUTO: 30.1 PG (ref 27–33)
MCHC RBC AUTO-ENTMCNC: 33.8 G/DL (ref 33.7–35.3)
MCV RBC AUTO: 89.1 FL (ref 81.4–97.8)
MONOCYTES # BLD AUTO: 0.71 K/UL (ref 0–0.85)
MONOCYTES NFR BLD AUTO: 9.6 % (ref 0–13.4)
NEUTROPHILS # BLD AUTO: 4.24 K/UL (ref 1.82–7.42)
NEUTROPHILS NFR BLD: 57.5 % (ref 44–72)
NRBC # BLD AUTO: 0 K/UL
NRBC BLD AUTO-RTO: 0 /100 WBC
PLATELET # BLD AUTO: 185 K/UL (ref 164–446)
PMV BLD AUTO: 11.4 FL (ref 9–12.9)
POTASSIUM SERPL-SCNC: 3.7 MMOL/L (ref 3.6–5.5)
PROTHROMBIN TIME: 19 SEC (ref 12–14.6)
PROTHROMBIN TIME: 24.4 SEC (ref 12–14.6)
RBC # BLD AUTO: 4.42 M/UL (ref 4.7–6.1)
SODIUM SERPL-SCNC: 131 MMOL/L (ref 135–145)
WBC # BLD AUTO: 7.4 K/UL (ref 4.8–10.8)

## 2017-08-27 PROCEDURE — 700102 HCHG RX REV CODE 250 W/ 637 OVERRIDE(OP): Performed by: INTERNAL MEDICINE

## 2017-08-27 PROCEDURE — 501499 HCHG SURG-I-LOOP, MINI (BLUE): Performed by: SURGERY

## 2017-08-27 PROCEDURE — 85025 COMPLETE CBC W/AUTO DIFF WBC: CPT

## 2017-08-27 PROCEDURE — 700102 HCHG RX REV CODE 250 W/ 637 OVERRIDE(OP): Performed by: STUDENT IN AN ORGANIZED HEALTH CARE EDUCATION/TRAINING PROGRAM

## 2017-08-27 PROCEDURE — A9270 NON-COVERED ITEM OR SERVICE: HCPCS | Performed by: INTERNAL MEDICINE

## 2017-08-27 PROCEDURE — 80048 BASIC METABOLIC PNL TOTAL CA: CPT

## 2017-08-27 PROCEDURE — 02H633Z INSERTION OF INFUSION DEVICE INTO RIGHT ATRIUM, PERCUTANEOUS APPROACH: ICD-10-PCS | Performed by: SURGERY

## 2017-08-27 PROCEDURE — 700111 HCHG RX REV CODE 636 W/ 250 OVERRIDE (IP)

## 2017-08-27 PROCEDURE — 031C09F BYPASS LEFT RADIAL ARTERY TO LOWER ARM VEIN WITH AUTOLOGOUS VENOUS TISSUE, OPEN APPROACH: ICD-10-PCS | Performed by: SURGERY

## 2017-08-27 PROCEDURE — 160002 HCHG RECOVERY MINUTES (STAT): Performed by: SURGERY

## 2017-08-27 PROCEDURE — 700105 HCHG RX REV CODE 258: Performed by: RADIOLOGY

## 2017-08-27 PROCEDURE — 160041 HCHG SURGERY MINUTES - EA ADDL 1 MIN LEVEL 4: Performed by: SURGERY

## 2017-08-27 PROCEDURE — A9270 NON-COVERED ITEM OR SERVICE: HCPCS | Performed by: STUDENT IN AN ORGANIZED HEALTH CARE EDUCATION/TRAINING PROGRAM

## 2017-08-27 PROCEDURE — 500123 HCHG BOVIE, CONTROL W/BLADE: Performed by: SURGERY

## 2017-08-27 PROCEDURE — 770020 HCHG ROOM/CARE - TELE (206)

## 2017-08-27 PROCEDURE — 500043 HCHG BAG-A-JET: Performed by: SURGERY

## 2017-08-27 PROCEDURE — 501498 HCHG SURG-I-LOOP, MAXI (BLUE): Performed by: SURGERY

## 2017-08-27 PROCEDURE — 502626 HCHG SURGIFLO HEMOSTATIC MATRIX 6ML: Performed by: SURGERY

## 2017-08-27 PROCEDURE — 700101 HCHG RX REV CODE 250

## 2017-08-27 PROCEDURE — B2141ZZ FLUOROSCOPY OF RIGHT HEART USING LOW OSMOLAR CONTRAST: ICD-10-PCS | Performed by: SURGERY

## 2017-08-27 PROCEDURE — A6402 STERILE GAUZE <= 16 SQ IN: HCPCS | Performed by: SURGERY

## 2017-08-27 PROCEDURE — 36415 COLL VENOUS BLD VENIPUNCTURE: CPT

## 2017-08-27 PROCEDURE — 500698 HCHG HEMOCLIP, MEDIUM: Performed by: SURGERY

## 2017-08-27 PROCEDURE — 501838 HCHG SUTURE GENERAL: Performed by: SURGERY

## 2017-08-27 PROCEDURE — 85610 PROTHROMBIN TIME: CPT

## 2017-08-27 PROCEDURE — 99232 SBSQ HOSP IP/OBS MODERATE 35: CPT | Mod: GC | Performed by: INTERNAL MEDICINE

## 2017-08-27 PROCEDURE — 160029 HCHG SURGERY MINUTES - 1ST 30 MINS LEVEL 4: Performed by: SURGERY

## 2017-08-27 PROCEDURE — 501837 HCHG SUTURE CV: Performed by: SURGERY

## 2017-08-27 PROCEDURE — 700105 HCHG RX REV CODE 258: Performed by: INTERNAL MEDICINE

## 2017-08-27 PROCEDURE — 160048 HCHG OR STATISTICAL LEVEL 1-5: Performed by: SURGERY

## 2017-08-27 PROCEDURE — 160009 HCHG ANES TIME/MIN: Performed by: SURGERY

## 2017-08-27 PROCEDURE — 700111 HCHG RX REV CODE 636 W/ 250 OVERRIDE (IP): Performed by: INTERNAL MEDICINE

## 2017-08-27 PROCEDURE — 500700 HCHG HEMOCLIP, SMALL (RED): Performed by: SURGERY

## 2017-08-27 PROCEDURE — C1750 CATH, HEMODIALYSIS,LONG-TERM: HCPCS | Performed by: SURGERY

## 2017-08-27 PROCEDURE — 160035 HCHG PACU - 1ST 60 MINS PHASE I: Performed by: SURGERY

## 2017-08-27 DEVICE — CATHETER PALINDROME 23CM - (5EA/PK): Type: IMPLANTABLE DEVICE | Status: FUNCTIONAL

## 2017-08-27 RX ORDER — PAPAVERINE HYDROCHLORIDE 30 MG/ML
INJECTION INTRAMUSCULAR; INTRAVENOUS
Status: DISCONTINUED | OUTPATIENT
Start: 2017-08-27 | End: 2017-08-27 | Stop reason: HOSPADM

## 2017-08-27 RX ORDER — BUPIVACAINE HYDROCHLORIDE 5 MG/ML
INJECTION, SOLUTION PERINEURAL
Status: DISCONTINUED | OUTPATIENT
Start: 2017-08-27 | End: 2017-08-27 | Stop reason: HOSPADM

## 2017-08-27 RX ORDER — OXYCODONE HYDROCHLORIDE 5 MG/1
5 TABLET ORAL
Status: DISCONTINUED | OUTPATIENT
Start: 2017-08-27 | End: 2017-09-09 | Stop reason: HOSPADM

## 2017-08-27 RX ORDER — HEPARIN SODIUM 5000 [USP'U]/ML
INJECTION, SOLUTION INTRAVENOUS; SUBCUTANEOUS
Status: DISCONTINUED | OUTPATIENT
Start: 2017-08-27 | End: 2017-08-27 | Stop reason: HOSPADM

## 2017-08-27 RX ADMIN — DEXTROSE MONOHYDRATE 500 ML: 50 INJECTION, SOLUTION INTRAVENOUS at 20:39

## 2017-08-27 RX ADMIN — THERA TABS 1 TABLET: TAB at 09:16

## 2017-08-27 RX ADMIN — SODIUM CHLORIDE 500 ML: 9 INJECTION, SOLUTION INTRAVENOUS at 11:33

## 2017-08-27 RX ADMIN — Medication 100 MG: at 09:16

## 2017-08-27 RX ADMIN — DEXTROSE MONOHYDRATE 500 ML: 50 INJECTION, SOLUTION INTRAVENOUS at 11:40

## 2017-08-27 RX ADMIN — METOPROLOL SUCCINATE 250 MG: 50 TABLET, EXTENDED RELEASE ORAL at 10:41

## 2017-08-27 RX ADMIN — FUROSEMIDE 40 MG: 40 TABLET ORAL at 09:16

## 2017-08-27 RX ADMIN — ATORVASTATIN CALCIUM 40 MG: 40 TABLET, FILM COATED ORAL at 20:31

## 2017-08-27 RX ADMIN — ISOSORBIDE MONONITRATE 60 MG: 30 TABLET ORAL at 09:16

## 2017-08-27 RX ADMIN — AMIODARONE HYDROCHLORIDE 0.5 MG/MIN: 50 INJECTION, SOLUTION INTRAVENOUS at 11:33

## 2017-08-27 RX ADMIN — STANDARDIZED SENNA CONCENTRATE AND DOCUSATE SODIUM 2 TABLET: 8.6; 5 TABLET, FILM COATED ORAL at 09:16

## 2017-08-27 RX ADMIN — STANDARDIZED SENNA CONCENTRATE AND DOCUSATE SODIUM 2 TABLET: 8.6; 5 TABLET, FILM COATED ORAL at 20:31

## 2017-08-27 RX ADMIN — FOLIC ACID 1 MG: 1 TABLET ORAL at 09:16

## 2017-08-27 ASSESSMENT — ENCOUNTER SYMPTOMS
ABDOMINAL PAIN: 0
NEUROLOGICAL NEGATIVE: 1
PALPITATIONS: 0
PSYCHIATRIC NEGATIVE: 1
MUSCULOSKELETAL NEGATIVE: 1
CONSTITUTIONAL NEGATIVE: 1
ORTHOPNEA: 0
GASTROINTESTINAL NEGATIVE: 1
HEADACHES: 0
EYES NEGATIVE: 1
CLAUDICATION: 0
SHORTNESS OF BREATH: 0
PND: 0
FEVER: 0
COUGH: 0
DIZZINESS: 0
CHILLS: 0

## 2017-08-27 ASSESSMENT — PAIN SCALES - GENERAL
PAINLEVEL_OUTOF10: 0

## 2017-08-27 NOTE — PROGRESS NOTES
Hemodialysis ordered by Dr. May. Treatment started at 1310 and ended at 1610. Pt stable, vss, no c/o post tx. See flow sheets for details. Net UF 1.0 liter. Report to KHUSHBOO Yuan RN.

## 2017-08-27 NOTE — PROGRESS NOTES
Vascular    Patient has given informed consent for left radial cephalic arteriovenous fistula and tunneled dialysis catheter.    Potassium 3.7 and INR  1.54

## 2017-08-27 NOTE — PROGRESS NOTES
Cardiology Progress Note               Author: Dr. Nuñez Date & Time created: 2017  9:42 AM     Interval History:  No acute events overnight.  No telemetry events. Still in atrial fib.    Review of Systems   Constitutional: Negative.    HENT: Negative.    Eyes: Negative.    Respiratory: Negative for cough and shortness of breath.    Cardiovascular: Negative for chest pain, palpitations, orthopnea, claudication, leg swelling and PND.   Gastrointestinal: Negative.    Genitourinary: Negative.    Musculoskeletal: Negative.    Skin: Negative.    Neurological: Negative.    Psychiatric/Behavioral: Negative.       Physical Exam   Constitutional: He is oriented to person, place, and time.   HENT:   Head: Normocephalic.   Neck: No JVD present. No thyromegaly present.   Cardiovascular: An irregularly irregular rhythm present. Tachycardia present.    Pulses:       Carotid pulses are 2+ on the right side, and 2+ on the left side.       Radial pulses are 2+ on the right side, and 2+ on the left side.   Pulmonary/Chest: He has no wheezes.   Abdominal: Soft.   Musculoskeletal: He exhibits no edema.   Neurological: He is alert and oriented to person, place, and time.   Skin: Skin is warm and dry.       Hemodynamics:  Temp (24hrs), Av.3 °C (97.4 °F), Min:35.9 °C (96.6 °F), Max:36.7 °C (98.1 °F)  Temperature: 36.2 °C (97.1 °F)  Pulse  Av.5  Min: 50  Max: 155   Blood Pressure: (!) 166/94 (nurse notified)     Respiratory:    Respiration: 16, Pulse Oximetry: 97 %     Work Of Breathing / Effort: Mild  RUL Breath Sounds: Clear, RML Breath Sounds: Clear, RLL Breath Sounds: Clear, SABAS Breath Sounds: Clear, LLL Breath Sounds: Clear  Fluids:  Date 17 0700 - 17 0659   Shift 9823-1445 2656-5644 0162-2046 24 Hour Total   I  N  T  A  K  E   Shift Total       O  U  T  P  U  T   Urine  120  120    Shift Total  120  120   Weight (kg) 62 62 62 62       Weight: 63.7 kg (140 lb 6.9 oz)  GI/Nutrition:  Orders Placed This Encounter    Procedures   • DIET NPO     Standing Status:   Standing     Number of Occurrences:   8     Order Specific Question:   Restrict to:     Answer:   Strict [1]     Lab Results:  Recent Labs      08/27/17   0251   WBC  7.4   RBC  4.42*   HEMOGLOBIN  13.3*   HEMATOCRIT  39.4*   MCV  89.1   MCH  30.1   MCHC  33.8   RDW  45.1   PLATELETCT  185   MPV  11.4     Recent Labs      08/24/17   2342  08/26/17   0757  08/27/17   0251   SODIUM  134*  130*  131*   POTASSIUM  3.2*  4.0  3.7   CHLORIDE  100  98  99   CO2  25  23  23   GLUCOSE  88  98  91   BUN  33*  42*  28*   CREATININE  2.39*  3.30*  2.94*   CALCIUM  8.8  8.8  8.7     Recent Labs      08/24/17   2342  08/26/17   0249  08/27/17   0251   INR  2.93*  2.84*  2.12*                   LOBO: 8/10/17  Trileaflet aortic valve. The left coronary cusp has a nodule calcification with poor coaptation resulting in severe central eccentric aortic regurgitation.  Severely reduced left ventricular systolic function.  Left ventricular ejection fraction is visually estimated to be 20%.  Severe concentric left ventricular hypertrophy.  Normal right ventricular size and systolic function.  Mild mitral regurgitation.  These findings were communicated to the ICU service.    TTE: 8/9/17  No prior study is available for comparison.   Severely reduced left ventricular systolic function. Global hypokinesis.  Left ventricular ejection fraction is visually estimated to be 25%.  Diastolic function is difficult to assess with atrial fibrillation.  Normal right ventricular size and systolic function.  Moderate mitral regurgitation.  Severe eccentric aortic insufficiency.  Estimated right ventricular systolic pressure  is 60 mmHg.  These findings are known by cardiology consultation    Brain MRI: 8/10/17  1.  Mild diffuse cerebral atrophy.  2.  Small areas of acute infarction involving the left frontal periventricular white matter extending inferiorly to the subinsular cortex. Punctate area of  "infarction involving the cortex in the right posterior occipital lobe.  3.  Moderate periventricular white matter changes consistent with chronic microvascular ischemic gliosis.  4.  Small chronic focus of lacunar type infarction in the right frontal periventricular white matter.  5.  Numerous chronic \"microbleed's\" are noted throughout the brain parenchyma in both the supra and infratentorial compartments. Gyriform/curvilinear regions of chronic \"microbleed\" noted in the right posterior frontal region and also the left frontal   periventricular white matter.  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  Continue Coumadin with target INR of 2-3.  Continue amiodarone PO therapy.    Cardiomyopathy valvular heart disease versus A. fib with RVR  Patient appears euvolemic.  No ACE-I and Spironolactone due to renal issue.    LOBO and cardioversion tomorrow will be held today for AVF surgery.    Severe aortic insufficiency   Due to acute CVA with hemorrhage not a candidate for surgery now.    Thank you for referring this patient to our cardiology service.  We will follow patient with you.    Edson Nuñez MD.  [facility].      Core Measures  "

## 2017-08-27 NOTE — CARE PLAN
Problem: Safety  Goal: Will remain free from injury  Outcome: PROGRESSING AS EXPECTED  Pt had no falls during this shift    Problem: Venous Thromboembolism (VTW)/Deep Vein Thrombosis (DVT) Prevention:  Goal: Patient will participate in Venous Thrombosis (VTE)/Deep Vein Thrombosis (DVT)Prevention Measures    Intervention: Assess and monitor for anticoagulation complications  No current complications due to anticoagulation

## 2017-08-27 NOTE — PROGRESS NOTES
Assumed pt care at 1900. No acute distess. Resting at this time. meds given per MAR. Pt agreeable to plan of care for tonight. Will cont to monitor

## 2017-08-28 LAB
APTT PPP: 72.5 SEC (ref 24.7–36)
BASOPHILS # BLD AUTO: 0.2 % (ref 0–1.8)
BASOPHILS # BLD: 0.02 K/UL (ref 0–0.12)
EKG IMPRESSION: NORMAL
EKG IMPRESSION: NORMAL
EOSINOPHIL # BLD AUTO: 0 K/UL (ref 0–0.51)
EOSINOPHIL NFR BLD: 0 % (ref 0–6.9)
ERYTHROCYTE [DISTWIDTH] IN BLOOD BY AUTOMATED COUNT: 46.5 FL (ref 35.9–50)
HCT VFR BLD AUTO: 39.2 % (ref 42–52)
HGB BLD-MCNC: 12.9 G/DL (ref 14–18)
IMM GRANULOCYTES # BLD AUTO: 0.05 K/UL (ref 0–0.11)
IMM GRANULOCYTES NFR BLD AUTO: 0.5 % (ref 0–0.9)
INR PPP: 1.57 (ref 0.87–1.13)
LYMPHOCYTES # BLD AUTO: 0.9 K/UL (ref 1–4.8)
LYMPHOCYTES NFR BLD: 9.8 % (ref 22–41)
MCH RBC QN AUTO: 29.6 PG (ref 27–33)
MCHC RBC AUTO-ENTMCNC: 32.9 G/DL (ref 33.7–35.3)
MCV RBC AUTO: 89.9 FL (ref 81.4–97.8)
MONOCYTES # BLD AUTO: 0.44 K/UL (ref 0–0.85)
MONOCYTES NFR BLD AUTO: 4.8 % (ref 0–13.4)
NEUTROPHILS # BLD AUTO: 7.77 K/UL (ref 1.82–7.42)
NEUTROPHILS NFR BLD: 84.7 % (ref 44–72)
NRBC # BLD AUTO: 0 K/UL
NRBC BLD AUTO-RTO: 0 /100 WBC
PLATELET # BLD AUTO: 212 K/UL (ref 164–446)
PMV BLD AUTO: 11.1 FL (ref 9–12.9)
PROTHROMBIN TIME: 19.3 SEC (ref 12–14.6)
RBC # BLD AUTO: 4.36 M/UL (ref 4.7–6.1)
WBC # BLD AUTO: 9.2 K/UL (ref 4.8–10.8)

## 2017-08-28 PROCEDURE — 700102 HCHG RX REV CODE 250 W/ 637 OVERRIDE(OP): Performed by: INTERNAL MEDICINE

## 2017-08-28 PROCEDURE — A9270 NON-COVERED ITEM OR SERVICE: HCPCS | Performed by: INTERNAL MEDICINE

## 2017-08-28 PROCEDURE — 85610 PROTHROMBIN TIME: CPT

## 2017-08-28 PROCEDURE — 99152 MOD SED SAME PHYS/QHP 5/>YRS: CPT

## 2017-08-28 PROCEDURE — A9270 NON-COVERED ITEM OR SERVICE: HCPCS | Performed by: STUDENT IN AN ORGANIZED HEALTH CARE EDUCATION/TRAINING PROGRAM

## 2017-08-28 PROCEDURE — 99233 SBSQ HOSP IP/OBS HIGH 50: CPT | Mod: GC | Performed by: HOSPITALIST

## 2017-08-28 PROCEDURE — 700102 HCHG RX REV CODE 250 W/ 637 OVERRIDE(OP): Performed by: STUDENT IN AN ORGANIZED HEALTH CARE EDUCATION/TRAINING PROGRAM

## 2017-08-28 PROCEDURE — 5A2204Z RESTORATION OF CARDIAC RHYTHM, SINGLE: ICD-10-PCS | Performed by: INTERNAL MEDICINE

## 2017-08-28 PROCEDURE — 302242 IV POLE: Performed by: INTERNAL MEDICINE

## 2017-08-28 PROCEDURE — 304952 HCHG R 2 PADS

## 2017-08-28 PROCEDURE — 93312 ECHO TRANSESOPHAGEAL: CPT

## 2017-08-28 PROCEDURE — 700111 HCHG RX REV CODE 636 W/ 250 OVERRIDE (IP)

## 2017-08-28 PROCEDURE — 93005 ELECTROCARDIOGRAM TRACING: CPT | Performed by: INTERNAL MEDICINE

## 2017-08-28 PROCEDURE — 770020 HCHG ROOM/CARE - TELE (206)

## 2017-08-28 PROCEDURE — 36415 COLL VENOUS BLD VENIPUNCTURE: CPT

## 2017-08-28 PROCEDURE — 85730 THROMBOPLASTIN TIME PARTIAL: CPT

## 2017-08-28 PROCEDURE — 93010 ELECTROCARDIOGRAM REPORT: CPT | Performed by: INTERNAL MEDICINE

## 2017-08-28 PROCEDURE — 85025 COMPLETE CBC W/AUTO DIFF WBC: CPT

## 2017-08-28 PROCEDURE — 700111 HCHG RX REV CODE 636 W/ 250 OVERRIDE (IP): Performed by: PHARMACIST

## 2017-08-28 PROCEDURE — 92960 CARDIOVERSION ELECTRIC EXT: CPT

## 2017-08-28 PROCEDURE — B245ZZ4 ULTRASONOGRAPHY OF LEFT HEART, TRANSESOPHAGEAL: ICD-10-PCS | Performed by: INTERNAL MEDICINE

## 2017-08-28 PROCEDURE — 700111 HCHG RX REV CODE 636 W/ 250 OVERRIDE (IP): Performed by: INTERNAL MEDICINE

## 2017-08-28 PROCEDURE — 93325 DOPPLER ECHO COLOR FLOW MAPG: CPT

## 2017-08-28 PROCEDURE — 700105 HCHG RX REV CODE 258: Performed by: INTERNAL MEDICINE

## 2017-08-28 RX ORDER — MIDAZOLAM HYDROCHLORIDE 1 MG/ML
INJECTION INTRAMUSCULAR; INTRAVENOUS
Status: COMPLETED
Start: 2017-08-28 | End: 2017-08-28

## 2017-08-28 RX ORDER — WARFARIN SODIUM 7.5 MG/1
3.75 TABLET ORAL
Status: DISCONTINUED | OUTPATIENT
Start: 2017-08-29 | End: 2017-08-30

## 2017-08-28 RX ORDER — AMIODARONE HYDROCHLORIDE 200 MG/1
200 TABLET ORAL TWICE DAILY
Status: DISCONTINUED | OUTPATIENT
Start: 2017-08-28 | End: 2017-08-30

## 2017-08-28 RX ORDER — WARFARIN SODIUM 5 MG/1
5 TABLET ORAL
Status: COMPLETED | OUTPATIENT
Start: 2017-08-28 | End: 2017-08-28

## 2017-08-28 RX ADMIN — FOLIC ACID 1 MG: 1 TABLET ORAL at 09:04

## 2017-08-28 RX ADMIN — WARFARIN SODIUM 5 MG: 5 TABLET ORAL at 18:27

## 2017-08-28 RX ADMIN — THERA TABS 1 TABLET: TAB at 09:04

## 2017-08-28 RX ADMIN — HEPARIN SODIUM 900 UNITS/HR: 5000 INJECTION, SOLUTION INTRAVENOUS at 11:18

## 2017-08-28 RX ADMIN — AMIODARONE HYDROCHLORIDE 200 MG: 200 TABLET ORAL at 20:41

## 2017-08-28 RX ADMIN — BENZOCAINE AND MENTHOL 1 LOZENGE: 15; 3.6 LOZENGE ORAL at 06:37

## 2017-08-28 RX ADMIN — AMIODARONE HYDROCHLORIDE 200 MG: 200 TABLET ORAL at 09:03

## 2017-08-28 RX ADMIN — FENTANYL CITRATE 75 MCG: 50 INJECTION, SOLUTION INTRAMUSCULAR; INTRAVENOUS at 10:30

## 2017-08-28 RX ADMIN — METOPROLOL SUCCINATE 250 MG: 50 TABLET, EXTENDED RELEASE ORAL at 09:03

## 2017-08-28 RX ADMIN — Medication 100 MG: at 09:04

## 2017-08-28 RX ADMIN — ISOSORBIDE MONONITRATE 60 MG: 30 TABLET ORAL at 09:03

## 2017-08-28 RX ADMIN — AMIODARONE HYDROCHLORIDE 0.51 MG/MIN: 50 INJECTION, SOLUTION INTRAVENOUS at 02:49

## 2017-08-28 RX ADMIN — MIDAZOLAM 3 MG: 1 INJECTION INTRAMUSCULAR; INTRAVENOUS at 10:30

## 2017-08-28 RX ADMIN — FUROSEMIDE 40 MG: 40 TABLET ORAL at 09:04

## 2017-08-28 RX ADMIN — ATORVASTATIN CALCIUM 40 MG: 40 TABLET, FILM COATED ORAL at 20:41

## 2017-08-28 ASSESSMENT — ENCOUNTER SYMPTOMS
HEARTBURN: 0
HEADACHES: 0
ABDOMINAL PAIN: 0
EYES NEGATIVE: 1
NEUROLOGICAL NEGATIVE: 1
PSYCHIATRIC NEGATIVE: 1
DIZZINESS: 0
NAUSEA: 0
CONSTITUTIONAL NEGATIVE: 1
MUSCULOSKELETAL NEGATIVE: 1
ORTHOPNEA: 0
CLAUDICATION: 0
DIARRHEA: 0
FEVER: 0
PND: 0
GASTROINTESTINAL NEGATIVE: 1
VOMITING: 0
HEMOPTYSIS: 0
MYALGIAS: 0
COUGH: 0
PALPITATIONS: 0
SHORTNESS OF BREATH: 0
CHILLS: 0

## 2017-08-28 ASSESSMENT — PAIN SCALES - GENERAL
PAINLEVEL_OUTOF10: 0

## 2017-08-28 NOTE — PROGRESS NOTES
Nephrology Progress Note, Adult, Complex               Author:Oni Najjar Date & Time created: 8/28/2017  12:13 PM     Interval History:  48 y/o male with severe, CHF  -EF 25 %, Severe AI, A.fib, CVA in TREASURE, metabolic acidosis  Poor UOP  Doing well, no complaints  Events last 24h noted    Review of Systems:  Review of Systems   Constitutional: Positive for malaise/fatigue. Negative for chills and fever.   Respiratory: Negative for cough and hemoptysis.    Cardiovascular: Negative for chest pain, palpitations and orthopnea.   Gastrointestinal: Negative for abdominal pain, diarrhea, heartburn, nausea and vomiting.   Genitourinary: Negative for dysuria.   All other systems reviewed and are negative.      Physical Exam:  Physical Exam   Constitutional: He is oriented to person, place, and time. No distress.   HENT:   Head: Normocephalic and atraumatic.   Nose: Nose normal.   Eyes: Right eye exhibits no discharge. Left eye exhibits no discharge. No scleral icterus.   Cardiovascular: An irregularly irregular rhythm present.   Pulmonary/Chest: Effort normal and breath sounds normal. No respiratory distress. He has no wheezes.   Musculoskeletal: He exhibits no edema or tenderness.   Trace pedal edema   Neurological: He is alert and oriented to person, place, and time.   Skin: Skin is warm and dry. He is not diaphoretic.   Psychiatric: He has a normal mood and affect. His behavior is normal.   Nursing note and vitals reviewed.      Labs:        Invalid input(s): [id number]      Recent Labs      08/26/17   0757  08/27/17   0251   SODIUM  130*  131*   POTASSIUM  4.0  3.7   CHLORIDE  98  99   CO2  23  23   BUN  42*  28*   CREATININE  3.30*  2.94*   MAGNESIUM  2.0   --    CALCIUM  8.8  8.7     Recent Labs      08/26/17   0757  08/27/17   0251   GLUCOSE  98  91     Recent Labs      08/27/17   0251  08/27/17   1231  08/28/17   0926   RBC  4.42*   --   4.36*   HEMOGLOBIN  13.3*   --   12.9*   HEMATOCRIT  39.4*   --   39.2*    PLATELETCT  185   --   212   PROTHROMBTM  24.4*  19.0*  19.3*   INR  2.12*  1.54*  1.57*     Recent Labs      17   0251  17   0926   WBC  7.4  9.2   NEUTSPOLYS  57.50  84.70*   LYMPHOCYTES  26.50  9.80*   MONOCYTES  9.60  4.80   EOSINOPHILS  5.30  0.00   BASOPHILS  0.80  0.20           Hemodynamics:  Temp (24hrs), Av.4 °C (97.5 °F), Min:36.1 °C (97 °F), Max:36.8 °C (98.3 °F)  Temperature: 36.8 °C (98.3 °F)  Pulse  Av.2  Min: 45  Max: 155Heart Rate (Monitored): 78  Blood Pressure: 105/59, NIBP: 121/72     Respiratory:    Respiration: 18, Pulse Oximetry: 95 %        RUL Breath Sounds: Clear, RML Breath Sounds: Clear, RLL Breath Sounds: Diminished, SABAS Breath Sounds: Clear, LLL Breath Sounds: Diminished  Fluids:    Intake/Output Summary (Last 24 hours) at 17 1213  Last data filed at 17 0700   Gross per 24 hour   Intake             1364 ml   Output              275 ml   Net             1089 ml        GI/Nutrition:  Orders Placed This Encounter   Procedures   • DIET NPO     Standing Status:   Standing     Number of Occurrences:   1     Order Specific Question:   Restrict to:     Answer:   Sips with Medications [3]     Medical Decision Making, by Problem:  Active Hospital Problems    Diagnosis   • *Atrial fibrillation with RVR (CMS-HCC) [I48.91]   • CVA (cerebral vascular accident) (CMS-HCC) [I63.9]   • Essential hypertension [I10]   • Respiratory failure (CMS-HCC) [J96.90]   • Electrolyte abnormality [E87.8]   • Hyperglycemia [R73.9]   • Prolonged Q-T interval on ECG [R94.31]   • Elevated troponin [R74.8]   • Acute on chronic systolic heart failure (CMS-HCC) [I50.23]   • Non-rheumatic mitral regurgitation [I34.0]   • TREASURE (acute kidney injury) (CMS-HCC) [N17.9]   • 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  2.HTN: BP well controlled  3.Electrolytes: well controlled  4.Anemia: Hb WNL  -to monitor  5.Metabolic acidosis -corrected with HD  6.Volume overloaded:improving with Lasix  7.CHF -cardiology following  Plan  no acute need for HD today.  Continue Lasix  check labs  Renal dose all meds  Avoid nephrotoxins  Prognosis guarded

## 2017-08-28 NOTE — PROGRESS NOTES
Pt arrived to floor via hospital bed via PACU RN from PACU. Patient assessed. A&O  X 4, Indonesian speaking only. No distress present; no pain. Call light, phone, and bedside table within reach. White board updated and plan of care discussed with patient. Bed alarm and strip alarm set and in place. Pt calls for assistance appropriately. No concerns present at this time. Will continue to monitor.

## 2017-08-28 NOTE — PROGRESS NOTES
Dr. Nuñez updated baseline labs have not been drawn to initiate heparin drip prior to LOBO and cardioversion, STAT lab draws entered. Dr. Nuñez gave verbal orders to start heparin drip when pt returns to the floor post cardioversion/LOBO.

## 2017-08-28 NOTE — OP REPORT
DATE OF SERVICE:  08/27/2017    PREOPERATIVE DIAGNOSIS:  Stage V kidney disease.    POSTOPERATIVE DIAGNOSIS:  Stage V kidney disease.    OPERATIONS:  1.  Insertion of right internal jugular tunneled hemodialysis catheter under   ultrasound and fluoroscopic guidance.  2.  Creation of left distal radiocephalic arteriovenous fistula.    SURGEON:  Hiren Ayon MD    ANESTHESIA:  General anesthesia.    ANESTHESIOLOGIST:  Shiraz Vidales MD.    DESCRIPTION OF PROCEDURE:  After obtaining informed consent, the patient was   taken to the operating room and put under general anesthesia.  I removed the   existing Mahurkar catheter from the neck and held pressure for 10-15 minutes   until hemostasis was achieved.  I then cleaned adhesive from bandages off the   neck and anterior chest wall.  We prepped the neck and upper chest with   Betadine.  The patient was draped sterilely.  With ultrasound, I identified   the jugular vein which was widely patent.  There was an external jugular that   was crossing over the area of tunneling.  It was close to the surface.  I   injected 0.5% Marcaine anesthetic over the vein between the skin and the vein   surface to push it deeper, so I would not estevez it with the tunneling device.    I made a short low neck incision on the right side over the jugular vein.  I   inserted an angioaccess needle into this followed by a 3 mm J wire and   observed it fluoroscopically pass into the right atrium.  I tunneled a 23 cm   Palindrome from a short incision below the clavicle and the neck wound trying   to remain superficial to avoid the jugular vein.  I then passed a series of   dilators over the guidewire in the internal jugular vein and then passed the   dilator sheath.  Through the dilator sheath, I inserted the Palindrome   catheter.  The sheath was peeled away.  I adjusted the catheter cuff from the   tunnel.  The catheter tip was in the right atrium.  I aspirated and flushed   with a 10 mL syringe  and had no resistance.  The catheter was flushed with   heparinized saline.  I closed the neck incision with 2 layers of running 4-0   Vicryl.  The catheter exit site was approximated with a vertical mattress of   4-0 Vicryl.  The catheter was secured with 3-0 Ethilon.  A 1.5 mL of   concentrated heparin 5000 units per mL was instilled in each port.  A Biopatch   was applied at the exit site.  A Tegaderm and gauze over the neck incision.    An island dressing over the catheter and exit site.    We then repositioned the patient with his arm extended out to the left on an   arm board.  He was prepped from the axilla to the fingertips.  The patient was   draped.  I marked on the skin, the visible veins from the cephalic vein at   the wrist up to the mid forearm and the branch veins.  I injected local   anesthetic between the radial artery and the cephalic vein.  The cephalic vein   at the wrist bifurcated in the branch that was closest to the radial artery   with the branch that had an IV in it long ago.  We did not see clot on the   imaging.  I rechecked it.  I made an incision between the artery and vein.  I   then dissected under the skin until I encountered the cephalic vein branch.    This medial branch was a little smaller than the dorsal branch, but it was too   far away to mobilize easily.  I mobilized the cephalic vein and there was a   dilated area, but did not constrict the vein proximal distal did.  It turns   out, there was a thrombus in here that was fairly fresh.  I did not discover   until I made the venotomy.  After mobilizing the vein and ligating branches, I   dissected out the radial artery and encircled the radial artery and the vein   side-to-side.  With holding them inside by side apposition allowed me to make   corresponding openings in the artery and vein approximately 10 mm long or   slightly shorter.  Opening the vein, I found a clot, I removed the clot.  It   was not well organized.  It look  like platelet clusters.  I passed a 2.5 mm   dilator up the vein and did not encounter any more obstruction.  I proceeded   to sew the artery and vein side-to-side anastomosis with 7-0 Prolene.  I   established flow and the suture line satisfactory.  I ligated the dorsal wrist   branch.  I checked with the Doppler and I could hear fistula flow all the way   up to the proximal forearm.  I irrigated with saline.  We used cautery and   Surgiflo for hemostasis.  I ligated the distal end of the cephalic vein at the   anastomosis.  The anastomosis looks satisfactory hemostasis was well   controlled, so I proceeded to close with running 4-0 Vicryl in 2 layers.  A   sterile dressing was applied.  The 3000 heparin that was given intravenously   was not reversed.  The patient's blood loss was less than 20 mL.  He tolerated   the procedure well and was taken to the recovery room.       ____________________________________     MD SOLOMON Wyatt / RAQUEL    DD:  08/27/2017 19:20:23  DT:  08/27/2017 20:07:06    D#:  [id number]  Job#:  [id number]

## 2017-08-28 NOTE — PROGRESS NOTES
Inpatient Anticoagulation Service Note  Date: 8/28/2017  Reason for Anticoagulation: Atrial Fibrillation   [id number] VASc Score: 4  Hemoglobin Value: (!) 12.9  Hematocrit Value: (!) 39.2  Lab Platelet Value: 212  Target INR: 2.0 to 3.0  INR from last 7 days     Date/Time INR Value    08/28/17 0926 (!)  1.57    08/27/17 1231 (!)  1.54    08/27/17 0251 (!)  2.12    08/26/17 0249 (!)  2.84    08/24/17 2342 (!)  2.93    08/24/17 0213 (!)  2.99    08/23/17 0214 (!)  3.14    08/22/17 0233 (!)  2.83        Dose from last 7 days     Date/Time Dose (mg)    08/26/17 1200  3.75    08/25/17 1300  3.75    08/24/17 1100  3.75    08/22/17 1200  7.5        Average Dose (mg): TBD (New start VKA this admission, dosing ~7.5 mg daily )  Significant Interactions: Amiodarone, Aspirin, Statin (MVI)  Bridge Therapy: Yes (heparin weight based protocol)  Bridge Therapy Start Date: 08/28/17  Days of Overlap Therapy: 0   INR Value Greater than 2 Prior to Discontinuation of Parenteral Anticoagulation: Not Applicable   Reversal Agent Administered: Not Applicable  Comments: INR down after oral vitamin K to prep for procedure. H/H low/stable. PLT stable. No overt bleeding noted. Warfarin interactions noted. Warfarin resumed post-op. Will give 5 mg bolus to mobilize INR.     Plan:  Warfarin 5 mg 8/28/17  Education Material Provided?: No (given prior)  Pharmacist suggested discharge dosing: warfarin 3.75 mg daily (recommend INR within 24 hours of discharge)    Darío Li, PharmD

## 2017-08-28 NOTE — PROGRESS NOTES
Pt returned to Tele 8 from PACU with 2 mL Fentanyl syringe, 3 mL phenylephrine syringe, and 100 mL phenylephrine bag attached to pt's IV pole on bed. Called pharmacy regarding medications and wasting, and pharmacy recommended calling PACU regarding medications, as they may have been pulled by PACU. Called PACU, but PACU RN stated meds were not from PACU and came with pt from OR. Called pharmacy again regarding medications. Pharmacy tech stated he would be up to floor take medications and waste them. Pharmacy tech called back and stated he could not waste medications, as they were not available in medselect. Per pharmacy, okay to waste medications in EPIC. Attempted to waste medications in EPIC with Rah tilley RN, but unable to waste medications d/t medications not appearing on electronic MAR. Wasted 2 mL Fentanyl syringe with Rah tilley RN in medselect. Wasted 3 mL phenylephrine syringe and 100 mL phenylephrine bag with Rah tilley RN. Unable to electronically show wasting of medication in medselect d/t medication not being pulled.

## 2017-08-28 NOTE — CARE PLAN
Problem: Safety  Goal: Will remain free from injury  Outcome: PROGRESSING AS EXPECTED  Pt remains free from falls or injuries. Bed alarm and strip alarm set and in place. Pt calls for assistance appropriately.     Problem: Pain Management  Goal: Pain level will decrease to patient's comfort goal  Outcome: PROGRESSING AS EXPECTED  Pt free from pain at this time. PRN pain medications available for pain management. Pt calls for pain management appropriately.

## 2017-08-28 NOTE — PROGRESS NOTES
Internal Medicine Interval Note    Name Bhavesh Saini       1970   Age/Sex 47 y.o. male   MRN [id number]   Code Status FULL.     After 5PM or if no immediate response to page, please call for cross-coverage  Attending/Team: Akil/Troy. See Patient List for primary contact information  Call [phone] to page    1st Call - Day Intern (R1):   [place]otte 2nd Call - Day Sr. Resident (R2/R3):   Nando         Reason for interval visit  (Principal Problem)   Atrial fibrillation with RVR (CMS-[facility])    Interval Problem Daily Status Update  (24 hours)   - Hemodialysis last night.  Net UF 1000 mL.    - No acute events overnight.  No complaints this morning.   - Per Cardiology, LOBO and cardioversion held today for AVF surgery.  Planning for tomorrow.   - Vascular Surgery planning for AVF creation today.       Review of Systems   Constitutional: Negative for chills and fever.   Respiratory: Negative for shortness of breath.    Cardiovascular: Negative for chest pain and palpitations.   Gastrointestinal: Negative for abdominal pain.   Neurological: Negative for dizziness and headaches.       Consultants/Specialty  Cardiology  Cardiac Surgery  Nephrology    Disposition  Inpatient for atrial fibrillation with RVR.    Quality Measures    Reviewed items::  Radiology images reviewed, Medications reviewed and Labs reviewed  Truong catheter::  No Truong  DVT prophylaxis pharmacological::  Warfarin (Coumadin)      Physical Exam       Vitals:    17 0459 17 0827 17 1122 17 1517   BP:  (!) 166/94 152/88 146/100   Pulse: (!) 101 (!) 125 65    Resp: 18 16 17 (!) 22   Temp:  36.2 °C (97.1 °F) 36.6 °C (97.8 °F) 36.4 °C (97.5 °F)   SpO2: 97% 97% 96% 99%   Weight:       Height:         Body mass index is 27.43 kg/m². Weight: 63.7 kg (140 lb 6.9 oz)  Oxygen Therapy:  Pulse Oximetry: 99 %, O2 (LPM): 0, O2 Delivery: None (Room Air)    Physical Exam   Constitutional: He is oriented to person, place, and  time. No distress.   Lying in bed.    Eyes: EOM are normal. Pupils are equal, round, and reactive to light.   Neck: No JVD present.   Cardiovascular:   Irregularly irregular rhythm.     Pulmonary/Chest: Effort normal and breath sounds normal. No respiratory distress. He has no wheezes. He has no rales.   Abdominal: Soft. Bowel sounds are normal. He exhibits no distension. There is no tenderness.   Neurological: He is alert and oriented to person, place, and time. He has normal reflexes.   Skin: Skin is warm.   Psychiatric: Affect normal.       Lab Data Review:       8/27/2017  6:30 PM    Recent Labs      08/24/17   2342  08/26/17   0757  08/27/17   0251   SODIUM  134*  130*  131*   POTASSIUM  3.2*  4.0  3.7   CHLORIDE  100  98  99   CO2  25  23  23   BUN  33*  42*  28*   CREATININE  2.39*  3.30*  2.94*   MAGNESIUM  1.9  2.0   --    CALCIUM  8.8  8.8  8.7       Recent Labs      08/24/17   2342  08/26/17   0757  08/27/17   0251   GLUCOSE  88  98  91       Recent Labs      08/26/17   0249  08/27/17   0251  08/27/17   1231   RBC   --   4.42*   --    HEMOGLOBIN   --   13.3*   --    HEMATOCRIT   --   39.4*   --    PLATELETCT   --   185   --    PROTHROMBTM  30.7*  24.4*  19.0*   INR  2.84*  2.12*  1.54*       Recent Labs      08/27/17   0251   WBC  7.4   NEUTSPOLYS  57.50   LYMPHOCYTES  26.50   MONOCYTES  9.60   EOSINOPHILS  5.30   BASOPHILS  0.80           Assessment/Plan     * Atrial fibrillation with RVR (CMS-HCC)- (present on admission)   Assessment & Plan    -New onset vs paroxysmal (vague PMH of afib and non-compliance with meds)  -overnight He was given diltiazem by NF due to Afib with nonsustained HR in 160s. - now in SR  -CHADS2: 4  -ECHO: severe LVH, global hypokinesis, EF ~20%, severe AI and mod MR  -LOBO: slow blood flow but no thrombus. Severe AI    Plan  - Na restricted diet.  - Continue scheduled metoprolol, PRN 5mg metoprolol q6h.    - Patient continued on amiodarone drip.  Cardiology planning to change to  PO.  - If HR < 50 or > 120s , is PERSISTENT and SYMPTOMATIC, will administer diltiazem 10 mg.   - LOBO and cardioversion planned for tomorrow.         Hyperkalemia   Assessment & Plan    - K 3.7 today after hemodialysis last night.    - Patient now on low K diet.   - Continue to monitor.          Cardiorenal syndrome   Assessment & Plan    -Diagnosis given by Nephrology - likely since urine Na is low  -Patient is on HD, and is urinated <500 ml yesterday.  -Creatinine in ED 4.38, Total protein Urine 208. Estimated GFR was 14.   -FENA 0.2%  -BNP 2203>> 2039   -CXR: cardiomegaly. ECHO showed: severe LVH, global hypokinesis, EF ~20%, AR and mod MR  Assessment: Cardiorenal syndrome  Plan  -Patient needs AVR/MAZE w LOBO per Cardiac Surgery, but due to his renal status and recent stroke with hemorrhage, they do not recommend at this time.  They will follow up outpatient in 6 weeks.   -Patient will be counseled on cessation of alcohol intake.   -Nephrology on board.             Aortic regurgitation- (present on admission)   Assessment & Plan    -Severe AR.   -ECHO:  severe LVH, global hypokinesis, EF ~20%, severe AI and mod MR. Findings were confirmed by LOBO  -Fits the criteria for valve replacement. Cardiology and cardiac surgery on board.        HFrEF (heart failure with reduced ejection fraction) (CMS-[facility])- (present on admission)   Assessment & Plan    -Presented with SOB, orthopnea, fatique and palpitations. Has h/o HTN and alcoholism in the past. On exam: JVD +., murmurs + but no volume overload.   -BNP 2203 >> 1753 > 2211.   -CXR: cardiomegaly. ECHO: severe LVH, global hypokinesis, EF ~20%, severe AI and mod MR  -Assessment: Compensated HF likely due to alcoholism vs tachycardia induced heart failure.   -Cardiac surgery spoke with Mr Saini and family, he needs ischemic work up and AVR/MAZE w LOBO, but due to his renal status and recent stroke with hemorrhage, they do not recommend it at this time.   - Patient's  undocumented alien status is another reason having a heart cath is diicamiloicult.      Plan  - Na restricted diet.   - Patient does have follow up appointment with Dr. Gutierrez, Cardiology, on September 11.  - Patient's family will fly him to [place] for further care after discharge.          Prolonged Q-T interval on ECG- (present on admission)   Assessment & Plan    -QTc ~530s. Will avoid QTc prolonging meds.        TREASURE (acute kidney injury) (CMS-HCC)- (present on admission)   Assessment & Plan    -Acute on chronic. Likely pre-renal vs cardiorenal. HTN is likely the cause of CKD  -On adm: Cr: 4.3, AGMA, Ph 6.1. Unknown baseline.  -FeNa <1%  -H/o renal disease since 7yrs, FH kidney disease in his father. Has been non-complaint with HTN medications. - has CHF  -Renal US: no hydronephrosis and calculi. Left renal cysts.  - Cr and K slowly increasing likely due to poor renal perfusion secondary to atrial fibrillation.    - Upper extremity vein mapping completed in anticipation of AV fistula.     Plan  - Vascular Surgery placing AVF today.    - Continue Lasix.  Assessed daily by Nephrology for need for HD.          CVA (cerebral vascular accident) (CMS-HCC)- (present on admission)   Assessment & Plan    -h/o left facial droop, slurred speech and left sided weakness. However no neuro deficits currently  -CT: cerebral atrophy and small vessel ischemic changes   -MRI brain: acute infarction in L frontal periventricular white matter extendeing to subinsular cortex. Punctate area of infarction in R posterior lobe. Numerous microbleeds.   Assessment: Afib could have contributed (LOBO showed 'sludge' in the heart)  Plan  -ASA, statin  -Mx of Afib as stated above.        Non-rheumatic mitral regurgitation   Assessment & Plan    -Moderate MR on ECHO        Hyperglycemia- (present on admission)   Assessment & Plan    -Resolved  -On adm: , A1c: 5.4  -He was started on ISS and hypoglycemia protocol which is discontinued.  CTM with  accuchecks        Alcoholic (CMS-HCC)- (present on admission)   Assessment & Plan    -History is vague but looks like he was drinking heavily in the past. Last drink was 8/5/2017.  -On thiamine and folate.           Essential hypertension- (present on admission)   Assessment & Plan    -PMH of HTN but likely non-compliant. ECHO showed severe LVH  -Stable on BB, hydralazine, nitrate, lasix

## 2017-08-28 NOTE — PROGRESS NOTES
Shauna Downing Fall Risk Assessment:     Last Known Fall: No falls  Mobility: Dizziness/generalized weakness  Medications: Cardiovascular and central nervous system meds  Mental Status/LOC/Awareness: Awake, alert, and oriented to date, place, and person  Toileting Needs: Use of assistive device (Bedside commode, bedpan, urinal)  Volume/Electrolyte Status: Use of IV fluids/tube feeds  Communication/Sensory: Visual (Glasses)/hearing deficit, Non-English patient/unable to speak/slurred speech  Behavior: Appropriate behavior  Shauna Downing Fall Risk Total: 12  Fall Risk Level: MODERATE RISK    Universal Fall Precautions:  call light/belongings in reach, bed in low position and locked, siderails up x 2, use non-slip footwear, adequate lighting, clutter free and spill free environment, educate on level of risk, educate to call for assistance    Fall Risk Level Interventions:   TRIAL ([facility] 8, NEURO, [facility] 5) Low Fall Risk Interventions  Place yellow fall risk ID band on patient: verified  Provide patient/family education based on risk assessment: verified  Educate patient/family to call staff for assistance when getting out of bed: verified  Place fall precaution signage outside patient door: verifiedTRIAL ([facility] 8, NEURO, [facility] 5) Moderate Fall Risk Interventions  Place yellow fall risk ID band on patient: verified  Provide patient/family education based on risk assessment : verified  Educate patient/family to call staff for assistance when getting out of bed: verified  Place fall precaution signage outside patient door: verified  Utilize bed/chair fall alarm: verifiedTRIAL ([facility] 8, NEURO, [employer] [facility] 5) High Fall Risk Interventions  Place yellow fall risk ID band on patient: verified  Provide patient/family education based on risk assessment: verified  Educate patient/family to call staff for assistance when getting out of bed: verified  Place fall precaution signage outside patient door: verified  Place patient in  room close to nursing station: verified  Utilize bed/chair fall alarm: verified  Notify charge of high risk for huddle: completed    Patient Specific Interventions:     Medication: review medications with patient and family and limit combination of prn medications  Mental Status/LOC/Awareness: reinforce falls education, check on patient hourly, utilize bed/chair fall alarm and reinforce the use of call light  Toileting: provide frquent toileting and monitor intake and output/use of appropriate interventions  Volume/Electrolyte Status: ensure patient remains hydrated, monitor abnormal lab values and ensure IV fluids are appropriate  Communication/Sensory: update plan of care on whiteboard and provide communication alternatives/  Behavioral: encourage patient to voice feelings, administer medication as ordered and instruct/reinforce fall program rationale  Mobility: utilize bed/chair fall alarm and ensure bed is locked and in lowest position

## 2017-08-28 NOTE — PROGRESS NOTES
Cardiology Progress Note               Author: Dr. Nuñez Date & Time created: 2017  9:12 AM     Interval History:  No acute events overnight.  No telemetry events. Still in atrial fib.  Had AVF done yesterday without problems.    Review of Systems   Constitutional: Negative.    HENT: Negative.    Eyes: Negative.    Respiratory: Negative for cough and shortness of breath.    Cardiovascular: Negative for chest pain, palpitations, orthopnea, claudication, leg swelling and PND.   Gastrointestinal: Negative.    Genitourinary: Negative.    Musculoskeletal: Negative.    Skin: Negative.    Neurological: Negative.    Psychiatric/Behavioral: Negative.       Physical Exam   Constitutional: He is oriented to person, place, and time.   HENT:   Head: Normocephalic.   Neck: No JVD present. No thyromegaly present.   Cardiovascular: An irregularly irregular rhythm present. Tachycardia present.    Pulses:       Carotid pulses are 2+ on the right side, and 2+ on the left side.       Radial pulses are 2+ on the right side, and 2+ on the left side.   Pulmonary/Chest: He has no wheezes.   Abdominal: Soft.   Musculoskeletal: He exhibits no edema.   Neurological: He is alert and oriented to person, place, and time.   Skin: Skin is warm and dry.   Nursing note and vitals reviewed.      Hemodynamics:  Temp (24hrs), Av.4 °C (97.5 °F), Min:36.1 °C (97 °F), Max:36.7 °C (98 °F)  Temperature: 36.4 °C (97.5 °F)  Pulse  Av.3  Min: 50  Max: 155Heart Rate (Monitored): 78  Blood Pressure: 121/89, NIBP: 121/72     Respiratory:    Respiration: 17, Pulse Oximetry: 91 %        RUL Breath Sounds: Clear, RML Breath Sounds: Clear, RLL Breath Sounds: Diminished, SABAS Breath Sounds: Clear, LLL Breath Sounds: Diminished  Fluids:  Date 17 0700 - 17 0659   Shift 5292-9631 [id number] 1762-9363 24 Hour Total   I  N  T  A  K  E   Shift Total       O  U  T  P  U  T   Urine  120  120    Shift Total  120  120   Weight (kg) 62 62 62 62           GI/Nutrition:  Orders Placed This Encounter   Procedures   • DIET NPO     Standing Status:   Standing     Number of Occurrences:   1     Order Specific Question:   Restrict to:     Answer:   Strict [1]     Lab Results:  Recent Labs      08/27/17   0251   WBC  7.4   RBC  4.42*   HEMOGLOBIN  13.3*   HEMATOCRIT  39.4*   MCV  89.1   MCH  30.1   MCHC  33.8   RDW  45.1   PLATELETCT  185   MPV  11.4     Recent Labs      08/26/17   0757  08/27/17   0251   SODIUM  130*  131*   POTASSIUM  4.0  3.7   CHLORIDE  98  99   CO2  23  23   GLUCOSE  98  91   BUN  42*  28*   CREATININE  3.30*  2.94*   CALCIUM  8.8  8.7     Recent Labs      08/26/17   0249  08/27/17   0251  08/27/17   1231   INR  2.84*  2.12*  1.54*                   LOBO: 8/10/17  Trileaflet aortic valve. The left coronary cusp has a nodule calcification with poor coaptation resulting in severe central eccentric aortic regurgitation.  Severely reduced left ventricular systolic function.  Left ventricular ejection fraction is visually estimated to be 20%.  Severe concentric left ventricular hypertrophy.  Normal right ventricular size and systolic function.  Mild mitral regurgitation.  These findings were communicated to the ICU service.    TTE: 8/9/17  No prior study is available for comparison.   Severely reduced left ventricular systolic function. Global hypokinesis.  Left ventricular ejection fraction is visually estimated to be 25%.  Diastolic function is difficult to assess with atrial fibrillation.  Normal right ventricular size and systolic function.  Moderate mitral regurgitation.  Severe eccentric aortic insufficiency.  Estimated right ventricular systolic pressure  is 60 mmHg.  These findings are known by cardiology consultation    Brain MRI: 8/10/17  1.  Mild diffuse cerebral atrophy.  2.  Small areas of acute infarction involving the left frontal periventricular white matter extending inferiorly to the subinsular cortex. Punctate area of infarction  "involving the cortex in the right posterior occipital lobe.  3.  Moderate periventricular white matter changes consistent with chronic microvascular ischemic gliosis.  4.  Small chronic focus of lacunar type infarction in the right frontal periventricular white matter.  5.  Numerous chronic \"microbleed's\" are noted throughout the brain parenchyma in both the supra and infratentorial compartments. Gyriform/curvilinear regions of chronic \"microbleed\" noted in the right posterior frontal region and also the left frontal   periventricular white matter.  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  Continue Coumadin with target INR of 2-3.  Amiodarone PO therapy.    Cardiomyopathy valvular heart disease versus A. fib with RVR  Patient appears euvolemic.  No ACE-I and Spironolactone due to renal issue.    LOBO and cardioversion today to try for sinus restoration.    Severe aortic insufficiency   Due to acute CVA with hemorrhage not a candidate for surgery now.    Thank you for referring this patient to our cardiology service.  We will follow patient with you.    Edson Nuñez MD.  [facility].      Quality-Core Measures  "

## 2017-08-28 NOTE — OR SURGEON
Operative Report    PreOp Diagnosis: Stage 5 kidney disease    PostOp Diagnosis: Same    Procedure(s):  CATH PLACEMENT - perm cath - Wound Class: Clean  AV FISTULA CREATION - left radial cephalic - Wound Class: Clean    Surgeon(s):  Hiren Ayon M.D.    Anesthesiologist/Type of Anesthesia:  Anesthesiologist: Shiraz Vidales M.D./General    Surgical Staff:  Circulator: Berta Craig R.N.  Relief Circulator: Kami Caal R.N.  Scrub Person: Gaurang Soares  Radiology Technologist: Georges Steiner    Specimens:  * No specimens in log *    Estimated Blood Loss: < 20 mls    Findings: Clot in cephalic vein. Initial flow good.  Tunneled catheter in good position.    Complications: None initially apparent.        8/27/2017 7:11 PM Hiren Ayon

## 2017-08-29 LAB
ANION GAP SERPL CALC-SCNC: 10 MMOL/L (ref 0–11.9)
APTT PPP: 156.5 SEC (ref 24.7–36)
APTT PPP: 71.3 SEC (ref 24.7–36)
APTT PPP: 77.1 SEC (ref 24.7–36)
APTT PPP: 99.1 SEC (ref 24.7–36)
BUN SERPL-MCNC: 58 MG/DL (ref 8–22)
CALCIUM SERPL-MCNC: 8.4 MG/DL (ref 8.5–10.5)
CHLORIDE SERPL-SCNC: 99 MMOL/L (ref 96–112)
CO2 SERPL-SCNC: 20 MMOL/L (ref 20–33)
CREAT SERPL-MCNC: 5.67 MG/DL (ref 0.5–1.4)
GFR SERPL CREATININE-BSD FRML MDRD: 11 ML/MIN/1.73 M 2
GLUCOSE SERPL-MCNC: 100 MG/DL (ref 65–99)
INR PPP: 1.75 (ref 0.87–1.13)
POTASSIUM SERPL-SCNC: 4.4 MMOL/L (ref 3.6–5.5)
PROTHROMBIN TIME: 21 SEC (ref 12–14.6)
SODIUM SERPL-SCNC: 129 MMOL/L (ref 135–145)

## 2017-08-29 PROCEDURE — 700102 HCHG RX REV CODE 250 W/ 637 OVERRIDE(OP): Performed by: INTERNAL MEDICINE

## 2017-08-29 PROCEDURE — A9270 NON-COVERED ITEM OR SERVICE: HCPCS | Performed by: INTERNAL MEDICINE

## 2017-08-29 PROCEDURE — 85730 THROMBOPLASTIN TIME PARTIAL: CPT

## 2017-08-29 PROCEDURE — 80048 BASIC METABOLIC PNL TOTAL CA: CPT

## 2017-08-29 PROCEDURE — 700111 HCHG RX REV CODE 636 W/ 250 OVERRIDE (IP): Performed by: PHARMACIST

## 2017-08-29 PROCEDURE — 90935 HEMODIALYSIS ONE EVALUATION: CPT

## 2017-08-29 PROCEDURE — 85610 PROTHROMBIN TIME: CPT

## 2017-08-29 PROCEDURE — 36415 COLL VENOUS BLD VENIPUNCTURE: CPT

## 2017-08-29 PROCEDURE — 700111 HCHG RX REV CODE 636 W/ 250 OVERRIDE (IP)

## 2017-08-29 PROCEDURE — 99232 SBSQ HOSP IP/OBS MODERATE 35: CPT | Mod: GC | Performed by: HOSPITALIST

## 2017-08-29 PROCEDURE — 770020 HCHG ROOM/CARE - TELE (206)

## 2017-08-29 PROCEDURE — A9270 NON-COVERED ITEM OR SERVICE: HCPCS | Performed by: STUDENT IN AN ORGANIZED HEALTH CARE EDUCATION/TRAINING PROGRAM

## 2017-08-29 PROCEDURE — 700102 HCHG RX REV CODE 250 W/ 637 OVERRIDE(OP): Performed by: STUDENT IN AN ORGANIZED HEALTH CARE EDUCATION/TRAINING PROGRAM

## 2017-08-29 RX ORDER — HEPARIN SODIUM 1000 [USP'U]/ML
3800 INJECTION, SOLUTION INTRAVENOUS; SUBCUTANEOUS
Status: DISCONTINUED | OUTPATIENT
Start: 2017-08-29 | End: 2017-09-09 | Stop reason: HOSPADM

## 2017-08-29 RX ORDER — METOPROLOL SUCCINATE 50 MG/1
200 TABLET, EXTENDED RELEASE ORAL
Status: DISCONTINUED | OUTPATIENT
Start: 2017-08-29 | End: 2017-08-30

## 2017-08-29 RX ORDER — HEPARIN SODIUM 1000 [USP'U]/ML
INJECTION, SOLUTION INTRAVENOUS; SUBCUTANEOUS
Status: COMPLETED
Start: 2017-08-29 | End: 2017-08-29

## 2017-08-29 RX ADMIN — FUROSEMIDE 40 MG: 40 TABLET ORAL at 08:47

## 2017-08-29 RX ADMIN — WARFARIN SODIUM 3.75 MG: 7.5 TABLET ORAL at 19:23

## 2017-08-29 RX ADMIN — AMIODARONE HYDROCHLORIDE 200 MG: 200 TABLET ORAL at 19:31

## 2017-08-29 RX ADMIN — ATORVASTATIN CALCIUM 40 MG: 40 TABLET, FILM COATED ORAL at 19:26

## 2017-08-29 RX ADMIN — Medication 100 MG: at 08:47

## 2017-08-29 RX ADMIN — THERA TABS 1 TABLET: TAB at 08:47

## 2017-08-29 RX ADMIN — FOLIC ACID 1 MG: 1 TABLET ORAL at 08:47

## 2017-08-29 RX ADMIN — HEPARIN SODIUM 25000 UNITS: 5000 INJECTION, SOLUTION INTRAVENOUS at 08:54

## 2017-08-29 RX ADMIN — HEPARIN SODIUM 3800 UNITS: 1000 INJECTION, SOLUTION INTRAVENOUS; SUBCUTANEOUS at 12:45

## 2017-08-29 RX ADMIN — STANDARDIZED SENNA CONCENTRATE AND DOCUSATE SODIUM 2 TABLET: 8.6; 5 TABLET, FILM COATED ORAL at 19:26

## 2017-08-29 ASSESSMENT — ENCOUNTER SYMPTOMS
DIARRHEA: 0
VOMITING: 0
WHEEZING: 0
FEVER: 0
DIZZINESS: 0
ORTHOPNEA: 0
MYALGIAS: 0
HEADACHES: 0
COUGH: 0
PALPITATIONS: 0
NAUSEA: 0
ABDOMINAL PAIN: 0
CHILLS: 0
HEARTBURN: 0
HEMOPTYSIS: 0
SHORTNESS OF BREATH: 0

## 2017-08-29 ASSESSMENT — PAIN SCALES - GENERAL
PAINLEVEL_OUTOF10: 0

## 2017-08-29 NOTE — PROGRESS NOTES
Inpatient Anticoagulation Service Note  Date: 8/29/2017  Reason for Anticoagulation: Atrial Fibrillation   [id number] VASc Score: 4  Hemoglobin Value: (!) 12.9  Hematocrit Value: (!) 39.2  Lab Platelet Value: 212  Target INR: 2.0 to 3.0  INR from last 7 days     Date/Time INR Value    08/29/17 0018 (!)  1.75    08/28/17 0926 (!)  1.57    08/27/17 1231 (!)  1.54    08/27/17 0251 (!)  2.12    08/26/17 0249 (!)  2.84    08/24/17 2342 (!)  2.93    08/24/17 0213 (!)  2.99    08/23/17 0214 (!)  3.14        Dose from last 7 days     Date/Time Dose (mg)    08/29/17 1300  3.75    08/28/17 1500  5    08/26/17 1200  3.75    08/25/17 1300  3.75    08/24/17 1100  3.75        Average Dose (mg): 3.75 (New start VKA this admission, dosing ~7.5 mg daily )  Significant Interactions: Amiodarone, Aspirin, Statin (MVI)  Bridge Therapy: Yes (heparin weight based protocol)  Bridge Therapy Start Date: 08/28/17  Days of Overlap Therapy: 1   INR Value Greater than 2 Prior to Discontinuation of Parenteral Anticoagulation: Not Applicable   Reversal Agent Administered: Not Applicable  Comments: INR up overnight. Warfarin interactions noted. No new H/H or PLT. No bleeding noted per chart review. Will give 3.75 mg and trend INR with AM labs.     Plan:  Warfarin 3.75 mg 8/29/17  Education Material Provided?: No (given prior)  Pharmacist suggested discharge dosing: warfarin 3.75 mg daily (recommend INR within 24 hours of discharge)    Darío Li, PharmD

## 2017-08-29 NOTE — PROGRESS NOTES
POD#1 s/p left arm AVF and dialysis catheter placement.     VSS afeb.    Left wrist with minimal edema.  Palpable left wrist thrill indicates patent fistula.     Impression.  Dialysis catheter ready for use.  Fistula will need maturation and at least 6 weeks to 3 months before needle access.     Plan:  Arrange follow-up with Dr. Ayon as an outpatient.

## 2017-08-29 NOTE — PROGRESS NOTES
"Cardiology Progress Note               Author: Ifrah Flores Date & Time created: 8/29/2017  11:36 AM     Interval History:  47 years old male present with dyspnea. He has history of CKD and prior sig etoh use. Recent history of left sided weakness and CVA.      Consultation: dyspnea     Echocardiography Laboratory  Trileaflet aortic valve. The left coronary cusp has a nodule   calcification with poor coaptation resulting in severe central   eccentric aortic regurgitation.  Severely reduced left ventricular systolic function.  Left ventricular ejection fraction is visually estimated to be 20%.  Severe concentric left ventricular hypertrophy.  Normal right ventricular size and systolic function.  Mild mitral regurgitation.  These findings were communicated to the ICU service.    Brain MRI: 8/10/17  1.  Mild diffuse cerebral atrophy.  2.  Small areas of acute infarction involving the left frontal periventricular white matter extending inferiorly to the subinsular cortex. Punctate area of infarction involving the cortex in the right posterior occipital lobe.  3.  Moderate periventricular white matter changes consistent with chronic microvascular ischemic gliosis.  4.  Small chronic focus of lacunar type infarction in the right frontal periventricular white matter.  5.  Numerous chronic \"microbleed's\" are noted throughout the brain parenchyma in both the supra and infratentorial compartments. Gyriform/curvilinear regions of chronic \"microbleed\" noted in the right posterior frontal region and also the left frontal   periventricular white matter.    8/29/17: denies any chest pain or CHAVARRIA  /70  HR 51    telemetry monitor sinus bradycardia 48-56    Labs reviewed   Na 129  K 4.4  Creatinine 5.67      Review of Systems   Respiratory: Negative for cough, shortness of breath and wheezing.    Cardiovascular: Negative for chest pain, palpitations, orthopnea and leg swelling.   All other systems reviewed and are " negative.      Physical Exam   Constitutional: He is oriented to person, place, and time. He appears well-developed and well-nourished.   HENT:   Head: Normocephalic.   Cardiovascular: Normal rate, regular rhythm and normal heart sounds.    No murmur heard.  Pulmonary/Chest: Effort normal and breath sounds normal. No respiratory distress. He has no wheezes.   Abdominal: Bowel sounds are normal.   Musculoskeletal: He exhibits no edema or tenderness.   Neurological: He is alert and oriented to person, place, and time.   Skin: Skin is warm and dry.   Psychiatric: His behavior is normal. Judgment normal.   Nursing note and vitals reviewed.      Hemodynamics:  Temp (24hrs), Av.5 °C (97.7 °F), Min:36.4 °C (97.6 °F), Max:36.6 °C (97.8 °F)  Temperature: 36.4 °C (97.6 °F)  Pulse  Av.9  Min: 44  Max: 155   Blood Pressure: 115/70     Respiratory:    Respiration: 18, Pulse Oximetry: 95 %     Work Of Breathing / Effort: Mild  RUL Breath Sounds: Clear, RML Breath Sounds: Clear, RLL Breath Sounds: Diminished, SABAS Breath Sounds: Clear, LLL Breath Sounds: Diminished  Fluids:     Weight: 65.1 kg (143 lb 8.3 oz)  GI/Nutrition:  Orders Placed This Encounter   Procedures   • DIET ORDER     Standing Status:   Standing     Number of Occurrences:   1     Order Specific Question:   Diet:     Answer:   Diabetic [3]     Order Specific Question:   Diet:     Answer:   2 Gram Sodium [7]     Order Specific Question:   Diet:     Answer:   Renal [8]     Order Specific Question:   Calorie modifications:     Answer:   1800 kcals [4]     Lab Results:  Recent Labs      17   0251  17   0926   WBC  7.4  9.2   RBC  4.42*  4.36*   HEMOGLOBIN  13.3*  12.9*   HEMATOCRIT  39.4*  39.2*   MCV  89.1  89.9   MCH  30.1  29.6   MCHC  33.8  32.9*   RDW  45.1  46.5   PLATELETCT  185  212   MPV  11.4  11.1     Recent Labs      17   0251  17   0018   SODIUM  131*  129*   POTASSIUM  3.7  4.4   CHLORIDE  99  99   CO2  23  20   GLUCOSE  91   100*   BUN  28*  58*   CREATININE  2.94*  5.67*   CALCIUM  8.7  8.4*     Recent Labs      08/27/17   1231  08/28/17   0926  08/28/17   1738  08/29/17   0018  08/29/17   0630   APTT   --    --   72.5*  77.1*  156.5*   INR  1.54*  1.57*   --   1.75*   --                      Medical Decision Making, by Problem:  Active Hospital Problems    Diagnosis   • *Atrial fibrillation with RVR (CMS-HCC) [I48.91]   • Cardiorenal syndrome [I13.10]   • Aortic regurgitation [I35.1]   • HFrEF (heart failure with reduced ejection fraction) (CMS-HCC) [I50.20]   • CVA (cerebral vascular accident) (CMS-HCC) [I63.9]   • TREASURE (acute kidney injury) (CMS-HCC) [N17.9]   • Prolonged Q-T interval on ECG [R94.31]   • Electrolyte abnormality [E87.8]   • Non-rheumatic mitral regurgitation [I34.0]   • Essential hypertension [I10]   • 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 Atrial fibrillation:  - LOBO and cardioversion 8/28, in Sinus bradycardia 48-56  - reduced at restoration of sinus rhythm toprol XL 200mg qd  - oral anticoagulation with coumadin, started heparin gtt on 8/9 then bridged over to coumadin   - amiodarone 200mg BID      2. Cardiomyopathy:  - ECHO ef 20-25%, Severe concentric left ventricular hypertrophy.  - imdur   - diuresis furosemide 40mg qd  - I/O -11k    3. Severe aortic insufficiency:  - recent CVA not a candidate for surgery now    4. TREASURE:  - followed by nephrology   - Cr: 5.67, dialysis today     5. CVA:  - MRI brain, acute infarction in left frontal periventricular white matter extending to subinsular cortex, punctate area of infarction in right posterior lobe, numerous microbleed    Quality-Core Measures

## 2017-08-29 NOTE — PROGRESS NOTES
3hr Hd started @ 0940 and completed @ 1241,tx well tolerated,net UF = 2000ml. LFAAVF + bruit/thrill,RIJ CVC dressing CDI,due for change today,report given to MAXIMILIAN Burgess RN.

## 2017-08-29 NOTE — PROGRESS NOTES
Report received from day RN. Patient Gabonese speaking only. Resting in bed; heparin gtt running. Denies needs. Call light within reach. Bed locked and in lowest position. Personal belongings in reach.

## 2017-08-29 NOTE — PROGRESS NOTES
Nephrology Progress Note, Adult, Complex               Author:Oni Najjar Date & Time created: 8/29/2017  1:53 PM     Interval History:  46 y/o male with severe, CHF  -EF 25 %, Severe AI, A.fib, CVA in TREASURE, metabolic acidosis  Poor UOP  Doing well, no complaints  Events last 24h noted  Seen and examined while getting HD.    Review of Systems:  Review of Systems   Constitutional: Positive for malaise/fatigue. Negative for chills and fever.   Respiratory: Negative for cough and hemoptysis.    Cardiovascular: Negative for chest pain, palpitations and orthopnea.   Gastrointestinal: Negative for abdominal pain, diarrhea, heartburn, nausea and vomiting.   Genitourinary: Negative for dysuria.       Physical Exam:  Physical Exam   Constitutional: He is oriented to person, place, and time. No distress.   HENT:   Head: Normocephalic and atraumatic.   Nose: Nose normal.   Eyes: Right eye exhibits no discharge. Left eye exhibits no discharge. No scleral icterus.   Cardiovascular: An irregularly irregular rhythm present.   Pulmonary/Chest: Effort normal and breath sounds normal. No respiratory distress. He has no wheezes.   Musculoskeletal: He exhibits no edema or tenderness.   Trace pedal edema   Neurological: He is alert and oriented to person, place, and time.   Skin: Skin is warm and dry. He is not diaphoretic.   Psychiatric: He has a normal mood and affect. His behavior is normal.   Nursing note and vitals reviewed.      Labs:        Invalid input(s): [id number]      Recent Labs      08/27/17   0251  08/29/17   0018   SODIUM  131*  129*   POTASSIUM  3.7  4.4   CHLORIDE  99  99   CO2  23  20   BUN  28*  58*   CREATININE  2.94*  5.67*   CALCIUM  8.7  8.4*     Recent Labs      08/27/17   0251  08/29/17   0018   GLUCOSE  91  100*     Recent Labs      08/27/17   0251  08/27/17   1231  08/28/17   0926  08/28/17   1738  08/29/17   0018  08/29/17   0630   RBC  4.42*   --   4.36*   --    --    --    HEMOGLOBIN  13.3*   --    12.9*   --    --    --    HEMATOCRIT  39.4*   --   39.2*   --    --    --    PLATELETCT  185   --   212   --    --    --    PROTHROMBTM  24.4*  19.0*  19.3*   --   21.0*   --    APTT   --    --    --   72.5*  77.1*  156.5*   INR  2.12*  1.54*  1.57*   --   1.75*   --      Recent Labs      17   0251  17   0926   WBC  7.4  9.2   NEUTSPOLYS  57.50  84.70*   LYMPHOCYTES  26.50  9.80*   MONOCYTES  9.60  4.80   EOSINOPHILS  5.30  0.00   BASOPHILS  0.80  0.20           Hemodynamics:  Temp (24hrs), Av.5 °C (97.7 °F), Min:36.4 °C (97.6 °F), Max:36.6 °C (97.8 °F)  Temperature: 36.5 °C (97.7 °F)  Pulse  Av.8  Min: 44  Max: 155   Blood Pressure: 130/79     Respiratory:    Respiration: 16, Pulse Oximetry: 99 %     Work Of Breathing / Effort: Mild  RUL Breath Sounds: Clear, RML Breath Sounds: Clear, RLL Breath Sounds: Diminished, SABAS Breath Sounds: Clear, LLL Breath Sounds: Diminished  Fluids:    Intake/Output Summary (Last 24 hours) at 17 1353  Last data filed at 17 0600   Gross per 24 hour   Intake              345 ml   Output              650 ml   Net             -305 ml     Weight: 65.1 kg (143 lb 8.3 oz)  GI/Nutrition:  Orders Placed This Encounter   Procedures   • DIET ORDER     Standing Status:   Standing     Number of Occurrences:   1     Order Specific Question:   Diet:     Answer:   Diabetic [3]     Order Specific Question:   Diet:     Answer:   2 Gram Sodium [7]     Order Specific Question:   Diet:     Answer:   Renal [8]     Order Specific Question:   Calorie modifications:     Answer:   1800 kcals [4]     Medical Decision Making, by Problem:  Active Hospital Problems    Diagnosis   • *Atrial fibrillation with RVR (CMS-[facility]) [I48.91]   • CVA (cerebral vascular accident) (CMS-[facility]) [I63.9]   • Essential hypertension [I10]   • Respiratory failure (CMS-[facility]) [J96.90]   • Electrolyte abnormality [E87.8]   • Hyperglycemia [R73.9]   • Prolonged Q-T interval on ECG [R94.31]   • Elevated troponin  [R74.8]   • Acute on chronic systolic heart failure (CMS-[facility]) [I50.23]   • Non-rheumatic mitral regurgitation [I34.0]   • TREASURE (acute kidney injury) (CMS-[facility]) [N17.9]   • 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  2.HTN: BP well controlled  3.Electrolytes: well controlled  4.Anemia: Hb WNL -to monitor  5.Metabolic acidosis -corrected with HD  6.Volume overloaded:improving with Lasix  7.CHF -cardiology following  Plan  HD today.  Renal dose all meds  Avoid nephrotoxins  Prognosis guarded

## 2017-08-29 NOTE — PROGRESS NOTES
Shauna Downing Fall Risk Assessment:     Last Known Fall: No falls  Mobility: No limitations  Medications: Diuretics  Mental Status/LOC/Awareness: Awake, alert, and oriented to date, place, and person  Toileting Needs: No needs  Volume/Electrolyte Status: No problems  Communication/Sensory: Non-English patient/unable to speak/slurred speech  Behavior: Appropriate behavior  Shauna Downing Fall Risk Total: 7  Fall Risk Level: LOW RISK    Universal Fall Precautions:  call light/belongings in reach, bed in low position and locked, wheelchairs and assistive devices out of sight, siderails up x 2, use non-slip footwear, adequate lighting, clutter free and spill free environment, educate on level of risk, educate to call for assistance    Fall Risk Level Interventions:   TRIAL ([email or web address], NEURO, MED AZALEA 5) Low Fall Risk Interventions  Place yellow fall risk ID band on patient: verified  Provide patient/family education based on risk assessment: verified  Educate patient/family to call staff for assistance when getting out of bed: verified  Place fall precaution signage outside patient door: verifiedTRIAL ([email or web address], NEURO, MED AZALEA 5) Moderate Fall Risk Interventions  Place yellow fall risk ID band on patient: verified  Provide patient/family education based on risk assessment : verified  Educate patient/family to call staff for assistance when getting out of bed: verified  Place fall precaution signage outside patient door: verified  Utilize bed/chair fall alarm: verifiedTRIAL ([employer], NEURO, [employer] AZALEA 5) High Fall Risk Interventions  Place yellow fall risk ID band on patient: verified  Provide patient/family education based on risk assessment: verified  Educate patient/family to call staff for assistance when getting out of bed: verified  Place fall precaution signage outside patient door: verified  Place patient in room close to nursing station: verified  Utilize bed/chair fall alarm: verified  Notify charge of high risk  for huddle: completed    Patient Specific Interventions:     Medication: assess for medications that can be discontinued or dosage decreased  Mental Status/LOC/Awareness: reinforce the use of call light  Toileting: not applicable  Volume/Electrolyte Status: ensure IV fluids are appropriate  Communication/Sensory: not applicable  Behavioral: encourage patient to voice feelings  Mobility: dangle prior to standing, ensure bed is locked and in lowest position and instruct patient to exit bed on their strongest side

## 2017-08-29 NOTE — PROGRESS NOTES
Shauna Downing Fall Risk Assessment:     Last Known Fall: No falls  Mobility: No limitations  Medications: Cardiovascular and central nervous system meds  Mental Status/LOC/Awareness: Awake, alert, and oriented to date, place, and person  Toileting Needs: No needs  Volume/Electrolyte Status: No problems  Communication/Sensory: Visual (Glasses)/hearing deficit, Non-English patient/unable to speak/slurred speech  Behavior: Appropriate behavior  Shauna Downing Fall Risk Total: 7  Fall Risk Level: LOW RISK    Universal Fall Precautions:  call light/belongings in reach, bed in low position and locked, wheelchairs and assistive devices out of sight, siderails up x 2, use non-slip footwear, adequate lighting, clutter free and spill free environment, educate on level of risk, educate to call for assistance    Fall Risk Level Interventions:   TRIAL ([employer], NEURO, MED AZALEA 5) Low Fall Risk Interventions  Place yellow fall risk ID band on patient: verified  Provide patient/family education based on risk assessment: verified  Educate patient/family to call staff for assistance when getting out of bed: verified  Place fall precaution signage outside patient door: verifiedTRIAL ([employer], NEURO, MED AZALEA 5) Moderate Fall Risk Interventions  Place yellow fall risk ID band on patient: verified  Provide patient/family education based on risk assessment : verified  Educate patient/family to call staff for assistance when getting out of bed: verified  Place fall precaution signage outside patient door: verified  Utilize bed/chair fall alarm: verifiedTRIAL ([employer], NEURO, [employer] AZALEA 5) High Fall Risk Interventions  Place yellow fall risk ID band on patient: verified  Provide patient/family education based on risk assessment: verified  Educate patient/family to call staff for assistance when getting out of bed: verified  Place fall precaution signage outside patient door: verified  Place patient in room close to nursing station:  verified  Utilize bed/chair fall alarm: verified  Notify charge of high risk for huddle: completed    Patient Specific Interventions:     Medication: assess need for orthostatic hypotension evaluation  Mental Status/LOC/Awareness: check on patient hourly and reinforce the use of call light  Toileting: instruct male patients prone to dizziness to void while sitting and do not leave patient unattended in bathroom/refer to toileting scripting  Volume/Electrolyte Status: ensure patient remains hydrated and advance diet as tolerated  Communication/Sensory: update plan of care on whiteboard and ensure proper positioning when transferrng/ambulating  Behavioral: not applicable  Mobility: schedule physical activity throughout the day, dangle prior to standing and instruct patient to exit bed on their strongest side

## 2017-08-29 NOTE — CARE PLAN
Problem: Safety  Goal: Will remain free from injury  Outcome: PROGRESSING AS EXPECTED  Safety precautions maintained

## 2017-08-29 NOTE — CARE PLAN
Problem: Infection  Goal: Will remain free from infection  Outcome: PROGRESSING AS EXPECTED  Patient complies with CHG bath. Hand Hygiene protocols     Problem: Respiratory:  Goal: Respiratory status will improve  Outcome: PROGRESSING AS EXPECTED  Patient tolerating room air well     Problem: Pain Management  Goal: Pain level will decrease to patient's comfort goal  Outcome: PROGRESSING AS EXPECTED  Patient declines pain. Encouraged patient to let staff know if he experiences pain

## 2017-08-29 NOTE — PROGRESS NOTES
Internal Medicine Interval Note    Name Bhavesh Saini       1970   Age/Sex 47 y.o. male   MRN [id number]   Code Status FULL.     After 5PM or if no immediate response to page, please call for cross-coverage  Attending/Team: Akil/Troy. See Patient List for primary contact information  Call [phone] to page    1st Call - Day Intern (R1):   Charlotte 2nd Call - Day Sr. Resident (R2/R3):   Nando         Reason for interval visit  (Principal Problem)   Atrial fibrillation with RVR (CMS-[facility])    Interval Problem Daily Status Update  (24 hours)   - AV fistula created last night.  No acute events overnight.    - No complaints this morning.   - Per Cardiology, LOBO and cardioversion today   - Through a , discussed with patient if warfarin vs. direct Xa inhibitor would better for patient once he returns to [place].  Patient will discuss with his daughter.       Review of Systems   Constitutional: Negative for chills and fever.   Respiratory: Negative for shortness of breath.    Cardiovascular: Negative for chest pain and palpitations.   Gastrointestinal: Negative for abdominal pain.   Musculoskeletal: Negative for myalgias.   Neurological: Negative for dizziness and headaches.       Consultants/Specialty  Cardiology  Cardiac Surgery  Nephrology    Disposition  Inpatient for atrial fibrillation with RVR.    Quality Measures    Reviewed items::  Radiology images reviewed, Medications reviewed and Labs reviewed  Truong catheter::  No Truong  DVT prophylaxis pharmacological::  Warfarin (Coumadin)      Physical Exam       Vitals:    17 1520 17 1555 17 1655 17 1755   BP: 108/63 110/61 101/64 105/60   Pulse: (!) 48 (!) 45 (!) 49 (!) 45   Resp: 18      Temp: 36.6 °C (97.8 °F)      SpO2: 97% 95% 97% 94%   Weight:       Height:         Body mass index is 27.43 kg/m².    Oxygen Therapy:  Pulse Oximetry: 94 %, O2 (LPM): 0, O2 Delivery: None (Room Air)    Physical Exam    Constitutional: He is oriented to person, place, and time. No distress.   Lying in bed.    Eyes: EOM are normal. Pupils are equal, round, and reactive to light.   Neck: No JVD present.   Cardiovascular:   Irregularly irregular rhythm.     Pulmonary/Chest: Effort normal and breath sounds normal. No respiratory distress. He has no wheezes. He has no rales.   Abdominal: Soft. Bowel sounds are normal. He exhibits no distension. There is no tenderness.   Musculoskeletal: He exhibits no edema.   Neurological: He is alert and oriented to person, place, and time. He has normal reflexes.   Skin: Skin is warm.   Psychiatric: Affect normal.       Lab Data Review:       8/27/2017  6:30 PM    Recent Labs      08/26/17   0757  08/27/17   0251   SODIUM  130*  131*   POTASSIUM  4.0  3.7   CHLORIDE  98  99   CO2  23  23   BUN  42*  28*   CREATININE  3.30*  2.94*   MAGNESIUM  2.0   --    CALCIUM  8.8  8.7       Recent Labs      08/26/17   0757  08/27/17   0251   GLUCOSE  98  91       Recent Labs      08/27/17   0251  08/27/17   1231  08/28/17   0926   RBC  4.42*   --   4.36*   HEMOGLOBIN  13.3*   --   12.9*   HEMATOCRIT  39.4*   --   39.2*   PLATELETCT  185   --   212   PROTHROMBTM  24.4*  19.0*  19.3*   INR  2.12*  1.54*  1.57*       Recent Labs      08/27/17   0251  08/28/17   0926   WBC  7.4  9.2   NEUTSPOLYS  57.50  84.70*   LYMPHOCYTES  26.50  9.80*   MONOCYTES  9.60  4.80   EOSINOPHILS  5.30  0.00   BASOPHILS  0.80  0.20           Assessment/Plan     * Atrial fibrillation with RVR (CMS-HCC)- (present on admission)   Assessment & Plan    -New onset vs paroxysmal (vague PMH of afib and non-compliance with meds)  -overnight He was given diltiazem by NF due to Afib with nonsustained HR in 160s. - now in SR  -CHADS2: 4  -ECHO: severe LVH, global hypokinesis, EF ~20%, severe AI and mod MR  -LOBO: slow blood flow but no thrombus. Severe AI    Plan  - Na restricted diet.  - Continue scheduled metoprolol, PRN 5mg metoprolol q6h.    -  Patient continued on amiodarone drip.  Cardiology planning to change to PO.  - If HR < 50 or > 120s , is PERSISTENT and SYMPTOMATIC, will administer diltiazem 10 mg.   - LOBO and cardioversion planned for today.        Hyperkalemia   Assessment & Plan    - K 3.7 today after hemodialysis last night.    - Patient now on low K diet.   - Continue to monitor.          Cardiorenal syndrome   Assessment & Plan    -Diagnosis given by Nephrology - likely since urine Na is low  -Patient is on HD, and is urinated <500 ml yesterday.  -Creatinine in ED 4.38, Total protein Urine 208. Estimated GFR was 14.   -FENA 0.2%  -BNP 2203>> 2039   -CXR: cardiomegaly. ECHO showed: severe LVH, global hypokinesis, EF ~20%, AR and mod MR  Assessment: Cardiorenal syndrome  Plan  -Patient needs AVR/MAZE w LOBO per Cardiac Surgery, but due to his renal status and recent stroke with hemorrhage, they do not recommend at this time.  They will follow up outpatient in 6 weeks.   -Patient will be counseled on cessation of alcohol intake.   -Nephrology on board.             Aortic regurgitation- (present on admission)   Assessment & Plan    -Severe AR.   -ECHO:  severe LVH, global hypokinesis, EF ~20%, severe AI and mod MR. Findings were confirmed by LOBO  -Fits the criteria for valve replacement. Cardiology and cardiac surgery on board.        HFrEF (heart failure with reduced ejection fraction) (CMS-[facility])- (present on admission)   Assessment & Plan    -Presented with SOB, orthopnea, fatique and palpitations. Has h/o HTN and alcoholism in the past. On exam: JVD +., murmurs + but no volume overload.   -BNP 2203 >> 1753 > 2211.   -CXR: cardiomegaly. ECHO: severe LVH, global hypokinesis, EF ~20%, severe AI and mod MR  -Assessment: Compensated HF likely due to alcoholism vs tachycardia induced heart failure.   -Cardiac surgery spoke with Mr Saini and family, he needs ischemic work up and AVR/MAZE w LOBO, but due to his renal status and recent stroke with  hemorrhage, they do not recommend it at this time.   - Patient's undocumented alien status is another reason having a heart cath is diifficult.      Plan  - Na restricted diet.   - Patient does have follow up appointment with Dr. Gutierrez, Cardiology, on September 11.  - Patient's family will fly him to [place] for further care after discharge.          Prolonged Q-T interval on ECG- (present on admission)   Assessment & Plan    -QTc ~530s. Will avoid QTc prolonging meds.        TREASURE (acute kidney injury) (CMS-HCC)- (present on admission)   Assessment & Plan    -Acute on chronic. Likely pre-renal vs cardiorenal. HTN is likely the cause of CKD  -On adm: Cr: 4.3, AGMA, Ph 6.1. Unknown baseline.  -FeNa <1%  -H/o renal disease since 5yrs, FH kidney disease in his father. Has been non-complaint with HTN medications. - has CHF  -Renal US: no hydronephrosis and calculi. Left renal cysts.  - Cr and K slowly increasing likely due to poor renal perfusion secondary to atrial fibrillation.    - Upper extremity vein mapping completed in anticipation of AV fistula.     Plan  - Vascular Surgery placed AVF yesterday.  - Continue Lasix.  Assessed daily by Nephrology for need for HD.          CVA (cerebral vascular accident) (CMS-HCC)- (present on admission)   Assessment & Plan    -h/o left facial droop, slurred speech and left sided weakness. However no neuro deficits currently  -CT: cerebral atrophy and small vessel ischemic changes   -MRI brain: acute infarction in L frontal periventricular white matter extendeing to subinsular cortex. Punctate area of infarction in R posterior lobe. Numerous microbleeds.   Assessment: Afib could have contributed (LOBO showed 'sludge' in the heart)  Plan  -ASA, statin  -Mx of Afib as stated above.        Non-rheumatic mitral regurgitation   Assessment & Plan    -Moderate MR on ECHO        Hyperglycemia- (present on admission)   Assessment & Plan    -Resolved  -On adm: , A1c: 5.4  -He was started  on ISS and hypoglycemia protocol which is discontinued.  CTM with accuchecks        Alcoholic (CMS-HCC)- (present on admission)   Assessment & Plan    -History is vague but looks like he was drinking heavily in the past. Last drink was 8/5/2017.  -On thiamine and folate.           Essential hypertension- (present on admission)   Assessment & Plan    -PMH of HTN but likely non-compliant. ECHO showed severe LVH  -Stable on BB, hydralazine, nitrate, lasix

## 2017-08-30 ENCOUNTER — PATIENT OUTREACH (OUTPATIENT)
Dept: HEALTH INFORMATION MANAGEMENT | Facility: OTHER | Age: 47
End: 2017-08-30

## 2017-08-30 LAB
ANION GAP SERPL CALC-SCNC: 10 MMOL/L (ref 0–11.9)
APTT PPP: 73.5 SEC (ref 24.7–36)
BUN SERPL-MCNC: 38 MG/DL (ref 8–22)
CALCIUM SERPL-MCNC: 8.4 MG/DL (ref 8.5–10.5)
CHLORIDE SERPL-SCNC: 101 MMOL/L (ref 96–112)
CO2 SERPL-SCNC: 23 MMOL/L (ref 20–33)
CREAT SERPL-MCNC: 4.05 MG/DL (ref 0.5–1.4)
GFR SERPL CREATININE-BSD FRML MDRD: 16 ML/MIN/1.73 M 2
GLUCOSE SERPL-MCNC: 83 MG/DL (ref 65–99)
INR PPP: 1.64 (ref 0.87–1.13)
POTASSIUM SERPL-SCNC: 3.9 MMOL/L (ref 3.6–5.5)
PROTHROMBIN TIME: 19.9 SEC (ref 12–14.6)
SODIUM SERPL-SCNC: 134 MMOL/L (ref 135–145)

## 2017-08-30 PROCEDURE — 99232 SBSQ HOSP IP/OBS MODERATE 35: CPT | Mod: GC | Performed by: HOSPITALIST

## 2017-08-30 PROCEDURE — 700102 HCHG RX REV CODE 250 W/ 637 OVERRIDE(OP): Performed by: INTERNAL MEDICINE

## 2017-08-30 PROCEDURE — 700102 HCHG RX REV CODE 250 W/ 637 OVERRIDE(OP): Performed by: STUDENT IN AN ORGANIZED HEALTH CARE EDUCATION/TRAINING PROGRAM

## 2017-08-30 PROCEDURE — A9270 NON-COVERED ITEM OR SERVICE: HCPCS | Performed by: INTERNAL MEDICINE

## 2017-08-30 PROCEDURE — 85730 THROMBOPLASTIN TIME PARTIAL: CPT

## 2017-08-30 PROCEDURE — 85610 PROTHROMBIN TIME: CPT

## 2017-08-30 PROCEDURE — 80048 BASIC METABOLIC PNL TOTAL CA: CPT

## 2017-08-30 PROCEDURE — 770020 HCHG ROOM/CARE - TELE (206)

## 2017-08-30 PROCEDURE — 36415 COLL VENOUS BLD VENIPUNCTURE: CPT

## 2017-08-30 PROCEDURE — A9270 NON-COVERED ITEM OR SERVICE: HCPCS | Performed by: STUDENT IN AN ORGANIZED HEALTH CARE EDUCATION/TRAINING PROGRAM

## 2017-08-30 PROCEDURE — 700102 HCHG RX REV CODE 250 W/ 637 OVERRIDE(OP): Performed by: HOSPITALIST

## 2017-08-30 PROCEDURE — A9270 NON-COVERED ITEM OR SERVICE: HCPCS | Performed by: HOSPITALIST

## 2017-08-30 RX ORDER — METOPROLOL SUCCINATE 50 MG/1
100 TABLET, EXTENDED RELEASE ORAL
Status: DISCONTINUED | OUTPATIENT
Start: 2017-08-31 | End: 2017-08-30

## 2017-08-30 RX ORDER — WARFARIN SODIUM 7.5 MG/1
7.5 TABLET ORAL
Status: COMPLETED | OUTPATIENT
Start: 2017-08-30 | End: 2017-08-30

## 2017-08-30 RX ORDER — METOPROLOL SUCCINATE 50 MG/1
100 TABLET, EXTENDED RELEASE ORAL
Status: DISCONTINUED | OUTPATIENT
Start: 2017-08-30 | End: 2017-08-30

## 2017-08-30 RX ORDER — WARFARIN SODIUM 5 MG/1
5 TABLET ORAL
Status: DISCONTINUED | OUTPATIENT
Start: 2017-08-31 | End: 2017-08-31

## 2017-08-30 RX ORDER — AMIODARONE HYDROCHLORIDE 200 MG/1
200 TABLET ORAL NIGHTLY
Status: DISCONTINUED | OUTPATIENT
Start: 2017-08-30 | End: 2017-09-09 | Stop reason: HOSPADM

## 2017-08-30 RX ADMIN — Medication 100 MG: at 08:55

## 2017-08-30 RX ADMIN — WARFARIN SODIUM 7.5 MG: 7.5 TABLET ORAL at 17:15

## 2017-08-30 RX ADMIN — FUROSEMIDE 40 MG: 40 TABLET ORAL at 08:55

## 2017-08-30 RX ADMIN — ISOSORBIDE MONONITRATE 60 MG: 30 TABLET ORAL at 08:55

## 2017-08-30 RX ADMIN — STANDARDIZED SENNA CONCENTRATE AND DOCUSATE SODIUM 2 TABLET: 8.6; 5 TABLET, FILM COATED ORAL at 08:55

## 2017-08-30 RX ADMIN — STANDARDIZED SENNA CONCENTRATE AND DOCUSATE SODIUM 2 TABLET: 8.6; 5 TABLET, FILM COATED ORAL at 21:56

## 2017-08-30 RX ADMIN — THERA TABS 1 TABLET: TAB at 08:55

## 2017-08-30 RX ADMIN — AMIODARONE HYDROCHLORIDE 200 MG: 200 TABLET ORAL at 21:56

## 2017-08-30 RX ADMIN — FOLIC ACID 1 MG: 1 TABLET ORAL at 08:55

## 2017-08-30 RX ADMIN — ATORVASTATIN CALCIUM 40 MG: 40 TABLET, FILM COATED ORAL at 21:56

## 2017-08-30 ASSESSMENT — ENCOUNTER SYMPTOMS
COUGH: 0
SHORTNESS OF BREATH: 0
DIZZINESS: 0
CHILLS: 0
HEMOPTYSIS: 0
HEADACHES: 0
MYALGIAS: 0
ABDOMINAL PAIN: 0
PALPITATIONS: 0
VOMITING: 0
DIARRHEA: 0
ORTHOPNEA: 0
HEARTBURN: 0
FEVER: 0
NAUSEA: 0

## 2017-08-30 ASSESSMENT — PAIN SCALES - GENERAL
PAINLEVEL_OUTOF10: 0

## 2017-08-30 NOTE — PROGRESS NOTES
[facility] update on patient's bradycardia in 50's. Metoprolol 200mg decreased to 100mg. Amiodarone decreased to Qday at night time.

## 2017-08-30 NOTE — PROGRESS NOTES
Internal Medicine Interval Note    Name Bhavesh Saini       1970   Age/Sex 47 y.o. male   MRN [id number]   Code Status FULL.     After 5PM or if no immediate response to page, please call for cross-coverage  Attending/Team: Akil/Troy. See Patient List for primary contact information  Call [phone] to page    1st Call - Day Intern (R1):   Charlotte 2nd Call - Day Sr. Resident (R2/R3):   Nando         Reason for interval visit  (Principal Problem)   Atrial fibrillation with RVR (CMS-[facility])    Interval Problem Daily Status Update  (24 hours)   -Some bradycardia episodes down to the 40s,remained in sinus rhythm though.  - No complaints this morning.   - Waiting for the daughter to check on the ability to check INRs.    Atrial fibrillation with rapid ventricular response: Successfully cardioverted. Remains in sinus rhythm. Currently on amiodarone. On heparin infusion bridging with Coumadin. INR 1.7. Metoprolol dose reduced to 200.    Bradycardia: Asymptomatic. Metoprolol dose reduced to 200.    Renal failure: Dialysis today.      Review of Systems   Constitutional: Negative for chills and fever.   Respiratory: Negative for shortness of breath.    Cardiovascular: Negative for chest pain and palpitations.   Gastrointestinal: Negative for abdominal pain.   Musculoskeletal: Negative for myalgias.   Neurological: Negative for dizziness and headaches.       Consultants/Specialty  Cardiology  Cardiac Surgery  Nephrology    Disposition  Inpatient for atrial fibrillation with RVR.    Quality Measures    Reviewed items::  Radiology images reviewed, Medications reviewed and Labs reviewed  Truong catheter::  No Truong  DVT prophylaxis pharmacological::  Warfarin (Coumadin)  And heparin infusion for now    Physical Exam       Vitals:    17 0029 17 0352 17 0703 17 1324   BP: 105/76 109/62 115/70 130/79   Pulse: (!) 56 (!) 56 (!) 51 (!) 56   Resp: 16 16 18 16   Temp: 36.4 °C (97.6 °F) 36.5  °C (97.7 °F) 36.4 °C (97.6 °F) 36.5 °C (97.7 °F)   SpO2: 100% 99% 95% 99%   Weight:       Height:         Body mass index is 28.03 kg/m². Weight: 65.1 kg (143 lb 8.3 oz)  Oxygen Therapy:  Pulse Oximetry: 99 %, O2 (LPM): 0, O2 Delivery: None (Room Air)    Physical Exam   Constitutional: He is oriented to person, place, and time. No distress.   Lying in bed.    Eyes: EOM are normal. Pupils are equal, round, and reactive to light.   Neck: No JVD present.   Cardiovascular:   Bradycardia in the 50s   Pulmonary/Chest: Effort normal and breath sounds normal. No respiratory distress. He has no wheezes. He has no rales.   Abdominal: Soft. Bowel sounds are normal. He exhibits no distension. There is no tenderness.   Musculoskeletal: He exhibits no edema.   Neurological: He is alert and oriented to person, place, and time. He has normal reflexes.   Skin: Skin is warm.   Psychiatric: Affect normal.       Lab Data Review:       8/27/2017  6:30 PM    Recent Labs      08/27/17   0251  08/29/17   0018   SODIUM  131*  129*   POTASSIUM  3.7  4.4   CHLORIDE  99  99   CO2  23  20   BUN  28*  58*   CREATININE  2.94*  5.67*   CALCIUM  8.7  8.4*       Recent Labs      08/27/17   0251  08/29/17   0018   GLUCOSE  91  100*       Recent Labs      08/27/17   0251  08/27/17   1231  08/28/17   0926   08/29/17   0018  08/29/17   0630  08/29/17   1440   RBC  4.42*   --   4.36*   --    --    --    --    HEMOGLOBIN  13.3*   --   12.9*   --    --    --    --    HEMATOCRIT  39.4*   --   39.2*   --    --    --    --    PLATELETCT  185   --   212   --    --    --    --    PROTHROMBTM  24.4*  19.0*  19.3*   --   21.0*   --    --    APTT   --    --    --    < >  77.1*  156.5*  99.1*   INR  2.12*  1.54*  1.57*   --   1.75*   --    --     < > = values in this interval not displayed.       Recent Labs      08/27/17   0251  08/28/17   0926   WBC  7.4  9.2   NEUTSPOLYS  57.50  84.70*   LYMPHOCYTES  26.50  9.80*   MONOCYTES  9.60  4.80   EOSINOPHILS  5.30  0.00    BASOPHILS  0.80  0.20           Assessment/Plan     * Atrial fibrillation with RVR ([facility])- (present on admission)   Assessment & Plan    -New onset vs paroxysmal (vague PMH of afib and non-compliance with meds)  -overnight He was given diltiazem by NF due to Afib with nonsustained HR in 160s. - now in SR  -CHADS2: 4  -ECHO: severe LVH, global hypokinesis, EF ~20%, severe AI and mod MR  -LOBO: slow blood flow but no thrombus. Severe AI    Plan  - Na restricted diet.  - Continue scheduled metoprolol, PRN 5mg metoprolol q6h.    -S/P LOBO and cardioversion.  -Currently in sinus rhythm.  -Amiodarone 200 mg twice a day.  -On heparin infusion bridging with warfarin.  INR is 1.7 today. Goal INR is 2-3.        Hyperkalemia (Resolved )   Assessment & Plan    -Continue to monitor        Cardiorenal syndrome   Assessment & Plan    -Diagnosis given by Nephrology - likely since urine Na is low  -Patient is on HD, and is urinated <500 ml yesterday.  -Creatinine in ED 4.38, Total protein Urine 208. Estimated GFR was 14.   -FENA 0.2%  -BNP 2203>> 2039   -CXR: cardiomegaly. ECHO showed: severe LVH, global hypokinesis, EF ~20%, AR and mod MR  Assessment: Cardiorenal syndrome  Plan  -Patient needs AVR/MAZE w LOBO per Cardiac Surgery, but due to his renal status and recent stroke with hemorrhage, they do not recommend at this time.  They will follow up outpatient in 6 weeks.   -Patient will be counseled on cessation of alcohol intake.   -Nephrology on board.             Aortic regurgitation- (present on admission)   Assessment & Plan    -Severe AR.   -ECHO:  severe LVH, global hypokinesis, EF ~20%, severe AI and mod MR. Findings were confirmed by LOBO  -Fits the criteria for valve replacement. Cardiology and cardiac surgery on board.No plans for surgery due to recent stroke.        HFrEF (heart failure with reduced ejection fraction) (CMS-HCC)- (present on admission)   Assessment & Plan    -Presented with SOB, orthopnea, fatique and  palpitations. Has h/o HTN and alcoholism in the past. On exam: JVD +., murmurs + but no volume overload.   -BNP 2203 >> 1753 > 2211.   -CXR: cardiomegaly. ECHO: severe LVH, global hypokinesis, EF ~20%, severe AI and mod MR  -Assessment: Compensated HF likely due to alcoholism vs tachycardia induced heart failure.   -Cardiac surgery spoke with Mr Saini and family, he needs ischemic work up and AVR/MAZE w LOBO, but due to his renal status and recent stroke with hemorrhage, they do not recommend it at this time.   - Patient's undocumented alien status is another reason having a heart cath is diifficult.      Plan  - Na restricted diet.   -Metoprolol, Lasix and nitrates  - Plan outpatient follow-up with cardiology post discharge..  - Patient's family will fly him to [place] for further care after discharge.          Prolonged Q-T interval on ECG- (present on admission)   Assessment & Plan    -QTc ~530s. Will avoid QTc prolonging meds.        TREASURE (acute kidney injury) (CMS-HCC)- (present on admission)   Assessment & Plan    -Acute on chronic. Likely pre-renal vs cardiorenal. HTN is likely the cause of CKD  -On adm: Cr: 4.3, AGMA, Ph 6.1. Unknown baseline.  -FeNa <1%  -H/o renal disease since 5yrs, FH kidney disease in his father. Has been non-complaint with HTN medications. - has CHF  -Renal US: no hydronephrosis and calculi. Left renal cysts.  - Cr and K slowly increasing likely due to poor renal perfusion secondary to atrial fibrillation.    - Upper extremity vein mapping completed in anticipation of AV fistula.     Plan  - Continue Lasix.  Assessed daily by Nephrology for need for HD.          CVA (cerebral vascular accident) (CMS-HCC)- (present on admission)   Assessment & Plan    -h/o left facial droop, slurred speech and left sided weakness. However no neuro deficits currently  -CT: cerebral atrophy and small vessel ischemic changes   -MRI brain: acute infarction in L frontal periventricular white matter extendeing  to subinsular cortex. Punctate area of infarction in R posterior lobe. Numerous microbleeds.   Assessment: Afib could have contributed (LOBO showed 'sludge' in the heart)  Plan  -ASA, statin  -Mx of Afib as stated above.        Non-rheumatic mitral regurgitation   Assessment & Plan    -Moderate MR on ECHO  -No plans for surgical intervention at this point.        Hyperglycemia- (present on admission)   Assessment & Plan    -Resolved  -On adm: , A1c: 5.4  -He was started on ISS and hypoglycemia protocol which is discontinued.  CTM with accuchecks        Alcoholic (CMS-HCC)- (present on admission)   Assessment & Plan    -History is vague but looks like he was drinking heavily in the past. Last drink was 8/5/2017.  -On thiamine and folate.           Essential hypertension- (present on admission)   Assessment & Plan    -PMH of HTN but likely non-compliant. ECHO showed severe LVH  -Stable on BB, nitrate, lasix

## 2017-08-30 NOTE — PROGRESS NOTES
Dr. Collier and Abad Adler MD aware two PTT values of 156.5 and 99.1 are out of range. Dr. Collier stated ok to continue Heparin at 600units/HR, adjust heparin per protocol. Orders read back

## 2017-08-30 NOTE — PROGRESS NOTES
Inpatient Anticoagulation Service Note  Date: 8/30/2017  Reason for Anticoagulation: Atrial Fibrillation   DJE2RS1 VASc Score: 4  Hemoglobin Value: (!) 12.9  Hematocrit Value: (!) 39.2  Lab Platelet Value: 212  Target INR: 2.0 to 3.0  INR from last 7 days     Date/Time INR Value    08/30/17 1210 (!)  1.64    08/29/17 0018 (!)  1.75    08/28/17 0926 (!)  1.57    08/27/17 1231 (!)  1.54    08/27/17 0251 (!)  2.12    08/26/17 0249 (!)  2.84    08/24/17 2342 (!)  2.93    08/24/17 0213 (!)  2.99        Dose from last 7 days     Date/Time Dose (mg)    08/30/17 1500  7.5    08/29/17 1300  3.75    08/28/17 1500  5    08/26/17 1200  3.75    08/25/17 1300  3.75    08/24/17 1100  3.75        Average Dose (mg): TBD (New start VKA this admission, dosing ~7.5 mg daily )  Significant Interactions: Amiodarone, Aspirin, Statin (MVI)  Bridge Therapy: Yes (heparin weight based protocol)  Bridge Therapy Start Date: 08/28/17  Days of Overlap Therapy: 2   INR Value Greater than 2 Prior to Discontinuation of Parenteral Anticoagulation: Not Applicable   Reversal Agent Administered: Not Applicable  Comments: INR down overnight. No new H/H or PLT. No overt bleeding noted. Warfarin interactions noted (including change in amiodarone dose). Will give 7.5 mg tonight and trend INR with AM labs. Likely to need dose adjustment.    Plan:  Warfarin 7.5 mg 8/30/17  Education Material Provided?: No (given prior)  Pharmacist suggested discharge dosing: warfarin 5 mg daily (recommend INR within 24 hours of discharge)    Darío Li, PharmD

## 2017-08-30 NOTE — PROGRESS NOTES
Waiting for parameters from [facility] for Metoprolol. AM dose held for now d/t HR in low 50's. Tele 8 Pharmacy has contacted [facility].

## 2017-08-30 NOTE — PROGRESS NOTES
Nephrology Progress Note, Adult, Complex               Author:Oni Avilesjjar Date & Time created: 8/30/2017  4:00 PM     Interval History:  48 y/o male with severe, CHF  -EF 25 %, Severe AI, A.fib, CVA in TREASURE, metabolic acidosis  Poor UOP  Doing well, no complaints  Events last 24h noted    Review of Systems:  Review of Systems   Constitutional: Negative for chills, fever and malaise/fatigue.   Respiratory: Negative for cough and hemoptysis.    Cardiovascular: Negative for chest pain, palpitations and orthopnea.   Gastrointestinal: Negative for abdominal pain, diarrhea, heartburn, nausea and vomiting.   Genitourinary: Negative for dysuria.       Physical Exam:  Physical Exam   Constitutional: He is oriented to person, place, and time. No distress.   HENT:   Head: Normocephalic and atraumatic.   Nose: Nose normal.   Eyes: Right eye exhibits no discharge. Left eye exhibits no discharge. No scleral icterus.   Cardiovascular: An irregularly irregular rhythm present.   Pulmonary/Chest: Effort normal and breath sounds normal. No respiratory distress. He has no wheezes.   Musculoskeletal: He exhibits no edema or tenderness.   Trace pedal edema   Neurological: He is alert and oriented to person, place, and time.   Skin: Skin is warm and dry. He is not diaphoretic.   Psychiatric: He has a normal mood and affect. His behavior is normal.   Nursing note and vitals reviewed.      Labs:        Invalid input(s): [id number]      Recent Labs      08/29/17   0018  08/30/17   0337   SODIUM  129*  134*   POTASSIUM  4.4  3.9   CHLORIDE  99  101   CO2  20  23   BUN  58*  38*   CREATININE  5.67*  4.05*   CALCIUM  8.4*  8.4*     Recent Labs      08/29/17   0018  08/30/17   0337   GLUCOSE  100*  83     Recent Labs      08/28/17   0926   08/29/17   0018   08/29/17   1440  08/29/17   2151  08/30/17   0337  08/30/17   1210   RBC  4.36*   --    --    --    --    --    --    --    HEMOGLOBIN  12.9*   --    --    --    --    --    --    --     HEMATOCRIT  39.2*   --    --    --    --    --    --    --    PLATELETCT  212   --    --    --    --    --    --    --    PROTHROMBTM  19.3*   --   21.0*   --    --    --    --   19.9*   APTT   --    < >  77.1*   < >  99.1*  71.3*  73.5*   --    INR  1.57*   --   1.75*   --    --    --    --   1.64*    < > = values in this interval not displayed.     Recent Labs      17   0926   WBC  9.2   NEUTSPOLYS  84.70*   LYMPHOCYTES  9.80*   MONOCYTES  4.80   EOSINOPHILS  0.00   BASOPHILS  0.20           Hemodynamics:  Temp (24hrs), Av.7 °C (98.1 °F), Min:36.4 °C (97.6 °F), Max:37.1 °C (98.7 °F)  Temperature: 37.1 °C (98.7 °F)  Pulse  Av.1  Min: 44  Max: 155   Blood Pressure: 151/84     Respiratory:    Respiration: 18, Pulse Oximetry: 94 %     Work Of Breathing / Effort: Mild  RUL Breath Sounds: Clear, RML Breath Sounds: Clear, RLL Breath Sounds: Diminished, SABAS Breath Sounds: Clear, LLL Breath Sounds: Diminished  Fluids:    Intake/Output Summary (Last 24 hours) at 17 1600  Last data filed at 17 0850   Gross per 24 hour   Intake              240 ml   Output              300 ml   Net              -60 ml        GI/Nutrition:  Orders Placed This Encounter   Procedures   • DIET ORDER     Standing Status:   Standing     Number of Occurrences:   1     Order Specific Question:   Diet:     Answer:   Diabetic [3]     Order Specific Question:   Diet:     Answer:   2 Gram Sodium [7]     Order Specific Question:   Diet:     Answer:   Renal [8]     Order Specific Question:   Calorie modifications:     Answer:   1800 kcals [4]     Medical Decision Making, by Problem:  Active Hospital Problems    Diagnosis   • *Atrial fibrillation with RVR (CMS-[facility]) [I48.91]   • CVA (cerebral vascular accident) (CMS-HCC) [I63.9]   • Essential hypertension [I10]   • Respiratory failure (CMS-[facility]) [J96.90]   • Electrolyte abnormality [E87.8]   • Hyperglycemia [R73.9]   • Prolonged Q-T interval on ECG [R94.31]   • Elevated troponin  [R74.8]   • Acute on chronic systolic heart failure (CMS-[facility]) [I50.23]   • Non-rheumatic mitral regurgitation [I34.0]   • TREASURE (acute kidney injury) (CMS-HCC) [N17.9]   • 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]         Reviewed items::  Labs reviewed    Assessment and Plan    1.TREASURE :sec to cardiorenal Syndrome.  2.HTN: BP well controlled  3.Electrolytes: well controlled  4.Anemia: Hb WNL -to monitor  5.Metabolic acidosis -corrected with HD  6.Volume overloaded:improving with Lasix  7.CHF -cardiology following  Plan  No acute need for HD today.  Renal dose all meds  Avoid nephrotoxins  Prognosis guarded  D/W primary team MD

## 2017-08-30 NOTE — PROGRESS NOTES
Transfer of care completed, pt sleeping. No complaints at this time. Call light in reach, bed low and locked.

## 2017-08-30 NOTE — CARE PLAN
Problem: Communication  Goal: The ability to communicate needs accurately and effectively will improve  Outcome: PROGRESSING AS EXPECTED  POC discussed at bedside - patient verbalizes understanding.  Education on medications and procedures provided.   White board updated, patient encouraged to call for all needs. Calls appropriately. No immediate concerns at this time.    phones and staff used for assistance with language barriers. Family also at bedside to be updated on POC     Problem: Safety  Goal: Will remain free from falls  Outcome: PROGRESSING AS EXPECTED  Shauna Downing Fall Risk Assessment:     Last Known Fall: No falls  Mobility: No limitations  Medications: Diuretics, Cardiovascular or central nervous system meds  Mental Status/LOC/Awareness: Awake, alert, and oriented to date, place, and person  Toileting Needs: No needs  Volume/Electrolyte Status: No problems  Communication/Sensory: Non-English patient/unable to speak/slurred speech  Behavior: Appropriate behavior  Shauna Downing Fall Risk Total: 8  Fall Risk Level: LOW RISK    Universal Fall Precautions:  call light/belongings in reach, bed in low position and locked, siderails up x 2, use non-slip footwear, adequate lighting, clutter free and spill free environment, educate on level of risk, educate to call for assistance    Fall Risk Level Interventions:   TRIAL (TELE 8, NEURO, MED AZALEA 5) Low Fall Risk Interventions  Place yellow fall risk ID band on patient: verified  Provide patient/family education based on risk assessment: completed  Educate patient/family to call staff for assistance when getting out of bed: completed  Place fall precaution signage outside patient door: verifiedTRIAL (TELE 8, NEURO, MED AZALEA 5) Moderate Fall Risk Interventions  Place yellow fall risk ID band on patient: verified  Provide patient/family education based on risk assessment : verified  Educate patient/family to call staff for assistance when getting out  of bed: verified  Place fall precaution signage outside patient door: verified  Utilize bed/chair fall alarm: verifiedTRIAL (TELE 8, NEURO, MED AZALEA 5) High Fall Risk Interventions  Place yellow fall risk ID band on patient: verified  Provide patient/family education based on risk assessment: verified  Educate patient/family to call staff for assistance when getting out of bed: verified  Place fall precaution signage outside patient door: verified  Place patient in room close to nursing station: verified  Utilize bed/chair fall alarm: verified  Notify charge of high risk for huddle: completed    Patient Specific Interventions:     Medication: review medications with patient and family and provide patients who received diuretics/laxatives frequent toileting  Mental Status/LOC/Awareness: reinforce falls education, check on patient hourly and reinforce the use of call light  Toileting: provide frquent toileting and monitor intake and output/use of appropriate interventions  Volume/Electrolyte Status: ensure patient remains hydrated and monitor abnormal lab values  Communication/Sensory: update plan of care on whiteboard, provide communication alternatives/ and have patients with hearing deficit repeat information back to you to ensure proper understanding  Behavioral: instruct/reinforce fall program rationale  Mobility: ensure bed is locked and in lowest position    Problem: Venous Thromboembolism (VTW)/Deep Vein Thrombosis (DVT) Prevention:  Goal: Patient will participate in Venous Thrombosis (VTE)/Deep Vein Thrombosis (DVT)Prevention Measures  Outcome: PROGRESSING AS EXPECTED  Heparin to Coumadin bridge in place   Heparin gtt APTT therapeutic x 2 now.   MD working with family for options on outpatient anticoag options.

## 2017-08-30 NOTE — PROGRESS NOTES
Patient notified RN that his daughter had to leave and cannot meet with [facility] rounding MD's at 1130. Daughter will be back at 0900 tomorrow morning. MD aware of this new plan.

## 2017-08-30 NOTE — PROGRESS NOTES
Report received from PM RN, care of patient assumed. POC discussed at bedside, no immediate concerns stated at this time. Safety measures in place/call light in reach. Will continue to round hourly. MD at bedside talking with patient during report.

## 2017-08-30 NOTE — CARE PLAN
Problem: Safety  Goal: Will remain free from injury  Outcome: PROGRESSING AS EXPECTED  Safety measures maintained

## 2017-08-31 LAB
ANION GAP SERPL CALC-SCNC: 8 MMOL/L (ref 0–11.9)
APTT PPP: 61.5 SEC (ref 24.7–36)
BUN SERPL-MCNC: 43 MG/DL (ref 8–22)
CALCIUM SERPL-MCNC: 9 MG/DL (ref 8.5–10.5)
CHLORIDE SERPL-SCNC: 102 MMOL/L (ref 96–112)
CO2 SERPL-SCNC: 25 MMOL/L (ref 20–33)
CREAT SERPL-MCNC: 3.69 MG/DL (ref 0.5–1.4)
GFR SERPL CREATININE-BSD FRML MDRD: 18 ML/MIN/1.73 M 2
GLUCOSE SERPL-MCNC: 116 MG/DL (ref 65–99)
INR PPP: 1.69 (ref 0.87–1.13)
POTASSIUM SERPL-SCNC: 3.9 MMOL/L (ref 3.6–5.5)
PROTHROMBIN TIME: 20.4 SEC (ref 12–14.6)
SODIUM SERPL-SCNC: 135 MMOL/L (ref 135–145)

## 2017-08-31 PROCEDURE — 80048 BASIC METABOLIC PNL TOTAL CA: CPT

## 2017-08-31 PROCEDURE — 700111 HCHG RX REV CODE 636 W/ 250 OVERRIDE (IP)

## 2017-08-31 PROCEDURE — 700102 HCHG RX REV CODE 250 W/ 637 OVERRIDE(OP)

## 2017-08-31 PROCEDURE — 85610 PROTHROMBIN TIME: CPT

## 2017-08-31 PROCEDURE — 90935 HEMODIALYSIS ONE EVALUATION: CPT

## 2017-08-31 PROCEDURE — A9270 NON-COVERED ITEM OR SERVICE: HCPCS

## 2017-08-31 PROCEDURE — A9270 NON-COVERED ITEM OR SERVICE: HCPCS | Performed by: INTERNAL MEDICINE

## 2017-08-31 PROCEDURE — A9270 NON-COVERED ITEM OR SERVICE: HCPCS | Performed by: STUDENT IN AN ORGANIZED HEALTH CARE EDUCATION/TRAINING PROGRAM

## 2017-08-31 PROCEDURE — 770020 HCHG ROOM/CARE - TELE (206)

## 2017-08-31 PROCEDURE — A9270 NON-COVERED ITEM OR SERVICE: HCPCS | Performed by: HOSPITALIST

## 2017-08-31 PROCEDURE — 700102 HCHG RX REV CODE 250 W/ 637 OVERRIDE(OP): Performed by: STUDENT IN AN ORGANIZED HEALTH CARE EDUCATION/TRAINING PROGRAM

## 2017-08-31 PROCEDURE — 700111 HCHG RX REV CODE 636 W/ 250 OVERRIDE (IP): Performed by: PHARMACIST

## 2017-08-31 PROCEDURE — 700102 HCHG RX REV CODE 250 W/ 637 OVERRIDE(OP): Performed by: INTERNAL MEDICINE

## 2017-08-31 PROCEDURE — 85730 THROMBOPLASTIN TIME PARTIAL: CPT

## 2017-08-31 PROCEDURE — 700102 HCHG RX REV CODE 250 W/ 637 OVERRIDE(OP): Performed by: HOSPITALIST

## 2017-08-31 PROCEDURE — 36415 COLL VENOUS BLD VENIPUNCTURE: CPT

## 2017-08-31 RX ORDER — WARFARIN SODIUM 7.5 MG/1
3.75 TABLET ORAL
Status: DISCONTINUED | OUTPATIENT
Start: 2017-09-01 | End: 2017-09-01

## 2017-08-31 RX ORDER — HEPARIN SODIUM 1000 [USP'U]/ML
INJECTION, SOLUTION INTRAVENOUS; SUBCUTANEOUS
Status: COMPLETED
Start: 2017-08-31 | End: 2017-08-31

## 2017-08-31 RX ORDER — WARFARIN SODIUM 5 MG/1
5 TABLET ORAL
Status: COMPLETED | OUTPATIENT
Start: 2017-08-31 | End: 2017-08-31

## 2017-08-31 RX ADMIN — THERA TABS 1 TABLET: TAB at 09:35

## 2017-08-31 RX ADMIN — FOLIC ACID 1 MG: 1 TABLET ORAL at 09:35

## 2017-08-31 RX ADMIN — STANDARDIZED SENNA CONCENTRATE AND DOCUSATE SODIUM 2 TABLET: 8.6; 5 TABLET, FILM COATED ORAL at 09:36

## 2017-08-31 RX ADMIN — STANDARDIZED SENNA CONCENTRATE AND DOCUSATE SODIUM 2 TABLET: 8.6; 5 TABLET, FILM COATED ORAL at 20:55

## 2017-08-31 RX ADMIN — HEPARIN SODIUM 3800 UNITS: 1000 INJECTION, SOLUTION INTRAVENOUS; SUBCUTANEOUS at 18:11

## 2017-08-31 RX ADMIN — WARFARIN SODIUM 5 MG: 5 TABLET ORAL at 20:55

## 2017-08-31 RX ADMIN — FUROSEMIDE 40 MG: 40 TABLET ORAL at 09:36

## 2017-08-31 RX ADMIN — Medication 100 MG: at 09:35

## 2017-08-31 RX ADMIN — AMIODARONE HYDROCHLORIDE 200 MG: 200 TABLET ORAL at 20:55

## 2017-08-31 RX ADMIN — HEPARIN SODIUM 600 UNITS/HR: 5000 INJECTION, SOLUTION INTRAVENOUS at 03:29

## 2017-08-31 RX ADMIN — METOPROLOL TARTRATE 25 MG: 25 TABLET, FILM COATED ORAL at 13:10

## 2017-08-31 RX ADMIN — ATORVASTATIN CALCIUM 40 MG: 40 TABLET, FILM COATED ORAL at 20:55

## 2017-08-31 RX ADMIN — ISOSORBIDE MONONITRATE 60 MG: 30 TABLET ORAL at 09:36

## 2017-08-31 ASSESSMENT — ENCOUNTER SYMPTOMS
ABDOMINAL PAIN: 0
DIARRHEA: 0
HEMOPTYSIS: 0
PALPITATIONS: 0
NAUSEA: 0
FEVER: 0
MYALGIAS: 0
CHILLS: 0
HEADACHES: 0
COUGH: 0
ORTHOPNEA: 0
VOMITING: 0
HEARTBURN: 0
SHORTNESS OF BREATH: 0
DIZZINESS: 0

## 2017-08-31 ASSESSMENT — PAIN SCALES - GENERAL
PAINLEVEL_OUTOF10: 0

## 2017-08-31 NOTE — PROGRESS NOTES
Bedside report received from KRYSTIN Barajas. POC discussed with pt; all questions answered at this time. White board updated. Appropriate functioning of tele monitor confirmed. All needs met at this time.

## 2017-08-31 NOTE — PROGRESS NOTES
Internal Medicine Interval Note    Name Bhavesh Saini       1970   Age/Sex 47 y.o. male   MRN [id number]   Code Status FULL.     After 5PM or if no immediate response to page, please call for cross-coverage  Attending/Team: Akil/T[place]. See Patient List for primary contact information  Call [phone] to page    1st Call - Day Intern (R1):   Charlotte 2nd Call - Day Sr. Resident (R2/R3):   Nando         Reason for interval visit  (Principal Problem)   Atrial fibrillation with RVR (CMS-[facility])    Interval Problem Daily Status Update  (24 hours)   - Bradycardia to 50s; remained in sinus rhythm.    - No complaints this morning.   - Will discuss with daughter ability to check INRs in [place].     Atrial fibrillation with rapid ventricular response: Remains in sinus rhythm. Currently on amiodarone; reduced dose to 100 mg nightly. On heparin infusion bridging with Coumadin.  INR 1.64.  Metoprolol changed to shorting acting, 25 mg q6 hours for titration.      Bradycardia: Asymptomatic. Metoprolol dose reduced to shorting acting, 25 mg q6 hours for titration.      Renal failure: Dialysis yesterday.  Spoke with Dr. Najjar, Nephrology.  They are assisting patient with Emergency Medicaid, which can be used for hemodialysis as an outpatient in their clinic until patient can move to [place].        Review of Systems   Constitutional: Negative for chills and fever.   Respiratory: Negative for shortness of breath.    Cardiovascular: Negative for chest pain and palpitations.   Gastrointestinal: Negative for abdominal pain.   Musculoskeletal: Negative for myalgias.   Neurological: Negative for dizziness and headaches.       Consultants/Specialty  Cardiology  Cardiac Surgery  Nephrology    Disposition  Inpatient for atrial fibrillation with RVR.    Quality Measures    Reviewed items::  Radiology images reviewed, Medications reviewed and Labs reviewed  Truong catheter::  No Truong  DVT prophylaxis pharmacological::   Warfarin (Coumadin)  And heparin infusion for now.     Physical Exam       Vitals:    08/30/17 0307 08/30/17 0850 08/30/17 1200 08/30/17 1600   BP: 124/73 136/76 151/84 148/89   Pulse: (!) 49 (!) 53 (!) 57 (!) 58   Resp: 18 16 18 18   Temp: 36.4 °C (97.6 °F) 36.7 °C (98.1 °F) 37.1 °C (98.7 °F) 36.6 °C (97.8 °F)   SpO2: 96% 95% 94% 97%   Weight:       Height:         Body mass index is 28.03 kg/m².    Oxygen Therapy:  Pulse Oximetry: 97 %, O2 (LPM): 0, O2 Delivery: None (Room Air)    Physical Exam   Constitutional: He is oriented to person, place, and time. No distress.   Lying in bed.    Eyes: EOM are normal. Pupils are equal, round, and reactive to light.   Neck: No JVD present.   Cardiovascular:   Bradycardia to the 50s   Pulmonary/Chest: Effort normal and breath sounds normal. No respiratory distress. He has no wheezes. He has no rales.   Abdominal: Soft. Bowel sounds are normal. He exhibits no distension. There is no tenderness.   Musculoskeletal: He exhibits no edema.   Neurological: He is alert and oriented to person, place, and time. He has normal reflexes.   Skin: Skin is warm.   Psychiatric: Affect normal.       Lab Data Review:       8/27/2017  6:30 PM    Recent Labs      08/29/17   0018  08/30/17   0337   SODIUM  129*  134*   POTASSIUM  4.4  3.9   CHLORIDE  99  101   CO2  20  23   BUN  58*  38*   CREATININE  5.67*  4.05*   CALCIUM  8.4*  8.4*       Recent Labs      08/29/17   0018  08/30/17   0337   GLUCOSE  100*  83       Recent Labs      08/28/17   0926   08/29/17   0018   08/29/17   1440  08/29/17   2151  08/30/17   0337  08/30/17   1210   RBC  4.36*   --    --    --    --    --    --    --    HEMOGLOBIN  12.9*   --    --    --    --    --    --    --    HEMATOCRIT  39.2*   --    --    --    --    --    --    --    PLATELETCT  212   --    --    --    --    --    --    --    PROTHROMBTM  19.3*   --   21.0*   --    --    --    --   19.9*   APTT   --    < >  77.1*   < >  99.1*  71.3*  73.5*   --    INR   1.57*   --   1.75*   --    --    --    --   1.64*    < > = values in this interval not displayed.       Recent Labs      08/28/17   0926   WBC  9.2   NEUTSPOLYS  84.70*   LYMPHOCYTES  9.80*   MONOCYTES  4.80   EOSINOPHILS  0.00   BASOPHILS  0.20           Assessment/Plan     * Atrial fibrillation with RVR (CMS-HCC)- (present on admission)   Assessment & Plan    -New onset vs paroxysmal (vague PMH of afib and non-compliance with meds)  -overnight He was given diltiazem by [facility] due to Afib with nonsustained HR in 160s. - now in SR  -CHADS2: 4  -ECHO: severe LVH, global hypokinesis, EF ~20%, severe AI and mod MR  -LOBO: slow blood flow but no thrombus. Severe AI    Plan  - Na restricted diet.  - Continue scheduled metoprolol, PRN 5mg metoprolol q6h.    -S/P LOBO and cardioversion.  -Currently in sinus rhythm.  -Amiodarone 100 mg nightly.   -On heparin infusion bridging with warfarin.  INR is 1.64 today. Goal INR is 2-3.        Hyperkalemia (Resolved )   Assessment & Plan    -Continue to monitor        Cardiorenal syndrome   Assessment & Plan    -Diagnosis given by Nephrology - likely since urine Na is low  -Patient is on HD, and is urinated <500 ml yesterday.  -Creatinine in ED 4.38, Total protein Urine 208. Estimated GFR was 14.   -FENA 0.2%  -BNP 2203>> 2039   -CXR: cardiomegaly. ECHO showed: severe LVH, global hypokinesis, EF ~20%, AR and mod MR  Assessment: Cardiorenal syndrome  Plan  -Patient needs AVR/MAZE w LOBO per Cardiac Surgery, but due to his renal status and recent stroke with hemorrhage, they do not recommend at this time.  They will follow up outpatient in 6 weeks.   -Patient will be counseled on cessation of alcohol intake.   -Nephrology on board.             Aortic regurgitation- (present on admission)   Assessment & Plan    -Severe AR.   -ECHO:  severe LVH, global hypokinesis, EF ~20%, severe AI and mod MR. Findings were confirmed by LOBO  -Fits the criteria for valve replacement. Cardiology and cardiac  surgery on board.No plans for surgery due to recent stroke.        HFrEF (heart failure with reduced ejection fraction) (CMS-[facility])- (present on admission)   Assessment & Plan    -Presented with SOB, orthopnea, fatique and palpitations. Has h/o HTN and alcoholism in the past. On exam: JVD +., murmurs + but no volume overload.   -BNP 2203 >> 1753 > 2211.   -CXR: cardiomegaly. ECHO: severe LVH, global hypokinesis, EF ~20%, severe AI and mod MR  -Assessment: Compensated HF likely due to alcoholism vs tachycardia induced heart failure.   -Cardiac surgery spoke with Mr Saini and family, he needs ischemic work up and AVR/MAZE w LOBO, but due to his renal status and recent stroke with hemorrhage, they do not recommend it at this time.   - Patient's undocumented alien status is another reason having a heart cath is diifficult.      Plan  - Na restricted diet.   -Metoprolol, Lasix and nitrates  - Plan outpatient follow-up with cardiology post discharge..  - Patient's family will fly him to [place] for further care after discharge.          Prolonged Q-T interval on ECG- (present on admission)   Assessment & Plan    -QTc ~530s. Will avoid QTc prolonging meds.        TREASURE (acute kidney injury) / Chronic Kidney Disease   Assessment & Plan    -Acute on chronic. Likely pre-renal vs cardiorenal. HTN is likely the cause of CKD  -On adm: Cr: 4.3, AGMA, Ph 6.1. Unknown baseline.  -FeNa <1%  -H/o renal disease since 7yrs, FH kidney disease in his father. Has been non-complaint with HTN medications. - has CHF  -Renal US: no hydronephrosis and calculi. Left renal cysts.  - Cr and K slowly increasing likely due to poor renal perfusion secondary to atrial fibrillation.    - Upper extremity vein mapping completed in anticipation of AV fistula.     Plan  - Continue Lasix.  Assessed daily by Nephrology for need for HD.    - Spoke with Dr. Aparicio, Nephrology.  Assisting patient with Emergency Medicaid, which can be used for hemodialysis as an  outpatient in their clinic until patient can move to [place].          CVA (cerebral vascular accident) (CMS-HCC)- (present on admission)   Assessment & Plan    -h/o left facial droop, slurred speech and left sided weakness. However no neuro deficits currently  -CT: cerebral atrophy and small vessel ischemic changes   -MRI brain: acute infarction in L frontal periventricular white matter extendeing to subinsular cortex. Punctate area of infarction in R posterior lobe. Numerous microbleeds.   Assessment: Afib could have contributed (LOBO showed 'sludge' in the heart)  Plan  -ASA, statin  -Mx of Afib as stated above.        Non-rheumatic mitral regurgitation   Assessment & Plan    -Moderate MR on ECHO  -No plans for surgical intervention at this point.        Hyperglycemia- (present on admission)   Assessment & Plan    -Resolved  -On adm: , A1c: 5.4  -He was started on ISS and hypoglycemia protocol which is discontinued.  CTM with accuchecks        Alcoholic (CMS-HCC)- (present on admission)   Assessment & Plan    -History is vague but looks like he was drinking heavily in the past. Last drink was 8/5/2017.  -On thiamine and folate.           Essential hypertension- (present on admission)   Assessment & Plan    -PMH of HTN but likely non-compliant. ECHO showed severe LVH  -Stable on BB, nitrate, lasix

## 2017-08-31 NOTE — DISCHARGE PLANNING
Pt is pending emergency medicaid. Number is [id number]. CORINA provided this information to Damon, dialysis coordinator.

## 2017-08-31 NOTE — CARE PLAN
Problem: Communication  Goal: The ability to communicate needs accurately and effectively will improve    Intervention: Use communication aids and/or /Language Line as appropriate  White board updated with POC and care team information during bedside report.  phone used as needed.

## 2017-08-31 NOTE — DISCHARGE PLANNING
Outpatient Dialysis Placement Update    Received emergency medicaid pending number. Provided number to [facility] Admissions to initiate SPA for dialysis placement at [facility].     Pending medical and financial clearance.    Dialysis Coordinator- [facility],  Damon Hammer [phone]

## 2017-08-31 NOTE — PROGRESS NOTES
Financial services came to visit. Pt unavailable. Left a contract in Vietnamese about coverage for emergency services they had previously discussed. Please call financial for any questions.

## 2017-08-31 NOTE — CARE PLAN
Problem: Psychosocial Needs:  Goal: Ability to cope will improve  Outcome: PROGRESSING AS EXPECTED    Intervention: Provide comfort measures  Pt encouraged to continue communication with family members

## 2017-08-31 NOTE — PROGRESS NOTES
Nephrology Progress Note, Adult, Complex               Author:Oni Najjar Date & Time created: 8/31/2017  3:45 PM     Interval History:  48 y/o male with severe, CHF  -EF 25 %, Severe AI, A.fib, CVA in TREASURE, metabolic acidosis  Seen and examined while getting HD.    Review of Systems:  Review of Systems   Constitutional: Negative for chills, fever and malaise/fatigue.   Respiratory: Negative for cough and hemoptysis.    Cardiovascular: Negative for chest pain, palpitations and orthopnea.   Gastrointestinal: Negative for abdominal pain, diarrhea, heartburn, nausea and vomiting.   Genitourinary: Negative for dysuria.       Physical Exam:  Physical Exam   Constitutional: He is oriented to person, place, and time. No distress.   HENT:   Head: Normocephalic and atraumatic.   Nose: Nose normal.   Eyes: Right eye exhibits no discharge. Left eye exhibits no discharge. No scleral icterus.   Cardiovascular: An irregularly irregular rhythm present.   Pulmonary/Chest: Effort normal and breath sounds normal. No respiratory distress. He has no wheezes.   Musculoskeletal: He exhibits no edema or tenderness.   Trace pedal edema   Neurological: He is alert and oriented to person, place, and time.   Skin: Skin is warm and dry. He is not diaphoretic.   Psychiatric: He has a normal mood and affect. His behavior is normal.   Nursing note and vitals reviewed.      Labs:        Invalid input(s): [id number]      Recent Labs      08/29/17 0018 08/30/17 0337  08/31/17   0914   SODIUM  129*  134*  135   POTASSIUM  4.4  3.9  3.9   CHLORIDE  99  101  102   CO2  20  23  25   BUN  58*  38*  43*   CREATININE  5.67*  4.05*  3.69*   CALCIUM  8.4*  8.4*  9.0     Recent Labs      08/29/17   0018  08/30/17   0337  08/31/17   0914   GLUCOSE  100*  83  116*     Recent Labs      08/29/17   0018   08/29/17   2151  08/30/17   0337  08/30/17   1210  08/31/17   0313   PROTHROMBTM  21.0*   --    --    --   19.9*  20.4*   APTT  77.1*   < >  71.3*  73.5*    --   61.5*   INR  1.75*   --    --    --   1.64*  1.69*    < > = values in this interval not displayed.               Hemodynamics:  Temp (24hrs), Av.7 °C (98.1 °F), Min:36.6 °C (97.8 °F), Max:37.2 °C (98.9 °F)  Temperature: 36.6 °C (97.9 °F)  Pulse  Av.5  Min: 44  Max: 155   Blood Pressure: 149/89     Respiratory:    Respiration: 16, Pulse Oximetry: 97 %     Work Of Breathing / Effort: Mild  RUL Breath Sounds: Clear, RML Breath Sounds: Clear, RLL Breath Sounds: Diminished, SABAS Breath Sounds: Clear, LLL Breath Sounds: Diminished  Fluids:    Intake/Output Summary (Last 24 hours) at 17 1545  Last data filed at 17 1250   Gross per 24 hour   Intake              340 ml   Output                0 ml   Net              340 ml     Weight: 64.4 kg (141 lb 15.6 oz)  GI/Nutrition:  Orders Placed This Encounter   Procedures   • DIET ORDER     Standing Status:   Standing     Number of Occurrences:   1     Order Specific Question:   Diet:     Answer:   Diabetic [3]     Order Specific Question:   Diet:     Answer:   2 Gram Sodium [7]     Order Specific Question:   Diet:     Answer:   Renal [8]     Order Specific Question:   Calorie modifications:     Answer:   1800 kcals [4]     Medical Decision Making, by Problem:  Active Hospital Problems    Diagnosis   • *Atrial fibrillation with RVR (CMS-HCC) [I48.91]   • CVA (cerebral vascular accident) (CMS-HCC) [I63.9]   • Essential hypertension [I10]   • Respiratory failure (CMS-HCC) [J96.90]   • Electrolyte abnormality [E87.8]   • Hyperglycemia [R73.9]   • Prolonged Q-T interval on ECG [R94.31]   • Elevated troponin [R74.8]   • Acute on chronic systolic heart failure (CMS-HCC) [I50.23]   • Non-rheumatic mitral regurgitation [I34.0]   • TREASURE (acute kidney injury) (CMS-HCC) [N17.9]   • 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]         Reviewed items::  Labs reviewed    Assessment and Plan    1.TREASURE :sec to cardiorenal Syndrome.  2.HTN: BP well controlled  3.Electrolytes: well  controlled  4.Anemia: Hb WNL -to monitor  5.Metabolic acidosis -corrected with HD  6.Volume overloaded:improving with Lasix  7.CHF -cardiology following  Plan  HD today.  Renal dose all meds  Avoid nephrotoxins

## 2017-08-31 NOTE — PROGRESS NOTES
Shauna Downing Fall Risk Assessment:     Last Known Fall: No falls  Mobility: No limitations  Medications: Cardiovascular or central nervous system meds, Diuretics  Mental Status/LOC/Awareness: Awake, alert, and oriented to date, place, and person  Toileting Needs: No needs  Volume/Electrolyte Status: No problems  Communication/Sensory: Non-English patient/unable to speak/slurred speech  Behavior: Appropriate behavior  Shauna Downing Fall Risk Total: 8  Fall Risk Level: LOW RISK    Universal Fall Precautions:  call light/belongings in reach, bed in low position and locked, siderails up x 2, use non-slip footwear, adequate lighting, clutter free and spill free environment, educate on level of risk, educate to call for assistance    Fall Risk Level Interventions:   TRIAL ([employer], NEURO, MED AZALEA 5) Low Fall Risk Interventions  Place yellow fall risk ID band on patient: verified  Provide patient/family education based on risk assessment: completed  Educate patient/family to call staff for assistance when getting out of bed: completed  Place fall precaution signage outside patient door: verifiedTRIAL (TELE 8, NEURO, MED AZALEA 5) Moderate Fall Risk Interventions  Place yellow fall risk ID band on patient: verified  Provide patient/family education based on risk assessment : verified  Educate patient/family to call staff for assistance when getting out of bed: verified  Place fall precaution signage outside patient door: verified  Utilize bed/chair fall alarm: verifiedTRIAL ([employer], NEURO, [place] AZALEA 5) High Fall Risk Interventions  Place yellow fall risk ID band on patient: verified  Provide patient/family education based on risk assessment: verified  Educate patient/family to call staff for assistance when getting out of bed: verified  Place fall precaution signage outside patient door: verified  Place patient in room close to nursing station: verified  Utilize bed/chair fall alarm: verified  Notify charge of high risk  for huddle: completed    Patient Specific Interventions:     Medication: review medications with patient and family and assess for medications that can be discontinued or dosage decreased  Mental Status/LOC/Awareness: check on patient hourly and reinforce the use of call light  Toileting: monitor intake and output/use of appropriate interventions and instruct male patients prone to dizziness to void while sitting  Volume/Electrolyte Status: ensure patient remains hydrated and monitor abnormal lab values  Communication/Sensory: update plan of care on whiteboard and provide communication alternatives/  Behavioral: encourage patient to voice feelings  Mobility: dangle prior to standing, ensure bed is locked and in lowest position and provide appropriate assistive device

## 2017-08-31 NOTE — CARE PLAN
Problem: Communication  Goal: The ability to communicate needs accurately and effectively will improve  Outcome: PROGRESSING AS EXPECTED   line to update patient on POC, answer any questions

## 2017-08-31 NOTE — PROGRESS NOTES
Inpatient Anticoagulation Service Note    Date: 8/31/2017  Reason for Anticoagulation: Atrial Fibrillation  Hemoglobin Value: (!) 12.9  Hematocrit Value: (!) 39.2  Lab Platelet Value: 212  Target INR: 2.0 to 3.0  INR from last 7 days     Date/Time INR Value    08/31/17 0313 (!)  1.69    08/30/17 1210 (!)  1.64    08/29/17 0018 (!)  1.75    08/28/17 0926 (!)  1.57    08/27/17 1231 (!)  1.54    08/27/17 0251 (!)  2.12    08/26/17 0249 (!)  2.84    08/24/17 2342 (!)  2.93        Dose from last 7 days     Date/Time Dose (mg)    08/31/17 1000  5    08/30/17 1500  7.5    08/29/17 1300  3.75    08/28/17 1500  5    08/26/17 1200  3.75    08/25/17 1300  3.75    08/24/17 1100  3.75        New start VKA this admission, dosing ~7.5 mg daily  Significant Interactions: Amiodarone, Aspirin, Statin, MVI  Bridge Therapy: Yes, heparin weight based protocol  Bridge Therapy Start Date: 08/28/17  Days of Overlap Therapy: 3     Comments: INR repsonding. I will give the patient 5 mg tonight and start 3.75 mg daily tomorrow. Patient will likely require a lower dose due to interaction with new start amiodarone. Warfarin is also a new start this admission. No new drug interaction. No s/s of bleeding noted. INR for AM. Continue bridge.    Plan: 5 mg tonight, INR for AM  Education Material Provided?: Yes (packet provided to RN to give to patient)  Pharmacist suggested discharge dosing: warfarin 3.75 mg daily. Recommend bridge therapy for at least full 5 days with 2 consecutive days having INRs >2. Recommend patient have INR checked 2-3 days after discharge.     Dulce Maria Enrique, VitorD

## 2017-09-01 LAB
ANION GAP SERPL CALC-SCNC: 9 MMOL/L (ref 0–11.9)
APTT PPP: 56 SEC (ref 24.7–36)
BUN SERPL-MCNC: 32 MG/DL (ref 8–22)
CALCIUM SERPL-MCNC: 8.7 MG/DL (ref 8.5–10.5)
CHLORIDE SERPL-SCNC: 100 MMOL/L (ref 96–112)
CO2 SERPL-SCNC: 24 MMOL/L (ref 20–33)
CREAT SERPL-MCNC: 2.79 MG/DL (ref 0.5–1.4)
GFR SERPL CREATININE-BSD FRML MDRD: 24 ML/MIN/1.73 M 2
GLUCOSE SERPL-MCNC: 88 MG/DL (ref 65–99)
INR PPP: 1.99 (ref 0.87–1.13)
POTASSIUM SERPL-SCNC: 4 MMOL/L (ref 3.6–5.5)
PROTHROMBIN TIME: 23.2 SEC (ref 12–14.6)
SODIUM SERPL-SCNC: 133 MMOL/L (ref 135–145)

## 2017-09-01 PROCEDURE — A9270 NON-COVERED ITEM OR SERVICE: HCPCS | Performed by: INTERNAL MEDICINE

## 2017-09-01 PROCEDURE — 700111 HCHG RX REV CODE 636 W/ 250 OVERRIDE (IP): Performed by: PHARMACIST

## 2017-09-01 PROCEDURE — A9270 NON-COVERED ITEM OR SERVICE: HCPCS | Performed by: STUDENT IN AN ORGANIZED HEALTH CARE EDUCATION/TRAINING PROGRAM

## 2017-09-01 PROCEDURE — 700102 HCHG RX REV CODE 250 W/ 637 OVERRIDE(OP): Performed by: INTERNAL MEDICINE

## 2017-09-01 PROCEDURE — 700102 HCHG RX REV CODE 250 W/ 637 OVERRIDE(OP): Performed by: STUDENT IN AN ORGANIZED HEALTH CARE EDUCATION/TRAINING PROGRAM

## 2017-09-01 PROCEDURE — 80048 BASIC METABOLIC PNL TOTAL CA: CPT

## 2017-09-01 PROCEDURE — 85730 THROMBOPLASTIN TIME PARTIAL: CPT

## 2017-09-01 PROCEDURE — 770020 HCHG ROOM/CARE - TELE (206)

## 2017-09-01 PROCEDURE — 36415 COLL VENOUS BLD VENIPUNCTURE: CPT

## 2017-09-01 PROCEDURE — 700102 HCHG RX REV CODE 250 W/ 637 OVERRIDE(OP): Performed by: HOSPITALIST

## 2017-09-01 PROCEDURE — A9270 NON-COVERED ITEM OR SERVICE: HCPCS | Performed by: HOSPITALIST

## 2017-09-01 PROCEDURE — 85610 PROTHROMBIN TIME: CPT

## 2017-09-01 RX ORDER — WARFARIN SODIUM 6 MG/1
6 TABLET ORAL
Status: COMPLETED | OUTPATIENT
Start: 2017-09-01 | End: 2017-09-01

## 2017-09-01 RX ORDER — WARFARIN SODIUM 4 MG/1
4 TABLET ORAL
Status: DISCONTINUED | OUTPATIENT
Start: 2017-09-02 | End: 2017-09-09 | Stop reason: HOSPADM

## 2017-09-01 RX ADMIN — ATORVASTATIN CALCIUM 40 MG: 40 TABLET, FILM COATED ORAL at 21:14

## 2017-09-01 RX ADMIN — Medication 100 MG: at 09:44

## 2017-09-01 RX ADMIN — STANDARDIZED SENNA CONCENTRATE AND DOCUSATE SODIUM 2 TABLET: 8.6; 5 TABLET, FILM COATED ORAL at 21:14

## 2017-09-01 RX ADMIN — WARFARIN SODIUM 6 MG: 6 TABLET ORAL at 19:48

## 2017-09-01 RX ADMIN — ISOSORBIDE MONONITRATE 60 MG: 30 TABLET ORAL at 09:44

## 2017-09-01 RX ADMIN — HEPARIN SODIUM 600 UNITS/HR: 5000 INJECTION, SOLUTION INTRAVENOUS at 22:26

## 2017-09-01 RX ADMIN — METOPROLOL TARTRATE 25 MG: 25 TABLET, FILM COATED ORAL at 13:52

## 2017-09-01 RX ADMIN — AMIODARONE HYDROCHLORIDE 200 MG: 200 TABLET ORAL at 21:14

## 2017-09-01 RX ADMIN — FUROSEMIDE 40 MG: 40 TABLET ORAL at 09:46

## 2017-09-01 RX ADMIN — STANDARDIZED SENNA CONCENTRATE AND DOCUSATE SODIUM 2 TABLET: 8.6; 5 TABLET, FILM COATED ORAL at 09:45

## 2017-09-01 RX ADMIN — THERA TABS 1 TABLET: TAB at 09:44

## 2017-09-01 ASSESSMENT — ENCOUNTER SYMPTOMS
DIARRHEA: 0
HEARTBURN: 0
PALPITATIONS: 0
ABDOMINAL PAIN: 0
NAUSEA: 0
ORTHOPNEA: 0
HEMOPTYSIS: 0
VOMITING: 0
CHILLS: 0
COUGH: 0
FEVER: 0

## 2017-09-01 ASSESSMENT — PAIN SCALES - GENERAL
PAINLEVEL_OUTOF10: 0

## 2017-09-01 NOTE — PROGRESS NOTES
Shauna Downing Fall Risk Assessment:     Last Known Fall: No falls  Mobility: No limitations  Medications: Cardiovascular or central nervous system meds, Diuretics  Mental Status/LOC/Awareness: Awake, alert, and oriented to date, place, and person  Toileting Needs: No needs  Volume/Electrolyte Status: No problems  Communication/Sensory: Non-English patient/unable to speak/slurred speech  Behavior: Appropriate behavior  Shauna Downing Fall Risk Total: 8  Fall Risk Level: LOW RISK    Universal Fall Precautions:  call light/belongings in reach, bed in low position and locked, siderails up x 2, use non-slip footwear, adequate lighting, clutter free and spill free environment, educate on level of risk    Fall Risk Level Interventions:   TRIAL ([address] [facility], NEURO, MED AZALEA 5) Low Fall Risk Interventions  Place yellow fall risk ID band on patient: verified  Provide patient/family education based on risk assessment: completed  Educate patient/family to call staff for assistance when getting out of bed: completed  Place fall precaution signage outside patient door: verifiedTRIAL (TELE 8, NEURO, MED AZALEA 5) Moderate Fall Risk Interventions  Place yellow fall risk ID band on patient: verified  Provide patient/family education based on risk assessment : verified  Educate patient/family to call staff for assistance when getting out of bed: verified  Place fall precaution signage outside patient door: verified  Utilize bed/chair fall alarm: verifiedTRIAL (TELE 8, NEURO, [place] AZALEA 5) High Fall Risk Interventions  Place yellow fall risk ID band on patient: verified  Provide patient/family education based on risk assessment: verified  Educate patient/family to call staff for assistance when getting out of bed: verified  Place fall precaution signage outside patient door: verified  Place patient in room close to nursing station: verified  Utilize bed/chair fall alarm: verified  Notify charge of high risk for huddle: completed    Patient  Specific Interventions:     Medication: review medications with patient and family and assess for medications that can be discontinued or dosage decreased  Mental Status/LOC/Awareness: reinforce falls education, check on patient hourly and reinforce the use of call light  Toileting: provide frquent toileting and monitor intake and output/use of appropriate interventions  Volume/Electrolyte Status: ensure patient remains hydrated and monitor abnormal lab values  Communication/Sensory: update plan of care on whiteboard and provide communication alternatives/  Behavioral: encourage patient to voice feelings  Mobility: dangle prior to standing, utilize bed/chair fall alarm and ensure bed is locked and in lowest position

## 2017-09-01 NOTE — PROGRESS NOTES
0700 Report rec'd. Pt. Asleep in bed. No acute distress.  Resp even and nonlabored. Bed in lowest and locked position. Side rails up x2. Call light within reach.     1730 No acute distress. Aaox4. Resp even and nonlabored. Bed in lowest and locked position. Side rails up x2. Call light within reach.

## 2017-09-01 NOTE — PROGRESS NOTES
Inpatient Anticoagulation Service Note  Date: 9/1/2017  Reason for Anticoagulation: Atrial Fibrillation   [id number] VASc Score: 4  Hemoglobin Value: (!) 12.9  Hematocrit Value: (!) 39.2  Lab Platelet Value: 212  Target INR: 2.0 to 3.0  INR from last 7 days     Date/Time INR Value    09/01/17 0240 (!)  1.99    08/31/17 0313 (!)  1.69    08/30/17 1210 (!)  1.64    08/29/17 0018 (!)  1.75    08/28/17 0926 (!)  1.57    08/27/17 1231 (!)  1.54    08/27/17 0251 (!)  2.12    08/26/17 0249 (!)  2.84        Dose from last 7 days     Date/Time Dose (mg)    09/01/17 1300  6    08/31/17 1000  5    08/30/17 1500  7.5    08/29/17 1300  3.75    08/28/17 1500  5    08/26/17 1200  3.75        Average Dose (mg): 4.6 (7 day average)  Significant Interactions: Amiodarone, Aspirin, Statin (MVI)  Bridge Therapy: Yes (heparin weight based protocol)  Bridge Therapy Start Date: 08/28/17  Days of Overlap Therapy: 4   INR Value Greater than 2 Prior to Discontinuation of Parenteral Anticoagulation: Not Applicable   Reversal Agent Administered: Not Applicable  Comments: INR very near goal. No new PLT or H/H. No overt bleeding noted. Warfarin interactions noted. Will give 6 mg today and trend INR with AM labs. Likely to need dose adjustments.    Plan:  Warfarin 6 mg 9/1/17  Education Material Provided?: No (given prior)  Pharmacist suggested discharge dosing: warfarin 4 mg daily (recommend INR within 24 hours of discharge and repeat warfarin education counseling prior to discharge)    Darío Li, PharmD

## 2017-09-01 NOTE — PROGRESS NOTES
Internal Medicine Interval Note    Name Bhavesh Saini       1970   Age/Sex 47 y.o. male   MRN [id number]   Code Status FULL.     After 5PM or if no immediate response to page, please call for cross-coverage  Attending/Team: Dr. Veloz/[place]. See Patient List for primary contact information  Call [phone] to page    1st Call - Day Intern (R1):   [place] 2nd Call - Day Sr. Resident (R2/R3):   Nando         Reason for interval visit  (Principal Problem)   Atrial fibrillation with RVR (CMS-[facility])    Interval Problem Daily Status Update  (24 hours)   - Bradycardia to 50s; remained in sinus rhythm.    - No complaints this morning.  Ambulating well, taking PO, and having regular bowel movements.   - Daughter checking on ability to check INRs in [place].     Atrial fibrillation with rapid ventricular response: Remains in sinus rhythm. Currently on amiodarone 100 mg nightly.  On heparin infusion bridging with Coumadin.  INR 1.69.  Continuing shorting acting metoprolol at 25 mg q6 hours for titration.      Bradycardia: Asymptomatic. Metoprolol dose reduced to shorting acting, 25 mg q6 hours for titration.      Renal failure: Dialysis today.      Review of Systems   Constitutional: Negative for chills and fever.   Respiratory: Negative for shortness of breath.    Cardiovascular: Negative for chest pain and palpitations.   Gastrointestinal: Negative for abdominal pain.   Musculoskeletal: Negative for myalgias.   Neurological: Negative for dizziness and headaches.       Consultants/Specialty  Cardiology  Cardiac Surgery  Nephrology    Disposition  Inpatient for atrial fibrillation with RVR.    Quality Measures    Reviewed items::  Radiology images reviewed, Medications reviewed and Labs reviewed  Truong catheter::  No Truong  DVT prophylaxis pharmacological::  Warfarin (Coumadin)  And heparin infusion for now.     Physical Exam       Vitals:    17 1130 17 1310 17 1800 17   BP:  149/89   156/94   Pulse: 60 63 (!) 59 64   Resp: 16   16   Temp: 36.6 °C (97.9 °F)   36.8 °C (98.3 °F)   SpO2: 97%   95%   Weight:    59.6 kg (131 lb 6.3 oz)   Height:         Body mass index is 25.66 kg/m². Weight: 59.6 kg (131 lb 6.3 oz)  Oxygen Therapy:  Pulse Oximetry: 95 %, O2 (LPM): 0, O2 Delivery: None (Room Air)    Physical Exam   Constitutional: He is oriented to person, place, and time. No distress.   Lying in bed.    Eyes: EOM are normal. Pupils are equal, round, and reactive to light.   Neck: No JVD present.   Cardiovascular:   Bradycardia to the 50s   Pulmonary/Chest: Effort normal and breath sounds normal. No respiratory distress. He has no wheezes. He has no rales.   Abdominal: Soft. Bowel sounds are normal. He exhibits no distension. There is no tenderness.   Musculoskeletal: He exhibits no edema.   Neurological: He is alert and oriented to person, place, and time. He has normal reflexes. Gait normal. Coordination normal.   Skin: Skin is warm.   Psychiatric: Affect normal.       Lab Data Review:       8/27/2017  6:30 PM    Recent Labs      08/29/17   0018  08/30/17 0337 08/31/17   0914   SODIUM  129*  134*  135   POTASSIUM  4.4  3.9  3.9   CHLORIDE  99  101  102   CO2  20  23  25   BUN  58*  38*  43*   CREATININE  5.67*  4.05*  3.69*   CALCIUM  8.4*  8.4*  9.0       Recent Labs      08/29/17   0018  08/30/17   0337  08/31/17   0914   GLUCOSE  100*  83  116*       Recent Labs      08/29/17   0018   08/29/17   2151  08/30/17   0337  08/30/17   1210  08/31/17   0313   PROTHROMBTM  21.0*   --    --    --   19.9*  20.4*   APTT  77.1*   < >  71.3*  73.5*   --   61.5*   INR  1.75*   --    --    --   1.64*  1.69*    < > = values in this interval not displayed.                 Assessment/Plan     * Atrial fibrillation with RVR (CMS-HCC)- (present on admission)   Assessment & Plan    -New onset vs paroxysmal (vague PMH of afib and non-compliance with meds)  -overnight He was given diltiazem by NF due to  Afib with nonsustained HR in 160s. - now in SR  -CHADS2: 4  -ECHO: severe LVH, global hypokinesis, EF ~20%, severe AI and mod MR  -LOBO: slow blood flow but no thrombus. Severe AI    Plan  - Na restricted diet.  - Continue scheduled metoprolol, PRN 5mg metoprolol q6h.    -S/P LOBO and cardioversion.  -Currently in sinus rhythm.  -Amiodarone 100 mg nightly.   -On heparin infusion bridging with warfarin.  INR is 1.64 today. Goal INR is 2-3.        Hyperkalemia (Resolved )   Assessment & Plan    -Continue to monitor        Cardiorenal syndrome   Assessment & Plan    -Diagnosis given by Nephrology - likely since urine Na is low  -Patient is on HD, and is urinated <500 ml yesterday.  -Creatinine in ED 4.38, Total protein Urine 208. Estimated GFR was 14.   -FENA 0.2%  -BNP 2203>> 2039   -CXR: cardiomegaly. ECHO showed: severe LVH, global hypokinesis, EF ~20%, AR and mod MR  Assessment: Cardiorenal syndrome  Plan  -Patient needs AVR/MAZE w LOBO per Cardiac Surgery, but due to his renal status and recent stroke with hemorrhage, they do not recommend at this time.  They will follow up outpatient in 6 weeks.   -Patient will be counseled on cessation of alcohol intake.   -Nephrology on board.             Aortic regurgitation- (present on admission)   Assessment & Plan    -Severe AR.   -ECHO:  severe LVH, global hypokinesis, EF ~20%, severe AI and mod MR. Findings were confirmed by LOBO  -Fits the criteria for valve replacement. Cardiology and cardiac surgery on board.No plans for surgery due to recent stroke.        HFrEF (heart failure with reduced ejection fraction) (CMS-[facility])- (present on admission)   Assessment & Plan    -Presented with SOB, orthopnea, fatique and palpitations. Has h/o HTN and alcoholism in the past. On exam: JVD +., murmurs + but no volume overload.   -BNP 2203 >> 1753 > 2211.   -CXR: cardiomegaly. ECHO: severe LVH, global hypokinesis, EF ~20%, severe AI and mod MR  -Assessment: Compensated HF likely due to  alcoholism vs tachycardia induced heart failure.   -Cardiac surgery spoke with Mr Saiin and family, he needs ischemic work up and AVR/MAZE w LOBO, but due to his renal status and recent stroke with hemorrhage, they do not recommend it at this time.   - Patient's undocumented alien status is another reason having a heart cath is diifficult.      Plan  - Na restricted diet.   -Metoprolol, Lasix and nitrates  - Plan outpatient follow-up with cardiology post discharge..  - Patient's family will fly him to [place] for further care after discharge.          Prolonged Q-T interval on ECG- (present on admission)   Assessment & Plan    -QTc ~530s. Will avoid QTc prolonging meds.        TREASURE (acute kidney injury) / Chronic Kidney Disease   Assessment & Plan    -Acute on chronic. Likely pre-renal vs cardiorenal. HTN is likely the cause of CKD  -On adm: Cr: 4.3, AGMA, Ph 6.1. Unknown baseline.  -FeNa <1%  -H/o renal disease since 7yrs, FH kidney disease in his father. Has been non-complaint with HTN medications. - has CHF  -Renal US: no hydronephrosis and calculi. Left renal cysts.  - Cr and K slowly increasing likely due to poor renal perfusion secondary to atrial fibrillation.    - Upper extremity vein mapping completed in anticipation of AV fistula.     Plan  - Continue Lasix.  Assessed daily by Nephrology for need for HD.    - Nephrology assisting patient with Emergency Medicaid, which can be used for hemodialysis as an outpatient in their clinic until patient can move to [place].          CVA (cerebral vascular accident) (CMS-[facility])- (present on admission)   Assessment & Plan    -h/o left facial droop, slurred speech and left sided weakness. However no neuro deficits currently  -CT: cerebral atrophy and small vessel ischemic changes   -MRI brain: acute infarction in L frontal periventricular white matter extendeing to subinsular cortex. Punctate area of infarction in R posterior lobe. Numerous microbleeds.   Assessment: Afib  could have contributed (LOBO showed 'sludge' in the heart)  Plan  -ASA, statin  -Mx of Afib as stated above.        Non-rheumatic mitral regurgitation   Assessment & Plan    -Moderate MR on ECHO  -No plans for surgical intervention at this point.        Hyperglycemia- (present on admission)   Assessment & Plan    -Resolved  -On adm: , A1c: 5.4  -He was started on ISS and hypoglycemia protocol which is discontinued.  CTM with accuchecks        Alcoholic (CMS-HCC)- (present on admission)   Assessment & Plan    -History is vague but looks like he was drinking heavily in the past. Last drink was 8/5/2017.  -On thiamine and folate.           Essential hypertension- (present on admission)   Assessment & Plan    -PMH of HTN but likely non-compliant. ECHO showed severe LVH  -Stable on BB, nitrate, lasix

## 2017-09-01 NOTE — PROGRESS NOTES
HD treatment were ordered by Dr. Najjar, pt was able to tolerate HD treatment without any difficulty, pt was able to pull 2.5 liters of fluids. Gave report to Janeen MCCLELLAND, pt CVC dressing were dry and intact, no swelling or redness were noted post tx, pt access were locked with heparin 1000 units/ml, 1.9 ml arterial port and 1.9 ml venous port. Pt was stable post tx, denies any complaint of pain and SOB, no fever or any distress were noted post tx. Treatment started at 1505 and ended at 1806, see flow sheets for further details.

## 2017-09-01 NOTE — PROGRESS NOTES
Bedside report received from KRYSTIN Vernon. Pt just arriving back on unit from dialysis. POC discussed with pt; all questions answered at this time. White board updated. Appropriate functioning of tele monitor confirmed. All needs met at this time.

## 2017-09-01 NOTE — PROGRESS NOTES
Nephrology Progress Note, Adult, Complex               Author:Oni Najjar Date & Time created: 2017  1:12 PM     Interval History:  46 y/o male with severe, CHF  -EF 25 %, Severe AI, A.fib, CVA in TREASURE, metabolic acidosis    Review of Systems:  Review of Systems   Constitutional: Negative for chills, fever and malaise/fatigue.   Respiratory: Negative for cough and hemoptysis.    Cardiovascular: Negative for chest pain, palpitations and orthopnea.   Gastrointestinal: Negative for abdominal pain, diarrhea, heartburn, nausea and vomiting.   Genitourinary: Negative for dysuria.       Physical Exam:  Physical Exam   Constitutional: He is oriented to person, place, and time. No distress.   HENT:   Head: Normocephalic and atraumatic.   Nose: Nose normal.   Eyes: Right eye exhibits no discharge. Left eye exhibits no discharge. No scleral icterus.   Cardiovascular: An irregularly irregular rhythm present.   Pulmonary/Chest: Effort normal and breath sounds normal. No respiratory distress. He has no wheezes.   Musculoskeletal: He exhibits no edema or tenderness.   Trace pedal edema   Neurological: He is alert and oriented to person, place, and time.   Skin: Skin is warm and dry. He is not diaphoretic.   Psychiatric: He has a normal mood and affect.   Nursing note and vitals reviewed.      Labs:        Invalid input(s): [id number]      Recent Labs      17   0914  17   0240   SODIUM  134*  135  133*   POTASSIUM  3.9  3.9  4.0   CHLORIDE  101  102  100   CO2  23  25  24   BUN  38*  43*  32*   CREATININE  4.05*  3.69*  2.79*   CALCIUM  8.4*  9.0  8.7     Recent Labs      17   0914  17   0240   GLUCOSE  83  116*  88     Recent Labs      17   03317   1210  17   0313  17   0240   PROTHROMBTM   --   19.9*  20.4*  23.2*   APTT  73.5*   --   61.5*  56.0*   INR   --   1.64*  1.69*  1.99*               Hemodynamics:  Temp (24hrs), Av.7 °C (98.1  °F), Min:36.6 °C (97.9 °F), Max:36.9 °C (98.5 °F)  Temperature: 36.7 °C (98 °F)  Pulse  Av.1  Min: 44  Max: 155   Blood Pressure: 128/79     Respiratory:    Respiration: 16, Pulse Oximetry: 98 %     Work Of Breathing / Effort: Mild  RUL Breath Sounds: Clear, RML Breath Sounds: Clear, RLL Breath Sounds: Diminished, SABAS Breath Sounds: Clear, LLL Breath Sounds: Diminished  Fluids:    Intake/Output Summary (Last 24 hours) at 17 1312  Last data filed at 17 0722   Gross per 24 hour   Intake              500 ml   Output             2635 ml   Net            -2135 ml     Weight: 59.6 kg (131 lb 6.3 oz)  GI/Nutrition:  Orders Placed This Encounter   Procedures   • DIET ORDER     Standing Status:   Standing     Number of Occurrences:   1     Order Specific Question:   Diet:     Answer:   Diabetic [3]     Order Specific Question:   Diet:     Answer:   2 Gram Sodium [7]     Order Specific Question:   Diet:     Answer:   Renal [8]     Order Specific Question:   Calorie modifications:     Answer:   1800 kcals [4]     Medical Decision Making, by Problem:  Active Hospital Problems    Diagnosis   • *Atrial fibrillation with RVR (CMS-HCC) [I48.91]   • CVA (cerebral vascular accident) (CMS-HCC) [I63.9]   • Essential hypertension [I10]   • Respiratory failure (CMS-HCC) [J96.90]   • Electrolyte abnormality [E87.8]   • Hyperglycemia [R73.9]   • Prolonged Q-T interval on ECG [R94.31]   • Elevated troponin [R74.8]   • Acute on chronic systolic heart failure (CMS-HCC) [I50.23]   • Non-rheumatic mitral regurgitation [I34.0]   • TREASURE (acute kidney injury) (CMS-HCC) [N17.9]   • 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]         Reviewed items::  Labs reviewed  Central line in place:  Dialysis    Assessment and Plan    1.TREASURE :sec to cardiorenal Syndrome.  2.HTN: BP well controlled  3.Electrolytes: well controlled  4.Anemia: Hb WNL -to monitor  5.Metabolic acidosis -corrected with HD  6.Volume overloaded:improving with Lasix  7.CHF -cardiology  following  Plan  No acute need for HD today.  Renal dose all meds  Avoid nephrotoxins

## 2017-09-01 NOTE — CARE PLAN
Problem: Communication  Goal: The ability to communicate needs accurately and effectively will improve    Intervention: Use communication aids and/or /Language Line as appropriate  White board updated with POC and care team information during bedside report.  phone at bedside.

## 2017-09-01 NOTE — DISCHARGE PLANNING
Outpatient Dialysis Placement Update    Forwarded pending medicaid information to [facility] admissions again to escalate for SPA (single patient agreement) contract to be initiated. Awaiting follow up from [facility] admissions.    Follow up call to Aliya [phone] left VM with updated placement information. Follow up message to Nephrology Dr. Najjar to update on placement status.    Dialysis Coordinator- [facility],  Damon Hammer [phone]

## 2017-09-02 LAB
ANION GAP SERPL CALC-SCNC: 10 MMOL/L (ref 0–11.9)
APTT PPP: 60.2 SEC (ref 24.7–36)
BUN SERPL-MCNC: 49 MG/DL (ref 8–22)
CALCIUM SERPL-MCNC: 9.1 MG/DL (ref 8.5–10.5)
CHLORIDE SERPL-SCNC: 100 MMOL/L (ref 96–112)
CO2 SERPL-SCNC: 20 MMOL/L (ref 20–33)
CREAT SERPL-MCNC: 3.52 MG/DL (ref 0.5–1.4)
GFR SERPL CREATININE-BSD FRML MDRD: 19 ML/MIN/1.73 M 2
GLUCOSE SERPL-MCNC: 79 MG/DL (ref 65–99)
INR PPP: 2.13 (ref 0.87–1.13)
POTASSIUM SERPL-SCNC: 4.3 MMOL/L (ref 3.6–5.5)
PROTHROMBIN TIME: 24.5 SEC (ref 12–14.6)
SODIUM SERPL-SCNC: 130 MMOL/L (ref 135–145)

## 2017-09-02 PROCEDURE — 700111 HCHG RX REV CODE 636 W/ 250 OVERRIDE (IP)

## 2017-09-02 PROCEDURE — 700102 HCHG RX REV CODE 250 W/ 637 OVERRIDE(OP): Performed by: INTERNAL MEDICINE

## 2017-09-02 PROCEDURE — 99232 SBSQ HOSP IP/OBS MODERATE 35: CPT | Mod: GC | Performed by: HOSPITALIST

## 2017-09-02 PROCEDURE — 85730 THROMBOPLASTIN TIME PARTIAL: CPT

## 2017-09-02 PROCEDURE — 700102 HCHG RX REV CODE 250 W/ 637 OVERRIDE(OP): Performed by: STUDENT IN AN ORGANIZED HEALTH CARE EDUCATION/TRAINING PROGRAM

## 2017-09-02 PROCEDURE — A9270 NON-COVERED ITEM OR SERVICE: HCPCS | Performed by: STUDENT IN AN ORGANIZED HEALTH CARE EDUCATION/TRAINING PROGRAM

## 2017-09-02 PROCEDURE — 36415 COLL VENOUS BLD VENIPUNCTURE: CPT

## 2017-09-02 PROCEDURE — 85610 PROTHROMBIN TIME: CPT

## 2017-09-02 PROCEDURE — 90935 HEMODIALYSIS ONE EVALUATION: CPT

## 2017-09-02 PROCEDURE — 700102 HCHG RX REV CODE 250 W/ 637 OVERRIDE(OP): Performed by: HOSPITALIST

## 2017-09-02 PROCEDURE — 770020 HCHG ROOM/CARE - TELE (206)

## 2017-09-02 PROCEDURE — 80048 BASIC METABOLIC PNL TOTAL CA: CPT

## 2017-09-02 PROCEDURE — A9270 NON-COVERED ITEM OR SERVICE: HCPCS | Performed by: INTERNAL MEDICINE

## 2017-09-02 PROCEDURE — A9270 NON-COVERED ITEM OR SERVICE: HCPCS | Performed by: HOSPITALIST

## 2017-09-02 RX ORDER — HEPARIN SODIUM 1000 [USP'U]/ML
INJECTION, SOLUTION INTRAVENOUS; SUBCUTANEOUS
Status: COMPLETED
Start: 2017-09-02 | End: 2017-09-02

## 2017-09-02 RX ADMIN — HEPARIN SODIUM 3800 UNITS: 1000 INJECTION, SOLUTION INTRAVENOUS; SUBCUTANEOUS at 17:05

## 2017-09-02 RX ADMIN — FUROSEMIDE 40 MG: 40 TABLET ORAL at 08:45

## 2017-09-02 RX ADMIN — Medication 100 MG: at 08:45

## 2017-09-02 RX ADMIN — FOLIC ACID 1 MG: 1 TABLET ORAL at 08:45

## 2017-09-02 RX ADMIN — WARFARIN SODIUM 4 MG: 4 TABLET ORAL at 18:00

## 2017-09-02 RX ADMIN — ATORVASTATIN CALCIUM 40 MG: 40 TABLET, FILM COATED ORAL at 20:35

## 2017-09-02 RX ADMIN — METOPROLOL TARTRATE 25 MG: 25 TABLET, FILM COATED ORAL at 18:01

## 2017-09-02 RX ADMIN — THERA TABS 1 TABLET: TAB at 08:45

## 2017-09-02 RX ADMIN — AMIODARONE HYDROCHLORIDE 200 MG: 200 TABLET ORAL at 20:35

## 2017-09-02 RX ADMIN — ISOSORBIDE MONONITRATE 60 MG: 30 TABLET ORAL at 08:45

## 2017-09-02 ASSESSMENT — ENCOUNTER SYMPTOMS
SHORTNESS OF BREATH: 0
FEVER: 0
COUGH: 0
HEMOPTYSIS: 0
DIZZINESS: 0
ORTHOPNEA: 0
ABDOMINAL PAIN: 0
HEADACHES: 0
CHILLS: 0
PALPITATIONS: 0
MYALGIAS: 0
VOMITING: 0
NAUSEA: 0
HEARTBURN: 0
DIARRHEA: 0

## 2017-09-02 ASSESSMENT — PAIN SCALES - GENERAL
PAINLEVEL_OUTOF10: 0

## 2017-09-02 NOTE — CARE PLAN
Problem: Safety  Goal: Will remain free from injury  Outcome: PROGRESSING AS EXPECTED  Discussed with patient the importance of calling for assistance prior to getting up if patient does not feel safe getting up by himself. Patient accepting of the information. All questions answered at this time.

## 2017-09-02 NOTE — PROGRESS NOTES
Received report from day shift RN. Assumed care of patient. Patient is sitting at couch by window with no family present at this time. Patient is SB on the monitor. Patient declines any needs at this time. Bed locked and in the lowest position with call light within reach. Heparin drip verified by day and night RNs.

## 2017-09-02 NOTE — CARE PLAN
Problem: Infection  Goal: Will remain free from infection  Outcome: PROGRESSING AS EXPECTED  Discussed with patient the importance of washing hands before and after eating or using the restroom in order to help prevent infection. Patient accepting of the information. All questions answered at this time.

## 2017-09-02 NOTE — PROGRESS NOTES
Nephrology Progress Note, Adult, Complex               Author:Oni Najjar Date & Time created: 2017  1:37 PM     Interval History:  48 y/o male with severe, CHF  -EF 25 %, Severe AI, A.fib, CVA in TREASURE, metabolic acidosis    Review of Systems:  Review of Systems   Constitutional: Negative for chills, fever and malaise/fatigue.   Respiratory: Negative for cough and hemoptysis.    Cardiovascular: Negative for chest pain, palpitations and orthopnea.   Gastrointestinal: Negative for abdominal pain, diarrhea, heartburn, nausea and vomiting.   Genitourinary: Negative for dysuria.       Physical Exam:  Physical Exam   Constitutional: He is oriented to person, place, and time. No distress.   HENT:   Head: Normocephalic and atraumatic.   Nose: Nose normal.   Eyes: Right eye exhibits no discharge. Left eye exhibits no discharge. No scleral icterus.   Cardiovascular: An irregularly irregular rhythm present.   Pulmonary/Chest: Effort normal and breath sounds normal. No respiratory distress. He has no wheezes.   Musculoskeletal: He exhibits no edema or tenderness.   Trace pedal edema   Neurological: He is alert and oriented to person, place, and time.   Skin: Skin is warm and dry. He is not diaphoretic.   Psychiatric: He has a normal mood and affect.   Nursing note and vitals reviewed.      Labs:        Invalid input(s): [id number]      Recent Labs      17   SODIUM  135  133*  130*   POTASSIUM  3.9  4.0  4.3   CHLORIDE  102  100  100   CO2  25  24  20   BUN  43*  32*  49*   CREATININE  3.69*  2.79*  3.52*   CALCIUM  9.0  8.7  9.1     Recent Labs      17   GLUCOSE  116*  88  79     Recent Labs      173  17   031   PROTHROMBTM  20.4*  23.2*  24.5*   APTT  61.5*  56.0*  60.2*   INR  1.69*  1.99*  2.13*               Hemodynamics:  Temp (24hrs), Av.6 °C (97.8 °F), Min:36.1 °C (97 °F), Max:37.1  °C (98.7 °F)  Temperature: 36.1 °C (97 °F)  Pulse  Av.6  Min: 44  Max: 155   Blood Pressure: 140/82     Respiratory:    Respiration: 13, Pulse Oximetry: 99 %     Work Of Breathing / Effort: Mild  RUL Breath Sounds: Clear, RML Breath Sounds: Clear, RLL Breath Sounds: Diminished, SABAS Breath Sounds: Clear, LLL Breath Sounds: Diminished  Fluids:    Intake/Output Summary (Last 24 hours) at 17 1337  Last data filed at 17 1300   Gross per 24 hour   Intake              480 ml   Output              700 ml   Net             -220 ml     Weight: 63 kg (138 lb 14.2 oz)  GI/Nutrition:  Orders Placed This Encounter   Procedures   • DIET ORDER     Standing Status:   Standing     Number of Occurrences:   1     Order Specific Question:   Diet:     Answer:   Diabetic [3]     Order Specific Question:   Diet:     Answer:   2 Gram Sodium [7]     Order Specific Question:   Diet:     Answer:   Renal [8]     Order Specific Question:   Calorie modifications:     Answer:   1800 kcals [4]     Medical Decision Making, by Problem:  Active Hospital Problems    Diagnosis   • *Atrial fibrillation with RVR (CMS-HCC) [I48.91]   • CVA (cerebral vascular accident) (CMS-HCC) [I63.9]   • Essential hypertension [I10]   • Respiratory failure (CMS-HCC) [J96.90]   • Electrolyte abnormality [E87.8]   • Hyperglycemia [R73.9]   • Prolonged Q-T interval on ECG [R94.31]   • Elevated troponin [R74.8]   • Acute on chronic systolic heart failure (CMS-HCC) [I50.23]   • Non-rheumatic mitral regurgitation [I34.0]   • TREASURE (acute kidney injury) (CMS-HCC) [N17.9]   • 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]         Reviewed items::  Labs reviewed  Central line in place:  Dialysis    Assessment and Plan    1.TREASURE :sec to cardiorenal Syndrome.  2.HTN: BP well controlled  3.Electrolytes: well controlled  4.Anemia: Hb WNL -to monitor  5.Metabolic acidosis -corrected with HD  6.Volume overloaded:improving with Lasix  7.CHF -cardiology following  Plan  Plan for HD later  today.  Renal dose all meds  Avoid nephrotoxins

## 2017-09-02 NOTE — PROGRESS NOTES
Internal Medicine Interval Note    Name Bhavesh Saini       1970   Age/Sex 47 y.o. male   MRN [id number]   Code Status FULL.     After 5PM or if no immediate response to page, please call for cross-coverage  Attending/Team: Dr. Veloz/[place]. See Patient List for primary contact information  Call [phone] to page    1st Call - Day Intern (R1):   Charlotte 2nd Call - Day Sr. Resident (R2/R3):   Nando         Reason for interval visit  (Principal Problem)   Atrial fibrillation with RVR (CMS-[facility])    Interval Problem Daily Status Update  (24 hours)   - Bradycardia to 50s; remained in sinus rhythm.  Given one dose of metoprolol tartrate 25 mg.    - Ambulating well, taking PO, and having regular bowel movements.  Did complain of minor pain/irritation on defecation.  Will give cream to alleviate.    - Daughter still checking on ability to check INRs in [place].   - INR today 2.13.  Therapeutic.  Will discontinue heparin.     Atrial fibrillation with rapid ventricular response: Remains in sinus rhythm. Currently on amiodarone 100 mg nightly.  INR 2.13.  Continuing shorting acting metoprolol at 25 mg q6 hours for titration; hold at HR < 60.  May convert to long acting metoprolol succinate 25 mg tomorrow morning.      Bradycardia: Asymptomatic.     Renal failure: Hemodialysis planned for today.      Review of Systems   Constitutional: Negative for chills and fever.   Respiratory: Negative for shortness of breath.    Cardiovascular: Negative for chest pain and palpitations.   Gastrointestinal: Negative for abdominal pain.   Musculoskeletal: Negative for myalgias.   Neurological: Negative for dizziness and headaches.       Consultants/Specialty  Cardiology  Cardiac Surgery  Nephrology    Disposition  Inpatient for atrial fibrillation with RVR.    Quality Measures    Reviewed items::  Radiology images reviewed, Medications reviewed and Labs reviewed  Truong catheter::  No Truong  DVT prophylaxis  pharmacological::  Warfarin (Coumadin)  And heparin infusion for now.     Physical Exam       Vitals:    09/02/17 0400 09/02/17 0734 09/02/17 1145 09/02/17 1200   BP: (!) 162/88 143/79 140/82    Pulse: (!) 57 62 65 (!) 57   Resp: 18 14 13    Temp: 36.8 °C (98.3 °F) 36.4 °C (97.6 °F) 36.1 °C (97 °F)    SpO2: 91% 97% 99%    Weight:       Height:         Body mass index is 27.13 kg/m². Weight: 63 kg (138 lb 14.2 oz)  Oxygen Therapy:  Pulse Oximetry: 99 %, O2 (LPM): 0, O2 Delivery: None (Room Air)    Physical Exam   Constitutional: He is oriented to person, place, and time and well-developed, well-nourished, and in no distress. No distress.   Lying in bed.    Eyes: EOM are normal. Pupils are equal, round, and reactive to light.   Neck: No JVD present.   Cardiovascular:   Bradycardia to the 50s   Pulmonary/Chest: Effort normal and breath sounds normal. No respiratory distress. He has no wheezes. He has no rales.   Abdominal: Soft. Bowel sounds are normal. He exhibits no distension. There is no tenderness.   Musculoskeletal: He exhibits no edema.   Neurological: He is alert and oriented to person, place, and time. He has normal reflexes. Gait normal. Coordination normal.   Skin: Skin is warm.   Psychiatric: Affect normal.       Lab Data Review:       8/27/2017  6:30 PM    Recent Labs      08/31/17 0914 09/01/17 0240 09/02/17 0312   SODIUM  135  133*  130*   POTASSIUM  3.9  4.0  4.3   CHLORIDE  102  100  100   CO2  25  24  20   BUN  43*  32*  49*   CREATININE  3.69*  2.79*  3.52*   CALCIUM  9.0  8.7  9.1       Recent Labs      08/31/17 0914 09/01/17 0240 09/02/17 0312   GLUCOSE  116*  88  79       Recent Labs      08/31/17 0313 09/01/17 0240 09/02/17 0312   PROTHROMBTM  20.4*  23.2*  24.5*   APTT  61.5*  56.0*  60.2*   INR  1.69*  1.99*  2.13*                 Assessment/Plan     * Atrial fibrillation with RVR (CMS-HCC)- (present on admission)   Assessment & Plan    -New onset vs paroxysmal (vague  PMH of afib and non-compliance with meds)  -overnight He was given diltiazem by NF due to Afib with nonsustained HR in 160s. - now in SR  -CHADS2: 4  -ECHO: severe LVH, global hypokinesis, EF ~20%, severe AI and mod MR  -LOBO: slow blood flow but no thrombus. Severe AI    Plan  - Na restricted diet.  - Continue scheduled metoprolol.  PRN 5mg metoprolol q6h.    -S/P LOBO and cardioversion.  -Currently in sinus rhythm.  -Amiodarone 100 mg nightly.   - May convert to long acting metoprolol succinate 25 mg tomorrow morning.    -INR is 2.13 today. Goal INR is 2-3.  Heparin discontinued.          Hyperkalemia (Resolved )   Assessment & Plan    -Continue to monitor        Cardiorenal syndrome   Assessment & Plan    -Diagnosis given by Nephrology - likely since urine Na is low  -Patient is on HD, and is urinated <500 ml yesterday.  -Creatinine in ED 4.38, Total protein Urine 208. Estimated GFR was 14.   -FENA 0.2%  -BNP 2203>> 2039   -CXR: cardiomegaly. ECHO showed: severe LVH, global hypokinesis, EF ~20%, AR and mod MR  Assessment: Cardiorenal syndrome  Plan  -Patient needs AVR/MAZE w LOBO per Cardiac Surgery, but due to his renal status and recent stroke with hemorrhage, they do not recommend at this time.  They will follow up outpatient in 6 weeks.   -Patient will be counseled on cessation of alcohol intake.   -Nephrology on board.             Aortic regurgitation- (present on admission)   Assessment & Plan    -Severe AR.   -ECHO:  severe LVH, global hypokinesis, EF ~20%, severe AI and mod MR. Findings were confirmed by LOBO  -Fits the criteria for valve replacement. Cardiology and cardiac surgery on board.No plans for surgery due to recent stroke.        HFrEF (heart failure with reduced ejection fraction) (CMS-[facility])- (present on admission)   Assessment & Plan    -Presented with SOB, orthopnea, fatique and palpitations. Has h/o HTN and alcoholism in the past. On exam: JVD +., murmurs + but no volume overload.   -BNP 2203 >>  1753 > 2211.   -CXR: cardiomegaly. ECHO: severe LVH, global hypokinesis, EF ~20%, severe AI and mod MR  -Assessment: Compensated HF likely due to alcoholism vs tachycardia induced heart failure.   -Cardiac surgery spoke with Mr Saini and family, he needs ischemic work up and AVR/MAZE w LOBO, but due to his renal status and recent stroke with hemorrhage, they do not recommend it at this time.   - Patient's undocumented alien status is another reason having a heart cath is diifficult.      Plan  - Na restricted diet.   -Metoprolol, Lasix and nitrates  - Plan outpatient follow-up with cardiology post discharge..  - Patient's family will fly him to [place] for further care after discharge.          Prolonged Q-T interval on ECG- (present on admission)   Assessment & Plan    -QTc ~530s. Will avoid QTc prolonging meds.        TREASURE (acute kidney injury) / Chronic Kidney Disease   Assessment & Plan    -Acute on chronic. Likely pre-renal vs cardiorenal. HTN is likely the cause of CKD  -On adm: Cr: 4.3, AGMA, Ph 6.1. Unknown baseline.  -FeNa <1%  -H/o renal disease since 7yrs, FH kidney disease in his father. Has been non-complaint with HTN medications. - has CHF  -Renal US: no hydronephrosis and calculi. Left renal cysts.  - Cr and K slowly increasing likely due to poor renal perfusion secondary to atrial fibrillation.    - Upper extremity vein mapping completed in anticipation of AV fistula.     Plan  - Continue Lasix.  Assessed daily by Nephrology for need for HD.    - Nephrology assisting patient with Emergency Medicaid, which can be used for hemodialysis as an outpatient in their clinic until patient can move to [place].          CVA (cerebral vascular accident) (CMS-[facility])- (present on admission)   Assessment & Plan    -h/o left facial droop, slurred speech and left sided weakness. However no neuro deficits currently  -CT: cerebral atrophy and small vessel ischemic changes   -MRI brain: acute infarction in L frontal  periventricular white matter extendeing to subinsular cortex. Punctate area of infarction in R posterior lobe. Numerous microbleeds.   Assessment: Afib could have contributed (LOBO showed 'sludge' in the heart)  Plan  -ASA, statin  -Mx of Afib as stated above.        Non-rheumatic mitral regurgitation   Assessment & Plan    -Moderate MR on ECHO  -No plans for surgical intervention at this point.        Hyperglycemia- (present on admission)   Assessment & Plan    -Resolved  -On adm: , A1c: 5.4  -He was started on ISS and hypoglycemia protocol which is discontinued.  CTM with accuchecks        Alcoholic (CMS-HCC)- (present on admission)   Assessment & Plan    -History is vague but looks like he was drinking heavily in the past. Last drink was 8/5/2017.  -On thiamine and folate.           Essential hypertension- (present on admission)   Assessment & Plan    -PMH of HTN but likely non-compliant. ECHO showed severe LVH  -Stable on BB, nitrate, lasix

## 2017-09-02 NOTE — PROGRESS NOTES
Inpatient Anticoagulation Service Note    Date: 9/2/2017  Reason for Anticoagulation: Atrial Fibrillation  Hemoglobin Value: (!) 12.9  Hematocrit Value: (!) 39.2  Lab Platelet Value: 212  Target INR: 2.0 to 3.0  INR from last 7 days     Date/Time INR Value    09/02/17 0312 (!)  2.13    09/01/17 0240 (!)  1.99    08/31/17 0313 (!)  1.69    08/30/17 1210 (!)  1.64    08/29/17 0018 (!)  1.75    08/28/17 0926 (!)  1.57    08/27/17 1231 (!)  1.54    08/27/17 0251 (!)  2.12        Dose from last 7 days     Date/Time Dose (mg)    09/02/17 1400  4    09/01/17 1300  6    08/31/17 1000  5    08/30/17 1500  7.5    08/29/17 1300  3.75    08/28/17 1500  5        Significant Interactions: Amiodarone, Statin, MVI    Comments: INR at goal now. Was requiring about ~6 mg daily to get there. No s/s of bleeding noted. Will back off to 4 mg daily and trend INRs. No new drug interactions.    Plan:  4 mg tonight, INR for AM  Education Material Provided?: No   Pharmacist suggested discharge dosing: warfarin 4 mg daily. Recommend patient have INR checked 2-3 days after discharge.     Dulce Maria Enrique, PharmD

## 2017-09-02 NOTE — PROGRESS NOTES
Assumed care of patient. Bedside report received from Night shift, RN. Updated on POC, call light within reach and fall precautions in place. Bed locked and in lowest position. Patient instructed to call for assistance before getting out of bed. All questions answered, no other needs at this time. MAR, labs, orders reviewed.

## 2017-09-02 NOTE — CARE PLAN
Problem: Communication  Goal: The ability to communicate needs accurately and effectively will improve  Outcome: PROGRESSING AS EXPECTED  POC discussed at bedside. Patient verbalizes understanding. Education on medications provided. White board updated, patient encouraged to call for all needs. Calls appropriately. No immediate concerns at this time.    Problem: Knowledge Deficit  Goal: Knowledge of disease process/condition, treatment plan, diagnostic tests, and medications will improve  Outcome: PROGRESSING AS EXPECTED  Patient has had more education reinforcement can success improve understand of medication, test, labs, and signs and symptoms to call her doctor.

## 2017-09-02 NOTE — PROGRESS NOTES
Internal Medicine Interval Note    Name Bhavesh Saini       1970   Age/Sex 47 y.o. male   MRN [id number]   Code Status FULL.     After 5PM or if no immediate response to page, please call for cross-coverage  Attending/Team: Dr. Veloz/Troy. See Patient List for primary contact information  Call [phone] to page    1st Call - Day Intern (R1):   [place] 2nd Call - Day Sr. Resident (R2/R3):   Nando         Reason for interval visit  (Principal Problem)   Atrial fibrillation with RVR (CMS-[facility])    Interval Problem Daily Status Update  (24 hours)   - Bradycardia to 50s; remained in sinus rhythm.    - No complaints this morning.  Ambulating well, taking PO, and having regular bowel movements.   - Daughter still checking on ability to check INRs in [place].     Atrial fibrillation with rapid ventricular response: Remains in sinus rhythm. Currently on amiodarone 100 mg nightly.  On heparin infusion bridging with Coumadin.  INR 1.99.  Continuing shorting acting metoprolol at 25 mg q6 hours for titration; hold at HR < 60.  Received one dose yesterday.      Bradycardia: Asymptomatic. Metoprolol dose reduced to shorting acting, 25 mg q6 hours for titration.      Renal failure: Dialysis yesterday.  No acute need today.     Review of Systems   Constitutional: Negative for chills and fever.   Respiratory: Negative for shortness of breath.    Cardiovascular: Negative for chest pain and palpitations.   Gastrointestinal: Negative for abdominal pain.   Musculoskeletal: Negative for myalgias.   Neurological: Negative for dizziness and headaches.       Consultants/Specialty  Cardiology  Cardiac Surgery  Nephrology    Disposition  Inpatient for atrial fibrillation with RVR.    Quality Measures    Reviewed items::  Radiology images reviewed, Medications reviewed and Labs reviewed  Truong catheter::  No Truong  DVT prophylaxis pharmacological::  Warfarin (Coumadin)  And heparin infusion for now.     Physical Exam        Vitals:    09/01/17 1124 09/01/17 1542 09/01/17 2116 09/02/17 0000   BP: 128/79 112/67 158/82 156/81   Pulse: 64 (!) 51 (!) 57 60   Resp: 16 16 18 16   Temp: 36.7 °C (98 °F) 36.6 °C (97.8 °F) 36.4 °C (97.6 °F) 37.1 °C (98.7 °F)   SpO2: 98% 99% 97% 94%   Weight:   63 kg (138 lb 14.2 oz)    Height:         Body mass index is 27.13 kg/m². Weight: 63 kg (138 lb 14.2 oz)  Oxygen Therapy:  Pulse Oximetry: 94 %, O2 (LPM): 0, O2 Delivery: None (Room Air)    Physical Exam   Constitutional: He is oriented to person, place, and time. No distress.   Lying in bed.    Eyes: EOM are normal. Pupils are equal, round, and reactive to light.   Neck: No JVD present.   Cardiovascular:   Bradycardia to the 50s   Pulmonary/Chest: Effort normal and breath sounds normal. No respiratory distress. He has no wheezes. He has no rales.   Abdominal: Soft. Bowel sounds are normal. He exhibits no distension. There is no tenderness.   Musculoskeletal: He exhibits no edema.   Neurological: He is alert and oriented to person, place, and time. He has normal reflexes. Gait normal. Coordination normal.   Skin: Skin is warm.   Psychiatric: Affect normal.       Lab Data Review:       8/27/2017  6:30 PM    Recent Labs      08/30/17 0337 08/31/17 0914  09/01/17   0240   SODIUM  134*  135  133*   POTASSIUM  3.9  3.9  4.0   CHLORIDE  101  102  100   CO2  23  25  24   BUN  38*  43*  32*   CREATININE  4.05*  3.69*  2.79*   CALCIUM  8.4*  9.0  8.7       Recent Labs      08/30/17 0337 08/31/17   0914  09/01/17   0240   GLUCOSE  83  116*  88       Recent Labs      08/30/17   0337 08/30/17   1210  08/31/17   0313  09/01/17   0240   PROTHROMBTM   --   19.9*  20.4*  23.2*   APTT  73.5*   --   61.5*  56.0*   INR   --   1.64*  1.69*  1.99*                 Assessment/Plan     * Atrial fibrillation with RVR (CMS-HCC)- (present on admission)   Assessment & Plan    -New onset vs paroxysmal (vague PMH of afib and non-compliance with meds)  -overnight He was  given diltiazem by NF due to Afib with nonsustained HR in 160s. - now in SR  -CHADS2: 4  -ECHO: severe LVH, global hypokinesis, EF ~20%, severe AI and mod MR  -LOBO: slow blood flow but no thrombus. Severe AI    Plan  - Na restricted diet.  - Continue scheduled metoprolol, PRN 5mg metoprolol q6h.    -S/P LOBO and cardioversion.  -Currently in sinus rhythm.  -Amiodarone 100 mg nightly.   -On heparin infusion bridging with warfarin.  INR is 1.99 today. Goal INR is 2-3.        Hyperkalemia (Resolved )   Assessment & Plan    -Continue to monitor        Cardiorenal syndrome   Assessment & Plan    -Diagnosis given by Nephrology - likely since urine Na is low  -Patient is on HD, and is urinated <500 ml yesterday.  -Creatinine in ED 4.38, Total protein Urine 208. Estimated GFR was 14.   -FENA 0.2%  -BNP 2203>> 2039   -CXR: cardiomegaly. ECHO showed: severe LVH, global hypokinesis, EF ~20%, AR and mod MR  Assessment: Cardiorenal syndrome  Plan  -Patient needs AVR/MAZE w LOBO per Cardiac Surgery, but due to his renal status and recent stroke with hemorrhage, they do not recommend at this time.  They will follow up outpatient in 6 weeks.   -Patient will be counseled on cessation of alcohol intake.   -Nephrology on board.             Aortic regurgitation- (present on admission)   Assessment & Plan    -Severe AR.   -ECHO:  severe LVH, global hypokinesis, EF ~20%, severe AI and mod MR. Findings were confirmed by LOBO  -Fits the criteria for valve replacement. Cardiology and cardiac surgery on board.No plans for surgery due to recent stroke.        HFrEF (heart failure with reduced ejection fraction) (CMS-[facility])- (present on admission)   Assessment & Plan    -Presented with SOB, orthopnea, fatique and palpitations. Has h/o HTN and alcoholism in the past. On exam: JVD +., murmurs + but no volume overload.   -BNP 2203 >> 1753 > 2211.   -CXR: cardiomegaly. ECHO: severe LVH, global hypokinesis, EF ~20%, severe AI and mod MR  -Assessment:  Compensated HF likely due to alcoholism vs tachycardia induced heart failure.   -Cardiac surgery spoke with Mr Saini and family, he needs ischemic work up and AVR/MAZE w LOBO, but due to his renal status and recent stroke with hemorrhage, they do not recommend it at this time.   - Patient's undocumented alien status is another reason having a heart cath is diifficult.      Plan  - Na restricted diet.   -Metoprolol, Lasix and nitrates  - Plan outpatient follow-up with cardiology post discharge..  - Patient's family will fly him to [place] for further care after discharge.          Prolonged Q-T interval on ECG- (present on admission)   Assessment & Plan    -QTc ~530s. Will avoid QTc prolonging meds.        TREASURE (acute kidney injury) / Chronic Kidney Disease   Assessment & Plan    -Acute on chronic. Likely pre-renal vs cardiorenal. HTN is likely the cause of CKD  -On adm: Cr: 4.3, AGMA, Ph 6.1. Unknown baseline.  -FeNa <1%  -H/o renal disease since 7yrs, FH kidney disease in his father. Has been non-complaint with HTN medications. - has CHF  -Renal US: no hydronephrosis and calculi. Left renal cysts.  - Cr and K slowly increasing likely due to poor renal perfusion secondary to atrial fibrillation.    - Upper extremity vein mapping completed in anticipation of AV fistula.     Plan  - Continue Lasix.  Assessed daily by Nephrology for need for HD.    - Nephrology assisting patient with Emergency Medicaid, which can be used for hemodialysis as an outpatient in their clinic until patient can move to [place].          CVA (cerebral vascular accident) (CMS-[facility])- (present on admission)   Assessment & Plan    -h/o left facial droop, slurred speech and left sided weakness. However no neuro deficits currently  -CT: cerebral atrophy and small vessel ischemic changes   -MRI brain: acute infarction in L frontal periventricular white matter extendeing to subinsular cortex. Punctate area of infarction in R posterior lobe. Numerous  microbleeds.   Assessment: Afib could have contributed (LOBO showed 'sludge' in the heart)  Plan  -ASA, statin  -Mx of Afib as stated above.        Non-rheumatic mitral regurgitation   Assessment & Plan    -Moderate MR on ECHO  -No plans for surgical intervention at this point.        Hyperglycemia- (present on admission)   Assessment & Plan    -Resolved  -On adm: , A1c: 5.4  -He was started on ISS and hypoglycemia protocol which is discontinued.  CTM with accuchecks        Alcoholic (CMS-HCC)- (present on admission)   Assessment & Plan    -History is vague but looks like he was drinking heavily in the past. Last drink was 8/5/2017.  -On thiamine and folate.           Essential hypertension- (present on admission)   Assessment & Plan    -PMH of HTN but likely non-compliant. ECHO showed severe LVH  -Stable on BB, nitrate, lasix

## 2017-09-02 NOTE — PROGRESS NOTES
Paged Dr Arzola regarding patient BP of 158/82. Per MD continue to monitor and call if SBP greater than 180 or DBP greater than 110.

## 2017-09-03 LAB
ANION GAP SERPL CALC-SCNC: 12 MMOL/L (ref 0–11.9)
BUN SERPL-MCNC: 34 MG/DL (ref 8–22)
CALCIUM SERPL-MCNC: 9.6 MG/DL (ref 8.5–10.5)
CHLORIDE SERPL-SCNC: 99 MMOL/L (ref 96–112)
CO2 SERPL-SCNC: 25 MMOL/L (ref 20–33)
CREAT SERPL-MCNC: 3.41 MG/DL (ref 0.5–1.4)
GFR SERPL CREATININE-BSD FRML MDRD: 19 ML/MIN/1.73 M 2
GLUCOSE SERPL-MCNC: 118 MG/DL (ref 65–99)
INR PPP: 2.55 (ref 0.87–1.13)
POTASSIUM SERPL-SCNC: 4.4 MMOL/L (ref 3.6–5.5)
PROTHROMBIN TIME: 28.2 SEC (ref 12–14.6)
SODIUM SERPL-SCNC: 136 MMOL/L (ref 135–145)

## 2017-09-03 PROCEDURE — 700102 HCHG RX REV CODE 250 W/ 637 OVERRIDE(OP): Performed by: INTERNAL MEDICINE

## 2017-09-03 PROCEDURE — A9270 NON-COVERED ITEM OR SERVICE: HCPCS | Performed by: STUDENT IN AN ORGANIZED HEALTH CARE EDUCATION/TRAINING PROGRAM

## 2017-09-03 PROCEDURE — 85610 PROTHROMBIN TIME: CPT

## 2017-09-03 PROCEDURE — 700102 HCHG RX REV CODE 250 W/ 637 OVERRIDE(OP): Performed by: STUDENT IN AN ORGANIZED HEALTH CARE EDUCATION/TRAINING PROGRAM

## 2017-09-03 PROCEDURE — 770020 HCHG ROOM/CARE - TELE (206)

## 2017-09-03 PROCEDURE — A9270 NON-COVERED ITEM OR SERVICE: HCPCS | Performed by: HOSPITALIST

## 2017-09-03 PROCEDURE — A9270 NON-COVERED ITEM OR SERVICE: HCPCS | Performed by: INTERNAL MEDICINE

## 2017-09-03 PROCEDURE — 700102 HCHG RX REV CODE 250 W/ 637 OVERRIDE(OP): Performed by: HOSPITALIST

## 2017-09-03 PROCEDURE — 36415 COLL VENOUS BLD VENIPUNCTURE: CPT

## 2017-09-03 PROCEDURE — 80048 BASIC METABOLIC PNL TOTAL CA: CPT

## 2017-09-03 RX ORDER — METOPROLOL SUCCINATE 25 MG/1
25 TABLET, EXTENDED RELEASE ORAL
Status: DISCONTINUED | OUTPATIENT
Start: 2017-09-03 | End: 2017-09-09 | Stop reason: HOSPADM

## 2017-09-03 RX ADMIN — FOLIC ACID 1 MG: 1 TABLET ORAL at 08:40

## 2017-09-03 RX ADMIN — METOPROLOL SUCCINATE 25 MG: 25 TABLET, EXTENDED RELEASE ORAL at 08:40

## 2017-09-03 RX ADMIN — WARFARIN SODIUM 4 MG: 4 TABLET ORAL at 17:38

## 2017-09-03 RX ADMIN — Medication 100 MG: at 08:40

## 2017-09-03 RX ADMIN — ISOSORBIDE MONONITRATE 60 MG: 30 TABLET ORAL at 08:40

## 2017-09-03 RX ADMIN — AMIODARONE HYDROCHLORIDE 200 MG: 200 TABLET ORAL at 21:05

## 2017-09-03 RX ADMIN — ATORVASTATIN CALCIUM 40 MG: 40 TABLET, FILM COATED ORAL at 21:05

## 2017-09-03 RX ADMIN — THERA TABS 1 TABLET: TAB at 08:40

## 2017-09-03 RX ADMIN — FUROSEMIDE 40 MG: 40 TABLET ORAL at 08:40

## 2017-09-03 RX ADMIN — STANDARDIZED SENNA CONCENTRATE AND DOCUSATE SODIUM 2 TABLET: 8.6; 5 TABLET, FILM COATED ORAL at 21:05

## 2017-09-03 ASSESSMENT — PAIN SCALES - GENERAL
PAINLEVEL_OUTOF10: 0

## 2017-09-03 ASSESSMENT — ENCOUNTER SYMPTOMS
DIZZINESS: 0
HEMOPTYSIS: 0
CHILLS: 0
VOMITING: 0
NAUSEA: 0
FEVER: 0
HEARTBURN: 0
SHORTNESS OF BREATH: 0
MYALGIAS: 0
COUGH: 0
ABDOMINAL PAIN: 0
DIARRHEA: 0
HEADACHES: 0
PALPITATIONS: 0
ORTHOPNEA: 0

## 2017-09-03 NOTE — PROGRESS NOTES
Inpatient Anticoagulation Service Note    Date: 9/3/2017  Reason for Anticoagulation: Atrial Fibrillation  Hemoglobin Value: (!) 12.9  Hematocrit Value: (!) 39.2  Lab Platelet Value: 212  Target INR: 2.0 to 3.0  INR from last 7 days     Date/Time INR Value    09/03/17 0236 (!)  2.55    09/02/17 0312 (!)  2.13    09/01/17 0240 (!)  1.99    08/31/17 0313 (!)  1.69    08/30/17 1210 (!)  1.64    08/29/17 0018 (!)  1.75    08/28/17 0926 (!)  1.57    08/27/17 1231 (!)  1.54        Dose from last 7 days     Date/Time Dose (mg)    09/03/17 1000  4    09/02/17 1400  4    09/01/17 1300  6    08/31/17 1000  5    08/30/17 1500  7.5    08/29/17 1300  3.75    08/28/17 1500  5        Significant Interactions: Amiodarone, Statin, MVI    Reversal Agent Administered: Not Applicable  Comments: INR continues to be within goal. Plan to continue with 4 mg daily. No s/s of bleeding. No new drug interactions noted. Plan to go to twice weekly INRs soon.    Plan:  4 mg tonight, INR for AM  Education Material Provided?: Yes  Pharmacist suggested discharge dosing: warfarin 4 mg daily. Recommend patient have INR checked 2-3 days after discharge.     Dulce Maria Enrique, PharmD

## 2017-09-03 NOTE — PROGRESS NOTES
Received report from day shift RN. Assumed care of patient. Patient is SR on the monitor. Patient is sitting up in bed with family present at bedside. Patient declines any needs at this time. Bed locked and in the lowest position with call light within reach.

## 2017-09-03 NOTE — CARE PLAN
Problem: Safety  Goal: Will remain free from injury  Outcome: PROGRESSING AS EXPECTED  Discussed with patient the importance of calling for assistance prior to getting out of bed in order to help prevent falls. Patient accepting of the information. All questions answered at this time.     Problem: Infection  Goal: Will remain free from infection  Outcome: PROGRESSING AS EXPECTED  Discussed with patient the importance of washing hands before and after eating or using the bathroom in order to help prevent infection. Patient accepting of the information. All questions answered at this time.

## 2017-09-03 NOTE — PROGRESS NOTES
HD treatment were ordered by Dr. Najjar, pt was able to tolerate HD treatment without any difficulty, pt was able to pull 3.0 liters of fluids. Gave report to Timothy MCCLELLAND, pt CVC dressing were dry and intact, no swelling or redness were noted. Access were locked with heparin 1000 units/ml, 1.9 ml arterial port and 1.9 ml venous port. Pt was stable post tx, denies any complaint of pain and SOB, no fever or any distress were noted post tx. Treatment started at 1355 and ended at 1700, see flow sheets for further details.

## 2017-09-03 NOTE — PROGRESS NOTES
Internal Medicine Interval Note    Name Bhavesh Saini       1970   Age/Sex 47 y.o. male   MRN [id number]   Code Status FULL.     After 5PM or if no immediate response to page, please call for cross-coverage  Attending/Team: Dr. Veloz/Troy. See Patient List for primary contact information  Call [phone] to page    1st Call - Day Intern (R1):   Charl[place] 2nd Call - Day Sr. Resident (R2/R3):   Nando         Reason for interval visit  (Principal Problem)   Atrial fibrillation with RVR (CMS-[facility])    Interval Problem Daily Status Update  (24 hours)   - Bradycardia to 52 yesterday; remained in sinus rhythm.  Given one dose of metoprolol tartrate 25 mg yesterday.    - Ambulating well, taking PO, and having regular bowel movements.    - INR today 2.55.      Atrial fibrillation with rapid ventricular response: Remains in sinus rhythm. Currently on amiodarone 100 mg nightly.  INR 2.55.  Starting long acting metoprolol succinate 25 mg today.     Bradycardia: Asymptomatic.     Renal failure: Hemodialysis planned for today.      Review of Systems   Constitutional: Negative for chills and fever.   Respiratory: Negative for shortness of breath.    Cardiovascular: Negative for chest pain and palpitations.   Gastrointestinal: Negative for abdominal pain.   Musculoskeletal: Negative for myalgias.   Neurological: Negative for dizziness and headaches.       Consultants/Specialty  Cardiology  Cardiac Surgery  Nephrology    Disposition  Inpatient for atrial fibrillation with RVR.    Quality Measures    Reviewed items::  Radiology images reviewed, Medications reviewed and Labs reviewed  Truong catheter::  No Truong  DVT prophylaxis pharmacological::  Warfarin (Coumadin)  And heparin infusion for now.     Physical Exam       Vitals:    17 0340 17 0718 17 0840 17 1152   BP: 134/80 140/82  117/83   Pulse: (!) 52  65 60   Resp:  16  16   Temp: 37.3 °C (99.1 °F) 36.4 °C (97.6 °F)  36.3 °C (97.4  °F)   SpO2: 98% 95%  98%   Weight:       Height:         Body mass index is 24.89 kg/m². Weight: 57.8 kg (127 lb 6.8 oz)  Oxygen Therapy:  Pulse Oximetry: 98 %, O2 (LPM): 0, O2 Delivery: None (Room Air)    Physical Exam   Constitutional: He is oriented to person, place, and time and well-developed, well-nourished, and in no distress. No distress.   Lying in bed.    Eyes: EOM are normal. Pupils are equal, round, and reactive to light.   Neck: No JVD present.   Cardiovascular:   Bradycardia to the 50s.  Irregularly irregular rhythm.    Pulmonary/Chest: Effort normal and breath sounds normal. No respiratory distress. He has no wheezes. He has no rales.   Abdominal: Soft. Bowel sounds are normal. He exhibits no distension. There is no tenderness.   Musculoskeletal: He exhibits no edema.   Neurological: He is alert and oriented to person, place, and time. He has normal reflexes. Gait normal. Coordination normal.   Skin: Skin is warm.   Psychiatric: Affect normal.       Lab Data Review:       8/27/2017  6:30 PM    Recent Labs      09/01/17 0240 09/02/17 0312 09/03/17   0815   SODIUM  133*  130*  136   POTASSIUM  4.0  4.3  4.4   CHLORIDE  100  100  99   CO2  24  20  25   BUN  32*  49*  34*   CREATININE  2.79*  3.52*  3.41*   CALCIUM  8.7  9.1  9.6       Recent Labs      09/01/17 0240 09/02/17 0312  09/03/17   0815   GLUCOSE  88  79  118*       Recent Labs      09/01/17 0240 09/02/17 0312 09/03/17   0236   PROTHROMBTM  23.2*  24.5*  28.2*   APTT  56.0*  60.2*   --    INR  1.99*  2.13*  2.55*                 Assessment/Plan     * Atrial fibrillation with RVR (CMS-HCC)- (present on admission)   Assessment & Plan    -New onset vs paroxysmal (vague PMH of afib and non-compliance with meds)  -overnight He was given diltiazem by NF due to Afib with nonsustained HR in 160s. - now in SR  -CHADS2: 4  -ECHO: severe LVH, global hypokinesis, EF ~20%, severe AI and mod MR  -LOBO: slow blood flow but no thrombus. Severe  AI    Plan  - Na restricted diet.  - Continue scheduled metoprolol.  PRN 5mg metoprolol q6h.    -S/P LOBO and cardioversion.  -Currently in sinus rhythm.  -Amiodarone 100 mg nightly.   - Starting long acting metoprolol succinate 25 mg today.    -INR is 2.55 today.         Hyperkalemia (Resolved )   Assessment & Plan    -Continue to monitor        Cardiorenal syndrome   Assessment & Plan    -Diagnosis given by Nephrology - likely since urine Na is low  -Patient is on HD, and is urinated <500 ml yesterday.  -Creatinine in ED 4.38, Total protein Urine 208. Estimated GFR was 14.   -FENA 0.2%  -BNP 2203>> 2039   -CXR: cardiomegaly. ECHO showed: severe LVH, global hypokinesis, EF ~20%, AR and mod MR  Assessment: Cardiorenal syndrome  Plan  -Patient needs AVR/MAZE w LOBO per Cardiac Surgery, but due to his renal status and recent stroke with hemorrhage, they do not recommend at this time.  They will follow up outpatient in 6 weeks.   -Patient will be counseled on cessation of alcohol intake.   -Nephrology on board.             Aortic regurgitation- (present on admission)   Assessment & Plan    -Severe AR.   -ECHO:  severe LVH, global hypokinesis, EF ~20%, severe AI and mod MR. Findings were confirmed by LOBO  -Fits the criteria for valve replacement. Cardiology and cardiac surgery on board.No plans for surgery due to recent stroke.        HFrEF (heart failure with reduced ejection fraction) (CMS-[facility])- (present on admission)   Assessment & Plan    -Presented with SOB, orthopnea, fatique and palpitations. Has h/o HTN and alcoholism in the past. On exam: JVD +., murmurs + but no volume overload.   -BNP 2203 >> 1753 > 2211.   -CXR: cardiomegaly. ECHO: severe LVH, global hypokinesis, EF ~20%, severe AI and mod MR  -Assessment: Compensated HF likely due to alcoholism vs tachycardia induced heart failure.   -Cardiac surgery spoke with Mr Saini and family, he needs ischemic work up and AVR/MAZE w LOBO, but due to his renal status  and recent stroke with hemorrhage, they do not recommend it at this time.   - Patient's undocumented alien status is another reason having a heart cath is diicamiloicult.      Plan  - Na restricted diet.   -Metoprolol, Lasix and nitrates  - Plan outpatient follow-up with cardiology post discharge..  - Patient's family will fly him to [place] for further care after discharge.          Prolonged Q-T interval on ECG- (present on admission)   Assessment & Plan    -QTc ~530s. Will avoid QTc prolonging meds.        TREASURE (acute kidney injury) / Chronic Kidney Disease   Assessment & Plan    -Acute on chronic. Likely pre-renal vs cardiorenal. HTN is likely the cause of CKD  -On adm: Cr: 4.3, AGMA, Ph 6.1. Unknown baseline.  -FeNa <1%  -H/o renal disease since 7yrs, FH kidney disease in his father. Has been non-complaint with HTN medications. - has CHF  -Renal US: no hydronephrosis and calculi. Left renal cysts.  - Cr and K slowly increasing likely due to poor renal perfusion secondary to atrial fibrillation.    - Upper extremity vein mapping completed in anticipation of AV fistula.     Plan  - Continue Lasix.  Assessed daily by Nephrology for need for HD.    - Nephrology assisting patient with Emergency Medicaid, which can be used for hemodialysis as an outpatient in their clinic until patient can move to [place].          CVA (cerebral vascular accident) (CMS-[facility])- (present on admission)   Assessment & Plan    -h/o left facial droop, slurred speech and left sided weakness. However no neuro deficits currently  -CT: cerebral atrophy and small vessel ischemic changes   -MRI brain: acute infarction in L frontal periventricular white matter extendeing to subinsular cortex. Punctate area of infarction in R posterior lobe. Numerous microbleeds.   Assessment: Afib could have contributed (LOBO showed 'sludge' in the heart)  Plan  -ASA, statin  -Mx of Afib as stated above.        Non-rheumatic mitral regurgitation   Assessment & Plan     -Moderate MR on ECHO  -No plans for surgical intervention at this point.        Hyperglycemia- (present on admission)   Assessment & Plan    -Resolved  -On adm: , A1c: 5.4  -He was started on ISS and hypoglycemia protocol which is discontinued.  CTM with accuchecks        Alcoholic (CMS-HCC)- (present on admission)   Assessment & Plan    -History is vague but looks like he was drinking heavily in the past. Last drink was 8/5/2017.  -On thiamine and folate.           Essential hypertension- (present on admission)   Assessment & Plan    -PMH of HTN but likely non-compliant. ECHO showed severe LVH  -Stable on BB, nitrate, lasix

## 2017-09-03 NOTE — PROGRESS NOTES
Nephrology Progress Note, Adult, Complex               Author:Oni Avilesjjar Date & Time created: 9/3/2017  1:44 PM     Interval History:  48 y/o male with severe, CHF  -EF 25 %, Severe AI, A.fib, CVA in TREASURE, metabolic acidosis    Review of Systems:  Review of Systems   Constitutional: Negative for chills, fever and malaise/fatigue.   Respiratory: Negative for cough and hemoptysis.    Cardiovascular: Negative for chest pain, palpitations and orthopnea.   Gastrointestinal: Negative for abdominal pain, diarrhea, heartburn, nausea and vomiting.   Genitourinary: Negative for dysuria.       Physical Exam:  Physical Exam   Constitutional: He is oriented to person, place, and time. No distress.   HENT:   Head: Normocephalic and atraumatic.   Nose: Nose normal.   Eyes: Right eye exhibits no discharge. Left eye exhibits no discharge. No scleral icterus.   Cardiovascular: An irregularly irregular rhythm present.   Pulmonary/Chest: Effort normal and breath sounds normal. No respiratory distress. He has no wheezes.   Musculoskeletal: He exhibits no edema or tenderness.   Trace pedal edema   Neurological: He is alert and oriented to person, place, and time.   Skin: Skin is warm and dry. He is not diaphoretic.   Psychiatric: He has a normal mood and affect.   Nursing note and vitals reviewed.      Labs:        Invalid input(s): [id number]      Recent Labs      17   0815   SODIUM  133*  130*  136   POTASSIUM  4.0  4.3  4.4   CHLORIDE  100  100  99   CO2  24  20  25   BUN  32*  49*  34*   CREATININE  2.79*  3.52*  3.41*   CALCIUM  8.7  9.1  9.6     Recent Labs      172  17   0815   GLUCOSE  88  79  118*     Recent Labs      17   0236   PROTHROMBTM  23.2*  24.5*  28.2*   APTT  56.0*  60.2*   --    INR  1.99*  2.13*  2.55*               Hemodynamics:  Temp (24hrs), Av.8 °C (98.3 °F), Min:36.3 °C (97.4 °F), Max:37.3  °C (99.1 °F)  Temperature: 36.3 °C (97.4 °F)  Pulse  Av.2  Min: 44  Max: 155   Blood Pressure: 117/83     Respiratory:    Respiration: 16, Pulse Oximetry: 98 %     Work Of Breathing / Effort: Mild  RUL Breath Sounds: Clear, RML Breath Sounds: Clear, RLL Breath Sounds: Diminished, SABAS Breath Sounds: Clear, LLL Breath Sounds: Diminished  Fluids:    Intake/Output Summary (Last 24 hours) at 17 1344  Last data filed at 17 1700   Gross per 24 hour   Intake              500 ml   Output             3000 ml   Net            -2500 ml     Weight: 57.8 kg (127 lb 6.8 oz)  GI/Nutrition:  Orders Placed This Encounter   Procedures   • DIET ORDER     Standing Status:   Standing     Number of Occurrences:   1     Order Specific Question:   Diet:     Answer:   Diabetic [3]     Order Specific Question:   Diet:     Answer:   2 Gram Sodium [7]     Order Specific Question:   Diet:     Answer:   Renal [8]     Order Specific Question:   Calorie modifications:     Answer:   1800 kcals [4]     Medical Decision Making, by Problem:  Active Hospital Problems    Diagnosis   • *Atrial fibrillation with RVR (CMS-HCC) [I48.91]   • CVA (cerebral vascular accident) (CMS-HCC) [I63.9]   • Essential hypertension [I10]   • Respiratory failure (CMS-HCC) [J96.90]   • Electrolyte abnormality [E87.8]   • Hyperglycemia [R73.9]   • Prolonged Q-T interval on ECG [R94.31]   • Elevated troponin [R74.8]   • Acute on chronic systolic heart failure (CMS-HCC) [I50.23]   • Non-rheumatic mitral regurgitation [I34.0]   • TREASURE (acute kidney injury) (CMS-HCC) [N17.9]   • 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]         Reviewed items::  Labs reviewed  Central line in place:  Dialysis    Assessment and Plan    1.TREASURE :sec to cardiorenal Syndrome.  2.HTN: BP well controlled  3.Electrolytes: well controlled  4.Anemia: Hb WNL -to monitor  5.Metabolic acidosis -corrected with HD  6.Volume overloaded:improving with Lasix  7.CHF -cardiology following  Plan  No need for  HDtoday.  Renal dose all meds  Avoid nephrotoxins

## 2017-09-04 LAB
ANION GAP SERPL CALC-SCNC: 10 MMOL/L (ref 0–11.9)
BUN SERPL-MCNC: 44 MG/DL (ref 8–22)
CALCIUM SERPL-MCNC: 9.2 MG/DL (ref 8.5–10.5)
CHLORIDE SERPL-SCNC: 99 MMOL/L (ref 96–112)
CO2 SERPL-SCNC: 23 MMOL/L (ref 20–33)
CREAT SERPL-MCNC: 3.95 MG/DL (ref 0.5–1.4)
GFR SERPL CREATININE-BSD FRML MDRD: 16 ML/MIN/1.73 M 2
GLUCOSE SERPL-MCNC: 84 MG/DL (ref 65–99)
INR PPP: 2.54 (ref 0.87–1.13)
POTASSIUM SERPL-SCNC: 4.3 MMOL/L (ref 3.6–5.5)
PROTHROMBIN TIME: 28.1 SEC (ref 12–14.6)
SODIUM SERPL-SCNC: 132 MMOL/L (ref 135–145)

## 2017-09-04 PROCEDURE — A9270 NON-COVERED ITEM OR SERVICE: HCPCS | Performed by: STUDENT IN AN ORGANIZED HEALTH CARE EDUCATION/TRAINING PROGRAM

## 2017-09-04 PROCEDURE — 700102 HCHG RX REV CODE 250 W/ 637 OVERRIDE(OP): Performed by: STUDENT IN AN ORGANIZED HEALTH CARE EDUCATION/TRAINING PROGRAM

## 2017-09-04 PROCEDURE — 700102 HCHG RX REV CODE 250 W/ 637 OVERRIDE(OP): Performed by: HOSPITALIST

## 2017-09-04 PROCEDURE — A9270 NON-COVERED ITEM OR SERVICE: HCPCS | Performed by: INTERNAL MEDICINE

## 2017-09-04 PROCEDURE — 770020 HCHG ROOM/CARE - TELE (206)

## 2017-09-04 PROCEDURE — 700101 HCHG RX REV CODE 250: Performed by: NURSE PRACTITIONER

## 2017-09-04 PROCEDURE — 700102 HCHG RX REV CODE 250 W/ 637 OVERRIDE(OP): Performed by: INTERNAL MEDICINE

## 2017-09-04 PROCEDURE — 36415 COLL VENOUS BLD VENIPUNCTURE: CPT

## 2017-09-04 PROCEDURE — 80048 BASIC METABOLIC PNL TOTAL CA: CPT

## 2017-09-04 PROCEDURE — 700105 HCHG RX REV CODE 258: Performed by: INTERNAL MEDICINE

## 2017-09-04 PROCEDURE — A9270 NON-COVERED ITEM OR SERVICE: HCPCS | Performed by: HOSPITALIST

## 2017-09-04 PROCEDURE — 85610 PROTHROMBIN TIME: CPT

## 2017-09-04 RX ADMIN — METOPROLOL TARTRATE 5 MG: 5 INJECTION INTRAVENOUS at 23:20

## 2017-09-04 RX ADMIN — DEXTROSE MONOHYDRATE 500 ML: 50 INJECTION, SOLUTION INTRAVENOUS at 17:47

## 2017-09-04 RX ADMIN — THERA TABS 1 TABLET: TAB at 08:25

## 2017-09-04 RX ADMIN — ATORVASTATIN CALCIUM 40 MG: 40 TABLET, FILM COATED ORAL at 21:19

## 2017-09-04 RX ADMIN — WARFARIN SODIUM 4 MG: 4 TABLET ORAL at 17:43

## 2017-09-04 RX ADMIN — STANDARDIZED SENNA CONCENTRATE AND DOCUSATE SODIUM 2 TABLET: 8.6; 5 TABLET, FILM COATED ORAL at 21:19

## 2017-09-04 RX ADMIN — Medication 100 MG: at 08:25

## 2017-09-04 RX ADMIN — METOPROLOL SUCCINATE 25 MG: 25 TABLET, EXTENDED RELEASE ORAL at 08:25

## 2017-09-04 RX ADMIN — AMIODARONE HYDROCHLORIDE 200 MG: 200 TABLET ORAL at 21:19

## 2017-09-04 RX ADMIN — STANDARDIZED SENNA CONCENTRATE AND DOCUSATE SODIUM 2 TABLET: 8.6; 5 TABLET, FILM COATED ORAL at 08:25

## 2017-09-04 RX ADMIN — FOLIC ACID 1 MG: 1 TABLET ORAL at 08:25

## 2017-09-04 RX ADMIN — FUROSEMIDE 40 MG: 40 TABLET ORAL at 08:25

## 2017-09-04 RX ADMIN — ISOSORBIDE MONONITRATE 60 MG: 30 TABLET ORAL at 08:24

## 2017-09-04 ASSESSMENT — PAIN SCALES - GENERAL
PAINLEVEL_OUTOF10: 0

## 2017-09-04 ASSESSMENT — ENCOUNTER SYMPTOMS
ABDOMINAL PAIN: 0
DIZZINESS: 0
CONSTIPATION: 0
CHILLS: 0
PALPITATIONS: 0
FEVER: 0
MYALGIAS: 0
SHORTNESS OF BREATH: 0
HEADACHES: 0

## 2017-09-04 NOTE — PROGRESS NOTES
Inpatient Anticoagulation Service Note    Date: 9/4/2017  Reason for Anticoagulation: Atrial Fibrillation  Hemoglobin Value: (!) 12.9  Hematocrit Value: (!) 39.2  Lab Platelet Value: 212  Target INR: 2.0 to 3.0  INR from last 7 days     Date/Time INR Value    09/04/17 0236 (!)  2.54    09/03/17 0236 (!)  2.55    09/02/17 0312 (!)  2.13    09/01/17 0240 (!)  1.99    08/31/17 0313 (!)  1.69    08/30/17 1210 (!)  1.64    08/29/17 0018 (!)  1.75        Dose from last 7 days     Date/Time Dose (mg)    09/04/17 1000  4    09/03/17 1000  4    09/02/17 1400  4    09/01/17 1300  6    08/31/17 1000  5    08/30/17 1500  7.5    08/29/17 1300  3.75    08/28/17 1500  5        Significant Interactions: Amiodarone, Statin, MVI  Bridge Therapy: Completed    Comments: INR continues to be within goal. Continue with 4 mg daily. No s/s of bleeding. No new drug interactions noted. Will obtain INR every other day for a few and still within goal then change to twice weekly.    Plan:  4 mg tonight, INR for AM  Education Material Provided?: Yes  Pharmacist suggested discharge dosing: warfarin 4 mg daily. Recommend patient have INR checked 2-3 days after discharge.     Dulce Maria Enrique, PharmD

## 2017-09-04 NOTE — PROGRESS NOTES
Internal Medicine Interval Note    Name Bhavesh Saini       1970   Age/Sex 47 y.o. male   MRN [id number]   Code Status FULL.     After 5PM or if no immediate response to page, please call for cross-coverage  Attending/Team: Dr. Veloz/[place]. See Patient List for primary contact information  Call [phone] to page    1st Call - Day Intern (R1):   [place] 2nd Call - Day Sr. Resident (R2/R3):   Nando         Reason for interval visit  (Principal Problem)   Atrial fibrillation with RVR (CMS-[facility])    Interval Problem Daily Status Update  (24 hours)   - Bradycardicto 56 yesterday; remained in sinus rhythm.  Started on metoprolol succinate 25 mg yesterday.    - Ambulating well, taking PO, and having regular bowel movements.    - INR today 2.54.      Atrial fibrillation with rapid ventricular response: Remains in sinus rhythm.  On amiodarone 100 mg nightly and long acting metoprolol succinate 25 mg daily.  INR 2.54.      Bradycardia: Asymptomatic.     Renal failure: Hemodialysis planning per Nephrology.     Review of Systems   Constitutional: Negative for chills and fever.   Respiratory: Negative for shortness of breath.    Cardiovascular: Negative for chest pain and palpitations.   Gastrointestinal: Negative for abdominal pain and constipation.   Genitourinary: Negative for dysuria.   Musculoskeletal: Negative for myalgias.   Neurological: Negative for dizziness and headaches.       Consultants/Specialty  Cardiology  Cardiac Surgery  Nephrology    Disposition  Inpatient for atrial fibrillation with RVR.    Quality Measures    Reviewed items::  Radiology images reviewed, Medications reviewed and Labs reviewed  Truong catheter::  No Truong  DVT prophylaxis pharmacological::  Warfarin (Coumadin)  And heparin infusion for now.     Physical Exam       Vitals:    17 2313 17 0349 17 0740 17 1120   BP: 152/81 133/76 146/85 133/80   Pulse: (!) 56 (!) 58 63 62   Resp: 16 16 16 16    Temp: 36.4 °C (97.5 °F) 36.4 °C (97.5 °F) 36.2 °C (97.2 °F) 36.6 °C (97.8 °F)   SpO2: 98% 93% 94% 98%   Weight:       Height:         Body mass index is 26.44 kg/m². Weight: 61.4 kg (135 lb 5.8 oz)  Oxygen Therapy:  Pulse Oximetry: 98 %, O2 (LPM): 0, O2 Delivery: None (Room Air)    Physical Exam   Constitutional: He is oriented to person, place, and time and well-developed, well-nourished, and in no distress. No distress.   Lying in bed.    Eyes: EOM are normal. Pupils are equal, round, and reactive to light.   Neck: No JVD present.   Cardiovascular:   Bradycardia to the 50s.  Irregularly irregular rhythm.    Pulmonary/Chest: Effort normal and breath sounds normal. No respiratory distress. He has no wheezes. He has no rales.   Abdominal: Soft. Bowel sounds are normal. He exhibits no distension. There is no tenderness.   Musculoskeletal: He exhibits no edema.   Neurological: He is alert and oriented to person, place, and time. He has normal reflexes. Gait normal. Coordination normal.   Skin: Skin is warm.   Psychiatric: Affect normal.       Lab Data Review:       8/27/2017  6:30 PM    Recent Labs      09/02/17 0312 09/03/17 0815 09/04/17   0236   SODIUM  130*  136  132*   POTASSIUM  4.3  4.4  4.3   CHLORIDE  100  99  99   CO2  20  25  23   BUN  49*  34*  44*   CREATININE  3.52*  3.41*  3.95*   CALCIUM  9.1  9.6  9.2       Recent Labs      09/02/17 0312 09/03/17 0815  09/04/17   0236   GLUCOSE  79  118*  84       Recent Labs      09/02/17 0312 09/03/17 0236  09/04/17   0236   PROTHROMBTM  24.5*  28.2*  28.1*   APTT  60.2*   --    --    INR  2.13*  2.55*  2.54*                 Assessment/Plan     * Atrial fibrillation with RVR (CMS-HCC)- (present on admission)   Assessment & Plan    -New onset vs paroxysmal (vague PMH of afib and non-compliance with meds)  -overnight He was given diltiazem by NF due to Afib with nonsustained HR in 160s. - now in SR  -CHADS2: 4  -ECHO: severe LVH, global hypokinesis,  EF ~20%, severe AI and mod MR  -LOBO: slow blood flow but no thrombus. Severe AI    Plan  - Na restricted diet.  - Continue scheduled metoprolol.  PRN 5mg metoprolol q6h.    -S/P LOBO and cardioversion.  -Currently in sinus rhythm.  -Amiodarone 100 mg nightly.   - Started long acting metoprolol succinate 25 mg yesterday.    -INR is 2.54 today.         Hyperkalemia (Resolved )   Assessment & Plan    -Continue to monitor        Cardiorenal syndrome   Assessment & Plan    -Diagnosis given by Nephrology - likely since urine Na is low  -Patient is on HD, and is urinated <500 ml yesterday.  -Creatinine in ED 4.38, Total protein Urine 208. Estimated GFR was 14.   -FENA 0.2%  -BNP 2203>> 2039   -CXR: cardiomegaly. ECHO showed: severe LVH, global hypokinesis, EF ~20%, AR and mod MR  Assessment: Cardiorenal syndrome  Plan  -Patient needs AVR/MAZE w LOBO per Cardiac Surgery, but due to his renal status and recent stroke with hemorrhage, they do not recommend at this time.  They will follow up outpatient in 6 weeks.   -Patient will be counseled on cessation of alcohol intake.   -Nephrology on board.             Aortic regurgitation- (present on admission)   Assessment & Plan    -Severe AR.   -ECHO:  severe LVH, global hypokinesis, EF ~20%, severe AI and mod MR. Findings were confirmed by LOBO  -Fits the criteria for valve replacement. Cardiology and cardiac surgery on board.No plans for surgery due to recent stroke.        HFrEF (heart failure with reduced ejection fraction) (CMS-[facility])- (present on admission)   Assessment & Plan    -Presented with SOB, orthopnea, fatique and palpitations. Has h/o HTN and alcoholism in the past. On exam: JVD +., murmurs + but no volume overload.   -BNP 2203 >> 1753 > 2211.   -CXR: cardiomegaly. ECHO: severe LVH, global hypokinesis, EF ~20%, severe AI and mod MR  -Assessment: Compensated HF likely due to alcoholism vs tachycardia induced heart failure.   -Cardiac surgery spoke with Mr Saini and  family, he needs ischemic work up and AVR/MAZE w LOBO, but due to his renal status and recent stroke with hemorrhage, they do not recommend it at this time.   - Patient's undocumented alien status is another reason having a heart cath is diicamiloicult.      Plan  - Na restricted diet.   -Metoprolol, Lasix and nitrates  - Plan outpatient follow-up with cardiology post discharge..  - Patient's family will fly him to [place] for further care after discharge.          Prolonged Q-T interval on ECG- (present on admission)   Assessment & Plan    -QTc ~530s. Will avoid QTc prolonging meds.        TREASURE (acute kidney injury) / Chronic Kidney Disease   Assessment & Plan    -Acute on chronic. Likely pre-renal vs cardiorenal. HTN is likely the cause of CKD  -On adm: Cr: 4.3, AGMA, Ph 6.1. Unknown baseline.  -FeNa <1%  -H/o renal disease since 7yrs, FH kidney disease in his father. Has been non-complaint with HTN medications. - has CHF  -Renal US: no hydronephrosis and calculi. Left renal cysts.  - Cr and K slowly increasing likely due to poor renal perfusion secondary to atrial fibrillation.    - Upper extremity vein mapping completed in anticipation of AV fistula.     Plan  - Continue Lasix.  Assessed daily by Nephrology for need for HD.    - Nephrology assisting patient with Emergency Medicaid, which can be used for hemodialysis as an outpatient in their clinic until patient can move to [place].          CVA (cerebral vascular accident) (CMS-[facility])- (present on admission)   Assessment & Plan    -h/o left facial droop, slurred speech and left sided weakness. However no neuro deficits currently  -CT: cerebral atrophy and small vessel ischemic changes   -MRI brain: acute infarction in L frontal periventricular white matter extendeing to subinsular cortex. Punctate area of infarction in R posterior lobe. Numerous microbleeds.   Assessment: Afib could have contributed (LOBO showed 'sludge' in the heart)  Plan  -ASA, statin  -Mx of Afib as  stated above.        Non-rheumatic mitral regurgitation   Assessment & Plan    -Moderate MR on ECHO  -No plans for surgical intervention at this point.        Hyperglycemia- (present on admission)   Assessment & Plan    -Resolved  -On adm: , A1c: 5.4  -He was started on ISS and hypoglycemia protocol which is discontinued.  CTM with accuchecks        Alcoholic (CMS-HCC)- (present on admission)   Assessment & Plan    -History is vague but looks like he was drinking heavily in the past. Last drink was 8/5/2017.  -On thiamine and folate.           Essential hypertension- (present on admission)   Assessment & Plan    -PMH of HTN but likely non-compliant. ECHO showed severe LVH  -Stable on BB, nitrate, lasix

## 2017-09-04 NOTE — PROGRESS NOTES
Hospital Medicine Progress Note, Adult, Complex               Author: Bandar Saavedra Date & Time created: 2017  12:17 PM     Interval History:  47 year old with TREASURE from cardiorenal syndrome, CHF with EF of 25%, started on dialysis, and tolerated tx. Awaiting outpatient HD chair.    Review of Systems:  Review of Systems   Constitutional: Negative for chills and fever.   Cardiovascular: Negative for chest pain and palpitations.       Physical Exam:  Physical Exam   Constitutional: He is oriented to person, place, and time. He appears well-developed and well-nourished.   Cardiovascular: Normal rate and regular rhythm.    Pulmonary/Chest: Effort normal and breath sounds normal.   Neurological: He is alert and oriented to person, place, and time.   Skin: Skin is warm and dry.   Psychiatric: He has a normal mood and affect. His behavior is normal.       Labs:        Invalid input(s): [id number]      Recent Labs      17   0236   SODIUM  130*  136  132*   POTASSIUM  4.3  4.4  4.3   CHLORIDE  100  99  99   CO2  20  25  23   BUN  49*  34*  44*   CREATININE  3.52*  3.41*  3.95*   CALCIUM  9.1  9.6  9.2     Recent Labs      1715  17   0236   GLUCOSE  79  118*  84     Recent Labs      176  17   0236   PROTHROMBTM  24.5*  28.2*  28.1*   APTT  60.2*   --    --    INR  2.13*  2.55*  2.54*               Hemodynamics:  Temp (24hrs), Av.4 °C (97.6 °F), Min:36.2 °C (97.2 °F), Max:36.7 °C (98 °F)  Temperature: 36.6 °C (97.8 °F)  Pulse  Av.8  Min: 44  Max: 155   Blood Pressure: 133/80     Respiratory:    Respiration: 16, Pulse Oximetry: 98 %     Work Of Breathing / Effort: Mild  RUL Breath Sounds: Clear, RML Breath Sounds: Clear, RLL Breath Sounds: Diminished, SABAS Breath Sounds: Clear, LLL Breath Sounds: Diminished  Fluids:    Intake/Output Summary (Last 24 hours) at 17 1217  Last data filed at 17  0300   Gross per 24 hour   Intake              240 ml   Output              175 ml   Net               65 ml     Weight: 61.4 kg (135 lb 5.8 oz)  GI/Nutrition:  Orders Placed This Encounter   Procedures   • DIET ORDER     Standing Status:   Standing     Number of Occurrences:   1     Order Specific Question:   Diet:     Answer:   Diabetic [3]     Order Specific Question:   Diet:     Answer:   2 Gram Sodium [7]     Order Specific Question:   Diet:     Answer:   Renal [8]     Order Specific Question:   Calorie modifications:     Answer:   1800 kcals [4]     Medical Decision Making, by Problem:  Active Hospital Problems    Diagnosis   • *Atrial fibrillation with RVR (CMS-HCC) [I48.91]   • Hyperkalemia [E87.5]   • Cardiorenal syndrome [I13.10]   • Aortic regurgitation [I35.1]   • Prolonged Q-T interval on ECG [R94.31]   • HFrEF (heart failure with reduced ejection fraction) (CMS-HCC) [I50.20]   • CVA (cerebral vascular accident) (CMS-HCC) [I63.9]   • TREASURE (acute kidney injury) (CMS-HCC) [N17.9]   • Non-rheumatic mitral regurgitation [I34.0]   • Essential hypertension [I10]   • 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]   • Hyperglycemia [R73.9]       Quality-Core Measures    1. TREASURE,cardiorenal, on dialysis, await outpatient chair  2. Hyponatremia, new, Na 132, should correct with dialysis  3. Volume overload, on lasix, UF on tx    WIll follow

## 2017-09-04 NOTE — CARE PLAN
Problem: Safety  Goal: Will remain free from falls  Outcome: PROGRESSING AS EXPECTED  Discussed safety measures with pt. Pt refuses bed alarm, call light placed within reach of pt.    Problem: Mobility  Goal: Risk for activity intolerance will decrease  Outcome: PROGRESSING AS EXPECTED  Encouraged activity to pt. Pt verbalized understanding.

## 2017-09-05 ENCOUNTER — PATIENT OUTREACH (OUTPATIENT)
Dept: HEALTH INFORMATION MANAGEMENT | Facility: OTHER | Age: 47
End: 2017-09-05

## 2017-09-05 LAB
ANION GAP SERPL CALC-SCNC: 8 MMOL/L (ref 0–11.9)
BUN SERPL-MCNC: 62 MG/DL (ref 8–22)
CALCIUM SERPL-MCNC: 9.1 MG/DL (ref 8.5–10.5)
CHLORIDE SERPL-SCNC: 100 MMOL/L (ref 96–112)
CO2 SERPL-SCNC: 21 MMOL/L (ref 20–33)
CREAT SERPL-MCNC: 4.04 MG/DL (ref 0.5–1.4)
GFR SERPL CREATININE-BSD FRML MDRD: 16 ML/MIN/1.73 M 2
GLUCOSE SERPL-MCNC: 82 MG/DL (ref 65–99)
INR PPP: 2.63 (ref 0.87–1.13)
MAGNESIUM SERPL-MCNC: 2 MG/DL (ref 1.5–2.5)
POTASSIUM SERPL-SCNC: 4.2 MMOL/L (ref 3.6–5.5)
PROTHROMBIN TIME: 28.9 SEC (ref 12–14.6)
SODIUM SERPL-SCNC: 129 MMOL/L (ref 135–145)

## 2017-09-05 PROCEDURE — 83735 ASSAY OF MAGNESIUM: CPT

## 2017-09-05 PROCEDURE — 700102 HCHG RX REV CODE 250 W/ 637 OVERRIDE(OP): Performed by: HOSPITALIST

## 2017-09-05 PROCEDURE — 700102 HCHG RX REV CODE 250 W/ 637 OVERRIDE(OP): Performed by: INTERNAL MEDICINE

## 2017-09-05 PROCEDURE — A9270 NON-COVERED ITEM OR SERVICE: HCPCS | Performed by: HOSPITALIST

## 2017-09-05 PROCEDURE — 770020 HCHG ROOM/CARE - TELE (206)

## 2017-09-05 PROCEDURE — 90935 HEMODIALYSIS ONE EVALUATION: CPT

## 2017-09-05 PROCEDURE — 99231 SBSQ HOSP IP/OBS SF/LOW 25: CPT | Mod: GC | Performed by: HOSPITALIST

## 2017-09-05 PROCEDURE — 700102 HCHG RX REV CODE 250 W/ 637 OVERRIDE(OP): Performed by: STUDENT IN AN ORGANIZED HEALTH CARE EDUCATION/TRAINING PROGRAM

## 2017-09-05 PROCEDURE — 700111 HCHG RX REV CODE 636 W/ 250 OVERRIDE (IP)

## 2017-09-05 PROCEDURE — 36415 COLL VENOUS BLD VENIPUNCTURE: CPT

## 2017-09-05 PROCEDURE — A9270 NON-COVERED ITEM OR SERVICE: HCPCS | Performed by: INTERNAL MEDICINE

## 2017-09-05 PROCEDURE — 80048 BASIC METABOLIC PNL TOTAL CA: CPT

## 2017-09-05 PROCEDURE — A9270 NON-COVERED ITEM OR SERVICE: HCPCS | Performed by: STUDENT IN AN ORGANIZED HEALTH CARE EDUCATION/TRAINING PROGRAM

## 2017-09-05 PROCEDURE — 85610 PROTHROMBIN TIME: CPT

## 2017-09-05 RX ORDER — HEPARIN SODIUM 1000 [USP'U]/ML
INJECTION, SOLUTION INTRAVENOUS; SUBCUTANEOUS
Status: COMPLETED
Start: 2017-09-05 | End: 2017-09-05

## 2017-09-05 RX ADMIN — STANDARDIZED SENNA CONCENTRATE AND DOCUSATE SODIUM 2 TABLET: 8.6; 5 TABLET, FILM COATED ORAL at 20:54

## 2017-09-05 RX ADMIN — STANDARDIZED SENNA CONCENTRATE AND DOCUSATE SODIUM 2 TABLET: 8.6; 5 TABLET, FILM COATED ORAL at 08:09

## 2017-09-05 RX ADMIN — HEPARIN SODIUM 3800 UNITS: 1000 INJECTION, SOLUTION INTRAVENOUS; SUBCUTANEOUS at 14:20

## 2017-09-05 RX ADMIN — THERA TABS 1 TABLET: TAB at 08:09

## 2017-09-05 RX ADMIN — Medication 100 MG: at 08:09

## 2017-09-05 RX ADMIN — ATORVASTATIN CALCIUM 40 MG: 40 TABLET, FILM COATED ORAL at 20:54

## 2017-09-05 RX ADMIN — WARFARIN SODIUM 4 MG: 4 TABLET ORAL at 17:39

## 2017-09-05 RX ADMIN — AMIODARONE HYDROCHLORIDE 200 MG: 200 TABLET ORAL at 20:54

## 2017-09-05 RX ADMIN — ISOSORBIDE MONONITRATE 60 MG: 30 TABLET ORAL at 08:08

## 2017-09-05 RX ADMIN — FUROSEMIDE 40 MG: 40 TABLET ORAL at 08:09

## 2017-09-05 RX ADMIN — METOPROLOL SUCCINATE 25 MG: 25 TABLET, EXTENDED RELEASE ORAL at 15:32

## 2017-09-05 RX ADMIN — FOLIC ACID 1 MG: 1 TABLET ORAL at 08:09

## 2017-09-05 ASSESSMENT — ENCOUNTER SYMPTOMS
SHORTNESS OF BREATH: 0
PALPITATIONS: 0
ABDOMINAL PAIN: 0
CONSTIPATION: 0
MYALGIAS: 0
FEVER: 0
CHILLS: 0
HEADACHES: 0
DIZZINESS: 0

## 2017-09-05 ASSESSMENT — PAIN SCALES - GENERAL
PAINLEVEL_OUTOF10: 0

## 2017-09-05 NOTE — PROGRESS NOTES
Received report from day shift RN. Assumed care of patient. Patient is SR on the monitor. Patient is sitting on couch in room with no family present at this time. Patient declines any needs at this time. Patient is A&O x4. Plan of care discussed with patient. Bed locked and in the lowest position with call light within reach.

## 2017-09-05 NOTE — PROGRESS NOTES
Inpatient Anticoagulation Service Note    Date: 9/5/2017  Reason for Anticoagulation: Atrial Fibrillation  Hemoglobin Value: (!) 12.9  Hematocrit Value: (!) 39.2  Lab Platelet Value: 212  Target INR: 2.0 to 3.0  INR from last 7 days     Date/Time INR Value    09/05/17 0216 (!)  2.63    09/04/17 0236 (!)  2.54    09/03/17 0236 (!)  2.55    09/02/17 0312 (!)  2.13    09/01/17 0240 (!)  1.99    08/31/17 0313 (!)  1.69    08/30/17 1210 (!)  1.64        Dose from last 7 days     Date/Time Dose (mg)    09/05/17 1100  4    09/04/17 1000  4    09/03/17 1000  4    09/02/17 1400  4    09/01/17 1300  6    08/31/17 1000  5    08/30/17 1500  7.5    08/29/17 1300  3.75        Significant Interactions: Amiodarone, Statin, MVI  Bridge Therapy: Completed    Comments: No s/s of bleeding noted. No new drug interactions. Patient is stable. I will go to every other INR draws just because INR is trending up but consider twice weekly if INR stable.    Plan:  4 mg tonight, INR for Thurs  Education Material Provided?: Yes  Pharmacist suggested discharge dosing: warfarin 4 mg daily. Recommend patient have INR checked 2-3 days after discharge.     Dulce Maria Enrique, PharmD

## 2017-09-05 NOTE — PROGRESS NOTES
HEMODIALYSIS NOTES:     HD today x 3 hours per Dr. Saavedra. Initiated at 1119 and ended at 1420. Patient had low BP at the last 20 minutes of HD. UF goal decreased. Patient able to maintain normal BP up to the end of HD. See paper flowsheet for details.    UF Net:  2,400 mL    R permacath intact and patent with good flow during dialysis. No s/sx of infection, no redness nor bleeding noted on the CVC site. CVC dressing clean, dry and intact. Blood returned and CVC port flushed with NS and Heparin 1000 units/mL used  to lock catheter given per designated amount. CVC port clamped and capped. Report given to CAROLINE Peña RN.

## 2017-09-05 NOTE — PROGRESS NOTES
Hospital Medicine Progress Note, Adult, Complex               Author: Bandar Saavedra Date & Time created: 2017  11:39 AM     Interval History:  47 year old with TREASURE from cardiorenal syndrome, CHF with EF of 25%, started on dialysis    Has been doing well. Awaiting chair. Tolerating HD.     Review of Systems:  Review of Systems   Constitutional: Negative for chills and fever.   Cardiovascular: Negative for chest pain and palpitations.       Physical Exam:  Physical Exam   Constitutional: He is oriented to person, place, and time. He appears well-developed and well-nourished.   Cardiovascular: Normal rate and regular rhythm.    Pulmonary/Chest: Effort normal and breath sounds normal.   Musculoskeletal: He exhibits no edema or deformity.   Neurological: He is alert and oriented to person, place, and time.       Labs:        Invalid input(s): [id number]      Recent Labs      17   0216   SODIUM  136  132*  129*   POTASSIUM  4.4  4.3  4.2   CHLORIDE  99  99  100   CO2  25  23  21   BUN  34*  44*  62*   CREATININE  3.41*  3.95*  4.04*   MAGNESIUM   --    --   2.0   CALCIUM  9.6  9.2  9.1     Recent Labs      17   0815  17   0236  17   0216   GLUCOSE  118*  84  82     Recent Labs      17   0216   PROTHROMBTM  28.2*  28.1*  28.9*   INR  2.55*  2.54*  2.63*               Hemodynamics:  Temp (24hrs), Av.7 °C (98 °F), Min:36.3 °C (97.4 °F), Max:36.8 °C (98.3 °F)  Temperature: 36.7 °C (98.1 °F)  Pulse  Av.5  Min: 44  Max: 155   Blood Pressure: 139/83     Respiratory:    Respiration: 16, Pulse Oximetry: 95 %     Work Of Breathing / Effort: Mild  RUL Breath Sounds: Clear, RML Breath Sounds: Clear, RLL Breath Sounds: Diminished, SABAS Breath Sounds: Clear, LLL Breath Sounds: Diminished  Fluids:  No intake or output data in the 24 hours ending 17 1139  Weight: 61.5 kg (135 lb 9.3 oz)  GI/Nutrition:  Orders Placed  This Encounter   Procedures   • DIET ORDER     Standing Status:   Standing     Number of Occurrences:   1     Order Specific Question:   Diet:     Answer:   Diabetic [3]     Order Specific Question:   Diet:     Answer:   2 Gram Sodium [7]     Order Specific Question:   Diet:     Answer:   Renal [8]     Order Specific Question:   Calorie modifications:     Answer:   1800 kcals [4]     Medical Decision Making, by Problem:  Active Hospital Problems    Diagnosis   • *Atrial fibrillation with RVR (CMS-HCC) [I48.91]   • Hyperkalemia [E87.5]   • Cardiorenal syndrome [I13.10]   • Aortic regurgitation [I35.1]   • Prolonged Q-T interval on ECG [R94.31]   • HFrEF (heart failure with reduced ejection fraction) (CMS-HCC) [I50.20]   • CVA (cerebral vascular accident) (CMS-HCC) [I63.9]   • TREASURE (acute kidney injury) (CMS-HCC) [N17.9]   • Non-rheumatic mitral regurgitation [I34.0]   • Essential hypertension [I10]   • 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]   • Hyperglycemia [R73.9]       1. TREASURE/now likely ESRD, on HD  2. Hyponatremia, Na down 129  3. Volume overload, improved    Discharge once outpatient chair available  Quality-Core Measures

## 2017-09-05 NOTE — PROGRESS NOTES
Shauna Downing Fall Risk Assessment:     Last Known Fall: No falls  Mobility: No limitations  Medications: Cardiovascular or central nervous system meds  Mental Status/LOC/Awareness: Awake, alert, and oriented to date, place, and person  Toileting Needs: No needs  Volume/Electrolyte Status: Use of IV fluids/tube feeds  Communication/Sensory: Non-English patient/unable to speak/slurred speech  Behavior: Appropriate behavior  Shauna Downing Fall Risk Total: 7  Fall Risk Level: LOW RISK    Universal Fall Precautions:  call light/belongings in reach, bed in low position and locked, wheelchairs and assistive devices out of sight, siderails up x 2, use non-slip footwear, adequate lighting, clutter free and spill free environment, educate on level of risk, educate to call for assistance    Fall Risk Level Interventions:   TRIAL (ePrep, NEURO, MED AZALEA 5) Low Fall Risk Interventions  Place yellow fall risk ID band on patient: verified  Provide patient/family education based on risk assessment: completed  Educate patient/family to call staff for assistance when getting out of bed: completed  Place fall precaution signage outside patient door: verifiedTRIAL (ePrep, NEURO, MED AZALEA 5) Moderate Fall Risk Interventions  Place yellow fall risk ID band on patient: verified  Provide patient/family education based on risk assessment : verified  Educate patient/family to call staff for assistance when getting out of bed: verified  Place fall precaution signage outside patient door: verified  Utilize bed/chair fall alarm: verifiedTRIAL (ePrep, NEURO, [place] AZALEA 5) High Fall Risk Interventions  Place yellow fall risk ID band on patient: verified  Provide patient/family education based on risk assessment: verified  Educate patient/family to call staff for assistance when getting out of bed: verified  Place fall precaution signage outside patient door: verified  Place patient in room close to nursing station: verified  Utilize  bed/chair fall alarm: verified  Notify charge of high risk for huddle: completed    Patient Specific Interventions:     Medication: review medications with patient and family, assess for medications that can be discontinued or dosage decreased and limit combination of prn medications  Mental Status/LOC/Awareness: reinforce falls education, check on patient hourly, reinforce the use of call light and provide activity  Toileting: provide frquent toileting, instruct patient/family on the use of grab bars and instruct patient/family on the need to call for assistance when toileting  Volume/Electrolyte Status: monitor abnormal lab values and ensure IV fluids are appropriate  Communication/Sensory: update plan of care on whiteboard  Behavioral: encourage patient to voice feelings, administer medication as ordered and instruct/reinforce fall program rationale  Mobility: schedule physical activity throughout the day, provide comfort measures during transport, ensure bed is locked and in lowest position and provide appropriate assistive device

## 2017-09-05 NOTE — DISCHARGE PLANNING
Outpatient Dialysis Placement Update    Follow up call to [facility]- confirmed all pertinent documentation has been received. SPA (single patient agreement) contract being drafted at this time and will be forwarded for signatures and review either later this afternoon or tomorrow. Will continue to provide updates.    Dialysis Coordinator- [facility],  Damon Hammer [phone]

## 2017-09-05 NOTE — CARE PLAN
Problem: Infection  Goal: Will remain free from infection  Outcome: PROGRESSING AS EXPECTED  Discussed with patient the importance of washing hands before and after eating or using the restroom in order to help prevent infection. Patient accepting of the information. All questions answered.     Problem: Skin Integrity  Goal: Risk for impaired skin integrity will decrease  Outcome: PROGRESSING AS EXPECTED  Discussed with patient the importance of rotating from side to side at least every two hours in order to help prevent skin breakdown. Patient accepting of the information. All questions answered at this time.

## 2017-09-05 NOTE — CARE PLAN
Problem: Safety  Goal: Will remain free from falls  Outcome: PROGRESSING AS EXPECTED  Discussed safety precautions with pt. Pt refuses bed alarm, pt is up self. Call light placed within reach    Problem: Infection  Goal: Will remain free from infection  Outcome: PROGRESSING AS EXPECTED  Discussed s/s of infection. Pt verbalized understanding.

## 2017-09-06 LAB
ANION GAP SERPL CALC-SCNC: 12 MMOL/L (ref 0–11.9)
BUN SERPL-MCNC: 37 MG/DL (ref 8–22)
CALCIUM SERPL-MCNC: 9.2 MG/DL (ref 8.5–10.5)
CHLORIDE SERPL-SCNC: 97 MMOL/L (ref 96–112)
CO2 SERPL-SCNC: 22 MMOL/L (ref 20–33)
CREAT SERPL-MCNC: 3.57 MG/DL (ref 0.5–1.4)
GFR SERPL CREATININE-BSD FRML MDRD: 18 ML/MIN/1.73 M 2
GLUCOSE SERPL-MCNC: 62 MG/DL (ref 65–99)
POTASSIUM SERPL-SCNC: 3.7 MMOL/L (ref 3.6–5.5)
SODIUM SERPL-SCNC: 131 MMOL/L (ref 135–145)

## 2017-09-06 PROCEDURE — 36415 COLL VENOUS BLD VENIPUNCTURE: CPT

## 2017-09-06 PROCEDURE — 80048 BASIC METABOLIC PNL TOTAL CA: CPT

## 2017-09-06 PROCEDURE — 700102 HCHG RX REV CODE 250 W/ 637 OVERRIDE(OP): Performed by: INTERNAL MEDICINE

## 2017-09-06 PROCEDURE — 700102 HCHG RX REV CODE 250 W/ 637 OVERRIDE(OP): Performed by: STUDENT IN AN ORGANIZED HEALTH CARE EDUCATION/TRAINING PROGRAM

## 2017-09-06 PROCEDURE — 700102 HCHG RX REV CODE 250 W/ 637 OVERRIDE(OP): Performed by: HOSPITALIST

## 2017-09-06 PROCEDURE — A9270 NON-COVERED ITEM OR SERVICE: HCPCS | Performed by: INTERNAL MEDICINE

## 2017-09-06 PROCEDURE — 99231 SBSQ HOSP IP/OBS SF/LOW 25: CPT | Mod: GC | Performed by: HOSPITALIST

## 2017-09-06 PROCEDURE — A9270 NON-COVERED ITEM OR SERVICE: HCPCS | Performed by: STUDENT IN AN ORGANIZED HEALTH CARE EDUCATION/TRAINING PROGRAM

## 2017-09-06 PROCEDURE — 770006 HCHG ROOM/CARE - MED/SURG/GYN SEMI*

## 2017-09-06 PROCEDURE — A9270 NON-COVERED ITEM OR SERVICE: HCPCS | Performed by: HOSPITALIST

## 2017-09-06 RX ORDER — VITAMIN C
1 TAB ORAL DAILY
Status: DISCONTINUED | OUTPATIENT
Start: 2017-09-06 | End: 2017-09-09 | Stop reason: HOSPADM

## 2017-09-06 RX ADMIN — FUROSEMIDE 40 MG: 40 TABLET ORAL at 09:34

## 2017-09-06 RX ADMIN — WARFARIN SODIUM 4 MG: 4 TABLET ORAL at 18:16

## 2017-09-06 RX ADMIN — STANDARDIZED SENNA CONCENTRATE AND DOCUSATE SODIUM 2 TABLET: 8.6; 5 TABLET, FILM COATED ORAL at 20:45

## 2017-09-06 RX ADMIN — ISOSORBIDE MONONITRATE 60 MG: 30 TABLET ORAL at 09:34

## 2017-09-06 RX ADMIN — ATORVASTATIN CALCIUM 40 MG: 40 TABLET, FILM COATED ORAL at 20:45

## 2017-09-06 RX ADMIN — VITAMIN C 1 TABLET: TAB at 09:37

## 2017-09-06 RX ADMIN — FOLIC ACID 1 MG: 1 TABLET ORAL at 09:34

## 2017-09-06 RX ADMIN — Medication 100 MG: at 09:34

## 2017-09-06 RX ADMIN — AMIODARONE HYDROCHLORIDE 200 MG: 200 TABLET ORAL at 20:45

## 2017-09-06 RX ADMIN — METOPROLOL SUCCINATE 25 MG: 25 TABLET, EXTENDED RELEASE ORAL at 09:37

## 2017-09-06 ASSESSMENT — PAIN SCALES - GENERAL
PAINLEVEL_OUTOF10: 0

## 2017-09-06 ASSESSMENT — ENCOUNTER SYMPTOMS
ABDOMINAL PAIN: 0
SHORTNESS OF BREATH: 0
CONSTIPATION: 0
HEADACHES: 0
DIZZINESS: 0
CHILLS: 0
MYALGIAS: 0
PALPITATIONS: 0
FEVER: 0

## 2017-09-06 NOTE — PROGRESS NOTES
Internal Medicine Interval Note    Name Bhavesh Saini       1970   Age/Sex 47 y.o. male   MRN [id number]   Code Status FULL.     After 5PM or if no immediate response to page, please call for cross-coverage  Attending/Team: Dr. Veloz/Troy. See Patient List for primary contact information  Call [phone] to page    1st Call - Day Intern (R1):   Charlotte 2nd Call - Day Sr. Resident (R2/R3):   Nando         Reason for interval visit  (Principal Problem)   Atrial fibrillation with RVR (CMS-[facility])    Interval Problem Daily Status Update  (24 hours)   - HD yesterday.  Net UF 2400 mL.   - No acute events overnight.   - Bradycardia stable.  Remained in sinus rhythm.  Continuing metoprolol succinate 25 mg daily.    - Ambulating well, taking PO, and having regular bowel movements.    - INR 2.63, therapeutic.    Atrial fibrillation with rapid ventricular response: Remains in sinus rhythm.  On amiodarone 200 mg nightly and long acting metoprolol succinate 25 mg daily.  INR therapeutic.     Bradycardia: Asymptomatic.     Renal failure: Hemodialysis planning per Nephrology.  Paperwork for single patient contract with [facility] for outpatient dialysis is underway.       Review of Systems   Constitutional: Negative for chills and fever.   Respiratory: Negative for shortness of breath.    Cardiovascular: Negative for chest pain and palpitations.   Gastrointestinal: Negative for abdominal pain and constipation.   Genitourinary: Negative for dysuria.   Musculoskeletal: Negative for myalgias.   Neurological: Negative for dizziness and headaches.       Consultants/Specialty  Cardiology  Cardiac Surgery  Nephrology    Disposition  Inpatient for hemodialysis pending outpatient dialysis chair arrangement    Quality Measures    Reviewed items::  Radiology images reviewed, Medications reviewed and Labs reviewed  Truong catheter::  No Truong  DVT prophylaxis pharmacological::  Warfarin (Coumadin)  And heparin  infusion for now.     Physical Exam       Vitals:    09/05/17 1911 09/06/17 0007 09/06/17 0325 09/06/17 0810   BP:  116/69 120/72 118/70   Pulse: 64 (!) 56 60 64   Resp: 18 18 18 16   Temp: 36.8 °C (98.2 °F) 36.7 °C (98 °F) 36.6 °C (97.8 °F) 36.6 °C (97.8 °F)   SpO2: 97% 100% 97% 95%   Weight: 59.6 kg (131 lb 6.3 oz)      Height:         Body mass index is 25.66 kg/m². Weight: 59.6 kg (131 lb 6.3 oz)  Oxygen Therapy:  Pulse Oximetry: 95 %, O2 (LPM): 0, O2 Delivery: None (Room Air)    Physical Exam   Constitutional: He is oriented to person, place, and time and well-developed, well-nourished, and in no distress. No distress.   Lying in bed.    Eyes: EOM are normal. Pupils are equal, round, and reactive to light.   Neck: No JVD present.   Cardiovascular: Normal rate, regular rhythm and intact distal pulses.    Pulmonary/Chest: Effort normal and breath sounds normal. No respiratory distress. He has no wheezes. He has no rales.   Abdominal: Soft. Bowel sounds are normal. He exhibits no distension. There is no tenderness.   Musculoskeletal: He exhibits no edema.   Neurological: He is alert and oriented to person, place, and time. He has normal reflexes. Gait normal. Coordination normal.   Skin: Skin is warm.   Psychiatric: Affect normal.       Lab Data Review:       8/27/2017  6:30 PM    Recent Labs      09/04/17 0236 09/05/17 0216 09/06/17   0150   SODIUM  132*  129*  131*   POTASSIUM  4.3  4.2  3.7   CHLORIDE  99  100  97   CO2  23  21  22   BUN  44*  62*  37*   CREATININE  3.95*  4.04*  3.57*   MAGNESIUM   --   2.0   --    CALCIUM  9.2  9.1  9.2       Recent Labs      09/04/17   0236  09/05/17   0216  09/06/17   0150   GLUCOSE  84  82  62*       Recent Labs      09/04/17 0236  09/05/17   0216   PROTHROMBTM  28.1*  28.9*   INR  2.54*  2.63*                 Assessment/Plan     * Atrial fibrillation with RVR (CMS-HCC)- (present on admission)   Assessment & Plan    -New onset vs paroxysmal (vague PMH of afib and  non-compliance with meds)  -overnight He was given diltiazem by NF due to Afib with nonsustained HR in 160s. - now in SR  -CHADS2: 4  -ECHO: severe LVH, global hypokinesis, EF ~20%, severe AI and mod MR  -LOBO: slow blood flow but no thrombus. Severe AI    Plan  - Na restricted diet.  - Continue scheduled metoprolol.  PRN 5mg metoprolol q6h.    -S/P LOBO and cardioversion.  -Currently in sinus rhythm.  -Amiodarone 100 mg nightly.   - Continue long acting metoprolol succinate 25 mg    -INRTherapeutic        Hyperkalemia (Resolved )   Assessment & Plan    -On hemodialysis  -Continue to monitor        Cardiorenal syndrome   Assessment & Plan    -Diagnosis given by Nephrology - likely since urine Na is low  -Patient is on HD, and is urinated <500 ml yesterday.  -Creatinine in ED 4.38, Total protein Urine 208. Estimated GFR was 14.   -FENA 0.2%  -BNP 2203>> 2039   -CXR: cardiomegaly. ECHO showed: severe LVH, global hypokinesis, EF ~20%, AR and mod MR  Assessment: Cardiorenal syndrome  Plan  -Patient needs AVR/MAZE w LOBO per Cardiac Surgery, but due to his renal status and recent stroke with hemorrhage, they do not recommend at this time.  They will follow up outpatient in 6 weeks.   -Patient will be counseled on cessation of alcohol intake.   -Nephrology on board.             Aortic regurgitation- (present on admission)   Assessment & Plan    -Severe AR.   -ECHO:  severe LVH, global hypokinesis, EF ~20%, severe AI and mod MR. Findings were confirmed by LOBO  -Fits the criteria for valve replacement. Cardiology and cardiac surgery on board.No plans for surgery due to recent stroke.        HFrEF (heart failure with reduced ejection fraction) (CMS-[facility])- (present on admission)   Assessment & Plan    -Presented with SOB, orthopnea, fatique and palpitations. Has h/o HTN and alcoholism in the past. On exam: JVD +., murmurs + but no volume overload.   -BNP 2203 >> 1753 > 2211.   -CXR: cardiomegaly. ECHO: severe LVH, global  hypokinesis, EF ~20%, severe AI and mod MR  -Assessment: Compensated HF likely due to alcoholism vs tachycardia induced heart failure.   -Cardiac surgery spoke with Mr Saini and family, he needs ischemic work up and AVR/MAZE w LOBO, but due to his renal status and recent stroke with hemorrhage, they do not recommend it at this time.   - Patient's undocumented alien status is another reason having a heart cath is diifficult.      Plan  - Na restricted diet.   -Metoprolol, Lasix and nitrates  - Plan outpatient follow-up with cardiology post discharge..  - Patient's family will fly him to [place] for further care after discharge.          Prolonged Q-T interval on ECG- (present on admission)   Assessment & Plan    -QTc ~530s. Will avoid QTc prolonging meds.        TREASURE (acute kidney injury) / Chronic Kidney Disease   Assessment & Plan    -Acute on chronic. Likely pre-renal vs cardiorenal. HTN is likely the cause of CKD  -On adm: Cr: 4.3, AGMA, Ph 6.1. Unknown baseline.  -FeNa <1%  -H/o renal disease since 7yrs, FH kidney disease in his father. Has been non-complaint with HTN medications. - has CHF  -Renal US: no hydronephrosis and calculi. Left renal cysts.  - Cr and K slowly increasing likely due to poor renal perfusion secondary to atrial fibrillation.    - Upper extremity vein mapping completed in anticipation of AV fistula.     Plan  - Continue Lasix.  Assessed daily by Nephrology for need for HD.    - Paperwork for single patient contract with [facility] for outpatient dialysis is underway.           CVA (cerebral vascular accident) (CMS-HCC)- (present on admission)   Assessment & Plan    -h/o left facial droop, slurred speech and left sided weakness. However no neuro deficits currently  -CT: cerebral atrophy and small vessel ischemic changes   -MRI brain: acute infarction in L frontal periventricular white matter extendeing to subinsular cortex. Punctate area of infarction in R posterior lobe. Numerous  microbleeds.   Assessment: Afib could have contributed (LOBO showed 'sludge' in the heart)  Plan  -ASA, statin  -Mx of Afib as stated above.        Non-rheumatic mitral regurgitation   Assessment & Plan    -Moderate MR on ECHO  -No plans for surgical intervention at this point.        Hyperglycemia- (present on admission)   Assessment & Plan    -Resolved  -On adm: , A1c: 5.4  -He was started on ISS and hypoglycemia protocol which is discontinued.  CTM with accuchecks        Alcoholic (CMS-HCC)- (present on admission)   Assessment & Plan    -History is vague but looks like he was drinking heavily in the past. Last drink was 8/5/2017.  -On thiamine and folate.           Essential hypertension- (present on admission)   Assessment & Plan    -PMH of HTN but likely non-compliant. ECHO showed severe LVH  -Stable on BB, nitrate, lasix

## 2017-09-06 NOTE — CARE PLAN
Problem: Communication  Goal: The ability to communicate needs accurately and effectively will improve  Outcome: PROGRESSING AS EXPECTED  Pt is Malian speaking. Pt is able to communicate needs appropriately.  used when needed. All questions answered at this time.     Problem: Knowledge Deficit  Goal: Knowledge of disease process/condition, treatment plan, diagnostic tests, and medications will improve  Outcome: PROGRESSING AS EXPECTED  Discussed POC with pt. Answered all questions appropriately. White board updated. All medications and interventions explained before performed. Pt verbalized understanding.

## 2017-09-06 NOTE — PROGRESS NOTES
Hospital Medicine Progress Note, Adult, Complex               Author: Bandar Saavedra Date & Time created: 2017  8:09 AM     Interval History:  47 year old with TREASURE, cardiorenal; started on dialysis and tolerating it well. Electrolytes are stable, chronic hyponatremia. No complaints of pain. No numbness/tingling in new AVF area. Overall doing well.    Review of Systems:  Review of Systems   Constitutional: Negative for chills and fever.   Cardiovascular: Negative for chest pain and palpitations.       Physical Exam:  Physical Exam   Constitutional: He is oriented to person, place, and time. He appears well-developed and well-nourished.   Cardiovascular: Normal rate and regular rhythm.    Left AVF, good bruit and thrill   Pulmonary/Chest: Effort normal and breath sounds normal.   Musculoskeletal: He exhibits no edema or deformity.   Neurological: He is alert and oriented to person, place, and time.   Skin: Skin is warm and dry.       Labs:        Invalid input(s): [id number]      Recent Labs      17   0150   SODIUM  132*  129*  131*   POTASSIUM  4.3  4.2  3.7   CHLORIDE  99  100  97   CO2  23  21  22   BUN  44*  62*  37*   CREATININE  3.95*  4.04*  3.57*   MAGNESIUM   --   2.0   --    CALCIUM  9.2  9.1  9.2     Recent Labs      176  176  17   0150   GLUCOSE  84  82  62*     Recent Labs      176  17   0216   PROTHROMBTM  28.1*  28.9*   INR  2.54*  2.63*               Hemodynamics:  Temp (24hrs), Av.6 °C (97.9 °F), Min:36.4 °C (97.6 °F), Max:36.8 °C (98.2 °F)  Temperature: 36.6 °C (97.8 °F)  Pulse  Av.3  Min: 44  Max: 155   Blood Pressure: 120/72     Respiratory:    Respiration: 18, Pulse Oximetry: 97 %     Work Of Breathing / Effort: Mild  RUL Breath Sounds: Clear, RML Breath Sounds: Clear, RLL Breath Sounds: Diminished, SABAS Breath Sounds: Clear, LLL Breath Sounds: Diminished  Fluids:    Intake/Output Summary  (Last 24 hours) at 09/06/17 0809  Last data filed at 09/05/17 2045   Gross per 24 hour   Intake              620 ml   Output             3100 ml   Net            -2480 ml     Weight: 59.6 kg (131 lb 6.3 oz)  GI/Nutrition:  Orders Placed This Encounter   Procedures   • DIET ORDER     Standing Status:   Standing     Number of Occurrences:   1     Order Specific Question:   Diet:     Answer:   Diabetic [3]     Order Specific Question:   Diet:     Answer:   2 Gram Sodium [7]     Order Specific Question:   Diet:     Answer:   Renal [8]     Order Specific Question:   Calorie modifications:     Answer:   1800 kcals [4]     Medical Decision Making, by Problem:  Active Hospital Problems    Diagnosis   • *Atrial fibrillation with RVR (CMS-HCC) [I48.91]   • Hyperkalemia [E87.5]   • Cardiorenal syndrome [I13.10]   • Aortic regurgitation [I35.1]   • Prolonged Q-T interval on ECG [R94.31]   • HFrEF (heart failure with reduced ejection fraction) (CMS-HCC) [I50.20]   • CVA (cerebral vascular accident) (CMS-HCC) [I63.9]   • TREASURE (acute kidney injury) (CMS-HCC) [N17.9]   • Non-rheumatic mitral regurgitation [I34.0]   • Essential hypertension [I10]   • 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]   • Hyperglycemia [R73.9]     1. ESRD - Continue dialysis on TTS schedule, last HD yesterday, no issues  2. AVF - Good bruit and thrill, current access is permcath  3. Renal osteodystrophy, will check pTH, phos in AM  4. HTN - BP stable  5. Nutrition, add renal vitamin    Quality-Core Measures

## 2017-09-06 NOTE — PROGRESS NOTES
Internal Medicine Interval Note    Name Bhavesh Saini       1970   Age/Sex 47 y.o. male   MRN [id number]   Code Status FULL.     After 5PM or if no immediate response to page, please call for cross-coverage  Attending/Team: Dr. Veloz/Troy. See Patient List for primary contact information  Call [phone] to page    1st Call - Day Intern (R1):   Charlotte 2nd Call - Day Sr. Resident (R2/R3):   Nando         Reason for interval visit  (Principal Problem)   Atrial fibrillation with RVR (CMS-[facility])    Interval Problem Daily Status Update  (24 hours)   - Bradycardia Stable.remained in sinus rhythm. We will continue metoprolol succinate 25 mg daily.    - Ambulating well, taking PO, and having regular bowel movements.    - INR therapeutic    Atrial fibrillation with rapid ventricular response: Remains in sinus rhythm.  On amiodarone 100 mg nightly and long acting metoprolol succinate 25 mg daily.  INR therapeutic    Bradycardia: Asymptomatic.     Renal failure: Hemodialysis planning per Nephrology.     Review of Systems   Constitutional: Negative for chills and fever.   Respiratory: Negative for shortness of breath.    Cardiovascular: Negative for chest pain and palpitations.   Gastrointestinal: Negative for abdominal pain and constipation.   Genitourinary: Negative for dysuria.   Musculoskeletal: Negative for myalgias.   Neurological: Negative for dizziness and headaches.       Consultants/Specialty  Cardiology  Cardiac Surgery  Nephrology    Disposition  Inpatient for hemodialysis pending outpatient dialysis chair arrangement    Quality Measures    Reviewed items::  Radiology images reviewed, Medications reviewed and Labs reviewed  Truong catheter::  No Truong  DVT prophylaxis pharmacological::  Warfarin (Coumadin)  And heparin infusion for now.     Physical Exam       Vitals:    17 2315 17 0515 17 0800 17 1425   BP:  154/70 139/83 140/79   Pulse: 70 64 66 60   Resp: 20 18 16  18   Temp:  36.7 °C (98 °F) 36.7 °C (98.1 °F) 36.4 °C (97.6 °F)   SpO2: 99% 95% 95%    Weight:       Height:         Body mass index is 26.48 kg/m². Weight: 61.5 kg (135 lb 9.3 oz)  Oxygen Therapy:  Pulse Oximetry:  (pt at dialysis), O2 (LPM): 0, O2 Delivery: None (Room Air)    Physical Exam   Constitutional: He is oriented to person, place, and time and well-developed, well-nourished, and in no distress. No distress.   Lying in bed.    Eyes: EOM are normal. Pupils are equal, round, and reactive to light.   Neck: No JVD present.   Pulmonary/Chest: Effort normal and breath sounds normal. No respiratory distress. He has no wheezes. He has no rales.   Abdominal: Soft. Bowel sounds are normal. He exhibits no distension. There is no tenderness.   Musculoskeletal: He exhibits no edema.   Neurological: He is alert and oriented to person, place, and time. He has normal reflexes. Gait normal. Coordination normal.   Skin: Skin is warm.   Psychiatric: Affect normal.       Lab Data Review:       8/27/2017  6:30 PM    Recent Labs      09/03/17 0815 09/04/17 0236 09/05/17 0216   SODIUM  136  132*  129*   POTASSIUM  4.4  4.3  4.2   CHLORIDE  99  99  100   CO2  25  23  21   BUN  34*  44*  62*   CREATININE  3.41*  3.95*  4.04*   MAGNESIUM   --    --   2.0   CALCIUM  9.6  9.2  9.1       Recent Labs      09/03/17 0815  09/04/17 0236  09/05/17   0216   GLUCOSE  118*  84  82       Recent Labs      09/03/17 0236  09/04/17 0236  09/05/17   0216   PROTHROMBTM  28.2*  28.1*  28.9*   INR  2.55*  2.54*  2.63*                 Assessment/Plan     * Atrial fibrillation with RVR (CMS-HCC)- (present on admission)   Assessment & Plan    -New onset vs paroxysmal (vague PMH of afib and non-compliance with meds)  -overnight He was given diltiazem by NF due to Afib with nonsustained HR in 160s. - now in SR  -CHADS2: 4  -ECHO: severe LVH, global hypokinesis, EF ~20%, severe AI and mod MR  -LOBO: slow blood flow but no thrombus. Severe  AI    Plan  - Na restricted diet.  - Continue scheduled metoprolol.  PRN 5mg metoprolol q6h.    -S/P LOBO and cardioversion.  -Currently in sinus rhythm.  -Amiodarone 100 mg nightly.   - Started long acting metoprolol succinate 25 mg    -INRTherapeutic        Hyperkalemia (Resolved )   Assessment & Plan    -On hemodialysis  -Continue to monitor        Cardiorenal syndrome   Assessment & Plan    -Diagnosis given by Nephrology - likely since urine Na is low  -Patient is on HD, and is urinated <500 ml yesterday.  -Creatinine in ED 4.38, Total protein Urine 208. Estimated GFR was 14.   -FENA 0.2%  -BNP 2203>> 2039   -CXR: cardiomegaly. ECHO showed: severe LVH, global hypokinesis, EF ~20%, AR and mod MR  Assessment: Cardiorenal syndrome  Plan  -Patient needs AVR/MAZE w LOBO per Cardiac Surgery, but due to his renal status and recent stroke with hemorrhage, they do not recommend at this time.  They will follow up outpatient in 6 weeks.   -Patient will be counseled on cessation of alcohol intake.   -Nephrology on board.             Aortic regurgitation- (present on admission)   Assessment & Plan    -Severe AR.   -ECHO:  severe LVH, global hypokinesis, EF ~20%, severe AI and mod MR. Findings were confirmed by LOBO  -Fits the criteria for valve replacement. Cardiology and cardiac surgery on board.No plans for surgery due to recent stroke.        HFrEF (heart failure with reduced ejection fraction) (CMS-[facility])- (present on admission)   Assessment & Plan    -Presented with SOB, orthopnea, fatique and palpitations. Has h/o HTN and alcoholism in the past. On exam: JVD +., murmurs + but no volume overload.   -BNP 2203 >> 1753 > 2211.   -CXR: cardiomegaly. ECHO: severe LVH, global hypokinesis, EF ~20%, severe AI and mod MR  -Assessment: Compensated HF likely due to alcoholism vs tachycardia induced heart failure.   -Cardiac surgery spoke with Mr Saini and family, he needs ischemic work up and AVR/MAZE w LOBO, but due to his renal  status and recent stroke with hemorrhage, they do not recommend it at this time.   - Patient's undocumented alien status is another reason having a heart cath is diicamiloicult.      Plan  - Na restricted diet.   -Metoprolol, Lasix and nitrates  - Plan outpatient follow-up with cardiology post discharge..  - Patient's family will fly him to [place] for further care after discharge.          Prolonged Q-T interval on ECG- (present on admission)   Assessment & Plan    -QTc ~530s. Will avoid QTc prolonging meds.        TREASURE (acute kidney injury) / Chronic Kidney Disease   Assessment & Plan    -Acute on chronic. Likely pre-renal vs cardiorenal. HTN is likely the cause of CKD  -On adm: Cr: 4.3, AGMA, Ph 6.1. Unknown baseline.  -FeNa <1%  -H/o renal disease since 7yrs, FH kidney disease in his father. Has been non-complaint with HTN medications. - has CHF  -Renal US: no hydronephrosis and calculi. Left renal cysts.  - Cr and K slowly increasing likely due to poor renal perfusion secondary to atrial fibrillation.    - Upper extremity vein mapping completed in anticipation of AV fistula.     Plan  - Continue Lasix.  Assessed daily by Nephrology for need for HD.    - Nephrology assisting patient with Emergency Medicaid, which can be used for hemodialysis as an outpatient in their clinic until patient can move to [place].          CVA (cerebral vascular accident) (CMS-[facility])- (present on admission)   Assessment & Plan    -h/o left facial droop, slurred speech and left sided weakness. However no neuro deficits currently  -CT: cerebral atrophy and small vessel ischemic changes   -MRI brain: acute infarction in L frontal periventricular white matter extendeing to subinsular cortex. Punctate area of infarction in R posterior lobe. Numerous microbleeds.   Assessment: Afib could have contributed (LOBO showed 'sludge' in the heart)  Plan  -ASA, statin  -Mx of Afib as stated above.        Non-rheumatic mitral regurgitation   Assessment & Plan     -Moderate MR on ECHO  -No plans for surgical intervention at this point.        Hyperglycemia- (present on admission)   Assessment & Plan    -Resolved  -On adm: , A1c: 5.4  -He was started on ISS and hypoglycemia protocol which is discontinued.  CTM with accuchecks        Alcoholic (CMS-HCC)- (present on admission)   Assessment & Plan    -History is vague but looks like he was drinking heavily in the past. Last drink was 8/5/2017.  -On thiamine and folate.           Essential hypertension- (present on admission)   Assessment & Plan    -PMH of HTN but likely non-compliant. ECHO showed severe LVH  -Stable on BB, nitrate, lasix

## 2017-09-06 NOTE — PROGRESS NOTES
Assumed care report received. Assessment completed. AOx4. Pt resting in bed, denies any pain at this time. No other complaints at this time. Plan of care discussed, verbalized understanding. Fall precautions in place. Call light within reach. Tele monitor in place. White board updated.

## 2017-09-06 NOTE — CARE PLAN
Problem: Communication  Goal: The ability to communicate needs accurately and effectively will improve    Intervention: Educate patient and significant other/support system about the plan of care, procedures, treatments, medications and allow for questions  Language line utilized.  Patient's white board is updated. Patient is updated on plan of care. All questions were answered.       Problem: Knowledge Deficit  Goal: Knowledge of disease process/condition, treatment plan, diagnostic tests, and medications will improve    Intervention: Explain information regarding disease process/condition, treatment plan, diagnostic tests, and medications and document in education  Language line utilized. Pt educated about disease process and plan of care for the day.  Pt verbalized understanding.

## 2017-09-06 NOTE — PROGRESS NOTES
Received report from previous nurse regarding prior 12 hours.  Language line utilized, POC reviewed with pt, white board updated, pt verbalized understanding, call light within reach.  Pt tolerated morning meds.

## 2017-09-06 NOTE — DISCHARGE PLANNING
Outpatient Dialysis Placement Update    Confirmed with [facility]- SPA (single patient agreement) contract was sent to hospital contact. Awaiting signature completion.    Dialysis Coordinator- [facility],  Damon Hammer [phone]

## 2017-09-06 NOTE — PROGRESS NOTES
Inpatient Anticoagulation Service Note    Date: 2017  Reason for Anticoagulation: Atrial Fibrillation   [id number] VASc Score: 4    Hemoglobin Value: (!) 12.9  Hematocrit Value: (!) 39.2  Lab Platelet Value: 212  Target INR: 2.0 to 3.0    INR from last 7 days     Date/Time INR Value    17 0216 (!)  2.63    17 0236 (!)  2.54    17 0236 (!)  2.55    17 0312 (!)  2.13    17 0240 (!)  1.99    17 0313 (!)  1.69        Dose from last 7 days     Date/Time Dose (mg)    17 1200  4    17 1100  4    17 1000  4    17 1000  4    17 1400  4    17 1300  6    17 1000  5    17 1500  7.5        Average Dose (mg): 4.6 (7 day average)  Significant Interactions: Amiodarone, Statin (MVI)  Bridge Therapy: No (heparin weight based protocol)  Bridge Therapy Start Date: 17  Days of Overlap Therapy:   INR Value Greater than 2 Prior to Discontinuation of Parenteral Anticoagulation: Not Applicable     Reversal Agent Administered: Not Applicable  Comments: No INR today.  No H/H reporting ~ 1 week.  Continues on dialysis.  Heparin for dialysis only.  Continue with warfarin 4 mg daily.  INR again in AM to verify trend.  Drug-drug interactions reviewed.  No bridge therapy.    Plan:  4 mg daily  Education Material Provided?: Yes  Pharmacist suggested discharge dosin mg daily.  INR within 48 hours of discharge.     Naga Fernandes, PharmD, BCPS

## 2017-09-07 LAB
ANION GAP SERPL CALC-SCNC: 12 MMOL/L (ref 0–11.9)
BUN SERPL-MCNC: 60 MG/DL (ref 8–22)
CALCIUM SERPL-MCNC: 9.1 MG/DL (ref 8.5–10.5)
CHLORIDE SERPL-SCNC: 98 MMOL/L (ref 96–112)
CO2 SERPL-SCNC: 21 MMOL/L (ref 20–33)
CREAT SERPL-MCNC: 4.39 MG/DL (ref 0.5–1.4)
GFR SERPL CREATININE-BSD FRML MDRD: 14 ML/MIN/1.73 M 2
GLUCOSE SERPL-MCNC: 83 MG/DL (ref 65–99)
INR PPP: 2.77 (ref 0.87–1.13)
PHOSPHATE SERPL-MCNC: 6 MG/DL (ref 2.5–4.5)
POTASSIUM SERPL-SCNC: 4.1 MMOL/L (ref 3.6–5.5)
PROTHROMBIN TIME: 30.1 SEC (ref 12–14.6)
PTH-INTACT SERPL-MCNC: 202.6 PG/ML (ref 14–72)
SODIUM SERPL-SCNC: 131 MMOL/L (ref 135–145)

## 2017-09-07 PROCEDURE — 36415 COLL VENOUS BLD VENIPUNCTURE: CPT

## 2017-09-07 PROCEDURE — A9270 NON-COVERED ITEM OR SERVICE: HCPCS | Performed by: INTERNAL MEDICINE

## 2017-09-07 PROCEDURE — 80048 BASIC METABOLIC PNL TOTAL CA: CPT

## 2017-09-07 PROCEDURE — 84100 ASSAY OF PHOSPHORUS: CPT

## 2017-09-07 PROCEDURE — 700102 HCHG RX REV CODE 250 W/ 637 OVERRIDE(OP): Performed by: INTERNAL MEDICINE

## 2017-09-07 PROCEDURE — 83970 ASSAY OF PARATHORMONE: CPT

## 2017-09-07 PROCEDURE — 85610 PROTHROMBIN TIME: CPT

## 2017-09-07 PROCEDURE — 99231 SBSQ HOSP IP/OBS SF/LOW 25: CPT | Mod: GC | Performed by: HOSPITALIST

## 2017-09-07 PROCEDURE — 90935 HEMODIALYSIS ONE EVALUATION: CPT

## 2017-09-07 PROCEDURE — 770006 HCHG ROOM/CARE - MED/SURG/GYN SEMI*

## 2017-09-07 PROCEDURE — 700102 HCHG RX REV CODE 250 W/ 637 OVERRIDE(OP): Performed by: STUDENT IN AN ORGANIZED HEALTH CARE EDUCATION/TRAINING PROGRAM

## 2017-09-07 PROCEDURE — A9270 NON-COVERED ITEM OR SERVICE: HCPCS | Performed by: STUDENT IN AN ORGANIZED HEALTH CARE EDUCATION/TRAINING PROGRAM

## 2017-09-07 PROCEDURE — 700111 HCHG RX REV CODE 636 W/ 250 OVERRIDE (IP)

## 2017-09-07 PROCEDURE — 700102 HCHG RX REV CODE 250 W/ 637 OVERRIDE(OP): Performed by: HOSPITALIST

## 2017-09-07 PROCEDURE — A9270 NON-COVERED ITEM OR SERVICE: HCPCS | Performed by: HOSPITALIST

## 2017-09-07 RX ORDER — HEPARIN SODIUM 1000 [USP'U]/ML
INJECTION, SOLUTION INTRAVENOUS; SUBCUTANEOUS
Status: COMPLETED
Start: 2017-09-07 | End: 2017-09-07

## 2017-09-07 RX ADMIN — Medication 100 MG: at 11:33

## 2017-09-07 RX ADMIN — STANDARDIZED SENNA CONCENTRATE AND DOCUSATE SODIUM 2 TABLET: 8.6; 5 TABLET, FILM COATED ORAL at 11:33

## 2017-09-07 RX ADMIN — WARFARIN SODIUM 4 MG: 4 TABLET ORAL at 20:40

## 2017-09-07 RX ADMIN — METOPROLOL SUCCINATE 25 MG: 25 TABLET, EXTENDED RELEASE ORAL at 11:34

## 2017-09-07 RX ADMIN — FUROSEMIDE 40 MG: 40 TABLET ORAL at 11:34

## 2017-09-07 RX ADMIN — STANDARDIZED SENNA CONCENTRATE AND DOCUSATE SODIUM 2 TABLET: 8.6; 5 TABLET, FILM COATED ORAL at 20:12

## 2017-09-07 RX ADMIN — FOLIC ACID 1 MG: 1 TABLET ORAL at 11:33

## 2017-09-07 RX ADMIN — HEPARIN SODIUM 3800 UNITS: 1000 INJECTION, SOLUTION INTRAVENOUS; SUBCUTANEOUS at 10:35

## 2017-09-07 RX ADMIN — ISOSORBIDE MONONITRATE 60 MG: 30 TABLET ORAL at 11:34

## 2017-09-07 RX ADMIN — ATORVASTATIN CALCIUM 40 MG: 40 TABLET, FILM COATED ORAL at 20:12

## 2017-09-07 RX ADMIN — VITAMIN C 1 TABLET: TAB at 11:36

## 2017-09-07 RX ADMIN — AMIODARONE HYDROCHLORIDE 200 MG: 200 TABLET ORAL at 20:12

## 2017-09-07 ASSESSMENT — ENCOUNTER SYMPTOMS
HEADACHES: 0
MYALGIAS: 0
CHILLS: 0
PALPITATIONS: 0
SHORTNESS OF BREATH: 0
FEVER: 0
CONSTIPATION: 0
DIZZINESS: 0
ABDOMINAL PAIN: 0

## 2017-09-07 ASSESSMENT — PAIN SCALES - GENERAL
PAINLEVEL_OUTOF10: 0

## 2017-09-07 NOTE — CARE PLAN
Problem: Venous Thromboembolism (VTW)/Deep Vein Thrombosis (DVT) Prevention:  Goal: Patient will participate in Venous Thrombosis (VTE)/Deep Vein Thrombosis (DVT)Prevention Measures  Outcome: PROGRESSING AS EXPECTED  Pt on coumadin. INR checked appropriately.     Problem: Mobility  Goal: Risk for activity intolerance will decrease  Outcome: PROGRESSING AS EXPECTED  Pt up ambulating halls with steady gait upself. Denies any difficulty at this time. Pt encouraged to call for help if he needs any. Pt verbalized understanding.

## 2017-09-07 NOTE — PROGRESS NOTES
Pt to [facility] via pt transport.  All belongings with pt. Family notified by pt of transfer to [facility].

## 2017-09-07 NOTE — PROGRESS NOTES
Assumed care report received. Assessment completed. AOx4. Pt sitting on couch, denies any pain at this time. No other complaints at this time. Plan of care discussed, verbalized understanding. Fall precautions in place. Call light within reach. Tele monitor in place. White board updated.

## 2017-09-07 NOTE — DISCHARGE PLANNING
Addendum 9/8/2017 07:45am    **Met with patient beside and provided dialysis schedule letter and instructions.  Utilized language line for Ukrainian interpretation. Patient verbalized understanding and knows when to start at outpatient facility next week.**    Dialysis Coordinator- [facility] [phone]      Outpatient Dialysis Placement Confirmation    Patient has been placed and confirmed at:     [facility]  [address] [place] [address] [address] [place] [address]  Phone: [phone] Fax: [phone]    Schedule: Monday, Wednesday, Friday  Time: 06:15am    Arrival 06:00am on first day for paperwork.Patient is cleared to start on Monday 9/11/2017.    **Unable to provide patient schedule letter beside this afternoon, room not equipped with  phone for Ukrainian translation. (patient recently transferred to floor/chart not updated as of yet)Will follow up tomorrow morning 9/8 to verbally explain dialysis scheduling with  phone bedside.**    Follow up courtesy message to Nephrology Dr. Saavedra to notify of HD chair schedule. Follow up call to RAHEL Hdez and updated on HD chair schedule and confirmation.    Dialysis Coordinator- [facility],  Damon Hammer [phone]

## 2017-09-07 NOTE — PROGRESS NOTES
Pt's family is going to go to [facility] [place] to get dialysis forms filled out.

## 2017-09-07 NOTE — DISCHARGE PLANNING
Outpatient Dialysis Placement Update    Follow up call to admissions, confirmed [facility] contract received. Awaiting final clearance and HD chair assignment from Radha Cross.    Dialysis Coordinator- [facility],  Damon Hammer [phone]

## 2017-09-07 NOTE — PROGRESS NOTES
HD treatment were ordered by Dr. Saavedra, pt was able to tolerate HD treatment without any difficulty, pt was able to pull 3.0 liters of fluids. Gave report to Sruthi RN, pt CVC dressing were dry and intact, no swelling or redness were noted post tx, +B/T pre and post tx. Access were locked with heparin 1000 units/ml, 1.9 ml arterial port and 1.9 ml venous port. Pt was stable post tx, denies any complaint of pain and SOB, no fever or any distress were noted post tx. Treatment started at 0728 and ended at 1030, see flow sheets for further details.

## 2017-09-07 NOTE — PROGRESS NOTES
[facility] Medicine Progress Note, Adult, Complex               Author: Bandar Infanteajaymaureen Date & Time created: 2017  1:42 PM     Interval History:  47 year old with new ESRD, seen on dialysis. No issues with treatment. No new events.    Review of Systems:  Review of Systems   Constitutional: Negative for chills and fever.   Cardiovascular: Negative for chest pain and palpitations.       Physical Exam:  Physical Exam   Constitutional: He is oriented to person, place, and time. He appears well-developed and well-nourished.   Cardiovascular: Normal rate and regular rhythm.    Pulmonary/Chest: Effort normal and breath sounds normal.   Neurological: He is alert and oriented to person, place, and time.       Labs:        Invalid input(s): [id number]      Recent Labs      17   0216  17   0150  17   0154  17   0205   SODIUM  129*  131*  131*   --    POTASSIUM  4.2  3.7  4.1   --    CHLORIDE  100  97  98   --    CO2  21  22  21   --    BUN  62*  37*  60*   --    CREATININE  4.04*  3.57*  4.39*   --    MAGNESIUM  2.0   --    --    --    PHOSPHORUS   --    --    --   6.0*   CALCIUM  9.1  9.2  9.1   --      Recent Labs      17   02117   0150  17   0154   GLUCOSE  82  62*  83     Recent Labs      17   0204   PROTHROMBTM  28.9*  30.1*   INR  2.63*  2.77*               Hemodynamics:  Temp (24hrs), Av.6 °C (97.9 °F), Min:36.4 °C (97.5 °F), Max:36.9 °C (98.5 °F)  Temperature: 36.9 °C (98.5 °F)  Pulse  Av.1  Min: 44  Max: 155   Blood Pressure: 150/84     Respiratory:    Respiration: 16, Pulse Oximetry: 98 %     Work Of Breathing / Effort: Mild  RUL Breath Sounds: Clear, RML Breath Sounds: Clear, RLL Breath Sounds: Clear, SABAS Breath Sounds: Clear, LLL Breath Sounds: Clear  Fluids:    Intake/Output Summary (Last 24 hours) at 17 1342  Last data filed at 17 1030   Gross per 24 hour   Intake              980 ml   Output             3200 ml   Net             -2220 ml     Weight: 60 kg (132 lb 4.4 oz)  GI/Nutrition:  Orders Placed This Encounter   Procedures   • DIET ORDER     Standing Status:   Standing     Number of Occurrences:   1     Order Specific Question:   Diet:     Answer:   Diabetic [3]     Order Specific Question:   Diet:     Answer:   2 Gram Sodium [7]     Order Specific Question:   Diet:     Answer:   Renal [8]     Order Specific Question:   Calorie modifications:     Answer:   1800 kcals [4]     Medical Decision Making, by Problem:  Active Hospital Problems    Diagnosis   • *Atrial fibrillation with RVR (CMS-HCC) [I48.91]   • Hyperkalemia [E87.5]   • Cardiorenal syndrome [I13.10]   • Aortic regurgitation [I35.1]   • Prolonged Q-T interval on ECG [R94.31]   • HFrEF (heart failure with reduced ejection fraction) (CMS-HCC) [I50.20]   • CVA (cerebral vascular accident) (CMS-HCC) [I63.9]   • TREASURE (acute kidney injury) (CMS-HCC) [N17.9]   • Non-rheumatic mitral regurgitation [I34.0]   • Essential hypertension [I10]   • 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]   • Hyperglycemia [R73.9]     1. ESRD - Seen on HD. See flowsheet for details.   2. Dispo - Plan for outpatient dialysis  Quality-Core Measures

## 2017-09-07 NOTE — PROGRESS NOTES
Inpatient Anticoagulation Service Note    Date: 9/7/2017  Reason for Anticoagulation: Atrial Fibrillation  Hemoglobin Value: (!) 12.9  Hematocrit Value: (!) 39.2  Lab Platelet Value: 212  Target INR: 2.0 to 3.0  INR from last 7 days     Date/Time INR Value    09/07/17 0204 (!)  2.77    09/05/17 0216 (!)  2.63    09/04/17 0236 (!)  2.54    09/03/17 0236 (!)  2.55    09/02/17 0312 (!)  2.13    09/01/17 0240 (!)  1.99        Dose from last 7 days     Date/Time Dose (mg)    09/07/17 1500  4    09/06/17 1200  4    09/05/17 1100  4    09/04/17 1000  4    09/03/17 1000  4    09/02/17 1400  4    09/01/17 1300  6        Average Dose (mg): 4.6 (7 day average)  Significant Interactions: Amiodarone, Statin, MVI with vitamin C  Bridge Therapy: completed    Comments: INR within goal today. Will continue with every other INRs and if within goal Sat, likely patient stable and we can go to Mon, Thurs draws. No s/s of bleeding noted in the chart. Patient's multivitamin was changed to MVI with vitamin C which may interact with warfarin.    Plan:  4 mg tonight, INR for Sat  Education Material Provided?: Yes  Pharmacist suggested discharge dosing: warfarin 4 mg daily. Recommend patient have INR checked 2-3 days after discharge.     Dulce Maria Enrique, VitorD

## 2017-09-07 NOTE — PROGRESS NOTES
Pt returned from dialysis via pt transport. Received report from previous nurse regarding prior 12 hours.  POC reviewed with pt, white board updated, pt verbalized understanding, call light within reach.  Pt tolerated morning meds.

## 2017-09-08 LAB
ANION GAP SERPL CALC-SCNC: 14 MMOL/L (ref 0–11.9)
BUN SERPL-MCNC: 46 MG/DL (ref 8–22)
CALCIUM SERPL-MCNC: 9 MG/DL (ref 8.5–10.5)
CHLORIDE SERPL-SCNC: 93 MMOL/L (ref 96–112)
CO2 SERPL-SCNC: 24 MMOL/L (ref 20–33)
CREAT SERPL-MCNC: 4.18 MG/DL (ref 0.5–1.4)
GFR SERPL CREATININE-BSD FRML MDRD: 15 ML/MIN/1.73 M 2
GLUCOSE SERPL-MCNC: 88 MG/DL (ref 65–99)
INR PPP: 2.65 (ref 0.87–1.13)
POTASSIUM SERPL-SCNC: 4 MMOL/L (ref 3.6–5.5)
PROTHROMBIN TIME: 29.1 SEC (ref 12–14.6)
SODIUM SERPL-SCNC: 131 MMOL/L (ref 135–145)

## 2017-09-08 PROCEDURE — A9270 NON-COVERED ITEM OR SERVICE: HCPCS | Performed by: STUDENT IN AN ORGANIZED HEALTH CARE EDUCATION/TRAINING PROGRAM

## 2017-09-08 PROCEDURE — 700102 HCHG RX REV CODE 250 W/ 637 OVERRIDE(OP): Performed by: INTERNAL MEDICINE

## 2017-09-08 PROCEDURE — A9270 NON-COVERED ITEM OR SERVICE: HCPCS | Performed by: INTERNAL MEDICINE

## 2017-09-08 PROCEDURE — 700102 HCHG RX REV CODE 250 W/ 637 OVERRIDE(OP): Performed by: STUDENT IN AN ORGANIZED HEALTH CARE EDUCATION/TRAINING PROGRAM

## 2017-09-08 PROCEDURE — 80048 BASIC METABOLIC PNL TOTAL CA: CPT

## 2017-09-08 PROCEDURE — 3E0234Z INTRODUCTION OF SERUM, TOXOID AND VACCINE INTO MUSCLE, PERCUTANEOUS APPROACH: ICD-10-PCS | Performed by: HOSPITALIST

## 2017-09-08 PROCEDURE — 36415 COLL VENOUS BLD VENIPUNCTURE: CPT

## 2017-09-08 PROCEDURE — 700102 HCHG RX REV CODE 250 W/ 637 OVERRIDE(OP): Performed by: HOSPITALIST

## 2017-09-08 PROCEDURE — A9270 NON-COVERED ITEM OR SERVICE: HCPCS | Performed by: HOSPITALIST

## 2017-09-08 PROCEDURE — 700111 HCHG RX REV CODE 636 W/ 250 OVERRIDE (IP): Performed by: HOSPITALIST

## 2017-09-08 PROCEDURE — 770006 HCHG ROOM/CARE - MED/SURG/GYN SEMI*

## 2017-09-08 PROCEDURE — 85610 PROTHROMBIN TIME: CPT

## 2017-09-08 PROCEDURE — 90686 IIV4 VACC NO PRSV 0.5 ML IM: CPT | Performed by: HOSPITALIST

## 2017-09-08 RX ADMIN — VITAMIN C 1 TABLET: TAB at 08:08

## 2017-09-08 RX ADMIN — STANDARDIZED SENNA CONCENTRATE AND DOCUSATE SODIUM 2 TABLET: 8.6; 5 TABLET, FILM COATED ORAL at 20:48

## 2017-09-08 RX ADMIN — ATORVASTATIN CALCIUM 40 MG: 40 TABLET, FILM COATED ORAL at 20:48

## 2017-09-08 RX ADMIN — ISOSORBIDE MONONITRATE 60 MG: 30 TABLET ORAL at 08:08

## 2017-09-08 RX ADMIN — STANDARDIZED SENNA CONCENTRATE AND DOCUSATE SODIUM 2 TABLET: 8.6; 5 TABLET, FILM COATED ORAL at 08:08

## 2017-09-08 RX ADMIN — Medication 100 MG: at 08:08

## 2017-09-08 RX ADMIN — FUROSEMIDE 40 MG: 40 TABLET ORAL at 08:08

## 2017-09-08 RX ADMIN — METOPROLOL SUCCINATE 25 MG: 25 TABLET, EXTENDED RELEASE ORAL at 08:08

## 2017-09-08 RX ADMIN — AMIODARONE HYDROCHLORIDE 200 MG: 200 TABLET ORAL at 20:48

## 2017-09-08 RX ADMIN — INFLUENZA A VIRUS A/MICHIGAN/45/2015 X-275 (H1N1) ANTIGEN (FORMALDEHYDE INACTIVATED), INFLUENZA A VIRUS A/HONG KONG/4801/2014 X-263B (H3N2) ANTIGEN (FORMALDEHYDE INACTIVATED), INFLUENZA B VIRUS B/PHUKET/3073/2013 ANTIGEN (FORMALDEHYDE INACTIVATED), AND INFLUENZA B VIRUS B/BRISBANE/60/2008 ANTIGEN (FORMALDEHYDE INACTIVATED) 0.5 ML: 15; 15; 15; 15 INJECTION, SUSPENSION INTRAMUSCULAR at 08:17

## 2017-09-08 RX ADMIN — FOLIC ACID 1 MG: 1 TABLET ORAL at 08:08

## 2017-09-08 RX ADMIN — WARFARIN SODIUM 4 MG: 4 TABLET ORAL at 17:44

## 2017-09-08 ASSESSMENT — ENCOUNTER SYMPTOMS
MYALGIAS: 0
SHORTNESS OF BREATH: 0
CHILLS: 0
PALPITATIONS: 0
DIZZINESS: 0
CONSTIPATION: 0
ABDOMINAL PAIN: 0
FEVER: 0
HEADACHES: 0

## 2017-09-08 ASSESSMENT — PAIN SCALES - GENERAL
PAINLEVEL_OUTOF10: 0
PAINLEVEL_OUTOF10: 0

## 2017-09-08 NOTE — PROGRESS NOTES
Inpatient Anticoagulation Service Note    Date: 9/8/2017  Reason for Anticoagulation: Atrial Fibrillation  Hemoglobin Value: (!) 12.9  Hematocrit Value: (!) 39.2  Lab Platelet Value: 212  Target INR: 2.0 to 3.0  INR from last 7 days     Date/Time INR Value    09/08/17 0209 (!)  2.65    09/07/17 0204 (!)  2.77    09/05/17 0216 (!)  2.63    09/04/17 0236 (!)  2.54    09/03/17 0236 (!)  2.55    09/02/17 0312 (!)  2.13        Dose from last 7 days     Date/Time Dose (mg)    09/08/17 1000  4    09/07/17 1500  4    09/06/17 1200  4    09/05/17 1100  4    09/04/17 1000  4    09/03/17 1000  4    09/02/17 1400  4    09/01/17 1300  6        Average Dose (mg): 4  Significant Interactions: Amiodarone, Statin  Bridge Therapy: No    Comments: INR within goal today. Will continue current dosing regimen. Check INRs every Mon/Thurs. No sx of bleeding noted. H/H low/stable. PLT stable. Significant interactions as noted above.      Plan:  Warfarin 4 mg today. INRs every Mon/Thurs.   Education Material Provided?: Yes  Pharmacist suggested discharge dosing: Consider 4 mg daily. Recommend outpatient follow-up INR within 72 hours of discharge.      Jovita Morocho, Pharmacy Intern      ADDENDUM:  I agree with the plan as outlined above.    Edilia Abraham, PharmD

## 2017-09-08 NOTE — CARE PLAN
Problem: Communication  Goal: The ability to communicate needs accurately and effectively will improve  Outcome: PROGRESSING AS EXPECTED  Patient was educated on proper use of call light and to ask questions pertaining to his care being provided.    Problem: Pain Management  Goal: Pain level will decrease to patient's comfort goal  Outcome: PROGRESSING AS EXPECTED  Patient denies pain and made no requests for medication

## 2017-09-08 NOTE — PROGRESS NOTES
Pt report received, pt arrived in room and orientated to room, call light system and given water. Pt stable with no complaints at this time.

## 2017-09-08 NOTE — HEART FAILURE PROGRAM
Cardiovascular Nurse Navigator () Progress Note:      Spoke with Madeleine OAKES, plan is to dialyze on Saturday and then discharge. Patient has the below appointment with the [facility] in [place]. Patient will be able to get medications on a sliding fee scale there but not until he is already established.     Therefore, this [employer] has placed another order to SW just to get back on their radar to ensure that patient is provided with medications to get him by until his appointment.     KRISTY Luke.  Go on 9/12/2017  please arrive at 9:00am for a 9:30am appointment. [facility] does a sliding scale for all their self pay patients. Please bring a Photo ID, Proof of income and Proof of address with you to this appointment. Thank you  [address]  [facility] [address]  [phone]     Thank you and please call with questions,  Lazaar Cardio RN Navigator [phone]

## 2017-09-08 NOTE — PROGRESS NOTES
Pt is AOx4. Plan of care discussed. Denies pain.  Tolerated all oral medications, skin and sacral assessment done. Thrill palpated, bruit heard. Call light in use, no bed alarm needed, up self independently, treaded socks on, bed locked in low position

## 2017-09-08 NOTE — CARE PLAN
Problem: Knowledge Deficit  Goal: Knowledge of disease process/condition, treatment plan, diagnostic tests, and medications will improve    Intervention: Explain information regarding disease process/condition, treatment plan, diagnostic tests, and medications and document in education  Educated patient on flu vaccine. Provided educational handouts in Austrian. Patient verbalized understanding and accepted the vaccine.       Problem: Knowledge Deficit:  Goal: Patient's knowledge of the prescribed therapeutic regimen will improve    Intervention: Discuss hemodialysis  Dialysis representative came in to educate patient about dialysis. Language line used. Patient verbalized understanding.

## 2017-09-08 NOTE — PROGRESS NOTES
Patient lying in bed during change of shift report.  He is Dutch speaking mainly but knows English.  I know some Croatian so together we were able to develop a dialogue.  He had no complaints of pain nor distress, he did state that he was tired.  AAOX4, up self at his desire.

## 2017-09-08 NOTE — PROGRESS NOTES
Internal Medicine Interval Note    Name Bhavesh Saini       1970   Age/Sex 47 y.o. male   MRN [id number]   Code Status FULL.     After 5PM or if no immediate response to page, please call for cross-coverage  Attending/Team: Dr. Veloz/T[place]. See Patient List for primary contact information  Call [phone] to page    1st Call - Day Intern (R1):   Charlotte 2nd Call - Day Sr. Resident (R2/R3):   Nando         Reason for interval visit  (Principal Problem)   Atrial fibrillation with RVR (CMS-[facility])    Interval Problem Daily Status Update  (24 hours)   - No acute events overnight.   - Bradycardia stable.  Remained in sinus rhythm.  Continuing metoprolol succinate 25 mg daily.    - Ambulating well, taking PO, and having regular bowel movements.    - INR 2.77, therapeutic.    Atrial fibrillation with rapid ventricular response: Remains in sinus rhythm.  On amiodarone 200 mg nightly and long acting metoprolol succinate 25 mg daily.  INR therapeutic.     Bradycardia: Asymptomatic.     Renal failure: Hemodialysis planning per Nephrology.  Paperwork for single patient contract with [facility] is complete.  Patient can start hemodialysis as outpatient on Monday.      Review of Systems   Constitutional: Negative for chills and fever.   Respiratory: Negative for shortness of breath.    Cardiovascular: Negative for chest pain and palpitations.   Gastrointestinal: Negative for abdominal pain and constipation.   Genitourinary: Negative for dysuria.   Musculoskeletal: Negative for myalgias.   Neurological: Negative for dizziness and headaches.       Consultants/Specialty  Cardiology  Cardiac Surgery  Nephrology    Disposition  Inpatient for hemodialysis pending outpatient dialysis chair arrangement    Quality Measures    Reviewed items::  Radiology images reviewed, Medications reviewed and Labs reviewed  Truong catheter::  No Truong  DVT prophylaxis pharmacological::  Warfarin (Coumadin)  And heparin  infusion for now.     Physical Exam       Vitals:    09/06/17 1914 09/07/17 0333 09/07/17 1546 09/07/17 1943   BP: 151/94 150/84 103/67 120/66   Pulse: 66 64 97 62   Resp: 16 16 16 18   Temp: 36.4 °C (97.5 °F) 36.9 °C (98.5 °F) 35.8 °C (96.5 °F) 36.7 °C (98 °F)   SpO2: 100% 98% 92% 98%   Weight: 60 kg (132 lb 4.4 oz)      Height:         Body mass index is 25.83 kg/m².    Oxygen Therapy:  Pulse Oximetry: 98 %, O2 (LPM): 0, O2 Delivery: None (Room Air)    Physical Exam   Constitutional: He is oriented to person, place, and time and well-developed, well-nourished, and in no distress. No distress.   Lying in bed.    Eyes: EOM are normal. Pupils are equal, round, and reactive to light.   Neck: No JVD present.   Cardiovascular: Normal rate, regular rhythm and intact distal pulses.    Pulmonary/Chest: Effort normal and breath sounds normal. No respiratory distress. He has no wheezes. He has no rales.   Abdominal: Soft. Bowel sounds are normal. He exhibits no distension. There is no tenderness.   Musculoskeletal: He exhibits no edema.   Neurological: He is alert and oriented to person, place, and time. He has normal reflexes. Gait normal. Coordination normal.   Skin: Skin is warm.   Psychiatric: Affect normal.       Lab Data Review:       8/27/2017  6:30 PM    Recent Labs      09/05/17 0216 09/06/17 0150 09/07/17 0154 09/07/17   0205   SODIUM  129*  131*  131*   --    POTASSIUM  4.2  3.7  4.1   --    CHLORIDE  100  97  98   --    CO2  21  22  21   --    BUN  62*  37*  60*   --    CREATININE  4.04*  3.57*  4.39*   --    MAGNESIUM  2.0   --    --    --    PHOSPHORUS   --    --    --   6.0*   CALCIUM  9.1  9.2  9.1   --        Recent Labs      09/05/17 0216 09/06/17   0150  09/07/17   0154   GLUCOSE  82  62*  83       Recent Labs      09/05/17 0216 09/07/17   0204   PROTHROMBTM  28.9*  30.1*   INR  2.63*  2.77*                 Assessment/Plan     * Atrial fibrillation with RVR (CMS-HCC)- (present on admission)    Assessment & Plan    -New onset vs paroxysmal (vague PMH of afib and non-compliance with meds)  -overnight He was given diltiazem by NF due to Afib with nonsustained HR in 160s. - now in SR  -CHADS2: 4  -ECHO: severe LVH, global hypokinesis, EF ~20%, severe AI and mod MR  -LOBO: slow blood flow but no thrombus. Severe AI    Plan  - Na restricted diet.  - Continue scheduled metoprolol.  PRN 5mg metoprolol q6h.    -S/P LOBO and cardioversion.  -Currently in sinus rhythm.  -Amiodarone 100 mg nightly.   - Continue long acting metoprolol succinate 25 mg    -INRTherapeutic        Hyperkalemia (Resolved )   Assessment & Plan    -On hemodialysis  -Continue to monitor        Cardiorenal syndrome   Assessment & Plan    -Diagnosis given by Nephrology - likely since urine Na is low  -Patient is on HD, and is urinated <500 ml yesterday.  -Creatinine in ED 4.38, Total protein Urine 208. Estimated GFR was 14.   -FENA 0.2%  -BNP 2203>> 2039   -CXR: cardiomegaly. ECHO showed: severe LVH, global hypokinesis, EF ~20%, AR and mod MR  Assessment: Cardiorenal syndrome  Plan  -Patient needs AVR/MAZE w LOBO per Cardiac Surgery, but due to his renal status and recent stroke with hemorrhage, they do not recommend at this time.  They will follow up outpatient in 6 weeks.   -Patient will be counseled on cessation of alcohol intake.   -Nephrology on board.             Aortic regurgitation- (present on admission)   Assessment & Plan    -Severe AR.   -ECHO:  severe LVH, global hypokinesis, EF ~20%, severe AI and mod MR. Findings were confirmed by LOBO  -Fits the criteria for valve replacement. Cardiology and cardiac surgery on board.No plans for surgery due to recent stroke.        HFrEF (heart failure with reduced ejection fraction) (CMS-[facility])- (present on admission)   Assessment & Plan    -Presented with SOB, orthopnea, fatique and palpitations. Has h/o HTN and alcoholism in the past. On exam: JVD +., murmurs + but no volume overload.   -BNP 2203  >> 1753 > 2211.   -CXR: cardiomegaly. ECHO: severe LVH, global hypokinesis, EF ~20%, severe AI and mod MR  -Assessment: Compensated HF likely due to alcoholism vs tachycardia induced heart failure.   -Cardiac surgery spoke with Mr Saini and family, he needs ischemic work up and AVR/MAZE w LOBO, but due to his renal status and recent stroke with hemorrhage, they do not recommend it at this time.   - Patient's undocumented alien status is another reason having a heart cath is diifficult.      Plan  - Na restricted diet.   -Metoprolol, Lasix and nitrates  - Plan outpatient follow-up with cardiology post discharge..  - Patient's family will fly him to [place] for further care after discharge.          Prolonged Q-T interval on ECG- (present on admission)   Assessment & Plan    -QTc ~530s. Will avoid QTc prolonging meds.        TREASURE (acute kidney injury) / Chronic Kidney Disease   Assessment & Plan    -Acute on chronic. Likely pre-renal vs cardiorenal. HTN is likely the cause of CKD  -On adm: Cr: 4.3, AGMA, Ph 6.1. Unknown baseline.  -FeNa <1%  -H/o renal disease since 7yrs, FH kidney disease in his father. Has been non-complaint with HTN medications. - has CHF  -Renal US: no hydronephrosis and calculi. Left renal cysts.  - Cr and K slowly increasing likely due to poor renal perfusion secondary to atrial fibrillation.    - Upper extremity vein mapping completed in anticipation of AV fistula.     Plan  - Continue Lasix.  Assessed daily by Nephrology for need for HD.    - Paperwork for single patient contract with [facility] is complete.  Patient can begin outpatient hemodialysis on Monday.          CVA (cerebral vascular accident) (CMS-[facility])- (present on admission)   Assessment & Plan    -h/o left facial droop, slurred speech and left sided weakness. However no neuro deficits currently  -CT: cerebral atrophy and small vessel ischemic changes   -MRI brain: acute infarction in L frontal periventricular white matter  extendeing to subinsular cortex. Punctate area of infarction in R posterior lobe. Numerous microbleeds.   Assessment: Afib could have contributed (LOBO showed 'sludge' in the heart)  Plan  -ASA, statin  -Mx of Afib as stated above.        Non-rheumatic mitral regurgitation   Assessment & Plan    -Moderate MR on ECHO  -No plans for surgical intervention at this point.        Hyperglycemia- (present on admission)   Assessment & Plan    -Resolved  -On adm: , A1c: 5.4  -He was started on ISS and hypoglycemia protocol which is discontinued.  CTM with accuchecks        Alcoholic (CMS-HCC)- (present on admission)   Assessment & Plan    -History is vague but looks like he was drinking heavily in the past. Last drink was 8/5/2017.  -On thiamine and folate.           Essential hypertension- (present on admission)   Assessment & Plan    -PMH of HTN but likely non-compliant. ECHO showed severe LVH  -Stable on BB, nitrate, lasix

## 2017-09-09 VITALS
BODY MASS INDEX: 25.97 KG/M2 | SYSTOLIC BLOOD PRESSURE: 124 MMHG | RESPIRATION RATE: 18 BRPM | TEMPERATURE: 97 F | WEIGHT: 132.28 LBS | OXYGEN SATURATION: 95 % | HEIGHT: 60 IN | HEART RATE: 60 BPM | DIASTOLIC BLOOD PRESSURE: 77 MMHG

## 2017-09-09 LAB
ANION GAP SERPL CALC-SCNC: 14 MMOL/L (ref 0–11.9)
BASOPHILS # BLD AUTO: 0.9 % (ref 0–1.8)
BASOPHILS # BLD: 0.05 K/UL (ref 0–0.12)
BUN SERPL-MCNC: 68 MG/DL (ref 8–22)
CALCIUM SERPL-MCNC: 9.2 MG/DL (ref 8.5–10.5)
CHLORIDE SERPL-SCNC: 96 MMOL/L (ref 96–112)
CO2 SERPL-SCNC: 20 MMOL/L (ref 20–33)
CREAT SERPL-MCNC: 5.57 MG/DL (ref 0.5–1.4)
EOSINOPHIL # BLD AUTO: 0.4 K/UL (ref 0–0.51)
EOSINOPHIL NFR BLD: 7.3 % (ref 0–6.9)
ERYTHROCYTE [DISTWIDTH] IN BLOOD BY AUTOMATED COUNT: 44.4 FL (ref 35.9–50)
GFR SERPL CREATININE-BSD FRML MDRD: 11 ML/MIN/1.73 M 2
GLUCOSE SERPL-MCNC: 83 MG/DL (ref 65–99)
HCT VFR BLD AUTO: 39 % (ref 42–52)
HGB BLD-MCNC: 12.9 G/DL (ref 14–18)
IMM GRANULOCYTES # BLD AUTO: 0.04 K/UL (ref 0–0.11)
IMM GRANULOCYTES NFR BLD AUTO: 0.7 % (ref 0–0.9)
INR PPP: 2.91 (ref 0.87–1.13)
LYMPHOCYTES # BLD AUTO: 1.76 K/UL (ref 1–4.8)
LYMPHOCYTES NFR BLD: 31.9 % (ref 22–41)
MCH RBC QN AUTO: 28.9 PG (ref 27–33)
MCHC RBC AUTO-ENTMCNC: 33.1 G/DL (ref 33.7–35.3)
MCV RBC AUTO: 87.2 FL (ref 81.4–97.8)
MONOCYTES # BLD AUTO: 0.49 K/UL (ref 0–0.85)
MONOCYTES NFR BLD AUTO: 8.9 % (ref 0–13.4)
NEUTROPHILS # BLD AUTO: 2.77 K/UL (ref 1.82–7.42)
NEUTROPHILS NFR BLD: 50.3 % (ref 44–72)
NRBC # BLD AUTO: 0 K/UL
NRBC BLD AUTO-RTO: 0 /100 WBC
PLATELET # BLD AUTO: 233 K/UL (ref 164–446)
PMV BLD AUTO: 10.3 FL (ref 9–12.9)
POTASSIUM SERPL-SCNC: 3.9 MMOL/L (ref 3.6–5.5)
PROTHROMBIN TIME: 31.3 SEC (ref 12–14.6)
RBC # BLD AUTO: 4.47 M/UL (ref 4.7–6.1)
SODIUM SERPL-SCNC: 130 MMOL/L (ref 135–145)
WBC # BLD AUTO: 5.5 K/UL (ref 4.8–10.8)

## 2017-09-09 PROCEDURE — A9270 NON-COVERED ITEM OR SERVICE: HCPCS | Performed by: STUDENT IN AN ORGANIZED HEALTH CARE EDUCATION/TRAINING PROGRAM

## 2017-09-09 PROCEDURE — 700111 HCHG RX REV CODE 636 W/ 250 OVERRIDE (IP)

## 2017-09-09 PROCEDURE — 90935 HEMODIALYSIS ONE EVALUATION: CPT

## 2017-09-09 PROCEDURE — 700102 HCHG RX REV CODE 250 W/ 637 OVERRIDE(OP): Performed by: INTERNAL MEDICINE

## 2017-09-09 PROCEDURE — A9270 NON-COVERED ITEM OR SERVICE: HCPCS | Performed by: INTERNAL MEDICINE

## 2017-09-09 PROCEDURE — 700102 HCHG RX REV CODE 250 W/ 637 OVERRIDE(OP): Performed by: STUDENT IN AN ORGANIZED HEALTH CARE EDUCATION/TRAINING PROGRAM

## 2017-09-09 PROCEDURE — 36415 COLL VENOUS BLD VENIPUNCTURE: CPT

## 2017-09-09 PROCEDURE — 85025 COMPLETE CBC W/AUTO DIFF WBC: CPT

## 2017-09-09 PROCEDURE — 80048 BASIC METABOLIC PNL TOTAL CA: CPT

## 2017-09-09 PROCEDURE — 85610 PROTHROMBIN TIME: CPT

## 2017-09-09 RX ORDER — LANOLIN ALCOHOL/MO/W.PET/CERES
100 CREAM (GRAM) TOPICAL DAILY
Qty: 3 TAB | Refills: 0 | Status: SHIPPED | OUTPATIENT
Start: 2017-09-09 | End: 2017-09-09

## 2017-09-09 RX ORDER — ISOSORBIDE MONONITRATE 60 MG/1
60 TABLET, EXTENDED RELEASE ORAL DAILY
Qty: 30 TAB | Refills: 2 | Status: SHIPPED | OUTPATIENT
Start: 2017-09-09 | End: 2018-03-22 | Stop reason: SDUPTHER

## 2017-09-09 RX ORDER — METOPROLOL SUCCINATE 25 MG/1
25 TABLET, EXTENDED RELEASE ORAL DAILY
Qty: 30 TAB | Refills: 2 | Status: SHIPPED | OUTPATIENT
Start: 2017-09-09 | End: 2018-03-22 | Stop reason: SDUPTHER

## 2017-09-09 RX ORDER — LANOLIN ALCOHOL/MO/W.PET/CERES
100 CREAM (GRAM) TOPICAL DAILY
Qty: 30 TAB | Refills: 2 | Status: SHIPPED | OUTPATIENT
Start: 2017-09-09 | End: 2020-09-29

## 2017-09-09 RX ORDER — FUROSEMIDE 40 MG/1
40 TABLET ORAL DAILY
Qty: 3 TAB | Refills: 0 | Status: SHIPPED | OUTPATIENT
Start: 2017-09-09 | End: 2017-09-09

## 2017-09-09 RX ORDER — AMIODARONE HYDROCHLORIDE 200 MG/1
200 TABLET ORAL DAILY
Qty: 30 TAB | Refills: 2 | Status: SHIPPED | OUTPATIENT
Start: 2017-09-09 | End: 2018-03-22 | Stop reason: SDUPTHER

## 2017-09-09 RX ORDER — ATORVASTATIN CALCIUM 40 MG/1
40 TABLET, FILM COATED ORAL
Qty: 30 TAB | Refills: 2 | Status: SHIPPED | OUTPATIENT
Start: 2017-09-09 | End: 2018-03-22 | Stop reason: SDUPTHER

## 2017-09-09 RX ORDER — ATORVASTATIN CALCIUM 40 MG/1
40 TABLET, FILM COATED ORAL
Qty: 3 TAB | Refills: 0 | Status: SHIPPED | OUTPATIENT
Start: 2017-09-09 | End: 2017-09-09

## 2017-09-09 RX ORDER — VITAMIN C
1 TAB ORAL DAILY
Qty: 3 TAB | Refills: 0 | Status: SHIPPED | OUTPATIENT
Start: 2017-09-09 | End: 2017-09-09

## 2017-09-09 RX ORDER — WARFARIN SODIUM 4 MG/1
4 TABLET ORAL DAILY
Qty: 3 TAB | Refills: 0 | Status: SHIPPED | OUTPATIENT
Start: 2017-09-09 | End: 2017-09-09

## 2017-09-09 RX ORDER — VITAMIN C
1 TAB ORAL DAILY
Qty: 30 TAB | Refills: 2 | Status: SHIPPED | OUTPATIENT
Start: 2017-09-09 | End: 2020-09-29

## 2017-09-09 RX ORDER — WARFARIN SODIUM 4 MG/1
4 TABLET ORAL DAILY
Qty: 30 TAB | Refills: 2 | Status: SHIPPED | OUTPATIENT
Start: 2017-09-09 | End: 2018-03-22 | Stop reason: SDUPTHER

## 2017-09-09 RX ORDER — ISOSORBIDE MONONITRATE 60 MG/1
60 TABLET, EXTENDED RELEASE ORAL DAILY
Qty: 3 TAB | Refills: 0 | Status: SHIPPED | OUTPATIENT
Start: 2017-09-09 | End: 2017-09-09

## 2017-09-09 RX ORDER — HEPARIN SODIUM 1000 [USP'U]/ML
INJECTION, SOLUTION INTRAVENOUS; SUBCUTANEOUS
Status: COMPLETED
Start: 2017-09-09 | End: 2017-09-09

## 2017-09-09 RX ORDER — FUROSEMIDE 40 MG/1
40 TABLET ORAL DAILY
Qty: 30 TAB | Refills: 2 | Status: SHIPPED | OUTPATIENT
Start: 2017-09-09 | End: 2018-03-22 | Stop reason: SDUPTHER

## 2017-09-09 RX ORDER — FOLIC ACID 1 MG/1
1 TABLET ORAL DAILY
Qty: 3 TAB | Refills: 0 | Status: SHIPPED | OUTPATIENT
Start: 2017-09-09 | End: 2017-09-09

## 2017-09-09 RX ORDER — FOLIC ACID 1 MG/1
1 TABLET ORAL DAILY
Qty: 30 TAB | Refills: 0 | Status: SHIPPED | OUTPATIENT
Start: 2017-09-09 | End: 2017-09-09

## 2017-09-09 RX ORDER — FOLIC ACID 1 MG/1
1 TABLET ORAL DAILY
Qty: 30 TAB | Refills: 2 | Status: SHIPPED | OUTPATIENT
Start: 2017-09-09 | End: 2020-09-29

## 2017-09-09 RX ORDER — ATORVASTATIN CALCIUM 40 MG/1
40 TABLET, FILM COATED ORAL
Qty: 30 TAB | Refills: 0 | Status: SHIPPED | OUTPATIENT
Start: 2017-09-09 | End: 2017-09-09

## 2017-09-09 RX ORDER — AMIODARONE HYDROCHLORIDE 200 MG/1
200 TABLET ORAL DAILY
Qty: 3 TAB | Refills: 0 | Status: SHIPPED | OUTPATIENT
Start: 2017-09-09 | End: 2017-09-09

## 2017-09-09 RX ORDER — METOPROLOL SUCCINATE 25 MG/1
25 TABLET, EXTENDED RELEASE ORAL DAILY
Qty: 3 TAB | Refills: 0 | Status: SHIPPED | OUTPATIENT
Start: 2017-09-09 | End: 2017-09-09

## 2017-09-09 RX ADMIN — FOLIC ACID 1 MG: 1 TABLET ORAL at 11:22

## 2017-09-09 RX ADMIN — Medication 100 MG: at 11:21

## 2017-09-09 RX ADMIN — HEPARIN SODIUM 3800 UNITS: 1000 INJECTION, SOLUTION INTRAVENOUS; SUBCUTANEOUS at 10:23

## 2017-09-09 RX ADMIN — METOPROLOL SUCCINATE 25 MG: 25 TABLET, EXTENDED RELEASE ORAL at 11:23

## 2017-09-09 RX ADMIN — WARFARIN SODIUM 4 MG: 4 TABLET ORAL at 15:12

## 2017-09-09 RX ADMIN — VITAMIN C 1 TABLET: TAB at 11:21

## 2017-09-09 RX ADMIN — ISOSORBIDE MONONITRATE 60 MG: 30 TABLET ORAL at 11:22

## 2017-09-09 RX ADMIN — STANDARDIZED SENNA CONCENTRATE AND DOCUSATE SODIUM 2 TABLET: 8.6; 5 TABLET, FILM COATED ORAL at 11:24

## 2017-09-09 RX ADMIN — FUROSEMIDE 40 MG: 40 TABLET ORAL at 11:22

## 2017-09-09 ASSESSMENT — PAIN SCALES - GENERAL: PAINLEVEL_OUTOF10: 0

## 2017-09-09 NOTE — PROGRESS NOTES
Patient sitting up in bed during shift report.  He is awake and alert X4.  Dialysis performed today.  Dayshift RN reported multiple pain medications throughout shift, refusing to use strip alarm.  He is up self but education was still given regarding alarm.

## 2017-09-09 NOTE — PROGRESS NOTES
HD tx per Dr. Saavedra x 3 hours, initiated at 0723, ended at 1023, Net UF of 2000ml. See paper flowsheet for details.    Report given to primary RN.

## 2017-09-09 NOTE — CARE PLAN
Problem: Knowledge Deficit  Goal: Knowledge of disease process/condition, treatment plan, diagnostic tests, and medications will improve    Intervention: Explain information regarding disease process/condition, treatment plan, diagnostic tests, and medications and document in education  Gave patient the heart failure packet in Telugu.       Problem: Pain Management  Goal: Pain level will decrease to patient's comfort goal    Intervention: Follow pain managment plan developed in collaboration with patient and Interdisciplinary Team  Encouraged patient to report any pain to staff. Patient verbalized understanding.

## 2017-09-09 NOTE — PROGRESS NOTES
Called Charlotte ROBLEDO for prescriptions for patient. Charlotte ROBLEDO verbalized she will write the prescriptions.

## 2017-09-09 NOTE — PROGRESS NOTES
Pt is AOx4. Plan of care discussed. Denies pain. Thrill palpated, bruit heard. Patient taken by transport via bed to dialysis.

## 2017-09-09 NOTE — PROGRESS NOTES
Chris Porter. Dr. new RN back. RN notified Dr. Porter that per SW note  pt has appt with [facility] on the 12th where he will receive a sliding fee scale for his medications but Sw could not get the meds refilled until 9/12. Asked if Dr. Porter can come up or send the discharge prescriptions ASAP so pt's meds can be worked on to get filled. Per Dr. Porter, she jenifer try to work on it this morning but she is still currently rounding. Will notify SW and keep an eye out for the scripts.

## 2017-09-09 NOTE — PROGRESS NOTES
Received report from dialysis RN Jose Enrique. Patient had 2 L fluid removed from his permacath. AV fistula not accessed.. Patient tolerated procedure well.

## 2017-09-09 NOTE — DISCHARGE PLANNING
Medical Social Work    LATE ENTRY:     [facility] paged GHADA Engle re: IP consult to  stating that pt has been set up at [facility] in [place] on 9/12 where he will receive his medications on a sliding fee scale. [facility] notified that pt is unable to get medications refilled until 9/12 but pt possibly ready for dc on 9/9. [facility]INA asked GHADA Engle for pt's dc prescriptions and asked GHADA Engle if medications could be written for a 3 day supply until pt gets to [facility]? GHADA Engle stated they would come talk to CORINA before 1400 on 9/8 but never showed up. SW to follow up today with GHADA Engle on dc plan.

## 2017-09-09 NOTE — DISCHARGE INSTRUCTIONS
Discharge Instructions    Discharged to home by car with relative. Discharged via walking, hospital escort: Refused.  Special equipment needed: Not Applicable    Be sure to schedule a follow-up appointment with your primary care doctor or any specialists as instructed.     Discharge Plan:   Confirmed Follow up Appointment: Appointment Scheduled  Influenza Vaccine Indication: Indicated: Not available from distributor/  Influenza Vaccine Given - only chart on this line when given: Influenza Vaccine Given (See MAR)    I understand that a diet low in cholesterol, fat, and sodium is recommended for good health. Unless I have been given specific instructions below for another diet, I accept this instruction as my diet prescription.   Other diet: Renal, Heart Healthy    Special Instructions:   HF Patient Discharge Instructions  · Monitor your weight daily, and maintain a weight chart, to track your weight changes.   · Activity as tolerated, unless your Doctor has ordered otherwise.  · Follow a low fat, low cholesterol, low salt diet unless instructed otherwise by your Doctor. Read the labels on the back of food products and track your intake of fat, cholesterol and salt.   · Fluid Restriction No. If a Fluid Restriction has been ordered by your Doctor, measure fluids with a measuring cup to ensure that you are not exceeding the restriction.   · No smoking.  · Oxygen No. If your Doctor has ordered that you wear Oxygen at home, it is important to wear it as ordered.  · Did you receive an explanation from staff on the importance of taking each of your medications and why it is necessary to keep taking them unless your doctor says to stop? Yes  · Were all of your questions answered about how to manage your heart failure and what to do if you have increased signs and symptoms after you go home? Yes  · Do you feel like your heart failure care team involved you in the care treatment plan and allowed you to make  decisions regarding your care while in the hospital and addressed any discharge needs you might have? Yes    See the educational handout provided at discharge for more information on monitoring your daily weight, activity and diet. This also explains more about Heart Failure, symptoms of a flare-up and some of the tests that you have undergone.     Warning Signs of a Flare-Up include:  · Swelling in the ankles or lower legs.  · Shortness of breath, while at rest, or while doing normal activities.   · Shortness of breath at night when in bed, or coughing in bed.   · Requiring more pillows to sleep at night, or needing to sit up at night to sleep.  · Feeling weak, dizzy or fatigued.     When to call your Doctor:  · Call [facility] seven days a week from 8:00 a.m. to 8:00 p.m. for medical questions [phone].  · Call your Primary Care Physician or Cardiologist if:   1. You experience any pain radiating to your jaw or neck.  2. You have any difficulty breathing.  3. You experience weight gain of 3 lbs in a day or 5 lbs in a week.   4. You feel any palpitations or irregular heartbeats.  5. You become dizzy or lose consciousness.   If you have had an angiogram or had a pacemaker or AICD placed, and experience:  1. Bleeding, drainage or swelling at the surgical / puncture site.  2. Fever greater than 100.0 F  3. Shock from internal defibrillator.  4. Cool and / or numb extremities.      · Is patient discharged on Warfarin / Coumadin?   Yes    You are receiving the drug warfarin. Please understand the importance of monitoring warfarin with scheduled PT/INR blood draws.  Follow-up with a call to your personal Doctor's office in 3 days to schedule a PT/INR. .    IMPORTANT: HOW TO USE THIS INFORMATION:  This is a summary and does NOT have all possible information about this product. This information does not assure that this product is safe, effective, or appropriate for you. This information is not individual  "medical advice and does not substitute for the advice of your health care professional. Always ask your health care professional for complete information about this product and your specific health needs.      WARFARIN - ORAL (WARF-uh-rin)      COMMON BRAND NAME(S): Coumadin      WARNING:  Warfarin can cause very serious (possibly fatal) bleeding. This is more likely to occur when you first start taking this medication or if you take too much warfarin. To decrease your risk for bleeding, your doctor or other health care provider will monitor you closely and check your lab results (INR test) to make sure you are not taking too much warfarin. Keep all medical and laboratory appointments. Tell your doctor right away if you notice any signs of serious bleeding. See also Side Effects section.      USES:  This medication is used to treat blood clots (such as in deep vein thrombosis-DVT or pulmonary embolus-PE) and/or to prevent new clots from forming in your body. Preventing harmful blood clots helps to reduce the risk of a stroke or heart attack. Conditions that increase your risk of developing blood clots include a certain type of irregular heart rhythm (atrial fibrillation), heart valve replacement, recent heart attack, and certain surgeries (such as hip/knee replacement). Warfarin is commonly called a \"blood thinner,\" but the more correct term is \"anticoagulant.\" It helps to keep blood flowing smoothly in your body by decreasing the amount of certain substances (clotting proteins) in your blood.      HOW TO USE:  Read the Medication Guide provided by your pharmacist before you start taking warfarin and each time you get a refill. If you have any questions, ask your doctor or pharmacist. Take this medication by mouth with or without food as directed by your doctor or other health care professional, usually once a day. It is very important to take it exactly as directed. Do not increase the dose, take it more " frequently, or stop using it unless directed by your doctor. Dosage is based on your medical condition, laboratory tests (such as INR), and response to treatment. Your doctor or other health care provider will monitor you closely while you are taking this medication to determine the right dose for you. Use this medication regularly to get the most benefit from it. To help you remember, take it at the same time each day. It is important to eat a balanced, consistent diet while taking warfarin. Some foods can affect how warfarin works in your body and may affect your treatment and dose. Avoid sudden large increases or decreases in your intake of foods high in vitamin K (such as broccoli, cauliflower, cabbage, brussels sprouts, kale, spinach, and other green leafy vegetables, liver, green tea, certain vitamin supplements). If you are trying to lose weight, check with your doctor before you try to go on a diet. Cranberry products may also affect how your warfarin works. Limit the amount of cranberry juice (16 ounces/480 milliliters a day) or other cranberry products you may drink or eat.      SIDE EFFECTS:  Nausea, loss of appetite, or stomach/abdominal pain may occur. If any of these effects persist or worsen, tell your doctor or pharmacist promptly. Remember that your doctor has prescribed this medication because he or she has judged that the benefit to you is greater than the risk of side effects. Many people using this medication do not have serious side effects. This medication can cause serious bleeding if it affects your blood clotting proteins too much (shown by unusually high INR lab results). Even if your doctor stops your medication, this risk of bleeding can continue for up to a week. Tell your doctor right away if you have any signs of serious bleeding, including: unusual pain/swelling/discomfort, unusual/easy bruising, prolonged bleeding from cuts or gums, persistent/frequent nosebleeds, unusually  heavy/prolonged menstrual flow, pink/dark urine, coughing up blood, vomit that is bloody or looks like coffee grounds, severe headache, dizziness/fainting, unusual or persistent tiredness/weakness, bloody/black/tarry stools, chest pain, shortness of breath, difficulty swallowing. Tell your doctor right away if any of these unlikely but serious side effects occur: persistent nausea/vomiting, severe stomach/abdominal pain, yellowing eyes/skin. This drug rarely has caused very serious (possibly fatal) problems if its effects lead to small blood clots (usually at the beginning of treatment). This can lead to severe skin/tissue damage that may require surgery or amputation if left untreated. Patients with certain blood conditions (protein C or S deficiency) may be at greater risk. Get medical help right away if any of these rare but serious side effects occur: painful/red/purplish patches on the skin (such as on the toe, breast, abdomen), change in the amount of urine, vision changes, confusion, slurred speech, weakness on one side of the body. A very serious allergic reaction to this drug is rare. However, get medical help right away if you notice any symptoms of a serious allergic reaction, including: rash, itching/swelling (especially of the face/tongue/throat), severe dizziness, trouble breathing. This is not a complete list of possible side effects. If you notice other effects not listed above, contact your doctor or pharmacist. In the [place] - Call your doctor for medical advice about side effects. You may report side effects to FDA at 1-616-BOP-2735. In [place] - Call your doctor for medical advice about side effects. You may report side effects to [facility] [place] at 1-240.334.7854.      PRECAUTIONS:  Before taking warfarin, tell your doctor or pharmacist if you are allergic to it; or if you have any other allergies. This product may contain inactive ingredients, which can cause allergic reactions or other problems. Talk  to your pharmacist for more details. Before using this medication, tell your doctor or pharmacist your medical history, especially of: blood disorders (such as anemia, hemophilia), bleeding problems (such as bleeding of the stomach/intestines, bleeding in the brain), blood vessel disorders (such as aneurysms), recent major injury/surgery, liver disease, alcohol use, mental/mood disorders (including memory problems), frequent falls/injuries. It is important that all your doctors and dentists know that you take warfarin. Before having surgery or any medical/dental procedures, tell your doctor or dentist that you are taking this medication and about all the products you use (including prescription drugs, nonprescription drugs, and herbal products). Avoid getting injections into the muscles. If you must have an injection into a muscle (for example, a flu shot), it should be given in the arm. This way, it will be easier to check for bleeding and/or apply pressure bandages. This medication may cause stomach bleeding. Daily use of alcohol while using this medicine will increase your risk for stomach bleeding and may also affect how this medication works. Limit or avoid alcoholic beverages. If you have not been eating well, if you have an illness or infection that causes fever, vomiting, or diarrhea for more than 2 days, or if you start using any antibiotic medications, contact your doctor or pharmacist immediately because these conditions can affect how warfarin works. This medication can cause heavy bleeding. To lower the chance of getting cut, bruised, or injured, use great caution with sharp objects like safety razors and nail cutters. Use an electric razor when shaving and a soft toothbrush when brushing your teeth. Avoid activities such as contact sports. If you fall or injure yourself, especially if you hit your head, call your doctor immediately. Your doctor may need to check you. The Food & Drug Administration has  "stated that generic warfarin products are interchangeable. However, consult your doctor or pharmacist before switching warfarin products. Be careful not to take more than one medication that contains warfarin unless specifically directed by the doctor or health care provider who is monitoring your warfarin treatment. Older adults may be at greater risk for bleeding while using this drug. This medication is not recommended for use during pregnancy because of serious (possibly fatal) harm to an unborn baby. Discuss the use of reliable forms of birth control with your doctor. If you become pregnant or think you may be pregnant, tell your doctor immediately. If you are planning pregnancy, discuss a plan for managing your condition with your doctor before you become pregnant. Your doctor may switch the type of medication you use during pregnancy. Very small amounts of this medication may pass into breast milk but is unlikely to harm a nursing infant. Consult your doctor before breast-feeding.      DRUG INTERACTIONS:  Drug interactions may change how your medications work or increase your risk for serious side effects. This document does not contain all possible drug interactions. Keep a list of all the products you use (including prescription/nonprescription drugs and herbal products) and share it with your doctor and pharmacist. Do not start, stop, or change the dosage of any medicines without your doctor's approval. Warfarin interacts with many prescription, nonprescription, vitamin, and herbal products. This includes medications that are applied to the skin or inside the vagina or rectum. The interactions with warfarin usually result in an increase or decrease in the \"blood-thinning\" (anticoagulant) effect. Your doctor or other health care professional should closely monitor you to prevent serious bleeding or clotting problems. While taking warfarin, it is very important to tell your doctor or pharmacist of any " changes in medications, vitamins, or herbal products that you are taking. Some products that may interact with this drug include: capecitabine, imatinib, mifepristone. Aspirin, aspirin-like drugs (salicylates), and nonsteroidal anti-inflammatory drugs (NSAIDs such as ibuprofen, naproxen, celecoxib) may have effects similar to warfarin. These drugs may increase the risk of bleeding problems if taken during treatment with warfarin. Carefully check all prescription/nonprescription product labels (including drugs applied to the skin such as pain-relieving creams) since the products may contain NSAIDs or salicylates. Talk to your doctor about using a different medication (such as acetaminophen) to treat pain/fever. Low-dose aspirin and related drugs (such as clopidogrel, ticlopidine) should be continued if prescribed by your doctor for specific medical reasons such as heart attack or stroke prevention. Consult your doctor or pharmacist for more details. Many herbal products interact with warfarin. Tell your doctor before taking any herbal products, especially bromelains, coenzyme Q10, cranberry, danshen, dong quai, fenugreek, garlic, ginkgo biloba, ginseng, and Rafael's wort, among others. This medication may interfere with a certain laboratory test to measure theophylline levels, possibly causing false test results. Make sure laboratory personnel and all your doctors know you use this drug.      OVERDOSE:  If overdose is suspected, contact a poison control center or emergency room immediately. [place] residents can call the [place] National Poison Hotline at 1-714.840.9657. [place] residents can call a provincial poison control center. Symptoms of overdose may include: bloody/black/tarry stools, pink/dark urine, unusual/prolonged bleeding.      NOTES:  Do not share this medication with others. Laboratory and/or medical tests (such as INR, complete blood count) must be performed periodically to monitor your progress or check for  side effects. Consult your doctor for more details.      MISSED DOSE:  For the best possible benefit, do not miss any doses. If you do miss a dose and remember on the same day, take it as soon as you remember. If you remember on the next day, skip the missed dose and resume your usual dosing schedule. Do not double the dose to catch up because this could increase your risk for bleeding. Keep a record of missed doses to give to your doctor or pharmacist. Contact your doctor or pharmacist if you miss 2 or more doses in a row.      STORAGE:  Store at room temperature away from light and moisture. Do not store in the bathroom. Keep all medications away from children and pets. Do not flush medications down the toilet or pour them into a drain unless instructed to do so. Properly discard this product when it is  or no longer needed. Consult your pharmacist or local waste disposal company for more details about how to safely discard your product.      MEDICAL ALERT:  Your condition and medication can cause complications in a medical emergency. For information about enrolling in MedicAlert, call 1-350.849.6994 ([place]) or 1-991.763.6891 ([place]).      Information last revised 2010 Copyright(c) 2010 [employer]             · Is patient Post Blood Transfusion?  No    Depression / Suicide Risk    As you are discharged from this [facility] facility, it is important to learn how to keep safe from harming yourself.    Recognize the warning signs:  · Abrupt changes in personality, positive or negative- including increase in energy   · Giving away possessions  · Change in eating patterns- significant weight changes-  positive or negative  · Change in sleeping patterns- unable to sleep or sleeping all the time   · Unwillingness or inability to communicate  · Depression  · Unusual sadness, discouragement and loneliness  · Talk of wanting to die  · Neglect of personal appearance   · Rebelliousness- reckless  behavior  · Withdrawal from people/activities they love  · Confusion- inability to concentrate     If you or a loved one observes any of these behaviors or has concerns about self-harm, here's what you can do:  · Talk about it- your feelings and reasons for harming yourself  · Remove any means that you might use to hurt yourself (examples: pills, rope, extension cords, firearm)  · Get professional help from the community (Mental Health, Substance Abuse, psychological counseling)  · Do not be alone:Call your Safe Contact- someone whom you trust who will be there for you.  · Call your local CRISIS HOTLINE 636-8212 or 460-460-4633  · Call your local Children's Mobile Crisis Response Team [place] (902) 044-0766 or www.[place]  · Call the toll free National Suicide Prevention Hotlines   · National Suicide Prevention Lifeline 200-684-SMMY (6747)  · [place] Line Network 800-PWREWWZ (252-7210)    Insuficiencia cardíaca   (Heart Failure)   La insuficiencia cardíaca es galilea afección en la que el corazón tiene dificultad para bombear la aletha. El corazón no bombea aletha de manera eficiente para que el organismo pueda funcionar poppy. En algunos casos de insuficiencia cardíaca, los líquidos vuelven a los pulmones, o puede hilario hinchazón (edema) en la stephanie inferior de las piernas. La insuficiencia cardíaca es galilea enfermedad de larga duración (crónica). Es importante que se cuide mucho y que siga el plan de tratamiento que le indique el médico.  CAUSAS   Algunas enfermedades pueden causar insuficiencia cardíaca. Esas enfermedades pueden ser:   · La presión arterial fahad (hipertensión) hace que el músculo cardiaco trabaje más de lo normal. Cuando la presión en los vasos sanguíneos es fahad, el corazón tiene que bombear (contraerse) con más fuerza para hacer circular la aletha por todo el cuerpo. La hipertensión arterial hace que con el tiempo el corazón se vuelva rígido y débil.  · La enfermedad arterial  coronaria (CAD) que es la acumulación de colesterol y de grasa (placa) en las arterias del corazón. La obstrucción de las arterias priva al músculo cardíaco de aletha y oxígeno. [place] ocasiona dolor en el pecho y puede conducir a un infarto. La hipertensión arterial también favorece la enfermedad arterial coronaria.  · Un ataque cardíaco (infarto de miocardio), se produce cuando las arterias se obstruyen. La pérdida de oxígeno daña el tejido muscular cardíaco. Cuando esto ocurre, galilea parte del músculo cardíaco muere. El tejido dañado no se contrae poppy y debilita la capacidad del corazón para bombear aletha.  · Las válvulas cardíacas anormales pueden causar insuficiencia cardíaca cuando no se abren y cierran correctamente. [place] hace que el músculo cardíaco tenga que bombear con más intensidad para que la aletha fluya.  · Enfermedad del músculo cardíaco (cardiomiopatía o miocarditis) que es el daño al músculo del corazón por diferentes causas. Entre ellas se encuentran el consumo de alcohol o drogas, las infecciones o pueden ser causas desconocidas. Todas estas causas aumentan el riesgo de insuficiencia cardíaca.  · Enfermedad pulmonar que hace que el corazón se esfuerce más porque los pulmones no funcionan correctamente. [place] puede hacer que el corazón se tensione, y causar insuficiencia.  · La diabetes aumenta el riesgo de insuficiencia cardíaca. El nivel elevado de azúcar en la aletha contribuye a aumentar los niveles de grasas (lípidos) en la aletha. La diabetes también favorece que lentamente se dañen los pequeños vasos sanguíneos que transportan los nutrientes necesarios para el músculo cardíaco. Cuando el corazón no obtiene oxígeno y alimento suficiente, se debilita y se torna rígido. Por lo tanto no se contrae de manera eficiente.  · Hay otras enfermedades que pueden contribuir a la insuficiencia cardíaca. Entre ellas se incluyen las arritmias, problemas de tiroides y un recuento bajo de glóbulos rojos  (anemia).  Ciertas conductas poco saludables aumentan el riesgo de insuficiencia cardíaca. Esas conductas son:   · Tener sobrepeso.  · Fumar o mascar tabaco.  · Consumir alimentos ricos en grasas y colesterol.  · Consumir drogas o alcohol.  · La falta de actividad física.  SÍNTOMAS   Los síntomas pueden variar y ser difíciles de detectar. Los síntomas pueden ser:   · Falta de aire al realizar actividades [place] subir escaleras.  · Tos persistente.  · Hinchazón de los pies, tobillos, piernas o abdomen.  · Aumento de peso sin motivo.  · Dificultad para respirar estando acostado (ortopnea).  · Despertarse con la necesidad de sentarse y rochelle aire.  · Frecuencia cardíaca rápida.  · Fatiga y pérdida de energía.  · Sentirse débil, mareado o tener sensación de desmayo.  · Pérdida del apetito.  · Náuseas.  · Aumento de la necesidad de orinar jarrell la noche (nocturia).  DIAGNÓSTICO   El diagnóstico se realiza según la historia clínica, los síntomas, el examen físico y las pruebas diagnósticas.   Las pruebas diagnósticas son:   · Ecocardiograma.  · Electrocardiograma.  · Radiografía de tórax.  · Análisis de aletha.  · Prueba de esfuerzo.  · Angiografía cardíaca.  · Gammagrafía.  TRATAMIENTO   El tratamiento está destinado al control de los síntomas. Podrá ser necesario que tome medicamentos, realice cambios en chance estilo de efra o se someta a galilea intervención quirúrgica para tratar la insuficiencia cardíaca.   · Los medicamentos para el tratamiento de la insuficiencia cardíaca son:  · Inhibidores de la enzima convertidora de angiotensina (IECA). Mulu tipo de medicamento bloquea los efectos de galilea proteína llamada enzima convertidora de angiotensina. Los inhibidores IECA relajan (dilatan) los vasos sanguíneos y ayudan a disminuir la presión arterial.  · Bloqueantes de los receptores de angiotensina. Mulu tipo de medicamento bloquea las acciones de galilea proteína de la aletha llamada angiotensina. Los bloqueadores de los  receptores de angiotensina dilatan los vasos sanguíneos y ayudan a reducir la presión arterial.  · Uso de diuréticos. Los diuréticos hacen que los riñones eliminen la sal y el agua de la aletha. El líquido en exceso es eliminado con la orina. Esta eliminación del exceso de líquido disminuye el volumen de aletha que el corazón bombea.  · Betabloqueantes. Los betabloqueantes evitan que el corazón palpite demasiado rápido y mejoran la fuerza del músculo cardíaco.  · Digitálicos. Aumentan la fuerza de los latidos cardíacos.  · Los cambios hacia un estilo de efra saludable incluyen:  · Alcanzar y mantener un peso saludable.  · Dejar de fumar o mascar tabaco.  · Consumir alimentos saludables.  · Limitar o evitar el alcohol  · Evitar el consumo de drogas.  · Realice actividad física según las indicaciones de chance médico.  · Los tratamientos quirúrgicos para la insuficiencia cardíaca pueden ser:  · Un procedimiento para abrir las arterias obstruidas, reparación de válvulas cardíacas dañadas o la extirpación d-el tejido muscular cardíaco dañado.  · La colocación de un marcapasos para ayudar al funcionamiento del músculo cardíaco y para controlar ciertos ritmos cardíacos anormales.  · Un desfibrilador cardioverter interno para tratar ciertos ritmos cardíacos anormales graves.  · Un dispositivo de asistencia ventricular izquierda para ayudar a la capacidad de bombeo del corazón.  INSTRUCCIONES PARA EL CUIDADO EN EL HOGAR   · Coyote la medicación [place] le ha indicado el profesional que lo asiste. Los medicamentos son importantes para reducir la carga de trabajo del corazón, disminuir la progresión de la insuficiencia cardíaca y mejorar los síntomas.  · No deje de rochelle los medicamento excepto que se lo indique chance médico.  · No se saltee ninguna dosis del medicamento.  · Pida galilea nueva receta antes de quedarse sin medicamentos. . Es necesario que tome phylicia medicamentos todos los días.  · Coyote medicamentos de venta lynn sólo [place] lo  indique chance médico o farmacéutico.  · Eric ejercicios y actividad física de forma moderada, si se lo ha indicado el médico. La actividad física moderada puede beneficiar a algunas personas. Las personas ancianas y los que tengan insuficiencia cardíaca grave deben consultar a chance médico acerca de la actividad física.  · Consuma alimentos saludables para el corazón. Elija alimentos que no contengan grasas trans y momo bajas en grasas saturadas, colesterol y sal (sodio). Las opciones saludables incluyen frutas frescas o congeladas y vegetales, pescado, hao magras, legumbres, productos lácteos descremados o bajos en grasa y granos enteros o alimentos con alto contenido de fibra. Póngase en contacto con un nutricionista para aprender cuales son los alimentos saludables.  · Limite el consumo de sodio, según lo indique chance médico. La restricción de sodio puede reducir los síntomas de la insuficiencia cardíaca en algunos pacientes. Póngase en contacto con un nutricionista para aprender cuales son los condimentos saludables.  · Use métodos de cocción saludables. Los métodos de cocción saludables incluyen asar, grillar, hervir, hornear, sancochar, cocer al vapor o saltear. Hable con un nutricionista para aprender más acerca de los métodos de cocción saludables.  · Limite la ingesta de líquidos, según lo indique chance médico. La restricción de sodio puede reducir los síntomas de la insuficiencia cardíaca en algunos pacientes.  · Controle chance peso a diario. Es importante que se pese todos los días para reconocer anticipadamente cuando hay exceso de líquido. Hágalo todas las mañanas después de orinar y antes de desayunar. Use la misma ropa cada vez que se pese. Anote chance peso todos los días. Lleve a chance médico el registro de chance peso.  · Controle y registre la presión arterial, según lo indique chance médico.  · Controle chance pulso si se lo indica el médico.  · Baje de peso según las indicaciones de chance médico. La pérdida de peso puede  reducir los síntomas de la insuficiencia cardíaca en algunos pacientes.  · Deje de fumar o mascar tabaco. La nicotina hace que el corazón trabaje más y estreche los vasos sanguíneos. No utilice chicles o parches de nicotina antes de hablar con chance médico.  · Programe y asista a las visitas de seguimiento según las indicaciones del médico. Es importante cumplir con todas phylicia citas.  · Limite el consumo de alcohol a no más de 1 copa al día para las mujeres no embarazadas y 2 copas por día para los hombres. Beber más puede ser dañino para el corazón. Informe a chance médico si mukul alcohol varias veces a la semana. Consulte a chance médico si beber alcohol es seguro para usted. Si el corazón ya ha sufrido un daño por el alcohol o sufre galilea insuficiencia cardíaca grave, debe dejar de consumirlo completamente.  · Evite el consumo de drogas.  · Manténgase al día con las vacunas. Es especialmente importante prevenir las infecciones respiratorias con vacunas actualizadas contra el neumococo y la gripe.  · Controle otros problemas de catarino [place] hipertensión, diabetes, enfermedad de la tiroides o arritmias, según le indique chance médico.  · Aprenda a manejar el estrés.  · Planifique períodos de descanso cuando se sienta fatigado.  · Aprenda estrategias para manejar las altas temperaturas. Si el clima es extremadamente caluroso:  · Evite la actividad física intensa.  · Use el aire acondicionado o los ventiladores o busque un lugar más fresco.  · Evite la cafeína y el alcohol.  · Use ropa holgada, ligera y de colores diana.  · Aprenda estrategias para manejar el frío. Si el clima es extremadamente frío:  · Evite la actividad física intensa.  · Vístase con ropa en capas.  · Use mitones o guantes, un sombrero y galilea bufanda cuando salga.  · Evite el alcohol.  · Reciba asesoramiento y apoyo si lo necesita.  · Busque y participe en programas de rehabilitación para mantener o mejorar chance independencia y chance calidad de efra.  SOLICITE ATENCIÓN  MÉDICA SI:   · Chance peso aumenta 3 libras (1,4 kg) en 1 shreyas o 5 libras (2,3 Kg) en galilea semana.  · Nota que le falta el aire y esto no es habitual en usted.  · No puede participar en phylicia actividades físicas habituales.  · Se cansa con facilidad.  · Tose más de lo normal, especialmente al realizar actividad física.  · Observa que se le hinchan o están más hinchadas que lo habitual phylicia traci, pies, tobillos o abdomen.  · Le ariel dormir debido a que le resulta difícil respirar.  · Siente que el corazón palpita rápido (palpitaciones).  · Siente mareos o vahídos al pararse.  SOLICITE ATENCIÓN MÉDICA DE INMEDIATO SI:   · Tiene dificultad para respirar.  · Hay galilea modificación en el estado mental, [place] disminución de la lucidez o dificultad para concentrarse.  · Siente dolor o molestias en el pecho.  · Sufre episodios de desmayos (síncope).  ASEGÚRESE DE QUE:   · Comprende estas instrucciones.  · Controlará chance enfermedad.  · Solicitará ayuda de inmediato si no mejora o si empeora.  Document Released: 12/18/2006 Document Revised: 04/14/2014  ExitCare® Patient Information ©2014 [employer].    Diálisis  (Dialysis)  La diálisis es un procedimiento que reemplaza parte de la función que realizan los riñones sanos. Se realiza cuando se pierde alrededor del 85 al 90 % de la función renal. También puede indicarse antes si tuviera la posibilidad de que los síntomas mejoraran con la diálisis. [place] la diálisis, se eliminan los desechos, sales y agua en exceso de la aletha, y se mantiene el nivel de ciertas sustancias químicas en la aletha ([place] el potasio). La diálisis se realiza en sesiones. Las sesiones de diálisis se continúan hasta que los riñones mejoran. Si los riñones no mejoran, [place] sucede en la enfermedad renal terminal, la diálisis se continúa de por efra o hasta que pueda recibir un nuevo riñón (trasplante renal). Hay dos tipos de diálisis: hemodiálisis y diálisis peritoneal.  ¿QUÉ ES LA HEMODIÁLISIS?   La  hemodiálisis es un tipo de diálisis en la que se utiliza galilea máquina llamada dializador para filtrar la aletha. Antes de comenzar la hemodiálisis, le harán galilea cirugía para crear un sitio en el que la aletha pueda extraerse del cuerpo y volver a ingresar en él (acceso vascular). Hay ashwin tipos de accesos vasculares:  · Fístula arteriovenosa. Para crear ivan tipo de acceso, se conecta galilea arteria a galilea vena (generalmente en el brazo). La fístula demora entre 1 y 6 meses para desarrollarse después de la cirugía. Si se desarrolla adecuadamente, generalmente chance duración es mayor que los otros tipos de accesos vasculares. También es menos probable que se infecte y se formen coágulos sanguíneos.  · Injerto arteriovenoso. Para crear ivan tipo de acceso, se conectan galilea arteria y galilea vena del brazo con un tubo. El injerto puede usarse de 2 a 3 semanas después de la cirugía.  · Catéter venoso. Para crear ivan tipo de acceso, se coloca un tubo elias y flexible (catéter) en galilea vena stefano del leigh, el tórax o la mauro. El catéter puede usarse inmediatamente. Por lo general, se usa [place] un acceso temporario cuando es necesario que la diálisis comience de inmediato.  Norman la hemodiálisis, la aletha sale del cuerpo a través del acceso. Viaja a través del tubo al dializador, donde se filtra. Luego la aletha retorna al cuerpo a través de otro tubo.  La hemodiálisis generalmente la realiza un médico en el hospital o en un centro de diálisis, ashwin veces por semana. Las sesiones arceo entre 3 y 4 horas. También puede hacerse en el hogar, con la ayuda de galilea persona entrenada.   ¿QUÉ ES LA DIÁLISIS PERITONEAL?  La diálisis peritoneal es un tipo de diálisis en el que se usa [place] filtro la delgada membrana que cubre el abdomen (peritoneo). Antes de comenzar con la diálisis peritoneal, le harán galilea cirugía para colocar un catéter en el abdomen. El catéter se usará para introducir y extraer del abdomen un líquido llamado dialisato.  Al inicio de la sesión, llenarán chance abdomen con dialisato. Jarrell la sesión, los desechos, las sales y los líquidos en exceso de la aletha pasan a través del peritoneo y hacia el dialisato. El dialisato se drena del cuerpo al finalizar la sesión. El proceso de llenar y drenar el dialisato se llama intercambio. Los intercambios se repiten hasta que haya usado todo el dialisato del día.  La diálisis peritoneal puede llevarse a cabo en el hogar o en hien cualquier otro lugar. Se realiza todos los días. Es posible que necesite hasta [place] intercambios por día. La cantidad de tiempo que permanece el dialisato en el organismo entre los intercambios se llama tiempo de permanencia. El tiempo de permanencia depende del número de intercambios necesarios y de las características del peritoneo. Generalmente, oscila entre 1,5 y 3 horas. Podrá llevar galilea efra normal entre los intercambios. [place] alternativa, los intercambios podrán realizarse jarrell la noche, mientras duerme, con un dispositivo llamado ciclador.  ¿QUÉ TIPO DE DIÁLISIS ELIZABET ELEGIR?   Tanto la hemodiálisis [place] la diálisis peritoneal tienen ventajas y desventajas. Hable con chance médico acerca de qué tipo de diálisis es el mejor para usted. Hay que considerar phylicia preferencias, chance estilo de efra y chance enfermedad. En algunos casos, puede elegirse solo un tipo de diálisis.   Ventajas de la hemodiálisis  · Se realiza con menos frecuencia que la diálisis peritoneal.  · Otra persona puede hacer la diálisis por usted.  · Si concurre a un centro especializado en diálisis, el medico podrá detectar los problemas inmediatamente.  · En el centro de diálisis, podrá interactuar con otras personas que se están haciendo diálisis. Aquí podrá encontrar apoyo emocional.  Desventajas de la hemodiálisis  · La hemodiálisis puede causar calambres e hipotensión arterial. Puede dejarle galilea sensación de cansancio en los joey en que le realizan el tratamiento.  · Si concurre a un centro de  diálisis, deberá concertar citas semanales en los horarios disponibles del centro.  · Deberá tener más cuidado cuando viaje. Si concurre a un centro de diálisis será necesario que maritza arreglos para encontrar un centro de diálisis cercano a chance casa. Si realiza el tratamiento en chance casa, será necesario que lleve el dializador con usted hasta chance lugar de destino.  · Será necesario que evite más alimentos de los que debe evitar en la diálisis peritoneal.  Ventajas de la diálisis peritoneal  · Es menos probable que cause calambres e hipotensión arterial.  · Podrá realizar los intercambios usted mismo, donde se encuentre, incluso cuando viaje.  · No es necesario que evite tantos alimentos [place] en la hemodiálisis.  Desventajas de la diálisis peritoneal  · Debe realizarse con más frecuencia que la hemodiálisis.  · La diálisis peritoneal requiere destreza con las traci. También debe ser capaz de levantar bolsas.  · Tendrá que aprender técnicas de esterilización. Deberá practicarlas todos los días para reducir el riesgo de infección.  ¿QUÉ CAMBIOS DEBERÉ HACER EN LA DIETA LOIS LA DIÁLISIS?  Tanto en la hemodiálisis [place] en la diálisis peritoneal se requiere que maritza algunos cambios en chance dieta. Por ejemplo, deberá limitar chance ingesta de alimentos con elevado contenido de fósforo y potasio. También deberá limitar chance ingesta de líquidos. Chance nutricionista puede ayudar a planificar phylicia comidas. Un buen plan de comidas puede mejorar la diálisis y chance catarino.   ¿QUÉ ELIZABET ESPERAR AL COMENZAR LA DIÁLISIS?  Adaptarse al tratamiento de diálisis, a las citas programadas y a la dieta puede rochelle algún tiempo. Puede que sea necesario que deje de trabajar y no pueda hacer algunas de las cosas que normalmente hace. Es probable que se sienta ansioso o deprimido cuando inicie la diálisis. Con el tiempo, muchas personas se sienten mejor debido a la diálisis. Algunos pueden volver a trabajar después de realizar algunos cambios, [place] disminuir  la intensidad del trabajo.  ¿DÓNDE PUEDO ENCONTRAR MÁS INFORMACIÓN?   · Fundación Nacional del Ri[place] (National Kidney Foundation): www.kidney.org  · Asociación Americana de Pacientes Renales (American Association of Kidney Patients, AAKP): www.aakp.org  · Fondo Americano para Problemas Renales (American Kidney Fund): www.kidneyfund.org     Esta información no tiene [place] fin reemplazar el consejo del médico. Asegúrese de hacerle al médico cualquier pregunta que tenga.     Document Released: 12/18/2006 Document Revised: 01/08/2016  [employer] Patient Education ©2016 Elsevier Inc.      Dieta para diálisis  (Dialysis Diet)  La diálisis es un tratamiento que limpia la aletha y se realiza cuando hay daño renal. Las personas que deben someterse a diálisis tienen que cuidar chance dieta. [place] se debe a que algunos nutrientes pueden acumularse en la aletha entre un tratamiento y otro, y causar enfermedades. Estos nutrientes son los siguientes:  · Potasio.  · Fósforo.  · Sodio.  El médico o el nutricionista harán lo siguiente:  · Le dirán la cantidad de estos nutrientes que puede consumir.  · Le dirán si, además, tiene que tener cuidado con otros nutrientes.  · Lo ayudarán a planificar las comidas.  · Le dirán la cantidad de líquido que tiene que beber a diario.  ¿QUÉ ELIZABET SABER ACERCA DE ESTA DIETA?  · Limite el consumo de potasio. La leche, las frutas y las verduras contienen potasio.  · Limite el consumo de fósforo. La leche, el queso, los frijoles, los jeremiah secos y las gaseosas contienen fósforo.  · Limite el consumo de sal (sodio). Entre los alimentos que contienen galilea gran cantidad de sodio se incluyen las hao procesadas y los embutidos, las comidas congeladas preelaboradas, las verduras enlatadas y las colaciones saladas.  · No use sustitutos de la sal.  · Intente no comer alimentos integrales y alimentos que contengan mucha fibra.  · Siga las indicaciones del médico respecto de la cantidad de líquido que  tiene que beber. Es posible que le indiquen que:  ¨ Anote lo que mukul.  ¨ Anote los alimentos que consume que están elaborados principalmente a base de agua, [place] gelatinas y sopas.  ¨ Vangie los líquidos en tazas pequeñas.  · Pregúntele al médico si debe rochelle un medicamento fijador de fósforo.  · [place] vitaminas y suplementos minerales solamente [place] se lo haya indicado el médico.  · Coma carne, carne de ave, pescado y huevos. Limite el consumo de jeremiah secos y frijoles.  · Antes de cocinar ge[place], córtelas en trozos pequeños y luego hiérvalas en agua sin sal.  · Elimine todo el líquido de las verduras cocidas y las frutas enlatadas antes de consumirlas.  ¿QUÉ ALIMENTOS PUEDO COMER?  Cereales  Pan doshi. Arroz doshi. Cereales cocidos. Palomitas de maíz sin sal. Tortillas. Pastas.  Verduras  Brócoli, zanahorias y judías verdes frescas o congeladas. Repollo. Coliflor. Apio. Pepinos. Berenjena. Rábanos. Calabac[place].  Frutas  Manzanas. Jeremiah rojos frescos o congelados. Peras, duraznos y ananá frescos o enlatados. Uvas. Ciruelas.  Ghazala y otras yip de proteínas  Carne, cerdo, silvia y pescado frescos o congelados. Huevos. Atún o salmón enlatado con bajo contenido de sodio.  Lácteos  Queso crema. Crema espesa. Queso ricota.  Bebidas  Addie de manzana. Jugo de arándanos. Mosto. Limonada. Café maggie.  Condimentos  Hierbas. Especias. Mermelada y jalea. Miel.  Dulces y postres  Sorbete. Bizcochuelos. Galletas.  Grasas y aceites  Aceite de aylaa, canola y girasol.  Otros  Crema no láctea. Cobertura batida no láctea. Caldos caseros sin sal.  Los artículos mencionados arriba pueden no ser galilea lista completa de las bebidas o los alimentos recomendados. Comuníquese con el nutricionista para conocer más opciones.  ¿QUÉ ALIMENTOS NO SE RECOMIENDAN?  Cereales  Panes integrales. Pastas integrales. Cereales con alto contenido de fibra.  Verduras  [place]. Remolachas. Tomates. Zapallo y calabaza. Espárragos. Espinaca.  Chirivía.  Frutas  Carambola. Bananas. Naranjas. Kiwi. Pelones. Ciruelas pasas. Melón. Frutas desecadas. Aguacate.  Ghazala y otras yip de proteínas  Ghazala enlatadas y ahumadas, y embutidos. Fiambres envasados. Marcio. Jeremiah secos y semillas. Mantequilla de maní. Frijoles y legumbres.  Lácteos  Leche. Shani de leche. Yogur. Queso y queso cottage. Quesos untables procesados.   Bebidas  Jugo de naranjas. Jugo de ciruelas. Bebidas gaseosas.  Condimentos  Sal. Sustitutos de la sal. Salsa de soja.   Dulces y postres  Helados. Chocolate. Praliné de jeremiah secos.  Grasas y aceites  [place]ant[place]illa. Margarina.  Otros  Comidas congeladas preelaboradas. Sopas enlatadas.   Los artículos mencionados arriba pueden no ser galilea lista completa de las bebidas y los alimentos que se deben evitar. Comuníquese con el nutricionista para obtener más información.     Esta información no tiene [place] fin reemplazar el consejo del médico. Asegúrese de hacerle al médico cualquier pregunta que tenga.     Document Released: 06/18/2013 Document Revised: 01/08/2016  Elsevier Interactive Patient Education ©2016 Elsevier Inc.

## 2017-09-09 NOTE — PROGRESS NOTES
Inpatient Anticoagulation Service Note    Date: 9/9/2017  Reason for Anticoagulation: Atrial Fibrillation   [id number] VASc Score: 4    Hemoglobin Value: (!) 12.9  Hematocrit Value: (!) 39  Lab Platelet Value: 233  Target INR: 2.0 to 3.0    INR from last 7 days     Date/Time INR Value    09/09/17 0356 (!)  2.91    09/08/17 0209 (!)  2.65    09/07/17 0204 (!)  2.77    09/05/17 0216 (!)  2.63    09/04/17 0236 (!)  2.54    09/03/17 0236 (!)  2.55        Dose from last 7 days     Date/Time Dose (mg)    09/09/17 1300  4    09/08/17 1000  4    09/07/17 1500  4    09/06/17 1200  4    09/05/17 1100  4    09/04/17 1000  4    09/03/17 1000  4    09/02/17 1400  4        Average Dose (mg): 4  Significant Interactions: Amiodarone, Statin  Bridge Therapy: No  Bridge Therapy Start Date: 08/28/17  Days of Overlap Therapy: 4   INR Value Greater than 2 Prior to Discontinuation of Parenteral Anticoagulation: Not Applicable     Reversal Agent Administered: Not Applicable  Comments: INR within goal today. Will continue current dosing regimen. Check INRs every Mon/Thurs. No sx of bleeding noted. H/H and platelets stable.      Plan:  Warfarin 4 mg PO daily  Education Material Provided?: Yes  Pharmacist suggested discharge dosing: Warfarin 4 mg PO daily with INR follow-up in 1 week.     Jose Reid, VitorD

## 2017-09-09 NOTE — CARE PLAN
Problem: Safety  Goal: Will remain free from injury  Outcome: PROGRESSING AS EXPECTED  Patient is up self at his leisure and ambulates well.  He requires no assistance regarding his mobility.    Problem: Discharge Barriers/Planning  Goal: Patient's continuum of care needs will be met  Outcome: PROGRESSING AS EXPECTED  Patient is eager for tomorrow as he has learned he has a chance to be discharged after dialysis.  He is on board with the plan provided by his treatment team.

## 2017-09-09 NOTE — DISCHARGE PLANNING
Medical Social Work    SW received pt's Rx and faxed to [facility] on [address] ([phone]). SW waiting to hear back from Pharmacy on price of each Rx for the next 3 days.

## 2017-09-09 NOTE — PROGRESS NOTES
Internal Medicine Interval Note    Name Bhavesh Saini       1970   Age/Sex 47 y.o. male   MRN [id number]   Code Status FULL.     After 5PM or if no immediate response to page, please call for cross-coverage  Attending/Team: Dr. Veloz/[place]. See Patient List for primary contact information  Call [phone] to page    1st Call - Day Intern (R1):   [place] 2nd Call - Day Sr. Resident (R2/R3):   Nando         Reason for interval visit  (Principal Problem)   Atrial fibrillation with RVR (CMS-[facility])    Interval Problem Daily Status Update  (24 hours)   - No acute events overnight.   - Remained in normal sinus rhythm.  Continuing metoprolol succinate 25 mg daily.    - Ambulating well, taking PO, and having regular bowel movements.    - INR 2.65, therapeutic.    Atrial fibrillation with rapid ventricular response: Remains in sinus rhythm.  On amiodarone 200 mg nightly and long acting metoprolol succinate 25 mg daily.  INR therapeutic.     Bradycardia: resolved on reduced metoprolol.      Renal failure: Inpatient hemodialysis planning per Nephrology.  Accepted for and will start outpatient hemodialysis Monday.      Review of Systems   Constitutional: Negative for chills and fever.   Respiratory: Negative for shortness of breath.    Cardiovascular: Negative for chest pain and palpitations.   Gastrointestinal: Negative for abdominal pain and constipation.   Genitourinary: Negative for dysuria.   Musculoskeletal: Negative for myalgias.   Neurological: Negative for dizziness and headaches.       Consultants/Specialty  Cardiology  Cardiac Surgery  Nephrology    Disposition  Likely discharge tomorrow after hemodialysis.      Quality Measures    Reviewed items::  Radiology images reviewed, Medications reviewed and Labs reviewed  Truong catheter::  No Truong  DVT prophylaxis pharmacological::  Warfarin (Coumadin)  And heparin infusion for now.     Physical Exam       Vitals:    17 0336 17 0800  09/08/17 1600 09/08/17 1953   BP: 119/75 122/84 122/75 132/78   Pulse: 60 70 60 63   Resp: 18 16 16 18   Temp: 36.8 °C (98.2 °F) 36.9 °C (98.4 °F) 37.2 °C (98.9 °F) 37.2 °C (99 °F)   SpO2: 95% 97% 99% 97%   Weight:       Height:         Body mass index is 25.83 kg/m².    Oxygen Therapy:  Pulse Oximetry: 97 %, O2 (LPM): 0, O2 Delivery: None (Room Air)    Physical Exam   Constitutional: He is oriented to person, place, and time and well-developed, well-nourished, and in no distress. No distress.   Sitting in bed.    Eyes: EOM are normal. Pupils are equal, round, and reactive to light.   Neck: No JVD present.   Cardiovascular: Normal rate, regular rhythm and intact distal pulses.    Pulmonary/Chest: Effort normal and breath sounds normal. No respiratory distress. He has no wheezes. He has no rales.   Abdominal: Soft. Bowel sounds are normal. He exhibits no distension. There is no tenderness.   Musculoskeletal: He exhibits no edema.   Neurological: He is alert and oriented to person, place, and time. He has normal reflexes. Gait normal. Coordination normal.   Skin: Skin is warm.   Psychiatric: Affect normal.       Lab Data Review:       8/27/2017  6:30 PM    Recent Labs      09/06/17   0150 09/07/17 0154 09/07/17 0205 09/08/17 0209   SODIUM  131*  131*   --   131*   POTASSIUM  3.7  4.1   --   4.0   CHLORIDE  97  98   --   93*   CO2  22  21   --   24   BUN  37*  60*   --   46*   CREATININE  3.57*  4.39*   --   4.18*   PHOSPHORUS   --    --   6.0*   --    CALCIUM  9.2  9.1   --   9.0       Recent Labs      09/06/17   0150  09/07/17   0154 09/08/17   0209   GLUCOSE  62*  83  88       Recent Labs      09/07/17   0204 09/08/17 0209   PROTHROMBTM  30.1*  29.1*   INR  2.77*  2.65*                 Assessment/Plan     * Atrial fibrillation with RVR (CMS-HCC)- (present on admission)   Assessment & Plan    -New onset vs paroxysmal (vague PMH of afib and non-compliance with meds)  -overnight He was given diltiazem by  NF due to Afib with nonsustained HR in 160s. - now in SR  -CHADS2: 4  -ECHO: severe LVH, global hypokinesis, EF ~20%, severe AI and mod MR  -LOBO: slow blood flow but no thrombus. Severe AI    Plan  - Na restricted diet.  - Continue scheduled metoprolol.  PRN 5mg metoprolol q6h.    -S/P LOBO and cardioversion.  -Currently in sinus rhythm.  -Amiodarone 100 mg nightly.   - Continue long acting metoprolol succinate 25 mg    -INRTherapeutic        Hyperkalemia (Resolved )   Assessment & Plan    -On hemodialysis  -Continue to monitor        Cardiorenal syndrome   Assessment & Plan    -Diagnosis given by Nephrology - likely since urine Na is low  -Patient is on HD, and is urinated <500 ml yesterday.  -Creatinine in ED 4.38, Total protein Urine 208. Estimated GFR was 14.   -FENA 0.2%  -BNP 2203>> 2039   -CXR: cardiomegaly. ECHO showed: severe LVH, global hypokinesis, EF ~20%, AR and mod MR  Assessment: Cardiorenal syndrome  Plan  -Patient needs AVR/MAZE w LOBO per Cardiac Surgery, but due to his renal status and recent stroke with hemorrhage, they do not recommend at this time.  They will follow up outpatient in 6 weeks.   -Patient will be counseled on cessation of alcohol intake.   -Nephrology on board.             Aortic regurgitation- (present on admission)   Assessment & Plan    -Severe AR.   -ECHO:  severe LVH, global hypokinesis, EF ~20%, severe AI and mod MR. Findings were confirmed by LOBO  -Fits the criteria for valve replacement. Cardiology and cardiac surgery on board.No plans for surgery due to recent stroke.        HFrEF (heart failure with reduced ejection fraction) (CMS-[facility])- (present on admission)   Assessment & Plan    -Presented with SOB, orthopnea, fatique and palpitations. Has h/o HTN and alcoholism in the past. On exam: JVD +., murmurs + but no volume overload.   -BNP 2203 >> 1753 > 2211.   -CXR: cardiomegaly. ECHO: severe LVH, global hypokinesis, EF ~20%, severe AI and mod MR  -Assessment: Compensated HF  likely due to alcoholism vs tachycardia induced heart failure.   -Cardiac surgery spoke with Mr Saini and family, he needs ischemic work up and AVR/MAZE w LOBO, but due to his renal status and recent stroke with hemorrhage, they do not recommend it at this time.   - Patient's undocumented alien status is another reason having a heart cath is diifficult.      Plan  - Na restricted diet.   -Metoprolol, Lasix and nitrates  - Plan outpatient follow-up with cardiology post discharge..  - Patient's family will fly him to [place] for further care after discharge.          Prolonged Q-T interval on ECG- (present on admission)   Assessment & Plan    -QTc ~530s. Will avoid QTc prolonging meds.        TREASURE (acute kidney injury) / Chronic Kidney Disease   Assessment & Plan    -Acute on chronic. Likely pre-renal vs cardiorenal. HTN is likely the cause of CKD  -On adm: Cr: 4.3, AGMA, Ph 6.1. Unknown baseline.  -FeNa <1%  -H/o renal disease since 7yrs, FH kidney disease in his father. Has been non-complaint with HTN medications. - has CHF  -Renal US: no hydronephrosis and calculi. Left renal cysts.  - Cr and K slowly increasing likely due to poor renal perfusion secondary to atrial fibrillation.    - Upper extremity vein mapping completed in anticipation of AV fistula.     Plan  - Continue Lasix.  Assessed daily by Nephrology for need for HD.    - Outpatient hemodialysis chair procured.  Will start Monday.         CVA (cerebral vascular accident) (CMS-[facility])- (present on admission)   Assessment & Plan    -h/o left facial droop, slurred speech and left sided weakness. However no neuro deficits currently  -CT: cerebral atrophy and small vessel ischemic changes   -MRI brain: acute infarction in L frontal periventricular white matter extendeing to subinsular cortex. Punctate area of infarction in R posterior lobe. Numerous microbleeds.   Assessment: Afib could have contributed (LOBO showed 'sludge' in the heart)  Plan  -ASA, statin  -Mx  of Afib as stated above.        Non-rheumatic mitral regurgitation   Assessment & Plan    -Moderate MR on ECHO  -No plans for surgical intervention at this point.        Hyperglycemia- (present on admission)   Assessment & Plan    -Resolved  -On adm: , A1c: 5.4  -He was started on ISS and hypoglycemia protocol which is discontinued.  CTM with accuchecks        Alcoholic (CMS-HCC)- (present on admission)   Assessment & Plan    -History is vague but looks like he was drinking heavily in the past. Last drink was 8/5/2017.  -On thiamine and folate.           Essential hypertension- (present on admission)   Assessment & Plan    -PMH of HTN but likely non-compliant. ECHO showed severe LVH  -Stable on BB, nitrate, lasix

## 2017-09-09 NOTE — PROGRESS NOTES
Per pt's daughter, pt had clothes when he was in [facility]. Called [facility] to ask if pt left his clothes in their unit. Per staff pt's clothes are not there. Per pt's daughter, it is okay. RN provided pt with clothes from  office.     DC instructions given through language line.  Robert ID# [id number].  Miguelina ID# [id number]. Given instructions about HF, warfarin, dialysis diet, permacath and AVF care.     1705: Bangladeshi instructions about low salt diet, HF, warfarin administration, AVF for dialysis, dialysis access care handouts given. Per Dr. Porter she will come up to unit for the needed scripts for pt.

## 2017-09-10 ENCOUNTER — PATIENT OUTREACH (OUTPATIENT)
Dept: HEALTH INFORMATION MANAGEMENT | Facility: OTHER | Age: 47
End: 2017-09-10

## 2017-09-10 NOTE — PROGRESS NOTES
Discharging Patient home per physician order.  Discharged with family.  Demonstrated understanding of discharge instructions, follow up appointments, home medications, prescriptions, home care for surgical wound, and nursing care instructions for heart failure and hemodialysis.  Ambulating without assistance, voiding without difficulty, pain well controlled, tolerating oral medications, oxygen saturation greater than 90% , tolerating diet.   Educational handouts heart failure and dialysis given and discussed.  Verbalized understanding of discharge instructions and educational handouts.  All questions answered.  Belongings with patient at time of discharge.

## 2017-09-10 NOTE — LETTER
Bhavesh Saini  [address] [facility] [address] [address]  [place] [address]    September 10, 2017      Dear Bhavesh Saini,    [facility] wants to ensure your discharge home is safe and you or your loved ones have had all of your questions answered regarding your care after you leave the hospital.    Our discharge team was unsuccessful in our attempts to contact you telephonically and we wanted to be sure that you had a list of resources and contact information should you have any questions regarding your hospital stay, follow-up instructions, or active medical symptoms.    Questions or Concerns Regarding… Contact   Medical Questions Related to Your Discharge  (7 days a week, 8am-5pm) Contact a Nurse Care Coordinator   [phone]   Medical Questions Not Related to Your Discharge  (24 hours a day / 7 days a week)  Contact the Nurse Health Line   [phone]    Medications or Discharge Instructions Refer to your discharge packet   or contact your -[phone]   Follow-up Appointment(s) Schedule your appointment via [employer]   or contact Scheduling [phone]   Billing Review your statement via [employer]  or contact Billing [phone]   Medical Records Review your records via [employer]   or contact Medical Records [phone]     You can also easily access your medical information, test results and upcoming appointments via the [employer] free online health management tool. You can learn more and sign up at [employer]. For assistance setting up your [employer] account, please call [phone].    Once again, we want to ensure your discharge home is safe and that you have a clear understanding of any next steps in your care. If you have any questions or concerns, please do not hesitate to contact us, we are here for you. Thank you for choosing [facility] for your healthcare needs.    Sincerely,      Your [facility] Healthcare Team

## 2017-09-10 NOTE — PROGRESS NOTES
Placed discharge outreach phone call to patient s/p hospital discharge 9/9/17.  Phone rings repeatedly and then goes to busy signal.  No answer, no option to leave voicemail.  Discharge outreach letter mailed to patient.

## 2017-09-10 NOTE — DISCHARGE SUMMARY
Internal Medicine Discharge Summary      Admit Date:  8/9/2017       Discharge Date:   9/9/2017    Service:   R Internal Medicine [facility] Team  Attending Physician(s):   Dr. Veloz  Senior Resident(s):   Dr. Collier  Ray Resident(s):   Dr. Porter      Primary Diagnosis:   Atrial fibrillation with RVR    Secondary Diagnoses:                Principal Problem:    Atrial fibrillation with RVR (CMS-[facility]) POA: Yes  Active Problems:    Hyperkalemia POA: Unknown    CVA (cerebral vascular accident) (CMS-[facility]) POA: Yes    TREASURE (acute kidney injury) (CMS-[facility]) POA: Yes    Prolonged Q-T interval on ECG POA: Yes    HFrEF (heart failure with reduced ejection fraction) (CMS-HCC) (Chronic) POA: Yes    Aortic regurgitation POA: Yes    Cardiorenal syndrome POA: Unknown    Essential hypertension (Chronic) POA: Yes    Alcoholic (CMS-HCC) (Chronic) POA: Yes    Hyperglycemia POA: Yes    Non-rheumatic mitral regurgitation POA: Unknown  Resolved Problems:    Elevated troponin POA: Unknown    Metabolic acidosis POA: Yes      Hospital Summary (Brief Narrative):       Mr. Saini is a 47 year old Iraqi-only speaking gentleman with PMHx significant for HTN and alcohol abuse who presented with tachycardia and stroke-like symptoms (from one week prior to presentation).  He was found to be in atrial fibrillation with RVR to 120s-160s.  Was given IV diltiazem but subsequently became profoundly bradycardic.  Also found to be in acute kidney injury.  Temporary dialysis catheter was placed; hemodialysis given.  Admitted to ICU where he was rate controlled.  ECHO showed EF 25% with severe left ventricular hypertrophy, severe aortic insufficiency, moderate mitral regurgitation.  LOBO confirmed EF of 20% and showed 'sluggish' blood flow in heart (but no thrombus) and severe AI.  Patient informed he will need aortic valve replacement (AVR).  CT head showed cerebral atrophy and small vessel ischemic changes.  MRI brain showed acute  infarction in L frontal periventricular white matter extendeing to subinsular cortex, punctate area of infarction in R posterior lobe, numerous microbleeds.  Cardiac surgery opted for elective AVR in 6 weeks given recent stroke.  However, patient does not have insurance and is instead planning to return to home country, [place], for the surgery.  Later in the admission, AV fistula created by Vascular Surgery.  Patient again underwent LOBO and successful cardioversion.  Patient initially had episodes of bradycardia after cardioversion, but medication was titrated appropriately.  Patient now in continued normal sinus rhythm on amiodarone 200 mg nightly and long-acting metoprolol 25 mg daily.  Patient also successfully bridged from heparin to warfarin.      Patient is discharged in stable condition to his home with his adult daughter.  He has follow up with [facility] for his prescriptions.  He has follow up outpatient dialysis MWF.  He also has a follow up outpatient Cardiology appointment.  (See appointments below.)      Patient /Hospital Summary (Details -- Problem Oriented) :          Hyperkalemia   Assessment & Plan    - K 3.7 today after hemodialysis last night.    - Patient now on low K diet.   - Continue to monitor.          * Atrial fibrillation with RVR (CMS-HCC)   Assessment & Plan    - New onset vs paroxysmal (vague PMHx of afib and non-compliance with meds).   - CHADS2 VASc score: 4.  - ECHO: severe LVH, global hypokinesis, EF ~20%, severe AI and moderate MR.   - LOBO: slow blood flow but no thrombus; severe AI.    - Placed on amiodarone drip.  Then amiodarone PO.  Metoprolol added.   - Subsequent LOBO done and patient successfully cardioverted.  Metoprolol titrated to 25 mg long-acting.   - Patient bridged from heparin to coumadin.         Cardiorenal syndrome   Assessment & Plan    - On adm: Cr: 4.3, AGMA, Ph 6.1. Unknown baseline.  - BNP 2203>> 2039   - FeNa <1%  - H/o renal disease x7 years.     - Renal US: no hydronephrosis and calculi.  Left renal cysts.  - Cr and K slowly increasing likely due to poor renal perfusion secondary to atrial fibrillation.    - Patient with temporary dialysis catheter placed and hemodialyzed per Nephrology.   - Upper extremity vein mapping done.  AV fistula placed by Vascular Surgery.    - Patient to received HD outpatient M/W/F.         Aortic regurgitation   Assessment & Plan    - Severe aortic insufficiency.    - ECHO:  severe LVH, global hypokinesis, EF ~20%, severe AI and moderate MR.  Findings were confirmed by LOBO.  - Fits the criteria for valve replacement.    - Patient plans to return to [place] for valvular replacement.          HFrEF (heart failure with reduced ejection fraction) (CMS-HCC)   Assessment & Plan    -Presented with SOB, orthopnea, fatique and palpitations. Has h/o HTN and alcoholism in the past. On exam: JVD +., murmurs + but no volume overload.   -BNP 2203 >> 1753 > 2211.   -CXR: cardiomegaly. ECHO: severe LVH, global hypokinesis, EF ~20%, severe atrial insufficiency and moderate mitral insufficiency.  - Patient needs aortic valve replacement, but renal status and recent stroke with hemorrhage prevented replacement in hospital.    - Patient has follow up appointment with Dr. Gutierrez, Cardiology, on September 11.  - Patient's family plans to fly him to [place] for further care after discharge.          Prolonged Q-T interval on ECG   Assessment & Plan    -QTc ~530s. Will avoid QTc prolonging meds.        TREASURE (acute kidney injury) (CMS-HCC)   Assessment & Plan    - Acute vs. chronic.  Likely cardiorenal.   - On adm: Cr: 4.3, AGMA, Ph 6.1. Unknown baseline.  - FeNa <1%  - H/o renal disease x7 years.    - Renal US: no hydronephrosis and calculi.  Left renal cysts.  - Cr and K slowly increasing likely due to poor renal perfusion secondary to atrial fibrillation.    - Patient with temporary dialysis catheter placed and hemodialyzed per Nephrology.   - Upper  extremity vein mapping done.  AV fistula placed by Vascular Surgery.    - Patient to received HD outpatient M/W/F.         CVA (cerebral vascular accident) (CMS-HCC)   Assessment & Plan    -H/o left facial droop, slurred speech, and left sided weakness one week before presentation.  Resolved.    - CT: cerebral atrophy and small vessel ischemic changes.   - MRI brain: acute infarction in L frontal periventricular white matter extendeing to subinsular cortex. Punctate area of infarction in R posterior lobe. Numerous microbleeds.   - Patient on statin plus medications for atrial fibrillation.          Non-rheumatic mitral regurgitation   Assessment & Plan    - Moderate MR on ECHO.         Hyperglycemia   Assessment & Plan    - Resolved.   - On adm: , A1c: 5.4.   - Was on correctional insulin and hypoglycemia protocol while inpatient.         Alcoholic (CMS-HCC)   Assessment & Plan    - History of heavy alcohol abuse.  Last drink 8/5/2017.  - Discharged on thiamine and vitamin B complex/vit C.          Essential hypertension   Assessment & Plan    - PMH of HTN but likely non-compliant with medications.  ECHO showed severe left ventricular hypertrophy.    - On discharge, patient stable on Imdur, lasix, metoprolol.         Metabolic acidosis-resolved as of 8/17/2017   Assessment & Plan    -AGMA on admission likely due to uremia  -Resolved with dialysis  -Nephrology on board        Elevated troponin-resolved as of 8/16/2017   Assessment & Plan    -Trops ~0.2 w/o ST-TW changes. Likely elevated due to renal disease            Consultants:     Nephrology  Cardiology  Vascular Surgery    Procedures:        LOBO with cardioversion.      Imaging/ Testing:      DX-PORTABLE FLUORO > 1 HOUR   Final Result         Portable fluoroscopy utilized for 12 seconds.      TRANSESOPHAGEAL ECHO W/O CONT   Final Result      IR-CECI WISEMAN PLACEMENT >5   Final Result      Successful image guided non-tunneled dialysis catheter  "placement.      Plan: The catheter is available for immediate use.            UE VEIN MAPPING ([facility] Only)   Final Result      TRANSESOPHAGEAL ECHO W/O CONT   Final Result      CAROTID DUPLEX ([facility])   Final Result      MR-BRAIN-W/O   Final Result         1.  Mild diffuse cerebral atrophy.      2.  Small areas of acute infarction involving the left frontal periventricular white matter extending inferiorly to the subinsular cortex. Punctate area of infarction involving the cortex in the right posterior occipital lobe.      3.  Moderate periventricular white matter changes consistent with chronic microvascular ischemic gliosis.      4.  Small chronic focus of lacunar type infarction in the right frontal periventricular white matter.      5.  Numerous chronic \"microbleed's\" are noted throughout the brain parenchyma in both the supra and infratentorial compartments. Gyriform/curvilinear regions of chronic \"microbleed\" noted in the right posterior frontal region and also the left frontal    periventricular white matter.      US-RENAL   Final Result      1.  No hydronephrosis is identified.      2.  Increased renal echotexture is consistent with medical renal disease.      3.  Left renal cysts.      4.  Small amount of free fluid in Morison's pouch.      ECHOCARDIOGRAM COMP W/O CONT   Final Result      IR-CVC NON TUNNELED > AGE 5   Final Result      1. Ultrasound and fluoroscopic guided placement of a right internal jugular 12 [place]kar triple lumen non-tunneled hemodialysis catheter.      2. The hemodialysis catheter may be used immediately as clinically indicated. Flushes per protocol.      CT-HEAD W/O   Final Result      1.  Cerebral atrophy.      2.  White matter lucencies most consistent with small vessel ischemic change versus demyelination or gliosis.      3.  Otherwise, Head CT without contrast with no acute findings. No evidence of acute cerebral infarction, " hemorrhage or mass lesion.      DX-CHEST-PORTABLE (1 VIEW)   Final Result      Cardiomegaly. No consolidation.            Discharge Medications:         Medication Reconciliation: Completed       Medication List      START taking these medications      Instructions   amiodarone 200 MG Tabs  Commonly known as:  CORDARONE   Take 1 Tab by mouth every day.  Dose:  200 mg     atorvastatin 40 MG Tabs  Commonly known as:  LIPITOR   Take 1 Tab by mouth every bedtime.  Dose:  40 mg     folic acid 1 MG Tabs  Commonly known as:  FOLVITE   Take 1 Tab by mouth every day.  Dose:  1 mg     furosemide 40 MG Tabs  Commonly known as:  LASIX   Take 1 Tab by mouth every day.  Dose:  40 mg     isosorbide mononitrate SR 60 MG Tb24  Commonly known as:  IMDUR   Take 1 Tab by mouth every day.  Dose:  60 mg     metoprolol SR 25 MG Tb24  Commonly known as:  TOPROL XL   Take 1 Tab by mouth every day.  Dose:  25 mg     thiamine 100 MG tablet  Commonly known as:  THIAMINE   Take 1 Tab by mouth every day.  Dose:  100 mg     vitamin B comp+C Tabs   Take 1 Tab by mouth every day.  Dose:  1 Tab     warfarin 4 MG Tabs  Commonly known as:  COUMADIN   Take 1 Tab by mouth every day.  Dose:  4 mg              Disposition:   Home.    Diet:   Renal diet.     Activity:   As tolerated.    Instructions:      The patient was instructed to return to the ER in the event of worsening symptoms. I have counseled the patient on the importance of compliance and the patient has agreed to proceed with all medical recommendations and follow up plan indicated above.   The patient understands that all medications come with benefits and risks. Risks may include permanent injury or death and these risks can be minimized with close reassessment and monitoring.        Primary Care Provider:      Discharge summary faxed to primary care provider:  Completed  Copy of discharge summary given to the patient: Deferred      Follow up appointment details :      - Follow up with  [facility] on September 12 for his prescriptions.  - Follow up outpatient dialysis MWF at [facility].    - Follow up outpatient appointment with Dr. Gutierrez, Cardiology, on September 11.     Pending Studies:        None.      Time spent on discharge day patient visit, preparing discharge paperwork and arranging for patient follow up.    Summary of follow up issues:   - Follow up outpatient dialysis MWF.    - Follow up outpatient Cardiology appointment to discuss patient's valvular pathology and plan to remedy.      Discharge Time (Minutes) :    50        Condition on Discharge    ______________________________________________________________________    Interval history/exam for day of discharge:    - No acute events overnight.    - Remained in normal sinus rhythm.    - INR therapeutic.   - Ambulating well, eating well, and having regular bowel movements.      Vitals:    09/09/17 1023 09/09/17 1026 09/09/17 1050 09/09/17 1510   BP: 150/78 152/85 146/79 124/77   Pulse:  61 64 60   Resp:  16 20 18   Temp:  36.6 °C (97.9 °F) 36.3 °C (97.4 °F) 36.1 °C (97 °F)   SpO2:   96% 95%   Weight:       Height:         Weight/BMI: Body mass index is 25.83 kg/m².  Pulse Oximetry: 95 %, O2 (LPM): 0, O2 Delivery: None (Room Air)    General: He is oriented to person, place, and time and well-developed, well-nourished, and in no distress. No distress.   CVS: Normal rate, regular rhythm and intact distal pulses.    PULM: Effort normal and breath sounds normal. No respiratory distress. He has no wheezes. He has no rales.       Most Recent Labs:    Lab Results   Component Value Date/Time    WBC 5.5 09/09/2017 03:56 AM    RBC 4.47 (L) 09/09/2017 03:56 AM    HEMOGLOBIN 12.9 (L) 09/09/2017 03:56 AM    HEMATOCRIT 39.0 (L) 09/09/2017 03:56 AM    MCV 87.2 09/09/2017 03:56 AM    MCH 28.9 09/09/2017 03:56 AM    MCHC 33.1 (L) 09/09/2017 03:56 AM    MPV 10.3 09/09/2017 03:56 AM    NEUTSPOLYS 50.30 09/09/2017 03:56 AM     LYMPHOCYTES 31.90 09/09/2017 03:56 AM    MONOCYTES 8.90 09/09/2017 03:56 AM    EOSINOPHILS 7.30 (H) 09/09/2017 03:56 AM    BASOPHILS 0.90 09/09/2017 03:56 AM      Lab Results   Component Value Date/Time    SODIUM 130 (L) 09/09/2017 03:56 AM    POTASSIUM 3.9 09/09/2017 03:56 AM    CHLORIDE 96 09/09/2017 03:56 AM    CO2 20 09/09/2017 03:56 AM    GLUCOSE 83 09/09/2017 03:56 AM    BUN 68 (H) 09/09/2017 03:56 AM    CREATININE 5.57 (HH) 09/09/2017 03:56 AM      Lab Results   Component Value Date/Time    ALTSGPT 32 08/21/2017 12:41 AM    ASTSGOT 24 08/21/2017 12:41 AM    ALKPHOSPHAT 67 08/21/2017 12:41 AM    TBILIRUBIN 0.4 08/21/2017 12:41 AM    ALBUMIN 3.5 08/21/2017 12:41 AM    GLOBULIN 2.6 08/21/2017 12:41 AM    INR 2.91 (H) 09/09/2017 03:56 AM     Lab Results   Component Value Date/Time    PROTHROMBTM 31.3 (H) 09/09/2017 03:56 AM    INR 2.91 (H) 09/09/2017 03:56 AM

## 2017-09-11 ENCOUNTER — OFFICE VISIT (OUTPATIENT)
Dept: CARDIOLOGY | Facility: MEDICAL CENTER | Age: 47
End: 2017-09-11
Payer: MEDICAID

## 2017-09-11 VITALS
SYSTOLIC BLOOD PRESSURE: 128 MMHG | HEART RATE: 56 BPM | OXYGEN SATURATION: 96 % | DIASTOLIC BLOOD PRESSURE: 72 MMHG | BODY MASS INDEX: 26.95 KG/M2 | WEIGHT: 138 LBS

## 2017-09-11 DIAGNOSIS — I48.91 ATRIAL FIBRILLATION WITH RVR (HCC): ICD-10-CM

## 2017-09-11 DIAGNOSIS — Z99.2 ACUTE RENAL FAILURE ON DIALYSIS (HCC): ICD-10-CM

## 2017-09-11 DIAGNOSIS — I50.22 CHRONIC SYSTOLIC HEART FAILURE (HCC): Chronic | ICD-10-CM

## 2017-09-11 DIAGNOSIS — I35.1 NONRHEUMATIC AORTIC VALVE INSUFFICIENCY: ICD-10-CM

## 2017-09-11 DIAGNOSIS — I63.19 CEREBROVASCULAR ACCIDENT (CVA) DUE TO EMBOLISM OF OTHER PRECEREBRAL ARTERY (HCC): ICD-10-CM

## 2017-09-11 DIAGNOSIS — N17.9 ACUTE RENAL FAILURE ON DIALYSIS (HCC): ICD-10-CM

## 2017-09-11 PROCEDURE — 99213 OFFICE O/P EST LOW 20 MIN: CPT | Performed by: NURSE PRACTITIONER

## 2017-09-11 ASSESSMENT — ENCOUNTER SYMPTOMS
COUGH: 0
DIZZINESS: 0
CLAUDICATION: 0
MYALGIAS: 0
SHORTNESS OF BREATH: 0
ABDOMINAL PAIN: 0
WEAKNESS: 0
ORTHOPNEA: 0
PALPITATIONS: 0
PND: 0

## 2017-09-11 NOTE — LETTER
[facility]   [address] [facility] [facility] [address] [facility] [address]  [place] [address]  Phone: [phone]  Fax: [phone]              Bhavesh Saini  1970    Encounter Date: 9/11/2017    DAVID Stroud          PROGRESS NOTE:  Subjective:   Bhavesh Saini is a 47 y.o. Finnish-speaking male who presents today for hospital follow-up. Translation was used, [place] [id number]. He is accompanied by his daughter, Lulu, who is bilingual.    The patient presented to the hospital with tachycardia and strokelike symptoms and was found to have rapid atrial fibrillation. MRI revealed a CVA with micro-bleeds.  He had acute kidney injury and required dialysis which he is continuing on Monday, Wednesday and Friday. Echocardiogram revealed a reduced ejection fraction of 25% and severe aortic stenosis. He has a history of alcohol use for an extended period of time. He was in the hospital for one month.    He was discharged on warfarin, amiodarone and Lasix. He will require aortic valve replacement but is uninsured therefore he is planning on returning to [place] to have this done.    Since being out of the hospital, he denies any shortness of breath, orthopnea, PND or ankle edema. His daughter has been cooking for him and limiting salt and doing her best to follow a renal diet.    The patient denies any chest pain or palpitations. He states he feels well and has been doing some walking around the block once a day. He has not had any alcohol since hospital discharge.    His daughter is working on obtaining paperwork from [place] so that he can have insurance coverage I get his valve replaced. She asked that we provide a letter stating that he is stable to fly to [place] when he is ready to go.    He will be following up at the [facility] for prescriptions and healthcare.    Past Medical History:   Diagnosis Date   • Heart murmur    • Valvular heart disease      Past Surgical History:      Procedure Laterality Date   • CATH PLACEMENT  8/27/2017    Procedure: CATH PLACEMENT - perm cath;  Surgeon: Hiren Ayon M.D.;  Location: SURGERY [facility];  Service: General   • AV FISTULA CREATION  8/27/2017    Procedure: AV FISTULA CREATION - left radial cephalic;  Surgeon: Hiren Ayon M.D.;  Location: SURGERY [facility];  Service: General     Family History   Problem Relation Age of Onset   • Stroke Sister 38     CVA     History   Smoking Status   • Never Smoker   Smokeless Tobacco   • Never Used     No Known Allergies  Outpatient Encounter Prescriptions as of 9/11/2017   Medication Sig Dispense Refill   • isosorbide mononitrate SR (IMDUR) 60 MG TABLET SR 24 HR Take 1 Tab by mouth every day. 30 Tab 2   • amiodarone (CORDARONE) 200 MG Tab Take 1 Tab by mouth every day. 30 Tab 2   • warfarin (COUMADIN) 4 MG Tab Take 1 Tab by mouth every day. 30 Tab 2   • metoprolol SR (TOPROL XL) 25 MG TABLET SR 24 HR Take 1 Tab by mouth every day. 30 Tab 2   • furosemide (LASIX) 40 MG Tab Take 1 Tab by mouth every day. 30 Tab 2   • vitamin B comp+C (ALLBEE WITH C) Tab Take 1 Tab by mouth every day. 30 Tab 2   • thiamine (THIAMINE) 100 MG tablet Take 1 Tab by mouth every day. 30 Tab 2   • atorvastatin (LIPITOR) 40 MG Tab Take 1 Tab by mouth every bedtime. 30 Tab 2   • folic acid (FOLVITE) 1 MG Tab Take 1 Tab by mouth every day. 30 Tab 2     No facility-administered encounter medications on file as of 9/11/2017.      Review of Systems   Constitutional: Positive for malaise/fatigue.   Respiratory: Negative for cough and shortness of breath.    Cardiovascular: Negative for chest pain, palpitations, orthopnea, claudication, leg swelling and PND.   Gastrointestinal: Negative for abdominal pain.   Musculoskeletal: Negative for myalgias.   Neurological: Negative for dizziness and weakness.        Objective:   /72   Pulse (!) 56   Wt 62.6 kg (138 lb)   SpO2 96%   BMI 26.95 kg/m²      Physical Exam    Constitutional: He appears well-developed and well-nourished.   HENT:   Head: Normocephalic.   Eyes: Conjunctivae are normal.   Neck: No JVD present. No thyromegaly present.   Cardiovascular: Normal rate and regular rhythm.    Murmur heard.   Diastolic murmur is present with a grade of 3/6   Pulmonary/Chest: Effort normal and breath sounds normal. He has no wheezes. He has no rales.   Abdominal: Soft. Bowel sounds are normal. He exhibits no distension.   Musculoskeletal: He exhibits no edema.   Neurological: He is alert.   Skin: Skin is warm and dry.   Psychiatric: He has a normal mood and affect.     Results for ALONDRA FERNANDEZ (MRN [id number]) as of 9/11/2017 09:55   Ref. Range 9/9/2017 03:56   WBC Latest Ref Range: 4.8 - 10.8 K/uL 5.5   RBC Latest Ref Range: 4.70 - 6.10 M/uL 4.47 (L)   Hemoglobin Latest Ref Range: 14.0 - 18.0 g/dL 12.9 (L)   Hematocrit Latest Ref Range: 42.0 - 52.0 % 39.0 (L)   MCV Latest Ref Range: 81.4 - 97.8 fL 87.2   MCH Latest Ref Range: 27.0 - 33.0 pg 28.9   MCHC Latest Ref Range: 33.7 - 35.3 g/dL 33.1 (L)   RDW Latest Ref Range: 35.9 - 50.0 fL 44.4   Platelet Count Latest Ref Range: 164 - 446 K/uL 233   MPV Latest Ref Range: 9.0 - 12.9 fL 10.3   Neutrophils-Polys Latest Ref Range: 44.00 - 72.00 % 50.30   Neutrophils (Absolute) Latest Ref Range: 1.82 - 7.42 K/uL 2.77   Lymphocytes Latest Ref Range: 22.00 - 41.00 % 31.90   Lymphs (Absolute) Latest Ref Range: 1.00 - 4.80 K/uL 1.76   Monocytes Latest Ref Range: 0.00 - 13.40 % 8.90   Monos (Absolute) Latest Ref Range: 0.00 - 0.85 K/uL 0.49   Eosinophils Latest Ref Range: 0.00 - 6.90 % 7.30 (H)   Eos (Absolute) Latest Ref Range: 0.00 - 0.51 K/uL 0.40   Basophils Latest Ref Range: 0.00 - 1.80 % 0.90   Baso (Absolute) Latest Ref Range: 0.00 - 0.12 K/uL 0.05   Immature Granulocytes Latest Ref Range: 0.00 - 0.90 % 0.70   Immature Granulocytes (abs) Latest Ref Range: 0.00 - 0.11 K/uL 0.04   Nucleated RBC Latest Units: /100 WBC 0.00   NRBC (Absolute)  Latest Units: K/uL 0.00   Sodium Latest Ref Range: 135 - 145 mmol/L 130 (L)   Potassium Latest Ref Range: 3.6 - 5.5 mmol/L 3.9   Chloride Latest Ref Range: 96 - 112 mmol/L 96   Co2 Latest Ref Range: 20 - 33 mmol/L 20   Anion Gap Latest Ref Range: 0.0 - 11.9  14.0 (H)   Glucose Latest Ref Range: 65 - 99 mg/dL 83   Bun Latest Ref Range: 8 - 22 mg/dL 68 (H)   Creatinine Latest Ref Range: 0.50 - 1.40 mg/dL 5.57 (HH)   GFR If  Latest Ref Range: >60 mL/min/1.73 m 2 13 (A)   GFR If Non  Latest Ref Range: >60 mL/min/1.73 m 2 11 (A)   Calcium Latest Ref Range: 8.5 - 10.5 mg/dL 9.2   PT Latest Ref Range: 12.0 - 14.6 sec 31.3 (H)   INR Latest Ref Range: 0.87 - 1.13  2.91 (H)       August 9, 2017: Transthoracic Echo Report  No prior study is available for comparison.   Severely reduced left ventricular systolic function.  Global hypokinesis.  Left ventricular ejection fraction is visually estimated to be 25%.  Diastolic function is difficult to assess with atrial fibrillation.  Normal right ventricular size and systolic function.  Moderate mitral regurgitation.  Severe eccentric aortic insufficiency.  Estimated right ventricular systolic pressure  is 60 mmHg.  These findings are known by cardiology consultation.    Assessment:     1. Atrial fibrillation with RVR (CMS-[facility])     2. Chronic systolic heart failure (CMS-[facility])     3. Nonrheumatic aortic valve insufficiency     4. Cerebrovascular accident (CVA) due to embolism of other precerebral artery     5. Acute renal failure on dialysis (CMS-[facility])         Medical Decision Making:  Today's Assessment / Status / Plan:       Atrial fibrillation: He is in a regular rhythm today. He will continue on amiodarone and metoprolol. He is on warfarin but it appears he has not had a INR checked since hospital discharge, the last being on September 9, 2017. He will need to be followed by [facility] for anticoagulation.    Systolic heart failure: His  ejection fractions 25%. He is on Lasix 40 mg daily. He has no fluid overload signs today. Continue on current dose of Lasix and limits sodium. He will report to us if he has shortness of breath or ankle edema.    Severe aortic insufficiency: Patient will require valve replacement. His daughter is working on getting him insurance in [place] and planning on traveling back to have his valve replaced.    CVA: No obvious symptoms. The patient has no significant lateralizing signs or weakness. Continue on warfarin.    Renal failure on dialysis: He is being dialyzed on Monday, Wednesday and Friday. He will continue with dialysis.    They're making plans for him to return to [place] so he can have his valve replaced. It appears that emergency insurance will not pay for his valve replacement here in the [place]. I have given the daughter let her that states he is stable to fly as of today's date. She will obtain the paperwork needed and we will probably repeat the letter at a closer day 2 when he will be going to [place].    He will return to our office in one month or sooner if he has symptoms of heart failure or rapid heart rate. Otherwise he will follow-up at the [facility] [place] with Harriet DAVIS.      No Recipients

## 2017-09-15 NOTE — PROCEDURES
DATE OF SERVICE:  08/28/2017    PROCEDURE:  Cardioversion.    PERSON ASKING FOR PROCEDURE:  Stephan Nuñez MD    INDICATION:  Atrial fibrillation with heart failure.    PROCEDURE DATA:  The patient had given informed consent prior to the   procedure.  Anesthesia staff was in attendance and delivered conscious   sedation.    The patient received 200 joules of direct current cardioversion and promptly   went into sinus mechanism.  There were no adverse events.  Recovery was   uneventful.  He was monitored throughout.       ____________________________________     MD PATT SANTAMARIA / RAQUEL    DD:  09/15/2017 07:41:25  DT:  09/15/2017 08:16:23    D#:  [id number]  Job#:  [id number]

## 2018-02-22 ENCOUNTER — OFFICE VISIT (OUTPATIENT)
Dept: CARDIOLOGY | Facility: MEDICAL CENTER | Age: 48
End: 2018-02-22

## 2018-02-22 VITALS
BODY MASS INDEX: 28.51 KG/M2 | HEART RATE: 72 BPM | OXYGEN SATURATION: 97 % | WEIGHT: 146 LBS | SYSTOLIC BLOOD PRESSURE: 142 MMHG | DIASTOLIC BLOOD PRESSURE: 74 MMHG

## 2018-02-22 DIAGNOSIS — I48.91 ATRIAL FIBRILLATION WITH RVR (HCC): ICD-10-CM

## 2018-02-22 DIAGNOSIS — I35.1 NONRHEUMATIC AORTIC VALVE INSUFFICIENCY: ICD-10-CM

## 2018-02-22 DIAGNOSIS — I10 ESSENTIAL HYPERTENSION: Chronic | ICD-10-CM

## 2018-02-22 DIAGNOSIS — I50.22 CHRONIC SYSTOLIC HEART FAILURE (HCC): Chronic | ICD-10-CM

## 2018-02-22 PROCEDURE — 99214 OFFICE O/P EST MOD 30 MIN: CPT | Performed by: INTERNAL MEDICINE

## 2018-02-23 NOTE — PROGRESS NOTES
Subjective:   Bhavesh Saini is a 47 y.o. male who presents today for follow-up of his severe aortic insufficiency. He also has a cardiomyopathy which may have been rate related due to atrial fibrillation with a rapid ventricular response. He did undergo cardioversion last July. He was hospitalized at that time and is now on chronic hemodialysis.    He denies any chest discomfort, dyspnea, edema, palpitations or lightheadedness.        Past Medical History:   Diagnosis Date   • Heart murmur    • Valvular heart disease      Past Surgical History:   Procedure Laterality Date   • CATH PLACEMENT  8/27/2017    Procedure: CATH PLACEMENT - perm cath;  Surgeon: Hiren yAon M.D.;  Location: SURGERY [facility];  Service: General   • AV FISTULA CREATION  8/27/2017    Procedure: AV FISTULA CREATION - left radial cephalic;  Surgeon: Hiren Ayon M.D.;  Location: SURGERY [facility];  Service: General     Family History   Problem Relation Age of Onset   • Stroke Sister 38     CVA     History   Smoking Status   • Never Smoker   Smokeless Tobacco   • Never Used     No Known Allergies  Outpatient Encounter Prescriptions as of 2/22/2018   Medication Sig Dispense Refill   • isosorbide mononitrate SR (IMDUR) 60 MG TABLET SR 24 HR Take 1 Tab by mouth every day. 30 Tab 2   • amiodarone (CORDARONE) 200 MG Tab Take 1 Tab by mouth every day. 30 Tab 2   • warfarin (COUMADIN) 4 MG Tab Take 1 Tab by mouth every day. 30 Tab 2   • metoprolol SR (TOPROL XL) 25 MG TABLET SR 24 HR Take 1 Tab by mouth every day. 30 Tab 2   • furosemide (LASIX) 40 MG Tab Take 1 Tab by mouth every day. 30 Tab 2   • vitamin B comp+C (ALLBEE WITH C) Tab Take 1 Tab by mouth every day. 30 Tab 2   • thiamine (THIAMINE) 100 MG tablet Take 1 Tab by mouth every day. 30 Tab 2   • atorvastatin (LIPITOR) 40 MG Tab Take 1 Tab by mouth every bedtime. 30 Tab 2   • folic acid (FOLVITE) 1 MG Tab Take 1 Tab by mouth every day. 30 Tab 2     No  facility-administered encounter medications on file as of 2/22/2018.      ROS     Objective:   /74   Pulse 72   Wt 66.2 kg (146 lb)   SpO2 97%   BMI 28.51 kg/m²     Physical Exam   Neck: No JVD present.   Cardiovascular: Normal rate and regular rhythm.  Exam reveals no gallop.    Murmur (2/6 diastolic murmur noted at the base and radiating to the left sternal border.) heard.  Pulmonary/Chest: Effort normal. He has no rales.   Abdominal: Soft. There is no tenderness.   Musculoskeletal: He exhibits no edema.     Lab Results   Component Value Date/Time    CHOLSTRLTOT 104 08/10/2017 12:16 AM    LDL 50 08/10/2017 12:16 AM    HDL 44 08/10/2017 12:16 AM    TRIGLYCERIDE 48 08/10/2017 12:16 AM       Lab Results   Component Value Date/Time    SODIUM 130 (L) 09/09/2017 03:56 AM    POTASSIUM 3.9 09/09/2017 03:56 AM    CHLORIDE 96 09/09/2017 03:56 AM    CO2 20 09/09/2017 03:56 AM    GLUCOSE 83 09/09/2017 03:56 AM    BUN 68 (H) 09/09/2017 03:56 AM    CREATININE 5.57 (HH) 09/09/2017 03:56 AM     Lab Results   Component Value Date/Time    ALKPHOSPHAT 67 08/21/2017 12:41 AM    ASTSGOT 24 08/21/2017 12:41 AM    ALTSGPT 32 08/21/2017 12:41 AM    TBILIRUBIN 0.4 08/21/2017 12:41 AM      Lab Results   Component Value Date/Time    BNPBTYPENAT 2211 (H) 08/18/2017 09:19 AM        LOBO:  Echocardiography Laboratory  CONCLUSIONS  Left ventricular ejection fraction is visually estimated to be 35%   prior to cardioversion, in atrial fibrillation at 140 bpm.  Immediately after cardioversion - EF had improved and was at least 45%   at a sinus rhythm at 50 bpm.  Normal left atrial appendage with 2D and 4D images.     ALONDRA FERNANDEZ  Exam Date:          08/28/2017       Assessment:     1. Chronic systolic heart failure (CMS-HCC)     2. Essential hypertension     3. Atrial fibrillation with RVR (CMS-HCC)     4. Nonrheumatic aortic valve insufficiency: Severe         Medical Decision Making:  Today's Assessment / Status / Plan:       Parveen is clinically stable. He is asymptomatic from cardiovascular standpoint. He does have severe aortic insufficiency. He is now on chronic hemodialysis. We will obtain an echocardiogram to see there has been further improvement of his LV function. It improved significantly immediately post-cardioversion. Depending on his clinical course and the result of his echocardiogram he may need to be considered for aortic valve replacement.    He does have Medicaid at the present time.

## 2018-03-22 ENCOUNTER — OFFICE VISIT (OUTPATIENT)
Dept: CARDIOLOGY | Facility: MEDICAL CENTER | Age: 48
End: 2018-03-22

## 2018-03-22 VITALS
HEART RATE: 66 BPM | OXYGEN SATURATION: 96 % | BODY MASS INDEX: 27.34 KG/M2 | SYSTOLIC BLOOD PRESSURE: 118 MMHG | DIASTOLIC BLOOD PRESSURE: 60 MMHG | WEIGHT: 140 LBS

## 2018-03-22 DIAGNOSIS — I35.1 NONRHEUMATIC AORTIC VALVE INSUFFICIENCY: ICD-10-CM

## 2018-03-22 DIAGNOSIS — I48.0 PAROXYSMAL ATRIAL FIBRILLATION (HCC): ICD-10-CM

## 2018-03-22 DIAGNOSIS — I10 ESSENTIAL HYPERTENSION: Chronic | ICD-10-CM

## 2018-03-22 DIAGNOSIS — I50.22 CHRONIC SYSTOLIC HEART FAILURE (HCC): Chronic | ICD-10-CM

## 2018-03-22 PROCEDURE — 99214 OFFICE O/P EST MOD 30 MIN: CPT | Performed by: NURSE PRACTITIONER

## 2018-03-22 RX ORDER — AMIODARONE HYDROCHLORIDE 200 MG/1
200 TABLET ORAL DAILY
Qty: 30 TAB | Refills: 11 | Status: SHIPPED | OUTPATIENT
Start: 2018-03-22 | End: 2021-10-19 | Stop reason: SDUPTHER

## 2018-03-22 RX ORDER — FUROSEMIDE 40 MG/1
40 TABLET ORAL DAILY
Qty: 30 TAB | Refills: 11 | Status: SHIPPED | OUTPATIENT
Start: 2018-03-22 | End: 2020-03-20 | Stop reason: SDUPTHER

## 2018-03-22 RX ORDER — ISOSORBIDE MONONITRATE 60 MG/1
60 TABLET, EXTENDED RELEASE ORAL DAILY
Qty: 30 TAB | Refills: 11 | Status: SHIPPED | OUTPATIENT
Start: 2018-03-22 | End: 2020-03-20 | Stop reason: SDUPTHER

## 2018-03-22 RX ORDER — METOPROLOL SUCCINATE 25 MG/1
25 TABLET, EXTENDED RELEASE ORAL DAILY
Qty: 30 TAB | Refills: 11 | Status: SHIPPED | OUTPATIENT
Start: 2018-03-22 | End: 2020-03-20 | Stop reason: SDUPTHER

## 2018-03-22 RX ORDER — WARFARIN SODIUM 4 MG/1
4 TABLET ORAL DAILY
Qty: 30 TAB | Refills: 11 | Status: SHIPPED | OUTPATIENT
Start: 2018-03-22 | End: 2020-09-29

## 2018-03-22 RX ORDER — ATORVASTATIN CALCIUM 40 MG/1
40 TABLET, FILM COATED ORAL EVERY EVENING
Qty: 30 TAB | Refills: 11 | Status: SHIPPED | OUTPATIENT
Start: 2018-03-22 | End: 2021-10-19 | Stop reason: SDUPTHER

## 2018-03-22 ASSESSMENT — ENCOUNTER SYMPTOMS
MYALGIAS: 0
ABDOMINAL PAIN: 0
ORTHOPNEA: 0
DIZZINESS: 0
PALPITATIONS: 0
COUGH: 0
PND: 0
WEAKNESS: 0
SHORTNESS OF BREATH: 0
CLAUDICATION: 0

## 2018-03-22 NOTE — LETTER
[facility]   [address] [place] [address] [address]  [place] [address]  Phone: [phone]  Fax: [phone]              Giovanni Saini  1970    Encounter Date: 3/22/2018    DAVID Stroud          PROGRESS NOTE:  Chief Complaint   Patient presents with   • CHF (Systolic)     follow up       Subjective:   Giovanni Saini is a 47 y.o. Upper sorbian-speaking male who presents today with his daughter, Lulu, who helps with translation.    He has a history of cardiomyopathy that was deemed to be alcoholic and tachycardia induced due to rapid atrial fibrillation. Ejection fraction back in 2017 was 25%. He underwent cardioversion and had an increase in his ejection fraction to approximately 45%. He also has moderate aortic insufficiency and was felt to need aortic valve replacement at some point.    He was last seen by Dr. Zavaleta on February 22, 2018, at which time echocardiogram was ordered. The patient was stable and feeling rather well.  He is on dialysis.    Patient denies any shortness of breath, chest discomfort, palpitations or ankle swelling. He states he feels generally well with the exception of feeling fatigued especially after dialysis.    He's concerned about his insurance in that he has a emergency Medicaid which apparently will not pay for valve replacement. He has considered going back to [place] but is unable to go.    Past Medical History:   Diagnosis Date   • Heart murmur    • Valvular heart disease     aortic insufficiency     Past Surgical History:   Procedure Laterality Date   • CATH PLACEMENT  8/27/2017    Procedure: CATH PLACEMENT - perm cath;  Surgeon: Hiren Ayon M.D.;  Location: SURGERY [facility];  Service: General   • AV FISTULA CREATION  8/27/2017    Procedure: AV FISTULA CREATION - left radial cephalic;  Surgeon: Hiren Ayon M.D.;  Location: SURGERY [facility];  Service: General     Family History   Problem Relation Age  of Onset   • Stroke Sister 38     CVA     Social History     Social History   • Marital status:      Spouse name: N/A   • Number of children: N/A   • Years of education: N/A     Occupational History   • Not on file.     Social History Main Topics   • Smoking status: Never Smoker   • Smokeless tobacco: Never Used   • Alcohol use No      Comment: quit after hospital    • Drug use: No   • Sexual activity: Not on file     Other Topics Concern   • Not on file     Social History Narrative   • No narrative on file     No Known Allergies  Outpatient Encounter Prescriptions as of 3/22/2018   Medication Sig Dispense Refill   • isosorbide mononitrate SR (IMDUR) 60 MG TABLET SR 24 HR Take 1 Tab by mouth every day. 30 Tab 11   • amiodarone (CORDARONE) 200 MG Tab Take 1 Tab by mouth every day. 30 Tab 11   • warfarin (COUMADIN) 4 MG Tab Take 1 Tab by mouth every day. 30 Tab 11   • metoprolol SR (TOPROL XL) 25 MG TABLET SR 24 HR Take 1 Tab by mouth every day. 30 Tab 11   • furosemide (LASIX) 40 MG Tab Take 1 Tab by mouth every day. 30 Tab 11   • atorvastatin (LIPITOR) 40 MG Tab Take 1 Tab by mouth every evening. 30 Tab 11   • vitamin B comp+C (ALLBEE WITH C) Tab Take 1 Tab by mouth every day. 30 Tab 2   • thiamine (THIAMINE) 100 MG tablet Take 1 Tab by mouth every day. 30 Tab 2   • folic acid (FOLVITE) 1 MG Tab Take 1 Tab by mouth every day. 30 Tab 2   • [DISCONTINUED] isosorbide mononitrate SR (IMDUR) 60 MG TABLET SR 24 HR Take 1 Tab by mouth every day. 30 Tab 2   • [DISCONTINUED] amiodarone (CORDARONE) 200 MG Tab Take 1 Tab by mouth every day. 30 Tab 2   • [DISCONTINUED] warfarin (COUMADIN) 4 MG Tab Take 1 Tab by mouth every day. 30 Tab 2   • [DISCONTINUED] metoprolol SR (TOPROL XL) 25 MG TABLET SR 24 HR Take 1 Tab by mouth every day. 30 Tab 2   • [DISCONTINUED] furosemide (LASIX) 40 MG Tab Take 1 Tab by mouth every day. 30 Tab 2   • [DISCONTINUED] atorvastatin (LIPITOR) 40 MG Tab Take 1 Tab by mouth every bedtime. 30 Tab  2     No facility-administered encounter medications on file as of 3/22/2018.      Review of Systems   Constitutional: Positive for malaise/fatigue (especially after dialysis.).   Respiratory: Negative for cough and shortness of breath.    Cardiovascular: Negative for chest pain, palpitations, orthopnea, claudication, leg swelling and PND.   Gastrointestinal: Negative for abdominal pain.   Musculoskeletal: Negative for myalgias.   Neurological: Negative for dizziness and weakness.        Objective:   /60   Pulse 66   Wt 63.5 kg (140 lb)   SpO2 96%   BMI 27.34 kg/m²      Physical Exam   Constitutional: He is oriented to person, place, and time. He appears well-developed and well-nourished.   HENT:   Head: Normocephalic.   Eyes: EOM are normal.   Neck: Normal range of motion. No JVD present.   Cardiovascular: Normal rate and regular rhythm.  Exam reveals no friction rub.    Murmur heard.   Systolic murmur is present with a grade of 3/6   Pulmonary/Chest: Effort normal.   Abdominal: Soft. Bowel sounds are normal.   Musculoskeletal: Normal range of motion. He exhibits no edema.   Neurological: He is alert and oriented to person, place, and time.   Skin: Skin is warm and dry.   Psychiatric: He has a normal mood and affect.     March 2, 2018: Echocardiogram showed ejection fraction of 60% with left ventricular hypertrophy. Grade 2 diastolic dysfunction. Mild to moderate aortic insufficiency.      Assessment:     1. Chronic systolic heart failure (CMS-HCC)     2. Nonrheumatic aortic valve insufficiency: Severe     3. Paroxysmal atrial fibrillation (CMS-HCC)     4. Essential hypertension         Medical Decision Making:  Today's Assessment / Status / Plan:     Cardiomyopathy: Previously had systolic heart failure with a reduced ejection fraction but it is now in the normal range. He has no fluid overload signs or symptoms. Continue on current regimen.    Aortic insufficiency: Previously felt to be severe but now  is being read as mild to moderate on his echocardiogram. He has no near syncope or other symptoms from the valve. Does not appear he needs replacement urgently.    Paroxysmal atrial fibrillation: He is in a regular rhythm today. It was felt that a rapid atrial fibrillation was causing him to have a reduced ejection fraction.    Hypertension: His blood pressure is excellent in the office today. He tells me that is usually good at dialysis as well.    He is stable enough to follow up in 6 months with Dr. Hernandez. If he were to have any symptoms of heart failure, near syncope or rapid heart rates he will contact this office or activate EMS.    Collaborating Provider: Dr. Keila Francis.        No Recipients

## 2018-03-22 NOTE — PROGRESS NOTES
Chief Complaint   Patient presents with   • CHF (Systolic)     follow up       Subjective:   Giovanni Saini is a 47 y.o. Macedonian-speaking male who presents today with his daughter, Lulu, who helps with translation.    He has a history of cardiomyopathy that was deemed to be alcoholic and tachycardia induced due to rapid atrial fibrillation. Ejection fraction back in 2017 was 25%. He underwent cardioversion and had an increase in his ejection fraction to approximately 45%. He also has moderate aortic insufficiency and was felt to need aortic valve replacement at some point.    He was last seen by Dr. Zavaleta on February 22, 2018, at which time echocardiogram was ordered. The patient was stable and feeling rather well.  He is on dialysis.    Patient denies any shortness of breath, chest discomfort, palpitations or ankle swelling. He states he feels generally well with the exception of feeling fatigued especially after dialysis.    He's concerned about his insurance in that he has a emergency Medicaid which apparently will not pay for valve replacement. He has considered going back to [place] but is unable to go.    Past Medical History:   Diagnosis Date   • Heart murmur    • Valvular heart disease     aortic insufficiency     Past Surgical History:   Procedure Laterality Date   • CATH PLACEMENT  8/27/2017    Procedure: CATH PLACEMENT - perm cath;  Surgeon: Hiren Ayon M.D.;  Location: SURGERY [facility];  Service: General   • AV FISTULA CREATION  8/27/2017    Procedure: AV FISTULA CREATION - left radial cephalic;  Surgeon: Hiren Ayon M.D.;  Location: SURGERY [facility];  Service: General     Family History   Problem Relation Age of Onset   • Stroke Sister 38     CVA     Social History     Social History   • Marital status:      Spouse name: N/A   • Number of children: N/A   • Years of education: N/A     Occupational History   • Not on file.     Social History Main Topics   • Smoking  status: Never Smoker   • Smokeless tobacco: Never Used   • Alcohol use No      Comment: quit after hospital    • Drug use: No   • Sexual activity: Not on file     Other Topics Concern   • Not on file     Social History Narrative   • No narrative on file     No Known Allergies  Outpatient Encounter Prescriptions as of 3/22/2018   Medication Sig Dispense Refill   • isosorbide mononitrate SR (IMDUR) 60 MG TABLET SR 24 HR Take 1 Tab by mouth every day. 30 Tab 11   • amiodarone (CORDARONE) 200 MG Tab Take 1 Tab by mouth every day. 30 Tab 11   • warfarin (COUMADIN) 4 MG Tab Take 1 Tab by mouth every day. 30 Tab 11   • metoprolol SR (TOPROL XL) 25 MG TABLET SR 24 HR Take 1 Tab by mouth every day. 30 Tab 11   • furosemide (LASIX) 40 MG Tab Take 1 Tab by mouth every day. 30 Tab 11   • atorvastatin (LIPITOR) 40 MG Tab Take 1 Tab by mouth every evening. 30 Tab 11   • vitamin B comp+C (ALLBEE WITH C) Tab Take 1 Tab by mouth every day. 30 Tab 2   • thiamine (THIAMINE) 100 MG tablet Take 1 Tab by mouth every day. 30 Tab 2   • folic acid (FOLVITE) 1 MG Tab Take 1 Tab by mouth every day. 30 Tab 2   • [DISCONTINUED] isosorbide mononitrate SR (IMDUR) 60 MG TABLET SR 24 HR Take 1 Tab by mouth every day. 30 Tab 2   • [DISCONTINUED] amiodarone (CORDARONE) 200 MG Tab Take 1 Tab by mouth every day. 30 Tab 2   • [DISCONTINUED] warfarin (COUMADIN) 4 MG Tab Take 1 Tab by mouth every day. 30 Tab 2   • [DISCONTINUED] metoprolol SR (TOPROL XL) 25 MG TABLET SR 24 HR Take 1 Tab by mouth every day. 30 Tab 2   • [DISCONTINUED] furosemide (LASIX) 40 MG Tab Take 1 Tab by mouth every day. 30 Tab 2   • [DISCONTINUED] atorvastatin (LIPITOR) 40 MG Tab Take 1 Tab by mouth every bedtime. 30 Tab 2     No facility-administered encounter medications on file as of 3/22/2018.      Review of Systems   Constitutional: Positive for malaise/fatigue (especially after dialysis.).   Respiratory: Negative for cough and shortness of breath.    Cardiovascular: Negative  for chest pain, palpitations, orthopnea, claudication, leg swelling and PND.   Gastrointestinal: Negative for abdominal pain.   Musculoskeletal: Negative for myalgias.   Neurological: Negative for dizziness and weakness.        Objective:   /60   Pulse 66   Wt 63.5 kg (140 lb)   SpO2 96%   BMI 27.34 kg/m²     Physical Exam   Constitutional: He is oriented to person, place, and time. He appears well-developed and well-nourished.   HENT:   Head: Normocephalic.   Eyes: EOM are normal.   Neck: Normal range of motion. No JVD present.   Cardiovascular: Normal rate and regular rhythm.  Exam reveals no friction rub.    Murmur heard.   Systolic murmur is present with a grade of 3/6   Pulmonary/Chest: Effort normal.   Abdominal: Soft. Bowel sounds are normal.   Musculoskeletal: Normal range of motion. He exhibits no edema.   Neurological: He is alert and oriented to person, place, and time.   Skin: Skin is warm and dry.   Psychiatric: He has a normal mood and affect.     March 2, 2018: Echocardiogram showed ejection fraction of 60% with left ventricular hypertrophy. Grade 2 diastolic dysfunction. Mild to moderate aortic insufficiency.      Assessment:     1. Chronic systolic heart failure (CMS-HCC)     2. Nonrheumatic aortic valve insufficiency: Severe     3. Paroxysmal atrial fibrillation (CMS-HCC)     4. Essential hypertension         Medical Decision Making:  Today's Assessment / Status / Plan:     Cardiomyopathy: Previously had systolic heart failure with a reduced ejection fraction but it is now in the normal range. He has no fluid overload signs or symptoms. Continue on current regimen.    Aortic insufficiency: Previously felt to be severe but now is being read as mild to moderate on his echocardiogram. He has no near syncope or other symptoms from the valve. Does not appear he needs replacement urgently.    Paroxysmal atrial fibrillation: He is in a regular rhythm today. It was felt that a rapid atrial  fibrillation was causing him to have a reduced ejection fraction.    Hypertension: His blood pressure is excellent in the office today. He tells me that is usually good at dialysis as well.    He is stable enough to follow up in 6 months with Dr. Hernandez. If he were to have any symptoms of heart failure, near syncope or rapid heart rates he will contact this office or activate EMS.    Collaborating Provider: Dr. Keila Francis.

## 2018-05-01 ENCOUNTER — HOSPITAL ENCOUNTER (EMERGENCY)
Facility: MEDICAL CENTER | Age: 48
End: 2018-05-01
Attending: EMERGENCY MEDICINE
Payer: MEDICAID

## 2018-05-01 VITALS
SYSTOLIC BLOOD PRESSURE: 121 MMHG | TEMPERATURE: 98.5 F | RESPIRATION RATE: 18 BRPM | WEIGHT: 143.74 LBS | OXYGEN SATURATION: 94 % | BODY MASS INDEX: 25.47 KG/M2 | HEART RATE: 53 BPM | DIASTOLIC BLOOD PRESSURE: 64 MMHG | HEIGHT: 63 IN

## 2018-05-01 DIAGNOSIS — Z99.2 ACUTE RENAL FAILURE ON DIALYSIS (HCC): ICD-10-CM

## 2018-05-01 DIAGNOSIS — N17.9 ACUTE RENAL FAILURE ON DIALYSIS (HCC): ICD-10-CM

## 2018-05-01 DIAGNOSIS — Z99.2: ICD-10-CM

## 2018-05-01 LAB — BLOOD CULTURE HOLD CXBCH: NORMAL

## 2018-05-01 PROCEDURE — 99284 EMERGENCY DEPT VISIT MOD MDM: CPT

## 2018-05-01 ASSESSMENT — PAIN SCALES - GENERAL: PAINLEVEL_OUTOF10: 2

## 2018-05-01 NOTE — ED NOTES
Does the patient present to the ED because of a fall? NO    Is the patient 70 yrs or older? NO    Does the patient have an altered mental status? NO    Does the patient have impaired mobility? NO    Does the nurse feel the patient is a fall risk due to bowel/bladder incontinence, diarrhea, urinary frequency/urgency, leg weakness, orthostatic hypotension, dizziness/vertigo, or narcotic use? NO    NO to all = Universal fall risk interventions implemented:     Familiarize pt with environment  Call light within reach and demonstrated use  Personal possessions within reach of pt  Stretcher in low position with wheels locked  Keep floor surfaces clean and dry  Keep care area uncluttered  Hourly assessment for pain, needs, position change, and call light access

## 2018-05-01 NOTE — ED NOTES
HD cath site cleaned and dressing changed using sterile technique. VSS. PIV DC 'd intact.   Pt scheduled with f/u appt w nephrology tomorrow at 11am. Per scheduling, pt needs order from nephrology for cath removal w IR. Dr. Mendoza made aware of all. Pt and family made aware of all and instructed purpose of f/u tomorrow is to get order/scheduled w cath removal w IR. Pt given DC instructions with verbalized understanding of all. Ambulatory to exit with steady gait w family at side.

## 2018-05-01 NOTE — DISCHARGE INSTRUCTIONS
"Diálisis, Cuidados después de la diálisis  (Dialysis, Care After)  La diálisis es un tratamiento que elimina los desechos tóxicos del organismo cuando la función de los riñones fracasa. Hay dos tipos de diálisis:  · Hemodiálisis. En la hemodiálisis, la aletha es bombeada desde el cuerpo y pasa a través de un filtro (dializador). Se eliminan los desechos de la aletha y luego ésta retorna al cuerpo. La hemodiálisis se realiza en un centro especilizado jarrell 3 a 4 horas, ashwin veces a la semana. Se accede por vía arteriovenosa (AV), por donde se tiene acceso a los vasos sanguíneos. La ventaja principal de la hemodiálisis es que el paciente no necesita un entrenamiento especial. Las desventajas son el fracaso en el acceso arteriovenoso y la falta de gerald, ya que debe permanecer relativamente cerca de un centro de diálisis.   · Diálisis peritoneal. Se utiliza la propia membrana del organismo [place] filtro. Se introduce y luego se extrae un líquido del abdomen para eliminar los desechos tóxicos del organismo. Las ventajas son que puede enseñarse al paciente y puede realizarse en el hogar, poniendo cuidado en la técnica. Permite más gerald y menos molestias. Las desventajas son la potencial inflamación de la stephanie interna del abdomen (peritonitis) y la idalmis de la membrana.   CUIDADOS EN EL HOGAR   · Si tiene un acceso arteriovenoso:   · Controle todos los días las \"vibraciones\" en el sitio del acceso. Es galilea sensación de vibración que puede sentir al colocar los dedos sobre el acceso. [place] significa que el acceso funciona correctamente. Si no siente la \"vibración\", el acceso deberá repararse.   · No use ropa o bijouterie ajustadas alrededor del acceso.   · Evite dormir sobre el acceso. Podría disminuir la circulación y formarse un coágulo.   · Mantenga el vendaje jarrell algunas horas luego del tratamiento, o según las indicaciones del médico. Evite que el vendaje se moje. Si el vendaje se sale del lugar, cubra el " "sitio del acceso con galilea gaza de 4x4 y sujétela con cinta adhesiva.   · Mantenga el sitio limpio para evitar infecciones.   · Tenga en chance casa algunos vendajes de 4x4 cm en rosaline de que el acceso comience a sangrar. Posiblemente le hayan indicado un tratamiento con heparina, que puede causar hemorragias.   · Controle chance presión arterial. La presión arterial fahad (hipertensión) daña el corazón y los vasos sanguíneos. Es importante que eric tanto ejercicio [place] le sea posible.   · Mantenga chance colesterol bajo control. Eric ejercicios regularmente o según las indicaciones.   SOLICITE ATENCIÓN MÉDICA SI:   · No siente la \"vibración\" en chance acceso arteriovenoso.   · Tiene fiebre, siente escalofríos, transpira o se siente débil.   · Hay un pequeño sangrado en el sitio del acceso.   · Tiene la presión arterial elevada.   SOLICITE ATENCIÓN MÉDICA DE INMEDIATO SI:   · Siente un repentino dolor en el pecho o tiene dificultad para respirar.   · Tiene galilea hemorragia que no puede detener o controlar aplicando presión directa.   ASEGÚRESE DE QUE:   · Comprende estas instrucciones.   · Controlará chance enfermedad.   · Solicitará ayuda de inmediato si no mejora o empeora..   Document Released: 04/05/2010 Document Revised: 03/11/2013  [employer]® Patient Information ©2013 [employer].  "

## 2018-05-01 NOTE — ED PROVIDER NOTES
ED Provider Note    CHIEF COMPLAINT  Chief Complaint   Patient presents with   • Vascular Access Problem     pt has dialysis port to R chest which was placed temporarily until fistula on L lower arm could be placed.  Pt now has fistula and reports burning, itching and pain around port.         HPI  Giovanni Saini is a 47 y.o. male who presents to the ED with complaints of dialysis port in the right chest discomfort. Patient states he's had the dialysis port for proximal year. The patient does have a fistula in the left arm. The currently is being used for the past couple of months. He describes pain in itching to the site. This been going on continuously for about 2 months. There's been no fevers, chills, drainage, or any other abnormalities. Patient does not know who his renal doctor is. Does not know who the vascular surgeon was placed the fistula. Soundly. The patient is just going to do, via for dialysis care, but is not following up with any of his physicians. Patient apparently all he remembers the cardiologist that he has been seeing which is Dr. Zavaleta. At this point. Denies any other symptoms. Denies shortness of breath currently is getting dialysis 3 days a week.    REVIEW OF SYSTEMS  See HPI for further details. All other systems are negative.     PAST MEDICAL HISTORY  Past Medical History:   Diagnosis Date   • Heart murmur    • Valvular heart disease     aortic insufficiency       FAMILY HISTORY  Family History   Problem Relation Age of Onset   • Stroke Sister 38     CVA     Patient's family history has been discussed and is been found to be noncontributory to his present illness  SOCIAL HISTORY  Social History     Social History   • Marital status:      Spouse name: N/A   • Number of children: N/A   • Years of education: N/A     Social History Main Topics   • Smoking status: Never Smoker   • Smokeless tobacco: Never Used   • Alcohol use No      Comment: quit after hospital    • Drug use: No  "  • Sexual activity: Not on file     Other Topics Concern   • Not on file     Social History Narrative   • No narrative on file      Pcp Pt States None      SURGICAL HISTORY  Past Surgical History:   Procedure Laterality Date   • CATH PLACEMENT  8/27/2017    Procedure: CATH PLACEMENT - perm cath;  Surgeon: Hiren Ayon M.D.;  Location: SURGERY [facility];  Service: General   • AV FISTULA CREATION  8/27/2017    Procedure: AV FISTULA CREATION - left radial cephalic;  Surgeon: Hiren Ayon M.D.;  Location: SURGERY [facility];  Service: General       CURRENT MEDICATIONS  Home Medications     Reviewed by Opal Donahue R.N. (Registered Nurse) on 05/01/18 at [facility]  Med List Status: Partial   Medication Last Dose Status   amiodarone (CORDARONE) 200 MG Tab  Active   atorvastatin (LIPITOR) 40 MG Tab  Active   folic acid (FOLVITE) 1 MG Tab 3/22/2018 Active   furosemide (LASIX) 40 MG Tab  Active   isosorbide mononitrate SR (IMDUR) 60 MG TABLET SR 24 HR  Active   metoprolol SR (TOPROL XL) 25 MG TABLET SR 24 HR  Active   thiamine (THIAMINE) 100 MG tablet 3/22/2018 Active   vitamin B comp+C (ALLBEE WITH C) Tab 3/22/2018 Active   warfarin (COUMADIN) 4 MG Tab  Active                ALLERGIES  No Known Allergies    PHYSICAL EXAM  VITAL SIGNS: /64   Pulse (!) 55   Temp 36.9 °C (98.5 °F)   Resp 18   Ht 1.6 m (5' 3\")   Wt 65.2 kg (143 lb 11.8 oz)   SpO2 96%   BMI 25.46 kg/m²   Pulse Oximetry was obtained. It showed a reading of Pulse Oximetry: 96 %.  I interpreted this as non-hypoxic.   Constitutional :  Well developed, Well nourished, No acute distress, Non-toxic appearance.   HENT: Head is normal without signs of trauma. TMs normal. Pharynx is moist mucous membranes  Eyes: Conjunctiva is normal  Neck: Normal range of motion, No tenderness, Supple, No stridor.   Cardiovascular: Normal heart rate, Normal rhythm, No rubs, No gallops. Patient does have a grade 2 to 3/6 murmur left sternal border  Thorax " & Lungs: Are to auscultation bilaterally. No rhonchi, wheezes or rales. The patient's dialysis for a right-sided chest appears normal. There is no significant erythema. No significant discharge and no significant tenderness upon evaluation.  Skin: Warm, Dry, No erythema, No rash.   Extremities: Intact distal pulses, No edema, No tenderness, No cyanosis, No clubbing.       RADIOLOGY/PROCEDURES  None    COURSE & MEDICAL DECISION MAKING  Pertinent Labs & Imaging studies reviewed. (See chart for details)  , The patient's nephrologist is Dr. George and his basket. Surgeon is Dr. Ayon. At this point, the catheter appears good and normal. I do not know if he will require in the future. At this point, I have recommended that the patient follow-up with Dr. George to discuss this and to his establish removal of the catheter if necessary. There is no signs of infection at this time is no signs of abnormality. So at this point, I do not feel the catheter requires emergent removal.    FINAL IMPRESSION  1. Acute renal failure on dialysis (HCC)    2. Encounter for dialysis and dialysis catheter care (HCC)       The patient will return for new or worsening symptoms and is stable at the time of discharge.    The patient is referred to a primary physician for blood pressure management, diabetic screening, and for all other preventative health concerns.        DISPOSITION:  Patient will be discharged home in stable condition.    FOLLOW UP:  Bandar Saavedra M.D.  [address]  [address]  [place] [address]  [phone]    Schedule an appointment as soon as possible for a visit        OUTPATIENT MEDICATIONS:  New Prescriptions    No medications on file           Electronically signed by: Og Carroll, 5/1/2018

## 2018-05-01 NOTE — ED TRIAGE NOTES
"Giovanni Saini 47 y.o. male ambulatory to triage with family for     Chief Complaint   Patient presents with   • Vascular Access Problem     pt has dialysis port to R chest which was placed temporarily until fistula on L lower arm could be placed.  Pt now has fistula and reports burning, itching and pain around port.      Pt has dressing over temp access, no redness/swelling noted around dressing.  Access last used in January.  Pt was supposed to have temporary dialysis catheter removed but has not been able to due to insurance issues.  /59   Pulse 61   Temp 36.9 °C (98.5 °F)   Resp 16   Ht 1.6 m (5' 3\")   Wt 65.2 kg (143 lb 11.8 oz)   SpO2 98%   BMI 25.46 kg/m²     "

## 2018-05-01 NOTE — ED NOTES
Redness noted to R CW dialysis cath site - pt states x 1 month w burning and itching. No drainage noted. Last HD = yesterday (HD is MWF). PIV placed with labs drawn using sterile technique. Labs sent. Awaiting MD eval/orders.

## 2018-05-01 NOTE — ED NOTES
Assumed care of pt from waiting room. Pt ambulatory to room with steady gait.  Changed to gown, hooked to monitor. Fall precautions in place. Call bell in reach. Family at bedside. Awaiting MD eval/orders. Ongoing monitoring.

## 2018-05-02 ENCOUNTER — PATIENT OUTREACH (OUTPATIENT)
Dept: HEALTH INFORMATION MANAGEMENT | Facility: OTHER | Age: 48
End: 2018-05-02

## 2018-06-20 ENCOUNTER — TELEPHONE (OUTPATIENT)
Dept: CARDIOLOGY | Facility: MEDICAL CENTER | Age: 48
End: 2018-06-20

## 2019-02-05 ENCOUNTER — OFFICE VISIT (OUTPATIENT)
Dept: CARDIOLOGY | Facility: MEDICAL CENTER | Age: 49
End: 2019-02-05

## 2019-02-05 ENCOUNTER — TELEPHONE (OUTPATIENT)
Dept: CARDIOLOGY | Facility: MEDICAL CENTER | Age: 49
End: 2019-02-05

## 2019-02-05 VITALS
WEIGHT: 150 LBS | OXYGEN SATURATION: 100 % | SYSTOLIC BLOOD PRESSURE: 130 MMHG | BODY MASS INDEX: 26.57 KG/M2 | HEART RATE: 62 BPM | DIASTOLIC BLOOD PRESSURE: 70 MMHG

## 2019-02-05 DIAGNOSIS — I50.20 NYHA CLASS 1 HEART FAILURE WITH REDUCED EJECTION FRACTION (HCC): ICD-10-CM

## 2019-02-05 DIAGNOSIS — I35.1 NONRHEUMATIC AORTIC VALVE INSUFFICIENCY: ICD-10-CM

## 2019-02-05 DIAGNOSIS — E78.5 DYSLIPIDEMIA: ICD-10-CM

## 2019-02-05 DIAGNOSIS — I50.20 ACC/AHA STAGE C SYSTOLIC HEART FAILURE (HCC): Chronic | ICD-10-CM

## 2019-02-05 PROBLEM — I48.0 PAF (PAROXYSMAL ATRIAL FIBRILLATION) (HCC): Status: ACTIVE | Noted: 2017-08-09

## 2019-02-05 PROCEDURE — 99214 OFFICE O/P EST MOD 30 MIN: CPT | Performed by: INTERNAL MEDICINE

## 2019-02-05 NOTE — PROGRESS NOTES
Chief Complaint   Patient presents with   • CHF (Systolic)     F/V: 1 YR       Subjective:   Giovanni Saini is a 48 y.o. male who presents today for follow-up of his severe aortic insufficiency. He also has a cardiomyopathy which may have been rate related due to atrial fibrillation with a rapid ventricular response. He did undergo cardioversion last July. He was hospitalized at that time and is now on chronic hemodialysis.    He denies any chest discomfort, dyspnea on exertion, PND, orthopnea or edema.  He has had no lightheadedness or palpitations.      Past Medical History:   Diagnosis Date   • Heart murmur    • Valvular heart disease     aortic insufficiency     Past Surgical History:   Procedure Laterality Date   • CATH PLACEMENT  8/27/2017    Procedure: CATH PLACEMENT - perm cath;  Surgeon: Hiren Ayon M.D.;  Location: SURGERY [facility];  Service: General   • AV FISTULA CREATION  8/27/2017    Procedure: AV FISTULA CREATION - left radial cephalic;  Surgeon: Hiren Ayon M.D.;  Location: SURGERY [facility];  Service: General     Family History   Problem Relation Age of Onset   • Stroke Sister 38        CVA     Social History     Social History   • Marital status:      Spouse name: N/A   • Number of children: N/A   • Years of education: N/A     Occupational History   • Not on file.     Social History Main Topics   • Smoking status: Never Smoker   • Smokeless tobacco: Never Used   • Alcohol use No      Comment: quit after hospital    • Drug use: No   • Sexual activity: Not on file     Other Topics Concern   • Not on file     Social History Narrative   • No narrative on file     No Known Allergies  Outpatient Encounter Prescriptions as of 2/5/2019   Medication Sig Dispense Refill   • isosorbide mononitrate SR (IMDUR) 60 MG TABLET SR 24 HR Take 1 Tab by mouth every day. 30 Tab 11   • amiodarone (CORDARONE) 200 MG Tab Take 1 Tab by mouth every day. 30 Tab 11   • warfarin (COUMADIN) 4  MG Tab Take 1 Tab by mouth every day. 30 Tab 11   • metoprolol SR (TOPROL XL) 25 MG TABLET SR 24 HR Take 1 Tab by mouth every day. 30 Tab 11   • furosemide (LASIX) 40 MG Tab Take 1 Tab by mouth every day. 30 Tab 11   • atorvastatin (LIPITOR) 40 MG Tab Take 1 Tab by mouth every evening. 30 Tab 11   • vitamin B comp+C (ALLBEE WITH C) Tab Take 1 Tab by mouth every day. 30 Tab 2   • thiamine (THIAMINE) 100 MG tablet Take 1 Tab by mouth every day. 30 Tab 2   • folic acid (FOLVITE) 1 MG Tab Take 1 Tab by mouth every day. 30 Tab 2     No facility-administered encounter medications on file as of 2/5/2019.      ROS     Objective:   /70 (BP Location: Left arm, Patient Position: Sitting, BP Cuff Size: Adult)   Pulse 62   Wt 68 kg (150 lb)   SpO2 100%   BMI 26.57 kg/m²     Physical Exam   Constitutional: He appears well-developed and well-nourished.   Neck: No JVD present.   Cardiovascular: Normal rate and regular rhythm.    Murmur (Grade 3/6 systolic and grade 2/6 diastolic murmur is heard at the base) heard.  Pulmonary/Chest: Effort normal and breath sounds normal. He has no rales.   Abdominal: Soft. There is no tenderness.   Musculoskeletal: He exhibits no edema.     Lab Results   Component Value Date/Time    CHOLSTRLTOT 104 08/10/2017 12:16 AM    LDL 50 08/10/2017 12:16 AM    HDL 44 08/10/2017 12:16 AM    TRIGLYCERIDE 48 08/10/2017 12:16 AM       Lab Results   Component Value Date/Time    SODIUM 130 (L) 09/09/2017 03:56 AM    POTASSIUM 3.9 09/09/2017 03:56 AM    CHLORIDE 96 09/09/2017 03:56 AM    CO2 20 09/09/2017 03:56 AM    GLUCOSE 83 09/09/2017 03:56 AM    BUN 68 (H) 09/09/2017 03:56 AM    CREATININE 5.57 (HH) 09/09/2017 03:56 AM     Lab Results   Component Value Date/Time    ALKPHOSPHAT 67 08/21/2017 12:41 AM    ASTSGOT 24 08/21/2017 12:41 AM    ALTSGPT 32 08/21/2017 12:41 AM    TBILIRUBIN 0.4 08/21/2017 12:41 AM      Lab Results   Component Value Date/Time    BNPBTYPENAT 2211 (H) 08/18/2017 09:19 AM       Echocardiogram from March 2018: This showed a normalization of the patient's ejection fraction which was 60%.  Grade 2 diastolic dysfunction was noted as was moderate concentric left ventricular hypertrophy.  However, his aortic insufficiency had improved and was only mild to moderate.    Assessment:     1. Nonrheumatic aortic valve insufficiency: Severe     2. ACC/AHA stage C systolic heart failure (HCC)     3. NYHA class 1 heart failure with reduced ejection fraction (HCC)         Medical Decision Making:  Today's Assessment / Status / Plan:     Mr. Saini is clinically stable.  He has had resolution of his LV dysfunction and marked improvement of his aortic insufficiency.  We will continue him on his current medications.  He did have blood work about a week ago and we will obtain that for review.  He will follow-up in about 6 months with repeat lab work.

## 2019-02-05 NOTE — LETTER
[facility]   [address]  [place] [address]  Phone: [phone]  Fax: [phone]              Giovanni Saini  1970    Encounter Date: 2/5/2019    Armani Hernandez M.D.          PROGRESS NOTE:  Chief Complaint   Patient presents with   • CHF (Systolic)     F/V: 1 YR       Subjective:   Giovanni Saini is a 48 y.o. male who presents today for follow-up of his severe aortic insufficiency. He also has a cardiomyopathy which may have been rate related due to atrial fibrillation with a rapid ventricular response. He did undergo cardioversion last July. He was hospitalized at that time and is now on chronic hemodialysis.    He denies any chest discomfort, dyspnea on exertion, PND, orthopnea or edema.  He has had no lightheadedness or palpitations.      Past Medical History:   Diagnosis Date   • Heart murmur    • Valvular heart disease     aortic insufficiency     Past Surgical History:   Procedure Laterality Date   • CATH PLACEMENT  8/27/2017    Procedure: CATH PLACEMENT - perm cath;  Surgeon: Hiren Ayon M.D.;  Location: SURGERY [facility];  Service: General   • AV FISTULA CREATION  8/27/2017    Procedure: AV FISTULA CREATION - left radial cephalic;  Surgeon: Hiren Ayon M.D.;  Location: SURGERY [facility];  Service: General     Family History   Problem Relation Age of Onset   • Stroke Sister 38        CVA     Social History     Social History   • Marital status:      Spouse name: N/A   • Number of children: N/A   • Years of education: N/A     Occupational History   • Not on file.     Social History Main Topics   • Smoking status: Never Smoker   • Smokeless tobacco: Never Used   • Alcohol use No      Comment: quit after hospital    • Drug use: No   • Sexual activity: Not on file     Other Topics Concern   • Not on file     Social History Narrative   • No narrative on file     No Known Allergies  Outpatient Encounter  Prescriptions as of 2/5/2019   Medication Sig Dispense Refill   • isosorbide mononitrate SR (IMDUR) 60 MG TABLET SR 24 HR Take 1 Tab by mouth every day. 30 Tab 11   • amiodarone (CORDARONE) 200 MG Tab Take 1 Tab by mouth every day. 30 Tab 11   • warfarin (COUMADIN) 4 MG Tab Take 1 Tab by mouth every day. 30 Tab 11   • metoprolol SR (TOPROL XL) 25 MG TABLET SR 24 HR Take 1 Tab by mouth every day. 30 Tab 11   • furosemide (LASIX) 40 MG Tab Take 1 Tab by mouth every day. 30 Tab 11   • atorvastatin (LIPITOR) 40 MG Tab Take 1 Tab by mouth every evening. 30 Tab 11   • vitamin B comp+C (ALLBEE WITH C) Tab Take 1 Tab by mouth every day. 30 Tab 2   • thiamine (THIAMINE) 100 MG tablet Take 1 Tab by mouth every day. 30 Tab 2   • folic acid (FOLVITE) 1 MG Tab Take 1 Tab by mouth every day. 30 Tab 2     No facility-administered encounter medications on file as of 2/5/2019.      ROS     Objective:   /70 (BP Location: Left arm, Patient Position: Sitting, BP Cuff Size: Adult)   Pulse 62   Wt 68 kg (150 lb)   SpO2 100%   BMI 26.57 kg/m²      Physical Exam   Constitutional: He appears well-developed and well-nourished.   Neck: No JVD present.   Cardiovascular: Normal rate and regular rhythm.    Murmur (Grade 3/6 systolic and grade 2/6 diastolic murmur is heard at the base) heard.  Pulmonary/Chest: Effort normal and breath sounds normal. He has no rales.   Abdominal: Soft. There is no tenderness.   Musculoskeletal: He exhibits no edema.     Lab Results   Component Value Date/Time    CHOLSTRLTOT 104 08/10/2017 12:16 AM    LDL 50 08/10/2017 12:16 AM    HDL 44 08/10/2017 12:16 AM    TRIGLYCERIDE 48 08/10/2017 12:16 AM       Lab Results   Component Value Date/Time    SODIUM 130 (L) 09/09/2017 03:56 AM    POTASSIUM 3.9 09/09/2017 03:56 AM    CHLORIDE 96 09/09/2017 03:56 AM    CO2 20 09/09/2017 03:56 AM    GLUCOSE 83 09/09/2017 03:56 AM    BUN 68 (H) 09/09/2017 03:56 AM    CREATININE 5.57 () 09/09/2017 03:56 AM     Lab Results      Component Value Date/Time    ALKPHOSPHAT 67 08/21/2017 12:41 AM    ASTSGOT 24 08/21/2017 12:41 AM    ALTSGPT 32 08/21/2017 12:41 AM    TBILIRUBIN 0.4 08/21/2017 12:41 AM      Lab Results   Component Value Date/Time    BNPBTYPENAT 2211 (H) 08/18/2017 09:19 AM      Echocardiogram from March 2018: This showed a normalization of the patient's ejection fraction which was 60%.  Grade 2 diastolic dysfunction was noted as was moderate concentric left ventricular hypertrophy.  However, his aortic insufficiency had improved and was only mild to moderate.    Assessment:     1. Nonrheumatic aortic valve insufficiency: Severe     2. ACC/AHA stage C systolic heart failure (HCC)     3. NYHA class 1 heart failure with reduced ejection fraction (HCC)         Medical Decision Making:  Today's Assessment / Status / Plan:     Mr. Saini is clinically stable.  He has had resolution of his LV dysfunction and marked improvement of his aortic insufficiency.  We will continue him on his current medications.  He did have blood work about a week ago and we will obtain that for review.  He will follow-up in about 6 months with repeat lab work.      Laury Angeles P.A.-C.  [address] [place] [id number]  VIA Facsimile: [phone]

## 2019-08-09 ENCOUNTER — OFFICE VISIT (OUTPATIENT)
Dept: CARDIOLOGY | Facility: MEDICAL CENTER | Age: 49
End: 2019-08-09
Payer: MEDICAID

## 2019-08-09 VITALS
SYSTOLIC BLOOD PRESSURE: 110 MMHG | BODY MASS INDEX: 27.81 KG/M2 | HEART RATE: 60 BPM | DIASTOLIC BLOOD PRESSURE: 60 MMHG | OXYGEN SATURATION: 95 % | WEIGHT: 157 LBS

## 2019-08-09 DIAGNOSIS — I35.1 NONRHEUMATIC AORTIC VALVE INSUFFICIENCY: ICD-10-CM

## 2019-08-09 DIAGNOSIS — I48.0 PAF (PAROXYSMAL ATRIAL FIBRILLATION) (HCC): ICD-10-CM

## 2019-08-09 DIAGNOSIS — I50.20 ACC/AHA STAGE C SYSTOLIC HEART FAILURE (HCC): Chronic | ICD-10-CM

## 2019-08-09 DIAGNOSIS — E78.5 DYSLIPIDEMIA: ICD-10-CM

## 2019-08-09 DIAGNOSIS — I10 ESSENTIAL HYPERTENSION: Chronic | ICD-10-CM

## 2019-08-09 PROCEDURE — 99214 OFFICE O/P EST MOD 30 MIN: CPT | Performed by: INTERNAL MEDICINE

## 2019-08-09 RX ORDER — WARFARIN SODIUM 3 MG/1
TABLET ORAL
Refills: 5 | COMMUNITY
Start: 2019-07-09 | End: 2019-08-09

## 2019-08-09 RX ORDER — LOSARTAN POTASSIUM 25 MG/1
TABLET ORAL
Refills: 6 | COMMUNITY
Start: 2019-07-19 | End: 2020-03-20 | Stop reason: SDUPTHER

## 2019-08-09 RX ORDER — ASCORBIC ACID 500 MG
TABLET ORAL
Refills: 3 | COMMUNITY
Start: 2019-06-14 | End: 2020-09-29

## 2019-08-09 NOTE — PROGRESS NOTES
Chief Complaint   Patient presents with   • CHF (Systolic)     F/V:        Subjective:   Giovanni Saini is a 48 y.o. male who presents today for follow-up of his severe aortic insufficiency. He also has a cardiomyopathy which may have been rate related due to atrial fibrillation with a rapid ventricular response. He did undergo cardioversion. He was hospitalized at that time and is now on chronic hemodialysis. He did not have lab work as requested.    He denies any chest discomfort, dyspnea on exertion, PND, orthopnea or edema.  He has had no lightheadedness or palpitations.      Past Medical History:   Diagnosis Date   • Heart murmur    • Valvular heart disease     aortic insufficiency     Past Surgical History:   Procedure Laterality Date   • CATH PLACEMENT  8/27/2017    Procedure: CATH PLACEMENT - perm cath;  Surgeon: Hiren Ayon M.D.;  Location: SURGERY [facility];  Service: General   • AV FISTULA CREATION  8/27/2017    Procedure: AV FISTULA CREATION - left radial cephalic;  Surgeon: Hiren Ayon M.D.;  Location: SURGERY [facility];  Service: General     Family History   Problem Relation Age of Onset   • Stroke Sister 38        CVA     Social History     Socioeconomic History   • Marital status:      Spouse name: Not on file   • Number of children: Not on file   • Years of education: Not on file   • Highest education level: Not on file   Occupational History   • Not on file   Social Needs   • Financial resource strain: Not on file   • Food insecurity:     Worry: Not on file     Inability: Not on file   • Transportation needs:     Medical: Not on file     Non-medical: Not on file   Tobacco Use   • Smoking status: Never Smoker   • Smokeless tobacco: Never Used   Substance and Sexual Activity   • Alcohol use: No     Alcohol/week: 6.0 oz     Types: 10 Standard drinks or equivalent per week     Comment: quit after hospital    • Drug use: No   • Sexual activity: Not on file   Lifestyle    • Physical activity:     Days per week: Not on file     Minutes per session: Not on file   • Stress: Not on file   Relationships   • Social connections:     Talks on phone: Not on file     Gets together: Not on file     Attends Yarsanism service: Not on file     Active member of club or organization: Not on file     Attends meetings of clubs or organizations: Not on file     Relationship status: Not on file   • Intimate partner violence:     Fear of current or ex partner: Not on file     Emotionally abused: Not on file     Physically abused: Not on file     Forced sexual activity: Not on file   Other Topics Concern   • Not on file   Social History Narrative   • Not on file     No Known Allergies  Outpatient Encounter Medications as of 8/9/2019   Medication Sig Dispense Refill   • losartan (COZAAR) 25 MG Tab TAKE ONE TABLET BY MOUTH EVERY DAY AT BEDTIME FOR BLOOD PRESSURE - BRING ALL MEDICATIONS TO EVERY APPOINTMENT - AS PER DR. STRICKLAND  6   • ascorbic acid (ASCORBIC ACID) 500 MG Tab TAKE 1 TABLET BY MOUTH EVERY DAY WITH VITAMIN B  3   • isosorbide mononitrate SR (IMDUR) 60 MG TABLET SR 24 HR Take 1 Tab by mouth every day. 30 Tab 11   • amiodarone (CORDARONE) 200 MG Tab Take 1 Tab by mouth every day. 30 Tab 11   • warfarin (COUMADIN) 4 MG Tab Take 1 Tab by mouth every day. 30 Tab 11   • metoprolol SR (TOPROL XL) 25 MG TABLET SR 24 HR Take 1 Tab by mouth every day. 30 Tab 11   • furosemide (LASIX) 40 MG Tab Take 1 Tab by mouth every day. 30 Tab 11   • atorvastatin (LIPITOR) 40 MG Tab Take 1 Tab by mouth every evening. 30 Tab 11   • vitamin B comp+C (ALLBEE WITH C) Tab Take 1 Tab by mouth every day. 30 Tab 2   • thiamine (THIAMINE) 100 MG tablet Take 1 Tab by mouth every day. 30 Tab 2   • folic acid (FOLVITE) 1 MG Tab Take 1 Tab by mouth every day. 30 Tab 2   • warfarin (COUMADIN) 3 MG Tab TAKE 1/2 TABLET BY MOUTH ON TUESDAYS AND 1 FULL TABLET BY MOUTH ON ALL OTHER DAYS FOR ANTICOAGULATION DOSE INCREASE  5     No  facility-administered encounter medications on file as of 8/9/2019.      ROS       Objective:   /60 (BP Location: Left arm, Patient Position: Sitting, BP Cuff Size: Adult)   Pulse 60   Wt 71.2 kg (157 lb)   SpO2 95%   BMI 27.81 kg/m²     Physical Exam   Constitutional: He appears well-developed and well-nourished.   Neck: No JVD present.   Cardiovascular: Normal rate and regular rhythm.   Murmur (Grade 3/6 systolic and grade 2/6 diastolic murmur is heard at the base) heard.  Pulmonary/Chest: Effort normal and breath sounds normal. He has no rales.   Abdominal: Soft. There is no tenderness.   Musculoskeletal: He exhibits no edema.     Lab Results   Component Value Date/Time    CHOLSTRLTOT 104 08/10/2017 12:16 AM    LDL 50 08/10/2017 12:16 AM    HDL 44 08/10/2017 12:16 AM    TRIGLYCERIDE 48 08/10/2017 12:16 AM       Lab Results   Component Value Date/Time    SODIUM 130 (L) 09/09/2017 03:56 AM    POTASSIUM 3.9 09/09/2017 03:56 AM    CHLORIDE 96 09/09/2017 03:56 AM    CO2 20 09/09/2017 03:56 AM    GLUCOSE 83 09/09/2017 03:56 AM    BUN 68 (H) 09/09/2017 03:56 AM    CREATININE 5.57 (HH) 09/09/2017 03:56 AM     Lab Results   Component Value Date/Time    ALKPHOSPHAT 67 08/21/2017 12:41 AM    ASTSGOT 24 08/21/2017 12:41 AM    ALTSGPT 32 08/21/2017 12:41 AM    TBILIRUBIN 0.4 08/21/2017 12:41 AM      Lab Results   Component Value Date/Time    BNPBTYPENAT 2211 (H) 08/18/2017 09:19 AM      Echocardiogram from March 2018: This showed a normalization of the patient's ejection fraction which was 60%.  Grade 2 diastolic dysfunction was noted as was moderate concentric left ventricular hypertrophy.  However, his aortic insufficiency had improved and was only mild to moderate.    Assessment:     1. ACC/AHA stage C systolic heart failure (HCC)     2. Essential hypertension     3. Nonrheumatic aortic valve insufficiency: Previously severe but mild to moderate in March 2018         Medical Decision Making:  Today's Assessment /  Status / Plan:     Mr. Saini is clinically stable.  He has had resolution of his LV dysfunction and marked improvement of his aortic insufficiency.  He will follow-up in 6 months with a echocardiogram and lab work.

## 2020-01-24 ENCOUNTER — TELEPHONE (OUTPATIENT)
Dept: CARDIOLOGY | Facility: MEDICAL CENTER | Age: 50
End: 2020-01-24

## 2020-01-24 NOTE — TELEPHONE ENCOUNTER
I s/w patient's Emergency Contact Maura, she asked if the patient can be rescheduled. I transferred her to scheduling at ext [phone].

## 2020-01-30 ENCOUNTER — APPOINTMENT (OUTPATIENT)
Dept: CARDIOLOGY | Facility: MEDICAL CENTER | Age: 50
End: 2020-01-30
Payer: MEDICAID

## 2020-02-22 ENCOUNTER — HOSPITAL ENCOUNTER (OUTPATIENT)
Dept: LAB | Facility: MEDICAL CENTER | Age: 50
End: 2020-02-22
Attending: INTERNAL MEDICINE
Payer: MEDICAID

## 2020-02-22 ENCOUNTER — TELEPHONE (OUTPATIENT)
Dept: CARDIOLOGY | Facility: MEDICAL CENTER | Age: 50
End: 2020-02-22

## 2020-02-22 DIAGNOSIS — I48.0 PAF (PAROXYSMAL ATRIAL FIBRILLATION) (HCC): ICD-10-CM

## 2020-02-22 DIAGNOSIS — E78.5 DYSLIPIDEMIA: ICD-10-CM

## 2020-02-22 DIAGNOSIS — I50.20 ACC/AHA STAGE C SYSTOLIC HEART FAILURE (HCC): Chronic | ICD-10-CM

## 2020-02-22 LAB
ALBUMIN SERPL BCP-MCNC: 4.1 G/DL (ref 3.2–4.9)
ALBUMIN/GLOB SERPL: 1.5 G/DL
ALP SERPL-CCNC: 63 U/L (ref 30–99)
ALT SERPL-CCNC: 15 U/L (ref 2–50)
ANION GAP SERPL CALC-SCNC: 12 MMOL/L (ref 0–11.9)
AST SERPL-CCNC: 17 U/L (ref 12–45)
BILIRUB SERPL-MCNC: 0.8 MG/DL (ref 0.1–1.5)
BUN SERPL-MCNC: 42 MG/DL (ref 8–22)
CALCIUM SERPL-MCNC: 9.9 MG/DL (ref 8.5–10.5)
CHLORIDE SERPL-SCNC: 102 MMOL/L (ref 96–112)
CHOLEST SERPL-MCNC: 111 MG/DL (ref 100–199)
CO2 SERPL-SCNC: 27 MMOL/L (ref 20–33)
CREAT SERPL-MCNC: 7.42 MG/DL (ref 0.5–1.4)
FASTING STATUS PATIENT QL REPORTED: NORMAL
GLOBULIN SER CALC-MCNC: 2.8 G/DL (ref 1.9–3.5)
GLUCOSE SERPL-MCNC: 90 MG/DL (ref 65–99)
HDLC SERPL-MCNC: 61 MG/DL
LDLC SERPL CALC-MCNC: 37 MG/DL
POTASSIUM SERPL-SCNC: 4.4 MMOL/L (ref 3.6–5.5)
PROT SERPL-MCNC: 6.9 G/DL (ref 6–8.2)
SODIUM SERPL-SCNC: 141 MMOL/L (ref 135–145)
TRIGL SERPL-MCNC: 65 MG/DL (ref 0–149)
TSH SERPL DL<=0.005 MIU/L-ACNC: 1.39 UIU/ML (ref 0.38–5.33)

## 2020-02-22 PROCEDURE — 80053 COMPREHEN METABOLIC PANEL: CPT

## 2020-02-22 PROCEDURE — 36415 COLL VENOUS BLD VENIPUNCTURE: CPT

## 2020-02-22 PROCEDURE — 84443 ASSAY THYROID STIM HORMONE: CPT

## 2020-02-22 PROCEDURE — 80061 LIPID PANEL: CPT

## 2020-02-22 NOTE — TELEPHONE ENCOUNTER
Lab called with critical values.   Patient is on hemodialysis.   I spoke with his daughter, Maura.  Patient has not missed and HD - goes on M, W, F.  Dialysis should be checking labs regularly and dialyzing accordingly.    No further recommendations.    Na Bro PA-C  2/22/2020

## 2020-03-04 ENCOUNTER — TELEPHONE (OUTPATIENT)
Dept: CARDIOLOGY | Facility: MEDICAL CENTER | Age: 50
End: 2020-03-04

## 2020-03-04 NOTE — TELEPHONE ENCOUNTER
FK    Pt's daughter is requesting Echo order be faxed to [facility]. Maura would please like a call back when completed at [phone].

## 2020-03-05 ENCOUNTER — APPOINTMENT (OUTPATIENT)
Dept: CARDIOLOGY | Facility: MEDICAL CENTER | Age: 50
End: 2020-03-05
Attending: INTERNAL MEDICINE
Payer: MEDICAID

## 2020-03-11 DIAGNOSIS — I48.0 PAF (PAROXYSMAL ATRIAL FIBRILLATION) (HCC): ICD-10-CM

## 2020-03-11 DIAGNOSIS — I10 ESSENTIAL HYPERTENSION: Chronic | ICD-10-CM

## 2020-03-11 DIAGNOSIS — I35.1 NONRHEUMATIC AORTIC VALVE INSUFFICIENCY: ICD-10-CM

## 2020-03-11 DIAGNOSIS — I50.20 ACC/AHA STAGE C SYSTOLIC HEART FAILURE (HCC): Chronic | ICD-10-CM

## 2020-03-20 ENCOUNTER — OFFICE VISIT (OUTPATIENT)
Dept: CARDIOLOGY | Facility: MEDICAL CENTER | Age: 50
End: 2020-03-20
Payer: MEDICAID

## 2020-03-20 VITALS
OXYGEN SATURATION: 96 % | WEIGHT: 156 LBS | HEART RATE: 54 BPM | BODY MASS INDEX: 27.63 KG/M2 | DIASTOLIC BLOOD PRESSURE: 56 MMHG | SYSTOLIC BLOOD PRESSURE: 110 MMHG

## 2020-03-20 DIAGNOSIS — I50.20 NYHA CLASS 1 HEART FAILURE WITH REDUCED EJECTION FRACTION (HCC): ICD-10-CM

## 2020-03-20 DIAGNOSIS — I50.20 ACC/AHA STAGE C SYSTOLIC HEART FAILURE (HCC): ICD-10-CM

## 2020-03-20 DIAGNOSIS — I48.0 PAF (PAROXYSMAL ATRIAL FIBRILLATION) (HCC): ICD-10-CM

## 2020-03-20 DIAGNOSIS — N18.6 ESRD (END STAGE RENAL DISEASE) ON DIALYSIS (HCC): ICD-10-CM

## 2020-03-20 DIAGNOSIS — I35.1 NONRHEUMATIC AORTIC VALVE INSUFFICIENCY: ICD-10-CM

## 2020-03-20 DIAGNOSIS — Z99.2 ESRD (END STAGE RENAL DISEASE) ON DIALYSIS (HCC): ICD-10-CM

## 2020-03-20 DIAGNOSIS — Z79.899 HIGH RISK MEDICATION USE: ICD-10-CM

## 2020-03-20 LAB — EKG IMPRESSION: NORMAL

## 2020-03-20 PROCEDURE — 93000 ELECTROCARDIOGRAM COMPLETE: CPT | Performed by: INTERNAL MEDICINE

## 2020-03-20 PROCEDURE — 99214 OFFICE O/P EST MOD 30 MIN: CPT | Performed by: INTERNAL MEDICINE

## 2020-03-20 RX ORDER — FUROSEMIDE 40 MG/1
40 TABLET ORAL DAILY
Qty: 30 TAB | Refills: 11 | Status: SHIPPED | OUTPATIENT
Start: 2020-03-20 | End: 2021-10-19 | Stop reason: SDUPTHER

## 2020-03-20 RX ORDER — ISOSORBIDE MONONITRATE 60 MG/1
60 TABLET, EXTENDED RELEASE ORAL DAILY
Qty: 30 TAB | Refills: 11 | Status: SHIPPED | OUTPATIENT
Start: 2020-03-20 | End: 2021-10-19 | Stop reason: SDUPTHER

## 2020-03-20 RX ORDER — LOSARTAN POTASSIUM 25 MG/1
TABLET ORAL
Qty: 30 TAB | Refills: 6 | Status: SHIPPED | OUTPATIENT
Start: 2020-03-20 | End: 2021-10-19 | Stop reason: SDUPTHER

## 2020-03-20 RX ORDER — METOPROLOL SUCCINATE 25 MG/1
25 TABLET, EXTENDED RELEASE ORAL DAILY
Qty: 30 TAB | Refills: 11 | Status: ON HOLD | OUTPATIENT
Start: 2020-03-20 | End: 2020-10-12

## 2020-03-20 ASSESSMENT — ENCOUNTER SYMPTOMS
BLURRED VISION: 0
NAUSEA: 0
LOSS OF CONSCIOUSNESS: 0
BLOOD IN STOOL: 0
ORTHOPNEA: 0
COUGH: 0
PALPITATIONS: 0
ABDOMINAL PAIN: 0
VOMITING: 0
EYE DISCHARGE: 0
FEVER: 0
DEPRESSION: 0
SPEECH CHANGE: 0
CLAUDICATION: 0
SHORTNESS OF BREATH: 0
WEIGHT LOSS: 0
FALLS: 0
HEADACHES: 0
MYALGIAS: 0
HALLUCINATIONS: 0
PND: 0
CHILLS: 0
EYE PAIN: 0
DIZZINESS: 0
DOUBLE VISION: 0
SENSORY CHANGE: 0
BRUISES/BLEEDS EASILY: 0

## 2020-03-20 NOTE — PROGRESS NOTES
Chief Complaint   Patient presents with   • Atrial Fibrillation     Previous patient        Subjective:   Giovanni Saini is a 49 y.o. male who presents today for cardiac care and management due to paroxysmal atrial fibrillation, aortic insufficiency, end-stage renal disease on hemodialysis.  He did have a history of cardiomyopathy for which was thought to be related to rate induced.  His most recent transthoracic echocardiogram in March 2020 at [facility] showed normal LV function, moderate aortic regurgitation.    Patient was seen by Dr. Zavaleta in the past.    I have personally interpreted her EKG today with patient, there is no evidence of acute coronary syndrome, no evidence of prior infarct, normal [place] and QT interval, no significant conduction disease.    No chest pain. No dyspnea.    Past Medical History:   Diagnosis Date   • Heart murmur    • Valvular heart disease     aortic insufficiency     Past Surgical History:   Procedure Laterality Date   • CATH PLACEMENT  8/27/2017    Procedure: CATH PLACEMENT - perm cath;  Surgeon: Hiren Ayon M.D.;  Location: SURGERY [facility];  Service: General   • AV FISTULA CREATION  8/27/2017    Procedure: AV FISTULA CREATION - left radial cephalic;  Surgeon: Hiren Ayon M.D.;  Location: SURGERY [facility];  Service: General     Family History   Problem Relation Age of Onset   • Stroke Sister 38        CVA     Social History     Socioeconomic History   • Marital status:      Spouse name: Not on file   • Number of children: Not on file   • Years of education: Not on file   • Highest education level: Not on file   Occupational History   • Not on file   Social Needs   • Financial resource strain: Not on file   • Food insecurity     Worry: Not on file     Inability: Not on file   • Transportation needs     Medical: Not on file     Non-medical: Not on file   Tobacco Use   • Smoking status: Never Smoker   • Smokeless tobacco: Never Used   Substance  and Sexual Activity   • Alcohol use: No     Alcohol/week: 6.0 oz     Types: 10 Standard drinks or equivalent per week     Comment: quit after hospital    • Drug use: No   • Sexual activity: Not on file   Lifestyle   • Physical activity     Days per week: Not on file     Minutes per session: Not on file   • Stress: Not on file   Relationships   • Social connections     Talks on phone: Not on file     Gets together: Not on file     Attends Jainism service: Not on file     Active member of club or organization: Not on file     Attends meetings of clubs or organizations: Not on file     Relationship status: Not on file   • Intimate partner violence     Fear of current or ex partner: Not on file     Emotionally abused: Not on file     Physically abused: Not on file     Forced sexual activity: Not on file   Other Topics Concern   • Not on file   Social History Narrative   • Not on file     No Known Allergies  Outpatient Encounter Medications as of 3/20/2020   Medication Sig Dispense Refill   • losartan (COZAAR) 25 MG Tab TAKE ONE TABLET BY MOUTH EVERY DAY AT BEDTIME FOR BLOOD PRESSURE - BRING ALL MEDICATIONS TO EVERY APPOINTMENT - AS PER DR. STRICKLAND  6   • ascorbic acid (ASCORBIC ACID) 500 MG Tab TAKE 1 TABLET BY MOUTH EVERY DAY WITH VITAMIN B  3   • isosorbide mononitrate SR (IMDUR) 60 MG TABLET SR 24 HR Take 1 Tab by mouth every day. 30 Tab 11   • amiodarone (CORDARONE) 200 MG Tab Take 1 Tab by mouth every day. 30 Tab 11   • warfarin (COUMADIN) 4 MG Tab Take 1 Tab by mouth every day. 30 Tab 11   • metoprolol SR (TOPROL XL) 25 MG TABLET SR 24 HR Take 1 Tab by mouth every day. 30 Tab 11   • atorvastatin (LIPITOR) 40 MG Tab Take 1 Tab by mouth every evening. 30 Tab 11   • vitamin B comp+C (ALLBEE WITH C) Tab Take 1 Tab by mouth every day. 30 Tab 2   • thiamine (THIAMINE) 100 MG tablet Take 1 Tab by mouth every day. 30 Tab 2   • folic acid (FOLVITE) 1 MG Tab Take 1 Tab by mouth every day. 30 Tab 2   • furosemide (LASIX) 40  MG Tab Take 1 Tab by mouth every day. 30 Tab 11     No facility-administered encounter medications on file as of 3/20/2020.      Review of Systems   Constitutional: Negative for chills, fever, malaise/fatigue and weight loss.   HENT: Negative for ear discharge, ear pain, hearing loss and nosebleeds.    Eyes: Negative for blurred vision, double vision, pain and discharge.   Respiratory: Negative for cough and shortness of breath.    Cardiovascular: Negative for chest pain, palpitations, orthopnea, claudication, leg swelling and PND.   Gastrointestinal: Negative for abdominal pain, blood in stool, melena, nausea and vomiting.   Genitourinary: Negative for dysuria and hematuria.   Musculoskeletal: Negative for falls, joint pain and myalgias.   Skin: Negative for itching and rash.   Neurological: Negative for dizziness, sensory change, speech change, loss of consciousness and headaches.   Endo/Heme/Allergies: Negative for environmental allergies. Does not bruise/bleed easily.   Psychiatric/Behavioral: Negative for depression, hallucinations and suicidal ideas.        Objective:   /56 (BP Location: Right arm, Patient Position: Sitting, BP Cuff Size: Adult)   Pulse (!) 54   Wt 70.8 kg (156 lb)   SpO2 96%   BMI 27.63 kg/m²     Physical Exam   Constitutional: He is oriented to person, place, and time. No distress.   HENT:   Head: Normocephalic and atraumatic.   Right Ear: External ear normal.   Left Ear: External ear normal.   Eyes: Right eye exhibits no discharge. Left eye exhibits no discharge.   Neck: No JVD present. No thyromegaly present.   Cardiovascular: Normal rate, regular rhythm, normal heart sounds and intact distal pulses. Exam reveals no gallop and no friction rub.   No murmur heard.  Pulmonary/Chest: Breath sounds normal. No respiratory distress.   Abdominal: Bowel sounds are normal. He exhibits no distension. There is no abdominal tenderness.   Musculoskeletal:         General: No tenderness or  edema.   Neurological: He is alert and oriented to person, place, and time. No cranial nerve deficit.   Skin: Skin is warm and dry. He is not diaphoretic.   Psychiatric: He has a normal mood and affect. His behavior is normal.   Nursing note and vitals reviewed.      Assessment:     1. ACC/AHA stage C systolic heart failure ([facility])     2. NYHA class 1 heart failure with reduced ejection fraction ([facility])     3. PAF (paroxysmal atrial fibrillation) ([facility])  EKG    EKG   4. High risk medication use     5. ESRD (end stage renal disease) on dialysis ([facility])     6. Nonrheumatic aortic valve insufficiency         Medical Decision Making:  Today's Assessment / Status / Plan:   Today, based on physical examination findings, patient is euvolemic. No JVD, lungs are clear to auscultation, no pitting edema in bilateral lower extremities, no ascites.    Dry weight is 156 lbs.    Continue current medications at current dose as above. Refills were prescribed today and patient was instructed with plan of care.    No need for surgical intervention for moderate AI.

## 2020-09-29 ENCOUNTER — HOSPITAL ENCOUNTER (INPATIENT)
Facility: MEDICAL CENTER | Age: 50
LOS: 13 days | DRG: 163 | End: 2020-10-12
Attending: EMERGENCY MEDICINE | Admitting: INTERNAL MEDICINE
Payer: MEDICAID

## 2020-09-29 ENCOUNTER — APPOINTMENT (OUTPATIENT)
Dept: RADIOLOGY | Facility: MEDICAL CENTER | Age: 50
DRG: 163 | End: 2020-09-29
Attending: EMERGENCY MEDICINE
Payer: MEDICAID

## 2020-09-29 DIAGNOSIS — N18.6 END STAGE RENAL DISEASE (HCC): ICD-10-CM

## 2020-09-29 DIAGNOSIS — J18.9 PNEUMONIA OF LEFT LOWER LOBE DUE TO INFECTIOUS ORGANISM: ICD-10-CM

## 2020-09-29 DIAGNOSIS — R07.81 PLEURITIC CHEST PAIN: ICD-10-CM

## 2020-09-29 PROBLEM — Z99.2 ESRD (END STAGE RENAL DISEASE) ON DIALYSIS (HCC): Status: ACTIVE | Noted: 2020-09-29

## 2020-09-29 PROBLEM — R07.89 OTHER CHEST PAIN: Status: ACTIVE | Noted: 2020-09-29

## 2020-09-29 LAB
ALBUMIN SERPL BCP-MCNC: 3.6 G/DL (ref 3.2–4.9)
ALBUMIN/GLOB SERPL: 1.3 G/DL
ALP SERPL-CCNC: 98 U/L (ref 30–99)
ALT SERPL-CCNC: 17 U/L (ref 2–50)
ANION GAP SERPL CALC-SCNC: 16 MMOL/L (ref 7–16)
AST SERPL-CCNC: 20 U/L (ref 12–45)
BASOPHILS # BLD AUTO: 0.3 % (ref 0–1.8)
BASOPHILS # BLD: 0.04 K/UL (ref 0–0.12)
BILIRUB SERPL-MCNC: 0.6 MG/DL (ref 0.1–1.5)
BUN SERPL-MCNC: 47 MG/DL (ref 8–22)
CALCIUM SERPL-MCNC: 9.1 MG/DL (ref 8.5–10.5)
CHLORIDE SERPL-SCNC: 92 MMOL/L (ref 96–112)
CO2 SERPL-SCNC: 26 MMOL/L (ref 20–33)
COVID ORDER STATUS COVID19: NORMAL
CREAT SERPL-MCNC: 8.32 MG/DL (ref 0.5–1.4)
D DIMER PPP IA.FEU-MCNC: 0.29 UG/ML (FEU) (ref 0–0.5)
EKG IMPRESSION: NORMAL
EKG IMPRESSION: NORMAL
EOSINOPHIL # BLD AUTO: 0.28 K/UL (ref 0–0.51)
EOSINOPHIL NFR BLD: 1.8 % (ref 0–6.9)
ERYTHROCYTE [DISTWIDTH] IN BLOOD BY AUTOMATED COUNT: 53.9 FL (ref 35.9–50)
GLOBULIN SER CALC-MCNC: 2.7 G/DL (ref 1.9–3.5)
GLUCOSE SERPL-MCNC: 108 MG/DL (ref 65–99)
HAV IGM SERPL QL IA: NORMAL
HBV CORE IGM SER QL: NORMAL
HBV SURFACE AB SERPL IA-ACNC: 308 MIU/ML (ref 0–10)
HBV SURFACE AG SER QL: NORMAL
HCT VFR BLD AUTO: 29.6 % (ref 42–52)
HCV AB SER QL: NORMAL
HGB BLD-MCNC: 9.5 G/DL (ref 14–18)
IMM GRANULOCYTES # BLD AUTO: 0.14 K/UL (ref 0–0.11)
IMM GRANULOCYTES NFR BLD AUTO: 0.9 % (ref 0–0.9)
INR PPP: 3.08 (ref 0.87–1.13)
LYMPHOCYTES # BLD AUTO: 1.26 K/UL (ref 1–4.8)
LYMPHOCYTES NFR BLD: 8.3 % (ref 22–41)
MCH RBC QN AUTO: 33.1 PG (ref 27–33)
MCHC RBC AUTO-ENTMCNC: 32.1 G/DL (ref 33.7–35.3)
MCV RBC AUTO: 103.1 FL (ref 81.4–97.8)
MONOCYTES # BLD AUTO: 1.56 K/UL (ref 0–0.85)
MONOCYTES NFR BLD AUTO: 10.2 % (ref 0–13.4)
NEUTROPHILS # BLD AUTO: 11.99 K/UL (ref 1.82–7.42)
NEUTROPHILS NFR BLD: 78.5 % (ref 44–72)
NRBC # BLD AUTO: 0 K/UL
NRBC BLD-RTO: 0 /100 WBC
NT-PROBNP SERPL IA-MCNC: ABNORMAL PG/ML (ref 0–125)
PLATELET # BLD AUTO: 233 K/UL (ref 164–446)
PMV BLD AUTO: 10.2 FL (ref 9–12.9)
POTASSIUM SERPL-SCNC: 4.5 MMOL/L (ref 3.6–5.5)
PROCALCITONIN SERPL-MCNC: 2.23 NG/ML
PROT SERPL-MCNC: 6.3 G/DL (ref 6–8.2)
PROTHROMBIN TIME: 32.8 SEC (ref 12–14.6)
RBC # BLD AUTO: 2.87 M/UL (ref 4.7–6.1)
SARS-COV-2 RNA RESP QL NAA+PROBE: NOTDETECTED
SODIUM SERPL-SCNC: 134 MMOL/L (ref 135–145)
SPECIMEN SOURCE: NORMAL
TROPONIN T SERPL-MCNC: 69 NG/L (ref 6–19)
TROPONIN T SERPL-MCNC: 70 NG/L (ref 6–19)
TROPONIN T SERPL-MCNC: 71 NG/L (ref 6–19)
WBC # BLD AUTO: 15.3 K/UL (ref 4.8–10.8)

## 2020-09-29 PROCEDURE — 96375 TX/PRO/DX INJ NEW DRUG ADDON: CPT

## 2020-09-29 PROCEDURE — 700102 HCHG RX REV CODE 250 W/ 637 OVERRIDE(OP): Performed by: INTERNAL MEDICINE

## 2020-09-29 PROCEDURE — 71250 CT THORAX DX C-: CPT

## 2020-09-29 PROCEDURE — U0003 INFECTIOUS AGENT DETECTION BY NUCLEIC ACID (DNA OR RNA); SEVERE ACUTE RESPIRATORY SYNDROME CORONAVIRUS 2 (SARS-COV-2) (CORONAVIRUS DISEASE [COVID-19]), AMPLIFIED PROBE TECHNIQUE, MAKING USE OF HIGH THROUGHPUT TECHNOLOGIES AS DESCRIBED BY CMS-2020-01-R: HCPCS

## 2020-09-29 PROCEDURE — 99285 EMERGENCY DEPT VISIT HI MDM: CPT

## 2020-09-29 PROCEDURE — 700111 HCHG RX REV CODE 636 W/ 250 OVERRIDE (IP): Performed by: EMERGENCY MEDICINE

## 2020-09-29 PROCEDURE — 93005 ELECTROCARDIOGRAM TRACING: CPT | Performed by: EMERGENCY MEDICINE

## 2020-09-29 PROCEDURE — 85025 COMPLETE CBC W/AUTO DIFF WBC: CPT

## 2020-09-29 PROCEDURE — 87150 DNA/RNA AMPLIFIED PROBE: CPT | Mod: 91

## 2020-09-29 PROCEDURE — 85379 FIBRIN DEGRADATION QUANT: CPT

## 2020-09-29 PROCEDURE — 84484 ASSAY OF TROPONIN QUANT: CPT | Mod: 91

## 2020-09-29 PROCEDURE — 770006 HCHG ROOM/CARE - MED/SURG/GYN SEMI*

## 2020-09-29 PROCEDURE — C9803 HOPD COVID-19 SPEC COLLECT: HCPCS | Performed by: EMERGENCY MEDICINE

## 2020-09-29 PROCEDURE — 83880 ASSAY OF NATRIURETIC PEPTIDE: CPT

## 2020-09-29 PROCEDURE — 87040 BLOOD CULTURE FOR BACTERIA: CPT

## 2020-09-29 PROCEDURE — 93005 ELECTROCARDIOGRAM TRACING: CPT

## 2020-09-29 PROCEDURE — 80053 COMPREHEN METABOLIC PANEL: CPT

## 2020-09-29 PROCEDURE — A9270 NON-COVERED ITEM OR SERVICE: HCPCS | Performed by: INTERNAL MEDICINE

## 2020-09-29 PROCEDURE — 84145 PROCALCITONIN (PCT): CPT

## 2020-09-29 PROCEDURE — 85610 PROTHROMBIN TIME: CPT

## 2020-09-29 PROCEDURE — 96365 THER/PROPH/DIAG IV INF INIT: CPT

## 2020-09-29 PROCEDURE — 86706 HEP B SURFACE ANTIBODY: CPT

## 2020-09-29 PROCEDURE — 87077 CULTURE AEROBIC IDENTIFY: CPT

## 2020-09-29 PROCEDURE — 80074 ACUTE HEPATITIS PANEL: CPT

## 2020-09-29 PROCEDURE — 99223 1ST HOSP IP/OBS HIGH 75: CPT | Performed by: INTERNAL MEDICINE

## 2020-09-29 PROCEDURE — 96368 THER/DIAG CONCURRENT INF: CPT

## 2020-09-29 PROCEDURE — 87186 SC STD MICRODIL/AGAR DIL: CPT

## 2020-09-29 PROCEDURE — 700102 HCHG RX REV CODE 250 W/ 637 OVERRIDE(OP): Performed by: FAMILY MEDICINE

## 2020-09-29 PROCEDURE — A9270 NON-COVERED ITEM OR SERVICE: HCPCS | Performed by: FAMILY MEDICINE

## 2020-09-29 PROCEDURE — 700105 HCHG RX REV CODE 258: Performed by: EMERGENCY MEDICINE

## 2020-09-29 PROCEDURE — 71045 X-RAY EXAM CHEST 1 VIEW: CPT

## 2020-09-29 RX ORDER — THIAMINE MONONITRATE (VIT B1) 100 MG
100 TABLET ORAL DAILY
Status: DISCONTINUED | OUTPATIENT
Start: 2020-09-29 | End: 2020-10-12 | Stop reason: HOSPADM

## 2020-09-29 RX ORDER — AMOXICILLIN 250 MG
2 CAPSULE ORAL 2 TIMES DAILY
Status: DISCONTINUED | OUTPATIENT
Start: 2020-09-29 | End: 2020-10-12 | Stop reason: HOSPADM

## 2020-09-29 RX ORDER — ONDANSETRON 2 MG/ML
4 INJECTION INTRAMUSCULAR; INTRAVENOUS EVERY 4 HOURS PRN
Status: DISCONTINUED | OUTPATIENT
Start: 2020-09-29 | End: 2020-09-29

## 2020-09-29 RX ORDER — WARFARIN SODIUM 4 MG/1
4-6 TABLET ORAL EVERY MORNING
Status: ON HOLD | COMMUNITY
End: 2020-10-12

## 2020-09-29 RX ORDER — ISOSORBIDE MONONITRATE 60 MG/1
60 TABLET, EXTENDED RELEASE ORAL DAILY
Status: DISCONTINUED | OUTPATIENT
Start: 2020-09-29 | End: 2020-10-05

## 2020-09-29 RX ORDER — MORPHINE SULFATE 4 MG/ML
2 INJECTION, SOLUTION INTRAMUSCULAR; INTRAVENOUS ONCE
Status: COMPLETED | OUTPATIENT
Start: 2020-09-29 | End: 2020-09-29

## 2020-09-29 RX ORDER — FOLIC ACID 1 MG/1
1 TABLET ORAL DAILY
Status: DISCONTINUED | OUTPATIENT
Start: 2020-09-29 | End: 2020-10-12 | Stop reason: HOSPADM

## 2020-09-29 RX ORDER — AZITHROMYCIN 250 MG/1
500 TABLET, FILM COATED ORAL DAILY
Status: COMPLETED | OUTPATIENT
Start: 2020-09-30 | End: 2020-10-01

## 2020-09-29 RX ORDER — BISACODYL 10 MG
10 SUPPOSITORY, RECTAL RECTAL
Status: DISCONTINUED | OUTPATIENT
Start: 2020-09-29 | End: 2020-10-12 | Stop reason: HOSPADM

## 2020-09-29 RX ORDER — POLYETHYLENE GLYCOL 3350 17 G/17G
1 POWDER, FOR SOLUTION ORAL
Status: DISCONTINUED | OUTPATIENT
Start: 2020-09-29 | End: 2020-10-12 | Stop reason: HOSPADM

## 2020-09-29 RX ORDER — LOSARTAN POTASSIUM 50 MG/1
25 TABLET ORAL EVERY EVENING
Status: DISCONTINUED | OUTPATIENT
Start: 2020-09-29 | End: 2020-10-12 | Stop reason: HOSPADM

## 2020-09-29 RX ORDER — FUROSEMIDE 40 MG/1
40 TABLET ORAL DAILY
Status: DISCONTINUED | OUTPATIENT
Start: 2020-09-29 | End: 2020-10-12 | Stop reason: HOSPADM

## 2020-09-29 RX ORDER — ONDANSETRON 4 MG/1
4 TABLET, ORALLY DISINTEGRATING ORAL EVERY 4 HOURS PRN
Status: DISCONTINUED | OUTPATIENT
Start: 2020-09-29 | End: 2020-09-29

## 2020-09-29 RX ORDER — ASCORBIC ACID 500 MG
500 TABLET ORAL DAILY
Status: DISCONTINUED | OUTPATIENT
Start: 2020-09-29 | End: 2020-09-29

## 2020-09-29 RX ORDER — PROCHLORPERAZINE EDISYLATE 5 MG/ML
5-10 INJECTION INTRAMUSCULAR; INTRAVENOUS EVERY 4 HOURS PRN
Status: DISCONTINUED | OUTPATIENT
Start: 2020-09-29 | End: 2020-09-29

## 2020-09-29 RX ORDER — PROMETHAZINE HYDROCHLORIDE 25 MG/1
12.5-25 TABLET ORAL EVERY 4 HOURS PRN
Status: DISCONTINUED | OUTPATIENT
Start: 2020-09-29 | End: 2020-09-29

## 2020-09-29 RX ORDER — ACETAMINOPHEN 325 MG/1
650 TABLET ORAL EVERY 6 HOURS PRN
Status: DISCONTINUED | OUTPATIENT
Start: 2020-09-29 | End: 2020-10-12 | Stop reason: HOSPADM

## 2020-09-29 RX ORDER — AZITHROMYCIN 500 MG/1
500 INJECTION, POWDER, LYOPHILIZED, FOR SOLUTION INTRAVENOUS ONCE
Status: COMPLETED | OUTPATIENT
Start: 2020-09-29 | End: 2020-09-29

## 2020-09-29 RX ORDER — PROMETHAZINE HYDROCHLORIDE 25 MG/1
12.5-25 SUPPOSITORY RECTAL EVERY 4 HOURS PRN
Status: DISCONTINUED | OUTPATIENT
Start: 2020-09-29 | End: 2020-09-29

## 2020-09-29 RX ORDER — WARFARIN SODIUM 4 MG/1
4 TABLET ORAL
Status: COMPLETED | OUTPATIENT
Start: 2020-09-29 | End: 2020-09-29

## 2020-09-29 RX ORDER — AMIODARONE HYDROCHLORIDE 200 MG/1
200 TABLET ORAL DAILY
Status: DISCONTINUED | OUTPATIENT
Start: 2020-09-29 | End: 2020-10-12 | Stop reason: HOSPADM

## 2020-09-29 RX ORDER — ATORVASTATIN CALCIUM 40 MG/1
40 TABLET, FILM COATED ORAL EVERY EVENING
Status: DISCONTINUED | OUTPATIENT
Start: 2020-09-29 | End: 2020-10-12 | Stop reason: HOSPADM

## 2020-09-29 RX ORDER — METOPROLOL SUCCINATE 25 MG/1
25 TABLET, EXTENDED RELEASE ORAL DAILY
Status: DISCONTINUED | OUTPATIENT
Start: 2020-09-29 | End: 2020-10-05

## 2020-09-29 RX ADMIN — AMIODARONE HYDROCHLORIDE 200 MG: 200 TABLET ORAL at 15:06

## 2020-09-29 RX ADMIN — WARFARIN SODIUM 4 MG: 4 TABLET ORAL at 16:55

## 2020-09-29 RX ADMIN — AZITHROMYCIN MONOHYDRATE 500 MG: 500 INJECTION, POWDER, LYOPHILIZED, FOR SOLUTION INTRAVENOUS at 04:15

## 2020-09-29 RX ADMIN — LOSARTAN POTASSIUM 25 MG: 50 TABLET, FILM COATED ORAL at 16:55

## 2020-09-29 RX ADMIN — FUROSEMIDE 40 MG: 40 TABLET ORAL at 12:23

## 2020-09-29 RX ADMIN — ISOSORBIDE MONONITRATE 60 MG: 60 TABLET, EXTENDED RELEASE ORAL at 15:06

## 2020-09-29 RX ADMIN — FOLIC ACID 1 MG: 1 TABLET ORAL at 15:07

## 2020-09-29 RX ADMIN — METOPROLOL SUCCINATE 25 MG: 25 TABLET, EXTENDED RELEASE ORAL at 12:22

## 2020-09-29 RX ADMIN — Medication 100 MG: at 15:07

## 2020-09-29 RX ADMIN — ATORVASTATIN CALCIUM 40 MG: 40 TABLET, FILM COATED ORAL at 16:55

## 2020-09-29 RX ADMIN — MORPHINE SULFATE 2 MG: 4 INJECTION INTRAVENOUS at 03:48

## 2020-09-29 RX ADMIN — SODIUM CHLORIDE 3 G: 900 INJECTION INTRAVENOUS at 04:08

## 2020-09-29 RX ADMIN — ACETAMINOPHEN 650 MG: 325 TABLET, FILM COATED ORAL at 21:52

## 2020-09-29 ASSESSMENT — COGNITIVE AND FUNCTIONAL STATUS - GENERAL
MOBILITY SCORE: 24
SUGGESTED CMS G CODE MODIFIER DAILY ACTIVITY: CH
SUGGESTED CMS G CODE MODIFIER MOBILITY: CH
DAILY ACTIVITIY SCORE: 24

## 2020-09-29 ASSESSMENT — ENCOUNTER SYMPTOMS
ABDOMINAL PAIN: 0
NAUSEA: 0
CHILLS: 0
COUGH: 1
SHORTNESS OF BREATH: 1
VOMITING: 0
FEVER: 0

## 2020-09-29 ASSESSMENT — CHA2DS2 SCORE
AGE 75 OR GREATER: NO
AGE 65 TO 74: NO
CHF OR LEFT VENTRICULAR DYSFUNCTION: YES
VASCULAR DISEASE: NO
SEX: MALE
HYPERTENSION: YES
CHA2DS2 VASC SCORE: 2
PRIOR STROKE OR TIA OR THROMBOEMBOLISM: NO
DIABETES: NO

## 2020-09-29 ASSESSMENT — PATIENT HEALTH QUESTIONNAIRE - PHQ9
1. LITTLE INTEREST OR PLEASURE IN DOING THINGS: NOT AT ALL
2. FEELING DOWN, DEPRESSED, IRRITABLE, OR HOPELESS: NOT AT ALL
SUM OF ALL RESPONSES TO PHQ9 QUESTIONS 1 AND 2: 0

## 2020-09-29 ASSESSMENT — PAIN DESCRIPTION - PAIN TYPE: TYPE: ACUTE PAIN

## 2020-09-29 ASSESSMENT — FIBROSIS 4 INDEX: FIB4 SCORE: 1.08

## 2020-09-29 NOTE — ED NOTES
Med Rec complete per Pt and Pt's daughter  Allergies Reviewed  No ABX in the last 14 days    Pt takes WARFARIN   6 mg every Tuesday and Thrusday   4 mg all other days of the week

## 2020-09-29 NOTE — ED NOTES
Attending Hospitalist is Dr Child starting at 0700. Please contact this physician for orders, updates or questions today.

## 2020-09-29 NOTE — H&P
Hospital Medicine History & Physical Note    Date of Service  9/29/2020    Primary Care Physician  Laury Angeles P.A.-C.    Consultants  renal    Code Status  Full Code    Chief Complaint  Chief Complaint   Patient presents with   • Chest Pain       History of Presenting Illness  50 y.o. male who presented 9/29/2020 with history of end-stage renal disease on dialysis Monday Wednesday Friday, paroxysmal atrial fibrillation on Coumadin, stage C systolic congestive heart failure, Danish-speaking only comes in with above chief complaint.  Patient told me that he had chest pain acute onset for approximately 24 hours has not missed any dialysis sessions recently.  He says the pain originates primarily in the left breast and radiates down to the left upper quadrant of his belly sometimes to his left shoulder and is worsened with deep inspiration and notices some mild shortness of breath but no clear actual cough.  Patient states he is compliant with his Coumadin but cannot tell me his last INR.  He denies any pain in his legs except bilateral knees and no new swelling.  Denies any known obvious COVID contacts.  Denies any fevers at home.  He apparently has been on dialysis for 2 years and does produce some urine.    In the emergency room he was given empiric IV Unasyn and azithromycin as well as a CT thorax without contrast that showed left basilar atelectasis and possible infiltrate or small cavitary lesion within this area.  His EKG personally viewed by me shows normal sinus rhythm with a heart rate of 62 no evidence of acute ST segment changes.  INR is pending as well as a procalcitonin which were both ordered stat by me.    Review of Systems  Review of Systems   Constitutional: Positive for malaise/fatigue (mild). Negative for chills and fever.   Respiratory: Positive for cough and shortness of breath.    Cardiovascular: Positive for chest pain (with deep breathing). Negative for leg swelling.   Gastrointestinal:  Negative for abdominal pain, nausea and vomiting.   Musculoskeletal: Positive for joint pain (B/L knees).   All other systems reviewed and are negative.      Past Medical History   has a past medical history of Heart murmur and Valvular heart disease.    Surgical History   has a past surgical history that includes cath placement (8/27/2017) and av fistula creation (8/27/2017).     Family History  family history includes Stroke (age of onset: 38) in his sister.     Social History   reports that he has never smoked. He has never used smokeless tobacco. He reports that he does not drink alcohol or use drugs.    Allergies  No Known Allergies    Medications  Prior to Admission Medications   Prescriptions Last Dose Informant Patient Reported? Taking?   amiodarone (CORDARONE) 200 MG Tab   No No   Sig: Take 1 Tab by mouth every day.   ascorbic acid (ASCORBIC ACID) 500 MG Tab   Yes No   Sig: TAKE 1 TABLET BY MOUTH EVERY DAY WITH VITAMIN B   atorvastatin (LIPITOR) 40 MG Tab   No No   Sig: Take 1 Tab by mouth every evening.   folic acid (FOLVITE) 1 MG Tab   No No   Sig: Take 1 Tab by mouth every day.   furosemide (LASIX) 40 MG Tab   No No   Sig: Take 1 Tab by mouth every day.   isosorbide mononitrate SR (IMDUR) 60 MG TABLET SR 24 HR   No No   Sig: Take 1 Tab by mouth every day.   losartan (COZAAR) 25 MG Tab   No No   Sig: TAKE ONE TABLET BY MOUTH EVERY DAY AT BEDTIME FOR BLOOD PRESSURE - BRING ALL MEDICATIONS TO EVERY APPOINTMENT - AS PER DR. STRICKLAND   metoprolol SR (TOPROL XL) 25 MG TABLET SR 24 HR   No No   Sig: Take 1 Tab by mouth every day.   thiamine (THIAMINE) 100 MG tablet   No No   Sig: Take 1 Tab by mouth every day.   vitamin B comp+C (ALLBEE WITH C) Tab   No No   Sig: Take 1 Tab by mouth every day.   warfarin (COUMADIN) 4 MG Tab   No No   Sig: Take 1 Tab by mouth every day.      Facility-Administered Medications: None       Physical Exam  Temp:  [36.3 °C (97.3 °F)] 36.3 °C (97.3 °F)  Pulse:  [64-74] 74  Resp:  [16-18]  16  BP: (127-146)/(67-83) 134/68  SpO2:  [93 %-98 %] 98 %    Physical Exam  Vitals signs and nursing note reviewed.   Constitutional:       General: He is not in acute distress.     Appearance: He is well-developed.      Comments: Appears older than stated age   HENT:      Head: Normocephalic and atraumatic.      Mouth/Throat:      Pharynx: No oropharyngeal exudate.   Eyes:      General: No scleral icterus.     Pupils: Pupils are equal, round, and reactive to light.   Neck:      Musculoskeletal: Normal range of motion and neck supple.      Thyroid: No thyromegaly.   Cardiovascular:      Rate and Rhythm: Normal rate and regular rhythm.      Heart sounds: Normal heart sounds. No murmur.      Comments: LUE AV fistula on the volar forearm surface, decent thrill appreciated  Pulmonary:      Effort: Pulmonary effort is normal. No respiratory distress.      Breath sounds: Rales (L base) present. No wheezing.   Chest:      Chest wall: No tenderness.   Abdominal:      General: Bowel sounds are normal. There is no distension.      Palpations: Abdomen is soft.      Tenderness: There is no abdominal tenderness.   Musculoskeletal: Normal range of motion.         General: Tenderness (B/L knees) present. No swelling.      Right lower leg: No edema.      Left lower leg: No edema.   Skin:     General: Skin is warm and dry.      Findings: No rash.   Neurological:      Mental Status: He is alert and oriented to person, place, and time.      Cranial Nerves: No cranial nerve deficit.         Laboratory:  Recent Labs     09/29/20 0215   WBC 15.3*   RBC 2.87*   HEMOGLOBIN 9.5*   HEMATOCRIT 29.6*   .1*   MCH 33.1*   MCHC 32.1*   RDW 53.9*   PLATELETCT 233   MPV 10.2     Recent Labs     09/29/20 0215   SODIUM 134*   POTASSIUM 4.5   CHLORIDE 92*   CO2 26   GLUCOSE 108*   BUN 47*   CREATININE 8.32*   CALCIUM 9.1     Recent Labs     09/29/20 0215   ALTSGPT 17   ASTSGOT 20   ALKPHOSPHAT 98   TBILIRUBIN 0.6   GLUCOSE 108*          Recent Labs     09/29/20  0215   NTPROBNP [id number]*         Recent Labs     09/29/20  0215 09/29/20  0516   TROPONINT 70* 69*       Imaging:  CT-CHEST (THORAX) W/O   Final Result         1.  Left basilar atelectasis, component of infiltrate not definitively excluded.   2.  Atherosclerosis and atherosclerotic coronary artery disease   3.  Cardiomegaly   4.  Hepatomegaly      DX-CHEST-PORTABLE (1 VIEW)   Final Result         1.  Left basilar atelectasis, no focal infiltrate   2.  Cardiomegaly            Assessment/Plan:  I anticipate this patient will require at least two midnights for appropriate medical management, necessitating inpatient admission.    PAF (paroxysmal atrial fibrillation) ([facility])- (present on admission)  Assessment & Plan  -NSR, on amio and coumadin, continue both, F/U INR    ACC/AHA stage C systolic heart failure ([facility])- (present on admission)  Assessment & Plan  -volume status is decent  -consult renal in the AM for iHD  -continue outpatient cardiac medications including oral lasix    Other chest pain- (present on admission)  Assessment & Plan  -no acute changes on 12-lead EKG, pleuritic in nature, initial trop around 70 but in the context in ESRD  -trend trops  -no additional medications at this time  -CT thorax without contrast mostly unrevealing except for possible L sided basilar PNA  -see below  -coumadin on outpatient, INR stat ordered by me, if is truly sub-therapeutic consider VQ scan or timing CTA chest with renal and iHD  -telemetry    CAP (community acquired pneumonia)- (present on admission)  Assessment & Plan  -presumed  -procal pending  -empiric IV unasyn and oral azithromycin  -trend WBC/fever curve  -F/U blood and sputum cultures    ESRD (end stage renal disease) on dialysis ([facility])- (present on admission)  Assessment & Plan  -dialyzed M/W/F  -lytes are ok right now  -consult renal in the AM    Essential hypertension- (present on admission)  Assessment & Plan  -continue outpatient  medications

## 2020-09-29 NOTE — ED NOTES
Pt medicated per MAR. Updated on pending transfer to inpatient bed, verbalizes understanding. Patient assessment unchanged, repositions self on cart. Will continue to monitor. Remains on monitoring, [email or web address].

## 2020-09-29 NOTE — ED PROVIDER NOTES
ED Provider Note    Chief Complaint:   Chest pain    HPI:  Giovanni Saini is a 50 y.o. male who presents with chief complaint of chest pain.  He describes a left-sided chest pressure that radiates towards the right side of his chest.  His symptoms began yesterday, he reports his chest pain has been persistent since that time, though it has been progressively worsening in severity.  Denies radiation to the thoracic back.  He is not had any associated nausea or vomiting.  His pain is exacerbated by taking a deep breath, no alleviating factors are identified.  He denies any leg pain or leg swelling, he has no known history of DVT nor pulmonary embolism.  He states he has not had similar pain in the past.  He is dialysis dependent, but has not missed any dialysis appointments.  He denies any recent fevers, denies cough.  He has no associated abdominal pain.  He does not have any associated low back pain. He denies diaphoresis. He does have a history of cardiomyopathy and aortic regurgitation.     Mr. Saini is Slovenian speaking, Language Line  used for this encounter.     Review of Systems:  See HPI for pertinent positives and negatives. All other systems negative.    Past Medical History:   has a past medical history of Heart murmur and Valvular heart disease.    Social History:  Social History     Tobacco Use   • Smoking status: Never Smoker   • Smokeless tobacco: Never Used   Substance and Sexual Activity   • Alcohol use: No     Alcohol/week: 6.0 oz     Types: 10 Standard drinks or equivalent per week     Comment: quit after hospital    • Drug use: No   • Sexual activity: Not on file       Surgical History:   has a past surgical history that includes cath placement (8/27/2017) and av fistula creation (8/27/2017).    Current Medications:  Home Medications    **Home medications have not yet been reviewed for this encounter**         Allergies:  No Known Allergies    Physical Exam:  Vital Signs: /68   " Pulse 74   Temp 36.3 °C (97.3 °F) (Temporal)   Resp 16   Ht 1.651 m (5' 5\")   Wt 70.8 kg (156 lb 1.4 oz)   SpO2 98%   BMI 25.97 kg/m²   Constitutional: Alert, calm  HENT: Normocephalic  Eyes: Pupils equal and reactive, normal conjunctiva  Neck: Supple, normal range of motion, no stridor  Cardiovascular: Extremities are warm and well perfused, no murmur appreciated, holosystolic murmur present  Pulmonary: No respiratory distress, normal work of breathing, no accessory muscule usage, breath sounds clear and equal bilaterally, no wheezing, no coarse breath sounds, no basilar crackles  Abdomen: Soft, non-distended, non-tender to palpation, no peritoneal signs  Skin: Warm, dry, no rashes or lesions  Musculoskeletal: Normal range of motion in all extremities, no swelling or deformity noted  Neurologic: Alert, oriented, normal speech, normal motor function  Psychiatric: Normal and appropriate mood and affect    Medical records reviewed for continuity of care.  Cardiology clinic note reviewed from 3/20/2020.  The patient is followed by Dr. Nuñez.  He is noted to have a past medical history of paroxysmal atrial fibrillation, aortic insufficiency, end-stage renal disease on hemodialysis, as well as cardiomyopathy.  Most recent transthoracic echocardiogram in March 2020 at [facility] showed normal LV function and moderate aortic regurgitation.    EKG: EKG performed at 2:01.  Rate 69, sinus rhythm, no ST depression, less than 1 mm ST elevation in V2 and V3 with normal T wave morphology, slight biphasic T waves noted in aVL and aVF.  These are slight changes from his prior EKG dated 3/20/2020.  I did order a repeat EKG which is improved, biphasic T waves are no longer present.    Labs:  Labs Reviewed   CBC WITH DIFFERENTIAL - Abnormal; Notable for the following components:       Result Value    WBC 15.3 (*)     RBC 2.87 (*)     Hemoglobin 9.5 (*)     Hematocrit 29.6 (*)     .1 (*)     MCH 33.1 (*)     MCHC 32.1 " (*)     RDW 53.9 (*)     Neutrophils-Polys 78.50 (*)     Lymphocytes 8.30 (*)     Neutrophils (Absolute) 11.99 (*)     Monos (Absolute) 1.56 (*)     Immature Granulocytes (abs) 0.14 (*)     All other components within normal limits    Narrative:     1. Age less then 50  2. Heart reate less then 100  3. 02 sat > 94%  4. No prior history of DVT or PE  5. No recent trauma or surgery (2 weeks)  6. No hemoptisis.  7. No  or HRP  8. No un-lateral leg swelling.   COMP METABOLIC PANEL - Abnormal; Notable for the following components:    Sodium 134 (*)     Chloride 92 (*)     Glucose 108 (*)     Bun 47 (*)     Creatinine 8.32 (*)     All other components within normal limits    Narrative:     1. Age less then 50  2. Heart reate less then 100  3. 02 sat > 94%  4. No prior history of DVT or PE  5. No recent trauma or surgery (2 weeks)  6. No hemoptisis.  7. No  or HRP  8. No un-lateral leg swelling.   PROBRAIN NATRIURETIC PEPTIDE, NT - Abnormal; Notable for the following components:    NT-proBNP 61009 (*)     All other components within normal limits    Narrative:     1. Age less then 50  2. Heart reate less then 100  3. 02 sat > 94%  4. No prior history of DVT or PE  5. No recent trauma or surgery (2 weeks)  6. No hemoptisis.  7. No  or HRP  8. No un-lateral leg swelling.   TROPONIN - Abnormal; Notable for the following components:    Troponin T 70 (*)     All other components within normal limits    Narrative:     1. Age less then 50  2. Heart reate less then 100  3. 02 sat > 94%  4. No prior history of DVT or PE  5. No recent trauma or surgery (2 weeks)  6. No hemoptisis.  7. No  or HRP  8. No un-lateral leg swelling.   ESTIMATED GFR - Abnormal; Notable for the following components:    GFR If  8 (*)     GFR If Non  7 (*)     All other components within normal limits    Narrative:     1. Age less then 50  2. Heart reate less then 100  3. 02 sat > 94%  4. No prior history of DVT or  "PE  5. No recent trauma or surgery (2 weeks)  6. No hemoptisis.  7. No  or HRP  8. No un-lateral leg swelling.   TROPONIN   D-DIMER    Narrative:     1. Age less then 50  2. Heart reate less then 100  3. 02 sat > 94%  4. No prior history of DVT or PE  5. No recent trauma or surgery (2 weeks)  6. No hemoptisis.  7. No  or HRP  8. No un-lateral leg swelling.   BLOOD CULTURE    Narrative:     Per Hospital Policy: Only change Specimen Src: to \"Line\" if  specified by physician order.   BLOOD CULTURE    Narrative:     Per Hospital Policy: Only change Specimen Src: to \"Line\" if  specified by physician order.   COVID/SARS COV-2    Narrative:     Is patient being admitted?->Yes  Does this patient meet criteria for Rush/Cepheid per [facility]  Inpatient Workflow? (See workflow link below)->Yes  Expected turn around time?->Rush (Cepheid 2-4 hours)  Is this the patients First SARS CoV-2 test?->Yes  Is this patient employed in healthcare?->No  Is the patient symptomatic as defined by the CDC?->Yes  Date of symptom onset?->9/27/20  Is the patient hospitalized?->Yes  Is the patient in the ICU?->No  Is the patient a resident in a congregate care setting?->No  Is the patient pregnant?->No   SARS-COV-2, PCR (IN-HOUSE)    Narrative:     Is patient being admitted?->Yes  Does this patient meet criteria for Rush/Cepheid per [facility]  Inpatient Workflow? (See workflow link below)->Yes  Expected turn around time?->Rush (Cepheid 2-4 hours)  Is this the patients First SARS CoV-2 test?->Yes  Is this patient employed in healthcare?->No  Is the patient symptomatic as defined by the CDC?->Yes  Date of symptom onset?->9/27/20  Is the patient hospitalized?->Yes  Is the patient in the ICU?->No  Is the patient a resident in a congregate care setting?->No  Is the patient pregnant?->No       Radiology:  CT-CHEST (THORAX) W/O   Final Result         1.  Left basilar atelectasis, component of infiltrate not definitively excluded.   2.  Atherosclerosis " and atherosclerotic coronary artery disease   3.  Cardiomegaly   4.  Hepatomegaly      DX-CHEST-PORTABLE (1 VIEW)   Final Result         1.  Left basilar atelectasis, no focal infiltrate   2.  Cardiomegaly           ED Medications Administered:  Medications   morphine (pf) 4 mg/mL injection 2 mg (2 mg Intravenous Given 9/29/20 1998)   ampicillin/sulbactam (UNASYN) 3 g in  mL IVPB (0 g Intravenous Stopped 9/29/20 6276)   azithromycin (ZITHROMAX) injection 500 mg (500 mg Intravenous IVPB 9/29/20 2854)       Differential diagnosis:  Fluid overload, ACS, pulmonary edema, pleural effusion, pneumonia, pulmonary embolism    MDM:  Patient presents with 2 days of left-sided chest pain. He is normotensive on arrival, no pain radiating to the thoracic back, no midline chest pain, less concerning for aortic injury. He has no stuttering chest pain. He reports no recent fevers, no known contacts with COVID-19 positive individuals. With regard to his end-stage renal disease, he is dialyzed Mondays, Wednesdays, and Fridays.  Today is Tuesday, he did attend dialysis yesterday.    On laboratory evaluation his white blood count is 15.3, hemoglobin is 9.5.  This is a new leukocytosis, hemoglobin is down from his baseline of 12-13.  Screening d-dimer is negative.  proBNP is 22,439.  Potassium is within normal limits, creatinine is 8.32 consistent with his history of end-stage renal disease.  High-sensitivity troponin is 70.    Hazy linear density in the left lung base is observed.  This was read as likely atelectasis, however in the setting of an elevated white blood count and a left-sided chest pain, I suspect this may be due to pneumonia.  Additionally, pulmonary embolism remains in my differential, though I feel this is less likely given the negative d-dimer.  I also would not expect pulmonary embolism to cause a white blood count of 15.  Blood cultures were obtained, Unasyn and azithromycin ordered.  COVID-19 testing ordered.   Noncontrasted CT of the chest ordered for further evaluation, left basilar atelectasis again noted, component of infiltrate not definitively excluded.  I did consider a CTA, however due to his end-stage renal disease this would have to be coordinated with dialysis, and I believe pulmonary embolism is a less likely etiology for his pain.  However I discussed the possibility with admitting hospitalist, and decided that an inpatient VQ scan could be ordered if needed. Suspect his elevated troponin is due to decreased renal clearance, consider repeat cardiac enzymes. HEART score is 7 indicating high risk of major adverse cardiac event.    His pain was treated with 2 mg of morphine.  This did provide some alleviation, he is more comfortable on my reassessment.  Plan at this time is for admission to hospitalist for continued antibiotics and respiratory monitoring.      Personal protective equipment including N95 surgical respirator, goggles, and gloves were used during this encounter.     Disposition:  Admit to hospitalist in guarded condition    Final Impression:  1. Pleuritic chest pain    2. Pneumonia of left lower lobe due to infectious organism    3. End stage renal disease (HCC)        Electronically signed by: Alia Barnhart MD, 9/29/2020 3:44 PM

## 2020-09-29 NOTE — PROGRESS NOTES
Inpatient Anticoagulation Service Note    Date: 9/29/2020    Reason for Anticoagulation: Atrial Fibrillation   Target INR: 2.0 to 3.0  [id number] VASc Score: 2  HAS-BLED Score: 1   Hemoglobin Value: (!) 9.5  Hematocrit Value: (!) 29.6  Lab Platelet Value: 233    INR from last 7 days     Date/Time INR Value    09/29/20 0215  (!) 3.08        Dose from last 7 days     Date/Time Dose (mg)    09/28/20 1038  4              Average Dose (mg): 37mg/week (6mg on Tuesdays and Thursdays. 4mg all other days)  Significant Interactions: Antibiotics, Amiodarone, Statin  Bridge Therapy: No     Reversal Agent Administered: Not Applicable  Comments: Pt INR is slightly supratherapeutic at 3.08. Last dose of warfarin is documented for yesterday morning 09/28 (warfarin 4mg). Pt is anemic per labs, but there are no s/s of bleeding in nursing notes. DDI with warfarin and pt's medications, including abx, home statin and amiodarone. Pt also has a history of CHF. If this is a CHF exacerbation, warfarin and INR can be affected by increasing response to warfarin.      Plan: Warfarin 4mg today due to DDI, possible CHF exacerbation and slightly supra-therapeutic INR level  Education Material Provided?: No  Pharmacist suggested discharge dosing: Likely home dose depending on drug interactions with home medications.      Carly Madrid, PharmD    I agree with the above, anemia expected as pt is ESRD  Pt normally would receive 6mg this am, agree with 4mg tonight. In light of new start ABX.   May need to reduce dose tomorrow.    Ricky Arora, PharmD, BCPS, BCCCP

## 2020-09-29 NOTE — DISCHARGE PLANNING
Outpatient Dialysis Note    Confirmed patient is active at:    [address] [address]    Schedule: Monday, Wednesday, Friday   Time: 05:15am    Spoke with Lazara at facility who confirmed.    Patient is seen by Dr. Merchant In HD clinic.    Forwarded records for review.    Alicia Hendrix- Dialysis Coordinator  [facility] # [phone]

## 2020-09-29 NOTE — ED TRIAGE NOTES
Chief Complaint   Patient presents with   • Chest Pain     Pt BIBA from home for chest pain x3 days on left side of chest. Pt receives dialysis M, W, F  And has not missed any sessions. Pt reports pain increases with breathing. Pt is alert and oriented x4. Pt is Japanese speaking only.

## 2020-09-29 NOTE — ASSESSMENT & PLAN NOTE
-volume status is decent  -Hd per nephro  -continue outpatient cardiac medications including oral lasix

## 2020-09-29 NOTE — ASSESSMENT & PLAN NOTE
empiric IV unasyn   oral azithromycin finished  trend WBC/fever curve  blood cx 1/2, [facility]) on 9/29  repeat blood cx on 9/30, 10/5, 10/6 came back negative

## 2020-09-29 NOTE — ED NOTES
Patient resting on cart, wakes easily to verbal stim, pt alert, oriented x 4. Patient updated on POC, verbalizes understanding. Denies needs or questions, call light within reach, will continue to monitor. Patient remains on monitoring. Pt states continued chest pain, patient denies aggravating or relieving factors. Patient assisted in repositioning on cart for comfort, HOB elevated, lights dimmed for comfort. Blankets in place. Pt respirs easy, unlabored, resting in no distress. Patient skin warm, dry, siderailx1, cart in low, locked position for safety. Aware of pending admission, verbalizes understanding. Awaiting morning medication verification for administration.

## 2020-09-29 NOTE — ASSESSMENT & PLAN NOTE
-dialyzed M/W/F  -renal consult appreciated  -We will need renal consult to order antibiotic during hemodialysis upon discharge  Will check CBC, CMP daily, avoid renal toxicity  -pt follows with [facility]. Will set up antibiotic during HD

## 2020-09-29 NOTE — PROGRESS NOTES
Admitted with chest pain, pneumonia, elevated troponin.  Known history of paroxysmal atrial fibrillation, on anticoagulation with Coumadin.  D-dimer is negative.  Known history of ESRD, consult placed to nephrology for HD MWF.  Check echocardiogram.

## 2020-09-29 NOTE — ASSESSMENT & PLAN NOTE
-0/1 ECHO ef 70%  Intermittent arrhythmia, troponin trending down  Patient denies chest pain  We will continue monitoring    10/2 ongoing CP, intermittent CP 6/10, ?block  Elevated trop at 70  - cardiology consult, Dr. Mchugh, appreciate input  ekg- nonspecific ST changes    10/3 f/u MPI, cardiology following    10/4 MPI with fixed defects inferior lateral wall, artifact versus small infarct  No reversible ischemia  Cardiology following  Continue current medication    10/6 cardiology, GI, IR and ID consult appreciate  IR thoracocentsis for left pleural effusion, unsuccessful;  IR will place a pigtail and  pleural fluid analysis and culture

## 2020-09-29 NOTE — ED NOTES
This RN assumes care of patient. Patient is resting comfortably. Remains on monitoring, positioned on side for comfort. Call light within reach, siderailx1, cart in low, locked position. Visualized with easy, unlabored respirs. Lights dimmed for comfort.

## 2020-09-29 NOTE — ED NOTES
Patient is resting comfortably. Remains on monitoring, visualized with easy, unlabored respirs. Positions self on cart, siderailsx1, cart in low, locked position. Will continue to monitor.

## 2020-09-29 NOTE — ED NOTES
Patient is resting comfortably. Remains on monitoring, positions self for comfort. Lights dimmed, positioned on side, blankets in place. Will continue to monitor, aware of awaiting admission.

## 2020-09-30 ENCOUNTER — APPOINTMENT (OUTPATIENT)
Dept: CARDIOLOGY | Facility: MEDICAL CENTER | Age: 50
DRG: 163 | End: 2020-09-30
Attending: FAMILY MEDICINE
Payer: MEDICAID

## 2020-09-30 PROBLEM — M25.512 ACUTE PAIN OF LEFT SHOULDER: Status: ACTIVE | Noted: 2020-09-30

## 2020-09-30 PROBLEM — R78.81 BACTEREMIA: Status: ACTIVE | Noted: 2020-09-30

## 2020-09-30 LAB
ANION GAP SERPL CALC-SCNC: 15 MMOL/L (ref 7–16)
ANION GAP SERPL CALC-SCNC: 18 MMOL/L (ref 7–16)
BASOPHILS # BLD AUTO: 0.2 % (ref 0–1.8)
BASOPHILS # BLD: 0.02 K/UL (ref 0–0.12)
BUN SERPL-MCNC: 33 MG/DL (ref 8–22)
BUN SERPL-MCNC: 68 MG/DL (ref 8–22)
CALCIUM SERPL-MCNC: 8.6 MG/DL (ref 8.5–10.5)
CALCIUM SERPL-MCNC: 8.7 MG/DL (ref 8.5–10.5)
CHLORIDE SERPL-SCNC: 92 MMOL/L (ref 96–112)
CHLORIDE SERPL-SCNC: 93 MMOL/L (ref 96–112)
CO2 SERPL-SCNC: 24 MMOL/L (ref 20–33)
CO2 SERPL-SCNC: 28 MMOL/L (ref 20–33)
CREAT SERPL-MCNC: 11.5 MG/DL (ref 0.5–1.4)
CREAT SERPL-MCNC: 6.38 MG/DL (ref 0.5–1.4)
EOSINOPHIL # BLD AUTO: 0.64 K/UL (ref 0–0.51)
EOSINOPHIL NFR BLD: 5.1 % (ref 0–6.9)
ERYTHROCYTE [DISTWIDTH] IN BLOOD BY AUTOMATED COUNT: 53.9 FL (ref 35.9–50)
GLUCOSE SERPL-MCNC: 131 MG/DL (ref 65–99)
GLUCOSE SERPL-MCNC: 155 MG/DL (ref 65–99)
HCT VFR BLD AUTO: 30.9 % (ref 42–52)
HGB BLD-MCNC: 9.9 G/DL (ref 14–18)
IMM GRANULOCYTES # BLD AUTO: 0.1 K/UL (ref 0–0.11)
IMM GRANULOCYTES NFR BLD AUTO: 0.8 % (ref 0–0.9)
INR PPP: 5.86 (ref 0.87–1.13)
LV EJECT FRACT  99904: 70
LV EJECT FRACT MOD 2C 99903: 70.48
LV EJECT FRACT MOD 4C 99902: 71.96
LV EJECT FRACT MOD BP 99901: 72.44
LYMPHOCYTES # BLD AUTO: 1.08 K/UL (ref 1–4.8)
LYMPHOCYTES NFR BLD: 8.6 % (ref 22–41)
MAGNESIUM SERPL-MCNC: 2.1 MG/DL (ref 1.5–2.5)
MCH RBC QN AUTO: 33 PG (ref 27–33)
MCHC RBC AUTO-ENTMCNC: 32 G/DL (ref 33.7–35.3)
MCV RBC AUTO: 103 FL (ref 81.4–97.8)
MONOCYTES # BLD AUTO: 0.88 K/UL (ref 0–0.85)
MONOCYTES NFR BLD AUTO: 7 % (ref 0–13.4)
NEUTROPHILS # BLD AUTO: 9.89 K/UL (ref 1.82–7.42)
NEUTROPHILS NFR BLD: 78.3 % (ref 44–72)
NRBC # BLD AUTO: 0 K/UL
NRBC BLD-RTO: 0 /100 WBC
PLATELET # BLD AUTO: 255 K/UL (ref 164–446)
PMV BLD AUTO: 10.1 FL (ref 9–12.9)
POTASSIUM SERPL-SCNC: 4.3 MMOL/L (ref 3.6–5.5)
POTASSIUM SERPL-SCNC: 4.7 MMOL/L (ref 3.6–5.5)
PROTHROMBIN TIME: 54.3 SEC (ref 12–14.6)
RBC # BLD AUTO: 3 M/UL (ref 4.7–6.1)
SODIUM SERPL-SCNC: 134 MMOL/L (ref 135–145)
SODIUM SERPL-SCNC: 136 MMOL/L (ref 135–145)
TROPONIN T SERPL-MCNC: 68 NG/L (ref 6–19)
WBC # BLD AUTO: 12.6 K/UL (ref 4.8–10.8)

## 2020-09-30 PROCEDURE — 700102 HCHG RX REV CODE 250 W/ 637 OVERRIDE(OP): Performed by: INTERNAL MEDICINE

## 2020-09-30 PROCEDURE — 93306 TTE W/DOPPLER COMPLETE: CPT

## 2020-09-30 PROCEDURE — A9270 NON-COVERED ITEM OR SERVICE: HCPCS | Performed by: INTERNAL MEDICINE

## 2020-09-30 PROCEDURE — 770020 HCHG ROOM/CARE - TELE (206)

## 2020-09-30 PROCEDURE — 700111 HCHG RX REV CODE 636 W/ 250 OVERRIDE (IP)

## 2020-09-30 PROCEDURE — 84484 ASSAY OF TROPONIN QUANT: CPT

## 2020-09-30 PROCEDURE — 5A1D70Z PERFORMANCE OF URINARY FILTRATION, INTERMITTENT, LESS THAN 6 HOURS PER DAY: ICD-10-PCS | Performed by: INTERNAL MEDICINE

## 2020-09-30 PROCEDURE — 93306 TTE W/DOPPLER COMPLETE: CPT | Mod: 26 | Performed by: INTERNAL MEDICINE

## 2020-09-30 PROCEDURE — 83735 ASSAY OF MAGNESIUM: CPT

## 2020-09-30 PROCEDURE — 700111 HCHG RX REV CODE 636 W/ 250 OVERRIDE (IP): Performed by: INTERNAL MEDICINE

## 2020-09-30 PROCEDURE — 90935 HEMODIALYSIS ONE EVALUATION: CPT

## 2020-09-30 PROCEDURE — 36415 COLL VENOUS BLD VENIPUNCTURE: CPT

## 2020-09-30 PROCEDURE — 85025 COMPLETE CBC W/AUTO DIFF WBC: CPT

## 2020-09-30 PROCEDURE — 93005 ELECTROCARDIOGRAM TRACING: CPT | Performed by: INTERNAL MEDICINE

## 2020-09-30 PROCEDURE — 87040 BLOOD CULTURE FOR BACTERIA: CPT | Mod: 91

## 2020-09-30 PROCEDURE — 90935 HEMODIALYSIS ONE EVALUATION: CPT | Performed by: INTERNAL MEDICINE

## 2020-09-30 PROCEDURE — 80048 BASIC METABOLIC PNL TOTAL CA: CPT | Mod: 91

## 2020-09-30 PROCEDURE — 85610 PROTHROMBIN TIME: CPT

## 2020-09-30 PROCEDURE — 700105 HCHG RX REV CODE 258: Performed by: INTERNAL MEDICINE

## 2020-09-30 PROCEDURE — 99233 SBSQ HOSP IP/OBS HIGH 50: CPT | Performed by: INTERNAL MEDICINE

## 2020-09-30 RX ORDER — HEPARIN SODIUM 1000 [USP'U]/ML
1500 INJECTION, SOLUTION INTRAVENOUS; SUBCUTANEOUS
Status: DISCONTINUED | OUTPATIENT
Start: 2020-09-30 | End: 2020-10-12 | Stop reason: HOSPADM

## 2020-09-30 RX ORDER — HEPARIN SODIUM 1000 [USP'U]/ML
INJECTION, SOLUTION INTRAVENOUS; SUBCUTANEOUS
Status: COMPLETED
Start: 2020-09-30 | End: 2020-09-30

## 2020-09-30 RX ORDER — OXYCODONE HYDROCHLORIDE 5 MG/1
5 TABLET ORAL ONCE
Status: COMPLETED | OUTPATIENT
Start: 2020-09-30 | End: 2020-09-30

## 2020-09-30 RX ORDER — OXYCODONE HYDROCHLORIDE 5 MG/1
5 TABLET ORAL EVERY 6 HOURS PRN
Status: DISCONTINUED | OUTPATIENT
Start: 2020-09-30 | End: 2020-10-12 | Stop reason: HOSPADM

## 2020-09-30 RX ADMIN — ISOSORBIDE MONONITRATE 60 MG: 60 TABLET, EXTENDED RELEASE ORAL at 04:05

## 2020-09-30 RX ADMIN — Medication 100 MG: at 04:05

## 2020-09-30 RX ADMIN — FOLIC ACID 1 MG: 1 TABLET ORAL at 04:05

## 2020-09-30 RX ADMIN — ATORVASTATIN CALCIUM 40 MG: 40 TABLET, FILM COATED ORAL at 17:36

## 2020-09-30 RX ADMIN — FUROSEMIDE 40 MG: 40 TABLET ORAL at 04:05

## 2020-09-30 RX ADMIN — METOPROLOL SUCCINATE 25 MG: 25 TABLET, EXTENDED RELEASE ORAL at 04:05

## 2020-09-30 RX ADMIN — HEPARIN SODIUM 1500 UNITS: 1000 INJECTION, SOLUTION INTRAVENOUS; SUBCUTANEOUS at 13:05

## 2020-09-30 RX ADMIN — AMIODARONE HYDROCHLORIDE 200 MG: 200 TABLET ORAL at 04:05

## 2020-09-30 RX ADMIN — SODIUM CHLORIDE 3 G: 900 INJECTION INTRAVENOUS at 03:54

## 2020-09-30 RX ADMIN — LOSARTAN POTASSIUM 25 MG: 50 TABLET, FILM COATED ORAL at 17:36

## 2020-09-30 RX ADMIN — AZITHROMYCIN 500 MG: 250 TABLET, FILM COATED ORAL at 03:55

## 2020-09-30 RX ADMIN — OXYCODONE HYDROCHLORIDE 5 MG: 5 TABLET ORAL at 20:32

## 2020-09-30 RX ADMIN — DOCUSATE SODIUM 50 MG AND SENNOSIDES 8.6 MG 2 TABLET: 8.6; 5 TABLET, FILM COATED ORAL at 04:05

## 2020-09-30 RX ADMIN — OXYCODONE HYDROCHLORIDE 5 MG: 5 TABLET ORAL at 12:18

## 2020-09-30 ASSESSMENT — ENCOUNTER SYMPTOMS
PALPITATIONS: 0
CONSTIPATION: 0
DIZZINESS: 0
NERVOUS/ANXIOUS: 0
DIARRHEA: 0
FEVER: 0
DEPRESSION: 0
VOMITING: 0
CHILLS: 0
MEMORY LOSS: 0
BACK PAIN: 0
NAUSEA: 0
FOCAL WEAKNESS: 0
MYALGIAS: 1
HEADACHES: 0
DIAPHORESIS: 0
WHEEZING: 0
ABDOMINAL PAIN: 0
SHORTNESS OF BREATH: 0
WEAKNESS: 0
FLANK PAIN: 0
COUGH: 0

## 2020-09-30 ASSESSMENT — PAIN DESCRIPTION - PAIN TYPE
TYPE: ACUTE PAIN

## 2020-09-30 NOTE — CONSULTS
DATE OF SERVICE:  09/29/2020    NEPHROLOGY CONSULTATION    CONSULT AT THE REQUEST OF:  Matthieu Child MD    REASON FOR CONSULTATION:  To evaluate and provide dialysis for patient with   end-stage renal disease, admitted with chest pain.    HISTORY OF PRESENT ILLNESS:  The patient is a 50-year-old male with end-stage   renal disease, on hemodialysis, who has been receiving his dialysis treatments   in [facility] on Monday, Wednesday, and Friday.  Last   dialysis completed yesterday without complications.  Admitted to the hospital   with severe left-sided chest pain radiating to right side and left shoulder,   worsening with breathing, coughing and movements.  No fever or chills, no   cough, no hemoptysis.  No sick contact.  No nausea or vomiting.    REVIEW OF SYSTEMS:  GENERAL:  No fatigue.  No fever or chills.  HEENT:  No sinus pain, no nosebleeds, no sore throat.  NECK:  No pain, no stiffness.  RESPIRATORY:  No hemoptysis, cough, mild shortness of breath related to chest   pain.  CARDIOVASCULAR:  No palpitations, no edema, no dyspnea.  GASTROINTESTINAL:  No abdominal pain, no nausea or vomiting.  All other systems reviewed, all negative.    PAST MEDICAL HISTORY:  End-stage renal disease, on hemodialysis, CHF, atrial   fibrillation, valvular heart disease.    PAST SURGICAL HISTORY:  Dialysis catheter placement and removal, AV fistula   creation.    FAMILY HISTORY:  No history of kidney disease.    ALLERGIES:  No known drug allergies.    OUTPATIENT MEDICATIONS:  Reviewed.    PHYSICAL EXAMINATION:  VITAL SIGNS:  134/68, pulse 74, temperature 36.3 Celsius.  GENERAL APPEARANCE:  Well-developed, well-nourished male in no acute distress.  HEENT:  Normocephalic, atraumatic.  Pupils equal, round, reactive to light.    Extraocular movement intact.  Nares patent.  Oropharynx clear, moist mucosa.    No erythema or exudate.  NECK:  No pain, no stiffness.  LUNGS:  Clear to auscultation bilaterally.  No  wheezes.  No rhonchi.  HEART:  Regular rate.  No rub or gallop.  ABDOMEN:  Soft, nontender, and nondistended.  Bowel sounds present.  EXTREMITIES:  No cyanosis, no edema, AV fistula with good trail.  MUSCULOSKELETAL:  There is tenderness to palpation with third, fourth   intercostal space of the chest wall.  NEUROLOGIC:  Alert and oriented x3, no focal deficit.  Cranial nerves II-XII   grossly intact.    LABORATORY RESULTS:  Reviewed, revealed hemoglobin level 9.5.  Sodium 134,   potassium 4.5, CO2 of 26, BUN 47, creatinine 8.3.    ASSESSMENT AND PLAN:  The patient a 50-year-old male with end-stage renal   disease, on hemodialysis, admitted with chest pain.  1.  End-stage renal disease.  We will continue dialysis per patient's schedule   Monday, Wednesday, Friday.  2.  Electrolytes remains well controlled.  3.  Volume.  We will ultrafiltrate with hemodialysis as blood pressure   tolerates.  4.  Hypertension.  Blood pressure remains well controlled.  5.  Anemia, to monitor hemoglobin level.  To add Epogen with dialysis   treatments.  6.  Chest pain, likely musculoskeletal in origin.    RECOMMENDATIONS:  1.  Hemodialysis Monday, Wednesday, and Friday.  To monitor basic metabolic   panel, hemoglobin level.  2.  Provide renal diet.  3.  Adjust all medications to renal doses.    Thank you for the consult.       ____________________________________     MD ELROY MULLER / RAQUEL    DD:  09/29/2020 15:30:22  DT:  09/29/2020 19:54:18    D#:  [id number]  Job#:  [id number]

## 2020-09-30 NOTE — CARE PLAN
Problem: Communication  Goal: The ability to communicate needs accurately and effectively will improve  Outcome: PROGRESSING AS EXPECTED  Note: Here for chest pain. A&O4, Italian speaking only. All needs met. Patient resting comfortably in bed with call light within reach.     Problem: Safety  Goal: Will remain free from falls  Outcome: PROGRESSING AS EXPECTED  Note: Here for chest pain. Bed is locked in lowest position with bed alarm on. Upper side rails up. Bed alarm is on. Personal belongings and call light is within reach. Uses call light appropriately.

## 2020-09-30 NOTE — ASSESSMENT & PLAN NOTE
one of two sets on 9/29 positive for MSSA, repeat sets on 9/30 NG, 10/5 NGTD  10/5  negative for both bottles; 10/6 negative  - No vegetation reported on TTE  - Patient remains afebrile  - Please notify nephrology when patient is close to discharge so we can arrange cefazolin with dialysis 2 g after dialysis on Tuesdays and Thursdays, 3 g after dialysis on Saturday, with a stop date of October 28, 2020.

## 2020-09-30 NOTE — CARE PLAN
Problem: Communication  Goal: The ability to communicate needs accurately and effectively will improve  Outcome: PROGRESSING AS EXPECTED     Problem: Safety  Goal: Will remain free from falls  Outcome: PROGRESSING AS EXPECTED     Problem: Pain Management  Goal: Pain level will decrease to patient's comfort goal  Outcome: PROGRESSING AS EXPECTED

## 2020-09-30 NOTE — PROGRESS NOTES
Ed from Lab called with critical result of Gram positive staph, methicillin sensitive in the blood. Critical lab result read back to Ed.   Dr. Vasquez notified of critical lab result at Gram positive staph, methicillin sensitive in the blood.  Critical lab result read back by Dr. Vasquez.    No new orders.

## 2020-09-30 NOTE — PROGRESS NOTES
Nephrology/Hemodialysis note    Patient with ESRD/HD admitted with chest pain    Seen and examined during dialysis  Tolerates well  VS stable  Lab results reviewed  Please see dialysis flow sheet for details

## 2020-09-30 NOTE — PROGRESS NOTES
Inpatient Anticoagulation Service Note    Date: 9/30/2020    Reason for Anticoagulation: Atrial Fibrillation   Target INR: 2.0 to 3.0  [id number] VASc Score: 2  HAS-BLED Score: 1   Hemoglobin Value: (!) 9.9  Hematocrit Value: (!) 30.9  Lab Platelet Value: 255    INR from last 7 days     Date/Time INR Value    09/30/20 0832  (!) 5.86    09/29/20 0215  (!) 3.08        Dose from last 7 days     Date/Time Dose (mg)    09/30/20 1342  0    09/29/20 1038  4    09/29/20 1008  2        Average Dose (mg): (6mg on Tu/Thurs and 4mg ROW)  Significant Interactions: Antibiotics, Amiodarone, Statin  Bridge Therapy: No (If less than 5 days and overlap therapy discontinued -- document reason (i.e. Bleed Risk))    (If still on overlap therapy, if No -- document reason (i.e. Bleed Risk))    Reversal Agent Administered: Not Applicable  Comments: Significant increase in INR overnigght. INR 5.86, goal 2-3. No overt s/sx of bleeding noted. New DDIs noted with antibiotics which may be responsible for increase in patient's INR. Given patient's INR is supratherapeutic, plan to hold warfarin and repeat INR in AM.    Plan:  HOLD warfarin, repeat INR in AM  Education Material Provided?: No  Pharmacist suggested discharge dosing: HOLD warfarin X1 dose, repeat INR w/i 24 hours of discharge     Mackenzie Akhtar, PharmD, BCPS

## 2020-09-30 NOTE — HEART FAILURE PROGRAM
"Cardiovascular Nurse Navigator () Advanced Heart Failure Program Inpatient Progress Note:    Per H&P this patient's HFpEF (followed at HF clinic) is not acutely decompensated. Therefore, a seven day HF f/u appt is not currently indicated. Nor does the HF discharge checklist need to be used.    Patient is being primarily treated for Pneumonia.    Should clinical status change to acute HF, patient will require a seven calendar day f/u appt which can be achieved by placing a \"schedule heart failure follow up appointment\" order per protocol or by calling the hospital schedulers at [phone].    Thank you, Lazara, Cardio RN Navigator [phone]        "

## 2020-09-30 NOTE — PROGRESS NOTES
[facility] Medicine Daily Progress Note    Date of Service  9/30/2020    Chief Complaint  50 y.o. male admitted 9/29/2020 with chest pain .    Hospital Course    Admitted with chest pain, pneumonia, elevated troponin.  Known history of paroxysmal atrial fibrillation, on anticoagulation with Coumadin.  D-dimer is negative.  Known history of ESRD, consult placed to nephrology for HD MWF.       Interval Problem Update    Patient reports left shoulder pain worse with movement, reporting pain of 9/10  No chest pain or shortness of breath or nausea or vomiting  Denies abdominal pain  Appears comfortable on exam  INR is therapeutic    Consultants/Specialty  None    Code Status  Full Code    Disposition  To home in 3 to 4 days with clinical improvement  PT OT eval    Review of Systems  Review of Systems   Constitutional: Negative for chills, diaphoresis, fever and malaise/fatigue.   HENT: Negative for congestion and hearing loss.    Respiratory: Negative for cough, shortness of breath and wheezing.    Cardiovascular: Negative for palpitations and leg swelling.   Gastrointestinal: Negative for abdominal pain, constipation, diarrhea, nausea and vomiting.   Genitourinary: Negative for dysuria and flank pain.   Musculoskeletal: Positive for joint pain and myalgias. Negative for back pain.   Neurological: Negative for dizziness, focal weakness, weakness and headaches.   Psychiatric/Behavioral: Negative for depression and memory loss. The patient is not nervous/anxious.         Physical Exam  Temp:  [36.2 °C (97.2 °F)-37.1 °C (98.7 °F)] 36.2 °C (97.2 °F)  Pulse:  [54-63] 54  Resp:  [16-18] 16  BP: (110-134)/(54-67) 116/64  SpO2:  [94 %-96 %] 95 %    Physical Exam  Vitals signs and nursing note reviewed.   Constitutional:       General: He is not in acute distress.     Appearance: He is not ill-appearing or diaphoretic.   HENT:      Head: Normocephalic and atraumatic.      Nose: Nose normal.   Eyes:      General:         Right eye:  No discharge.         Left eye: No discharge.      Pupils: Pupils are equal, round, and reactive to light.   Neck:      Musculoskeletal: Neck supple.      Thyroid: No thyromegaly.      Vascular: No JVD.   Cardiovascular:      Rate and Rhythm: Normal rate.      Heart sounds: No murmur.   Pulmonary:      Effort: No respiratory distress.      Breath sounds: Normal breath sounds. No wheezing.   Abdominal:      General: Bowel sounds are normal. There is no distension.      Palpations: Abdomen is soft.   Musculoskeletal:         General: No swelling or tenderness.      Comments: Decreased ROM L shoulder, ttp   Skin:     General: Skin is warm and dry.      Coloration: Skin is not pale.      Findings: No erythema or rash.   Neurological:      Mental Status: He is alert and oriented to person, place, and time.      Cranial Nerves: No cranial nerve deficit.      Sensory: No sensory deficit.      Motor: Weakness present.      Coordination: Coordination normal.   Psychiatric:         Behavior: Behavior normal.         Thought Content: Thought content normal.         Fluids  No intake or output data in the 24 hours ending 09/30/20 1320    Laboratory  Recent Labs     09/29/20 0215 09/30/20  0832   WBC 15.3* 12.6*   RBC 2.87* 3.00*   HEMOGLOBIN 9.5* 9.9*   HEMATOCRIT 29.6* 30.9*   .1* 103.0*   MCH 33.1* 33.0   MCHC 32.1* 32.0*   RDW 53.9* 53.9*   PLATELETCT 233 255   MPV 10.2 10.1     Recent Labs     09/29/20 0215 09/30/20  0832   SODIUM 134* 134*   POTASSIUM 4.5 4.7   CHLORIDE 92* 92*   CO2 26 24   GLUCOSE 108* 155*   BUN 47* 68*   CREATININE 8.32* 11.50*   CALCIUM 9.1 8.6     Recent Labs     09/29/20 0215 09/30/20  0832   INR 3.08* 5.86*               Imaging  EC-ECHOCARDIOGRAM COMPLETE W/O CONT   Final Result      CT-CHEST (THORAX) W/O   Final Result         1.  Left basilar atelectasis, component of infiltrate not definitively excluded.   2.  Atherosclerosis and atherosclerotic coronary artery disease   3.   Cardiomegaly   4.  Hepatomegaly      DX-CHEST-PORTABLE (1 VIEW)   Final Result         1.  Left basilar atelectasis, no focal infiltrate   2.  Cardiomegaly           Assessment/Plan  PAF (paroxysmal atrial fibrillation) (HCC)- (present on admission)  Assessment & Plan  -NSR, on amio and coumadin, continue both, F/U INR    ACC/AHA stage C systolic heart failure (HCC)- (present on admission)  Assessment & Plan  -volume status is decent  -Hd per nephro  -continue outpatient cardiac medications including oral lasix    Acute pain of left shoulder  Assessment & Plan  MSK, avoid nsaids d/t ESRD  - trial of oxycodone, and lidocaine patch  - CXR, no bony abnormality  - pt/ot     Bacteremia  Assessment & Plan  1/2 bottles f/u sens  Repeat blood cx  On unasyn    Other chest pain- (present on admission)  Assessment & Plan  -no acute changes on 12-lead EKG, pleuritic in nature, initial trop around 70 but in the context in ESRD  - trops 70  -no additional medications at this time  -CT thorax without contrast mostly unrevealing except for possible L sided basilar PNA  -see below  -inr therapeutic, cont coumadin  -telemetry    9/30 echo pending    CAP (community acquired pneumonia)- (present on admission)  Assessment & Plan  -presumed  -procal elevated, 2.2  -empiric IV unasyn and oral azithromycin  -trend WBC/fever curve  - blood cx 1/2, GPC(mssa)   and sputum cultures    ESRD (end stage renal disease) on dialysis (HCC)- (present on admission)  Assessment & Plan  -dialyzed M/W/F  -lytes are ok right now  -consult renal in the AM    Essential hypertension- (present on admission)  Assessment & Plan  -continue outpatient medications       VTE prophylaxis: Coumadin

## 2020-10-01 LAB
ANION GAP SERPL CALC-SCNC: 16 MMOL/L (ref 7–16)
BASOPHILS # BLD AUTO: 0.2 % (ref 0–1.8)
BASOPHILS # BLD: 0.02 K/UL (ref 0–0.12)
BUN SERPL-MCNC: 38 MG/DL (ref 8–22)
CALCIUM SERPL-MCNC: 8.8 MG/DL (ref 8.5–10.5)
CHLORIDE SERPL-SCNC: 93 MMOL/L (ref 96–112)
CO2 SERPL-SCNC: 27 MMOL/L (ref 20–33)
CREAT SERPL-MCNC: 6.85 MG/DL (ref 0.5–1.4)
D DIMER PPP IA.FEU-MCNC: 0.63 UG/ML (FEU) (ref 0–0.5)
EKG IMPRESSION: NORMAL
EKG IMPRESSION: NORMAL
EOSINOPHIL # BLD AUTO: 0.33 K/UL (ref 0–0.51)
EOSINOPHIL NFR BLD: 3.1 % (ref 0–6.9)
ERYTHROCYTE [DISTWIDTH] IN BLOOD BY AUTOMATED COUNT: 54.9 FL (ref 35.9–50)
GLUCOSE SERPL-MCNC: 98 MG/DL (ref 65–99)
HCT VFR BLD AUTO: 32 % (ref 42–52)
HGB BLD-MCNC: 10.2 G/DL (ref 14–18)
IMM GRANULOCYTES # BLD AUTO: 0.05 K/UL (ref 0–0.11)
IMM GRANULOCYTES NFR BLD AUTO: 0.5 % (ref 0–0.9)
INR PPP: 4.83 (ref 0.87–1.13)
LYMPHOCYTES # BLD AUTO: 0.97 K/UL (ref 1–4.8)
LYMPHOCYTES NFR BLD: 9.2 % (ref 22–41)
MCH RBC QN AUTO: 32.7 PG (ref 27–33)
MCHC RBC AUTO-ENTMCNC: 31.9 G/DL (ref 33.7–35.3)
MCV RBC AUTO: 102.6 FL (ref 81.4–97.8)
MONOCYTES # BLD AUTO: 0.89 K/UL (ref 0–0.85)
MONOCYTES NFR BLD AUTO: 8.5 % (ref 0–13.4)
NEUTROPHILS # BLD AUTO: 8.23 K/UL (ref 1.82–7.42)
NEUTROPHILS NFR BLD: 78.5 % (ref 44–72)
NRBC # BLD AUTO: 0 K/UL
NRBC BLD-RTO: 0 /100 WBC
PLATELET # BLD AUTO: 266 K/UL (ref 164–446)
PMV BLD AUTO: 10 FL (ref 9–12.9)
POTASSIUM SERPL-SCNC: 4.6 MMOL/L (ref 3.6–5.5)
PROTHROMBIN TIME: 46.6 SEC (ref 12–14.6)
RBC # BLD AUTO: 3.12 M/UL (ref 4.7–6.1)
SODIUM SERPL-SCNC: 136 MMOL/L (ref 135–145)
TROPONIN T SERPL-MCNC: 74 NG/L (ref 6–19)
WBC # BLD AUTO: 10.5 K/UL (ref 4.8–10.8)

## 2020-10-01 PROCEDURE — 84484 ASSAY OF TROPONIN QUANT: CPT

## 2020-10-01 PROCEDURE — A9270 NON-COVERED ITEM OR SERVICE: HCPCS | Performed by: INTERNAL MEDICINE

## 2020-10-01 PROCEDURE — 85379 FIBRIN DEGRADATION QUANT: CPT

## 2020-10-01 PROCEDURE — 93010 ELECTROCARDIOGRAM REPORT: CPT | Performed by: INTERNAL MEDICINE

## 2020-10-01 PROCEDURE — 99232 SBSQ HOSP IP/OBS MODERATE 35: CPT | Performed by: INTERNAL MEDICINE

## 2020-10-01 PROCEDURE — 80048 BASIC METABOLIC PNL TOTAL CA: CPT

## 2020-10-01 PROCEDURE — 85025 COMPLETE CBC W/AUTO DIFF WBC: CPT

## 2020-10-01 PROCEDURE — 700101 HCHG RX REV CODE 250: Performed by: INTERNAL MEDICINE

## 2020-10-01 PROCEDURE — 700111 HCHG RX REV CODE 636 W/ 250 OVERRIDE (IP): Performed by: INTERNAL MEDICINE

## 2020-10-01 PROCEDURE — 700102 HCHG RX REV CODE 250 W/ 637 OVERRIDE(OP): Performed by: INTERNAL MEDICINE

## 2020-10-01 PROCEDURE — 770020 HCHG ROOM/CARE - TELE (206)

## 2020-10-01 PROCEDURE — 700105 HCHG RX REV CODE 258: Performed by: INTERNAL MEDICINE

## 2020-10-01 PROCEDURE — 93005 ELECTROCARDIOGRAM TRACING: CPT | Performed by: INTERNAL MEDICINE

## 2020-10-01 PROCEDURE — 93010 ELECTROCARDIOGRAM REPORT: CPT | Mod: 77 | Performed by: INTERNAL MEDICINE

## 2020-10-01 PROCEDURE — 85610 PROTHROMBIN TIME: CPT

## 2020-10-01 PROCEDURE — 36415 COLL VENOUS BLD VENIPUNCTURE: CPT

## 2020-10-01 RX ORDER — LIDOCAINE 50 MG/G
1 PATCH TOPICAL EVERY 24 HOURS
Status: DISCONTINUED | OUTPATIENT
Start: 2020-10-01 | End: 2020-10-05

## 2020-10-01 RX ORDER — TRAMADOL HYDROCHLORIDE 50 MG/1
50 TABLET ORAL EVERY 12 HOURS PRN
Status: DISCONTINUED | OUTPATIENT
Start: 2020-10-01 | End: 2020-10-12 | Stop reason: HOSPADM

## 2020-10-01 RX ADMIN — FOLIC ACID 1 MG: 1 TABLET ORAL at 04:21

## 2020-10-01 RX ADMIN — AMIODARONE HYDROCHLORIDE 200 MG: 200 TABLET ORAL at 04:21

## 2020-10-01 RX ADMIN — ATORVASTATIN CALCIUM 40 MG: 40 TABLET, FILM COATED ORAL at 17:08

## 2020-10-01 RX ADMIN — TRAMADOL HYDROCHLORIDE 50 MG: 50 TABLET, FILM COATED ORAL at 17:04

## 2020-10-01 RX ADMIN — LIDOCAINE 1 PATCH: 50 PATCH TOPICAL at 11:32

## 2020-10-01 RX ADMIN — ISOSORBIDE MONONITRATE 60 MG: 60 TABLET, EXTENDED RELEASE ORAL at 06:15

## 2020-10-01 RX ADMIN — METOPROLOL SUCCINATE 25 MG: 25 TABLET, EXTENDED RELEASE ORAL at 06:15

## 2020-10-01 RX ADMIN — DOCUSATE SODIUM 50 MG AND SENNOSIDES 8.6 MG 2 TABLET: 8.6; 5 TABLET, FILM COATED ORAL at 04:21

## 2020-10-01 RX ADMIN — LOSARTAN POTASSIUM 25 MG: 50 TABLET, FILM COATED ORAL at 17:08

## 2020-10-01 RX ADMIN — AZITHROMYCIN 500 MG: 250 TABLET, FILM COATED ORAL at 04:21

## 2020-10-01 RX ADMIN — DOCUSATE SODIUM 50 MG AND SENNOSIDES 8.6 MG 2 TABLET: 8.6; 5 TABLET, FILM COATED ORAL at 17:08

## 2020-10-01 RX ADMIN — ACETAMINOPHEN 650 MG: 325 TABLET, FILM COATED ORAL at 09:28

## 2020-10-01 RX ADMIN — FUROSEMIDE 40 MG: 40 TABLET ORAL at 06:16

## 2020-10-01 RX ADMIN — Medication 100 MG: at 04:21

## 2020-10-01 RX ADMIN — SODIUM CHLORIDE 3 G: 900 INJECTION INTRAVENOUS at 04:21

## 2020-10-01 ASSESSMENT — ENCOUNTER SYMPTOMS
BACK PAIN: 0
MEMORY LOSS: 0
CHILLS: 0
FEVER: 0
PALPITATIONS: 0
COUGH: 0
HEMOPTYSIS: 0
MYALGIAS: 1
CONSTIPATION: 0
VOMITING: 0
HEADACHES: 0
NAUSEA: 0
DIZZINESS: 0
EYES NEGATIVE: 1
WEIGHT LOSS: 0
SHORTNESS OF BREATH: 0
WHEEZING: 0
ORTHOPNEA: 0
FLANK PAIN: 0
NERVOUS/ANXIOUS: 0
WEAKNESS: 0
ABDOMINAL PAIN: 0
SINUS PAIN: 0

## 2020-10-01 ASSESSMENT — PAIN DESCRIPTION - PAIN TYPE
TYPE: ACUTE PAIN

## 2020-10-01 NOTE — PROGRESS NOTES
Monitor summary: SB 55-58, OH 0.18, QRS 0.08, QT 0.42, with rare PVCs and a 1.7 sec pause per strip from monitor room.

## 2020-10-01 NOTE — CARE PLAN
Problem: Infection  Goal: Will remain free from infection  Outcome: PROGRESSING AS EXPECTED  Note: Here for chest pain. VSS, afebrile. Close monitoring of labs ongoing. No s/sx of infection. Skin is intact and patient voids appropriately. Patient kept clean and dry throughout shift.       Problem: Knowledge Deficit  Goal: Knowledge of disease process/condition, treatment plan, diagnostic tests, and medications will improve  Outcome: PROGRESSING AS EXPECTED  Note: Here for chest pain. Patient A&O4, [place] speaking only. POC educated and updated with patient verbalizing understanding. All questions have been answered.      Problem: Pain Management  Goal: Pain level will decrease to patient's comfort goal  Outcome: PROGRESSING AS EXPECTED  Note: Here for chest pain. Complains of chest pain on inhalation. 7/10. Tylenol not effective per patient. PRN Oxycodone given with effective results. Non-pharmacological measures taken to reduce pain with little effect. Will continue to monitor.

## 2020-10-01 NOTE — PROGRESS NOTES
Hospital Medicine Daily Progress Note    Date of Service  10/1/2020    Chief Complaint  50 y.o. male admitted 9/29/2020 with chest pain .    Hospital Course    Admitted with chest pain, pneumonia, elevated troponin.  Known history of paroxysmal atrial fibrillation, on anticoagulation with Coumadin.  D-dimer is negative.  Known history of ESRD, consult placed to nephrology for HD MWF.       Interval Problem Update    Improving left shoulder pain, range of motion  Intermittent arrhythmia, will repeat ventricular tachycardia 6-8 beats, resolved on its own  Denies chest pain or shortness of breath      Consultants/Specialty  None    Code Status  Full Code    Disposition  To home in 3 to 4 days with clinical improvement  PT OT eval    Review of Systems  Review of Systems   Constitutional: Negative for chills, fever and malaise/fatigue.   HENT: Negative for congestion and hearing loss.    Respiratory: Negative for cough and shortness of breath.    Cardiovascular: Negative for palpitations and leg swelling.   Gastrointestinal: Negative for abdominal pain, constipation, nausea and vomiting.   Genitourinary: Negative for dysuria and flank pain.   Musculoskeletal: Positive for joint pain and myalgias. Negative for back pain.   Neurological: Negative for dizziness, weakness and headaches.   Psychiatric/Behavioral: Negative for memory loss. The patient is not nervous/anxious.         Physical Exam  Temp:  [36.2 °C (97.1 °F)-36.8 °C (98.2 °F)] 36.5 °C (97.7 °F)  Pulse:  [] 61  Resp:  [16-18] 18  BP: ()/(53-59) 102/53  SpO2:  [94 %-97 %] 94 %    Physical Exam  Vitals signs and nursing note reviewed.   Constitutional:       General: He is not in acute distress.     Appearance: He is not ill-appearing.   HENT:      Head: Normocephalic and atraumatic.      Nose: Nose normal.   Eyes:      Extraocular Movements: Extraocular movements intact.      Pupils: Pupils are equal, round, and reactive to light.   Neck:       Musculoskeletal: Neck supple.      Thyroid: No thyromegaly.      Vascular: No JVD.   Cardiovascular:      Rate and Rhythm: Normal rate.      Heart sounds: No murmur.   Pulmonary:      Effort: No respiratory distress.      Breath sounds: Normal breath sounds.   Abdominal:      General: Bowel sounds are normal. There is no distension.      Palpations: Abdomen is soft.   Musculoskeletal:         General: No swelling or tenderness.      Comments: Decreased ROM L shoulder, ttp   Skin:     General: Skin is warm and dry.      Coloration: Skin is not jaundiced or pale.      Findings: No rash.   Neurological:      Mental Status: He is alert and oriented to person, place, and time.      Cranial Nerves: No cranial nerve deficit.      Sensory: No sensory deficit.      Motor: Weakness present.      Coordination: Coordination normal.   Psychiatric:         Behavior: Behavior normal.         Thought Content: Thought content normal.         Fluids    Intake/Output Summary (Last 24 hours) at 10/1/2020 1417  Last data filed at 10/1/2020 0600  Gross per 24 hour   Intake 860 ml   Output 2500 ml   Net -1640 ml       Laboratory  Recent Labs     09/29/20 0215 09/30/20  0832 10/01/20  0051   WBC 15.3* 12.6* 10.5   RBC 2.87* 3.00* 3.12*   HEMOGLOBIN 9.5* 9.9* 10.2*   HEMATOCRIT 29.6* 30.9* 32.0*   .1* 103.0* 102.6*   MCH 33.1* 33.0 32.7   MCHC 32.1* 32.0* 31.9*   RDW 53.9* 53.9* 54.9*   PLATELETCT 233 255 266   MPV 10.2 10.1 10.0     Recent Labs     09/30/20  0832 09/30/20  1904 10/01/20  0051   SODIUM 134* 136 136   POTASSIUM 4.7 4.3 4.6   CHLORIDE 92* 93* 93*   CO2 24 28 27   GLUCOSE 155* 131* 98   BUN 68* 33* 38*   CREATININE 11.50* 6.38* 6.85*   CALCIUM 8.6 8.7 8.8     Recent Labs     09/29/20 0215 09/30/20  0832 10/01/20  0051   INR 3.08* 5.86* 4.83*               Imaging  EC-ECHOCARDIOGRAM COMPLETE W/O CONT   Final Result      CT-CHEST (THORAX) W/O   Final Result         1.  Left basilar atelectasis, component of infiltrate  not definitively excluded.   2.  Atherosclerosis and atherosclerotic coronary artery disease   3.  Cardiomegaly   4.  Hepatomegaly      DX-CHEST-PORTABLE (1 VIEW)   Final Result         1.  Left basilar atelectasis, no focal infiltrate   2.  Cardiomegaly           Assessment/Plan  PAF (paroxysmal atrial fibrillation) (HCC)- (present on admission)  Assessment & Plan  -NSR, on amio and coumadin, continue both, F/U INR    ACC/AHA stage C systolic heart failure (HCC)- (present on admission)  Assessment & Plan  -volume status is decent  -Hd per nephro  -continue outpatient cardiac medications including oral lasix    Acute pain of left shoulder  Assessment & Plan  MSK, avoid nsaids d/t ESRD  - trial of oxycodone, and lidocaine patch  - CXR, no bony abnormality  - pt/ot     10/1 improving pain control, will add tramadol  PT/OT evaluation    Bacteremia  Assessment & Plan  1/2 bottles f/u sens  Repeat blood cx  On unasyn    10/1  1 of 2 bottles positive for MSSa  Repeat blood cultures now negative    Other chest pain- (present on admission)  Assessment & Plan  -no acute changes on 12-lead EKG, pleuritic in nature, initial trop around 70 but in the context in ESRD  - trops 70  -no additional medications at this time  -CT thorax without contrast mostly unrevealing except for possible L sided basilar PNA  -see below  -inr therapeutic, cont coumadin  -telemetry    9/30 echo pending    10/1 ECHO ef 70%  Intermittent arrhythmia, troponin trending down  Patient denies chest pain  We will continue monitoring    CAP (community acquired pneumonia)- (present on admission)  Assessment & Plan  -presumed  -procal elevated, 2.2  -empiric IV unasyn and oral azithromycin  -trend WBC/fever curve  - blood cx 1/2, GPC(mssa)   and sputum cultures    ESRD (end stage renal disease) on dialysis (HCC)- (present on admission)  Assessment & Plan  -dialyzed M/W/F  -lytes are ok right now  -consult renal in the AM    Essential hypertension- (present on  admission)  Assessment & Plan  -continue outpatient medications       VTE prophylaxis: Coumadin

## 2020-10-01 NOTE — PROGRESS NOTES
0400 BP 89/57,  in a fib. Gave Amiodarone and held other BP/diruetic medications. Paged KEILY Carter, he said wait an hour and recheck and then give Metoprolol if it's not too low because if it's held, can cause rebound tachycardia. Patient has since converted back to SR around 0440.

## 2020-10-01 NOTE — PROGRESS NOTES
Received call from monitor room pt was tachy 115 Dr. JANIYA Peacock notified-pt then had 4-5 beats of Vtach- pt was complaining of chest pain when he breathes or talks-vitals were BP 94/62  97% room air -pt converted into Afib/flutter-STAT EKG as Troponin were ordered.   Call back from monitor room pt converted back into SR 72-MD notified.

## 2020-10-01 NOTE — PROGRESS NOTES
At around 1400, RN received call from tele monitor reporting that Pt has converted to afib. Pt stable, asymptomatic, resting in bed, no s/s of distress.   1430: paged MD to notify of change of rhythm.   1452: RN received another call that Pt had a second degree type 1 PAC with 2 dropped beats.   1500: MD returned page, notified of change and EKG and troponin ordered.   1508: Tele monitor called, Pt back in sinus rhythm.       Pending troponin and EKG.       Monitor Summary: SB and converted to afib during shift. See note above. HR , VA .16, QRS .08, QT .48 with 2nd degree type 1 PAC with 2 missed beats per strip from monitor room.

## 2020-10-01 NOTE — PROGRESS NOTES
3hr HD started @ 1308 and completed @ 1608,tolerated 2000ml net UF,soft bp during tx,asymptomatic.LFAAVF + B/T,cannulation sites covered with DD,CDI,report given to Felisa Nieto RN.

## 2020-10-01 NOTE — PROGRESS NOTES
Nephrology Daily Progress Note    Date of Service  10/1/2020    Chief Complaint  50 y.o. male with ESRD/HD admitted 9/29/2020 with CP    Interval Problem Update  10/1 -doing better, no acute events  HD MWF    Review of Systems  Review of Systems   Constitutional: Negative for chills, fever, malaise/fatigue and weight loss.   HENT: Negative for congestion, hearing loss and sinus pain.    Eyes: Negative.    Respiratory: Negative for cough, hemoptysis, shortness of breath and wheezing.    Cardiovascular: Negative for chest pain, palpitations, orthopnea and leg swelling.   Gastrointestinal: Negative for abdominal pain, nausea and vomiting.   Skin: Negative.    All other systems reviewed and are negative.       Physical Exam  Temp:  [36.2 °C (97.1 °F)-36.8 °C (98.2 °F)] 36.5 °C (97.7 °F)  Pulse:  [] 61  Resp:  [16-18] 18  BP: ()/(53-59) 102/53  SpO2:  [94 %-97 %] 94 %    Physical Exam  Vitals signs and nursing note reviewed.   Constitutional:       General: He is not in acute distress.     Appearance: Normal appearance. He is well-developed. He is not diaphoretic.   HENT:      Head: Normocephalic and atraumatic.      Nose: Nose normal.   Eyes:      General: No scleral icterus.     Extraocular Movements: Extraocular movements intact.      Conjunctiva/sclera: Conjunctivae normal.      Pupils: Pupils are equal, round, and reactive to light.   Neck:      Musculoskeletal: Normal range of motion and neck supple.   Cardiovascular:      Rate and Rhythm: Normal rate and regular rhythm.   Pulmonary:      Effort: Pulmonary effort is normal. No respiratory distress.      Breath sounds: Normal breath sounds. No wheezing, rhonchi or rales.   Abdominal:      General: Bowel sounds are normal. There is no distension.      Palpations: Abdomen is soft.      Tenderness: There is no abdominal tenderness.   Musculoskeletal:      Right lower leg: No edema.      Left lower leg: No edema.   Skin:     General: Skin is warm.       Findings: No erythema or rash.   Neurological:      Mental Status: He is alert and oriented to person, place, and time.      Cranial Nerves: No cranial nerve deficit.      Coordination: Coordination normal.         Fluids    Intake/Output Summary (Last 24 hours) at 10/1/2020 1333  Last data filed at 10/1/2020 0600  Gross per 24 hour   Intake 860 ml   Output 2500 ml   Net -1640 ml       Laboratory  Recent Labs     09/29/20 0215 09/30/20  0832 10/01/20  0051   WBC 15.3* 12.6* 10.5   RBC 2.87* 3.00* 3.12*   HEMOGLOBIN 9.5* 9.9* 10.2*   HEMATOCRIT 29.6* 30.9* 32.0*   .1* 103.0* 102.6*   MCH 33.1* 33.0 32.7   MCHC 32.1* 32.0* 31.9*   RDW 53.9* 53.9* 54.9*   PLATELETCT 233 255 266   MPV 10.2 10.1 10.0     Recent Labs     09/30/20  0832 09/30/20  1904 10/01/20  0051   SODIUM 134* 136 136   POTASSIUM 4.7 4.3 4.6   CHLORIDE 92* 93* 93*   CO2 24 28 27   GLUCOSE 155* 131* 98   BUN 68* 33* 38*   CREATININE 11.50* 6.38* 6.85*   CALCIUM 8.6 8.7 8.8     Recent Labs     09/29/20 0215 09/30/20  0832 10/01/20  0051   INR 3.08* 5.86* 4.83*     Recent Labs     09/29/20 0215   NTPROBNP [id number]*           Imaging  reveiwed    Assessment/Plan  1.ESRD/HD -on MWF schedule  2.HTN: BP well controlled, Pt advised to monitor BP at home  3.Electrolytes: well controlled.  4.Anemia: Hb stable  5.Volume: well controlled with UF/HD    Recs; no need for emergent dialysis today             HD MWF             Renal diet             All meds to renal doses

## 2020-10-01 NOTE — PROGRESS NOTES
Monitor Summary: SR 65 - atrial fib 118, DC -.20, QRS -.08, QT -.42, with rare PVC's per strip from monitor room.

## 2020-10-01 NOTE — PROGRESS NOTES
Inpatient Anticoagulation Service Note    Date: 10/1/2020    Reason for Anticoagulation: Atrial Fibrillation   Target INR: 2.0 to 3.0  [id number] VASc Score: 2  HAS-BLED Score: 1   Hemoglobin Value: (!) 10.2  Hematocrit Value: (!) 32  Lab Platelet Value: 266    INR from last 7 days     Date/Time INR Value    10/01/20 0051  (!) 4.83    09/30/20 0832  (!) 5.86    09/29/20 0215  (!) 3.08        Dose from last 7 days     Date/Time Dose (mg)    10/01/20 1347  0    09/30/20 1342  0    09/29/20 1038  4    09/29/20 1008  2        Average Dose (mg): (6mg on Tu/Thurs and 4mg ROW)  Significant Interactions: Antibiotics, Amiodarone, Statin  Bridge Therapy: No (If less than 5 days and overlap therapy discontinued -- document reason (i.e. Bleed Risk))    (If still on overlap therapy, if No -- document reason (i.e. Bleed Risk))    Reversal Agent Administered: Not Applicable  Comments: INR trending down to 4.83, goal 2-3. H/h stable. No new DDI noted. No overt s/sx of bleeding noted in chart. Will hold dose of warfarin and plan to repeat INR in AM. Pharmaacy will continue to monitor.    Plan:  HOLD warfarin, repeat INR in AM  Education Material Provided?: No(Established warfarin patient)  Pharmacist suggested discharge dosing: Pharmacist suggested discharge dosing: HOLD warfarin X1 dose, repeat INR w/i 24 hours of discharge     Mackenzie Akhtar, PharmD, BCPS

## 2020-10-02 PROBLEM — I50.20 ACC/AHA STAGE C SYSTOLIC HEART FAILURE (HCC): Chronic | Status: RESOLVED | Noted: 2017-08-10 | Resolved: 2020-10-02

## 2020-10-02 LAB
ANION GAP SERPL CALC-SCNC: 18 MMOL/L (ref 7–16)
ANISOCYTOSIS BLD QL SMEAR: ABNORMAL
BACTERIA BLD CULT: ABNORMAL
BACTERIA BLD CULT: ABNORMAL
BASOPHILS # BLD AUTO: 0.9 % (ref 0–1.8)
BASOPHILS # BLD: 0.08 K/UL (ref 0–0.12)
BUN SERPL-MCNC: 65 MG/DL (ref 8–22)
BURR CELLS BLD QL SMEAR: NORMAL
CALCIUM SERPL-MCNC: 8.6 MG/DL (ref 8.5–10.5)
CHLORIDE SERPL-SCNC: 90 MMOL/L (ref 96–112)
CO2 SERPL-SCNC: 23 MMOL/L (ref 20–33)
CREAT SERPL-MCNC: 10.27 MG/DL (ref 0.5–1.4)
CRP SERPL HS-MCNC: 36.84 MG/DL (ref 0–0.75)
EOSINOPHIL # BLD AUTO: 0.4 K/UL (ref 0–0.51)
EOSINOPHIL NFR BLD: 4.3 % (ref 0–6.9)
ERYTHROCYTE [DISTWIDTH] IN BLOOD BY AUTOMATED COUNT: 54 FL (ref 35.9–50)
ERYTHROCYTE [SEDIMENTATION RATE] IN BLOOD BY WESTERGREN METHOD: 119 MM/HOUR (ref 0–20)
GLUCOSE SERPL-MCNC: 93 MG/DL (ref 65–99)
HCT VFR BLD AUTO: 30 % (ref 42–52)
HGB BLD-MCNC: 9.6 G/DL (ref 14–18)
INR PPP: 5.71 (ref 0.87–1.13)
LYMPHOCYTES # BLD AUTO: 0.82 K/UL (ref 1–4.8)
LYMPHOCYTES NFR BLD: 8.7 % (ref 22–41)
MACROCYTES BLD QL SMEAR: ABNORMAL
MANUAL DIFF BLD: NORMAL
MCH RBC QN AUTO: 33 PG (ref 27–33)
MCHC RBC AUTO-ENTMCNC: 32 G/DL (ref 33.7–35.3)
MCV RBC AUTO: 103.1 FL (ref 81.4–97.8)
MICROCYTES BLD QL SMEAR: ABNORMAL
MONOCYTES # BLD AUTO: 0.9 K/UL (ref 0–0.85)
MONOCYTES NFR BLD AUTO: 9.6 % (ref 0–13.4)
MORPHOLOGY BLD-IMP: NORMAL
NEUTROPHILS # BLD AUTO: 7.19 K/UL (ref 1.82–7.42)
NEUTROPHILS NFR BLD: 76.5 % (ref 44–72)
NRBC # BLD AUTO: 0 K/UL
NRBC BLD-RTO: 0 /100 WBC
OVALOCYTES BLD QL SMEAR: NORMAL
PHOSPHATE SERPL-MCNC: 7.7 MG/DL (ref 2.5–4.5)
PLATELET # BLD AUTO: 266 K/UL (ref 164–446)
PLATELET BLD QL SMEAR: NORMAL
PMV BLD AUTO: 9.7 FL (ref 9–12.9)
POIKILOCYTOSIS BLD QL SMEAR: NORMAL
POTASSIUM SERPL-SCNC: 4.6 MMOL/L (ref 3.6–5.5)
PROTHROMBIN TIME: 53.2 SEC (ref 12–14.6)
RBC # BLD AUTO: 2.91 M/UL (ref 4.7–6.1)
RBC BLD AUTO: PRESENT
SIGNIFICANT IND 70042: ABNORMAL
SITE SITE: ABNORMAL
SODIUM SERPL-SCNC: 131 MMOL/L (ref 135–145)
SOURCE SOURCE: ABNORMAL
WBC # BLD AUTO: 9.4 K/UL (ref 4.8–10.8)

## 2020-10-02 PROCEDURE — 5A1D70Z PERFORMANCE OF URINARY FILTRATION, INTERMITTENT, LESS THAN 6 HOURS PER DAY: ICD-10-PCS | Performed by: INTERNAL MEDICINE

## 2020-10-02 PROCEDURE — 86140 C-REACTIVE PROTEIN: CPT

## 2020-10-02 PROCEDURE — 700105 HCHG RX REV CODE 258: Performed by: INTERNAL MEDICINE

## 2020-10-02 PROCEDURE — 85652 RBC SED RATE AUTOMATED: CPT

## 2020-10-02 PROCEDURE — 90935 HEMODIALYSIS ONE EVALUATION: CPT

## 2020-10-02 PROCEDURE — 700102 HCHG RX REV CODE 250 W/ 637 OVERRIDE(OP): Performed by: INTERNAL MEDICINE

## 2020-10-02 PROCEDURE — 99254 IP/OBS CNSLTJ NEW/EST MOD 60: CPT | Mod: GC | Performed by: INTERNAL MEDICINE

## 2020-10-02 PROCEDURE — 700111 HCHG RX REV CODE 636 W/ 250 OVERRIDE (IP)

## 2020-10-02 PROCEDURE — 85610 PROTHROMBIN TIME: CPT

## 2020-10-02 PROCEDURE — 85007 BL SMEAR W/DIFF WBC COUNT: CPT

## 2020-10-02 PROCEDURE — 36415 COLL VENOUS BLD VENIPUNCTURE: CPT

## 2020-10-02 PROCEDURE — 97165 OT EVAL LOW COMPLEX 30 MIN: CPT

## 2020-10-02 PROCEDURE — 700111 HCHG RX REV CODE 636 W/ 250 OVERRIDE (IP): Performed by: INTERNAL MEDICINE

## 2020-10-02 PROCEDURE — 90935 HEMODIALYSIS ONE EVALUATION: CPT | Performed by: INTERNAL MEDICINE

## 2020-10-02 PROCEDURE — 84100 ASSAY OF PHOSPHORUS: CPT

## 2020-10-02 PROCEDURE — 85027 COMPLETE CBC AUTOMATED: CPT

## 2020-10-02 PROCEDURE — 700101 HCHG RX REV CODE 250: Performed by: INTERNAL MEDICINE

## 2020-10-02 PROCEDURE — 770020 HCHG ROOM/CARE - TELE (206)

## 2020-10-02 PROCEDURE — 99233 SBSQ HOSP IP/OBS HIGH 50: CPT | Performed by: INTERNAL MEDICINE

## 2020-10-02 PROCEDURE — 97161 PT EVAL LOW COMPLEX 20 MIN: CPT

## 2020-10-02 PROCEDURE — A9270 NON-COVERED ITEM OR SERVICE: HCPCS | Performed by: INTERNAL MEDICINE

## 2020-10-02 PROCEDURE — 80048 BASIC METABOLIC PNL TOTAL CA: CPT

## 2020-10-02 RX ORDER — CALCIUM ACETATE 667 MG/1
2001 TABLET ORAL
Status: DISCONTINUED | OUTPATIENT
Start: 2020-10-03 | End: 2020-10-12 | Stop reason: HOSPADM

## 2020-10-02 RX ORDER — HEPARIN SODIUM 1000 [USP'U]/ML
INJECTION, SOLUTION INTRAVENOUS; SUBCUTANEOUS
Status: COMPLETED
Start: 2020-10-02 | End: 2020-10-02

## 2020-10-02 RX ADMIN — ISOSORBIDE MONONITRATE 60 MG: 60 TABLET, EXTENDED RELEASE ORAL at 05:17

## 2020-10-02 RX ADMIN — FUROSEMIDE 40 MG: 40 TABLET ORAL at 05:17

## 2020-10-02 RX ADMIN — FOLIC ACID 1 MG: 1 TABLET ORAL at 05:17

## 2020-10-02 RX ADMIN — HEPARIN SODIUM 1500 UNITS: 1000 INJECTION, SOLUTION INTRAVENOUS; SUBCUTANEOUS at 11:48

## 2020-10-02 RX ADMIN — Medication 100 MG: at 05:17

## 2020-10-02 RX ADMIN — ACETAMINOPHEN 650 MG: 325 TABLET, FILM COATED ORAL at 16:57

## 2020-10-02 RX ADMIN — LIDOCAINE 1 PATCH: 50 PATCH TOPICAL at 10:49

## 2020-10-02 RX ADMIN — ATORVASTATIN CALCIUM 40 MG: 40 TABLET, FILM COATED ORAL at 16:57

## 2020-10-02 RX ADMIN — AMIODARONE HYDROCHLORIDE 200 MG: 200 TABLET ORAL at 05:17

## 2020-10-02 RX ADMIN — TRAMADOL HYDROCHLORIDE 50 MG: 50 TABLET, FILM COATED ORAL at 05:17

## 2020-10-02 RX ADMIN — METOPROLOL SUCCINATE 25 MG: 25 TABLET, EXTENDED RELEASE ORAL at 05:17

## 2020-10-02 RX ADMIN — LOSARTAN POTASSIUM 25 MG: 50 TABLET, FILM COATED ORAL at 16:57

## 2020-10-02 RX ADMIN — SODIUM CHLORIDE 3 G: 900 INJECTION INTRAVENOUS at 05:16

## 2020-10-02 RX ADMIN — ACETAMINOPHEN 650 MG: 325 TABLET, FILM COATED ORAL at 01:11

## 2020-10-02 ASSESSMENT — COGNITIVE AND FUNCTIONAL STATUS - GENERAL
DAILY ACTIVITIY SCORE: 24
SUGGESTED CMS G CODE MODIFIER MOBILITY: CH
SUGGESTED CMS G CODE MODIFIER DAILY ACTIVITY: CH
MOBILITY SCORE: 24

## 2020-10-02 ASSESSMENT — ENCOUNTER SYMPTOMS
CHILLS: 0
WEAKNESS: 0
HEADACHES: 0
COUGH: 0
ABDOMINAL PAIN: 0
CONSTIPATION: 0
MEMORY LOSS: 0
FLANK PAIN: 0
NAUSEA: 0
PALPITATIONS: 0
DIZZINESS: 0
NERVOUS/ANXIOUS: 0
BACK PAIN: 0
FEVER: 0
SHORTNESS OF BREATH: 0
VOMITING: 0
MYALGIAS: 1

## 2020-10-02 ASSESSMENT — PAIN DESCRIPTION - PAIN TYPE
TYPE: ACUTE PAIN

## 2020-10-02 ASSESSMENT — ACTIVITIES OF DAILY LIVING (ADL): TOILETING: INDEPENDENT

## 2020-10-02 ASSESSMENT — GAIT ASSESSMENTS
DEVIATION: BRADYKINETIC
DISTANCE (FEET): 500
GAIT LEVEL OF ASSIST: SUPERVISED

## 2020-10-02 NOTE — THERAPY
"Occupational Therapy   Initial Evaluation     Patient Name: Giovanni Saini  Age:  50 y.o., Sex:  male  Medical Record #: [id number]  Today's Date: 10/2/2020     Comments: Anticoagulation therapy     Assessment    Patient is 50 y.o. male with h/o ESRD (on HD), a-fib, anticoagulation, who presented with chest pain. Pt dx with PNA and heart failure. Seen now for OT eval. Pt is completing basic ADL and transfers with no more than supv. He lives with daughter who can assist if needed. Has shower chair which he can use if needed. Pt appears to be at or near baseline function from OT standpoint in this setting. No further acute OT needs.     Plan    Recommend Occupational Therapy for Evaluation only    DC Equipment Recommendations: None  Discharge Recommendations: Anticipate that the patient will have no further occupational therapy needs after discharge from the hospital     Subjective    \"I feel fine\"     Objective       10/02/20 1054   Prior Living Situation   Housing / Facility [address] [facility]   Bathroom Set up Shower Chair;Walk In Shower   Equipment Owned Tub / Shower Seat   Comments Pt resides with adult daughter. Reports she \"does everything for me\" then states he is completely independent with ADL and driving. Pt appears to be referring to I-ADL which daughter likely completes due to cultural roles (versus pt lacking physical abilities).   Prior Level of ADL Function   Self Feeding Independent   Grooming / Hygiene Independent   Bathing Independent   Dressing Independent   Toileting Independent   Bed Mobility    Supine to Sit Modified Independent   Scooting Supervised   ADL Assessment   Grooming   (NT, declined due to already completed this AM)   Lower Body Dressing Modified Independent   Toileting   (NT, declined need )   Functional Mobility   Sit to Stand Modified Independent   Bed, Chair, Wheelchair Transfer Supervised   Transfer Method Stand Step                 "

## 2020-10-02 NOTE — PROGRESS NOTES
Inpatient Anticoagulation Service Note    Date: 10/2/2020    Reason for Anticoagulation: Atrial Fibrillation   Target INR: 2.0 to 3.0  [id number] VASc Score: 2  HAS-BLED Score: 1   Hemoglobin Value: (!) 9.6  Hematocrit Value: (!) 30  Lab Platelet Value: 266    INR from last 7 days     Date/Time INR Value    10/02/20 0420  (!) 5.71    10/01/20 0051  (!) 4.83    09/30/20 0832  (!) 5.86    09/29/20 0215  (!) 3.08        Dose from last 7 days     Date/Time Dose (mg)    10/02/20 1241  0    10/01/20 1347  0    09/30/20 1342  0    09/29/20 1038  4    09/29/20 1008  2        Average Dose (mg): (6mg on Tu/Thurs and 4mg ROW)  Significant Interactions: Antibiotics, Amiodarone, Statin  Bridge Therapy: No (If less than 5 days and overlap therapy discontinued -- document reason (i.e. Bleed Risk))    (If still on overlap therapy, if No -- document reason (i.e. Bleed Risk))    Reversal Agent Administered: Not Applicable  Comments: INR actually trended up to 5.71, goal 2-3. H/h decreased, but stable. No overt s/sx of bleeding in  chart. INR elevations may be due to antibiotics. Likely that patient will require a reduced home dose of warfarin while on antibiotics. Will hold warfarin, and repeat INR in AM. Pharmacy will continue to monitor.    Plan:  HOLD warfarin, repeat INR in AM  Education Material Provided?: No  Pharmacist suggested discharge dosing: HOLD warfarin X1 dose, repeat INR w/i 24 hours of discharge     Mackenzie Akhtar, PharmD, BCPS

## 2020-10-02 NOTE — THERAPY
Physical Therapy   Initial Evaluation     Patient Name: Giovanni Saini  Age:  50 y.o., Sex:  male  Medical Record #: [id number]  Today's Date: 10/2/2020     Precautions: Fall Risk    Assessment  Patient is 50 y.o. male admitted with chest pain, cardiac work up essentially negative at this time, with hx of ESRD, Afib on anticoagulation, CHF and EF 70%. Pt presents to physical therapy near PLOF baseline. Pt did not require physical assist to complete functional mobility or gait around unit. Reported reduced gait speed related to L rib/flank pain with deep inspiration. No further acute PT needs. Continue to mobilize with nursing staff.     Plan    Recommend Physical Therapy for Evaluation only     DC Equipment Recommendations: None  Discharge Recommendations: Anticipate that the patient will have no further physical therapy needs after discharge from the hospital        10/02/20 1053   Precautions   Precautions Fall Risk   Comments supratherapetic INR   Vitals   O2 Delivery Device None - Room Air   Pain 0 - 10 Group   Therapist Pain Assessment Nurse Notified;6  (L lateral rib/flank pain)   Prior Living Situation   Prior Services Home-Independent   Housing / Facility 1 Story House   Steps Into Home 3   Steps In Home 0   Rail   (railing to enter)   Equipment Owned Tub / Shower Seat;Grab Bar(s) In Tub / Shower   Lives with - Patient's Self Care Capacity Adult Children  (Dtr)   Comments pt reports he drives himself to dialysis, dtr assists with IADLs. Pt does not work   Prior Level of Functional Mobility   Bed Mobility Independent   Transfer Status Independent   Ambulation Independent   Distance Ambulation (Feet)   (community)   Assistive Devices Used None   Stairs Independent   Cognition    Cognition / Consciousness WDL   Level of Consciousness Alert   Comments agreeable to work with PT.  utilized   Active ROM Lower Body    Active ROM Lower Body  WDL   Strength Lower Body   Lower Body Strength  WDL    Balance Assessment   Sitting Balance (Static) Good   Sitting Balance (Dynamic) Good   Standing Balance (Static) Good   Standing Balance (Dynamic) Good   Weight Shift Sitting Good   Weight Shift Standing Good   Comments w/o AD   Gait Analysis   Gait Level Of Assist Supervised   Assistive Device None   Distance (Feet) 500   # of Times Distance was Traveled 1   Deviation Bradykinetic   # of Stairs Climbed 2   Level of Assist with Stairs Supervised   Weight Bearing Status no restrictions   Comments pt noted gait speed was limited due to rib pain with deep breath   Bed Mobility    Supine to Sit Modified Independent   Sit to Supine   (seated in chair at end of session)   Scooting Modified Independent   Functional Mobility   Sit to Stand Modified Independent   Bed, Chair, Wheelchair Transfer Modified Independent   Transfer Method Stand Step

## 2020-10-02 NOTE — CARE PLAN
Problem: Safety  Goal: Will remain free from injury  Outcome: PROGRESSING AS EXPECTED  Note: Treaded socks in place, bed in the lowest position, bed alarm on, call light and belongings within reach, pt call for assistance appropriately      Problem: Knowledge Deficit  Goal: Knowledge of disease process/condition, treatment plan, diagnostic tests, and medications will improve  Outcome: PROGRESSING AS EXPECTED  Note: Educated on POC, all questions answered, hourly rounding in progress     Problem: Pain Management  Goal: Pain level will decrease to patient's comfort goal  Outcome: PROGRESSING AS EXPECTED  Note: Medicated with lidocaine patch with adequate pain control, hourly rounding in progress

## 2020-10-02 NOTE — PROGRESS NOTES
Hospital Medicine Daily Progress Note    Date of Service  10/2/2020    Chief Complaint  50 y.o. male admitted 9/29/2020 with chest pain .    Hospital Course    Admitted with chest pain, pneumonia, elevated troponin.  Known history of paroxysmal atrial fibrillation, on anticoagulation with Coumadin.  D-dimer is negative.  Known history of ESRD, consult placed to nephrology for HD MWF.       Interval Problem Update    Seen in HD  Ongoing CP, 6/10, improved with pain rx  Arrhythmia, with intermittent vtach, asx      Consultants/Specialty  None    Code Status  Full Code    Disposition  To home in 1-2 days with clinical improvement  Cardiology eval    Review of Systems  Review of Systems   Constitutional: Negative for chills, fever and malaise/fatigue.   HENT: Negative for congestion and hearing loss.    Respiratory: Negative for cough and shortness of breath.    Cardiovascular: Negative for palpitations and leg swelling.   Gastrointestinal: Negative for abdominal pain, constipation, nausea and vomiting.   Genitourinary: Negative for dysuria and flank pain.   Musculoskeletal: Positive for joint pain and myalgias. Negative for back pain.   Neurological: Negative for dizziness, weakness and headaches.   Psychiatric/Behavioral: Negative for memory loss. The patient is not nervous/anxious.         Physical Exam  Temp:  [36.3 °C (97.3 °F)-36.8 °C (98.3 °F)] 36.3 °C (97.4 °F)  Pulse:  [54-63] 54  Resp:  [15-18] 16  BP: ()/(51-63) 109/56  SpO2:  [93 %-99 %] 98 %    Physical Exam  Vitals signs and nursing note reviewed.   Constitutional:       General: He is not in acute distress.     Appearance: He is not ill-appearing.   HENT:      Head: Normocephalic and atraumatic.      Nose: Nose normal.   Eyes:      Extraocular Movements: Extraocular movements intact.      Pupils: Pupils are equal, round, and reactive to light.   Neck:      Musculoskeletal: Neck supple.      Thyroid: No thyromegaly.      Vascular: No JVD.    Cardiovascular:      Rate and Rhythm: Normal rate.      Heart sounds: No murmur.   Pulmonary:      Effort: No respiratory distress.      Breath sounds: Normal breath sounds.   Abdominal:      General: Bowel sounds are normal. There is no distension.      Palpations: Abdomen is soft.   Musculoskeletal:         General: No swelling or tenderness.      Comments: Decreased ROM L shoulder, ttp   Skin:     General: Skin is warm and dry.      Coloration: Skin is not jaundiced or pale.      Findings: No rash.   Neurological:      Mental Status: He is alert and oriented to person, place, and time.      Cranial Nerves: No cranial nerve deficit.      Sensory: No sensory deficit.      Motor: Weakness present.      Coordination: Coordination normal.   Psychiatric:         Behavior: Behavior normal.         Thought Content: Thought content normal.         Fluids    Intake/Output Summary (Last 24 hours) at 10/2/2020 1449  Last data filed at 10/2/2020 0900  Gross per 24 hour   Intake 360 ml   Output --   Net 360 ml       Laboratory  Recent Labs     09/30/20  0832 10/01/20  0051 10/02/20  0420   WBC 12.6* 10.5 9.4   RBC 3.00* 3.12* 2.91*   HEMOGLOBIN 9.9* 10.2* 9.6*   HEMATOCRIT 30.9* 32.0* 30.0*   .0* 102.6* 103.1*   MCH 33.0 32.7 33.0   MCHC 32.0* 31.9* 32.0*   RDW 53.9* 54.9* 54.0*   PLATELETCT 255 266 266   MPV 10.1 10.0 9.7     Recent Labs     09/30/20  1904 10/01/20  0051 10/02/20  0420   SODIUM 136 136 131*   POTASSIUM 4.3 4.6 4.6   CHLORIDE 93* 93* 90*   CO2 28 27 23   GLUCOSE 131* 98 93   BUN 33* 38* 65*   CREATININE 6.38* 6.85* 10.27*   CALCIUM 8.7 8.8 8.6     Recent Labs     09/30/20  0832 10/01/20  0051 10/02/20  0420   INR 5.86* 4.83* 5.71*               Imaging  EC-ECHOCARDIOGRAM COMPLETE W/O CONT   Final Result      CT-CHEST (THORAX) W/O   Final Result         1.  Left basilar atelectasis, component of infiltrate not definitively excluded.   2.  Atherosclerosis and atherosclerotic coronary artery disease   3.   Cardiomegaly   4.  Hepatomegaly      DX-CHEST-PORTABLE (1 VIEW)   Final Result         1.  Left basilar atelectasis, no focal infiltrate   2.  Cardiomegaly           Assessment/Plan  PAF (paroxysmal atrial fibrillation) (HCC)- (present on admission)  Assessment & Plan  -NSR, on amio and coumadin, continue both, F/U INR    ACC/AHA stage C systolic heart failure (HCC)- (present on admission)  Assessment & Plan  -volume status is decent  -Hd per nephro  -continue outpatient cardiac medications including oral lasix    Acute pain of left shoulder  Assessment & Plan  MSK, avoid nsaids d/t ESRD  - trial of oxycodone, and lidocaine patch  - CXR, no bony abnormality  - pt/ot     10/1 improving pain control, will add tramadol  PT/OT evaluation    10/2 improving, cont pain control    Bacteremia  Assessment & Plan  1/2 bottles f/u sens  Repeat blood cx  On unasyn    10/1  1 of 2 bottles positive for MSSa  Repeat blood cultures now negative    Other chest pain- (present on admission)  Assessment & Plan  -no acute changes on 12-lead EKG, pleuritic in nature, initial trop around 70 but in the context in ESRD  - trops 70  -no additional medications at this time  -CT thorax without contrast mostly unrevealing except for possible L sided basilar PNA  -see below  -inr therapeutic, cont coumadin  -telemetry    9/30 echo pending    10/1 ECHO ef 70%  Intermittent arrhythmia, troponin trending down  Patient denies chest pain  We will continue monitoring    10/2 ongoing CP, intermittent CP 6/10, ?block  Elevated trop at 70  - cardiology consult, Dr. Mchugh, appreciate input  ekg- nonspecific ST changes    CAP (community acquired pneumonia)- (present on admission)  Assessment & Plan  -presumed  -procal elevated, 2.2  -empiric IV unasyn and oral azithromycin  -trend WBC/fever curve  - blood cx 1/2, GPC(mssa)   and sputum cultures    ESRD (end stage renal disease) on dialysis (HCC)- (present on admission)  Assessment & Plan  -dialyzed  M/W/F  -lytes are ok right now  -consult renal in the AM    Essential hypertension- (present on admission)  Assessment & Plan  -continue outpatient medications       VTE prophylaxis: Coumadin

## 2020-10-02 NOTE — PROGRESS NOTES
Report received. Assumed care. Pt in bed awake. A/O x4. VSS. Responds appropriately. C/O pain, medicated per MAR, no SOB. Tele monitor on. Assessment complete. AV fistula to the left arm, positive for bruit and thrill. Discussed POC, monitor VS/labs, pain control, HD, mobility, safety, DC planning, pt verbalizes understanding. Explained importance of calling before getting OOB. Call light and belongings within reach. Bed alarm on. Bed in the lowest position. Treaded socks in place. Hourly rounding in progress. Will continue to monitor .

## 2020-10-02 NOTE — PROGRESS NOTES
3hr HD started @ 1151 and completed @ 1452,tolerated UF = 1300ml.A fib epsode during tx,asymptomatic.LFAAVF + B/T,cannulation sites covered with DD,CDI,report given to Machelle Delos Reyes RN.

## 2020-10-02 NOTE — CARE PLAN
Problem: Communication  Goal: The ability to communicate needs accurately and effectively will improve  Outcome: PROGRESSING AS EXPECTED  Pt is primarily Lithuanian-speaking,  used as needed. Education provided to call when needing assistance. Call light is within reach. Hourly rounding in place.      Problem: Infection  Goal: Will remain free from infection  Outcome: PROGRESSING AS EXPECTED  Abx administered as ordered.

## 2020-10-03 ENCOUNTER — APPOINTMENT (OUTPATIENT)
Dept: RADIOLOGY | Facility: MEDICAL CENTER | Age: 50
DRG: 163 | End: 2020-10-03
Attending: STUDENT IN AN ORGANIZED HEALTH CARE EDUCATION/TRAINING PROGRAM
Payer: MEDICAID

## 2020-10-03 PROBLEM — R31.9 HEMATURIA: Status: ACTIVE | Noted: 2020-10-03

## 2020-10-03 LAB
ERYTHROCYTE [DISTWIDTH] IN BLOOD BY AUTOMATED COUNT: 54.2 FL (ref 35.9–50)
HCT VFR BLD AUTO: 30.7 % (ref 42–52)
HGB BLD-MCNC: 9.7 G/DL (ref 14–18)
INR PPP: 5.49 (ref 0.87–1.13)
MCH RBC QN AUTO: 32.4 PG (ref 27–33)
MCHC RBC AUTO-ENTMCNC: 31.6 G/DL (ref 33.7–35.3)
MCV RBC AUTO: 102.7 FL (ref 81.4–97.8)
PLATELET # BLD AUTO: 302 K/UL (ref 164–446)
PMV BLD AUTO: 9.9 FL (ref 9–12.9)
PROTHROMBIN TIME: 51.6 SEC (ref 12–14.6)
RBC # BLD AUTO: 2.99 M/UL (ref 4.7–6.1)
WBC # BLD AUTO: 9.3 K/UL (ref 4.8–10.8)

## 2020-10-03 PROCEDURE — A9270 NON-COVERED ITEM OR SERVICE: HCPCS | Performed by: INTERNAL MEDICINE

## 2020-10-03 PROCEDURE — 85610 PROTHROMBIN TIME: CPT

## 2020-10-03 PROCEDURE — 700111 HCHG RX REV CODE 636 W/ 250 OVERRIDE (IP): Performed by: INTERNAL MEDICINE

## 2020-10-03 PROCEDURE — 99231 SBSQ HOSP IP/OBS SF/LOW 25: CPT | Mod: GC | Performed by: INTERNAL MEDICINE

## 2020-10-03 PROCEDURE — 700101 HCHG RX REV CODE 250: Performed by: INTERNAL MEDICINE

## 2020-10-03 PROCEDURE — 700111 HCHG RX REV CODE 636 W/ 250 OVERRIDE (IP)

## 2020-10-03 PROCEDURE — 770020 HCHG ROOM/CARE - TELE (206)

## 2020-10-03 PROCEDURE — 85027 COMPLETE CBC AUTOMATED: CPT

## 2020-10-03 PROCEDURE — 700102 HCHG RX REV CODE 250 W/ 637 OVERRIDE(OP): Performed by: INTERNAL MEDICINE

## 2020-10-03 PROCEDURE — 36415 COLL VENOUS BLD VENIPUNCTURE: CPT

## 2020-10-03 PROCEDURE — 99232 SBSQ HOSP IP/OBS MODERATE 35: CPT | Performed by: INTERNAL MEDICINE

## 2020-10-03 PROCEDURE — 700105 HCHG RX REV CODE 258: Performed by: INTERNAL MEDICINE

## 2020-10-03 PROCEDURE — A9502 TC99M TETROFOSMIN: HCPCS

## 2020-10-03 RX ORDER — REGADENOSON 0.08 MG/ML
INJECTION, SOLUTION INTRAVENOUS
Status: COMPLETED
Start: 2020-10-03 | End: 2020-10-03

## 2020-10-03 RX ORDER — REGADENOSON 0.08 MG/ML
0.4 INJECTION, SOLUTION INTRAVENOUS ONCE
Status: COMPLETED | OUTPATIENT
Start: 2020-10-03 | End: 2020-10-03

## 2020-10-03 RX ADMIN — ATORVASTATIN CALCIUM 40 MG: 40 TABLET, FILM COATED ORAL at 16:05

## 2020-10-03 RX ADMIN — TRAMADOL HYDROCHLORIDE 50 MG: 50 TABLET, FILM COATED ORAL at 05:00

## 2020-10-03 RX ADMIN — DOCUSATE SODIUM 50 MG AND SENNOSIDES 8.6 MG 2 TABLET: 8.6; 5 TABLET, FILM COATED ORAL at 16:05

## 2020-10-03 RX ADMIN — FUROSEMIDE 40 MG: 40 TABLET ORAL at 04:57

## 2020-10-03 RX ADMIN — LOSARTAN POTASSIUM 25 MG: 50 TABLET, FILM COATED ORAL at 16:05

## 2020-10-03 RX ADMIN — ISOSORBIDE MONONITRATE 60 MG: 60 TABLET, EXTENDED RELEASE ORAL at 04:57

## 2020-10-03 RX ADMIN — OXYCODONE HYDROCHLORIDE 5 MG: 5 TABLET ORAL at 16:10

## 2020-10-03 RX ADMIN — REGADENOSON 0.4 MG: 0.08 INJECTION, SOLUTION INTRAVENOUS at 12:50

## 2020-10-03 RX ADMIN — AMIODARONE HYDROCHLORIDE 200 MG: 200 TABLET ORAL at 04:57

## 2020-10-03 RX ADMIN — LIDOCAINE 1 PATCH: 50 PATCH TOPICAL at 11:27

## 2020-10-03 RX ADMIN — SODIUM CHLORIDE 3 G: 900 INJECTION INTRAVENOUS at 04:57

## 2020-10-03 RX ADMIN — Medication 2001 MG: at 16:06

## 2020-10-03 RX ADMIN — FOLIC ACID 1 MG: 1 TABLET ORAL at 04:57

## 2020-10-03 RX ADMIN — Medication 100 MG: at 06:29

## 2020-10-03 ASSESSMENT — ENCOUNTER SYMPTOMS
FEVER: 0
MYALGIAS: 1
NECK PAIN: 0
COUGH: 0
FOCAL WEAKNESS: 0
FLANK PAIN: 0
SINUS PAIN: 0
HEADACHES: 0
ORTHOPNEA: 0
PALPITATIONS: 0
BACK PAIN: 0
NERVOUS/ANXIOUS: 0
CHILLS: 0
VOMITING: 0
CONSTIPATION: 0
MEMORY LOSS: 0
DEPRESSION: 0
SHORTNESS OF BREATH: 0
DIARRHEA: 0
DIAPHORESIS: 0
EYES NEGATIVE: 1
WEAKNESS: 0
NAUSEA: 0
DIZZINESS: 0
WHEEZING: 0
WEIGHT LOSS: 0
ABDOMINAL PAIN: 0
HEMOPTYSIS: 0
MYALGIAS: 0

## 2020-10-03 ASSESSMENT — PAIN DESCRIPTION - PAIN TYPE
TYPE: ACUTE PAIN

## 2020-10-03 ASSESSMENT — FIBROSIS 4 INDEX: FIB4 SCORE: 0.84

## 2020-10-03 NOTE — CARE PLAN
Problem: Communication  Goal: The ability to communicate needs accurately and effectively will improve  Outcome: PROGRESSING AS EXPECTED     Problem: Communication  Goal: The ability to communicate needs accurately and effectively will improve  Outcome: PROGRESSING AS EXPECTED     Problem: Safety  Goal: Will remain free from injury  Outcome: PROGRESSING AS EXPECTED  Goal: Will remain free from falls  Outcome: PROGRESSING AS EXPECTED     Problem: Infection  Goal: Will remain free from infection  Outcome: PROGRESSING AS EXPECTED     Problem: Venous Thromboembolism (VTW)/Deep Vein Thrombosis (DVT) Prevention:  Goal: Patient will participate in Venous Thrombosis (VTE)/Deep Vein Thrombosis (DVT)Prevention Measures  Outcome: PROGRESSING AS EXPECTED     Problem: Bowel/Gastric:  Goal: Normal bowel function is maintained or improved  Outcome: PROGRESSING AS EXPECTED  Goal: Will not experience complications related to bowel motility  Outcome: PROGRESSING AS EXPECTED     Problem: Knowledge Deficit  Goal: Knowledge of disease process/condition, treatment plan, diagnostic tests, and medications will improve  Outcome: PROGRESSING AS EXPECTED  Goal: Knowledge of the prescribed therapeutic regimen will improve  Outcome: PROGRESSING AS EXPECTED     Problem: Discharge Barriers/Planning  Goal: Patient's continuum of care needs will be met  Outcome: PROGRESSING AS EXPECTED     Problem: Discharge Barriers/Planning  Goal: Patient's continuum of care needs will be met  Outcome: PROGRESSING AS EXPECTED     Problem: Pain Management  Goal: Pain level will decrease to patient's comfort goal  Outcome: PROGRESSING AS EXPECTED     Problem: Psychosocial Needs:  Goal: Level of anxiety will decrease  Outcome: PROGRESSING AS EXPECTED

## 2020-10-03 NOTE — PROGRESS NOTES
Nephrology Daily Progress Note    Date of Service  10/3/2020    Chief Complaint  50 y.o. male with ESRD/HD admitted 9/29/2020 with CP    Interval Problem Update  10/1 -doing better, no acute events  HD MWF  10/3 -doing well, improved CP  No complaints  HD MWF    Review of Systems  Review of Systems   Constitutional: Negative for chills, fever, malaise/fatigue and weight loss.   HENT: Negative for congestion, hearing loss and sinus pain.    Eyes: Negative.    Respiratory: Negative for cough, hemoptysis, shortness of breath and wheezing.    Cardiovascular: Negative for chest pain, palpitations, orthopnea and leg swelling.   Gastrointestinal: Negative for abdominal pain, diarrhea, nausea and vomiting.   Genitourinary: Negative for dysuria, flank pain, frequency, hematuria and urgency.   Musculoskeletal: Negative for back pain, joint pain, myalgias and neck pain.   Skin: Negative.    All other systems reviewed and are negative.       Physical Exam  Temp:  [36.3 °C (97.3 °F)-36.7 °C (98.1 °F)] 36.6 °C (97.9 °F)  Pulse:  [60-63] 60  Resp:  [16-18] 16  BP: (101-141)/(47-65) 101/47  SpO2:  [93 %-95 %] 93 %    Physical Exam  Vitals signs and nursing note reviewed.   Constitutional:       General: He is not in acute distress.     Appearance: Normal appearance. He is well-developed and normal weight. He is not diaphoretic.   HENT:      Head: Normocephalic and atraumatic.      Nose: Nose normal.   Eyes:      General: No scleral icterus.     Extraocular Movements: Extraocular movements intact.      Conjunctiva/sclera: Conjunctivae normal.      Pupils: Pupils are equal, round, and reactive to light.   Neck:      Musculoskeletal: Normal range of motion and neck supple.   Cardiovascular:      Rate and Rhythm: Normal rate and regular rhythm.      Pulses: Normal pulses.      Heart sounds: No friction rub. No gallop.    Pulmonary:      Effort: Pulmonary effort is normal. No respiratory distress.      Breath sounds: Normal breath  sounds. No wheezing, rhonchi or rales.   Abdominal:      General: Bowel sounds are normal. There is no distension.      Palpations: Abdomen is soft. There is no mass.      Tenderness: There is no abdominal tenderness.   Musculoskeletal:         General: No swelling.      Right lower leg: No edema.      Left lower leg: No edema.   Skin:     General: Skin is warm.      Findings: No erythema or rash.   Neurological:      General: No focal deficit present.      Mental Status: He is alert and oriented to person, place, and time.      Cranial Nerves: No cranial nerve deficit.      Coordination: Coordination normal.         Fluids    Intake/Output Summary (Last 24 hours) at 10/3/2020 1332  Last data filed at 10/2/2020 1452  Gross per 24 hour   Intake 500 ml   Output 1800 ml   Net -1300 ml       Laboratory  Recent Labs     10/01/20  0051 10/02/20  0420   WBC 10.5 9.4   RBC 3.12* 2.91*   HEMOGLOBIN 10.2* 9.6*   HEMATOCRIT 32.0* 30.0*   .6* 103.1*   MCH 32.7 33.0   MCHC 31.9* 32.0*   RDW 54.9* 54.0*   PLATELETCT 266 266   MPV 10.0 9.7     Recent Labs     09/30/20  1904 10/01/20  0051 10/02/20  0420   SODIUM 136 136 131*   POTASSIUM 4.3 4.6 4.6   CHLORIDE 93* 93* 90*   CO2 28 27 23   GLUCOSE 131* 98 93   BUN 33* 38* 65*   CREATININE 6.38* 6.85* 10.27*   CALCIUM 8.7 8.8 8.6     Recent Labs     10/01/20  0051 10/02/20  0420 10/03/20  0807   INR 4.83* 5.71* 5.49*     No results for input(s): NTPROBNP in the last 72 hours.        Imaging  reveiwed    Assessment/Plan  1.ESRD/HD -on MWF schedule  2.HTN: BP well controlled  3.Electrolytes: mild hyponatremia -to monitor  4.Anemia: Hb to monitor  5.Volume: well controlled with UF/HD    Recs; no need for emergent dialysis today             HD MWF             Renal diet             All meds to renal doses

## 2020-10-03 NOTE — PROGRESS NOTES
Monitor Summary:    SB-SR- Afib   (R)PVC, (R)coup, (R) trig  Convert from SR to Afib @1211  Convert from Afib to SR @ 1515    .20/.10/.46    12 hr chart check

## 2020-10-03 NOTE — PROGRESS NOTES
[facility] Medicine Daily Progress Note    Date of Service  10/3/2020    Chief Complaint  50 y.o. male admitted 9/29/2020 with chest pain .    Hospital Course    Admitted with chest pain, pneumonia, elevated troponin.  Known history of paroxysmal atrial fibrillation, on anticoagulation with Coumadin.  D-dimer is negative.  Known history of ESRD, consult placed to nephrology for HD MWF.       Interval Problem Update    Patient reports hematuria today  Denies any shortness of breath, nausea or vomiting  Minimal chest discomfort  Improved joint pain, shoulder pain  N.p.o. for MPI today    Consultants/Specialty  None    Code Status  Full Code    Disposition  To home in 1-2 days with clinical improvement  Cardiology eval  Follow-up UA    Review of Systems  Review of Systems   Constitutional: Negative for chills, diaphoresis, fever and malaise/fatigue.   HENT: Negative for congestion and hearing loss.    Respiratory: Negative for cough and shortness of breath.    Cardiovascular: Positive for chest pain. Negative for palpitations and leg swelling.   Gastrointestinal: Negative for abdominal pain, constipation, nausea and vomiting.   Genitourinary: Positive for hematuria. Negative for dysuria, flank pain and urgency.   Musculoskeletal: Positive for myalgias. Negative for back pain and joint pain.   Neurological: Negative for dizziness, focal weakness, weakness and headaches.   Psychiatric/Behavioral: Negative for depression and memory loss. The patient is not nervous/anxious.         Physical Exam  Temp:  [36.3 °C (97.3 °F)-36.7 °C (98.1 °F)] 36.6 °C (97.9 °F)  Pulse:  [60-63] 60  Resp:  [16-18] 16  BP: (101-141)/(47-65) 101/47  SpO2:  [93 %-95 %] 93 %    Physical Exam  Vitals signs and nursing note reviewed.   Constitutional:       General: He is not in acute distress.     Appearance: He is not ill-appearing, toxic-appearing or diaphoretic.   HENT:      Head: Normocephalic.      Nose: Nose normal.   Eyes:      Extraocular  Movements: Extraocular movements intact.      Pupils: Pupils are equal, round, and reactive to light.   Neck:      Musculoskeletal: Neck supple.      Thyroid: No thyromegaly.      Vascular: No JVD.   Cardiovascular:      Rate and Rhythm: Normal rate.      Heart sounds: No murmur.   Pulmonary:      Effort: No respiratory distress.      Breath sounds: Normal breath sounds.   Abdominal:      General: Bowel sounds are normal. There is no distension.      Palpations: Abdomen is soft. There is no mass.      Tenderness: There is no abdominal tenderness.   Musculoskeletal:         General: No swelling or tenderness.      Comments: Decreased ROM L shoulder, ttp   Skin:     General: Skin is warm and dry.      Coloration: Skin is not jaundiced or pale.      Findings: No erythema.   Neurological:      Mental Status: He is alert and oriented to person, place, and time.      Cranial Nerves: No cranial nerve deficit.      Sensory: No sensory deficit.      Motor: Weakness present.   Psychiatric:         Behavior: Behavior normal.         Thought Content: Thought content normal.         Fluids  No intake or output data in the 24 hours ending 10/03/20 1528    Laboratory  Recent Labs     10/01/20  0051 10/02/20  0420   WBC 10.5 9.4   RBC 3.12* 2.91*   HEMOGLOBIN 10.2* 9.6*   HEMATOCRIT 32.0* 30.0*   .6* 103.1*   MCH 32.7 33.0   MCHC 31.9* 32.0*   RDW 54.9* 54.0*   PLATELETCT 266 266   MPV 10.0 9.7     Recent Labs     09/30/20  1904 10/01/20  0051 10/02/20  0420   SODIUM 136 136 131*   POTASSIUM 4.3 4.6 4.6   CHLORIDE 93* 93* 90*   CO2 28 27 23   GLUCOSE 131* 98 93   BUN 33* 38* 65*   CREATININE 6.38* 6.85* 10.27*   CALCIUM 8.7 8.8 8.6     Recent Labs     10/01/20  0051 10/02/20  0420 10/03/20  0807   INR 4.83* 5.71* 5.49*               Imaging  NM-CARDIAC STRESS TEST   Final Result      EC-ECHOCARDIOGRAM COMPLETE W/O CONT   Final Result      CT-CHEST (THORAX) W/O   Final Result         1.  Left basilar atelectasis, component of  infiltrate not definitively excluded.   2.  Atherosclerosis and atherosclerotic coronary artery disease   3.  Cardiomegaly   4.  Hepatomegaly      DX-CHEST-PORTABLE (1 VIEW)   Final Result         1.  Left basilar atelectasis, no focal infiltrate   2.  Cardiomegaly           Assessment/Plan  PAF (paroxysmal atrial fibrillation) (HCC)- (present on admission)  Assessment & Plan  -NSR, on amio and coumadin, continue both, F/U INR    Hematuria  Assessment & Plan  10/3 follow-up UA, elevated INR  Follow-up CBC, if significant blood loss, will transfuse if hemoglobin less than 7  We will consider vitamin K administration  Coumadin on hold per pharmacy    Acute pain of left shoulder  Assessment & Plan  MSK, avoid nsaids d/t ESRD  - trial of oxycodone, and lidocaine patch  - CXR, no bony abnormality  - pt/ot     10/1 improving pain control, will add tramadol  PT/OT evaluation    10/2 improving, cont pain control    Bacteremia  Assessment & Plan  1/2 bottles f/u sens  Repeat blood cx  On unasyn    10/1  1 of 2 bottles positive for MSSa  Repeat blood cultures now negative    Other chest pain- (present on admission)  Assessment & Plan  -no acute changes on 12-lead EKG, pleuritic in nature, initial trop around 70 but in the context in ESRD  - trops 70  -no additional medications at this time  -CT thorax without contrast mostly unrevealing except for possible L sided basilar PNA  -see below  -inr therapeutic, cont coumadin  -telemetry    9/30 echo pending    10/1 ECHO ef 70%  Intermittent arrhythmia, troponin trending down  Patient denies chest pain  We will continue monitoring    10/2 ongoing CP, intermittent CP 6/10, ?block  Elevated trop at 70  - cardiology consult, Dr. Mchugh, appreciate input  ekg- nonspecific ST changes    10/3 f/u MPI, cardiology following    CAP (community acquired pneumonia)- (present on admission)  Assessment & Plan  -presumed  -procal elevated, 2.2  -empiric IV unasyn and oral azithromycin  -trend  WBC/fever curve  - blood cx 1/2, GPC(mssa)   and sputum cultures    ESRD (end stage renal disease) on dialysis (HCC)- (present on admission)  Assessment & Plan  -dialyzed M/W/F  -lytes are ok right now  -consult renal in the AM    Essential hypertension- (present on admission)  Assessment & Plan  -continue outpatient medications       VTE prophylaxis: Coumadin

## 2020-10-03 NOTE — PROGRESS NOTES
Inpatient Anticoagulation Service Note    Date: 10/3/2020    Reason for Anticoagulation: Atrial Fibrillation   Target INR: 2.0 to 3.0  [id number] VASc Score: 2  HAS-BLED Score: 1   Hemoglobin Value: (!) 9.6  Hematocrit Value: (!) 30  Lab Platelet Value: 266    INR from last 7 days     Date/Time INR Value    10/03/20 0807  (!) 5.49    10/02/20 0420  (!) 5.71    10/01/20 0051  (!) 4.83    09/30/20 0832  (!) 5.86    09/29/20 0215  (!) 3.08        Dose from last 7 days     Date/Time Dose (mg)    10/03/20 0911  0    10/02/20 1241  0    10/01/20 1347  0    09/30/20 1342  0    09/29/20 1038  4    09/29/20 1008  2        Average Dose (mg): (6mg on Tu/Thurs and 4mg ROW)  Significant Interactions: Amiodarone, Statin  Bridge Therapy: No (If less than 5 days and overlap therapy discontinued -- document reason (i.e. Bleed Risk))    (If still on overlap therapy, if No -- document reason (i.e. Bleed Risk))    Reversal Agent Administered: Not Applicable  Comments: Slight downtrend in INR to 5.49, previously 5.71. NNL to assess H/h; however, no overt s/sx of bleeding noted in chart. Warfarin has been held for several days, but INR seems to barely change. Increase in INR may be due to NPO status as well as previous antibiotic use (although antibiotics completed at this time). May see effects on antibiotics on INR for a couple of days, but expect INR to trend down. As INR remains supratherapeutic, will continue to hold warfarin. Pharmacy will continue to monitor.    Plan:  HOLD WARFARIN, repeat INR in AM  Education Material Provided?: No  Pharmacist suggested discharge dosing: HOLD warfarin X1 dose, repeat INR w/i 24 hours of discharge     Mackenzie Akhtar, PharmD, BCPS

## 2020-10-03 NOTE — CARE PLAN
Problem: Communication  Goal: The ability to communicate needs accurately and effectively will improve  Outcome: PROGRESSING AS EXPECTED   utilized as needed. Education provided to call when needing assistance. Call light is within reach. Hourly rounding in place.      Problem: Safety  Goal: Will remain free from falls  Outcome: PROGRESSING AS EXPECTED  Education provided to call before ambulating. Call light is within reach. Bed is in a low and locked position with the bed alarm on. Hourly rounding in place.

## 2020-10-03 NOTE — PROGRESS NOTES
Notified MD Dr. Reyes of pt's SR converted to Afib and converted back to SR, pt denies any discomfort. No new orders received. Will continue to monitor.

## 2020-10-03 NOTE — CONSULTS
CARDIAC CONSULTATION    Requesting Provider: Aissatou Peacock D.O.  Reason for consultation: Chest pain / arrhythmia     Impressions:  #. Left-sided chest pain, atypical  #. Paroxysmal atrial fibrillation  #. ESRD on hemodialysis  #. Left ventricular systolic heart failure with preserved ejection fraction  #. Hepatosteatosis    Recommendations:  50 y.o. man with PMH of ESRD on hemodialysis, systolic heart failure with preserved EF, and paroxysmal Afib undergoing treatment for PNA, with reported left-sided chest pain since Saturday. Pain somewhat atypical as non-exertional and associated with deep respiration. Present at baseline. Currently low suspicion for cardiac ischemia, though given history of ESRD with multiple metabolic derangements cannot completely exclude. Serial troponin measurements elevated though stable with respect to one another; suspect related to ESRD and chronic related ischemia with poor renal clearance. With respect to EKG changes, appear to be non-specific at this time and may be related to derangements and cardiac history as discussed above. Patient sinus rhythm on examination at this time. Would benefit from stress test to rule out cardiac ischemia as potential cause of chest pain  - Ordered NM stress test, likely to be completed tomorrow  - Telemetry monitoring overnight  - Continue heart failure medications losartan, metoprolol, isosorbide dinitrate   - Continue furosemide per Nephrology recommendations  - Continue amiodarone and metoprolol for atrial fibrillation  - Oral anticoagulation likely contraindicated in ESRD on hemodialysis, will leave decision up to primary team    Cardiology will continue to follow, please contact team with any questions.    History: Giovanni Saini is a 50 y.o. male with a past medical history of paroxysmal Afib, ESRD on hemodialysis, and AH class C systolic heart failure for whom a Cardiology consultation was requested for evaluation of chest pain and  "arrhythmia. Patient admitted for left-sided chest pain several days ago and found to have possible pneumonia, as well as electrolyte derangements likely related to his ESRD. Patient has been having ongoing pain since admission and has elevated troponin with atypical appearing EKG and non-specific findings, resulting in Cardiology being consulted for further evaluation.    Interview with Giovanni conducted through [employer] [place]  services. Giovanni states his chest pain started on Saturday 7 days ago and is a 5/10 in severity at most times. Patient states pain worsens when taking a deep breath or talking, causing a \"stabbing\" sensation which patient localized to the left chest along the axillary line. Patient denies radiation elsewhere or association with physical exertion or food intake. Patient states that he was brought to the hospital by his daughter who grew concerned when pain did not end. Spoke with patient's daughter via patient's cell phone. Per daughter, Giovanni's heart failure and ESRD requiring hemodialysis started 3 years ago and attributed possibly to alcohol intake, though patient and daughter not definitively certain. Patient denies other associated symptoms with chest pain.     Spoke with RN on floor and in dialysis unit who state that patient had run of atrial fibrillation while in dialysis. Patient reportedly asymptomatic during that time.     ROS:   Constitutional: Denies fever or chills  HEENT: Denies changes in vision or hearing  Cardiac: Denies palpitations, states chest pain   Respiratory: Denies cough or shortness of breath  Abdominal: Denies abdominal pain or nausea  Urinary: Denies dysuria or polyuria  MSK: Denies joint pain or swelling  Heme: Denies bleeding, states easy bruising  Neuro: Denies headache, states dizziness    PE:  /60   Pulse 63   Temp 36.3 °C (97.3 °F) (Temporal)   Resp 16   Ht 1.651 m (5' 5\")   Wt 69.9 kg (154 lb 1.6 oz)   SpO2 95%   " BMI 25.64 kg/m²   GEN: Well appearing  HEENT: Symmetric face, Anicteric sclerae, Moist mucus membranes  NECK: No JVD  CARDIAC: Regular rate & rhythm, Normal S1 & S2, No murmur   VASCULATURE: Normal carotid amplitude without bruit. Bilateral radial pulses palpable, Left wrist thrill over AV fistula  RESP: Clear to auscultation bilaterally  ABD: Soft, Non-tender, Non-distended  EXT: No edema, No clubbing or cyanosis  SKIN: Warm and dry  NEURO: No gross deficits, Alert, Orientation not assessed  PSYCH: Appropriate affect, participates in conversation      Past Medical History:   Diagnosis Date   • Heart murmur    • Valvular heart disease     aortic insufficiency     Past Surgical History:   Procedure Laterality Date   • CATH PLACEMENT  8/27/2017    Procedure: CATH PLACEMENT - perm cath;  Surgeon: Hiren Ayon M.D.;  Location: SURGERY [facility];  Service: General   • AV FISTULA CREATION  8/27/2017    Procedure: AV FISTULA CREATION - left radial cephalic;  Surgeon: Hiren Ayon M.D.;  Location: SURGERY [facility];  Service: General     No Known Allergies      Current Facility-Administered Medications:   •  lidocaine (LIDODERM) 5 % 1 Patch, 1 Patch, Transdermal, Q24HR, Aissatou Peacock D.O., 1 Patch at 10/02/20 1049  •  tramadol (ULTRAM) 50 MG tablet 50 mg, 50 mg, Oral, Q12HRS PRN, CAMILO Ballard.O., 50 mg at 10/02/20 0517  •  heparin injection 1,500 Units, 1,500 Units, Intravenous, ACUTE DIALYSIS PRN, Katrina Merchant M.D., 1,500 Units at 10/02/20 1148  •  oxyCODONE immediate-release (ROXICODONE) tablet 5 mg, 5 mg, Oral, Q6HRS PRN, CAMILO Ballard.O., 5 mg at 09/30/20 2032  •  amiodarone (CORDARONE) tablet 200 mg, 200 mg, Oral, DAILY, Blayne Verma M.D., 200 mg at 10/02/20 0517  •  atorvastatin (LIPITOR) tablet 40 mg, 40 mg, Oral, Q EVENING, Blayne Verma M.D., 40 mg at 10/02/20 1657  •  folic acid (FOLVITE) tablet 1 mg, 1 mg, Oral, DAILY, Blayne Verma M.D., 1 mg at 10/02/20 1771  •   furosemide (LASIX) tablet 40 mg, 40 mg, Oral, DAILY, Blayne Verma M.D., 40 mg at 10/02/20 0517  •  isosorbide mononitrate SR (IMDUR) tablet 60 mg, 60 mg, Oral, DAILY, Blayne Verma M.D., 60 mg at 10/02/20 0517  •  losartan (COZAAR) tablet 25 mg, 25 mg, Oral, Q EVENING, Blayne Verma M.D., 25 mg at 10/02/20 1657  •  metoprolol SR (TOPROL XL) tablet 25 mg, 25 mg, Oral, DAILY, Blayne Verma M.D., 25 mg at 10/02/20 0517  •  thiamine tablet 100 mg, 100 mg, Oral, DAILY, Blayne Verma M.D., 100 mg at 10/02/20 0517  •  senna-docusate (PERICOLACE or SENOKOT S) 8.6-50 MG per tablet 2 Tab, 2 Tab, Oral, BID, 2 Tab at 10/01/20 1708 **AND** polyethylene glycol/lytes (MIRALAX) PACKET 1 Packet, 1 Packet, Oral, QDAY PRN **AND** magnesium hydroxide (MILK OF MAGNESIA) suspension 30 mL, 30 mL, Oral, QDAY PRN **AND** bisacodyl (DULCOLAX) suppository 10 mg, 10 mg, Rectal, QDAY PRN, Blayne Verma M.D.  •  acetaminophen (TYLENOL) tablet 650 mg, 650 mg, Oral, Q6HRS PRN, Blayne Verma M.D., 650 mg at 10/02/20 1657  •  ampicillin/sulbactam (UNASYN) 3 g in  mL IVPB, 3 g, Intravenous, Q24HRS, Blayne Verma M.D., Stopped at 10/02/20 0546  •  MD Alert...Warfarin per Pharmacy, , Other, PHARMACY TO DOSE, Blayne Verma M.D.  Medications Prior to Admission   Medication Sig Dispense Refill Last Dose   • warfarin (COUMADIN) 4 MG Tab Take 4-6 mg by mouth every morning. 6 mg every Tuesday and Thrusday  4 mg all other days of the week   9/28/2020 at AM   • isosorbide mononitrate SR (IMDUR) 60 MG TABLET SR 24 HR Take 1 Tab by mouth every day. 30 Tab 11 9/28/2020 at AM   • losartan (COZAAR) 25 MG Tab TAKE ONE TABLET BY MOUTH EVERY DAY AT BEDTIME FOR BLOOD PRESSURE - BRING ALL MEDICATIONS TO EVERY APPOINTMENT - AS PER DR. STRICKLAND 30 Tab 6 9/28/2020 at PM   • metoprolol SR (TOPROL XL) 25 MG TABLET SR 24 HR Take 1 Tab by mouth every day. 30 Tab 11 9/28/2020 at AM   • furosemide (LASIX) 40 MG Tab Take 1 Tab by mouth every  day. 30 Tab 11 9/28/2020 at AM   • amiodarone (CORDARONE) 200 MG Tab Take 1 Tab by mouth every day. 30 Tab 11 9/28/2020 at AM   • atorvastatin (LIPITOR) 40 MG Tab Take 1 Tab by mouth every evening. 30 Tab 11 9/28/2020 at PM      Social History     Socioeconomic History   • Marital status:      Spouse name: Not on file   • Number of children: Not on file   • Years of education: Not on file   • Highest education level: Not on file   Occupational History   • Not on file   Social Needs   • Financial resource strain: Not on file   • Food insecurity     Worry: Not on file     Inability: Not on file   • Transportation needs     Medical: Not on file     Non-medical: Not on file   Tobacco Use   • Smoking status: Never Smoker   • Smokeless tobacco: Never Used   Substance and Sexual Activity   • Alcohol use: No     Alcohol/week: 6.0 oz     Types: 10 Standard drinks or equivalent per week     Comment: quit after hospital    • Drug use: No   • Sexual activity: Not on file   Lifestyle   • Physical activity     Days per week: Not on file     Minutes per session: Not on file   • Stress: Not on file   Relationships   • Social connections     Talks on phone: Not on file     Gets together: Not on file     Attends Denominational service: Not on file     Active member of club or organization: Not on file     Attends meetings of clubs or organizations: Not on file     Relationship status: Not on file   • Intimate partner violence     Fear of current or ex partner: Not on file     Emotionally abused: Not on file     Physically abused: Not on file     Forced sexual activity: Not on file   Other Topics Concern   • Not on file   Social History Narrative   • Not on file       Family History   Problem Relation Age of Onset   • Stroke Sister 38        CVA          Studies  Lab Results   Component Value Date/Time    CHOLSTRLTOT 111 02/22/2020 08:38 AM    LDL 37 02/22/2020 08:38 AM    HDL 61 02/22/2020 08:38 AM    TRIGLYCERIDE 65 02/22/2020  08:38 AM       Lab Results   Component Value Date/Time    SODIUM 131 (L) 10/02/2020 04:20 AM    POTASSIUM 4.6 10/02/2020 04:20 AM    CHLORIDE 90 (L) 10/02/2020 04:20 AM    CO2 23 10/02/2020 04:20 AM    GLUCOSE 93 10/02/2020 04:20 AM    BUN 65 (H) 10/02/2020 04:20 AM    CREATININE 10.27 (HH) 10/02/2020 04:20 AM     Lab Results   Component Value Date/Time    ALKPHOSPHAT 98 09/29/2020 02:15 AM    ASTSGOT 20 09/29/2020 02:15 AM    ALTSGPT 17 09/29/2020 02:15 AM    TBILIRUBIN 0.6 09/29/2020 02:15 AM      Lab Results   Component Value Date/Time    BNPBTYPENAT 2211 (H) 08/18/2017 09:19 AM       For this encounter I directly reviewed ECG tracings, Echo images, medical records, Chest X-ray images and Chest CT images    Case discussed with Dr. Stephan Nuñez, Cardiology attending physician.    Jg Reyes M.D.  Family Medicine Resident  PGY-2

## 2020-10-03 NOTE — PROGRESS NOTES
Report received from [facility] RN. Pt back from dialysis via bed with RN at 1630. AV fistula gauze dressing in place, cdi. Pt c/o pain medicated with Tylenol per MAR with adequate pain control, hourly rounding in progress. No further needs at this time. Hourly rounding in progress.

## 2020-10-03 NOTE — PROGRESS NOTES
"CARDIOLOGY FOLLOW UP    Impressions:  #. Left-sided chest pain, atypical  #. Paroxysmal atrial fibrillation  #. ESRD on hemodialysis  #. Left ventricular systolic heart failure with preserved ejection fraction  #. Hepatosteatosis    Recommendations:  50 y.o. man with PMH of ESRD on hemodialysis, systolic heart failure with preserved EF, and paroxysmal Afib undergoing treatment for PNA, with reported left-sided chest pain since Saturday. Pain  atypical as non-exertional and associated with deep respiration. Present at baseline. Low suspicion for angina. Serial troponin measurements elevated though stable with respect to one another.  - NM stress test today  - Recommend oral anticoagulation for paroxysmal atrial fibrillation unless otherwise contraindicated for patient   - Continue heart failure medications losartan, metoprolol, isosorbide dinitrate   - Continue furosemide per Nephrology recommendations  - Continue amiodarone and metoprolol for atrial fibrillation    Cardiology will continue to follow, please contact team with any questions.     SUBJECTIVE/INTERIM EVENTS:  No acute overnight events. Patient self-converted out of Afib during dialysis yesterday. Otherwise Giovanni reports chest pain continues with deep inspiration over left chest, though pain decreased overall.     EXAM:  /56   Pulse 62   Temp 36.7 °C (98.1 °F) (Temporal)   Resp 18   Ht 1.651 m (5' 5\")   Wt 69.9 kg (154 lb 1.6 oz)   SpO2 93%   BMI 25.64 kg/m²   GEN: Well appearing man in no acute distress, Appears stated age  HEENT: Symmetric face, Anicteric sclerae, Moist mucus membranes  NECK: No JVD  CARDIAC: Regular rate & rhythm, Normal S1 & S2, Mitral regurgitation murmur  RESP: Clear to auscultation bilaterally  ABD: Soft, Non-tender, Non-distended  EXT: No edema, No clubbing or cyanosis  SKIN: Warm and dry  NEURO: No gross deficits, Alert, Orientation not assessed  PSYCH: Appropriate affect, participates in conversation     Studies  Lab " Results   Component Value Date/Time    WBC 9.4 10/02/2020 04:20 AM    RBC 2.91 (L) 10/02/2020 04:20 AM    HEMOGLOBIN 9.6 (L) 10/02/2020 04:20 AM    HEMATOCRIT 30.0 (L) 10/02/2020 04:20 AM    .1 (H) 10/02/2020 04:20 AM    MCH 33.0 10/02/2020 04:20 AM    MCHC 32.0 (L) 10/02/2020 04:20 AM    MPV 9.7 10/02/2020 04:20 AM    NEUTSPOLYS 76.50 (H) 10/02/2020 04:20 AM    LYMPHOCYTES 8.70 (L) 10/02/2020 04:20 AM    MONOCYTES 9.60 10/02/2020 04:20 AM    EOSINOPHILS 4.30 10/02/2020 04:20 AM    BASOPHILS 0.90 10/02/2020 04:20 AM    ANISOCYTOSIS 1+ 10/02/2020 04:20 AM      Lab Results   Component Value Date/Time    SODIUM 131 (L) 10/02/2020 04:20 AM    POTASSIUM 4.6 10/02/2020 04:20 AM    CHLORIDE 90 (L) 10/02/2020 04:20 AM    CO2 23 10/02/2020 04:20 AM    GLUCOSE 93 10/02/2020 04:20 AM    BUN 65 (H) 10/02/2020 04:20 AM    CREATININE 10.27 (HH) 10/02/2020 04:20 AM       For this encounter I directly reviewed ECG tracings and medical records.       Current Facility-Administered Medications:   •  calcium acetate (PHOS-LO) 667 MG tablet 2,001 mg, 2,001 mg, Oral, TID AC, Katrina Merchant M.D., Stopped at 10/03/20 0700  •  lidocaine (LIDODERM) 5 % 1 Patch, 1 Patch, Transdermal, Q24HR, Aissatou Peacock D.O., 1 Patch at 10/02/20 1049  •  tramadol (ULTRAM) 50 MG tablet 50 mg, 50 mg, Oral, Q12HRS PRN, Aissatou Peacock D.O., 50 mg at 10/03/20 0500  •  heparin injection 1,500 Units, 1,500 Units, Intravenous, ACUTE DIALYSIS PRN, Katrina Merchant M.D., 1,500 Units at 10/02/20 1148  •  oxyCODONE immediate-release (ROXICODONE) tablet 5 mg, 5 mg, Oral, Q6HRS PRN, Aissatou Peacock D.O., 5 mg at 09/30/20 2032  •  amiodarone (CORDARONE) tablet 200 mg, 200 mg, Oral, DAILY, Blayne Verma M.D., 200 mg at 10/03/20 0457  •  atorvastatin (LIPITOR) tablet 40 mg, 40 mg, Oral, Q EVENING, Blayne Verma M.D., 40 mg at 10/02/20 1657  •  folic acid (FOLVITE) tablet 1 mg, 1 mg, Oral, DAILY, Blayne Verma M.D., 1 mg at 10/03/20 0457  •  furosemide (LASIX)  tablet 40 mg, 40 mg, Oral, DAILY, Blayne Verma M.D., 40 mg at 10/03/20 0457  •  isosorbide mononitrate SR (IMDUR) tablet 60 mg, 60 mg, Oral, DAILY, Blayne Verma M.D., 60 mg at 10/03/20 0457  •  losartan (COZAAR) tablet 25 mg, 25 mg, Oral, Q EVENING, Blayne Verma M.D., 25 mg at 10/02/20 1657  •  metoprolol SR (TOPROL XL) tablet 25 mg, 25 mg, Oral, DAILY, Blayne Verma M.D., Stopped at 10/03/20 0600  •  thiamine tablet 100 mg, 100 mg, Oral, DAILY, Blayne Verma M.D., 100 mg at 10/03/20 0629  •  senna-docusate (PERICOLACE or SENOKOT S) 8.6-50 MG per tablet 2 Tab, 2 Tab, Oral, BID, Stopped at 10/03/20 0600 **AND** polyethylene glycol/lytes (MIRALAX) PACKET 1 Packet, 1 Packet, Oral, QDAY PRN **AND** magnesium hydroxide (MILK OF MAGNESIA) suspension 30 mL, 30 mL, Oral, QDAY PRN **AND** bisacodyl (DULCOLAX) suppository 10 mg, 10 mg, Rectal, QDAY PRN, Blayne Verma M.D.  •  acetaminophen (TYLENOL) tablet 650 mg, 650 mg, Oral, Q6HRS PRN, Blayne Verma M.D., 650 mg at 10/02/20 1657  •  MD Alert...Warfarin per Pharmacy, , Other, PHARMACY TO DOSE, Blayne Verma M.D.    Case discussed with Dr. Wan Mchugh, Cardiology attending physician.     Jg Reyes M.D.  Family Medicine Resident  PGY-2

## 2020-10-03 NOTE — PROGRESS NOTES
Nephrology/Hemodialysis note    Patient with ESRD/HD admitted with CP  Seen and examined during dialysis treatment -tolerates well  VS stable  Lab results reviewed  UF goal 1-2 L  Please see dialysis floe sheet for details

## 2020-10-04 PROBLEM — M25.561 RIGHT KNEE PAIN: Status: ACTIVE | Noted: 2020-10-04

## 2020-10-04 LAB
ANION GAP SERPL CALC-SCNC: 18 MMOL/L (ref 7–16)
ANISOCYTOSIS BLD QL SMEAR: ABNORMAL
APPEARANCE UR: CLEAR
BACTERIA #/AREA URNS HPF: NEGATIVE /HPF
BACTERIA BLD CULT: NORMAL
BASOPHILS # BLD AUTO: 0 % (ref 0–1.8)
BASOPHILS # BLD: 0 K/UL (ref 0–0.12)
BILIRUB UR QL STRIP.AUTO: NEGATIVE
BUN SERPL-MCNC: 57 MG/DL (ref 8–22)
CALCIUM SERPL-MCNC: 9.3 MG/DL (ref 8.5–10.5)
CHLORIDE SERPL-SCNC: 90 MMOL/L (ref 96–112)
CO2 SERPL-SCNC: 24 MMOL/L (ref 20–33)
COLOR UR: YELLOW
CREAT SERPL-MCNC: 9.91 MG/DL (ref 0.5–1.4)
EOSINOPHIL # BLD AUTO: 0.93 K/UL (ref 0–0.51)
EOSINOPHIL NFR BLD: 8.7 % (ref 0–6.9)
EPI CELLS #/AREA URNS HPF: NEGATIVE /HPF
ERYTHROCYTE [DISTWIDTH] IN BLOOD BY AUTOMATED COUNT: 53.5 FL (ref 35.9–50)
GLUCOSE SERPL-MCNC: 94 MG/DL (ref 65–99)
GLUCOSE UR STRIP.AUTO-MCNC: NEGATIVE MG/DL
HCT VFR BLD AUTO: 30.1 % (ref 42–52)
HGB BLD-MCNC: 9.6 G/DL (ref 14–18)
HYALINE CASTS #/AREA URNS LPF: ABNORMAL /LPF
HYPOCHROMIA BLD QL SMEAR: ABNORMAL
INR PPP: 6.57 (ref 0.87–1.13)
KETONES UR STRIP.AUTO-MCNC: ABNORMAL MG/DL
LEUKOCYTE ESTERASE UR QL STRIP.AUTO: NEGATIVE
LYMPHOCYTES # BLD AUTO: 0.46 K/UL (ref 1–4.8)
LYMPHOCYTES NFR BLD: 4.3 % (ref 22–41)
MACROCYTES BLD QL SMEAR: ABNORMAL
MANUAL DIFF BLD: NORMAL
MCH RBC QN AUTO: 32.8 PG (ref 27–33)
MCHC RBC AUTO-ENTMCNC: 31.9 G/DL (ref 33.7–35.3)
MCV RBC AUTO: 102.7 FL (ref 81.4–97.8)
MICRO URNS: ABNORMAL
MONOCYTES # BLD AUTO: 0.46 K/UL (ref 0–0.85)
MONOCYTES NFR BLD AUTO: 4.3 % (ref 0–13.4)
MORPHOLOGY BLD-IMP: NORMAL
NEUTROPHILS # BLD AUTO: 8.84 K/UL (ref 1.82–7.42)
NEUTROPHILS NFR BLD: 82.6 % (ref 44–72)
NITRITE UR QL STRIP.AUTO: NEGATIVE
NRBC # BLD AUTO: 0 K/UL
NRBC BLD-RTO: 0 /100 WBC
PH UR STRIP.AUTO: 5.5 [PH] (ref 5–8)
PLATELET # BLD AUTO: 291 K/UL (ref 164–446)
PLATELET BLD QL SMEAR: NORMAL
PMV BLD AUTO: 9.3 FL (ref 9–12.9)
POTASSIUM SERPL-SCNC: 5.3 MMOL/L (ref 3.6–5.5)
PROT UR QL STRIP: 100 MG/DL
PROTHROMBIN TIME: 59.5 SEC (ref 12–14.6)
RBC # BLD AUTO: 2.93 M/UL (ref 4.7–6.1)
RBC # URNS HPF: ABNORMAL /HPF
RBC BLD AUTO: PRESENT
RBC UR QL AUTO: ABNORMAL
SIGNIFICANT IND 70042: NORMAL
SITE SITE: NORMAL
SODIUM SERPL-SCNC: 132 MMOL/L (ref 135–145)
SOURCE SOURCE: NORMAL
SP GR UR STRIP.AUTO: 1.02
URATE SERPL-MCNC: 8.6 MG/DL (ref 2.5–8.3)
UROBILINOGEN UR STRIP.AUTO-MCNC: 0.2 MG/DL
WBC # BLD AUTO: 10.7 K/UL (ref 4.8–10.8)
WBC #/AREA URNS HPF: ABNORMAL /HPF

## 2020-10-04 PROCEDURE — 770020 HCHG ROOM/CARE - TELE (206)

## 2020-10-04 PROCEDURE — 85027 COMPLETE CBC AUTOMATED: CPT

## 2020-10-04 PROCEDURE — 36415 COLL VENOUS BLD VENIPUNCTURE: CPT

## 2020-10-04 PROCEDURE — 700102 HCHG RX REV CODE 250 W/ 637 OVERRIDE(OP): Performed by: INTERNAL MEDICINE

## 2020-10-04 PROCEDURE — 81001 URINALYSIS AUTO W/SCOPE: CPT

## 2020-10-04 PROCEDURE — A9270 NON-COVERED ITEM OR SERVICE: HCPCS | Performed by: INTERNAL MEDICINE

## 2020-10-04 PROCEDURE — 99232 SBSQ HOSP IP/OBS MODERATE 35: CPT | Performed by: INTERNAL MEDICINE

## 2020-10-04 PROCEDURE — 700105 HCHG RX REV CODE 258: Performed by: INTERNAL MEDICINE

## 2020-10-04 PROCEDURE — 700101 HCHG RX REV CODE 250: Performed by: INTERNAL MEDICINE

## 2020-10-04 PROCEDURE — 80048 BASIC METABOLIC PNL TOTAL CA: CPT

## 2020-10-04 PROCEDURE — 85007 BL SMEAR W/DIFF WBC COUNT: CPT

## 2020-10-04 PROCEDURE — 700111 HCHG RX REV CODE 636 W/ 250 OVERRIDE (IP): Performed by: INTERNAL MEDICINE

## 2020-10-04 PROCEDURE — 85610 PROTHROMBIN TIME: CPT

## 2020-10-04 PROCEDURE — 84550 ASSAY OF BLOOD/URIC ACID: CPT

## 2020-10-04 RX ORDER — LIDOCAINE 50 MG/G
1 PATCH TOPICAL EVERY 24 HOURS
Status: DISCONTINUED | OUTPATIENT
Start: 2020-10-04 | End: 2020-10-05

## 2020-10-04 RX ADMIN — TRAMADOL HYDROCHLORIDE 50 MG: 50 TABLET, FILM COATED ORAL at 21:07

## 2020-10-04 RX ADMIN — LIDOCAINE 1 PATCH: 50 PATCH TOPICAL at 10:48

## 2020-10-04 RX ADMIN — SODIUM CHLORIDE 3 G: 900 INJECTION INTRAVENOUS at 14:17

## 2020-10-04 RX ADMIN — Medication 2001 MG: at 16:15

## 2020-10-04 RX ADMIN — METOPROLOL SUCCINATE 25 MG: 25 TABLET, EXTENDED RELEASE ORAL at 05:20

## 2020-10-04 RX ADMIN — FOLIC ACID 1 MG: 1 TABLET ORAL at 05:20

## 2020-10-04 RX ADMIN — AMIODARONE HYDROCHLORIDE 200 MG: 200 TABLET ORAL at 05:20

## 2020-10-04 RX ADMIN — ISOSORBIDE MONONITRATE 60 MG: 60 TABLET, EXTENDED RELEASE ORAL at 05:20

## 2020-10-04 RX ADMIN — LOSARTAN POTASSIUM 25 MG: 50 TABLET, FILM COATED ORAL at 16:14

## 2020-10-04 RX ADMIN — Medication 2001 MG: at 08:01

## 2020-10-04 RX ADMIN — ATORVASTATIN CALCIUM 40 MG: 40 TABLET, FILM COATED ORAL at 16:14

## 2020-10-04 RX ADMIN — DOCUSATE SODIUM 50 MG AND SENNOSIDES 8.6 MG 2 TABLET: 8.6; 5 TABLET, FILM COATED ORAL at 16:14

## 2020-10-04 RX ADMIN — Medication 2001 MG: at 10:47

## 2020-10-04 RX ADMIN — LIDOCAINE 1 PATCH: 50 PATCH TOPICAL at 16:14

## 2020-10-04 RX ADMIN — Medication 100 MG: at 05:20

## 2020-10-04 RX ADMIN — FUROSEMIDE 40 MG: 40 TABLET ORAL at 05:20

## 2020-10-04 RX ADMIN — OXYCODONE HYDROCHLORIDE 5 MG: 5 TABLET ORAL at 14:16

## 2020-10-04 RX ADMIN — TRAMADOL HYDROCHLORIDE 50 MG: 50 TABLET, FILM COATED ORAL at 05:21

## 2020-10-04 ASSESSMENT — ENCOUNTER SYMPTOMS
WEAKNESS: 0
CHILLS: 0
CONSTIPATION: 0
FOCAL WEAKNESS: 0
COUGH: 0
PALPITATIONS: 0
NAUSEA: 0
FLANK PAIN: 0
MYALGIAS: 1
BACK PAIN: 0
MEMORY LOSS: 0
HEADACHES: 0
FEVER: 0
SHORTNESS OF BREATH: 0
ABDOMINAL PAIN: 0
NERVOUS/ANXIOUS: 0

## 2020-10-04 ASSESSMENT — PAIN DESCRIPTION - PAIN TYPE
TYPE: ACUTE PAIN

## 2020-10-04 NOTE — PROGRESS NOTES
Patient complaining of pain to left side of chest on and off throughout this shift.  Medicated x1 with oxycodone with effectiveness.  NPO this morning for Stress Test.  Appetite good after without complaints of nausea.  No complaints other than chest pain.  Resting in bed at this time.  Will continue to monitor.

## 2020-10-04 NOTE — CARE PLAN
Problem: Communication  Goal: The ability to communicate needs accurately and effectively will improve  Outcome: PROGRESSING AS EXPECTED     Problem: Safety  Goal: Will remain free from injury  Outcome: PROGRESSING AS EXPECTED     Problem: Safety  Goal: Will remain free from falls  Outcome: PROGRESSING AS EXPECTED     Problem: Infection  Goal: Will remain free from infection  Outcome: PROGRESSING AS EXPECTED     Problem: Venous Thromboembolism (VTW)/Deep Vein Thrombosis (DVT) Prevention:  Goal: Patient will participate in Venous Thrombosis (VTE)/Deep Vein Thrombosis (DVT)Prevention Measures  Outcome: PROGRESSING AS EXPECTED     Problem: Bowel/Gastric:  Goal: Normal bowel function is maintained or improved  Outcome: PROGRESSING AS EXPECTED  Goal: Will not experience complications related to bowel motility  Outcome: PROGRESSING AS EXPECTED     Problem: Knowledge Deficit  Goal: Knowledge of disease process/condition, treatment plan, diagnostic tests, and medications will improve  Outcome: PROGRESSING AS EXPECTED  Goal: Knowledge of the prescribed therapeutic regimen will improve  Outcome: PROGRESSING AS EXPECTED     Problem: Discharge Barriers/Planning  Goal: Patient's continuum of care needs will be met  Outcome: PROGRESSING AS EXPECTED     Problem: Pain Management  Goal: Pain level will decrease to patient's comfort goal  Outcome: PROGRESSING AS EXPECTED     Problem: Psychosocial Needs:  Goal: Level of anxiety will decrease  Outcome: PROGRESSING AS EXPECTED

## 2020-10-04 NOTE — PROGRESS NOTES
Monitor Summary: SB-SR 56-66, DC 0.20, QRS 0.08, QT 0.50, without ectopy per strip from monitor room.

## 2020-10-04 NOTE — PROGRESS NOTES
Lab called with critical result of PT at 59.5. Critical lab result read back to lab. KEILY Aparicio notified of critical lab result at 0500.  Critical lab result read back by APRN. No new orders received at this time. Will pass on information to day shift RN.

## 2020-10-04 NOTE — PROGRESS NOTES
Inpatient Anticoagulation Service Note    Date: 10/4/2020    Reason for Anticoagulation: Atrial Fibrillation   Target INR: 2.0 to 3.0  [id number] VASc Score: 2  HAS-BLED Score: 1   Hemoglobin Value: (!) 9.6  Hematocrit Value: (!) 30.1  Lab Platelet Value: 291    INR from last 7 days     Date/Time INR Value    10/04/20 0409  (!) 6.57    10/03/20 0807  (!) 5.49    10/02/20 0420  (!) 5.71    10/01/20 0051  (!) 4.83    09/30/20 0832  (!) 5.86    09/29/20 0215  (!) 3.08        Dose from last 7 days     Date/Time Dose (mg)    10/04/20 1357  0    10/03/20 0911  0    10/02/20 1241  0    10/01/20 1347  0    09/30/20 1342  0    09/29/20 1038  4    09/29/20 1008  2        Average Dose (mg): (6mg on Tu/Thurs and 4mg ROW)  Significant Interactions: Amiodarone, Antibiotics, Statin  Bridge Therapy: No     Reversal Agent Administered: Not Applicable  Comments: INR supratherapeutic, trending up despite last dose being given 9/29. Patient displaying increased sensitivty to warfarin. New interaction with abx regiment doesn't explain new sensitivity. Patient does have some hematuria present, but h/h is stable and no reversal has been required. May consider scheduled vitamin k to improve INR and help schedule warfarin. Continue daily INR.    Plan:  Hold warfarin  Education Material Provided?: No  Pharmacist suggested discharge dosing: Hold warfarin with INR follow up 3 days from discharge.     Joseph La, PharmD

## 2020-10-04 NOTE — CARE PLAN
Problem: Safety  Goal: Will remain free from falls  Outcome: PROGRESSING AS EXPECTED  Note: Education provided to call before ambulating. Call light is within reach. Bed is in a low and locked position with the bed alarm on. Hourly rounding in place.     Problem: Infection  Goal: Will remain free from infection  Outcome: PROGRESSING AS EXPECTED  Note: Abx administered as ordered.

## 2020-10-04 NOTE — PROGRESS NOTES
[facility] Medicine Daily Progress Note    Date of Service  10/4/2020    Chief Complaint  50 y.o. male admitted 9/29/2020 with chest pain .    Hospital Course    Admitted with chest pain, pneumonia, elevated troponin.  Known history of paroxysmal atrial fibrillation, on anticoagulation with Coumadin.  D-dimer is negative.  Known history of ESRD, consult placed to nephrology for HD MWF.       Interval Problem Update    Patient appears comfortable on exam, reports chest pain, pain level of 6/10, denies any shortness of breath, nausea vomiting or diaphoresis  Stress test results reviewed  Reports minimal hematuria with urine output today  H&H stable  Increased INR today  Now reports right knee discomfort with minimal edema    Consultants/Specialty  ID    Code Status  Full Code    Disposition  To home in 1-2 days with clinical improvement  Cardiology follow-up  Follow-up UA, uric acid    Review of Systems  Review of Systems   Constitutional: Negative for chills, fever and malaise/fatigue.   HENT: Negative for congestion and hearing loss.    Respiratory: Negative for cough and shortness of breath.    Cardiovascular: Positive for chest pain. Negative for palpitations and leg swelling.   Gastrointestinal: Negative for abdominal pain, constipation and nausea.   Genitourinary: Positive for hematuria. Negative for dysuria, flank pain and urgency.   Musculoskeletal: Positive for joint pain and myalgias. Negative for back pain.   Neurological: Negative for focal weakness, weakness and headaches.   Psychiatric/Behavioral: Negative for memory loss. The patient is not nervous/anxious.         Physical Exam  Temp:  [36.5 °C (97.7 °F)-37.2 °C (98.9 °F)] 36.8 °C (98.3 °F)  Pulse:  [60-66] 60  Resp:  [16-17] 16  BP: (108-136)/(59-65) 110/65  SpO2:  [90 %-95 %] 92 %    Physical Exam  Vitals signs and nursing note reviewed.   Constitutional:       General: He is not in acute distress.     Appearance: He is not diaphoretic.   HENT:       Head: Normocephalic.      Nose: Nose normal.   Eyes:      Extraocular Movements: Extraocular movements intact.      Pupils: Pupils are equal, round, and reactive to light.   Neck:      Musculoskeletal: Neck supple.      Thyroid: No thyromegaly.      Vascular: No JVD.   Cardiovascular:      Rate and Rhythm: Normal rate.      Heart sounds: No murmur.   Pulmonary:      Effort: No respiratory distress.      Breath sounds: Normal breath sounds.   Abdominal:      General: Bowel sounds are normal. There is no distension.      Palpations: Abdomen is soft.      Tenderness: There is no abdominal tenderness.   Musculoskeletal:         General: No swelling or tenderness.      Right lower leg: Edema present.      Comments: Decreased ROM L shoulder, ttp   Skin:     General: Skin is warm and dry.      Coloration: Skin is not jaundiced or pale.      Findings: No rash.   Neurological:      Mental Status: He is alert and oriented to person, place, and time.      Cranial Nerves: No cranial nerve deficit.      Sensory: No sensory deficit.      Motor: Weakness present.      Coordination: Coordination normal.   Psychiatric:         Behavior: Behavior normal.         Thought Content: Thought content normal.         Fluids    Intake/Output Summary (Last 24 hours) at 10/4/2020 1406  Last data filed at 10/4/2020 1200  Gross per 24 hour   Intake --   Output 50 ml   Net -50 ml       Laboratory  Recent Labs     10/02/20  0420 10/03/20  1547 10/04/20  0409   WBC 9.4 9.3 10.7   RBC 2.91* 2.99* 2.93*   HEMOGLOBIN 9.6* 9.7* 9.6*   HEMATOCRIT 30.0* 30.7* 30.1*   .1* 102.7* 102.7*   MCH 33.0 32.4 32.8   MCHC 32.0* 31.6* 31.9*   RDW 54.0* 54.2* 53.5*   PLATELETCT 266 302 291   MPV 9.7 9.9 9.3     Recent Labs     10/02/20  0420 10/04/20  0409   SODIUM 131* 132*   POTASSIUM 4.6 5.3   CHLORIDE 90* 90*   CO2 23 24   GLUCOSE 93 94   BUN 65* 57*   CREATININE 10.27* 9.91*   CALCIUM 8.6 9.3     Recent Labs     10/02/20  0420 10/03/20  0807  10/04/20  0409   INR 5.71* 5.49* 6.57*               Imaging  NM-CARDIAC STRESS TEST   Final Result      EC-ECHOCARDIOGRAM COMPLETE W/O CONT   Final Result      CT-CHEST (THORAX) W/O   Final Result         1.  Left basilar atelectasis, component of infiltrate not definitively excluded.   2.  Atherosclerosis and atherosclerotic coronary artery disease   3.  Cardiomegaly   4.  Hepatomegaly      DX-CHEST-PORTABLE (1 VIEW)   Final Result         1.  Left basilar atelectasis, no focal infiltrate   2.  Cardiomegaly           Assessment/Plan  PAF (paroxysmal atrial fibrillation) (HCC)- (present on admission)  Assessment & Plan  -NSR, on amio and coumadin, continue both, F/U INR    Right knee pain  Assessment & Plan  Minimal edema, nontender to palpation  Recommend continued therapy, trial of lidocaine patch, warm compress  Pain control as needed  Encourage activity  Follow-up uric acid level    Hematuria  Assessment & Plan  10/3 follow-up UA, elevated INR  Follow-up CBC, if significant blood loss, will transfuse if hemoglobin less than 7  We will consider vitamin K administration  Coumadin on hold per pharmacy    10/4 follow-up a.m. labs  H&H stable  We will monitor for recurrent hematuria  May need urology referral  Follow-up UA  Increasing INR at 6    Acute pain of left shoulder  Assessment & Plan  MSK, avoid nsaids d/t ESRD  - trial of oxycodone, and lidocaine patch  - CXR, no bony abnormality  - pt/ot     10/1 improving pain control, will add tramadol  PT/OT evaluation    10/2 improving, cont pain control    Bacteremia  Assessment & Plan  1/2 bottles f/u sens  Repeat blood cx  On unasyn    10/1  1 of 2 bottles positive for MSSa  Repeat blood cultures now negative    10/4 continue Unasyn, will consult ID for antibiotic recommendations, and duration of therapy      Other chest pain- (present on admission)  Assessment & Plan  -no acute changes on 12-lead EKG, pleuritic in nature, initial trop around 70 but in the context  in ESRD  - trops 70  -no additional medications at this time  -CT thorax without contrast mostly unrevealing except for possible L sided basilar PNA  -see below  -inr therapeutic, cont coumadin  -telemetry    9/30 echo pending    10/1 ECHO ef 70%  Intermittent arrhythmia, troponin trending down  Patient denies chest pain  We will continue monitoring    10/2 ongoing CP, intermittent CP 6/10, ?block  Elevated trop at 70  - cardiology consult, Dr. Mchugh, appreciate input  ekg- nonspecific ST changes    10/3 f/u MPI, cardiology following    10/4 MPI with fixed defects inferior lateral wall, artifact versus small infarct  No reversible ischemia  Cardiology following  Continue current medication    CAP (community acquired pneumonia)- (present on admission)  Assessment & Plan  -presumed  -procal elevated, 2.2  -empiric IV unasyn and oral azithromycin  -trend WBC/fever curve  - blood cx 1/2, GPC(mssa)   and sputum cultures    ESRD (end stage renal disease) on dialysis (HCC)- (present on admission)  Assessment & Plan  -dialyzed M/W/F  -lytes are ok right now  -consult renal in the AM    Essential hypertension- (present on admission)  Assessment & Plan  -continue outpatient medications       VTE prophylaxis: Coumadin

## 2020-10-05 ENCOUNTER — APPOINTMENT (OUTPATIENT)
Dept: RADIOLOGY | Facility: MEDICAL CENTER | Age: 50
DRG: 163 | End: 2020-10-05
Attending: INTERNAL MEDICINE
Payer: MEDICAID

## 2020-10-05 PROBLEM — K62.5 RECTAL BLEED: Status: ACTIVE | Noted: 2020-10-05

## 2020-10-05 LAB
ABO + RH BLD: NORMAL
ABO GROUP BLD: NORMAL
ANION GAP SERPL CALC-SCNC: 15 MMOL/L (ref 7–16)
ANION GAP SERPL CALC-SCNC: 18 MMOL/L (ref 7–16)
ANISOCYTOSIS BLD QL SMEAR: ABNORMAL
ANISOCYTOSIS BLD QL SMEAR: ABNORMAL
BACTERIA BLD CULT: NORMAL
BACTERIA BLD CULT: NORMAL
BARCODED ABORH UBTYP: 5100
BARCODED PRD CODE UBPRD: NORMAL
BARCODED UNIT NUM UBUNT: NORMAL
BASOPHILS # BLD AUTO: 0 % (ref 0–1.8)
BASOPHILS # BLD AUTO: 0.3 % (ref 0–1.8)
BASOPHILS # BLD AUTO: 0.9 % (ref 0–1.8)
BASOPHILS # BLD: 0 K/UL (ref 0–0.12)
BASOPHILS # BLD: 0.05 K/UL (ref 0–0.12)
BASOPHILS # BLD: 0.12 K/UL (ref 0–0.12)
BLD GP AB SCN SERPL QL: NORMAL
BUN SERPL-MCNC: 29 MG/DL (ref 8–22)
BUN SERPL-MCNC: 75 MG/DL (ref 8–22)
CALCIUM SERPL-MCNC: 9.4 MG/DL (ref 8.5–10.5)
CALCIUM SERPL-MCNC: 9.7 MG/DL (ref 8.5–10.5)
CHLORIDE SERPL-SCNC: 87 MMOL/L (ref 96–112)
CHLORIDE SERPL-SCNC: 92 MMOL/L (ref 96–112)
CO2 SERPL-SCNC: 24 MMOL/L (ref 20–33)
CO2 SERPL-SCNC: 27 MMOL/L (ref 20–33)
COMPONENT F 8504F: NORMAL
CREAT SERPL-MCNC: 12.57 MG/DL (ref 0.5–1.4)
CREAT SERPL-MCNC: 6.24 MG/DL (ref 0.5–1.4)
CRP SERPL HS-MCNC: 35.62 MG/DL (ref 0–0.75)
EKG IMPRESSION: NORMAL
EOSINOPHIL # BLD AUTO: 0.36 K/UL (ref 0–0.51)
EOSINOPHIL # BLD AUTO: 0.38 K/UL (ref 0–0.51)
EOSINOPHIL # BLD AUTO: 0.94 K/UL (ref 0–0.51)
EOSINOPHIL NFR BLD: 2.6 % (ref 0–6.9)
EOSINOPHIL NFR BLD: 2.6 % (ref 0–6.9)
EOSINOPHIL NFR BLD: 7.6 % (ref 0–6.9)
ERYTHROCYTE [DISTWIDTH] IN BLOOD BY AUTOMATED COUNT: 52.6 FL (ref 35.9–50)
ERYTHROCYTE [DISTWIDTH] IN BLOOD BY AUTOMATED COUNT: 53.3 FL (ref 35.9–50)
ERYTHROCYTE [DISTWIDTH] IN BLOOD BY AUTOMATED COUNT: 54.4 FL (ref 35.9–50)
ERYTHROCYTE [SEDIMENTATION RATE] IN BLOOD BY WESTERGREN METHOD: >120 MM/HOUR (ref 0–20)
GLUCOSE SERPL-MCNC: 126 MG/DL (ref 65–99)
GLUCOSE SERPL-MCNC: 92 MG/DL (ref 65–99)
HCT VFR BLD AUTO: 27.5 % (ref 42–52)
HCT VFR BLD AUTO: 28.5 % (ref 42–52)
HCT VFR BLD AUTO: 29.1 % (ref 42–52)
HGB BLD-MCNC: 9 G/DL (ref 14–18)
HGB BLD-MCNC: 9 G/DL (ref 14–18)
HGB BLD-MCNC: 9.1 G/DL (ref 14–18)
HYPOCHROMIA BLD QL SMEAR: ABNORMAL
IMM GRANULOCYTES # BLD AUTO: 0.11 K/UL (ref 0–0.11)
IMM GRANULOCYTES NFR BLD AUTO: 0.7 % (ref 0–0.9)
INR PPP: 1.77 (ref 0.87–1.13)
INR PPP: 7.34 (ref 0.87–1.13)
LACTATE BLD-SCNC: 1.5 MMOL/L (ref 0.5–2)
LACTATE BLD-SCNC: 1.6 MMOL/L (ref 0.5–2)
LYMPHOCYTES # BLD AUTO: 0.6 K/UL (ref 1–4.8)
LYMPHOCYTES # BLD AUTO: 0.77 K/UL (ref 1–4.8)
LYMPHOCYTES # BLD AUTO: 1.04 K/UL (ref 1–4.8)
LYMPHOCYTES NFR BLD: 4.4 % (ref 22–41)
LYMPHOCYTES NFR BLD: 5.2 % (ref 22–41)
LYMPHOCYTES NFR BLD: 8.4 % (ref 22–41)
MACROCYTES BLD QL SMEAR: ABNORMAL
MACROCYTES BLD QL SMEAR: ABNORMAL
MAGNESIUM SERPL-MCNC: 2.2 MG/DL (ref 1.5–2.5)
MANUAL DIFF BLD: NORMAL
MANUAL DIFF BLD: NORMAL
MCH RBC QN AUTO: 32.2 PG (ref 27–33)
MCH RBC QN AUTO: 32.6 PG (ref 27–33)
MCH RBC QN AUTO: 32.8 PG (ref 27–33)
MCHC RBC AUTO-ENTMCNC: 31.3 G/DL (ref 33.7–35.3)
MCHC RBC AUTO-ENTMCNC: 31.6 G/DL (ref 33.7–35.3)
MCHC RBC AUTO-ENTMCNC: 32.7 G/DL (ref 33.7–35.3)
MCV RBC AUTO: 100.4 FL (ref 81.4–97.8)
MCV RBC AUTO: 102.8 FL (ref 81.4–97.8)
MCV RBC AUTO: 103.3 FL (ref 81.4–97.8)
MICROCYTES BLD QL SMEAR: ABNORMAL
MICROCYTES BLD QL SMEAR: ABNORMAL
MONOCYTES # BLD AUTO: 0.6 K/UL (ref 0–0.85)
MONOCYTES # BLD AUTO: 0.83 K/UL (ref 0–0.85)
MONOCYTES # BLD AUTO: 1.04 K/UL (ref 0–0.85)
MONOCYTES NFR BLD AUTO: 4.4 % (ref 0–13.4)
MONOCYTES NFR BLD AUTO: 6.7 % (ref 0–13.4)
MONOCYTES NFR BLD AUTO: 7 % (ref 0–13.4)
MORPHOLOGY BLD-IMP: NORMAL
MORPHOLOGY BLD-IMP: NORMAL
NEUTROPHILS # BLD AUTO: 12.01 K/UL (ref 1.82–7.42)
NEUTROPHILS # BLD AUTO: 12.52 K/UL (ref 1.82–7.42)
NEUTROPHILS # BLD AUTO: 9.59 K/UL (ref 1.82–7.42)
NEUTROPHILS NFR BLD: 74.8 % (ref 44–72)
NEUTROPHILS NFR BLD: 84.2 % (ref 44–72)
NEUTROPHILS NFR BLD: 87.7 % (ref 44–72)
NEUTS BAND NFR BLD MANUAL: 2.5 % (ref 0–10)
NRBC # BLD AUTO: 0 K/UL
NRBC BLD-RTO: 0 /100 WBC
OVALOCYTES BLD QL SMEAR: NORMAL
OVALOCYTES BLD QL SMEAR: NORMAL
PLATELET # BLD AUTO: 298 K/UL (ref 164–446)
PLATELET # BLD AUTO: 313 K/UL (ref 164–446)
PLATELET # BLD AUTO: 363 K/UL (ref 164–446)
PLATELET BLD QL SMEAR: NORMAL
PLATELET BLD QL SMEAR: NORMAL
PMV BLD AUTO: 9.5 FL (ref 9–12.9)
PMV BLD AUTO: 9.6 FL (ref 9–12.9)
PMV BLD AUTO: 9.6 FL (ref 9–12.9)
POIKILOCYTOSIS BLD QL SMEAR: NORMAL
POIKILOCYTOSIS BLD QL SMEAR: NORMAL
POTASSIUM SERPL-SCNC: 4.5 MMOL/L (ref 3.6–5.5)
POTASSIUM SERPL-SCNC: 6 MMOL/L (ref 3.6–5.5)
PRODUCT TYPE UPROD: NORMAL
PROTHROMBIN TIME: 21.2 SEC (ref 12–14.6)
PROTHROMBIN TIME: 64.8 SEC (ref 12–14.6)
RBC # BLD AUTO: 2.74 M/UL (ref 4.7–6.1)
RBC # BLD AUTO: 2.76 M/UL (ref 4.7–6.1)
RBC # BLD AUTO: 2.83 M/UL (ref 4.7–6.1)
RBC BLD AUTO: PRESENT
RBC BLD AUTO: PRESENT
RH BLD: NORMAL
SIGNIFICANT IND 70042: NORMAL
SIGNIFICANT IND 70042: NORMAL
SITE SITE: NORMAL
SITE SITE: NORMAL
SODIUM SERPL-SCNC: 129 MMOL/L (ref 135–145)
SODIUM SERPL-SCNC: 134 MMOL/L (ref 135–145)
SOURCE SOURCE: NORMAL
SOURCE SOURCE: NORMAL
TROPONIN T SERPL-MCNC: 74 NG/L (ref 6–19)
UNIT STATUS USTAT: NORMAL
WBC # BLD AUTO: 12.4 K/UL (ref 4.8–10.8)
WBC # BLD AUTO: 13.7 K/UL (ref 4.8–10.8)
WBC # BLD AUTO: 14.9 K/UL (ref 4.8–10.8)

## 2020-10-05 PROCEDURE — 85007 BL SMEAR W/DIFF WBC COUNT: CPT | Mod: 91

## 2020-10-05 PROCEDURE — 86900 BLOOD TYPING SEROLOGIC ABO: CPT

## 2020-10-05 PROCEDURE — A9270 NON-COVERED ITEM OR SERVICE: HCPCS | Performed by: INTERNAL MEDICINE

## 2020-10-05 PROCEDURE — 36415 COLL VENOUS BLD VENIPUNCTURE: CPT

## 2020-10-05 PROCEDURE — 83605 ASSAY OF LACTIC ACID: CPT

## 2020-10-05 PROCEDURE — 85610 PROTHROMBIN TIME: CPT

## 2020-10-05 PROCEDURE — 700102 HCHG RX REV CODE 250 W/ 637 OVERRIDE(OP): Performed by: INTERNAL MEDICINE

## 2020-10-05 PROCEDURE — 80048 BASIC METABOLIC PNL TOTAL CA: CPT | Mod: 91

## 2020-10-05 PROCEDURE — 86140 C-REACTIVE PROTEIN: CPT

## 2020-10-05 PROCEDURE — 85652 RBC SED RATE AUTOMATED: CPT

## 2020-10-05 PROCEDURE — 5A1D70Z PERFORMANCE OF URINARY FILTRATION, INTERMITTENT, LESS THAN 6 HOURS PER DAY: ICD-10-PCS | Performed by: INTERNAL MEDICINE

## 2020-10-05 PROCEDURE — 83735 ASSAY OF MAGNESIUM: CPT

## 2020-10-05 PROCEDURE — P9017 PLASMA 1 DONOR FRZ W/IN 8 HR: HCPCS

## 2020-10-05 PROCEDURE — 74177 CT ABD & PELVIS W/CONTRAST: CPT

## 2020-10-05 PROCEDURE — 770020 HCHG ROOM/CARE - TELE (206)

## 2020-10-05 PROCEDURE — 99233 SBSQ HOSP IP/OBS HIGH 50: CPT | Performed by: INTERNAL MEDICINE

## 2020-10-05 PROCEDURE — 93010 ELECTROCARDIOGRAM REPORT: CPT | Performed by: INTERNAL MEDICINE

## 2020-10-05 PROCEDURE — 93971 EXTREMITY STUDY: CPT | Mod: RT

## 2020-10-05 PROCEDURE — 36430 TRANSFUSION BLD/BLD COMPNT: CPT

## 2020-10-05 PROCEDURE — 700111 HCHG RX REV CODE 636 W/ 250 OVERRIDE (IP): Performed by: INTERNAL MEDICINE

## 2020-10-05 PROCEDURE — A9270 NON-COVERED ITEM OR SERVICE: HCPCS | Performed by: PHYSICIAN ASSISTANT

## 2020-10-05 PROCEDURE — 73564 X-RAY EXAM KNEE 4 OR MORE: CPT | Mod: RT

## 2020-10-05 PROCEDURE — 700101 HCHG RX REV CODE 250: Performed by: INTERNAL MEDICINE

## 2020-10-05 PROCEDURE — 84484 ASSAY OF TROPONIN QUANT: CPT

## 2020-10-05 PROCEDURE — 87040 BLOOD CULTURE FOR BACTERIA: CPT

## 2020-10-05 PROCEDURE — C9803 HOPD COVID-19 SPEC COLLECT: HCPCS | Performed by: INTERNAL MEDICINE

## 2020-10-05 PROCEDURE — 90935 HEMODIALYSIS ONE EVALUATION: CPT | Performed by: INTERNAL MEDICINE

## 2020-10-05 PROCEDURE — 85025 COMPLETE CBC W/AUTO DIFF WBC: CPT

## 2020-10-05 PROCEDURE — 700105 HCHG RX REV CODE 258: Performed by: INTERNAL MEDICINE

## 2020-10-05 PROCEDURE — 85027 COMPLETE CBC AUTOMATED: CPT | Mod: 91

## 2020-10-05 PROCEDURE — 90935 HEMODIALYSIS ONE EVALUATION: CPT

## 2020-10-05 PROCEDURE — 99221 1ST HOSP IP/OBS SF/LOW 40: CPT | Mod: GC | Performed by: INTERNAL MEDICINE

## 2020-10-05 PROCEDURE — 700102 HCHG RX REV CODE 250 W/ 637 OVERRIDE(OP): Performed by: PHYSICIAN ASSISTANT

## 2020-10-05 PROCEDURE — 93005 ELECTROCARDIOGRAM TRACING: CPT | Performed by: INTERNAL MEDICINE

## 2020-10-05 PROCEDURE — 700117 HCHG RX CONTRAST REV CODE 255: Performed by: INTERNAL MEDICINE

## 2020-10-05 PROCEDURE — 99232 SBSQ HOSP IP/OBS MODERATE 35: CPT | Performed by: INTERNAL MEDICINE

## 2020-10-05 PROCEDURE — 86850 RBC ANTIBODY SCREEN: CPT

## 2020-10-05 PROCEDURE — 86901 BLOOD TYPING SEROLOGIC RH(D): CPT

## 2020-10-05 RX ORDER — METOPROLOL SUCCINATE 25 MG/1
50 TABLET, EXTENDED RELEASE ORAL DAILY
Status: DISCONTINUED | OUTPATIENT
Start: 2020-10-06 | End: 2020-10-12 | Stop reason: HOSPADM

## 2020-10-05 RX ORDER — LIDOCAINE 50 MG/G
1 PATCH TOPICAL EVERY 24 HOURS
Status: DISCONTINUED | OUTPATIENT
Start: 2020-10-05 | End: 2020-10-12 | Stop reason: HOSPADM

## 2020-10-05 RX ORDER — METOPROLOL SUCCINATE 25 MG/1
25 TABLET, EXTENDED RELEASE ORAL ONCE
Status: COMPLETED | OUTPATIENT
Start: 2020-10-05 | End: 2020-10-05

## 2020-10-05 RX ADMIN — SODIUM CHLORIDE 3 G: 900 INJECTION INTRAVENOUS at 04:10

## 2020-10-05 RX ADMIN — LIDOCAINE 1 PATCH: 50 PATCH TOPICAL at 22:36

## 2020-10-05 RX ADMIN — DOCUSATE SODIUM 50 MG AND SENNOSIDES 8.6 MG 2 TABLET: 8.6; 5 TABLET, FILM COATED ORAL at 16:45

## 2020-10-05 RX ADMIN — Medication 2001 MG: at 08:28

## 2020-10-05 RX ADMIN — ACETAMINOPHEN 650 MG: 325 TABLET, FILM COATED ORAL at 04:10

## 2020-10-05 RX ADMIN — METOPROLOL SUCCINATE 25 MG: 25 TABLET, EXTENDED RELEASE ORAL at 20:38

## 2020-10-05 RX ADMIN — ATORVASTATIN CALCIUM 40 MG: 40 TABLET, FILM COATED ORAL at 16:45

## 2020-10-05 RX ADMIN — LIDOCAINE 1 PATCH: 50 PATCH TOPICAL at 22:34

## 2020-10-05 RX ADMIN — METOPROLOL SUCCINATE 25 MG: 25 TABLET, EXTENDED RELEASE ORAL at 04:11

## 2020-10-05 RX ADMIN — FOLIC ACID 1 MG: 1 TABLET ORAL at 04:10

## 2020-10-05 RX ADMIN — FUROSEMIDE 40 MG: 40 TABLET ORAL at 04:11

## 2020-10-05 RX ADMIN — ISOSORBIDE MONONITRATE 60 MG: 60 TABLET, EXTENDED RELEASE ORAL at 04:11

## 2020-10-05 RX ADMIN — AMIODARONE HYDROCHLORIDE 200 MG: 200 TABLET ORAL at 05:17

## 2020-10-05 RX ADMIN — PHYTONADIONE 10 MG: 10 INJECTION, EMULSION INTRAMUSCULAR; INTRAVENOUS; SUBCUTANEOUS at 08:28

## 2020-10-05 RX ADMIN — OXYCODONE HYDROCHLORIDE 5 MG: 5 TABLET ORAL at 00:43

## 2020-10-05 RX ADMIN — LOSARTAN POTASSIUM 25 MG: 50 TABLET, FILM COATED ORAL at 18:48

## 2020-10-05 RX ADMIN — Medication 100 MG: at 04:10

## 2020-10-05 RX ADMIN — IOHEXOL 100 ML: 350 INJECTION, SOLUTION INTRAVENOUS at 11:00

## 2020-10-05 RX ADMIN — Medication 2001 MG: at 16:46

## 2020-10-05 ASSESSMENT — ENCOUNTER SYMPTOMS
BACK PAIN: 0
FEVER: 0
COUGH: 0
FLANK PAIN: 0
FALLS: 1
SHORTNESS OF BREATH: 1
NAUSEA: 0
DIAPHORESIS: 0
CHILLS: 0
CHILLS: 1
HEADACHES: 0
MYALGIAS: 1
WEAKNESS: 1
CONSTIPATION: 0
ABDOMINAL PAIN: 0
BLURRED VISION: 0
MEMORY LOSS: 0
SHORTNESS OF BREATH: 0
NERVOUS/ANXIOUS: 0
VOMITING: 0
FOCAL WEAKNESS: 0
DIZZINESS: 0
BLOOD IN STOOL: 1
NECK PAIN: 1
PALPITATIONS: 0

## 2020-10-05 ASSESSMENT — PAIN DESCRIPTION - PAIN TYPE
TYPE: ACUTE PAIN

## 2020-10-05 NOTE — PROGRESS NOTES
Patient returned from radiology at this time.  No complaints voiced.  Swelling to right arm diminished from previous assessment.  Transport here now to take patient to dialysis.

## 2020-10-05 NOTE — PROGRESS NOTES
Call placed to Dr Peacock at this time to report hypotension, complaints of dizziness and multiple runs of Vtach as reported by monitor tech (Maria Guadalupe Ludwig).  New orders received.

## 2020-10-05 NOTE — PROGRESS NOTES
Patient returned from dialysis.  Complaining of slight dizziness.  No complaints of pain.  Blood pressure running low with elevated pulse rate.  IV to right AC removed due to leakage.  While patient was in CT tech placed an IV in left AC.  No charting present in Epic.  IV to left AC removed as not appropriate in arm with AV fistula.  New IV placed in right hand for FFP administration.

## 2020-10-05 NOTE — CARE PLAN
Problem: Knowledge Deficit  Goal: Knowledge of disease process/condition, treatment plan, diagnostic tests, and medications will improve  Outcome: PROGRESSING AS EXPECTED  Note: Education provided on plan of care. All questions answered. Call light is within reach.        Problem: Pain Management  Goal: Pain level will decrease to patient's comfort goal  Outcome: PROGRESSING AS EXPECTED  Note: Education provided on the pain scale and non-pharmacological pain interventions. Pain medications administered as ordered. Hourly rounding in place.

## 2020-10-05 NOTE — PROGRESS NOTES
While disconnecting patients IV infusion forearm noted to be significantly swollen below IV site.  IV checked and patient.  Swelling soft and extending laterally.  No evidence of discoloration.  Additionally right knee swelling slightly worse than previously.  Patient denies discomfort.  Dr. Peacock notified.  New orders received for arthrogram of right knee and ultrasound of right upper extremity.  Patient educated through the use of  on new orders and explained that if there is any further pain, swelling, bleeding or other abnormalities that he needs to report them to staff immediately.  Patient engaged, asking appropriate questions and verbalizes understanding.

## 2020-10-05 NOTE — PROGRESS NOTES
Lab called with critical result of PT at 64.8 and INR at 7.34. Critical lab result read back to lab. Will pass on to day shift to notify MD during the day.

## 2020-10-05 NOTE — PROGRESS NOTES
Diagnosis: End-Stage Renal Disease, admitted with acute on chronic CHF. Patient seen and examined on hemodialysis during treatment. Patient is stable, tolerating hemodialysis. Denies chest pain and shortness of breath. Orders updated as needed. Please refer to flowsheet for full details.    Access: Left RC AVF  UF goal: 0.5L    Plan: Continue HD Monday Wednesday Friday. Left arm precautions.     Burton Perales MD  Nephrology

## 2020-10-05 NOTE — PROGRESS NOTES
[facility] Medicine Daily Progress Note    Date of Service  10/5/2020    Chief Complaint  50 y.o. male admitted 9/29/2020 with chest pain .    Hospital Course    Admitted with chest pain, pneumonia, elevated troponin.  Known history of paroxysmal atrial fibrillation, on anticoagulation with Coumadin.  D-dimer is negative.  Known history of ESRD, consult placed to nephrology for HD MWF.       Interval Problem Update    Rectal bleed this am  Reported as oozing, on exam resolved  Denies abd pain  + R knee edema and RUE hematoma, nttp    Denies n/v, diaphoresis or CP  - supratherapeutic INR 7    Consultants/Specialty  ID    Code Status  Full Code    Disposition    TBD  F/u cbc q8, s/p ffp and vit K        Review of Systems  Review of Systems   Constitutional: Negative for chills, diaphoresis, fever and malaise/fatigue.   HENT: Negative for congestion.    Eyes: Negative for blurred vision.   Respiratory: Negative for shortness of breath.    Cardiovascular: Positive for chest pain. Negative for palpitations and leg swelling.   Gastrointestinal: Positive for blood in stool. Negative for abdominal pain, constipation, melena and nausea.   Genitourinary: Positive for hematuria. Negative for dysuria, flank pain and urgency.   Musculoskeletal: Positive for joint pain and myalgias. Negative for back pain.   Neurological: Positive for weakness. Negative for dizziness, focal weakness and headaches.   Psychiatric/Behavioral: Negative for memory loss. The patient is not nervous/anxious.         Physical Exam  Temp:  [36.2 °C (97.1 °F)-36.7 °C (98.1 °F)] 36.2 °C (97.1 °F)  Pulse:  [57-63] 61  Resp:  [16-17] 16  BP: (133-158)/(57-74) 158/74  SpO2:  [91 %-95 %] 92 %    Physical Exam  Vitals signs and nursing note reviewed.   Constitutional:       General: He is not in acute distress.     Appearance: He is not ill-appearing.   HENT:      Head: Normocephalic.      Nose: Nose normal.   Eyes:      Pupils: Pupils are equal, round, and  reactive to light.   Neck:      Musculoskeletal: Neck supple.      Thyroid: No thyromegaly.      Vascular: No JVD.   Cardiovascular:      Rate and Rhythm: Normal rate.      Heart sounds: No murmur.   Pulmonary:      Effort: No respiratory distress.      Breath sounds: Normal breath sounds. No stridor. No wheezing or rhonchi.   Abdominal:      General: Bowel sounds are normal. There is no distension.      Palpations: Abdomen is soft.   Musculoskeletal:         General: Swelling present. No tenderness.      Right lower leg: Edema present.      Comments: Decreased ROM L shoulder, ttp   Skin:     General: Skin is warm and dry.      Coloration: Skin is not jaundiced or pale.   Neurological:      Mental Status: He is alert and oriented to person, place, and time.      Cranial Nerves: No cranial nerve deficit.      Sensory: No sensory deficit.      Motor: Weakness present.   Psychiatric:         Behavior: Behavior normal.         Thought Content: Thought content normal.         Fluids  No intake or output data in the 24 hours ending 10/05/20 1307    Laboratory  Recent Labs     10/03/20  1547 10/04/20  0409 10/05/20  0531   WBC 9.3 10.7 12.4*   RBC 2.99* 2.93* 2.76*   HEMOGLOBIN 9.7* 9.6* 9.0*   HEMATOCRIT 30.7* 30.1* 28.5*   .7* 102.7* 103.3*   MCH 32.4 32.8 32.6   MCHC 31.6* 31.9* 31.6*   RDW 54.2* 53.5* 53.3*   PLATELETCT 302 291 298   MPV 9.9 9.3 9.5     Recent Labs     10/04/20  0409 10/05/20  0531   SODIUM 132* 129*   POTASSIUM 5.3 6.0*   CHLORIDE 90* 87*   CO2 24 24   GLUCOSE 94 92   BUN 57* 75*   CREATININE 9.91* 12.57*   CALCIUM 9.3 9.7     Recent Labs     10/03/20  0807 10/04/20  0409 10/05/20  0531   INR 5.49* 6.57* 7.34*               Imaging  DX-KNEE COMPLETE 4+ RIGHT   Final Result      1.  Large RIGHT knee joint effusion.   2.  No fracture or dislocation.   3.  Minimal degenerative change.      CT-ABDOMEN-PELVIS WITH   Final Result      1.  Colonic diverticulosis without evidence for diverticulitis.    2.  Normal appendix.   3.  No focal mesenteric inflammatory process.   4.  Diffuse atherosclerotic calcification of abdominal aorta with prominent celiac trunk and SMA, without evidence for occlusion.   5.  Loculated fluid collection of the LEFT lung base with associated pleural thickening and atelectasis, possibly indicating empyema, new from prior.      NM-CARDIAC STRESS TEST   Final Result      EC-ECHOCARDIOGRAM COMPLETE W/O CONT   Final Result      CT-CHEST (THORAX) W/O   Final Result         1.  Left basilar atelectasis, component of infiltrate not definitively excluded.   2.  Atherosclerosis and atherosclerotic coronary artery disease   3.  Cardiomegaly   4.  Hepatomegaly      DX-CHEST-PORTABLE (1 VIEW)   Final Result         1.  Left basilar atelectasis, no focal infiltrate   2.  Cardiomegaly      US-EXTREMITY VENOUS UPPER UNILAT RIGHT    (Results Pending)        Assessment/Plan  PAF (paroxysmal atrial fibrillation) (HCC)- (present on admission)  Assessment & Plan  -NSR, on amio and coumadin, continue both, F/U INR    Rectal bleed  Assessment & Plan  10/5 rectal oozing, h/h stable  - supratherapeutic INR, 7  - given FFP stat, vit K x 1, f/u INR  - monitor h/h q18  GI consulted, Dr. Ponce, appreciate input  F/u CT Abd/pelvis    Right knee pain  Assessment & Plan  Minimal edema, nontender to palpation  Recommend continued therapy, trial of lidocaine patch, warm compress  Pain control as needed  Encourage activity    10/5 uric acid level elevated with renal failure  Cont warm compress, pain control    Hematuria  Assessment & Plan  10/3 follow-up UA, elevated INR  Follow-up CBC, if significant blood loss, will transfuse if hemoglobin less than 7  We will consider vitamin K administration  Coumadin on hold per pharmacy    10/4 follow-up a.m. labs  H&H stable  We will monitor for recurrent hematuria  May need urology referral  Follow-up UA  Increasing INR at 6    10/5 now with rectal bleed    Acute pain of left  shoulder  Assessment & Plan  MSK, avoid nsaids d/t ESRD  - trial of oxycodone, and lidocaine patch  - CXR, no bony abnormality  - pt/ot     10/1 improving pain control, will add tramadol  PT/OT evaluation    10/2 improving, cont pain control    Bacteremia  Assessment & Plan  1/2 bottles f/u sens  Repeat blood cx  On unasyn    10/1  1 of 2 bottles positive for MSSa  Repeat blood cultures now negative    10/4 continue Unasyn, will consult ID for antibiotic recommendations, and duration of therapy      Other chest pain- (present on admission)  Assessment & Plan  -no acute changes on 12-lead EKG, pleuritic in nature, initial trop around 70 but in the context in ESRD  - trops 70  -no additional medications at this time  -CT thorax without contrast mostly unrevealing except for possible L sided basilar PNA  -see below  -inr therapeutic, cont coumadin  -telemetry    9/30 echo pending    10/1 ECHO ef 70%  Intermittent arrhythmia, troponin trending down  Patient denies chest pain  We will continue monitoring    10/2 ongoing CP, intermittent CP 6/10, ?block  Elevated trop at 70  - cardiology consult, Dr. Mchugh, appreciate input  ekg- nonspecific ST changes    10/3 f/u MPI, cardiology following    10/4 MPI with fixed defects inferior lateral wall, artifact versus small infarct  No reversible ischemia  Cardiology following  Continue current medication    CAP (community acquired pneumonia)- (present on admission)  Assessment & Plan  -presumed  -procal elevated, 2.2  -empiric IV unasyn and oral azithromycin  -trend WBC/fever curve  - blood cx 1/2, GPC(mssa)   and sputum cultures    ESRD (end stage renal disease) on dialysis ([facility])- (present on admission)  Assessment & Plan  -dialyzed M/W/F  -lytes are ok right now  -consult renal in the AM    Essential hypertension- (present on admission)  Assessment & Plan  -continue outpatient medications       VTE prophylaxis: Coumadin

## 2020-10-05 NOTE — PROGRESS NOTES
Inpatient Anticoagulation Service Note    Date: 10/5/2020    Reason for Anticoagulation: Atrial Fibrillation   Target INR: 2.0 to 3.0  [id number] VASc Score: 2  HAS-BLED Score: 1   Hemoglobin Value: (!) 9  Hematocrit Value: (!) 28.5  Lab Platelet Value: 298    INR from last 7 days     Date/Time INR Value    10/05/20 0531  (!) 7.34    10/04/20 0409  (!) 6.57    10/03/20 0807  (!) 5.49    10/02/20 0420  (!) 5.71    10/01/20 0051  (!) 4.83    09/30/20 0832  (!) 5.86    09/29/20 0215  (!) 3.08        Dose from last 7 days     Date/Time Dose (mg)    10/05/20 1118  0    10/04/20 1357  0    10/03/20 0911  0    10/02/20 1241  0    10/01/20 1347  0    09/30/20 1342  0    09/29/20 1038  4    09/29/20 1008  2        Average Dose (mg): (6mg on Tu/Thurs and 4mg ROW)  Significant Interactions: Amiodarone, Antibiotics, Statin  Bridge Therapy: No    Reversal Agent Administered: Vitamin K  Intravenous(10mg 10/5/20)  Comments: INR supratherapeutic, trending up. Holding warfarin since 9/29. Vitamin K reversal today. Hematuria and hemataschezia noted this morning. H/h without significant drop yet. Continue daily INR.    Plan:  Hold warfarin  Education Material Provided?: No  Pharmacist suggested discharge dosing: Hold warfarin with INR follow up within 3 days of discharge.      Joseph La, PharmD

## 2020-10-05 NOTE — PROGRESS NOTES
3hr HD started @ 1137 and completed @ 1438,net UF 500ml only limited by bp.LFAAVF + B/T,cannulation sites covered with DD,CDI,report given to VIOLA Evans RN.Patient arrived to HD room today with IV placed on the fistula arm,Dr Perales and primary RN notified.No bp,needlestick or IV's no left arm please.

## 2020-10-05 NOTE — PROGRESS NOTES
[facility] Follow-up Note    Date of Service:    10/5/2020      Name:   Giovanni Saini   YOB: 1970  Age:   50 y.o.  male   MRN:   [id number]      Chief Complaint: tachycardia.    Primary Cardiologist:  Dr. Nuñez    HPI:  Mr Saini is a 51 y/o fellow with PMH including PAF dx in 2017 s/p DCCV, on warfarin, aortic insufficiency.  He had been evaluated by Dr. Dia in 2017 who recommended AVR/MAZE at that time.   He had HFpEF (hx of EF 20% 2017 - thought to be rate related and recovered), ESRD on HD (M, W, F).  He presented to [facility] on 9/29/20 with c/o chest pain, after a recent fall.  Also found to have supratherapeutic INR of 7.3. found to have PNA and + blood cultures (MSSA on 1/2 sets on 9/29).   He also has a loculated pleural effusion L lung base.      Was seen in consultation by Dr. Mchugh on 10/2/20, nuclear stress test showed small fixed infer/lateral defect, thought to be consistent with artifact given his normal EF/echo.  Dr. Mchugh signed off on 10/3.    Cardiology is called due to tachycardia with dizziness today after HD.    Interim Events:  We communicated with patient via .  He states he felt well during dialysis today  Denies palpitations.   His main complaint is chest pressure when he takes a deep breath  Also c/o swelling and pain to the R arm and lateral R knee  Xray today showing large R knee effusion.  Nurse notes patient having paroxysms of AFIB surrounding HD.    Also short 4-8 beats runs of NSVT.      ROS  Constitutional:  denies fatigue.  Respiratory:  Denies shortness of breath, no cough.  Cardiovascular:  + chest pain w deep inspiration. no lower extremity edema.  Denies orthopnea or PND.  : minimal urine output.  GI:  Denies nausea/vomiting.  No abdominal distention.  Neuro:  Denies dizziness, syncope.  Hem/lymph: Denies easy bleeding/bruising.      All other review of systems reviewed and negative.    Past medical,  surgical, social, and family history reviewed and unchanged from admission except as noted in HPI.    Medications: Reviewed in MAR  Current Facility-Administered Medications   Medication Dose Frequency Provider Last Rate Last Dose   • lidocaine (LIDODERM) 5 % 1 Patch  1 Patch Q24HR CAMILO Ballard.SRINIVAS       • lidocaine (LIDODERM) 5 % 1 Patch  1 Patch Q24HR Aissatou Peacock D.O.       • ampicillin/sulbactam (UNASYN) 3 g in  mL IVPB  3 g Q24HRS CAMILO Ballard.SRINIVAS   Stopped at 10/05/20 0440   • calcium acetate (PHOS-LO) 667 MG tablet 2,001 mg  2,001 mg TID AC Katrina Merchant M.D.   Stopped at 10/05/20 1100   • tramadol (ULTRAM) 50 MG tablet 50 mg  50 mg Q12HRS PRN HAKEEM BallardORaisa   50 mg at 10/04/20 2107   • heparin injection 1,500 Units  1,500 Units ACUTE DIALYSIS PRN Katrina Merchant M.D.   1,500 Units at 10/02/20 1148   • oxyCODONE immediate-release (ROXICODONE) tablet 5 mg  5 mg Q6HRS PRN HAKEEM BallardORaisa   5 mg at 10/05/20 0043   • amiodarone (CORDARONE) tablet 200 mg  200 mg DAILY Blayne Verma M.D.   200 mg at 10/05/20 0517   • atorvastatin (LIPITOR) tablet 40 mg  40 mg Q EVENING Blayne Verma M.D.   40 mg at 10/04/20 1614   • folic acid (FOLVITE) tablet 1 mg  1 mg DAILY Blayne Verma M.D.   1 mg at 10/05/20 0410   • furosemide (LASIX) tablet 40 mg  40 mg DAILY Blayne Verma M.D.   40 mg at 10/05/20 0411   • isosorbide mononitrate SR (IMDUR) tablet 60 mg  60 mg DAILY Blayne Verma M.D.   60 mg at 10/05/20 0411   • losartan (COZAAR) tablet 25 mg  25 mg Q EVENING Blayne Verma M.D.   25 mg at 10/04/20 1614   • metoprolol SR (TOPROL XL) tablet 25 mg  25 mg DAILY Blayne Verma M.D.   25 mg at 10/05/20 0411   • thiamine tablet 100 mg  100 mg DAILY Blayne Verma M.D.   100 mg at 10/05/20 0410   • senna-docusate (PERICOLACE or SENOKOT S) 8.6-50 MG per tablet 2 Tab  2 Tab BID Blayne Verma M.D.   2 Tab at 10/04/20 1614    And   • polyethylene glycol/lytes (MIRALAX) PACKET 1 Packet  1  "Packet QDAY PRN Blayne Verma M.D.        And   • magnesium hydroxide (MILK OF MAGNESIA) suspension 30 mL  30 mL QDAY PRN Blayne Verma M.D.        And   • bisacodyl (DULCOLAX) suppository 10 mg  10 mg QDAY PRN Blayne Verma M.D.       • acetaminophen (TYLENOL) tablet 650 mg  650 mg Q6HRS PRN Blayne Verma M.D.   650 mg at 10/05/20 0410   • MD Alert...Warfarin per Pharmacy   PHARMACY TO DOSE Blayne Verma M.D.       Last reviewed on 9/29/2020  7:39 AM by Heidi Garcia CHRISTIN    No Known Allergies    Physical Exam  Body mass index is 24.65 kg/m². BP (!) 99/58   Pulse (!) 126   Temp 36.6 °C (97.8 °F) (Temporal)   Resp 16   Ht 1.651 m (5' 5\")   Wt 67.2 kg (148 lb 2.4 oz)   SpO2 98%    Vitals:    10/05/20 0800 10/05/20 1518 10/05/20 1533 10/05/20 1545   BP: 158/74 (!) 85/61 (!) 86/72 (!) 99/58   Pulse: 61 (!) 115 (!) 138 (!) 126   Resp: 16 18 16 16   Temp: 36.2 °C (97.1 °F) 36.4 °C (97.5 °F) 36.5 °C (97.7 °F) 36.6 °C (97.8 °F)   TempSrc: Temporal  Temporal Temporal   SpO2: 92% 98% 98% 98%   Weight:       Height:        Oxygen Therapy:  Pulse Oximetry: 98 %, O2 (LPM): 0, O2 Delivery Device: None - Room Air    General: no apparent distress  Neck: no JVD   Lungs: normal effort,  without crackles, no wheezing or rhonchi  Heart: normal rate, irregular rhythm, 2/6 murmur at USB, no rub  EXT: no lower extremity edema, firm baseball sized area of swelling at the proximal lateral R knee, minimally tender with palpation, slightly warm to touch compared to L.  Neurological: No focal deficits, no facial asymmetry.  Normal speech  Psychiatric: Appropriate affect, alert and oriented x 3  Skin: Warm extremities, no rash    Labs (personally reviewed):     Lab Results   Component Value Date/Time    SODIUM 129 (L) 10/05/2020 05:31 AM    POTASSIUM 6.0 (H) 10/05/2020 05:31 AM    CHLORIDE 87 (L) 10/05/2020 05:31 AM    CO2 24 10/05/2020 05:31 AM    GLUCOSE 92 10/05/2020 05:31 AM    BUN 75 (HH) 10/05/2020 05:31 AM "    CREATININE 12.57 (HH) 10/05/2020 05:31 AM     Lab Results   Component Value Date/Time    ALKPHOSPHAT 98 2020 02:15 AM    ASTSGOT 20 2020 02:15 AM    ALTSGPT 17 2020 02:15 AM    TBILIRUBIN 0.6 2020 02:15 AM      Lab Results   Component Value Date/Time    CHOLSTRLTOT 111 2020 08:38 AM    LDL 37 2020 08:38 AM    HDL 61 2020 08:38 AM    TRIGLYCERIDE 65 2020 08:38 AM         Cardiac Imaging and Procedures Review:      Personal Telemetry Review:  AFIB with HR in the 90s - 120.    Echo 20:  CONCLUSIONS  Normal left ventricular systolic function.  Left ventricular ejection fraction is visually estimated to be 70%.  Moderate concentric left ventricular hypertrophy.  Aortic sclerosis without stenosis.  Moderate aortic insufficiency.    Stress Test 10/3/2020:  NUCLEAR IMAGING INTERPRETATION   Small ill defined, mostly fixed defect in the inferolateral wall, likely    small infarct or artifact.      Assessment and Medical Decision Makin   Paroxysmal atrial fibrillation with marginal rate control  -    Continue Toprol and amiodarone  -    Warfarin mgmt per pharmacy  -    [id number] at least 2 (cad, chf)    2   NSVT  -    Short runs 4-8 beats noted during dialysis  -    Increase Toprol XL from 25 to 50 mg daily  -    Continue PO amio 200 daily  -    BMP this afternoon with normal K, recheck Mag    3   MSSA bacteremia.  -   1/2 sets blood cx + on   -   abx per ID and primary team    4   R knee effusion, large per xray  -   New, concern for septic joint given his bacteremia, relayed to hospitalist.     5   Coagulopathy, INR > 7  -   Received vitamin K 10 mg this morning + 1 u FFP  -   With hematoma of the R arm rectal bleeding and hematuria per notes.    6   Coronary artery disease, diffuse calcifications on CT  -   Without ischemia on recent MPI 10/3/20  -   CP is pleuritic and atypical, unlikely cardiac.    7   L loculated pleural effusion with possible  pneumonia.  -   CT abd/pelvis today with concern for empyema    8   ESRD on HD (M, W, F)    9   Chronic HFpEF  -   Historically EF 20% in 2017 -  Possibly tachy induced  -   EF has recovered (70% on 9/30/20)  -   Continues on Toprol and losartan.    10  Chronic anemia   -   Having some BRBPR   -   Nurse notes plans for colonoscopy  -   Patient at intermediate risk for perioperative cardiovascular events (10% for next 30 days).   Proceed with procedures with this in mind.    11  Aortic insufficiency   -   Moderate and stable since 2017      Will continue to follow.      Na Bro PA-C  [facility]

## 2020-10-05 NOTE — ASSESSMENT & PLAN NOTE
Resolved  Bleeding likely from internal hemorrhoids  Colonoscopy: Normal appearing terminal ileum; Normal colonic mucosa; Small internal hemorrhoids.

## 2020-10-05 NOTE — PROGRESS NOTES
Monitor Summary: SB-SR 56-63, NV 0.18, QRS 0.12, QT 0.48, without ectopy per strip from monitor room.

## 2020-10-05 NOTE — PROGRESS NOTES
Patient ambulated to bathroom at this time.  After urinating blood was noted on toilet.  Patient has started bleeding from the rectum.  Bleeding is currently a slow steady drip.  Tiger text sent to Dr. Peacock with response and new orders for 1 unit of FFP and Vitamin K administration.  Patient currently stable in bed at this time.

## 2020-10-05 NOTE — CONSULTS
Infectious Disease Consult Note    Date of Admission: 9/29/2020  Admission Status: Inpatient  Attending: Aissatou Peacock D.O.   ID Attending: Mayte Malhotra MD    Reason for consult Pneumonia, bacteremia     History of Present Illness (HPI):  50M PMH A Fib (on coumadin), history of HFrEF (EF 20%), ESRD presumably cardiorenal (on dialysis MWF) presenting with several days of chest pain with inspiration. 3 days prior to admission, he had weakness without dizziness, and fell onto his left side, resulting in bruising. He denies associated fever, nausea, vomiting, diarrhea, headache. He endorses some neck pain when he lays in bed for extended periods of time, but no back pain.     Since admission, he has been evaluated for rectal bleeding, R arm swelling secondary to INR 7.3. ID is consulted for antibiotic recommendations for positive blood culture and pneumonia. He denies synthetic AV graft material or other non-native material.      Review of Systems:   Review of Systems   Constitutional: Positive for chills. Negative for fever.   Respiratory: Positive for shortness of breath (Baseline). Negative for cough.    Cardiovascular: Positive for chest pain. Negative for palpitations.   Gastrointestinal: Negative for nausea and vomiting.   Musculoskeletal: Positive for falls and neck pain (Mild with immobility). Negative for back pain and joint pain.   Skin: Negative for rash.   Neurological: Negative for headaches.     Past Medical History:   Past Medical History was reviewed with patient.   has a past medical history of Heart murmur and Valvular heart disease.    Past Surgical History: Past Surgical History was reviewed with patient.   has a past surgical history that includes cath placement (8/27/2017) and av fistula creation (8/27/2017).    Medications: Medications have been reviewed with patient.  Prior to Admission Medications   Prescriptions Last Dose Informant Patient Reported? Taking?   amiodarone (CORDARONE) 200 MG  Tab 9/28/2020 at AM Family Member No No   Sig: Take 1 Tab by mouth every day.   atorvastatin (LIPITOR) 40 MG Tab 9/28/2020 at PM Family Member No No   Sig: Take 1 Tab by mouth every evening.   furosemide (LASIX) 40 MG Tab 9/28/2020 at AM Family Member No No   Sig: Take 1 Tab by mouth every day.   isosorbide mononitrate SR (IMDUR) 60 MG TABLET SR 24 HR 9/28/2020 at AM Family Member No No   Sig: Take 1 Tab by mouth every day.   losartan (COZAAR) 25 MG Tab 9/28/2020 at PM Family Member No No   Sig: TAKE ONE TABLET BY MOUTH EVERY DAY AT BEDTIME FOR BLOOD PRESSURE - BRING ALL MEDICATIONS TO EVERY APPOINTMENT - AS PER DR. STRICKLAND   metoprolol SR (TOPROL XL) 25 MG TABLET SR 24 HR 9/28/2020 at AM Family Member No No   Sig: Take 1 Tab by mouth every day.   warfarin (COUMADIN) 4 MG Tab 9/28/2020 at AM Family Member Yes Yes   Sig: Take 4-6 mg by mouth every morning. 6 mg every Tuesday and Thrusday  4 mg all other days of the week      Facility-Administered Medications: None        Allergies: Allergies have been reviewed with patient.  No Known Allergies    Family History:   family history includes Stroke (age of onset: 38) in his sister.     Social History:   Social History     Socioeconomic History   • Marital status:      Spouse name: Not on file   • Number of children: Not on file   • Years of education: Not on file   • Highest education level: Not on file   Occupational History   • Not on file   Social Needs   • Financial resource strain: Not on file   • Food insecurity     Worry: Not on file     Inability: Not on file   • Transportation needs     Medical: Not on file     Non-medical: Not on file   Tobacco Use   • Smoking status: Never Smoker   • Smokeless tobacco: Never Used   Substance and Sexual Activity   • Alcohol use: No     Alcohol/week: 6.0 oz     Types: 10 Standard drinks or equivalent per week     Comment: quit after hospital    • Drug use: No   • Sexual activity: Not on file   Lifestyle   • Physical  activity     Days per week: Not on file     Minutes per session: Not on file   • Stress: Not on file   Relationships   • Social connections     Talks on phone: Not on file     Gets together: Not on file     Attends Scientologist service: Not on file     Active member of club or organization: Not on file     Attends meetings of clubs or organizations: Not on file     Relationship status: Not on file   • Intimate partner violence     Fear of current or ex partner: Not on file     Emotionally abused: Not on file     Physically abused: Not on file     Forced sexual activity: Not on file   Other Topics Concern   • Not on file   Social History Narrative   • Not on file       Physical Exam:   Vitals:  Temp:  [36.2 °C (97.1 °F)-36.7 °C (98.1 °F)] 36.2 °C (97.1 °F)  Pulse:  [57-63] 61  Resp:  [16-17] 16  BP: (133-158)/(57-74) 158/74  SpO2:  [91 %-95 %] 92 %    Physical Exam  Vitals signs and nursing note reviewed.   Constitutional:       General: He is not in acute distress.     Appearance: He is not toxic-appearing.   HENT:      Head: Normocephalic and atraumatic.      Right Ear: External ear normal.      Left Ear: External ear normal.      Nose: Nose normal.      Mouth/Throat:      Mouth: Mucous membranes are moist.   Eyes:      Extraocular Movements: Extraocular movements intact.      Pupils: Pupils are equal, round, and reactive to light.   Neck:      Musculoskeletal: Normal range of motion and neck supple.   Cardiovascular:      Rate and Rhythm: Normal rate and regular rhythm.      Pulses: Normal pulses.      Heart sounds: Normal heart sounds.   Pulmonary:      Effort: Pulmonary effort is normal. No respiratory distress.      Breath sounds: Normal breath sounds. No wheezing.   Abdominal:      General: Abdomen is flat. Bowel sounds are normal.      Palpations: Abdomen is soft.   Musculoskeletal: Normal range of motion.      Right lower leg: No edema.      Left lower leg: No edema.   Skin:     General: Skin is warm and dry.       Capillary Refill: Capillary refill takes less than 2 seconds.   Neurological:      General: No focal deficit present.      Mental Status: He is alert and oriented to person, place, and time. Mental status is at baseline.   Psychiatric:         Mood and Affect: Mood normal.         Thought Content: Thought content normal.         Labs:   Lab Results   Component Value Date/Time    WBC 12.4 (H) 10/05/2020 05:31 AM    RBC 2.76 (L) 10/05/2020 05:31 AM    HEMOGLOBIN 9.0 (L) 10/05/2020 05:31 AM    HEMATOCRIT 28.5 (L) 10/05/2020 05:31 AM    .3 (H) 10/05/2020 05:31 AM    MCH 32.6 10/05/2020 05:31 AM    MCHC 31.6 (L) 10/05/2020 05:31 AM    MPV 9.5 10/05/2020 05:31 AM    NEUTSPOLYS 74.80 (H) 10/05/2020 05:31 AM    LYMPHOCYTES 8.40 (L) 10/05/2020 05:31 AM    MONOCYTES 6.70 10/05/2020 05:31 AM    EOSINOPHILS 7.60 (H) 10/05/2020 05:31 AM    BASOPHILS 0.00 10/05/2020 05:31 AM    HYPOCHROMIA 1+ 10/05/2020 05:31 AM    ANISOCYTOSIS 1+ 10/05/2020 05:31 AM      Lab Results   Component Value Date/Time    SODIUM 129 (L) 10/05/2020 05:31 AM    POTASSIUM 6.0 (H) 10/05/2020 05:31 AM    CHLORIDE 87 (L) 10/05/2020 05:31 AM    CO2 24 10/05/2020 05:31 AM    GLUCOSE 92 10/05/2020 05:31 AM    BUN 75 (HH) 10/05/2020 05:31 AM    CREATININE 12.57 (HH) 10/05/2020 05:31 AM      9/29   Blood culture  1 bottle of 4 MSSA, pansensitive    Imaging:   DX-KNEE COMPLETE 4+ RIGHT   Final Result      1.  Large RIGHT knee joint effusion.   2.  No fracture or dislocation.   3.  Minimal degenerative change.      CT-ABDOMEN-PELVIS WITH   Final Result      1.  Colonic diverticulosis without evidence for diverticulitis.   2.  Normal appendix.   3.  No focal mesenteric inflammatory process.   4.  Diffuse atherosclerotic calcification of abdominal aorta with prominent celiac trunk and SMA, without evidence for occlusion.   5.  Loculated fluid collection of the LEFT lung base with associated pleural thickening and atelectasis, possibly indicating empyema,  new from prior.      NM-CARDIAC STRESS TEST   Final Result      EC-ECHOCARDIOGRAM COMPLETE W/O CONT   Final Result      CT-CHEST (THORAX) W/O   Final Result         1.  Left basilar atelectasis, component of infiltrate not definitively excluded.   2.  Atherosclerosis and atherosclerotic coronary artery disease   3.  Cardiomegaly   4.  Hepatomegaly      DX-CHEST-PORTABLE (1 VIEW)   Final Result         1.  Left basilar atelectasis, no focal infiltrate   2.  Cardiomegaly      US-EXTREMITY VENOUS UPPER UNILAT RIGHT    (Results Pending)         Previous Data Review: reviewed    Assessment:   50M with ESRD (HD MWF), A Fib (coumadin) presenting with fall, pleuritic chest pain, found to have ?L base consolidation on CT, and 1 of 2 positive cultures for MSSA.      # Bacteremia, MSSA  - one of two sets on 9/29, repeat sets on 9/30 NGTD  - Unasyn sensitive, day #7  - Verbal report from Microbiology, PCN sensitive  - No vegetation reported on TTE    # Pneumonia, associated empyema  - Pleuritic chest pain on presentation  - Chest CT 9/29 with atelectasis  - CT Abd/pelvis 10/5 shows loculated fluid collection of the LEFT lung base with associated pleural thickening and atelectasis, possibly indicating empyema, new from prior    # Supertherapeutic state, anticoagulant  - Unclear etiology as patient off warfarin since 9/29    # ESRD    # History of HFrEF (EF 20%) 2017  - Unclear etiology  - Current EF 70%    PLAN:    - CMP tomorrow evaluation of liver function in setting of rising INR  - Repeat BC today  - Daily CXR  - Recommend drainage of locuated pleural effusion, with cultures  - Consider CT surgery if unable to obtain adequate drainage of effusion  - Tentatively, depending on effusion results:     Continue unasyn for 7 days post negative BC, end date 10/7, then:     Transition to cefazolin for total of 4 weeks for bacteremia end date 10/28 if BC             remain negative  - No LOBO recommended at this time    - Continue to  hold coumadin, monitor INR, correct as indicated  - MWF HD, nephrology following  - ID will continue to follow    Seen and discussed with Dr. Malhotra, ID Attending

## 2020-10-05 NOTE — CARE PLAN
Problem: Communication  Goal: The ability to communicate needs accurately and effectively will improve  Outcome: PROGRESSING AS EXPECTED     Problem: Safety  Goal: Will remain free from injury  Outcome: PROGRESSING AS EXPECTED  Goal: Will remain free from falls  Outcome: PROGRESSING AS EXPECTED     Problem: Infection  Goal: Will remain free from infection  Outcome: PROGRESSING AS EXPECTED     Problem: Venous Thromboembolism (VTW)/Deep Vein Thrombosis (DVT) Prevention:  Goal: Patient will participate in Venous Thrombosis (VTE)/Deep Vein Thrombosis (DVT)Prevention Measures  Outcome: PROGRESSING AS EXPECTED     Problem: Bowel/Gastric:  Goal: Normal bowel function is maintained or improved  Outcome: PROGRESSING AS EXPECTED  Goal: Will not experience complications related to bowel motility  Outcome: PROGRESSING AS EXPECTED     Problem: Knowledge Deficit  Goal: Knowledge of disease process/condition, treatment plan, diagnostic tests, and medications will improve  Outcome: PROGRESSING AS EXPECTED  Goal: Knowledge of the prescribed therapeutic regimen will improve  Outcome: PROGRESSING AS EXPECTED     Problem: Discharge Barriers/Planning  Goal: Patient's continuum of care needs will be met  Outcome: PROGRESSING AS EXPECTED     Problem: Pain Management  Goal: Pain level will decrease to patient's comfort goal  Outcome: PROGRESSING AS EXPECTED     Problem: Psychosocial Needs:  Goal: Level of anxiety will decrease  Outcome: PROGRESSING AS EXPECTED

## 2020-10-05 NOTE — PROGRESS NOTES
Dr. Peacock returned call and also order stat CT of Abdomen and Pelvis with contrast and requested that we inform dialysis of INR and bleeding.  Call placed to on call Nephrologist who was made aware with no new orders placed.

## 2020-10-06 ENCOUNTER — APPOINTMENT (OUTPATIENT)
Dept: RADIOLOGY | Facility: MEDICAL CENTER | Age: 50
DRG: 163 | End: 2020-10-06
Attending: STUDENT IN AN ORGANIZED HEALTH CARE EDUCATION/TRAINING PROGRAM
Payer: MEDICAID

## 2020-10-06 LAB
ALBUMIN SERPL BCP-MCNC: 3.1 G/DL (ref 3.2–4.9)
ALBUMIN/GLOB SERPL: 0.9 G/DL
ALP SERPL-CCNC: 111 U/L (ref 30–99)
ALT SERPL-CCNC: 16 U/L (ref 2–50)
ANION GAP SERPL CALC-SCNC: 11 MMOL/L (ref 7–16)
ANISOCYTOSIS BLD QL SMEAR: ABNORMAL
AST SERPL-CCNC: 20 U/L (ref 12–45)
BASOPHILS # BLD AUTO: 0.1 % (ref 0–1.8)
BASOPHILS # BLD AUTO: 0.2 % (ref 0–1.8)
BASOPHILS # BLD AUTO: 1.7 % (ref 0–1.8)
BASOPHILS # BLD: 0.01 K/UL (ref 0–0.12)
BASOPHILS # BLD: 0.02 K/UL (ref 0–0.12)
BASOPHILS # BLD: 0.21 K/UL (ref 0–0.12)
BILIRUB SERPL-MCNC: 0.5 MG/DL (ref 0.1–1.5)
BUN SERPL-MCNC: 38 MG/DL (ref 8–22)
CALCIUM SERPL-MCNC: 9.3 MG/DL (ref 8.5–10.5)
CHLORIDE SERPL-SCNC: 92 MMOL/L (ref 96–112)
CO2 SERPL-SCNC: 30 MMOL/L (ref 20–33)
COVID ORDER STATUS COVID19: NORMAL
CREAT SERPL-MCNC: 8.08 MG/DL (ref 0.5–1.4)
EOSINOPHIL # BLD AUTO: 0.21 K/UL (ref 0–0.51)
EOSINOPHIL # BLD AUTO: 0.65 K/UL (ref 0–0.51)
EOSINOPHIL # BLD AUTO: 0.69 K/UL (ref 0–0.51)
EOSINOPHIL NFR BLD: 1.7 % (ref 0–6.9)
EOSINOPHIL NFR BLD: 5.4 % (ref 0–6.9)
EOSINOPHIL NFR BLD: 5.6 % (ref 0–6.9)
ERYTHROCYTE [DISTWIDTH] IN BLOOD BY AUTOMATED COUNT: 52.8 FL (ref 35.9–50)
ERYTHROCYTE [DISTWIDTH] IN BLOOD BY AUTOMATED COUNT: 53.4 FL (ref 35.9–50)
ERYTHROCYTE [DISTWIDTH] IN BLOOD BY AUTOMATED COUNT: 54.2 FL (ref 35.9–50)
GLOBULIN SER CALC-MCNC: 3.6 G/DL (ref 1.9–3.5)
GLUCOSE SERPL-MCNC: 122 MG/DL (ref 65–99)
HCT VFR BLD AUTO: 25.7 % (ref 42–52)
HCT VFR BLD AUTO: 25.9 % (ref 42–52)
HCT VFR BLD AUTO: 27.5 % (ref 42–52)
HGB BLD-MCNC: 8.1 G/DL (ref 14–18)
HGB BLD-MCNC: 8.1 G/DL (ref 14–18)
HGB BLD-MCNC: 8.7 G/DL (ref 14–18)
IMM GRANULOCYTES # BLD AUTO: 0.05 K/UL (ref 0–0.11)
IMM GRANULOCYTES # BLD AUTO: 0.06 K/UL (ref 0–0.11)
IMM GRANULOCYTES NFR BLD AUTO: 0.4 % (ref 0–0.9)
IMM GRANULOCYTES NFR BLD AUTO: 0.5 % (ref 0–0.9)
INR PPP: 1.47 (ref 0.87–1.13)
LACTATE BLD-SCNC: 0.8 MMOL/L (ref 0.5–2)
LACTATE BLD-SCNC: 1.2 MMOL/L (ref 0.5–2)
LYMPHOCYTES # BLD AUTO: 0.74 K/UL (ref 1–4.8)
LYMPHOCYTES # BLD AUTO: 1.17 K/UL (ref 1–4.8)
LYMPHOCYTES # BLD AUTO: 1.71 K/UL (ref 1–4.8)
LYMPHOCYTES NFR BLD: 10.1 % (ref 22–41)
LYMPHOCYTES NFR BLD: 13.4 % (ref 22–41)
LYMPHOCYTES NFR BLD: 6.1 % (ref 22–41)
MACROCYTES BLD QL SMEAR: ABNORMAL
MANUAL DIFF BLD: NORMAL
MCH RBC QN AUTO: 32 PG (ref 27–33)
MCH RBC QN AUTO: 32.4 PG (ref 27–33)
MCH RBC QN AUTO: 32.7 PG (ref 27–33)
MCHC RBC AUTO-ENTMCNC: 31.3 G/DL (ref 33.7–35.3)
MCHC RBC AUTO-ENTMCNC: 31.5 G/DL (ref 33.7–35.3)
MCHC RBC AUTO-ENTMCNC: 31.6 G/DL (ref 33.7–35.3)
MCV RBC AUTO: 102.4 FL (ref 81.4–97.8)
MCV RBC AUTO: 102.8 FL (ref 81.4–97.8)
MCV RBC AUTO: 103.4 FL (ref 81.4–97.8)
MICROCYTES BLD QL SMEAR: ABNORMAL
MONOCYTES # BLD AUTO: 0.43 K/UL (ref 0–0.85)
MONOCYTES # BLD AUTO: 1.09 K/UL (ref 0–0.85)
MONOCYTES # BLD AUTO: 1.11 K/UL (ref 0–0.85)
MONOCYTES NFR BLD AUTO: 3.5 % (ref 0–13.4)
MONOCYTES NFR BLD AUTO: 8.7 % (ref 0–13.4)
MONOCYTES NFR BLD AUTO: 9.5 % (ref 0–13.4)
MORPHOLOGY BLD-IMP: NORMAL
NEUTROPHILS # BLD AUTO: 10.61 K/UL (ref 1.82–7.42)
NEUTROPHILS # BLD AUTO: 8.55 K/UL (ref 1.82–7.42)
NEUTROPHILS # BLD AUTO: 9.15 K/UL (ref 1.82–7.42)
NEUTROPHILS NFR BLD: 71.9 % (ref 44–72)
NEUTROPHILS NFR BLD: 74.2 % (ref 44–72)
NEUTROPHILS NFR BLD: 87 % (ref 44–72)
NRBC # BLD AUTO: 0 K/UL
NRBC BLD-RTO: 0 /100 WBC
OVALOCYTES BLD QL SMEAR: NORMAL
PLATELET # BLD AUTO: 300 K/UL (ref 164–446)
PLATELET # BLD AUTO: 317 K/UL (ref 164–446)
PLATELET # BLD AUTO: 325 K/UL (ref 164–446)
PLATELET BLD QL SMEAR: NORMAL
PMV BLD AUTO: 9.3 FL (ref 9–12.9)
PMV BLD AUTO: 9.4 FL (ref 9–12.9)
PMV BLD AUTO: 9.7 FL (ref 9–12.9)
POIKILOCYTOSIS BLD QL SMEAR: NORMAL
POTASSIUM SERPL-SCNC: 4.7 MMOL/L (ref 3.6–5.5)
PROT SERPL-MCNC: 6.7 G/DL (ref 6–8.2)
PROTHROMBIN TIME: 18.3 SEC (ref 12–14.6)
RBC # BLD AUTO: 2.5 M/UL (ref 4.7–6.1)
RBC # BLD AUTO: 2.53 M/UL (ref 4.7–6.1)
RBC # BLD AUTO: 2.66 M/UL (ref 4.7–6.1)
RBC BLD AUTO: PRESENT
SARS-COV-2 RNA RESP QL NAA+PROBE: NOTDETECTED
SODIUM SERPL-SCNC: 133 MMOL/L (ref 135–145)
SPECIMEN SOURCE: NORMAL
WBC # BLD AUTO: 11.5 K/UL (ref 4.8–10.8)
WBC # BLD AUTO: 12.2 K/UL (ref 4.8–10.8)
WBC # BLD AUTO: 12.7 K/UL (ref 4.8–10.8)

## 2020-10-06 PROCEDURE — 700102 HCHG RX REV CODE 250 W/ 637 OVERRIDE(OP): Performed by: PHYSICIAN ASSISTANT

## 2020-10-06 PROCEDURE — 0WJB3ZZ INSPECTION OF LEFT PLEURAL CAVITY, PERCUTANEOUS APPROACH: ICD-10-PCS | Performed by: RADIOLOGY

## 2020-10-06 PROCEDURE — 71045 X-RAY EXAM CHEST 1 VIEW: CPT

## 2020-10-06 PROCEDURE — 700102 HCHG RX REV CODE 250 W/ 637 OVERRIDE(OP): Performed by: INTERNAL MEDICINE

## 2020-10-06 PROCEDURE — 85025 COMPLETE CBC W/AUTO DIFF WBC: CPT | Mod: 91

## 2020-10-06 PROCEDURE — A9270 NON-COVERED ITEM OR SERVICE: HCPCS | Performed by: INTERNAL MEDICINE

## 2020-10-06 PROCEDURE — 99232 SBSQ HOSP IP/OBS MODERATE 35: CPT | Performed by: INTERNAL MEDICINE

## 2020-10-06 PROCEDURE — 87040 BLOOD CULTURE FOR BACTERIA: CPT | Mod: 91

## 2020-10-06 PROCEDURE — A9270 NON-COVERED ITEM OR SERVICE: HCPCS | Performed by: PHYSICIAN ASSISTANT

## 2020-10-06 PROCEDURE — 85007 BL SMEAR W/DIFF WBC COUNT: CPT

## 2020-10-06 PROCEDURE — 700105 HCHG RX REV CODE 258: Performed by: INTERNAL MEDICINE

## 2020-10-06 PROCEDURE — 30233K1 TRANSFUSION OF NONAUTOLOGOUS FROZEN PLASMA INTO PERIPHERAL VEIN, PERCUTANEOUS APPROACH: ICD-10-PCS | Performed by: INTERNAL MEDICINE

## 2020-10-06 PROCEDURE — 770020 HCHG ROOM/CARE - TELE (206)

## 2020-10-06 PROCEDURE — 80053 COMPREHEN METABOLIC PANEL: CPT

## 2020-10-06 PROCEDURE — 700111 HCHG RX REV CODE 636 W/ 250 OVERRIDE (IP): Performed by: INTERNAL MEDICINE

## 2020-10-06 PROCEDURE — 99233 SBSQ HOSP IP/OBS HIGH 50: CPT | Mod: GC | Performed by: INTERNAL MEDICINE

## 2020-10-06 PROCEDURE — U0003 INFECTIOUS AGENT DETECTION BY NUCLEIC ACID (DNA OR RNA); SEVERE ACUTE RESPIRATORY SYNDROME CORONAVIRUS 2 (SARS-COV-2) (CORONAVIRUS DISEASE [COVID-19]), AMPLIFIED PROBE TECHNIQUE, MAKING USE OF HIGH THROUGHPUT TECHNOLOGIES AS DESCRIBED BY CMS-2020-01-R: HCPCS

## 2020-10-06 PROCEDURE — 83605 ASSAY OF LACTIC ACID: CPT

## 2020-10-06 PROCEDURE — 700101 HCHG RX REV CODE 250: Performed by: INTERNAL MEDICINE

## 2020-10-06 PROCEDURE — 36415 COLL VENOUS BLD VENIPUNCTURE: CPT

## 2020-10-06 PROCEDURE — 51798 US URINE CAPACITY MEASURE: CPT

## 2020-10-06 PROCEDURE — 85027 COMPLETE CBC AUTOMATED: CPT

## 2020-10-06 PROCEDURE — 99233 SBSQ HOSP IP/OBS HIGH 50: CPT | Performed by: STUDENT IN AN ORGANIZED HEALTH CARE EDUCATION/TRAINING PROGRAM

## 2020-10-06 PROCEDURE — 85610 PROTHROMBIN TIME: CPT

## 2020-10-06 PROCEDURE — 4410569 US-THORACENTESIS PUNCTURE

## 2020-10-06 RX ORDER — SODIUM CHLORIDE, SODIUM LACTATE, POTASSIUM CHLORIDE, CALCIUM CHLORIDE 600; 310; 30; 20 MG/100ML; MG/100ML; MG/100ML; MG/100ML
INJECTION, SOLUTION INTRAVENOUS CONTINUOUS
Status: DISCONTINUED | OUTPATIENT
Start: 2020-10-07 | End: 2020-10-07

## 2020-10-06 RX ADMIN — SODIUM CHLORIDE 3 G: 900 INJECTION INTRAVENOUS at 04:21

## 2020-10-06 RX ADMIN — LIDOCAINE 1 PATCH: 50 PATCH TOPICAL at 17:23

## 2020-10-06 RX ADMIN — METOPROLOL SUCCINATE 50 MG: 25 TABLET, EXTENDED RELEASE ORAL at 04:20

## 2020-10-06 RX ADMIN — Medication 2001 MG: at 17:21

## 2020-10-06 RX ADMIN — DOCUSATE SODIUM 50 MG AND SENNOSIDES 8.6 MG 2 TABLET: 8.6; 5 TABLET, FILM COATED ORAL at 04:20

## 2020-10-06 RX ADMIN — FUROSEMIDE 40 MG: 40 TABLET ORAL at 04:20

## 2020-10-06 RX ADMIN — Medication 2001 MG: at 08:39

## 2020-10-06 RX ADMIN — Medication 2001 MG: at 12:11

## 2020-10-06 RX ADMIN — ATORVASTATIN CALCIUM 40 MG: 40 TABLET, FILM COATED ORAL at 17:21

## 2020-10-06 RX ADMIN — LOSARTAN POTASSIUM 25 MG: 50 TABLET, FILM COATED ORAL at 17:21

## 2020-10-06 RX ADMIN — Medication 100 MG: at 04:20

## 2020-10-06 RX ADMIN — AMIODARONE HYDROCHLORIDE 200 MG: 200 TABLET ORAL at 04:21

## 2020-10-06 RX ADMIN — LIDOCAINE 1 PATCH: 50 PATCH TOPICAL at 17:24

## 2020-10-06 RX ADMIN — FOLIC ACID 1 MG: 1 TABLET ORAL at 04:21

## 2020-10-06 RX ADMIN — OXYCODONE HYDROCHLORIDE 5 MG: 5 TABLET ORAL at 08:39

## 2020-10-06 RX ADMIN — POLYETHYLENE GLYCOL 3350, SODIUM SULFATE ANHYDROUS, SODIUM BICARBONATE, SODIUM CHLORIDE, POTASSIUM CHLORIDE 4 L: 236; 22.74; 6.74; 5.86; 2.97 POWDER, FOR SOLUTION ORAL at 20:35

## 2020-10-06 ASSESSMENT — PAIN DESCRIPTION - PAIN TYPE
TYPE: ACUTE PAIN
TYPE: ACUTE PAIN

## 2020-10-06 ASSESSMENT — ENCOUNTER SYMPTOMS
SHORTNESS OF BREATH: 0
PALPITATIONS: 0
HEADACHES: 0
CONSTIPATION: 0
DIZZINESS: 0
CHILLS: 0
FLANK PAIN: 0
DIARRHEA: 1
NAUSEA: 0
FEVER: 0
FOCAL WEAKNESS: 0
NERVOUS/ANXIOUS: 0
VOMITING: 0
BACK PAIN: 0
ABDOMINAL PAIN: 0
MEMORY LOSS: 0
MYALGIAS: 1
BLURRED VISION: 0
DIAPHORESIS: 0
WEAKNESS: 1
BLOOD IN STOOL: 1

## 2020-10-06 NOTE — PROGRESS NOTES
Daily Progress Note:     Date of Service: 10/6/2020  Attending: Brianna Goddard M.D.   ID Attending: Dr. Malhotra    ID:  50M PMH A Fib (on coumadin), history of HFrEF (EF 20% 2017; repeat ECHO 9/2020 with EF 70%), ESRD presumably cardiorenal (on dialysis MWF) admitted with chest pain with inspiration.  ID is consulted for antibiotic recommendations for positive blood culture (MSSA, pansensitive) and pneumonia/ empyema. He denies synthetic AV graft material or other non-native material.    Internal Events:  Dialysis 10/5 ended after 500cc UF due to hypotension. Received Vitamin K INR 7.3--> 1.8    Subjective:   Patient states that he is feeling much better than yesterday, and chest pain is nearly resolved. His only complaint today is knee pain, which he states is chronic. He denies fever, chills, nausea, vomiting, dizziness.    Objective:   Physical Exam:   Vitals:   Temp:  [2.6 °C (36.7 °F)-36.9 °C (98.4 °F)] 36.7 °C (98 °F)  Pulse:  [] 59  Resp:  [16-18] 16  BP: ()/(58-74) 138/74  SpO2:  [92 %-98 %] 92 %    Physical Exam  Vitals signs and nursing note reviewed.   Constitutional:       General: He is not in acute distress.     Appearance: Normal appearance.   HENT:      Head: Normocephalic and atraumatic.      Nose: Nose normal.      Mouth/Throat:      Mouth: Mucous membranes are moist.   Eyes:      Extraocular Movements: Extraocular movements intact.      Pupils: Pupils are equal, round, and reactive to light.   Neck:      Musculoskeletal: Normal range of motion and neck supple. No muscular tenderness.   Cardiovascular:      Rate and Rhythm: Normal rate and regular rhythm.      Pulses: Normal pulses.      Heart sounds: Normal heart sounds.   Pulmonary:      Effort: Pulmonary effort is normal.      Comments: Decreased breath sounds L base  Abdominal:      General: There is no distension.      Palpations: Abdomen is soft.      Tenderness: There is no abdominal tenderness.   Musculoskeletal: Normal range of  motion.         General: Swelling (R knee, generalized, not warm or red) present. No tenderness or deformity.      Right lower leg: No edema.      Left lower leg: No edema.   Skin:     General: Skin is warm and dry.      Capillary Refill: Capillary refill takes less than 2 seconds.      Coloration: Skin is not jaundiced.   Neurological:      General: No focal deficit present.      Mental Status: He is alert and oriented to person, place, and time.      Cranial Nerves: No cranial nerve deficit.   Psychiatric:         Mood and Affect: Mood normal.         Behavior: Behavior normal.           Labs:   Recent Labs     10/05/20  0531  10/05/20  1600 10/06/20  0154 10/06/20  0920   WBC 12.4*   < > 13.7* 12.2* 12.7*   RBC 2.76*   < > 2.83* 2.66* 2.50*   HEMOGLOBIN 9.0*   < > 9.1* 8.7* 8.1*   HEMATOCRIT 28.5*   < > 29.1* 27.5* 25.7*   .3*   < > 102.8* 103.4* 102.8*   MCH 32.6   < > 32.2 32.7 32.4   RDW 53.3*   < > 54.4* 54.2* 52.8*   PLATELETCT 298   < > 363 317 325   MPV 9.5   < > 9.6 9.3 9.7   NEUTSPOLYS 74.80*   < > 87.70* 87.00* 71.90   LYMPHOCYTES 8.40*   < > 4.40* 6.10* 13.40*   MONOCYTES 6.70   < > 4.40 3.50 8.70   EOSINOPHILS 7.60*   < > 2.60 1.70 5.40   BASOPHILS 0.00   < > 0.90 1.70 0.20   RBCMORPHOLO Present  --  Present Present  --     < > = values in this interval not displayed.     [unfilled]  Recent Labs     10/05/20  0531 10/05/20  1600 10/06/20  0154   ALBUMIN  --   --  3.1*   TBILIRUBIN  --   --  0.5   ALKPHOSPHAT  --   --  111*   TOTPROTEIN  --   --  6.7   ALTSGPT  --   --  16   ASTSGOT  --   --  20   CREATININE 12.57* 6.24* 8.08*       Imaging:   DX-CHEST-PORTABLE (1 VIEW)   Final Result      Increased opacity on the left may represent a layering pleural effusion with overlying atelectasis/consolidation.      Stable cardiomegaly.         US-EXTREMITY VENOUS UPPER UNILAT RIGHT   Final Result      DX-KNEE COMPLETE 4+ RIGHT   Final Result      1.  Large RIGHT knee joint effusion.   2.  No fracture or  dislocation.   3.  Minimal degenerative change.      CT-ABDOMEN-PELVIS WITH   Final Result      1.  Colonic diverticulosis without evidence for diverticulitis.   2.  Normal appendix.   3.  No focal mesenteric inflammatory process.   4.  Diffuse atherosclerotic calcification of abdominal aorta with prominent celiac trunk and SMA, without evidence for occlusion.   5.  Loculated fluid collection of the LEFT lung base with associated pleural thickening and atelectasis, possibly indicating empyema, new from prior.      NM-CARDIAC STRESS TEST   Final Result      EC-ECHOCARDIOGRAM COMPLETE W/O CONT   Final Result      CT-CHEST (THORAX) W/O   Final Result         1.  Left basilar atelectasis, component of infiltrate not definitively excluded.   2.  Atherosclerosis and atherosclerotic coronary artery disease   3.  Cardiomegaly   4.  Hepatomegaly      DX-CHEST-PORTABLE (1 VIEW)   Final Result         1.  Left basilar atelectasis, no focal infiltrate   2.  Cardiomegaly          Assessment and Plan:    50M with ESRD (HD MWF), A Fib (coumadin) presenting with fall, pleuritic chest pain, found to have L base consolidation and loculated effusion on CT, and 1 of 2 positive cultures for MSSA.      # Bacteremia, MSSA  - one of two sets on 9/29, repeat sets on 9/30 NGTD  - Unasyn sensitive, day #8  - Verbal report from Microbiology  - No vegetation reported on TTE  - Patient remains afebrile     # Pneumonia, associated empyema  - Pleuritic chest pain on presentation  - Chest CT 9/29 with atelectasis  - CT Abd/pelvis 10/5 shows loculated fluid collection of the LEFT lung base with associated pleural thickening and atelectasis, possibly indicating empyema, new from prior  - IR consult pending    # R knee swelling, unknown chronicity  - Does not appear on exam consistent with septic arthritis, no warmth, redness or decreased active or passive ROM, able to bear weight  - Concerning in setting of MSSA bacteremia# ESRD  - On HD MWF,  compliant     # History of HFrEF (EF 20%) 2017  - Unclear etiology  - Current EF 70%     PLAN:     - Follow blood cultures  - Daily CXR  - Recommend drainage of locuated pleural effusion, with AFB/Fungal/Aerobic/anaerobic cultures  - Consider CT surgery consult if unable to obtain adequate drainage of effusion  - Tentatively, depending on pleural fluid culture results:     Continue unasyn for 7 days post negative BC, end date 10/7, then:     Transition to cefazolin for total of 4 weeks for bacteremia end date 10/28 if BC             remain negative  - Continue to monitor knee, low threshold for Ortho consult for arthrocentesis  - No LOBO recommended at this time     - [facility] HD, nephrology following  - ID will continue to follow    Discussed with Dr. Malhotra, ID attending

## 2020-10-06 NOTE — PROGRESS NOTES
[facility] Cardiology Follow-up Note    Date of Service:    10/6/2020      Name:   Giovanni Saini   YOB: 1970  Age:   50 y.o.  male   MRN:   [id number]      Chief Complaint: tachycardia.    Primary Cardiologist:  Dr. Nuñez    HPI:  Mr Saini is a 49 y/o fellow with PMH including PAF dx in 2017 s/p DCCV, on warfarin, aortic insufficiency.  He had been evaluated by Dr. Dia in 2017 who recommended AVR/MAZE at that time.   He had HFpEF (hx of EF 20% 2017 - thought to be rate related and recovered), ESRD on HD (M, W, F).  He presented to [facility] on 9/29/20 with c/o chest pain, after a recent fall.  Also found to have supratherapeutic INR of 7.3. found to have PNA and + blood cultures (MSSA on 1/2 sets on 9/29).   He also has a loculated pleural effusion L lung base.      Was seen in consultation by Dr. Mchugh on 10/2/20, nuclear stress test showed small fixed infer/lateral defect, thought to be consistent with artifact given his normal EF/echo.  Dr. Mchugh signed off on 10/3.  Cardiology called to re-evaluate on 10/5/20 due to AFIB RVR and NSVT during HD.    Interim Events:  Communicated with patient via .   He is overall the same this morning  Still some pleuritic chest pain (has a lidocaine patch on his chest and R knee)  Also c/o R knee pain and R arm pain  US of the R arm 10/5 found antecubital vein thrombus.  R knee US 10/5 with large effusion.    ROS  Constitutional:  denies fatigue.  Respiratory:  Denies shortness of breath, no cough.  Cardiovascular:  + chest pain w deep inspiration. no lower extremity edema.  Denies orthopnea or PND.  : minimal urine output.  GI:  Denies nausea/vomiting.  No abdominal distention.  Neuro:  Denies dizziness, syncope.  Hem/lymph: Denies easy bleeding/bruising.      All other review of systems reviewed and negative.    Past medical, surgical, social, and family history reviewed and unchanged from admission except as noted in  HPI.    Medications: Reviewed in MAR  Current Facility-Administered Medications   Medication Dose Frequency Provider Last Rate Last Dose   • lidocaine (LIDODERM) 5 % 1 Patch  1 Patch Q24HR Aissatou Peacock D.O.   1 Patch at 10/05/20 2236   • lidocaine (LIDODERM) 5 % 1 Patch  1 Patch Q24HR Aissatou Peacock D.O.   1 Patch at 10/05/20 2234   • metoprolol SR (TOPROL XL) tablet 50 mg  50 mg DAILY EDYTA Aaron.[email or web address]-VERITO   50 mg at 10/06/20 0420   • ampicillin/sulbactam (UNASYN) 3 g in  mL IVPB  3 g Q24HRS Aissatou Peacock D.O.   Stopped at 10/06/20 0451   • calcium acetate (PHOS-LO) 667 MG tablet 2,001 mg  2,001 mg TID AC Katrina Merchant M.D.   2,001 mg at 10/05/20 1646   • tramadol (ULTRAM) 50 MG tablet 50 mg  50 mg Q12HRS PRN HAKEEM BallardORaisa   50 mg at 10/04/20 2107   • heparin injection 1,500 Units  1,500 Units ACUTE DIALYSIS PRN Katrina Merchant M.D.   1,500 Units at 10/02/20 1148   • oxyCODONE immediate-release (ROXICODONE) tablet 5 mg  5 mg Q6HRS PRN HAKEEM BallardORaisa   5 mg at 10/05/20 0043   • amiodarone (CORDARONE) tablet 200 mg  200 mg DAILY Blayne Verma M.D.   200 mg at 10/06/20 0421   • atorvastatin (LIPITOR) tablet 40 mg  40 mg Q EVENING Blayne Verma M.D.   40 mg at 10/05/20 1645   • folic acid (FOLVITE) tablet 1 mg  1 mg DAILY Blayne Verma M.D.   1 mg at 10/06/20 0421   • furosemide (LASIX) tablet 40 mg  40 mg DAILY Blayne Verma M.D.   40 mg at 10/06/20 0420   • losartan (COZAAR) tablet 25 mg  25 mg Q EVENING Blayne Verma M.D.   25 mg at 10/05/20 1848   • thiamine tablet 100 mg  100 mg DAILY Blayne Verma M.D.   100 mg at 10/06/20 0420   • senna-docusate (PERICOLACE or SENOKOT S) 8.6-50 MG per tablet 2 Tab  2 Tab BID Blayne Verma M.D.   2 Tab at 10/06/20 0420    And   • polyethylene glycol/lytes (MIRALAX) PACKET 1 Packet  1 Packet QDAY SYLVIE Verma M.D.        And   • magnesium hydroxide (MILK OF MAGNESIA) suspension 30 mL  30 mL QDAY SYLVIE Verma M.D.         "And   • bisacodyl (DULCOLAX) suppository 10 mg  10 mg QDAY PRN Blayne Verma M.D.       • acetaminophen (TYLENOL) tablet 650 mg  650 mg Q6HRS PRN Blayne Verma M.D.   650 mg at 10/05/20 0410   • MD Alert...Warfarin per Pharmacy   PHARMACY TO DOSE Blayne Verma M.D.       Last reviewed on 9/29/2020  7:39 AM by Heidi Garcia T    No Known Allergies    Physical Exam  Body mass index is 24.65 kg/m². /65   Pulse (!) 59   Temp 36.8 °C (98.3 °F) (Temporal)   Resp 16   Ht 1.651 m (5' 5\")   Wt 67.2 kg (148 lb 2.4 oz)   SpO2 95%    Vitals:    10/05/20 1600 10/05/20 2000 10/06/20 0012 10/06/20 0500   BP: (!) 99/63 132/67 145/73 121/65   Pulse: (!) 113 61 63 (!) 59   Resp: 16 16 16 16   Temp: (!) 2.6 °C (36.7 °F) 36.9 °C (98.4 °F) 36.6 °C (97.9 °F) 36.8 °C (98.3 °F)   TempSrc: Temporal Temporal Temporal Temporal   SpO2: 98% 94% 94% 95%   Weight:       Height:        Oxygen Therapy:  Pulse Oximetry: 95 %, O2 Delivery Device: None - Room Air    General: no apparent distress  Neck: no JVD   Lungs: normal effort,  without crackles, no wheezing or rhonchi  Heart: normal rate, irregular rhythm, 2/6 murmur at USB, no rub  EXT: no lower extremity edema, still with firm baseball sized area of swelling at the proximal lateral R knee, minimally tender with palpation, slightly warm to touch compared to L.  R arm ventral mid forearm with firm area, likely related to thrombus.  Neurological: No focal deficits, no facial asymmetry.  Normal speech  Psychiatric: Appropriate affect, alert and oriented x 3  Skin: Warm extremities, no rash    Labs (personally reviewed):     Lab Results   Component Value Date/Time    SODIUM 133 (L) 10/06/2020 01:54 AM    POTASSIUM 4.7 10/06/2020 01:54 AM    CHLORIDE 92 (L) 10/06/2020 01:54 AM    CO2 30 10/06/2020 01:54 AM    GLUCOSE 122 (H) 10/06/2020 01:54 AM    BUN 38 (H) 10/06/2020 01:54 AM    CREATININE 8.08 (HH) 10/06/2020 01:54 AM     Lab Results   Component Value Date/Time    " ALKPHOSPHAT 111 (H) 10/06/2020 01:54 AM    ASTSGOT 20 10/06/2020 01:54 AM    ALTSGPT 16 10/06/2020 01:54 AM    TBILIRUBIN 0.5 10/06/2020 01:54 AM      Lab Results   Component Value Date/Time    CHOLSTRLTOT 111 2020 08:38 AM    LDL 37 2020 08:38 AM    HDL 61 2020 08:38 AM    TRIGLYCERIDE 65 2020 08:38 AM         Cardiac Imaging and Procedures Review:      Personal Telemetry Review:  SB in the 50-60 range this AM.  Converted from AFIB 10/5/20 around 1739.   No AFIB or NSVT overnight.    Echo 20:  CONCLUSIONS  Normal left ventricular systolic function.  Left ventricular ejection fraction is visually estimated to be 70%.  Moderate concentric left ventricular hypertrophy.  Aortic sclerosis without stenosis.  Moderate aortic insufficiency.    Stress Test 10/3/2020:  NUCLEAR IMAGING INTERPRETATION   Small ill defined, mostly fixed defect in the inferolateral wall, likely    small infarct or artifact.      Assessment and Medical Decision Makin   Paroxysmal atrial fibrillation, now maintaining SR.  -    Continue Toprol and amiodarone  -    Warfarin mgmt per pharmacy  -    [id number] at least 2 (cad, chf)    2   NSVT  -    Short runs 4-8 beats noted during dialysis 10/5/20  -    Tolerated Toprol XL 50 well, to continue  -    Continue PO amio 200 daily  -    Electrolytes stable, maintain K > 4, Mag >2     3   MSSA bacteremia.  -   1/2 sets blood cx + on   -   abx per ID and primary team    4   R knee effusion, large per xray  -    New, concern for septic joint given his bacteremia, relayed to hospitalist 10/5.     5   Coagulopathy, INR > 7 - now improved 1.47   -    Received vitamin K 10 mg  + 1 u FFP  10/5/20  -    With rectal bleeding and hematuria per notes.    6   Coronary artery disease, diffuse calcifications on CT  -    Without ischemia on recent MPI 10/3/20  -    CP is pleuritic and atypical, unlikely cardiac.    7   L loculated pleural effusion with possible pneumonia.  -   CT  abd/pelvis today with concern for empyema    8   ESRD on HD (M, W, F)    9   Chronic HFpEF  -    Historically EF 20% in 2017 -  Possibly tachy induced  -    EF has recovered (70% on 9/30/20)  -    Continues on Toprol and losartan.    10  Chronic anemia   -    Having hematochezia  -    GI evaluated, plans for colonoscopy Wed 10/7  -    Patient at low to intermediate risk for perioperative cardiovascular events.  May proceed with procedure .    11  Aortic insufficiency   -    Moderate and stable since 2017    Cardiology will sign off at this time.  Please contact our service directly with further questions/concerns.    Na Bro PA-C  [facility]

## 2020-10-06 NOTE — CARE PLAN
Problem: Communication  Goal: The ability to communicate needs accurately and effectively will improve  Outcome: PROGRESSING AS EXPECTED  Note: Patient Brazilian speaking, A&Ox4, able to make needs known and using call light appropriately for assistance. Call light and belongings within reach. Hourly rounding in place.     Problem: Safety  Goal: Will remain free from injury  Outcome: PROGRESSING AS EXPECTED  Note: Education regarding fall risk provided; patient expressing understanding and using call light appropriately for assistance. Bed locked and in lowest position, bed alarm on. Call light and belongings within reach. Hourly rounding in place.

## 2020-10-06 NOTE — PROGRESS NOTES
The patient was transported to ultrasound for a diagnostic ultrasound guided thoracentesis of the LEFT pleura. The  service was used to describe the procedure, obtain the consent, perform the time out, and inform the patient about the procedure being unsuccessful. Dr. Banuelos was present for the consent and the time out. Dr. Banuelos performed the procedure. However, the fluid was too thick to be removed through the small catheter. The procedure was then ended and a band aid was placed over the site. A post thoracentesis chest x-ray was performed. The patient tolerated the procedure well and left in a stable condition. RNRodriguez, was updated.

## 2020-10-06 NOTE — PROGRESS NOTES
Anil text sent to Dr Peacock at this time to report EKG changes.  Cardiology on floor to review and see patient.

## 2020-10-06 NOTE — PROGRESS NOTES
Spoke with Dr. Horton with GI. MD to round with pt prior to placing orders for bowel prep. Still planning for colonoscopy tomorrow.

## 2020-10-06 NOTE — PROGRESS NOTES
Patient in IR for thoracentesis. Hgb slowly declining and oozing rectal bleeding per notes. We will delay colonoscopy for now due to cardiac status and thoracentesis today. Possible colonoscopy on Thur depending on clinical status. Keep patient on clear liquid diet.

## 2020-10-06 NOTE — CARE PLAN
Problem: Communication  Goal: The ability to communicate needs accurately and effectively will improve  Outcome: PROGRESSING AS EXPECTED  Note: Ipad  in use. Pt calling appropriate.      Problem: Safety  Goal: Will remain free from falls  Outcome: PROGRESSING AS EXPECTED  Note: Fall precautions in place.

## 2020-10-06 NOTE — PROGRESS NOTES
Monitor Summary: SB-SR 55-60, DE 0.20, QRS 0.10, QT 0.46, with conversion to Afib at a rate of 131 during dialysis and several runs of Vtach up to 6 beats per strip from monitor room.

## 2020-10-06 NOTE — CONSULTS
Gastroenterology Consult Note:    Danae Larose M.D.  Date & Time note created:    10/5/2020   5:02 PM     Patient ID:  Name:             Giovanni Saini  YOB: 1970  Age:                 50 y.o.  male  MRN:               [id number]    Referring MD:  Dr. Peacock                                                             Chief Complaint(s):      Heamtochezia    History of Present Illness:    This is a very pleasant 50 y.o. male with the past medical history of end-stage renal disease on dialysis Monday Wednesday Friday, paroxysmal atrial fibrillation on Coumadin, stage C systolic congestive heart failure HFrEF (EF 20%), Tristanian-speaking only comes in for chest pain acute onset for approximately 24 hours. He says the pain originates primarily in the left breast and radiates down to the left upper quadrant of his belly sometimes to his left shoulder and is worsened with deep inspiration and notices some mild shortness of breath but no clear actual cough.  Patient states he is compliant with his Coumadin but cannot tell me his last INR. He apparently has been on dialysis for 2 years and does produce some urine. Pt was seen by Cardiology, and NM stress test on 10/3/20 showed Small ill defined, mostly fixed defect in the inferolateral wall, likely  small infarct or artifact.     The patient was also found to have supratherapeutic INR. In the past one week, he has minimal BRBPR. He had another episode of BRBPR 2 weeks ago. Denied FH of CRC. Never had COL in the past.  He denied dyschezia.    On average, the patient has 2 BMs a day, mostly type 4 on the [place] stool chart.    Otherwise the patient is doing fine without complaints of fever/chills/weight loss/appetite change/dysphagia/odynophagia/heartburn/nausea/vomiting/bloating/constipation/diarrhea/melena or abdominal pain.    Review of Systems:      Constitutional: Denies fevers, weight changes  Eyes: Denies changes in vision,  jaundice  Ears/Nose/Throat/Mouth: Denies nasal congestion or sore throat   Cardiovascular: Denies chest pain or palpitations   Respiratory: Denies shortness of breath, denies cough  Gastrointestinal/Hepatic: Denies abdominal pain, nausea, vomiting, diarrhea, constipation; pos GI bleeding   Genitourinary: Denies dysuria or frequency  Musculoskeletal/Rheum: Denies  joint pain and swelling, edema  Skin: Denies rash  Neurological: Denies headache, confusion, memory loss or focal weakness/parasthesias  Psychiatric: denies mood disorder   Endocrine: Randee thyroid problems  Heme/Oncology/Lymph Nodes: Denies enlarged lymph nodes, denies brusing or known bleeding disorder  All other systems were reviewed and are negative (AMA/CMS criteria)            ROS    Past Medical History:   Past Medical History:   Diagnosis Date   • Heart murmur    • Valvular heart disease     aortic insufficiency     Active Hospital Problems    Diagnosis   • PAF (paroxysmal atrial fibrillation) ([facility]) [I48.0]     Priority: High   • Essential hypertension [I10]     Priority: Low   • Rectal bleed [K62.5]   • Right knee pain [M25.561]   • Hematuria [R31.9]   • Bacteremia [R78.81]   • Acute pain of left shoulder [M25.512]   • ESRD (end stage renal disease) on dialysis ([facility]) [N18.6, Z99.2]   • CAP (community acquired pneumonia) [J18.9]   • Other chest pain [R07.89]       Past Surgical History:  Past Surgical History:   Procedure Laterality Date   • CATH PLACEMENT  8/27/2017    Procedure: CATH PLACEMENT - perm cath;  Surgeon: Hiren Ayon M.D.;  Location: SURGERY [facility];  Service: General   • AV FISTULA CREATION  8/27/2017    Procedure: AV FISTULA CREATION - left radial cephalic;  Surgeon: Hiren Ayon M.D.;  Location: SURGERY [facility];  Service: General       Hospital Medications:  Current Facility-Administered Medications   Medication Dose Frequency Provider Last Rate Last Dose   • lidocaine (LIDODERM) 5 % 1 Patch  1 Patch Q24HR  Aissatou Peacock D.O.       • lidocaine (LIDODERM) 5 % 1 Patch  1 Patch Q24HR Aissatou Peacock D.O.       • ampicillin/sulbactam (UNASYN) 3 g in  mL IVPB  3 g Q24HRS Aissatou Peacock D.O.   Stopped at 10/05/20 0440   • calcium acetate (PHOS-LO) 667 MG tablet 2,001 mg  2,001 mg TID AC Katrina Merchant M.D.   2,001 mg at 10/05/20 1646   • tramadol (ULTRAM) 50 MG tablet 50 mg  50 mg Q12HRS PRN Aissatou Peacock D.O.   50 mg at 10/04/20 2107   • heparin injection 1,500 Units  1,500 Units ACUTE DIALYSIS PRN Katrina Merchant M.D.   1,500 Units at 10/02/20 1148   • oxyCODONE immediate-release (ROXICODONE) tablet 5 mg  5 mg Q6HRS PRN Aissatou Peacock D.O.   5 mg at 10/05/20 0043   • amiodarone (CORDARONE) tablet 200 mg  200 mg DAILY Blayne Verma M.D.   200 mg at 10/05/20 0517   • atorvastatin (LIPITOR) tablet 40 mg  40 mg Q EVENING Blayne Verma M.D.   40 mg at 10/05/20 1645   • folic acid (FOLVITE) tablet 1 mg  1 mg DAILY Blayne Verma M.D.   1 mg at 10/05/20 0410   • furosemide (LASIX) tablet 40 mg  40 mg DAILY Blayne Verma M.D.   40 mg at 10/05/20 0411   • isosorbide mononitrate SR (IMDUR) tablet 60 mg  60 mg DAILY Blayne Verma M.D.   60 mg at 10/05/20 0411   • losartan (COZAAR) tablet 25 mg  25 mg Q EVENING Blayne Verma M.D.   25 mg at 10/04/20 1614   • metoprolol SR (TOPROL XL) tablet 25 mg  25 mg DAILY Blayne Verma M.D.   25 mg at 10/05/20 0411   • thiamine tablet 100 mg  100 mg DAILY Blayne Verma M.D.   100 mg at 10/05/20 0410   • senna-docusate (PERICOLACE or SENOKOT S) 8.6-50 MG per tablet 2 Tab  2 Tab BID Blayne Verma M.D.   2 Tab at 10/05/20 1645    And   • polyethylene glycol/lytes (MIRALAX) PACKET 1 Packet  1 Packet QDAY PRKALINA Verma M.D.        And   • magnesium hydroxide (MILK OF MAGNESIA) suspension 30 mL  30 mL QDAY SYLVIE Verma M.D.        And   • bisacodyl (DULCOLAX) suppository 10 mg  10 mg QDAY SYLVIE Verma M.D.       • acetaminophen (TYLENOL) tablet  650 mg  650 mg Q6HRS PRN Blayne Verma M.D.   650 mg at 10/05/20 0410   • MD Alert...Warfarin per Pharmacy   PHARMACY TO DOSE Blayne Verma M.D.       Last reviewed on 9/29/2020  7:39 AM by Madison Luu      Current Outpatient Medications:  Medications Prior to Admission   Medication Sig Dispense Refill Last Dose   • warfarin (COUMADIN) 4 MG Tab Take 4-6 mg by mouth every morning. 6 mg every Tuesday and Thrusday  4 mg all other days of the week   9/28/2020 at AM   • isosorbide mononitrate SR (IMDUR) 60 MG TABLET SR 24 HR Take 1 Tab by mouth every day. 30 Tab 11 9/28/2020 at AM   • losartan (COZAAR) 25 MG Tab TAKE ONE TABLET BY MOUTH EVERY DAY AT BEDTIME FOR BLOOD PRESSURE - BRING ALL MEDICATIONS TO EVERY APPOINTMENT - AS PER DR. STRICKLAND 30 Tab 6 9/28/2020 at PM   • metoprolol SR (TOPROL XL) 25 MG TABLET SR 24 HR Take 1 Tab by mouth every day. 30 Tab 11 9/28/2020 at AM   • furosemide (LASIX) 40 MG Tab Take 1 Tab by mouth every day. 30 Tab 11 9/28/2020 at AM   • amiodarone (CORDARONE) 200 MG Tab Take 1 Tab by mouth every day. 30 Tab 11 9/28/2020 at AM   • atorvastatin (LIPITOR) 40 MG Tab Take 1 Tab by mouth every evening. 30 Tab 11 9/28/2020 at PM       Medication Allergy:  No Known Allergies    Family History:  Family History   Problem Relation Age of Onset   • Stroke Sister 38        CVA       Social History:  Social History     Socioeconomic History   • Marital status:      Spouse name: Not on file   • Number of children: Not on file   • Years of education: Not on file   • Highest education level: Not on file   Occupational History   • Not on file   Social Needs   • Financial resource strain: Not on file   • Food insecurity     Worry: Not on file     Inability: Not on file   • Transportation needs     Medical: Not on file     Non-medical: Not on file   Tobacco Use   • Smoking status: Never Smoker   • Smokeless tobacco: Never Used   Substance and Sexual Activity   • Alcohol use: No      "Alcohol/week: 6.0 oz     Types: 10 Standard drinks or equivalent per week     Comment: quit after hospital    • Drug use: No   • Sexual activity: Not on file   Lifestyle   • Physical activity     Days per week: Not on file     Minutes per session: Not on file   • Stress: Not on file   Relationships   • Social connections     Talks on phone: Not on file     Gets together: Not on file     Attends Amish service: Not on file     Active member of club or organization: Not on file     Attends meetings of clubs or organizations: Not on file     Relationship status: Not on file   • Intimate partner violence     Fear of current or ex partner: Not on file     Emotionally abused: Not on file     Physically abused: Not on file     Forced sexual activity: Not on file   Other Topics Concern   • Not on file   Social History Narrative   • Not on file       Physical Exam:  Weight/BMI: Body mass index is 24.65 kg/m².  BP (!) 99/58   Pulse (!) 126   Temp 36.6 °C (97.8 °F) (Temporal)   Resp 16   Ht 1.651 m (5' 5\")   Wt 67.2 kg (148 lb 2.4 oz)   SpO2 98%   Vitals:    10/05/20 0800 10/05/20 1518 10/05/20 1533 10/05/20 1545   BP: 158/74 (!) 85/61 (!) 86/72 (!) 99/58   Pulse: 61 (!) 115 (!) 138 (!) 126   Resp: 16 18 16 16   Temp: 36.2 °C (97.1 °F) 36.4 °C (97.5 °F) 36.5 °C (97.7 °F) 36.6 °C (97.8 °F)   TempSrc: Temporal  Temporal Temporal   SpO2: 92% 98% 98% 98%   Weight:       Height:         Oxygen Therapy:  Pulse Oximetry: 98 %, O2 (LPM): 0, O2 Delivery Device: None - Room Air  No intake or output data in the 24 hours ending 10/05/20 1702  Physical Exam     Constitutional:   Well developed, well nourished, no acute distress  HEENT:  Normocephalic, Atraumatic, Conjunctiva not pale, Sclera not icteric, Oropharynx moist mucous membranes, No oral exudates, Nose normal.  No thyromegaly.  Neck:  Normal range of motion, No cervical tenderness,  no JVD.  Chest/Lungs:  Symmetric expansion, no spider angioma, breath sounds clear to " auscultation bilaterally,  no crackles, no wheezing.   Cardiovascular:  Normal heart rate, Normal rhythm, No murmurs, No rubs, No gallops.    Abdomen: Bowel sounds normal, Soft, No tenderness, No guarding, No rebound, No masses, No hepatosplenomegaly.  Extremities: No cyanosis/clubbing/edema/palmar erythema/flapping tremor  Skin: Warm, Dry, No erythema, No rash, no induration.    MDM (Data Review):     Records reviewed and summarized in current documentation    Lab Data Review:  Recent Results (from the past 24 hour(s))   ABO Rh Confirm    Collection Time: 10/05/20  4:09 AM   Result Value Ref Range    ABO Rh Confirm O POS    PROTHROMBIN TIME    Collection Time: 10/05/20  5:31 AM   Result Value Ref Range    PT 64.8 (HH) 12.0 - 14.6 sec    INR 7.34 (HH) 0.87 - 1.13   CBC WITH DIFFERENTIAL    Collection Time: 10/05/20  5:31 AM   Result Value Ref Range    WBC 12.4 (H) 4.8 - 10.8 K/uL    RBC 2.76 (L) 4.70 - 6.10 M/uL    Hemoglobin 9.0 (L) 14.0 - 18.0 g/dL    Hematocrit 28.5 (L) 42.0 - 52.0 %    .3 (H) 81.4 - 97.8 fL    MCH 32.6 27.0 - 33.0 pg    MCHC 31.6 (L) 33.7 - 35.3 g/dL    RDW 53.3 (H) 35.9 - 50.0 fL    Platelet Count 298 164 - 446 K/uL    MPV 9.5 9.0 - 12.9 fL    Neutrophils-Polys 74.80 (H) 44.00 - 72.00 %    Lymphocytes 8.40 (L) 22.00 - 41.00 %    Monocytes 6.70 0.00 - 13.40 %    Eosinophils 7.60 (H) 0.00 - 6.90 %    Basophils 0.00 0.00 - 1.80 %    Nucleated RBC 0.00 /100 WBC    Neutrophils (Absolute) 9.59 (H) 1.82 - 7.42 K/uL    Lymphs (Absolute) 1.04 1.00 - 4.80 K/uL    Monos (Absolute) 0.83 0.00 - 0.85 K/uL    Eos (Absolute) 0.94 (H) 0.00 - 0.51 K/uL    Baso (Absolute) 0.00 0.00 - 0.12 K/uL    NRBC (Absolute) 0.00 K/uL    Hypochromia 1+     Anisocytosis 1+     Macrocytosis 1+     Microcytosis 1+    Basic Metabolic Panel    Collection Time: 10/05/20  5:31 AM   Result Value Ref Range    Sodium 129 (L) 135 - 145 mmol/L    Potassium 6.0 (H) 3.6 - 5.5 mmol/L    Chloride 87 (L) 96 - 112 mmol/L    Co2 24 20 -  33 mmol/L    Glucose 92 65 - 99 mg/dL    Bun 75 (HH) 8 - 22 mg/dL    Creatinine 12.57 (HH) 0.50 - 1.40 mg/dL    Calcium 9.7 8.5 - 10.5 mg/dL    Anion Gap 18.0 (H) 7.0 - 16.0   CRP QUANTITIVE (NON-CARDIAC)    Collection Time: 10/05/20  5:31 AM   Result Value Ref Range    Stat C-Reactive Protein 35.62 (H) 0.00 - 0.75 mg/dL   Sed Rate    Collection Time: 10/05/20  5:31 AM   Result Value Ref Range    Sed Rate Westergren >120 (H) 0 - 20 mm/hour   DIFFERENTIAL MANUAL    Collection Time: 10/05/20  5:31 AM   Result Value Ref Range    Bands-Stabs 2.50 0.00 - 10.00 %    Manual Diff Status PERFORMED    PERIPHERAL SMEAR REVIEW    Collection Time: 10/05/20  5:31 AM   Result Value Ref Range    Peripheral Smear Review see below    PLATELET ESTIMATE    Collection Time: 10/05/20  5:31 AM   Result Value Ref Range    Plt Estimation Normal    MORPHOLOGY    Collection Time: 10/05/20  5:31 AM   Result Value Ref Range    RBC Morphology Present     Poikilocytosis 1+     Ovalocytes 1+    ESTIMATED GFR    Collection Time: 10/05/20  5:31 AM   Result Value Ref Range    GFR If African American 5 (A) >60 mL/min/1.73 m 2    GFR If Non African American 4 (A) >60 mL/min/1.73 m 2   COD - Adult (Type and Screen)    Collection Time: 10/05/20  5:31 AM   Result Value Ref Range    ABO Grouping Only O     Rh Grouping Only POS     Antibody Screen-Cod NEG    FRESH FROZEN PLASMA    Collection Time: 10/05/20  7:38 AM   Result Value Ref Range    Component F       TA                  Thawed Plasma       [id number]   issued       10/05/20   15:05      Product Type Thawed Apheresis Plasma     Dispense Status issued     Unit Number (Barcoded) [id number]     Product Code (Barcoded) [id number]     Blood Type (Barcoded) 5100    CBC WITH DIFFERENTIAL    Collection Time: 10/05/20  1:44 PM   Result Value Ref Range    WBC 14.9 (H) 4.8 - 10.8 K/uL    RBC 2.74 (L) 4.70 - 6.10 M/uL    Hemoglobin 9.0 (L) 14.0 - 18.0 g/dL    Hematocrit 27.5 (L) 42.0 - 52.0 %    .4  (H) 81.4 - 97.8 fL    MCH 32.8 27.0 - 33.0 pg    MCHC 32.7 (L) 33.7 - 35.3 g/dL    RDW 52.6 (H) 35.9 - 50.0 fL    Platelet Count 313 164 - 446 K/uL    MPV 9.6 9.0 - 12.9 fL    Neutrophils-Polys 84.20 (H) 44.00 - 72.00 %    Lymphocytes 5.20 (L) 22.00 - 41.00 %    Monocytes 7.00 0.00 - 13.40 %    Eosinophils 2.60 0.00 - 6.90 %    Basophils 0.30 0.00 - 1.80 %    Immature Granulocytes 0.70 0.00 - 0.90 %    Nucleated RBC 0.00 /100 WBC    Neutrophils (Absolute) 12.52 (H) 1.82 - 7.42 K/uL    Lymphs (Absolute) 0.77 (L) 1.00 - 4.80 K/uL    Monos (Absolute) 1.04 (H) 0.00 - 0.85 K/uL    Eos (Absolute) 0.38 0.00 - 0.51 K/uL    Baso (Absolute) 0.05 0.00 - 0.12 K/uL    Immature Granulocytes (abs) 0.11 0.00 - 0.11 K/uL    NRBC (Absolute) 0.00 K/uL   CBC WITH DIFFERENTIAL    Collection Time: 10/05/20  4:00 PM   Result Value Ref Range    WBC 13.7 (H) 4.8 - 10.8 K/uL    RBC 2.83 (L) 4.70 - 6.10 M/uL    Hemoglobin 9.1 (L) 14.0 - 18.0 g/dL    Hematocrit 29.1 (L) 42.0 - 52.0 %    .8 (H) 81.4 - 97.8 fL    MCH 32.2 27.0 - 33.0 pg    MCHC 31.3 (L) 33.7 - 35.3 g/dL    RDW 54.4 (H) 35.9 - 50.0 fL    Platelet Count 363 164 - 446 K/uL    MPV 9.6 9.0 - 12.9 fL    Nucleated RBC 0.00 /100 WBC    NRBC (Absolute) 0.00 K/uL   Lactic Acid -STAT Once    Collection Time: 10/05/20  4:00 PM   Result Value Ref Range    Lactic Acid 1.5 0.5 - 2.0 mmol/L   Prothrombin time (INR)    Collection Time: 10/05/20  4:00 PM   Result Value Ref Range    PT 21.2 (H) 12.0 - 14.6 sec    INR 1.77 (H) 0.87 - 1.13   Basic Metabolic Panel    Collection Time: 10/05/20  4:00 PM   Result Value Ref Range    Sodium 134 (L) 135 - 145 mmol/L    Potassium 4.5 3.6 - 5.5 mmol/L    Chloride 92 (L) 96 - 112 mmol/L    Co2 27 20 - 33 mmol/L    Glucose 126 (H) 65 - 99 mg/dL    Bun 29 (H) 8 - 22 mg/dL    Creatinine 6.24 (HH) 0.50 - 1.40 mg/dL    Calcium 9.4 8.5 - 10.5 mg/dL    Anion Gap 15.0 7.0 - 16.0   TROPONIN    Collection Time: 10/05/20  4:00 PM   Result Value Ref Range     Troponin T 74 (H) 6 - 19 ng/L   ESTIMATED GFR    Collection Time: 10/05/20  4:00 PM   Result Value Ref Range    GFR If  11 (A) >60 mL/min/1.73 m 2    GFR If Non African American 9 (A) >60 mL/min/1.73 m 2   EKG    Collection Time: 10/05/20  4:47 PM   Result Value Ref Range    Report       [facility]    Test Date:  2020-10-05  Pt Name:    BOUBACAR FERNANDEZ                 Department: [facility]  MRN:        [id number]                      Room:       [facility]  Gender:     Male                         Technician: PEP  :        1970                   Requested By:SILVIA CHOWDARY  Order #:    [id number]                    Reading MD:    Measurements  Intervals                                Axis  Rate:       132                          P:  SD:                                      QRS:        108  QRSD:       120                          T:          -39  QT:         384  QTc:        569    Interpretive Statements  ATRIAL FIBRILLATION  VENTRICULAR PREMATURE COMPLEX  LEFT POSTERIOR FASCICULAR BLOCK  ST DEPRESSION, CONSIDER ISCHEMIA, INF LEADS  Compared to ECG 10/01/2020 15:38:56  Ventricular premature complex(es) now present  Left posterior fascicular block now present  ST (T wave) deviation now present  Sinus bradycardia no longer present  Right-axis deviation no longer present  P ossible ischemia still present         MDM (Assessment and Plan):     Active Hospital Problems    Diagnosis   • PAF (paroxysmal atrial fibrillation) ([facility]) [I48.0]     Priority: High   • Essential hypertension [I10]     Priority: Low   • Rectal bleed [K62.5]   • Right knee pain [M25.561]   • Hematuria [R31.9]   • Bacteremia [R78.81]   • Acute pain of left shoulder [M25.512]   • ESRD (end stage renal disease) on dialysis ([facility]) [N18.6, Z99.2]   • CAP (community acquired pneumonia) [J18.9]   • Other chest pain [R07.89]       Imaging/Procedures Review:    DX-KNEE COMPLETE 4+ RIGHT   Final Result      1.  Large RIGHT knee joint effusion.    2.  No fracture or dislocation.   3.  Minimal degenerative change.      CT-ABDOMEN-PELVIS WITH   Final Result      1.  Colonic diverticulosis without evidence for diverticulitis.   2.  Normal appendix.   3.  No focal mesenteric inflammatory process.   4.  Diffuse atherosclerotic calcification of abdominal aorta with prominent celiac trunk and SMA, without evidence for occlusion.   5.  Loculated fluid collection of the LEFT lung base with associated pleural thickening and atelectasis, possibly indicating empyema, new from prior.      NM-CARDIAC STRESS TEST   Final Result      EC-ECHOCARDIOGRAM COMPLETE W/O CONT   Final Result      CT-CHEST (THORAX) W/O   Final Result         1.  Left basilar atelectasis, component of infiltrate not definitively excluded.   2.  Atherosclerosis and atherosclerotic coronary artery disease   3.  Cardiomegaly   4.  Hepatomegaly      DX-CHEST-PORTABLE (1 VIEW)   Final Result         1.  Left basilar atelectasis, no focal infiltrate   2.  Cardiomegaly      US-EXTREMITY VENOUS UPPER UNILAT RIGHT    (Results Pending)       Assessment  Hematochezia, suspect defecation trauma, can't r/o neoplasm, diverticular bleeding or else  PAF  Hypertension  ESRD on HD  HFrEF (EF 20%)  Supratherapeutic INR  CAP    Plan  -  Hospitalist to manage comorbid conditions. Need Cardiac clearance due to LVEF 20%  -  Cl liq diet, no red  -  Monitor H/H and vitals. Serial hemoglobin and hematocrit q 6-8 hours with transfusions as needed per primary team for hemoglobin less than 7 or hematocrit less than 21.  -  Colon prep tomorrow  -  Likely colonoscopy Wednesday  -  Dr. Horton will see the patient tomorrow and likely perform the COL on Wednesday once clearance is obtained    Risks, benefits, and alternatives were discussed with patient. Consenting persons were given an opportunity to ask questions and discuss other options. Risks including but not limited to failed or incomplete endoscopy, ineffective therapy,  perforation, infection, bleeding, missed lesion(s), cardiac and/or pulmonary event, aspiration, stroke, possible need for surgery, hospitalization possibly prolonged, discomfort, unsuccessful and/or incomplete procedure, possible need for repeat procedures and/or additional testings, damage to adjacent organs and/or vascular structures, medication reaction, disability, death, and other adverse events possibly life-threatening. Discussion was undertaken with Layman's terms. Consenting persons stated understanding and acceptance of these risks, and wished to proceed. Consent was given in clear state of mind. All questions were answered.    Thank you very much for allowing me to participate in the care of your patient.  Please feel free to contact me anytime at [phone].     Danae Larose M.D.    Core Quality Measures   Reviewed items::  Labs, Medications and Radiology reports reviewed

## 2020-10-06 NOTE — PROGRESS NOTES
Hospital Medicine Daily Progress Note    Date of Service  10/6/2020    Chief Complaint  50 y.o. male admitted 9/29/2020 with chest pain .    Hospital Course    Admitted with chest pain, pneumonia, elevated troponin.  Known history of paroxysmal atrial fibrillation, on anticoagulation with Coumadin.  D-dimer is negative.  Known history of ESRD, consult placed to nephrology for HD MWF. CT  CT abd/pelvis with concern for empyema, CXR today shows increased effusion. IR involved for thoracocentesis and pleural effusion culture and analysis. Supratheraprutic INR is reversed with coumadin on hold for GI bleeding, colonoscopy scheduled. Right knee effusion, discussed with ID consult, since patient had MSSA + from blood culture, will continue monitoring.    Interval Problem Update    Rectal bleed this am continues, bright red; Denies abd pain; N/V    + R knee edema, No warmth to touch or erythema   and RUE hematoma,     INR normalized, coumadin on hold due to GI bleeding, plan for colonoscopy in am.    Consultants/Specialty  ID  Cardiology    Code Status  Full Code    Disposition    TBD  F/u cbc q8, s/p ffp and vit K        Review of Systems  Review of Systems   Constitutional: Negative for chills, diaphoresis, fever and malaise/fatigue.   HENT: Negative for congestion.    Eyes: Negative for blurred vision.   Respiratory: Negative for shortness of breath.    Cardiovascular: Negative for chest pain, palpitations and leg swelling.   Gastrointestinal: Positive for blood in stool. Negative for abdominal pain, constipation, melena and nausea.   Genitourinary: Negative for dysuria, flank pain, hematuria and urgency.   Musculoskeletal: Positive for joint pain and myalgias. Negative for back pain.   Neurological: Positive for weakness. Negative for dizziness, focal weakness and headaches.   Psychiatric/Behavioral: Negative for memory loss. The patient is not nervous/anxious.         Physical Exam  Temp:  [2.6 °C (36.7 °F)-36.9 °C  (98.4 °F)] 36.6 °C (97.8 °F)  Pulse:  [] 53  Resp:  [16-18] 16  BP: ()/(58-74) 134/69  SpO2:  [92 %-98 %] 93 %    Physical Exam  Vitals signs and nursing note reviewed.   Constitutional:       General: He is not in acute distress.     Appearance: Normal appearance. He is not ill-appearing.   HENT:      Head: Normocephalic.      Nose: Nose normal.   Eyes:      Pupils: Pupils are equal, round, and reactive to light.   Neck:      Musculoskeletal: Neck supple.      Thyroid: No thyromegaly.      Vascular: No JVD.   Cardiovascular:      Rate and Rhythm: Normal rate.      Heart sounds: No murmur.   Pulmonary:      Effort: No respiratory distress.      Breath sounds: Normal breath sounds. No stridor. No wheezing or rhonchi.   Abdominal:      General: Bowel sounds are normal. There is no distension.      Palpations: Abdomen is soft.   Musculoskeletal:         General: Swelling and tenderness (on right arm) present.      Right lower leg: Edema (right knee, no warmth to tough or erythma) present.      Comments: Decreased ROM L shoulder, ttp   Skin:     General: Skin is warm and dry.      Coloration: Skin is not jaundiced or pale.   Neurological:      Mental Status: He is alert and oriented to person, place, and time.      Cranial Nerves: No cranial nerve deficit.      Sensory: No sensory deficit.      Motor: Weakness present.   Psychiatric:         Behavior: Behavior normal.         Thought Content: Thought content normal.         Fluids    Intake/Output Summary (Last 24 hours) at 10/6/2020 1421  Last data filed at 10/5/2020 1600  Gross per 24 hour   Intake 195 ml   Output --   Net 195 ml       Laboratory  Recent Labs     10/05/20  1600 10/06/20  0154 10/06/20  0920   WBC 13.7* 12.2* 12.7*   RBC 2.83* 2.66* 2.50*   HEMOGLOBIN 9.1* 8.7* 8.1*   HEMATOCRIT 29.1* 27.5* 25.7*   .8* 103.4* 102.8*   MCH 32.2 32.7 32.4   MCHC 31.3* 31.6* 31.5*   RDW 54.4* 54.2* 52.8*   PLATELETCT 363 317 325   MPV 9.6 9.3 9.7      Recent Labs     10/05/20  0531 10/05/20  1600 10/06/20  0154   SODIUM 129* 134* 133*   POTASSIUM 6.0* 4.5 4.7   CHLORIDE 87* 92* 92*   CO2 24 27 30   GLUCOSE 92 126* 122*   BUN 75* 29* 38*   CREATININE 12.57* 6.24* 8.08*   CALCIUM 9.7 9.4 9.3     Recent Labs     10/05/20  0531 10/05/20  1600 10/06/20  0154   INR 7.34* 1.77* 1.47*               Imaging  DX-CHEST-PORTABLE (1 VIEW)   Final Result      Increased opacity on the left may represent a layering pleural effusion with overlying atelectasis/consolidation.      Stable cardiomegaly.         US-EXTREMITY VENOUS UPPER UNILAT RIGHT   Final Result      DX-KNEE COMPLETE 4+ RIGHT   Final Result      1.  Large RIGHT knee joint effusion.   2.  No fracture or dislocation.   3.  Minimal degenerative change.      CT-ABDOMEN-PELVIS WITH   Final Result      1.  Colonic diverticulosis without evidence for diverticulitis.   2.  Normal appendix.   3.  No focal mesenteric inflammatory process.   4.  Diffuse atherosclerotic calcification of abdominal aorta with prominent celiac trunk and SMA, without evidence for occlusion.   5.  Loculated fluid collection of the LEFT lung base with associated pleural thickening and atelectasis, possibly indicating empyema, new from prior.      NM-CARDIAC STRESS TEST   Final Result      EC-ECHOCARDIOGRAM COMPLETE W/O CONT   Final Result      CT-CHEST (THORAX) W/O   Final Result         1.  Left basilar atelectasis, component of infiltrate not definitively excluded.   2.  Atherosclerosis and atherosclerotic coronary artery disease   3.  Cardiomegaly   4.  Hepatomegaly      DX-CHEST-PORTABLE (1 VIEW)   Final Result         1.  Left basilar atelectasis, no focal infiltrate   2.  Cardiomegaly      US-THORACENTESIS PUNCTURE LEFT    (Results Pending)        Assessment/Plan  PAF (paroxysmal atrial fibrillation) (HCC)- (present on admission)  Assessment & Plan  -NSR, on amio and metoprolol  coumadin on hold due to GI bleeding, F/U INR    Rectal  bleed  Assessment & Plan  10/5 rectal oozing, h/h stable  - supratherapeutic INR, 7  - given FFP stat, vit K x 1, f/u INR  - monitor h/h q18  GI consulted, Dr. Ponce, appreciate input  F/u CT Abd/pelvis    10/6 rectal bleeding, h/h slowly trending down  INR normalized, coumadin on  Hold  Colonoscopy tomorrow    Right knee pain  Assessment & Plan  Minimal edema, nontender to palpation  Recommend continued therapy, trial of lidocaine patch, warm compress  Pain control as needed  Encourage activity    10/5 uric acid level elevated with renal failure  Cont warm compress, pain control    10/6 no warmth to touch or erythema, nontenderness; lidocaine patch helped yesterday, monitoring closely his right knee for possible septic arthritis and low threshold for Ortho consult for arthrocentesis;        Hematuria  Assessment & Plan  10/4 follow-up a.m. labs  H&H stable  We will monitor for recurrent hematuria  May need urology referral  Follow-up UA  Increasing INR at 6    10/5 now with rectal bleed  10/6 denies hematuria    Acute pain of left shoulder  Assessment & Plan  MSK, avoid nsaids d/t ESRD  - trial of oxycodone, and lidocaine patch  - CXR, no bony abnormality  - pt/ot     10/1 improving pain control, will add tramadol  PT/OT evaluation    10/2 improving, cont pain control  10/6 denies should pain, c/w same pain management    Bacteremia  Assessment & Plan  1/2 bottles f/u sens  Repeat blood cx  On unasyn    10/1  1 of 2 bottles positive for MSSa  Repeat blood cultures now negative    10/4 continue Unasyn, will consult ID for antibiotic recommendations, and duration of therapy  10/6 preliminary blood culture form 10/5 were negative for both bottles; c/w unasyn per ID, for 7 days post negative BC, end date 10/7,   then:Transition to cefazolin for total of 4 weeks for bacteremia end date 10/28 if BC remain negative  ID consult appreciated; blood culture sent again today    Other chest pain- (present on  admission)  Assessment & Plan  -0/1 ECHO ef 70%  Intermittent arrhythmia, troponin trending down  Patient denies chest pain  We will continue monitoring    10/2 ongoing CP, intermittent CP 6/10, ?block  Elevated trop at 70  - cardiology consult, Dr. Mchugh, appreciate input  ekg- nonspecific ST changes    10/3 f/u MPI, cardiology following    10/4 MPI with fixed defects inferior lateral wall, artifact versus small infarct  No reversible ischemia  Cardiology following  Continue current medication    10/6 cardiology, GI, IR and ID consult appreciate  IR thoracocentsis for left pleural effusion; pleural fluid analysis and culture      CAP (community acquired pneumonia)- (present on admission)  Assessment & Plan  presumed  procal elevated, 2.2  empiric IV unasyn and oral azithromycin  trend WBC/fever curve  blood cx 1/2, GPC(mssa) on 9/29  repeat blood cx on 9/30, 10/5 both negative, send blood culture again today  sputum cultures ordered on 10/6    ESRD (end stage renal disease) on dialysis ([facility])- (present on admission)  Assessment & Plan  -dialyzed M/W/F  -lytes are ok right now  -consult renal in the AM    Essential hypertension- (present on admission)  Assessment & Plan  -continue outpatient medications       VTE prophylaxis: Coumadin

## 2020-10-07 ENCOUNTER — ANESTHESIA EVENT (OUTPATIENT)
Dept: SURGERY | Facility: MEDICAL CENTER | Age: 50
DRG: 163 | End: 2020-10-07
Payer: MEDICAID

## 2020-10-07 ENCOUNTER — ANESTHESIA (OUTPATIENT)
Dept: SURGERY | Facility: MEDICAL CENTER | Age: 50
DRG: 163 | End: 2020-10-07
Payer: MEDICAID

## 2020-10-07 LAB
ALBUMIN SERPL BCP-MCNC: 2.9 G/DL (ref 3.2–4.9)
ALBUMIN/GLOB SERPL: 0.8 G/DL
ALP SERPL-CCNC: 97 U/L (ref 30–99)
ALT SERPL-CCNC: 16 U/L (ref 2–50)
ANION GAP SERPL CALC-SCNC: 18 MMOL/L (ref 7–16)
AST SERPL-CCNC: 21 U/L (ref 12–45)
BASOPHILS # BLD AUTO: 0.2 % (ref 0–1.8)
BASOPHILS # BLD: 0.02 K/UL (ref 0–0.12)
BASOPHILS # BLD: 0.02 K/UL (ref 0–0.12)
BASOPHILS # BLD: 0.03 K/UL (ref 0–0.12)
BILIRUB SERPL-MCNC: 0.4 MG/DL (ref 0.1–1.5)
BUN SERPL-MCNC: 48 MG/DL (ref 8–22)
CALCIUM SERPL-MCNC: 9.5 MG/DL (ref 8.5–10.5)
CHLORIDE SERPL-SCNC: 90 MMOL/L (ref 96–112)
CO2 SERPL-SCNC: 25 MMOL/L (ref 20–33)
CREAT SERPL-MCNC: 10.27 MG/DL (ref 0.5–1.4)
EOSINOPHIL # BLD AUTO: 0.29 K/UL (ref 0–0.51)
EOSINOPHIL # BLD AUTO: 0.33 K/UL (ref 0–0.51)
EOSINOPHIL # BLD AUTO: 0.49 K/UL (ref 0–0.51)
EOSINOPHIL NFR BLD: 2.2 % (ref 0–6.9)
EOSINOPHIL NFR BLD: 2.9 % (ref 0–6.9)
EOSINOPHIL NFR BLD: 4.1 % (ref 0–6.9)
ERYTHROCYTE [DISTWIDTH] IN BLOOD BY AUTOMATED COUNT: 51.4 FL (ref 35.9–50)
ERYTHROCYTE [DISTWIDTH] IN BLOOD BY AUTOMATED COUNT: 52.8 FL (ref 35.9–50)
ERYTHROCYTE [DISTWIDTH] IN BLOOD BY AUTOMATED COUNT: 52.9 FL (ref 35.9–50)
GLOBULIN SER CALC-MCNC: 3.5 G/DL (ref 1.9–3.5)
GLUCOSE SERPL-MCNC: 71 MG/DL (ref 65–99)
HCT VFR BLD AUTO: 25.4 % (ref 42–52)
HCT VFR BLD AUTO: 25.7 % (ref 42–52)
HCT VFR BLD AUTO: 26 % (ref 42–52)
HGB BLD-MCNC: 8.1 G/DL (ref 14–18)
HGB BLD-MCNC: 8.2 G/DL (ref 14–18)
HGB BLD-MCNC: 8.3 G/DL (ref 14–18)
IMM GRANULOCYTES # BLD AUTO: 0.06 K/UL (ref 0–0.11)
IMM GRANULOCYTES # BLD AUTO: 0.06 K/UL (ref 0–0.11)
IMM GRANULOCYTES # BLD AUTO: 0.08 K/UL (ref 0–0.11)
IMM GRANULOCYTES NFR BLD AUTO: 0.5 % (ref 0–0.9)
IMM GRANULOCYTES NFR BLD AUTO: 0.5 % (ref 0–0.9)
IMM GRANULOCYTES NFR BLD AUTO: 0.7 % (ref 0–0.9)
INR PPP: 1.47 (ref 0.87–1.13)
LYMPHOCYTES # BLD AUTO: 1.17 K/UL (ref 1–4.8)
LYMPHOCYTES # BLD AUTO: 1.3 K/UL (ref 1–4.8)
LYMPHOCYTES # BLD AUTO: 1.37 K/UL (ref 1–4.8)
LYMPHOCYTES NFR BLD: 10.1 % (ref 22–41)
LYMPHOCYTES NFR BLD: 11.4 % (ref 22–41)
LYMPHOCYTES NFR BLD: 9.8 % (ref 22–41)
MAGNESIUM SERPL-MCNC: 2.4 MG/DL (ref 1.5–2.5)
MCH RBC QN AUTO: 32.1 PG (ref 27–33)
MCH RBC QN AUTO: 32.5 PG (ref 27–33)
MCH RBC QN AUTO: 32.7 PG (ref 27–33)
MCHC RBC AUTO-ENTMCNC: 31.5 G/DL (ref 33.7–35.3)
MCHC RBC AUTO-ENTMCNC: 31.9 G/DL (ref 33.7–35.3)
MCHC RBC AUTO-ENTMCNC: 32.3 G/DL (ref 33.7–35.3)
MCV RBC AUTO: 100.8 FL (ref 81.4–97.8)
MCV RBC AUTO: 102 FL (ref 81.4–97.8)
MCV RBC AUTO: 102.4 FL (ref 81.4–97.8)
MONOCYTES # BLD AUTO: 0.83 K/UL (ref 0–0.85)
MONOCYTES # BLD AUTO: 0.88 K/UL (ref 0–0.85)
MONOCYTES # BLD AUTO: 0.89 K/UL (ref 0–0.85)
MONOCYTES NFR BLD AUTO: 6.2 % (ref 0–13.4)
MONOCYTES NFR BLD AUTO: 7.4 % (ref 0–13.4)
MONOCYTES NFR BLD AUTO: 7.6 % (ref 0–13.4)
NEUTROPHILS # BLD AUTO: 10.79 K/UL (ref 1.82–7.42)
NEUTROPHILS # BLD AUTO: 9.11 K/UL (ref 1.82–7.42)
NEUTROPHILS # BLD AUTO: 9.2 K/UL (ref 1.82–7.42)
NEUTROPHILS NFR BLD: 76.2 % (ref 44–72)
NEUTROPHILS NFR BLD: 78.7 % (ref 44–72)
NEUTROPHILS NFR BLD: 81.1 % (ref 44–72)
NRBC # BLD AUTO: 0 K/UL
NRBC BLD-RTO: 0 /100 WBC
PLATELET # BLD AUTO: 328 K/UL (ref 164–446)
PLATELET # BLD AUTO: 339 K/UL (ref 164–446)
PLATELET # BLD AUTO: 370 K/UL (ref 164–446)
PMV BLD AUTO: 9.4 FL (ref 9–12.9)
PMV BLD AUTO: 9.6 FL (ref 9–12.9)
PMV BLD AUTO: 9.7 FL (ref 9–12.9)
POTASSIUM SERPL-SCNC: 5 MMOL/L (ref 3.6–5.5)
PROT SERPL-MCNC: 6.4 G/DL (ref 6–8.2)
PROTHROMBIN TIME: 18.2 SEC (ref 12–14.6)
RBC # BLD AUTO: 2.52 M/UL (ref 4.7–6.1)
RBC # BLD AUTO: 2.52 M/UL (ref 4.7–6.1)
RBC # BLD AUTO: 2.54 M/UL (ref 4.7–6.1)
SODIUM SERPL-SCNC: 133 MMOL/L (ref 135–145)
WBC # BLD AUTO: 11.6 K/UL (ref 4.8–10.8)
WBC # BLD AUTO: 12.1 K/UL (ref 4.8–10.8)
WBC # BLD AUTO: 13.3 K/UL (ref 4.8–10.8)

## 2020-10-07 PROCEDURE — 770020 HCHG ROOM/CARE - TELE (206)

## 2020-10-07 PROCEDURE — 83735 ASSAY OF MAGNESIUM: CPT

## 2020-10-07 PROCEDURE — 700111 HCHG RX REV CODE 636 W/ 250 OVERRIDE (IP): Performed by: INTERNAL MEDICINE

## 2020-10-07 PROCEDURE — 99232 SBSQ HOSP IP/OBS MODERATE 35: CPT | Performed by: STUDENT IN AN ORGANIZED HEALTH CARE EDUCATION/TRAINING PROGRAM

## 2020-10-07 PROCEDURE — 700101 HCHG RX REV CODE 250: Performed by: STUDENT IN AN ORGANIZED HEALTH CARE EDUCATION/TRAINING PROGRAM

## 2020-10-07 PROCEDURE — 85610 PROTHROMBIN TIME: CPT

## 2020-10-07 PROCEDURE — 80053 COMPREHEN METABOLIC PANEL: CPT

## 2020-10-07 PROCEDURE — 90935 HEMODIALYSIS ONE EVALUATION: CPT

## 2020-10-07 PROCEDURE — 700102 HCHG RX REV CODE 250 W/ 637 OVERRIDE(OP): Performed by: INTERNAL MEDICINE

## 2020-10-07 PROCEDURE — 700105 HCHG RX REV CODE 258: Performed by: INTERNAL MEDICINE

## 2020-10-07 PROCEDURE — 700101 HCHG RX REV CODE 250: Performed by: INTERNAL MEDICINE

## 2020-10-07 PROCEDURE — A9270 NON-COVERED ITEM OR SERVICE: HCPCS | Performed by: INTERNAL MEDICINE

## 2020-10-07 PROCEDURE — 99233 SBSQ HOSP IP/OBS HIGH 50: CPT | Performed by: INTERNAL MEDICINE

## 2020-10-07 PROCEDURE — 85025 COMPLETE CBC W/AUTO DIFF WBC: CPT

## 2020-10-07 PROCEDURE — A9270 NON-COVERED ITEM OR SERVICE: HCPCS | Performed by: PHYSICIAN ASSISTANT

## 2020-10-07 PROCEDURE — 36415 COLL VENOUS BLD VENIPUNCTURE: CPT

## 2020-10-07 PROCEDURE — 99233 SBSQ HOSP IP/OBS HIGH 50: CPT | Mod: GC | Performed by: INTERNAL MEDICINE

## 2020-10-07 PROCEDURE — 700102 HCHG RX REV CODE 250 W/ 637 OVERRIDE(OP): Performed by: PHYSICIAN ASSISTANT

## 2020-10-07 PROCEDURE — 700111 HCHG RX REV CODE 636 W/ 250 OVERRIDE (IP)

## 2020-10-07 RX ADMIN — EPOETIN ALFA-EPBX 3000 UNITS: 3000 INJECTION, SOLUTION INTRAVENOUS; SUBCUTANEOUS at 15:53

## 2020-10-07 RX ADMIN — ACETAMINOPHEN 650 MG: 325 TABLET, FILM COATED ORAL at 22:45

## 2020-10-07 RX ADMIN — LOSARTAN POTASSIUM 25 MG: 50 TABLET, FILM COATED ORAL at 17:45

## 2020-10-07 RX ADMIN — Medication 100 MG: at 04:31

## 2020-10-07 RX ADMIN — AMIODARONE HYDROCHLORIDE 200 MG: 200 TABLET ORAL at 04:31

## 2020-10-07 RX ADMIN — SODIUM CHLORIDE 3 G: 900 INJECTION INTRAVENOUS at 04:31

## 2020-10-07 RX ADMIN — FUROSEMIDE 40 MG: 40 TABLET ORAL at 04:31

## 2020-10-07 RX ADMIN — Medication 2001 MG: at 12:17

## 2020-10-07 RX ADMIN — LIDOCAINE 1 PATCH: 50 PATCH TOPICAL at 17:46

## 2020-10-07 RX ADMIN — POLYETHYLENE GLYCOL 3350, SODIUM SULFATE ANHYDROUS, SODIUM BICARBONATE, SODIUM CHLORIDE, POTASSIUM CHLORIDE 4 L: 236; 22.74; 6.74; 5.86; 2.97 POWDER, FOR SOLUTION ORAL at 18:45

## 2020-10-07 RX ADMIN — LIDOCAINE 1 PATCH: 50 PATCH TOPICAL at 17:47

## 2020-10-07 RX ADMIN — METOPROLOL SUCCINATE 50 MG: 25 TABLET, EXTENDED RELEASE ORAL at 04:31

## 2020-10-07 RX ADMIN — Medication 2001 MG: at 17:45

## 2020-10-07 RX ADMIN — FOLIC ACID 1 MG: 1 TABLET ORAL at 04:31

## 2020-10-07 RX ADMIN — ATORVASTATIN CALCIUM 40 MG: 40 TABLET, FILM COATED ORAL at 17:45

## 2020-10-07 ASSESSMENT — ENCOUNTER SYMPTOMS
WEAKNESS: 0
FEVER: 0
CONSTIPATION: 0
SHORTNESS OF BREATH: 0
BLOOD IN STOOL: 1
BLURRED VISION: 0
DIZZINESS: 0
MYALGIAS: 1
HEADACHES: 0
FLANK PAIN: 0
MEMORY LOSS: 0
DIAPHORESIS: 0
CHILLS: 0
FOCAL WEAKNESS: 0
ABDOMINAL PAIN: 0
NAUSEA: 0
NERVOUS/ANXIOUS: 0
BACK PAIN: 0
PALPITATIONS: 0

## 2020-10-07 ASSESSMENT — PAIN DESCRIPTION - PAIN TYPE
TYPE: ACUTE PAIN
TYPE: ACUTE PAIN

## 2020-10-07 NOTE — CARE PLAN
Problem: Communication  Goal: The ability to communicate needs accurately and effectively will improve  Outcome: PROGRESSING AS EXPECTED   Patient encouraged to use call light to make needs known.  in use whenever communicating with patient  Problem: Safety  Goal: Will remain free from falls  Outcome: PROGRESSING AS EXPECTED   Bed alarm on, bed locked in lowest position, upper side rails up, call light and personal items within reach, non skids socks on.

## 2020-10-07 NOTE — PROGRESS NOTES
Gi    For some reason patients consent was not signed by him last night after informed consent discussion was given. Now he seems rather sleepy after pain meds(?) and states he doesn't know why he is having the procedure or why he is in the hospital. OR staff is concerned and we are forced to cancel his procedure due to MS changes. Rec stopping uneeded pain meds. Will reassess in am

## 2020-10-07 NOTE — PROGRESS NOTES
Daily Progress Note:     Date of Service: 10/7/2020  Attending: Brianna Goddard M.D.   ID Attending: Dr. Malhotra    ID:  50M PMH A Fib (on coumadin), history of HFrEF (EF 20% 2017; repeat ECHO 9/2020 with EF 70%), ESRD presumably cardiorenal (on dialysis MWF) admitted with chest pain with inspiration.  ID is consulted for antibiotic recommendations for positive blood culture (MSSA, pansensitive) and pneumonia/ empyema. He denies synthetic AV graft material or other non-native material.    Internal Events:  - U/s guided drainage attempted, unsuccessful due to viscosity of fluid  - Colon prep completed in anticipation for colonoscopy today, however delayed due to need for empyema drainage  - Unasyn day 9    Subjective:   Patient states that he is feeling tired, but has no new complaints. Left sided chest pain is still present with deep breaths, but is unchanged from previous. His right knee is still sore, unchanged from yesterday. He denies fever, chills, nausea, vomiting, dizziness.    Objective:   Physical Exam:   Vitals:   Temp:  [36.3 °C (97.3 °F)-36.7 °C (98.1 °F)] 36.6 °C (97.9 °F)  Pulse:  [53-64] 58  Resp:  [16-17] 17  BP: (131-178)/(55-78) 148/69  SpO2:  [90 %-100 %] 93 %    Physical Exam  Vitals signs and nursing note reviewed.   Constitutional:       General: He is not in acute distress.     Appearance: Normal appearance.   HENT:      Head: Normocephalic and atraumatic.      Nose: Nose normal.      Mouth/Throat:      Mouth: Mucous membranes are moist.   Eyes:      Extraocular Movements: Extraocular movements intact.      Pupils: Pupils are equal, round, and reactive to light.   Neck:      Musculoskeletal: Normal range of motion and neck supple. No muscular tenderness.   Cardiovascular:      Rate and Rhythm: Normal rate and regular rhythm.      Pulses: Normal pulses.      Heart sounds: Normal heart sounds.   Pulmonary:      Effort: Pulmonary effort is normal.      Comments: Decreased breath sounds L  base  Abdominal:      General: There is no distension.      Palpations: Abdomen is soft.      Tenderness: There is no abdominal tenderness.   Musculoskeletal: Normal range of motion.         General: Swelling (R knee, generalized, not warm or red) present. No tenderness or deformity.      Right lower leg: No edema.      Left lower leg: No edema.   Skin:     General: Skin is warm and dry.      Capillary Refill: Capillary refill takes less than 2 seconds.      Coloration: Skin is not jaundiced.   Neurological:      General: No focal deficit present.      Mental Status: He is alert and oriented to person, place, and time.      Cranial Nerves: No cranial nerve deficit.   Psychiatric:         Mood and Affect: Mood normal.         Behavior: Behavior normal.       Labs:   Recent Labs     10/05/20  0531  10/05/20  1600 10/06/20  0154 10/06/20  0920 10/06/20  1720 10/07/20  0254   WBC 12.4*   < > 13.7* 12.2* 12.7* 11.5* 12.1*   RBC 2.76*   < > 2.83* 2.66* 2.50* 2.53* 2.52*   HEMOGLOBIN 9.0*   < > 9.1* 8.7* 8.1* 8.1* 8.1*   HEMATOCRIT 28.5*   < > 29.1* 27.5* 25.7* 25.9* 25.7*   .3*   < > 102.8* 103.4* 102.8* 102.4* 102.0*   MCH 32.6   < > 32.2 32.7 32.4 32.0 32.1   RDW 53.3*   < > 54.4* 54.2* 52.8* 53.4* 52.9*   PLATELETCT 298   < > 363 317 325 300 339   MPV 9.5   < > 9.6 9.3 9.7 9.4 9.7   NEUTSPOLYS 74.80*   < > 87.70* 87.00* 71.90 74.20* 76.20*   LYMPHOCYTES 8.40*   < > 4.40* 6.10* 13.40* 10.10* 11.40*   MONOCYTES 6.70   < > 4.40 3.50 8.70 9.50 7.40   EOSINOPHILS 7.60*   < > 2.60 1.70 5.40 5.60 4.10   BASOPHILS 0.00   < > 0.90 1.70 0.20 0.10 0.20   RBCMORPHOLO Present  --  Present Present  --   --   --     < > = values in this interval not displayed.     [unfilled]  Recent Labs     10/05/20  1600 10/06/20  0154 10/07/20  0254   ALBUMIN  --  3.1* 2.9*   TBILIRUBIN  --  0.5 0.4   ALKPHOSPHAT  --  111* 97   TOTPROTEIN  --  6.7 6.4   ALTSGPT  --  16 16   ASTSGOT  --  20 21   CREATININE 6.24* 8.08* 10.27*       Imaging:    US-THORACENTESIS PUNCTURE LEFT   Final Result      Unsuccessful attempt at left-sided thoracentesis.         DX-CHEST-PORTABLE (1 VIEW)   Final Result      1.  Volume loss within the left lung.      2.  Loculated left pleural effusion.      3.  Cardiomegaly.      DX-CHEST-PORTABLE (1 VIEW)   Final Result      Increased opacity on the left may represent a layering pleural effusion with overlying atelectasis/consolidation.      Stable cardiomegaly.         US-EXTREMITY VENOUS UPPER UNILAT RIGHT   Final Result      DX-KNEE COMPLETE 4+ RIGHT   Final Result      1.  Large RIGHT knee joint effusion.   2.  No fracture or dislocation.   3.  Minimal degenerative change.      CT-ABDOMEN-PELVIS WITH   Final Result      1.  Colonic diverticulosis without evidence for diverticulitis.   2.  Normal appendix.   3.  No focal mesenteric inflammatory process.   4.  Diffuse atherosclerotic calcification of abdominal aorta with prominent celiac trunk and SMA, without evidence for occlusion.   5.  Loculated fluid collection of the LEFT lung base with associated pleural thickening and atelectasis, possibly indicating empyema, new from prior.      NM-CARDIAC STRESS TEST   Final Result      EC-ECHOCARDIOGRAM COMPLETE W/O CONT   Final Result      CT-CHEST (THORAX) W/O   Final Result         1.  Left basilar atelectasis, component of infiltrate not definitively excluded.   2.  Atherosclerosis and atherosclerotic coronary artery disease   3.  Cardiomegaly   4.  Hepatomegaly      DX-CHEST-PORTABLE (1 VIEW)   Final Result         1.  Left basilar atelectasis, no focal infiltrate   2.  Cardiomegaly          Assessment and Plan:    50M with ESRD (HD MWF), A Fib (coumadin) presenting with fall, pleuritic chest pain, found to have L base consolidation and loculated effusion on CT, and 1 of 2 positive cultures for MSSA.      # Bacteremia, MSSA  - one of two sets on 9/29, repeat sets on 9/30 NG, 10/5 NGTD  - Unasyn sensitive, day #9  - No vegetation  reported on TTE  - Patient remains afebrile  - Source is likely empyema, which has not been addressed  - Monitor closely for signs of sepsis     # Pneumonia, associated large L empyema  - Pleuritic chest pain on presentation  - Chest CT 9/29 with atelectasis. ?fluid collection in fissure, but no empyema  - CT Abd/pelvis 10/5 shows loculated fluid collection of the LEFT lung base with associated pleural thickening. Likely empyema, appears   - IR unsuccessful in draining due to thickness of fluid    # R knee swelling, unknown chronicity  - Does not appear on exam consistent with septic arthritis, no warmth, redness or decreased active or passive ROM, able to bear weight  - Concerning in setting of MSSA bacteremia    # ESRD  - On HD MWF, compliant     # History of HFrEF (EF 20% in 2017)  - Unclear etiology  - EF 70% during this admission     PLAN:     - Follow blood cultures  - Daily CXR  - Needs urgent drainage of locuated pleural effusion, with AFB/ Fungal/Aerobic/anaerobic cultures  - Recommend CT surgery consult for evaluation for possible VATS, or General/Trauma surgery consult for large diameter chest tube if patient not deemed a surgical candidate  - Tentatively, depending on pleural fluid culture results:     Continue unasyn as source likely is empyema, which is not drained  - Will consider transition to cefazolin for total of 4 weeks for bacteremia end date 10/28 if BC remain negative  - Continue to monitor knee, low threshold for Ortho consult for arthrocentesis  - No LOBO recommended at this time     - MWF HD, nephrology following  - ID will continue to follow    Discussed with Dr. Malhotra, ID attending

## 2020-10-07 NOTE — PROGRESS NOTES
Nephrology Daily Progress Note    Date of Service  10/7/2020    Chief Complaint  50 y.o. male with ESRD on HD Monday Wednesday Friday admitted 9/29/2020 with chest pain, course complicated by GI bleed.     Interval Problem Update  10/7 - endoscopy cancelled today due to AMS and unable to consent. Denies chest pain, SOB.     Review of Systems  Review of Systems   Constitutional: Negative for fever.   Respiratory: Negative for shortness of breath.    Cardiovascular: Negative for chest pain.   Gastrointestinal: Negative for abdominal pain.   All other systems reviewed and are negative.       Physical Exam  Temp:  [36.3 °C (97.3 °F)-36.7 °C (98.1 °F)] 36.6 °C (97.9 °F)  Pulse:  [53-64] 58  Resp:  [16-17] 17  BP: (131-175)/(55-78) 148/69  SpO2:  [90 %-97 %] 93 %    Physical Exam   Constitutional: He is oriented to person, place, and time. He appears well-developed. No distress.   HENT:   Mouth/Throat: Oropharynx is clear and moist. No oropharyngeal exudate.   Eyes: EOM are normal. No scleral icterus.   Neck: No tracheal deviation present.   Cardiovascular: Normal rate and normal heart sounds.   No murmur heard.  Pulmonary/Chest: Effort normal and breath sounds normal. No stridor. He has no rales.   Abdominal: Soft. He exhibits no distension. There is no abdominal tenderness.   Musculoskeletal: Normal range of motion.         General: No edema.   Neurological: He is alert and oriented to person, place, and time.   Skin: Skin is warm and dry. He is not diaphoretic.   Psychiatric: He has a normal mood and affect.   Access: left RC AVF +bruit/thrill      Fluids    Intake/Output Summary (Last 24 hours) at 10/7/2020 1531  Last data filed at 10/7/2020 0045  Gross per 24 hour   Intake 2000 ml   Output --   Net 2000 ml       Laboratory  Labs reviewed, pertinent labs below.  Recent Labs     10/06/20  1720 10/07/20  0254 10/07/20  1320   WBC 11.5* 12.1* 11.6*   RBC 2.53* 2.52* 2.52*   HEMOGLOBIN 8.1* 8.1* 8.2*   HEMATOCRIT 25.9*  25.7* 25.4*   .4* 102.0* 100.8*   MCH 32.0 32.1 32.5   MCHC 31.3* 31.5* 32.3*   RDW 53.4* 52.9* 51.4*   PLATELETCT 300 339 328   MPV 9.4 9.7 9.6     Recent Labs     10/05/20  1600 10/06/20  0154 10/07/20  0254   SODIUM 134* 133* 133*   POTASSIUM 4.5 4.7 5.0   CHLORIDE 92* 92* 90*   CO2 27 30 25   GLUCOSE 126* 122* 71   BUN 29* 38* 48*   CREATININE 6.24* 8.08* 10.27*   CALCIUM 9.4 9.3 9.5     Recent Labs     10/05/20  1600 10/06/20  0154 10/07/20  0254   INR 1.77* 1.47* 1.47*           URINALYSIS:  Lab Results   Component Value Date/Time    COLORURINE Yellow 10/04/2020 1104    CLARITY Clear 10/04/2020 1104    SPECGRAVITY 1.016 10/04/2020 1104    PHURINE 5.5 10/04/2020 1104    KETONES Trace (A) 10/04/2020 1104    PROTEINURIN 100 (A) 10/04/2020 1104    BILIRUBINUR Negative 10/04/2020 1104    UROBILU 0.2 10/04/2020 1104    NITRITE Negative 10/04/2020 1104    LEUKESTERAS Negative 10/04/2020 1104    OCCULTBLOOD Trace (A) 10/04/2020 1104     UPC  Lab Results   Component Value Date/Time    TOTPROTUR 28.2 (H) 08/17/2017 1637      Lab Results   Component Value Date/Time    CREATININEU 51.40 08/17/2017 1637         Imaging reviewed  US-THORACENTESIS PUNCTURE LEFT   Final Result      Unsuccessful attempt at left-sided thoracentesis.         DX-CHEST-PORTABLE (1 VIEW)   Final Result      1.  Volume loss within the left lung.      2.  Loculated left pleural effusion.      3.  Cardiomegaly.      DX-CHEST-PORTABLE (1 VIEW)   Final Result      Increased opacity on the left may represent a layering pleural effusion with overlying atelectasis/consolidation.      Stable cardiomegaly.         US-EXTREMITY VENOUS UPPER UNILAT RIGHT   Final Result      DX-KNEE COMPLETE 4+ RIGHT   Final Result      1.  Large RIGHT knee joint effusion.   2.  No fracture or dislocation.   3.  Minimal degenerative change.      CT-ABDOMEN-PELVIS WITH   Final Result      1.  Colonic diverticulosis without evidence for diverticulitis.   2.  Normal  appendix.   3.  No focal mesenteric inflammatory process.   4.  Diffuse atherosclerotic calcification of abdominal aorta with prominent celiac trunk and SMA, without evidence for occlusion.   5.  Loculated fluid collection of the LEFT lung base with associated pleural thickening and atelectasis, possibly indicating empyema, new from prior.      NM-CARDIAC STRESS TEST   Final Result      EC-ECHOCARDIOGRAM COMPLETE W/O CONT   Final Result      CT-CHEST (THORAX) W/O   Final Result         1.  Left basilar atelectasis, component of infiltrate not definitively excluded.   2.  Atherosclerosis and atherosclerotic coronary artery disease   3.  Cardiomegaly   4.  Hepatomegaly      DX-CHEST-PORTABLE (1 VIEW)   Final Result         1.  Left basilar atelectasis, no focal infiltrate   2.  Cardiomegaly      IR-CONSULT AND TREAT    (Results Pending)         Current Facility-Administered Medications   Medication Dose Route Frequency Provider Last Rate Last Dose   • polyethylene glycol-electrolytes (GOLYTELY) solution 4 L  4 L Oral Once Brianna Goddard M.D.       • lidocaine (LIDODERM) 5 % 1 Patch  1 Patch Transdermal Q24HR HAKEEM BallardORaisa   1 Patch at 10/06/20 1724   • lidocaine (LIDODERM) 5 % 1 Patch  1 Patch Transdermal Q24HR HAKEEM BallardORaisa   1 Patch at 10/06/20 1723   • metoprolol SR (TOPROL XL) tablet 50 mg  50 mg Oral DAILY Na Bro P.ARaisa-VERITO   50 mg at 10/07/20 0431   • ampicillin/sulbactam (UNASYN) 3 g in  mL IVPB  3 g Intravenous Q24HRS Mayte Malhotra M.D.   Stopped at 10/07/20 0501   • calcium acetate (PHOS-LO) 667 MG tablet 2,001 mg  2,001 mg Oral TID AC Katrina Merchant M.D.   2,001 mg at 10/07/20 1217   • tramadol (ULTRAM) 50 MG tablet 50 mg  50 mg Oral Q12HRS PRN HAKEEM BallardORaisa   50 mg at 10/04/20 2107   • heparin injection 1,500 Units  1,500 Units Intravenous ACUTE DIALYSIS PRN Katrina Merchant M.D.   1,500 Units at 10/02/20 1148   • oxyCODONE immediate-release (ROXICODONE) tablet 5 mg  5 mg Oral Q6HRS  SYLVIE Peacock D.O.   5 mg at 10/06/20 0839   • amiodarone (CORDARONE) tablet 200 mg  200 mg Oral DAILY Blayne Verma M.D.   200 mg at 10/07/20 0431   • atorvastatin (LIPITOR) tablet 40 mg  40 mg Oral Q EVENING Blayne Verma M.D.   40 mg at 10/06/20 1721   • folic acid (FOLVITE) tablet 1 mg  1 mg Oral DAILY Blayne Verma M.D.   1 mg at 10/07/20 0431   • furosemide (LASIX) tablet 40 mg  40 mg Oral DAILY Blayne Verma M.D.   40 mg at 10/07/20 0431   • losartan (COZAAR) tablet 25 mg  25 mg Oral Q EVENING Blayne Verma M.D.   25 mg at 10/06/20 1721   • thiamine tablet 100 mg  100 mg Oral DAILY Blayne Verma M.D.   100 mg at 10/07/20 0431   • senna-docusate (PERICOLACE or SENOKOT S) 8.6-50 MG per tablet 2 Tab  2 Tab Oral BID Blayne Verma M.D.   Stopped at 10/07/20 0600    And   • polyethylene glycol/lytes (MIRALAX) PACKET 1 Packet  1 Packet Oral QDAY PRKALINA Verma M.D.        And   • magnesium hydroxide (MILK OF MAGNESIA) suspension 30 mL  30 mL Oral QDAY SYLVIE Verma M.D.        And   • bisacodyl (DULCOLAX) suppository 10 mg  10 mg Rectal QDAY PRN Blayne Verma M.D.       • acetaminophen (TYLENOL) tablet 650 mg  650 mg Oral Q6HRS SYLVIE Verma M.D.   650 mg at 10/05/20 0410         Assessment/Plan  50 y.o. male with ESRD on HD Monday Wednesday Friday admitted 9/29/2020 with chest pain, course complicated by GI bleed.     1.  ESRD on hemodialysis Monday Wednesday Friday.  Dialysis today per usual schedule.  Avoid nephrotoxins.  Check labs daily.    2.  Access: Left radiocephalic AV fistula, patent.  Left arm precautions.    3.  Anemia of chronic disease, superimposed on acute blood loss anemia.  Order Epogen thrice weekly with dialysis.  Check CBC at least daily.  Endoscopy plans per GI.    4.  Leukocytosis, slightly improving.  Unclear etiology.  Check CBC daily.    5.  Hyponatremia, due to excess fluid intake.  Limit hypotonic fluids.  Check labs daily.    6.   Hypoalbuminemia.  Encourage oral intake as tolerated.    Following      Burton Perales MD  Nephrology

## 2020-10-07 NOTE — PROGRESS NOTES
Gastroenterology Progress Note     Author: Shiraz Horton M.D.   Date & Time Created: 10/7/2020 10:49 AM    Chief Complaint:  Hematochezia    Interval History:  No report of hematochezia. However plan was for colo today and patient is acting confused in Pre-op and there is concern for MS changes. No abd pain no n/v. Report is that he finished his prep    Review of Systems:  Review of Systems   Unable to perform ROS: Mental status change       Physical Exam:  Physical Exam  Constitutional:       Appearance: Normal appearance. He is not ill-appearing.   HENT:      Head: Atraumatic.   Eyes:      Extraocular Movements: Extraocular movements intact.   Cardiovascular:      Rate and Rhythm: Normal rate and regular rhythm.   Pulmonary:      Effort: Pulmonary effort is normal.   Abdominal:      General: Abdomen is flat. There is no distension.      Palpations: Abdomen is soft.      Tenderness: There is no abdominal tenderness. There is no guarding or rebound.   Musculoskeletal:      Right lower leg: No edema.      Left lower leg: No edema.   Neurological:      Mental Status: He is alert.      Comments: Difficult to assess MS - at times says he understands then at others he does not         Labs:          Recent Labs     10/05/20  1600 10/06/20  0154 10/07/20  0254   SODIUM 134* 133* 133*   POTASSIUM 4.5 4.7 5.0   CHLORIDE 92* 92* 90*   CO2 27 30 25   BUN 29* 38* 48*   CREATININE 6.24* 8.08* 10.27*   MAGNESIUM 2.2  --  2.4   CALCIUM 9.4 9.3 9.5     Recent Labs     10/05/20  1600 10/06/20  0154 10/07/20  0254   ALTSGPT  --  16 16   ASTSGOT  --  20 21   ALKPHOSPHAT  --  111* 97   TBILIRUBIN  --  0.5 0.4   GLUCOSE 126* 122* 71     Recent Labs     10/05/20  1600 10/06/20  0154 10/06/20  0920 10/06/20  1720 10/07/20  0254   RBC 2.83* 2.66* 2.50* 2.53* 2.52*   HEMOGLOBIN 9.1* 8.7* 8.1* 8.1* 8.1*   HEMATOCRIT 29.1* 27.5* 25.7* 25.9* 25.7*   PLATELETCT 363 317 325 300 339   PROTHROMBTM 21.2* 18.3*  --   --  18.2*   INR 1.77* 1.47*   --   --  1.47*     Recent Labs     10/06/20  0154 10/06/20  0920 10/06/20  1720 10/07/20  0254   WBC 12.2* 12.7* 11.5* 12.1*   NEUTSPOLYS 87.00* 71.90 74.20* 76.20*   LYMPHOCYTES 6.10* 13.40* 10.10* 11.40*   MONOCYTES 3.50 8.70 9.50 7.40   EOSINOPHILS 1.70 5.40 5.60 4.10   BASOPHILS 1.70 0.20 0.10 0.20   ASTSGOT 20  --   --  21   ALTSGPT 16  --   --  16   ALKPHOSPHAT 111*  --   --  97   TBILIRUBIN 0.5  --   --  0.4     Hemodynamics:  Temp (24hrs), Av.5 °C (97.7 °F), Min:36.3 °C (97.3 °F), Max:36.7 °C (98.1 °F)  Temperature: 36.5 °C (97.7 °F)  Pulse  Av.6  Min: 53  Max: 138   Blood Pressure: (!) 175/66     Respiratory:    Respiration: 17, Pulse Oximetry: 90 %           Fluids:  No intake or output data in the 24 hours ending 10/06/20 1708     GI/Nutrition:  Orders Placed This Encounter   Procedures   • Diet NPO     Standing Status:   Standing     Number of Occurrences:   8     Order Specific Question:   Restrict to:     Answer:   Sips with Medications [3]     Medical Decision Making, by Problem:  Active Hospital Problems    Diagnosis   • PAF (paroxysmal atrial fibrillation) (HCC) [I48.0]   • Essential hypertension [I10]   • Rectal bleed [K62.5]   • Right knee pain [M25.561]   • Hematuria [R31.9]   • Bacteremia [R78.81]   • Acute pain of left shoulder [M25.512]   • ESRD (end stage renal disease) on dialysis (HCC) [N18.6, Z99.2]   • CAP (community acquired pneumonia) [J18.9]   • Other chest pain [R07.89]     Impression and Plan    1) Hematochezia - unclear etiology but with the INR so elevated certainly bled. HgB stable. Unclear why he is having these MS changes but the team felt that we could NOT proceed with a non-emergent case. Will reassess tomorrow.   2) CHF - improved. S/p cardiac eval  3) ESRD - on HD  4) anticoagulation management - coumadin held  5) Anemia, may be mixed etiology    Will follow - Dr Mancia to take over for us Thursday    [facility] [place]      Quality-Core Measures

## 2020-10-07 NOTE — CARE PLAN
Problem: Communication  Goal: The ability to communicate needs accurately and effectively will improve  Outcome: PROGRESSING AS EXPECTED  Note: Using Ipad  to communicate. Rounding hourly with pt. Communicating with family via telephone.     Problem: Safety  Goal: Will remain free from falls  Outcome: PROGRESSING AS EXPECTED  Note: Call precautions in place.

## 2020-10-07 NOTE — PROGRESS NOTES
Gastroenterology Progress Note     Author: Shiraz Horton M.D.   Date & Time Created: 10/6/2020 5:08 PM    Chief Complaint:  Hematochezia    Interval History:  Still with report of Hematochezia today. Able to see patient with Justus rojas and says he feels better. Less SOB however IR unable to remove and fluid with thorocentesis. Cardiology cleared patient for colonoscopy    Review of Systems:  Review of Systems   Constitutional: Negative for chills and fever.   Gastrointestinal: Positive for blood in stool and diarrhea. Negative for abdominal pain, nausea and vomiting.       Physical Exam:  Physical Exam  Constitutional:       Appearance: Normal appearance. He is not ill-appearing.   HENT:      Head: Atraumatic.   Eyes:      Extraocular Movements: Extraocular movements intact.   Cardiovascular:      Rate and Rhythm: Normal rate and regular rhythm.   Pulmonary:      Effort: Pulmonary effort is normal.   Abdominal:      General: Abdomen is flat. There is no distension.      Palpations: Abdomen is soft.      Tenderness: There is no abdominal tenderness. There is no guarding or rebound.   Musculoskeletal:      Right lower leg: No edema.      Left lower leg: No edema.   Neurological:      Mental Status: He is alert and oriented to person, place, and time.   Psychiatric:         Mood and Affect: Mood normal.         Behavior: Behavior normal.         Labs:          Recent Labs     10/05/20  0531 10/05/20  1600 10/06/20  0154   SODIUM 129* 134* 133*   POTASSIUM 6.0* 4.5 4.7   CHLORIDE 87* 92* 92*   CO2 24 27 30   BUN 75* 29* 38*   CREATININE 12.57* 6.24* 8.08*   MAGNESIUM  --  2.2  --    CALCIUM 9.7 9.4 9.3     Recent Labs     10/05/20  0531 10/05/20  1600 10/06/20  0154   ALTSGPT  --   --  16   ASTSGOT  --   --  20   ALKPHOSPHAT  --   --  111*   TBILIRUBIN  --   --  0.5   GLUCOSE 92 126* 122*     Recent Labs     10/05/20  0531  10/05/20  1600 10/06/20  0154 10/06/20  0920   RBC 2.76*   < > 2.83* 2.66* 2.50*    HEMOGLOBIN 9.0*   < > 9.1* 8.7* 8.1*   HEMATOCRIT 28.5*   < > 29.1* 27.5* 25.7*   PLATELETCT 298   < > 363 317 325   PROTHROMBTM 64.8*  --  21.2* 18.3*  --    INR 7.34*  --  1.77* 1.47*  --     < > = values in this interval not displayed.     Recent Labs     10/05/20  1600 10/06/20  0154 10/06/20  0920   WBC 13.7* 12.2* 12.7*   NEUTSPOLYS 87.70* 87.00* 71.90   LYMPHOCYTES 4.40* 6.10* 13.40*   MONOCYTES 4.40 3.50 8.70   EOSINOPHILS 2.60 1.70 5.40   BASOPHILS 0.90 1.70 0.20   ASTSGOT  --  20  --    ALTSGPT  --  16  --    ALKPHOSPHAT  --  111*  --    TBILIRUBIN  --  0.5  --      Hemodynamics:  Temp (24hrs), Av.7 °C (98.1 °F), Min:36.6 °C (97.8 °F), Max:36.9 °C (98.4 °F)  Temperature: 36.6 °C (97.8 °F)  Pulse  Av.3  Min: 53  Max: 138   Blood Pressure: 146/66     Respiratory:    Respiration: 16, Pulse Oximetry: 91 %           Fluids:  No intake or output data in the 24 hours ending 10/06/20 1708     GI/Nutrition:  Orders Placed This Encounter   Procedures   • Diet Order Clear Liquid, Renal     Standing Status:   Standing     Number of Occurrences:   1     Order Specific Question:   Diet:     Answer:   Clear Liquid [10]     Order Specific Question:   Diet:     Answer:   Renal [8]   • Diet NPO     Standing Status:   Standing     Number of Occurrences:   8     Order Specific Question:   Restrict to:     Answer:   Strict [1]     Medical Decision Making, by Problem:  Active Hospital Problems    Diagnosis   • PAF (paroxysmal atrial fibrillation) (HCC) [I48.0]   • Essential hypertension [I10]   • Rectal bleed [K62.5]   • Right knee pain [M25.561]   • Hematuria [R31.9]   • Bacteremia [R78.81]   • Acute pain of left shoulder [M25.512]   • ESRD (end stage renal disease) on dialysis (HCC) [N18.6, Z99.2]   • CAP (community acquired pneumonia) [J18.9]   • Other chest pain [R07.89]     Impression and Plan    1) Hematochezia - unclear etiology but the INR so elevated certainly bled. Still having blood per rectum however in  spite correcting his INR. Plan for [place] tomorrow about 1130. Prep tonight. Spoke with nursing staff  2) CHF - improved. S/p cardiac eval  3) ESRD - on HD  4) anticoagulation management - coumadin held  5) Anemia, may be mixed etiology    Will follow    EMO      Quality-Core Measures

## 2020-10-07 NOTE — PROGRESS NOTES
2045:  used, see  flow sheet. Patient educated on plan to proceed with colonoscopy in the AM. Extensive education given on golytley administration and bowel prep. Golytely started at 2035, patient educated to drink one cup every 15 minutes until stool is clear or gallon finished. Patient c/o baseline chest pain/shortness of breath when taking deep breathes, lung sounds diminished x5. States he always has this chest pain but it is better tonight than it was earlier today before the thoracentesis. Educated patient to inform nursing if his chest pain/SOB worsens throughout night.       2200: Patient up to restroom with assistance x4 times with loose stool. No blood noted in stool. In house  used to educated patient on need to continue drinking Golytely until stool is clear. Patient hesitant to continue drinking Golytely but verbalizes understanding.     0045: On call hospitalist paged for clarification of continuous fluid orders. Since 2045, patient has had >2000ml of Golytely for colonoscopy in AM. Patient pending dialysis tomorrow, time unknown. Per Dr Gaffney, hold off on IV fluids for now and have day shift reassess need for fluids in AM. Recheck added to MAR for continuous fluids at 0800 for day shift to reassess with hospitalist. Patients stool beginning to become clear yellow. Will continue to monitor.     0200: Lab unable to draw labs from R arm, multiple unsuccessful attempts, fistula in L arm. On call hospitalist paged, orders received to draw this AM labs from foot. Nursing communication placed, lab updated, to come draw labs asap.

## 2020-10-07 NOTE — OR SURGEON
Immediate Post- Operative Note        PostOp Diagnosis: pleural effusion      Procedure(s): attempted thoracentesis      Estimated Blood Loss: Less than 5 ml        Complications: None            10/7/2020     6:44 AM     Justa Banuelos M.D.

## 2020-10-07 NOTE — ANESTHESIA PREPROCEDURE EVALUATION
Relevant Problems   PULMONARY   (+) CAP (community acquired pneumonia)      NEURO   (+) CVA (cerebral vascular accident) ([facility])      CARDIAC   (+) Essential hypertension   (+) Non-rheumatic mitral regurgitation   (+) Nonrheumatic aortic valve insufficiency: Previously severe but mild to moderate in March 2018   (+) PAF (paroxysmal atrial fibrillation) ([facility])         (+) TREASURE (acute kidney injury) ([facility])   (+) Acute renal failure on dialysis ([facility])   (+) Cardiorenal syndrome   (+) ESRD (end stage renal disease) on dialysis ([facility])       Physical Exam    Airway   Mallampati: II  TM distance: >3 FB  Neck ROM: full       Cardiovascular - normal exam  Rhythm: regular  Rate: normal  (-) murmur     Dental - normal exam           Pulmonary - normal exam  Breath sounds clear to auscultation     Abdominal    Neurological - normal exam                 Anesthesia Plan    ASA 3   ASA physical status 3 criteria: ESRD undergoing regularly scheduled dialysis    Plan - MAC             Induction: intravenous    Postoperative Plan: Postoperative administration of opioids is intended.    Pertinent diagnostic labs and testing reviewed    Informed Consent:    Anesthetic plan and risks discussed with patient.    Use of blood products discussed with: patient whom consented to blood products.

## 2020-10-07 NOTE — PROGRESS NOTES
[facility] Progress Note     Hemodialysis treatment ordered today per Dr. Perales x 3 hours.   Treatment initiated at 1319 ended at 1622.      Patient tolerated tx; see paper flow sheet for details.      Net UF 1300 mL  Limited by changes in Heart rhythm from tele monitor, BP at 90's     [place] removed from access site. Dressings applied and sites held x 10 minutes; verified no bleeding. Positive bruit/thrill post tx.   Staff RN to monitor AVF/G for breakthrough bleeding. Should breakthrough bleeding occur, staff RN to apply pressure to access sites until bleeding resolved.   Notify Dialysis and Nephrologist for follow-up.     Report given to Primary RN KHUSHBOO Ramos.

## 2020-10-07 NOTE — PROGRESS NOTES
IR Nursing Note:    Order received for chest tube placement.  Patient is currently in dialysis.  Per RN patient waxes and wanes with orientation.  Patient is scheduled 10/8/2020 at 1630 for endoscopy per Carl in Endo.  Will have daughter give consent via phone tomrrow for chest tube placement then patient will proceed to endo later in the day.  Patient will need to be NPO after midnight, please hold all anticoagulation. Any questions please call IR.

## 2020-10-07 NOTE — PROGRESS NOTES
With patient’s permission (if able), completed daily phone call to designated support person, guprreet Lorenzo  Discussed patient condition and plan of care. All questions answered.  Follow up requested: Maura states she will be in this afternoon to sign consents as needed.

## 2020-10-07 NOTE — PROGRESS NOTES
Hospital Medicine Daily Progress Note    Date of Service  10/7/2020    Chief Complaint  50 y.o. male admitted 9/29/2020 with chest pain .    Hospital Course    Admitted with chest pain, pneumonia, elevated troponin.  Known history of paroxysmal atrial fibrillation, on anticoagulation with Coumadin.  D-dimer is negative.  Known history of ESRD, consult placed to nephrology for HD MWF. CT  CT abd/pelvis with concern for empyema, CXR today shows increased effusion. IR involved for thoracocentesis and pleural effusion culture and analysis. Supratheraprutic INR is reversed with coumadin on hold for GI bleeding, colonoscopy scheduled. Right knee effusion, discussed with ID consult, since patient had MSSA + from blood culture, will continue monitoring.    Interval Problem Update    Rectal bleed this am continues, bright red; Denies abd pain; N/V    + R knee mild edema,warmth to touch or no erythema or tenderness  and RUE hematoma improving    INR normalized, coumadin on hold due to GI bleeding    U/s guided drainage attempted, unsuccessful due to viscosity of fluid  IR informed, will have pigtail placed.  colonoscopy could not be done due to change of MS, will discuss with daughter, schedule for tomorrow.    Consultants/Specialty  ID  Cardiology  IR  Renal  GI    Code Status  Full Code    Disposition    TBD  F/u cbc q8, s/p ffp and vit K        Review of Systems  Review of Systems   Constitutional: Negative for chills, diaphoresis, fever and malaise/fatigue.   HENT: Negative for congestion.    Eyes: Negative for blurred vision.   Respiratory: Negative for shortness of breath.    Cardiovascular: Negative for chest pain, palpitations and leg swelling.   Gastrointestinal: Positive for blood in stool. Negative for abdominal pain, constipation, melena and nausea.   Genitourinary: Negative for dysuria, flank pain, hematuria and urgency.   Musculoskeletal: Positive for myalgias. Negative for back pain.        Denies right knee  pain   Neurological: Negative for dizziness, focal weakness, weakness and headaches.   Psychiatric/Behavioral: Negative for memory loss. The patient is not nervous/anxious.         Physical Exam  Temp:  [36.3 °C (97.3 °F)-36.7 °C (98.1 °F)] 36.6 °C (97.9 °F)  Pulse:  [53-64] 58  Resp:  [16-17] 17  BP: (131-178)/(55-78) 148/69  SpO2:  [90 %-100 %] 93 %    Physical Exam  Vitals signs and nursing note reviewed.   Constitutional:       General: He is not in acute distress.     Appearance: Normal appearance. He is not ill-appearing.   HENT:      Head: Normocephalic.      Nose: Nose normal.   Eyes:      Pupils: Pupils are equal, round, and reactive to light.   Neck:      Musculoskeletal: Neck supple.      Thyroid: No thyromegaly.      Vascular: No JVD.   Cardiovascular:      Rate and Rhythm: Normal rate.      Heart sounds: No murmur.   Pulmonary:      Effort: No respiratory distress.      Breath sounds: Normal breath sounds. No stridor. No wheezing or rhonchi.   Abdominal:      General: Bowel sounds are normal. There is no distension.      Palpations: Abdomen is soft.   Musculoskeletal:         General: Swelling (Mild) and tenderness (on right arm improving) present.      Right lower leg: Edema (right knee, no warmth to tough or erythma) present.      Comments: No tenderness of the right knee; mild warmth to touch   Skin:     General: Skin is warm and dry.      Coloration: Skin is not jaundiced or pale.   Neurological:      Mental Status: He is alert and oriented to person, place, and time.      Cranial Nerves: No cranial nerve deficit.      Sensory: No sensory deficit.      Motor: No weakness.   Psychiatric:         Behavior: Behavior normal.         Thought Content: Thought content normal.         Fluids    Intake/Output Summary (Last 24 hours) at 10/7/2020 1443  Last data filed at 10/7/2020 0045  Gross per 24 hour   Intake 2000 ml   Output --   Net 2000 ml       Laboratory  Recent Labs     10/06/20  1720 10/07/20  0259  10/07/20  1320   WBC 11.5* 12.1* 11.6*   RBC 2.53* 2.52* 2.52*   HEMOGLOBIN 8.1* 8.1* 8.2*   HEMATOCRIT 25.9* 25.7* 25.4*   .4* 102.0* 100.8*   MCH 32.0 32.1 32.5   MCHC 31.3* 31.5* 32.3*   RDW 53.4* 52.9* 51.4*   PLATELETCT 300 339 328   MPV 9.4 9.7 9.6     Recent Labs     10/05/20  1600 10/06/20  0154 10/07/20  0254   SODIUM 134* 133* 133*   POTASSIUM 4.5 4.7 5.0   CHLORIDE 92* 92* 90*   CO2 27 30 25   GLUCOSE 126* 122* 71   BUN 29* 38* 48*   CREATININE 6.24* 8.08* 10.27*   CALCIUM 9.4 9.3 9.5     Recent Labs     10/05/20  1600 10/06/20  0154 10/07/20  0254   INR 1.77* 1.47* 1.47*               Imaging  US-THORACENTESIS PUNCTURE LEFT   Final Result      Unsuccessful attempt at left-sided thoracentesis.         DX-CHEST-PORTABLE (1 VIEW)   Final Result      1.  Volume loss within the left lung.      2.  Loculated left pleural effusion.      3.  Cardiomegaly.      DX-CHEST-PORTABLE (1 VIEW)   Final Result      Increased opacity on the left may represent a layering pleural effusion with overlying atelectasis/consolidation.      Stable cardiomegaly.         US-EXTREMITY VENOUS UPPER UNILAT RIGHT   Final Result      DX-KNEE COMPLETE 4+ RIGHT   Final Result      1.  Large RIGHT knee joint effusion.   2.  No fracture or dislocation.   3.  Minimal degenerative change.      CT-ABDOMEN-PELVIS WITH   Final Result      1.  Colonic diverticulosis without evidence for diverticulitis.   2.  Normal appendix.   3.  No focal mesenteric inflammatory process.   4.  Diffuse atherosclerotic calcification of abdominal aorta with prominent celiac trunk and SMA, without evidence for occlusion.   5.  Loculated fluid collection of the LEFT lung base with associated pleural thickening and atelectasis, possibly indicating empyema, new from prior.      NM-CARDIAC STRESS TEST   Final Result      EC-ECHOCARDIOGRAM COMPLETE W/O CONT   Final Result      CT-CHEST (THORAX) W/O   Final Result         1.  Left basilar atelectasis, component of  infiltrate not definitively excluded.   2.  Atherosclerosis and atherosclerotic coronary artery disease   3.  Cardiomegaly   4.  Hepatomegaly      DX-CHEST-PORTABLE (1 VIEW)   Final Result         1.  Left basilar atelectasis, no focal infiltrate   2.  Cardiomegaly      IR-CONSULT AND TREAT    (Results Pending)        Assessment/Plan  PAF (paroxysmal atrial fibrillation) (HCC)- (present on admission)  Assessment & Plan  -NSR, on amio and metoprolol  coumadin on hold due to GI bleeding, F/U INR    Rectal bleed  Assessment & Plan  10/5 rectal oozing, h/h stable  - supratherapeutic INR, 7  - given FFP stat, vit K x 1, f/u INR  - monitor h/h q18  GI consulted, Dr. Ponce, appreciate input  F/u CT Abd/pelvis    10/6 rectal bleeding, h/h slowly trending down  INR normalized, coumadin on  Hold  Colonoscopy had to reschedule due to mental status change    Right knee pain  Assessment & Plan  warmth to touch or no erythema, nontenderness; lidocaine patch helped yesterday, monitoring closely his right knee for possible septic arthritis and low threshold for Ortho consult for arthrocentesis;        Hematuria  Assessment & Plan    10/5 now with rectal bleed  10/6 denies hematuria  resolved    Acute pain of left shoulder  Assessment & Plan  MSK, avoid nsaids d/t ESRD  - trial of oxycodone, and lidocaine patch  - CXR, no bony abnormality  - pt/ot     10/1 improving pain control, will add tramadol  PT/OT evaluation    10/2 improving, cont pain control  10/6 denies should pain, c/w same pain management  10/7 denies pain shoulder or knee; be cautious with oral pain management    Bacteremia  Assessment & Plan  1/2 bottles f/u sens  Repeat blood cx  On unasyn    10/1  1 of 2 bottles positive for MSSa  Repeat blood cultures now negative    10/4 continue Unasyn, will consult ID for antibiotic recommendations, and duration of therapy  10/6 preliminary blood culture form 10/5 were negative for both bottles; c/w unasyn per ID, for 7 days  post negative BC, end date 10/7,   then:Transition to cefazolin for total of 4 weeks for bacteremia end date 10/28 if BC remain negative  ID consult appreciated; blood culture from 10/6 came back negative    Other chest pain- (present on admission)  Assessment & Plan  -0/1 ECHO ef 70%  Intermittent arrhythmia, troponin trending down  Patient denies chest pain  We will continue monitoring    10/2 ongoing CP, intermittent CP 6/10, ?block  Elevated trop at 70  - cardiology consult, Dr. Mchugh, appreciate input  ekg- nonspecific ST changes    10/3 f/u MPI, cardiology following    10/4 MPI with fixed defects inferior lateral wall, artifact versus small infarct  No reversible ischemia  Cardiology following  Continue current medication    10/6 cardiology, GI, IR and ID consult appreciate  IR thoracocentsis for left pleural effusion, unsuccessful;  IR will place a pigtail and  pleural fluid analysis and culture      CAP (community acquired pneumonia)- (present on admission)  Assessment & Plan  presumed  procal elevated, 2.2  empiric IV unasyn and oral azithromycin  trend WBC/fever curve  blood cx 1/2, GPC(mssa) on 9/29  repeat blood cx on 9/30, 10/5 both negative, blood culture from 10/6 came back negative   sputum cultures ordered on 10/6, pending collection    ESRD (end stage renal disease) on dialysis (HCC)- (present on admission)  Assessment & Plan  -dialyzed M/W/F  -lytes are ok right now      Essential hypertension- (present on admission)  Assessment & Plan  -continue outpatient medications       VTE prophylaxis: Compression sequential

## 2020-10-08 ENCOUNTER — ANESTHESIA (OUTPATIENT)
Dept: SURGERY | Facility: MEDICAL CENTER | Age: 50
DRG: 163 | End: 2020-10-08
Payer: MEDICAID

## 2020-10-08 ENCOUNTER — APPOINTMENT (OUTPATIENT)
Dept: RADIOLOGY | Facility: MEDICAL CENTER | Age: 50
DRG: 163 | End: 2020-10-08
Attending: SURGERY
Payer: MEDICAID

## 2020-10-08 ENCOUNTER — ANESTHESIA EVENT (OUTPATIENT)
Dept: SURGERY | Facility: MEDICAL CENTER | Age: 50
DRG: 163 | End: 2020-10-08
Payer: MEDICAID

## 2020-10-08 PROBLEM — J86.9 EMPYEMA OF LUNG (HCC): Status: ACTIVE | Noted: 2020-10-08

## 2020-10-08 LAB
ALBUMIN SERPL BCP-MCNC: 2.9 G/DL (ref 3.2–4.9)
ALBUMIN/GLOB SERPL: 0.8 G/DL
ALP SERPL-CCNC: 93 U/L (ref 30–99)
ALT SERPL-CCNC: 15 U/L (ref 2–50)
ANION GAP SERPL CALC-SCNC: 15 MMOL/L (ref 7–16)
AST SERPL-CCNC: 20 U/L (ref 12–45)
BASOPHILS # BLD AUTO: 0.2 % (ref 0–1.8)
BASOPHILS # BLD AUTO: 0.3 % (ref 0–1.8)
BASOPHILS # BLD: 0.02 K/UL (ref 0–0.12)
BASOPHILS # BLD: 0.03 K/UL (ref 0–0.12)
BILIRUB SERPL-MCNC: 0.5 MG/DL (ref 0.1–1.5)
BUN SERPL-MCNC: 25 MG/DL (ref 8–22)
CALCIUM SERPL-MCNC: 8.8 MG/DL (ref 8.5–10.5)
CHLORIDE SERPL-SCNC: 90 MMOL/L (ref 96–112)
CO2 SERPL-SCNC: 30 MMOL/L (ref 20–33)
CREAT SERPL-MCNC: 6.73 MG/DL (ref 0.5–1.4)
CRP SERPL HS-MCNC: 26.48 MG/DL (ref 0–0.75)
EOSINOPHIL # BLD AUTO: 0.32 K/UL (ref 0–0.51)
EOSINOPHIL # BLD AUTO: 0.32 K/UL (ref 0–0.51)
EOSINOPHIL NFR BLD: 3 % (ref 0–6.9)
EOSINOPHIL NFR BLD: 3 % (ref 0–6.9)
ERYTHROCYTE [DISTWIDTH] IN BLOOD BY AUTOMATED COUNT: 50.5 FL (ref 35.9–50)
ERYTHROCYTE [DISTWIDTH] IN BLOOD BY AUTOMATED COUNT: 51.4 FL (ref 35.9–50)
ERYTHROCYTE [SEDIMENTATION RATE] IN BLOOD BY WESTERGREN METHOD: >120 MM/HOUR (ref 0–20)
GLOBULIN SER CALC-MCNC: 3.6 G/DL (ref 1.9–3.5)
GLUCOSE SERPL-MCNC: 70 MG/DL (ref 65–99)
HCT VFR BLD AUTO: 24.5 % (ref 42–52)
HCT VFR BLD AUTO: 28 % (ref 42–52)
HGB BLD-MCNC: 7.9 G/DL (ref 14–18)
HGB BLD-MCNC: 8.9 G/DL (ref 14–18)
IMM GRANULOCYTES # BLD AUTO: 0.05 K/UL (ref 0–0.11)
IMM GRANULOCYTES # BLD AUTO: 0.06 K/UL (ref 0–0.11)
IMM GRANULOCYTES NFR BLD AUTO: 0.5 % (ref 0–0.9)
IMM GRANULOCYTES NFR BLD AUTO: 0.6 % (ref 0–0.9)
LYMPHOCYTES # BLD AUTO: 1.65 K/UL (ref 1–4.8)
LYMPHOCYTES # BLD AUTO: 1.76 K/UL (ref 1–4.8)
LYMPHOCYTES NFR BLD: 15.6 % (ref 22–41)
LYMPHOCYTES NFR BLD: 16.5 % (ref 22–41)
MCH RBC QN AUTO: 32.2 PG (ref 27–33)
MCH RBC QN AUTO: 32.4 PG (ref 27–33)
MCHC RBC AUTO-ENTMCNC: 31.8 G/DL (ref 33.7–35.3)
MCHC RBC AUTO-ENTMCNC: 32.2 G/DL (ref 33.7–35.3)
MCV RBC AUTO: 100.4 FL (ref 81.4–97.8)
MCV RBC AUTO: 101.4 FL (ref 81.4–97.8)
MONOCYTES # BLD AUTO: 1 K/UL (ref 0–0.85)
MONOCYTES # BLD AUTO: 1.06 K/UL (ref 0–0.85)
MONOCYTES NFR BLD AUTO: 10 % (ref 0–13.4)
MONOCYTES NFR BLD AUTO: 9.4 % (ref 0–13.4)
NEUTROPHILS # BLD AUTO: 7.5 K/UL (ref 1.82–7.42)
NEUTROPHILS # BLD AUTO: 7.53 K/UL (ref 1.82–7.42)
NEUTROPHILS NFR BLD: 70.3 % (ref 44–72)
NEUTROPHILS NFR BLD: 70.6 % (ref 44–72)
NRBC # BLD AUTO: 0 K/UL
NRBC # BLD AUTO: 0 K/UL
NRBC BLD-RTO: 0 /100 WBC
NRBC BLD-RTO: 0 /100 WBC
PATHOLOGY CONSULT NOTE: NORMAL
PLATELET # BLD AUTO: 311 K/UL (ref 164–446)
PLATELET # BLD AUTO: 355 K/UL (ref 164–446)
PMV BLD AUTO: 9.3 FL (ref 9–12.9)
PMV BLD AUTO: 9.6 FL (ref 9–12.9)
POTASSIUM SERPL-SCNC: 4.3 MMOL/L (ref 3.6–5.5)
PROT SERPL-MCNC: 6.5 G/DL (ref 6–8.2)
RBC # BLD AUTO: 2.44 M/UL (ref 4.7–6.1)
RBC # BLD AUTO: 2.76 M/UL (ref 4.7–6.1)
SODIUM SERPL-SCNC: 135 MMOL/L (ref 135–145)
WBC # BLD AUTO: 10.6 K/UL (ref 4.8–10.8)
WBC # BLD AUTO: 10.7 K/UL (ref 4.8–10.8)

## 2020-10-08 PROCEDURE — 700101 HCHG RX REV CODE 250: Performed by: ANESTHESIOLOGY

## 2020-10-08 PROCEDURE — 160035 HCHG PACU - 1ST 60 MINS PHASE I: Performed by: SURGERY

## 2020-10-08 PROCEDURE — A9270 NON-COVERED ITEM OR SERVICE: HCPCS | Performed by: PHYSICIAN ASSISTANT

## 2020-10-08 PROCEDURE — 700111 HCHG RX REV CODE 636 W/ 250 OVERRIDE (IP): Performed by: INTERNAL MEDICINE

## 2020-10-08 PROCEDURE — 99232 SBSQ HOSP IP/OBS MODERATE 35: CPT | Performed by: STUDENT IN AN ORGANIZED HEALTH CARE EDUCATION/TRAINING PROGRAM

## 2020-10-08 PROCEDURE — 700105 HCHG RX REV CODE 258: Performed by: INTERNAL MEDICINE

## 2020-10-08 PROCEDURE — 160048 HCHG OR STATISTICAL LEVEL 1-5: Performed by: SURGERY

## 2020-10-08 PROCEDURE — 700102 HCHG RX REV CODE 250 W/ 637 OVERRIDE(OP): Performed by: INTERNAL MEDICINE

## 2020-10-08 PROCEDURE — 80053 COMPREHEN METABOLIC PANEL: CPT

## 2020-10-08 PROCEDURE — 700105 HCHG RX REV CODE 258: Performed by: ANESTHESIOLOGY

## 2020-10-08 PROCEDURE — 501838 HCHG SUTURE GENERAL: Performed by: SURGERY

## 2020-10-08 PROCEDURE — A9270 NON-COVERED ITEM OR SERVICE: HCPCS | Performed by: INTERNAL MEDICINE

## 2020-10-08 PROCEDURE — 700105 HCHG RX REV CODE 258: Performed by: SURGERY

## 2020-10-08 PROCEDURE — 88305 TISSUE EXAM BY PATHOLOGIST: CPT

## 2020-10-08 PROCEDURE — 700101 HCHG RX REV CODE 250: Performed by: SURGERY

## 2020-10-08 PROCEDURE — A9270 NON-COVERED ITEM OR SERVICE: HCPCS | Performed by: ANESTHESIOLOGY

## 2020-10-08 PROCEDURE — 87070 CULTURE OTHR SPECIMN AEROBIC: CPT

## 2020-10-08 PROCEDURE — 501570 HCHG TROCAR, SEPARATOR: Performed by: SURGERY

## 2020-10-08 PROCEDURE — 36415 COLL VENOUS BLD VENIPUNCTURE: CPT

## 2020-10-08 PROCEDURE — 700111 HCHG RX REV CODE 636 W/ 250 OVERRIDE (IP): Performed by: ANESTHESIOLOGY

## 2020-10-08 PROCEDURE — 0BNL4ZZ RELEASE LEFT LUNG, PERCUTANEOUS ENDOSCOPIC APPROACH: ICD-10-PCS | Performed by: SURGERY

## 2020-10-08 PROCEDURE — 88304 TISSUE EXAM BY PATHOLOGIST: CPT

## 2020-10-08 PROCEDURE — 88112 CYTOPATH CELL ENHANCE TECH: CPT

## 2020-10-08 PROCEDURE — 700102 HCHG RX REV CODE 250 W/ 637 OVERRIDE(OP): Performed by: PHYSICIAN ASSISTANT

## 2020-10-08 PROCEDURE — 85652 RBC SED RATE AUTOMATED: CPT

## 2020-10-08 PROCEDURE — 87205 SMEAR GRAM STAIN: CPT

## 2020-10-08 PROCEDURE — 87075 CULTR BACTERIA EXCEPT BLOOD: CPT

## 2020-10-08 PROCEDURE — 86140 C-REACTIVE PROTEIN: CPT

## 2020-10-08 PROCEDURE — 160002 HCHG RECOVERY MINUTES (STAT): Performed by: SURGERY

## 2020-10-08 PROCEDURE — 700101 HCHG RX REV CODE 250: Performed by: INTERNAL MEDICINE

## 2020-10-08 PROCEDURE — 87102 FUNGUS ISOLATION CULTURE: CPT

## 2020-10-08 PROCEDURE — 71045 X-RAY EXAM CHEST 1 VIEW: CPT

## 2020-10-08 PROCEDURE — 160009 HCHG ANES TIME/MIN: Performed by: SURGERY

## 2020-10-08 PROCEDURE — 87206 SMEAR FLUORESCENT/ACID STAI: CPT

## 2020-10-08 PROCEDURE — 160036 HCHG PACU - EA ADDL 30 MINS PHASE I: Performed by: SURGERY

## 2020-10-08 PROCEDURE — 03HY32Z INSERTION OF MONITORING DEVICE INTO UPPER ARTERY, PERCUTANEOUS APPROACH: ICD-10-PCS | Performed by: ANESTHESIOLOGY

## 2020-10-08 PROCEDURE — 85025 COMPLETE CBC W/AUTO DIFF WBC: CPT

## 2020-10-08 PROCEDURE — 87015 SPECIMEN INFECT AGNT CONCNTJ: CPT

## 2020-10-08 PROCEDURE — 87116 MYCOBACTERIA CULTURE: CPT

## 2020-10-08 PROCEDURE — 501577 HCHG TROCAR, STEP 11MM: Performed by: SURGERY

## 2020-10-08 PROCEDURE — 160041 HCHG SURGERY MINUTES - EA ADDL 1 MIN LEVEL 4: Performed by: SURGERY

## 2020-10-08 PROCEDURE — 770020 HCHG ROOM/CARE - TELE (206)

## 2020-10-08 PROCEDURE — 99233 SBSQ HOSP IP/OBS HIGH 50: CPT | Mod: GC | Performed by: INTERNAL MEDICINE

## 2020-10-08 PROCEDURE — 700102 HCHG RX REV CODE 250 W/ 637 OVERRIDE(OP): Performed by: ANESTHESIOLOGY

## 2020-10-08 PROCEDURE — 502571 HCHG PACK, LAP CHOLE: Performed by: SURGERY

## 2020-10-08 PROCEDURE — 160029 HCHG SURGERY MINUTES - 1ST 30 MINS LEVEL 4: Performed by: SURGERY

## 2020-10-08 PROCEDURE — 501581 HCHG TROCAR: Performed by: SURGERY

## 2020-10-08 RX ORDER — MAGNESIUM HYDROXIDE 1200 MG/15ML
LIQUID ORAL
Status: COMPLETED | OUTPATIENT
Start: 2020-10-08 | End: 2020-10-08

## 2020-10-08 RX ORDER — HYDROMORPHONE HYDROCHLORIDE 1 MG/ML
0.1 INJECTION, SOLUTION INTRAMUSCULAR; INTRAVENOUS; SUBCUTANEOUS
Status: DISCONTINUED | OUTPATIENT
Start: 2020-10-08 | End: 2020-10-08 | Stop reason: HOSPADM

## 2020-10-08 RX ORDER — MIDAZOLAM HYDROCHLORIDE 1 MG/ML
INJECTION INTRAMUSCULAR; INTRAVENOUS PRN
Status: DISCONTINUED | OUTPATIENT
Start: 2020-10-08 | End: 2020-10-08 | Stop reason: SURG

## 2020-10-08 RX ORDER — SODIUM CHLORIDE 9 MG/ML
INJECTION, SOLUTION INTRAVENOUS
Status: DISCONTINUED | OUTPATIENT
Start: 2020-10-08 | End: 2020-10-08 | Stop reason: SURG

## 2020-10-08 RX ORDER — GLYCOPYRROLATE 0.2 MG/ML
INJECTION INTRAMUSCULAR; INTRAVENOUS PRN
Status: DISCONTINUED | OUTPATIENT
Start: 2020-10-08 | End: 2020-10-08 | Stop reason: SURG

## 2020-10-08 RX ORDER — ONDANSETRON 2 MG/ML
4 INJECTION INTRAMUSCULAR; INTRAVENOUS
Status: DISCONTINUED | OUTPATIENT
Start: 2020-10-08 | End: 2020-10-08 | Stop reason: HOSPADM

## 2020-10-08 RX ORDER — HYDROMORPHONE HYDROCHLORIDE 1 MG/ML
0.4 INJECTION, SOLUTION INTRAMUSCULAR; INTRAVENOUS; SUBCUTANEOUS
Status: DISCONTINUED | OUTPATIENT
Start: 2020-10-08 | End: 2020-10-08 | Stop reason: HOSPADM

## 2020-10-08 RX ORDER — NEOSTIGMINE METHYLSULFATE 1 MG/ML
INJECTION, SOLUTION INTRAVENOUS PRN
Status: DISCONTINUED | OUTPATIENT
Start: 2020-10-08 | End: 2020-10-08 | Stop reason: SURG

## 2020-10-08 RX ORDER — ONDANSETRON 2 MG/ML
INJECTION INTRAMUSCULAR; INTRAVENOUS PRN
Status: DISCONTINUED | OUTPATIENT
Start: 2020-10-08 | End: 2020-10-08 | Stop reason: SURG

## 2020-10-08 RX ORDER — DIPHENHYDRAMINE HYDROCHLORIDE 50 MG/ML
12.5 INJECTION INTRAMUSCULAR; INTRAVENOUS
Status: DISCONTINUED | OUTPATIENT
Start: 2020-10-08 | End: 2020-10-08 | Stop reason: HOSPADM

## 2020-10-08 RX ORDER — HALOPERIDOL 5 MG/ML
1 INJECTION INTRAMUSCULAR
Status: DISCONTINUED | OUTPATIENT
Start: 2020-10-08 | End: 2020-10-08 | Stop reason: HOSPADM

## 2020-10-08 RX ORDER — ATRACURIUM BESYLATE 10 MG/ML
50 INJECTION, SOLUTION INTRAVENOUS ONCE
Status: DISCONTINUED | OUTPATIENT
Start: 2020-10-08 | End: 2020-10-08

## 2020-10-08 RX ORDER — LABETALOL HYDROCHLORIDE 5 MG/ML
5 INJECTION, SOLUTION INTRAVENOUS
Status: DISCONTINUED | OUTPATIENT
Start: 2020-10-08 | End: 2020-10-08 | Stop reason: HOSPADM

## 2020-10-08 RX ORDER — BUPIVACAINE HYDROCHLORIDE AND EPINEPHRINE 5; 5 MG/ML; UG/ML
INJECTION, SOLUTION EPIDURAL; INTRACAUDAL; PERINEURAL
Status: DISCONTINUED | OUTPATIENT
Start: 2020-10-08 | End: 2020-10-08 | Stop reason: HOSPADM

## 2020-10-08 RX ORDER — OXYCODONE HCL 5 MG/5 ML
10 SOLUTION, ORAL ORAL
Status: COMPLETED | OUTPATIENT
Start: 2020-10-08 | End: 2020-10-08

## 2020-10-08 RX ORDER — LORAZEPAM 2 MG/ML
0.5 INJECTION INTRAMUSCULAR
Status: DISCONTINUED | OUTPATIENT
Start: 2020-10-08 | End: 2020-10-08 | Stop reason: HOSPADM

## 2020-10-08 RX ORDER — HYDROMORPHONE HYDROCHLORIDE 1 MG/ML
0.2 INJECTION, SOLUTION INTRAMUSCULAR; INTRAVENOUS; SUBCUTANEOUS
Status: DISCONTINUED | OUTPATIENT
Start: 2020-10-08 | End: 2020-10-08 | Stop reason: HOSPADM

## 2020-10-08 RX ORDER — OXYCODONE HCL 5 MG/5 ML
5 SOLUTION, ORAL ORAL
Status: COMPLETED | OUTPATIENT
Start: 2020-10-08 | End: 2020-10-08

## 2020-10-08 RX ORDER — SODIUM CHLORIDE, SODIUM GLUCONATE, SODIUM ACETATE, POTASSIUM CHLORIDE AND MAGNESIUM CHLORIDE 526; 502; 368; 37; 30 MG/100ML; MG/100ML; MG/100ML; MG/100ML; MG/100ML
INJECTION, SOLUTION INTRAVENOUS CONTINUOUS
Status: DISCONTINUED | OUTPATIENT
Start: 2020-10-08 | End: 2020-10-08 | Stop reason: HOSPADM

## 2020-10-08 RX ORDER — ATRACURIUM BESYLATE 10 MG/ML
INJECTION, SOLUTION INTRAVENOUS PRN
Status: DISCONTINUED | OUTPATIENT
Start: 2020-10-08 | End: 2020-10-08 | Stop reason: SURG

## 2020-10-08 RX ORDER — CEFAZOLIN SODIUM 1 G/3ML
INJECTION, POWDER, FOR SOLUTION INTRAMUSCULAR; INTRAVENOUS PRN
Status: DISCONTINUED | OUTPATIENT
Start: 2020-10-08 | End: 2020-10-08 | Stop reason: SURG

## 2020-10-08 RX ORDER — ATRACURIUM BESYLATE 10 MG/ML
25-50 INJECTION, SOLUTION INTRAVENOUS ONCE
Status: DISCONTINUED | OUTPATIENT
Start: 2020-10-08 | End: 2020-10-08

## 2020-10-08 RX ORDER — SODIUM CHLORIDE, SODIUM LACTATE, POTASSIUM CHLORIDE, CALCIUM CHLORIDE 600; 310; 30; 20 MG/100ML; MG/100ML; MG/100ML; MG/100ML
INJECTION, SOLUTION INTRAVENOUS
Status: COMPLETED | OUTPATIENT
Start: 2020-10-08 | End: 2020-10-08

## 2020-10-08 RX ADMIN — OXYCODONE HYDROCHLORIDE 5 MG: 5 TABLET ORAL at 18:16

## 2020-10-08 RX ADMIN — Medication 2001 MG: at 07:52

## 2020-10-08 RX ADMIN — MIDAZOLAM HYDROCHLORIDE 2 MG: 1 INJECTION, SOLUTION INTRAMUSCULAR; INTRAVENOUS at 13:01

## 2020-10-08 RX ADMIN — LOSARTAN POTASSIUM 25 MG: 50 TABLET, FILM COATED ORAL at 18:16

## 2020-10-08 RX ADMIN — NEOSTIGMINE METHYLSULFATE 1 MG: 1 INJECTION INTRAVENOUS at 14:12

## 2020-10-08 RX ADMIN — METOPROLOL SUCCINATE 50 MG: 25 TABLET, EXTENDED RELEASE ORAL at 04:57

## 2020-10-08 RX ADMIN — LIDOCAINE 1 PATCH: 50 PATCH TOPICAL at 18:17

## 2020-10-08 RX ADMIN — FUROSEMIDE 40 MG: 40 TABLET ORAL at 04:57

## 2020-10-08 RX ADMIN — AMIODARONE HYDROCHLORIDE 200 MG: 200 TABLET ORAL at 04:57

## 2020-10-08 RX ADMIN — FENTANYL CITRATE 50 MCG: 50 INJECTION, SOLUTION INTRAMUSCULAR; INTRAVENOUS at 14:02

## 2020-10-08 RX ADMIN — ONDANSETRON 4 MG: 2 INJECTION INTRAMUSCULAR; INTRAVENOUS at 14:02

## 2020-10-08 RX ADMIN — Medication 100 MG: at 04:57

## 2020-10-08 RX ADMIN — PROPOFOL 150 MG: 10 INJECTION, EMULSION INTRAVENOUS at 13:15

## 2020-10-08 RX ADMIN — LIDOCAINE 1 PATCH: 50 PATCH TOPICAL at 18:18

## 2020-10-08 RX ADMIN — GLYCOPYRROLATE 0.2 MG: 0.2 INJECTION INTRAMUSCULAR; INTRAVENOUS at 14:12

## 2020-10-08 RX ADMIN — GLYCOPYRROLATE 0.4 MG: 0.2 INJECTION INTRAMUSCULAR; INTRAVENOUS at 14:05

## 2020-10-08 RX ADMIN — PROPOFOL 20 MG: 10 INJECTION, EMULSION INTRAVENOUS at 14:04

## 2020-10-08 RX ADMIN — ALFENTANIL HYDROCHLORIDE 1000 MCG: 500 INJECTION INTRAVENOUS at 13:15

## 2020-10-08 RX ADMIN — CEFAZOLIN 2000 MG: 330 INJECTION, POWDER, FOR SOLUTION INTRAMUSCULAR; INTRAVENOUS at 13:15

## 2020-10-08 RX ADMIN — NEOSTIGMINE METHYLSULFATE 3 MG: 1 INJECTION INTRAVENOUS at 14:05

## 2020-10-08 RX ADMIN — ATRACURIUM BESYLATE 50 MG: 10 INJECTION, SOLUTION INTRAVENOUS at 12:58

## 2020-10-08 RX ADMIN — ATORVASTATIN CALCIUM 40 MG: 40 TABLET, FILM COATED ORAL at 18:16

## 2020-10-08 RX ADMIN — SODIUM CHLORIDE 3 G: 900 INJECTION INTRAVENOUS at 04:57

## 2020-10-08 RX ADMIN — SODIUM CHLORIDE: 9 INJECTION, SOLUTION INTRAVENOUS at 12:58

## 2020-10-08 RX ADMIN — FOLIC ACID 1 MG: 1 TABLET ORAL at 04:57

## 2020-10-08 RX ADMIN — OXYCODONE HYDROCHLORIDE 5 MG: 5 SOLUTION ORAL at 14:39

## 2020-10-08 ASSESSMENT — ENCOUNTER SYMPTOMS
BLURRED VISION: 0
ABDOMINAL PAIN: 0
CHILLS: 0
SHORTNESS OF BREATH: 0
FOCAL WEAKNESS: 0
PALPITATIONS: 0
FLANK PAIN: 0
NAUSEA: 0
DIZZINESS: 0
DIAPHORESIS: 0
MEMORY LOSS: 0
NERVOUS/ANXIOUS: 0
HEADACHES: 0
SORE THROAT: 1
CONSTIPATION: 0
MYALGIAS: 1
FEVER: 0
BLOOD IN STOOL: 1
WEAKNESS: 0
BACK PAIN: 0

## 2020-10-08 ASSESSMENT — PAIN DESCRIPTION - PAIN TYPE
TYPE: SURGICAL PAIN
TYPE: ACUTE PAIN
TYPE: SURGICAL PAIN

## 2020-10-08 ASSESSMENT — PAIN SCALES - GENERAL: PAIN_LEVEL: 4

## 2020-10-08 NOTE — ANESTHESIA PROCEDURE NOTES
Arterial Line  Performed by: Tim Cordova M.D.  Authorized by: Tim Cordova M.D.     Start Time:  10/8/2020 1:17 PM  End Time:  10/8/2020 1:22 PM  Localization: surface landmarks    Patient Location:  OR  Indication: continuous blood pressure monitoring        Catheter Size:  20 G  Seldinger Technique?: Yes    Site:  Radial artery  Line Secured:  Antimicrobial disc, tape and transparent dressing  Events: patient tolerated procedure well with no complications

## 2020-10-08 NOTE — ANESTHESIA POSTPROCEDURE EVALUATION
Patient: Giovanni Saini    Procedure Summary     Date: 10/08/20 Room / Location: [place] [id number] / SURGERY [facility]    Anesthesia Start: 1258 Anesthesia Stop:     Procedure: THORACOSCOPY (Left Chest) Diagnosis: (pleural effusion, empyema)    Surgeon: Darío Guerra M.D. Responsible Provider: Tim Cordova M.D.    Anesthesia Type: general ASA Status: 4          Final Anesthesia Type: general  Last vitals  BP   Blood Pressure: 110/59, Arterial BP: (!) 96/29    Temp   37 °C (98.6 °F)    Pulse   Pulse: (!) 53   Resp   16    SpO2   93 %      Anesthesia Post Evaluation    Patient location during evaluation: PACU  Patient participation: complete - patient participated  Level of consciousness: awake  Pain score: 4    Airway patency: patent  Anesthetic complications: no  Cardiovascular status: hemodynamically stable  Respiratory status: acceptable  Hydration status: euvolemic    PONV: none           Nurse Pain Score: 4 (NPRS)

## 2020-10-08 NOTE — OP REPORT
DATE OF OPERATION: 10/8/2020     PREOPERATIVE DIAGNOSIS: Left-sided empyema    POSTOPERATIVE DIAGNOSIS: Left-sided empyema    PROCEDURE PERFORMED: Thoracoscopy, decortication    SURGEON: Darío Guerra M.D.    ANESTHESIOLOGIST: Anesthesiologist: Tim Cordova M.D.    ANESTHESIA: General endotracheal anesthesia.        INDICATIONS: The patient is a 50 y.o.-year-old male with clinical and radiographic findings of left-sided empyema failed less invasive means of drainage. He  is taken to the operating room for scopic decortication    FINDINGS: Left-sided empyema    WOUND CLASSIFICATION: Class IV, Dirty, Infected.    SPECIMEN: Fluid for culture tissue for culture left-sided empyema    ESTIMATED BLOOD LOSS 18 mL.    PROCEDURE: Following informed consent, the patient was properly identified, taken to the operating room and placed in supine position where general endotracheal anesthesia was administered. Intravenous antibiotics were administered by the anesthesiologist in correct time interval. Sequential compression devices were employed.     Patient was placed left side up.  The area was prepped and draped into a sterile field.     Timeout was completed     Marcaine 0.5% with epinephrine was used to infiltrate the port sites. A centimeter incision was made mid axillary line approximately sixth interspace and subcutaneous tissue spread bluntly.  Port was gently placed , cameras placed   A 5 mm port was placed laterally under vision  Fluid was taken for culture  Lung was carefully freed the lung adhesions  Pleural sinus culture and histology  Final inspection revealed lungs free all collections removed  Right angle chest tubes placed under vision  Anesthesia reinflated the lung under vision with full reinflation    The patient tolerated the procedure well and there were no apparent complication. All sponge, needle, and instrument counts were correct on 2 separate occasions. He  was awakened, extubated, and  transferred to the recovery room in satisfactory condition.   Family daughter was updated by telephone  ____________________________________   Darío Guerra M.D.    DD: 10/8/2020  2:46 PM

## 2020-10-08 NOTE — OR SURGEON
Immediate Post OP Note    PreOp Diagnosis: LEFT-SIDED EMPYEMA    PostOp Diagnosis: Left-sided empyema    Procedure(s):  THORACOSCOPY - Wound Class: Clean Contaminated  Decortication  Surgeon(s):  Darío Guerra M.D.    Anesthesiologist/Type of Anesthesia:  Anesthesiologist: Tim Cordova M.D./General    Surgical Staff:  Circulator: Sheryl Henry R.N.; Kenton Sanchez R.N.  Scrub Person: Parris Dexter R.N.; Geri Desai    Specimens removed if any:  ID Type Source Tests Collected by Time Destination   1 : LEFT empyema Body Fluid Thoracentesis Fluid AFB CULTURE, FUNGAL CULTURE, AEROBIC/ANAEROBIC CULTURE (SURGERY) Darío Guerra M.D. 10/8/2020  1:39 PM    A : pleural rind abcess cavity Tissue Lung PATHOLOGY SPECIMEN Darío Guerra M.D. 10/8/2020  1:42 PM        Estimated Blood Loss: 15 cc  Findings: Sided empyema  Complications: None        10/8/2020 2:45 PM Darío Guerra M.D.

## 2020-10-08 NOTE — PROGRESS NOTES
Monitor Summary: SB/SR, HR 59-68, WY 0.18, QRS 0.10, QT 0.46 with rare PACs per strip from monitor room.

## 2020-10-08 NOTE — ANESTHESIA PROCEDURE NOTES
Airway    Date/Time: 10/8/2020 1:16 PM  Performed by: Tim Cordova M.D.  Authorized by: Tim Cordova M.D.     Location:  OR  Urgency:  Elective  Difficult Airway: No    Indications for Airway Management:  Anesthesia      Spontaneous Ventilation: absent    Sedation Level:  Deep  Preoxygenated: Yes    Patient Position:  Sniffing  Mask Difficulty Assessment:  1 - vent by mask  Final Airway Type:  Endotracheal airway  Final Endotracheal Airway:  ETT - double lumen left with ONE LUNG VENTILATION  Cuffed: Yes    Technique Used for Successful ETT Placement:  Direct laryngoscopy    Insertion Site:  Oral  Blade Type:  Shelby  Laryngoscope Blade/Videolaryngoscope Blade Size:  3  ETT Double Lumen (fr):  39  Measured from:  Teeth  ETT to Teeth (cm):  27  Placement Verified by: auscultation and capnometry    Cormack-Lehane Classification:  Grade IIa - partial view of glottis  Number of Attempts at Approach:  1

## 2020-10-08 NOTE — OR NURSING
Patient recovered well in post-op. A&Ox4. VSS, on 1L via NC. Surgical site TAHIR, surgical dressing CDI with new drainage. L chest tube intact and set up to continuous suction. Output in pacu = 160mls. Surgical pain managed through PO medication. Patient able to intake fluids without nausea. Daughter updated and discussed POC. Patient belongings retrieved and on [employer]. Report called to Franchesca.

## 2020-10-08 NOTE — ANESTHESIA PREPROCEDURE EVALUATION
Pneumonia (CAP) 9.29.20, now with loculated L pleural effusion and failed USG thoracentesis. To OR for thoroscopic treatment.    Relevant Problems   PULMONARY   (+) CAP (community acquired pneumonia)      NEURO   (+) CVA (cerebral vascular accident) ([facility])      CARDIAC   (+) Essential hypertension   (+) Non-rheumatic mitral regurgitation   (+) Nonrheumatic aortic valve insufficiency: Previously severe but mild to moderate in March 2018   (+) PAF (paroxysmal atrial fibrillation) ([facility])         (+) TREASURE (acute kidney injury) ([facility])   (+) Acute renal failure on dialysis ([facility])   (+) Cardiorenal syndrome   (+) ESRD (end stage renal disease) on dialysis ([facility])      Other   (+) ACC/AHA stage C systolic heart failure ([facility]) (Resolved)   (+) Empyema of lung ([facility])       Physical Exam    Airway   Mallampati: II  TM distance: >3 FB  Neck ROM: full       Cardiovascular - normal exam  Rhythm: regular  Rate: normal  (-) murmur     Dental - normal exam           Pulmonary   Breath sounds clear to auscultation     Abdominal    Neurological     Comments: A&Ox4, grossly intact, answers simple questions appropriately.             Anesthesia Plan    ASA 4 (ESRD. Cardiomyopathy/Heart failure. Cardio-renal syndrome. Dialysis dependent. Hx CVA.)   ASA physical status 4 criteria: other (comment)    Plan - general       (A-line. SHARA.)      Induction: intravenous    Postoperative Plan: Postoperative administration of opioids is intended.    Pertinent diagnostic labs and testing reviewed    Informed Consent:    Anesthetic plan and risks discussed with patient.    Use of blood products discussed with: patient whom consented to blood products.

## 2020-10-08 NOTE — ANESTHESIA TIME REPORT
Anesthesia Start and Stop Event Times     Date Time Event    10/8/2020 1232 Ready for Procedure     1258 Anesthesia Start     1430 Anesthesia Stop        Responsible Staff  10/08/20    Name Role Begin End    Tim Cordova M.D. Anesth 1258 1430        Preop Diagnosis (Free Text):  Pre-op Diagnosis     pleural effusion, empyema        Preop Diagnosis (Codes):    Post op Diagnosis  Empyema      Premium Reason  Non-Premium    Comments:

## 2020-10-08 NOTE — ANESTHESIA QCDR
2019 [facility] Clinical Data Registry (for Quality Improvement)     Postoperative nausea/vomiting risk protocol (Adult = 18 yrs and Pediatric 3-17 yrs)- (430 and 463)  General inhalation anesthetic (NOT TIVA) with PONV risk factors: No  Provision of anti-emetic therapy with at least 2 different classes of agents: N/A  Patient DID NOT receive anti-emetic therapy and reason is documented in Medical Record: N/A    Multimodal Pain Management- (477)  Non-emergent surgery AND patient age >= 18: No  Use of Multimodal Pain Management, two or more drugs and/or interventions, NOT including systemic opioids:   Exception: Documented allergy to multiple classes of analgesics:     Smoking Abstinence (404)  Patient is current smoker (cigarette, pipe, e-cig, marijuanna): No  Elective Surgery:   Abstinence instructions provided prior to day of surgery:   Patient abstained from smoking on day of surgery:     Pre-Op Beta-Blocker in Isolated CABG (44)  Isolated CABG AND patient age >= 18: No  Beta-blocker admin within 24 hours of surgical incision:   Exception:of medical reason(s) for not administering beta blocker within 24 hours prior to surgical incision (e.g., not  indicated,other medical reason):     PACU assessment of acute postoperative pain prior to Anesthesia Care End- Applies to Patients Age = 18- (ABG7)  Initial PACU pain score is which of the following: < 7/10  Patient unable to report pain score: N/A    Post-anesthetic transfer of care checklist/protocol to PACU/ICU- (426 and 427)  Upon conclusion of case, patient transferred to which of the following locations: PACU/Non-ICU  Use of transfer checklist/protocol: Yes  Exclusion: Service Performed in Patient Hospital Room (and thus did not require transfer): N/A  Unplanned admission to ICU related to anesthesia service up through end of PACU care- (MD51)  Unplanned admission to ICU (not initially anticipated at anesthesia start time): No

## 2020-10-08 NOTE — PROGRESS NOTES
2 RN skin check completed with KRYSTIN Blair.  Chest tube to left side of chest. Old drainage to dressing.   Sacrum and back WNL. Feet dry.

## 2020-10-08 NOTE — PROGRESS NOTES
Hospital Medicine Daily Progress Note    Date of Service  10/8/2020    Chief Complaint  50 y.o. male admitted 9/29/2020 with chest pain .    Hospital Course    Admitted with chest pain, pneumonia, elevated troponin.  Known history of paroxysmal atrial fibrillation, on anticoagulation with Coumadin.  D-dimer is negative.  Known history of ESRD, consult placed to nephrology for HD MWF. CT abd/pelvis with concern for empyema, CXR today shows increased effusion. IR and surgery involved for thoracocentesis and pleural effusion culture and analysis. Supratheraprutic INR is reversed with coumadin on hold for GI bleeding, colonoscopy scheduled. Right knee effusion, discussed with ID consult, since patient had MSSA + from blood culture, will continue monitoring.    Interval Problem Update    Rectal bleed this am continues, bright red; Denies abd pain; N/V    + R knee mild edema,warmth to touch or no erythema or tenderness  and RUE hematoma improving    INR normalized, coumadin on hold due to GI bleeding    U/s guided drainage attempted, unsuccessful due to viscosity of fluid  IR informed, will have pigtail placed.  colonoscopy could not be done due to change of MS, discussed with daughter, all questions answered    Thoracoscopy and chest tube done today, fluid sent out for culture.  Colonoscopy will be rescheduled    Consultants/Specialty  ID  Cardiology  IR  Renal  GI  Surgery  Code Status  Full Code    Disposition    TBD  F/u cbc q8, s/p ffp and vit K        Review of Systems  Review of Systems   Constitutional: Negative for chills, diaphoresis, fever and malaise/fatigue.   HENT: Positive for sore throat. Negative for congestion.    Eyes: Negative for blurred vision.   Respiratory: Negative for shortness of breath.    Cardiovascular: Negative for chest pain, palpitations and leg swelling.   Gastrointestinal: Positive for blood in stool. Negative for abdominal pain, constipation, melena and nausea.   Genitourinary: Negative  for dysuria, flank pain, hematuria and urgency.   Musculoskeletal: Positive for myalgias. Negative for back pain.        Denies right knee pain   Neurological: Negative for dizziness, focal weakness, weakness and headaches.   Psychiatric/Behavioral: Negative for memory loss. The patient is not nervous/anxious.         Physical Exam  Temp:  [36.2 °C (97.2 °F)-36.9 °C (98.5 °F)] 36.3 °C (97.4 °F)  Pulse:  [] 62  Resp:  [14-17] 16  BP: (131-175)/(60-76) 136/70  SpO2:  [90 %-97 %] 93 %    Physical Exam  Vitals signs and nursing note reviewed.   Constitutional:       General: He is not in acute distress.     Appearance: Normal appearance. He is not ill-appearing.   HENT:      Head: Normocephalic.      Nose: Nose normal.   Eyes:      Pupils: Pupils are equal, round, and reactive to light.   Neck:      Musculoskeletal: Neck supple.      Thyroid: No thyromegaly.      Vascular: No JVD.   Cardiovascular:      Rate and Rhythm: Normal rate.      Heart sounds: No murmur.   Pulmonary:      Effort: No respiratory distress.      Breath sounds: Normal breath sounds. No stridor. No wheezing or rhonchi.   Abdominal:      General: Bowel sounds are normal. There is no distension.      Palpations: Abdomen is soft.   Musculoskeletal:         General: Swelling (Mild) and tenderness (on right arm improving) present.      Right lower leg: Edema (right knee, no warmth to tough or erythma) present.      Comments: No tenderness of the right knee; mild warmth to touch   Skin:     General: Skin is warm and dry.      Coloration: Skin is not jaundiced or pale.   Neurological:      Mental Status: He is alert and oriented to person, place, and time.      Cranial Nerves: No cranial nerve deficit.      Sensory: No sensory deficit.      Motor: No weakness.   Psychiatric:         Behavior: Behavior normal.         Thought Content: Thought content normal.         Fluids    Intake/Output Summary (Last 24 hours) at 10/8/2020 0805  Last data filed at  10/7/2020 1622  Gross per 24 hour   Intake 500 ml   Output 1800 ml   Net -1300 ml       Laboratory  Recent Labs     10/07/20  1320 10/07/20  1804 10/08/20  0208   WBC 11.6* 13.3* 10.7   RBC 2.52* 2.54* 2.44*   HEMOGLOBIN 8.2* 8.3* 7.9*   HEMATOCRIT 25.4* 26.0* 24.5*   .8* 102.4* 100.4*   MCH 32.5 32.7 32.4   MCHC 32.3* 31.9* 32.2*   RDW 51.4* 52.8* 50.5*   PLATELETCT 328 370 311   MPV 9.6 9.4 9.3     Recent Labs     10/06/20  0154 10/07/20  0254 10/08/20  0208   SODIUM 133* 133* 135   POTASSIUM 4.7 5.0 4.3   CHLORIDE 92* 90* 90*   CO2 30 25 30   GLUCOSE 122* 71 70   BUN 38* 48* 25*   CREATININE 8.08* 10.27* 6.73*   CALCIUM 9.3 9.5 8.8     Recent Labs     10/05/20  1600 10/06/20  0154 10/07/20  0254   INR 1.77* 1.47* 1.47*               Imaging  US-THORACENTESIS PUNCTURE LEFT   Final Result      Unsuccessful attempt at left-sided thoracentesis.         DX-CHEST-PORTABLE (1 VIEW)   Final Result      1.  Volume loss within the left lung.      2.  Loculated left pleural effusion.      3.  Cardiomegaly.      DX-CHEST-PORTABLE (1 VIEW)   Final Result      Increased opacity on the left may represent a layering pleural effusion with overlying atelectasis/consolidation.      Stable cardiomegaly.         US-EXTREMITY VENOUS UPPER UNILAT RIGHT   Final Result      DX-KNEE COMPLETE 4+ RIGHT   Final Result      1.  Large RIGHT knee joint effusion.   2.  No fracture or dislocation.   3.  Minimal degenerative change.      CT-ABDOMEN-PELVIS WITH   Final Result      1.  Colonic diverticulosis without evidence for diverticulitis.   2.  Normal appendix.   3.  No focal mesenteric inflammatory process.   4.  Diffuse atherosclerotic calcification of abdominal aorta with prominent celiac trunk and SMA, without evidence for occlusion.   5.  Loculated fluid collection of the LEFT lung base with associated pleural thickening and atelectasis, possibly indicating empyema, new from prior.      NM-CARDIAC STRESS TEST   Final Result       EC-ECHOCARDIOGRAM COMPLETE W/O CONT   Final Result      CT-CHEST (THORAX) W/O   Final Result         1.  Left basilar atelectasis, component of infiltrate not definitively excluded.   2.  Atherosclerosis and atherosclerotic coronary artery disease   3.  Cardiomegaly   4.  Hepatomegaly      DX-CHEST-PORTABLE (1 VIEW)   Final Result         1.  Left basilar atelectasis, no focal infiltrate   2.  Cardiomegaly      CT-CHEST TUBE-EMPYEMA LEFT    (Results Pending)        Assessment/Plan  PAF (paroxysmal atrial fibrillation) (HCC)- (present on admission)  Assessment & Plan  -NSR, on amio and metoprolol  coumadin on hold due to GI bleeding, F/U INR    Empyema of lung (HCC)  Assessment & Plan  Shows on CT left left side alone  Failed ultrasound-guided thoracocentesis  Thoracoscope with chest tube down today  pleural fluid sent out for culture    Rectal bleed  Assessment & Plan  10/5 rectal oozing, h/h stable  - supratherapeutic INR, 7  - given FFP stat, vit K x 1, f/u INR  - monitor h/h q18  GI consulted, Dr. Ponce, appreciate input  F/u CT Abd/pelvis    10/6 rectal bleeding, h/h slowly trending down  INR normalized, coumadin on  Hold  Colonoscopy had to reschedule due to conflict with other procedure    Right knee pain  Assessment & Plan  warmth to touch or no erythema, nontenderness; lidocaine patch helped yesterday, monitoring closely his right knee for possible septic arthritis and low threshold for Ortho consult for arthrocentesis;        Hematuria  Assessment & Plan  resolved    Acute pain of left shoulder  Assessment & Plan  MSK, avoid nsaids d/t ESRD   denies pain shoulder or knee; be cautious with oral pain management    Bacteremia  Assessment & Plan  one of two sets on 9/29 positive for MSSA, repeat sets on 9/30 NG, 10/5 NGTD  10/5  negative for both bottles; 10/6 negative  c/w unasyn per ID during inpatient  then:Transition to cefazolin for total of 4 weeks for bacteremia end date 10/28 if BC remain  negative  ID consult appreciated;   Unasyn sensitive, day #10  - No vegetation reported on TTE  - Patient remains afebrile    CAP (community acquired pneumonia)- (present on admission)  Assessment & Plan  presumed  procal elevated, 2.2  empiric IV unasyn and oral azithromycin  trend WBC/fever curve  blood cx 1/2, GPC(mssa) on 9/29  repeat blood cx on 9/30, 10/5 both negative, blood culture from 10/6 came back negative   sputum cultures ordered on 10/6, pending collection    ESRD (end stage renal disease) on dialysis (HCC)- (present on admission)  Assessment & Plan  -dialyzed M/W/F  -renal consult appreciated  Will check CBC, CMP daily, avoid renal toxicity      Essential hypertension- (present on admission)  Assessment & Plan  -continue outpatient medications       VTE prophylaxis: Compression sequential

## 2020-10-08 NOTE — PROGRESS NOTES
IR NOTE:    Pt has not been NPO for chest tube procedure. Exam to be rescheduled for 10/9. Pt will need to be NPO after midnight and any blood thinners will need to be held. KRYSTIN Frias updated.

## 2020-10-08 NOTE — CARE PLAN
Problem: Communication  Goal: The ability to communicate needs accurately and effectively will improve  Outcome: PROGRESSING AS EXPECTED  Note:  needed. Patient expresses needs effectively.      Problem: Venous Thromboembolism (VTW)/Deep Vein Thrombosis (DVT) Prevention:  Goal: Patient will participate in Venous Thrombosis (VTE)/Deep Vein Thrombosis (DVT)Prevention Measures  Outcome: PROGRESSING AS EXPECTED  Flowsheets  Taken 10/8/2020 0147  SCDs, Sequential Compression Device: On  Taken 10/7/2020 2000  Mechanical Prophylaxis: SCDs, Sequential Compression Device  Note: Patient wears SCDs appropriately and ambulates to the bathroom frequently.

## 2020-10-08 NOTE — PROGRESS NOTES
Monitor summary: sinus chase/sinus tach rate , WV .20, QRS .12, QT .48, 1.6 second pause, rare PACs/PVCs per strip from monitor room.

## 2020-10-08 NOTE — ASSESSMENT & PLAN NOTE
Failed ultrasound-guided thoracocentesis  pleural fluid culture preliminary result: Acid fast stain and Gram stain are negative, culture for fungal bacteriuria negative    Overnight chest tube drainage minimal  Chest x-ray this morning no pneumothorax or interval change  chest tube removed  Discharge planning

## 2020-10-08 NOTE — CONSULTS
consult  10/8/2020        CC: Left-sided empyema Unable to drain aspiration    Patient seen with the assistance of telemedicine   HPI: Giovanni Saini is a Very pleasant 50 y.o.male.    Admitted 9/29/2020 Evaluation for chief complaint of chest pain  He has been receiving care as above  He received a CT evaluation the abdomen 10/5/2020 evaluation of GI bleed  Left empyema noted on that scan  Report attempted drain by aspiration unsuccessful  Surgery was consulted on the recommendation of ID  Discussed above with Mr. Florez with the assistance of   He reports he been feeling much better since admission  He states occasional shortness of breath chest discomfort    Past Medical History:   Diagnosis Date   • Heart murmur    • Valvular heart disease     aortic insufficiency       Past Surgical History:   Procedure Laterality Date   • CATH PLACEMENT  8/27/2017    Procedure: CATH PLACEMENT - perm cath;  Surgeon: Hiren Ayon M.D.;  Location: SURGERY [facility];  Service: General   • AV FISTULA CREATION  8/27/2017    Procedure: AV FISTULA CREATION - left radial cephalic;  Surgeon: Hiren Ayon M.D.;  Location: SURGERY [facility];  Service: General       Current Facility-Administered Medications   Medication Dose Route Frequency Provider Last Rate Last Dose   • epoetin (Retacrit) injection (Dialysis Use Only) 3,000 Units  3,000 Units Intravenous MO, WE + FR Burton Perales M.D.   3,000 Units at 10/07/20 1553   • lidocaine (LIDODERM) 5 % 1 Patch  1 Patch Transdermal Q24HR Aissatou Peacock D.O.   1 Patch at 10/07/20 1747   • lidocaine (LIDODERM) 5 % 1 Patch  1 Patch Transdermal Q24HR CAMILO Ballard.O.   1 Patch at 10/07/20 1746   • metoprolol SR (TOPROL XL) tablet 50 mg  50 mg Oral DAILY Na Bro P.A.-C.   50 mg at 10/08/20 0457   • ampicillin/sulbactam (UNASYN) 3 g in  mL IVPB  3 g Intravenous Q24HRS Mayte Malhotra M.D.   Stopped at 10/08/20 0573   • calcium acetate  (PHOS-LO) 667 MG tablet 2,001 mg  2,001 mg Oral TID AC Katrina Merchant M.D.   2,001 mg at 10/08/20 0752   • tramadol (ULTRAM) 50 MG tablet 50 mg  50 mg Oral Q12HRS PRN HAKEEM BallardORaisa   50 mg at 10/04/20 2107   • heparin injection 1,500 Units  1,500 Units Intravenous ACUTE DIALYSIS PRN Katrina Merchant M.D.   1,500 Units at 10/02/20 1148   • oxyCODONE immediate-release (ROXICODONE) tablet 5 mg  5 mg Oral Q6HRS PRN HAKEEM BallardORaisa   5 mg at 10/06/20 0839   • amiodarone (CORDARONE) tablet 200 mg  200 mg Oral DAILY Blayne Verma M.D.   200 mg at 10/08/20 0457   • atorvastatin (LIPITOR) tablet 40 mg  40 mg Oral Q EVENING Blayne Verma M.D.   40 mg at 10/07/20 1745   • folic acid (FOLVITE) tablet 1 mg  1 mg Oral DAILY Blayne Verma M.D.   1 mg at 10/08/20 0457   • furosemide (LASIX) tablet 40 mg  40 mg Oral DAILY Blayne Verma M.D.   40 mg at 10/08/20 0457   • losartan (COZAAR) tablet 25 mg  25 mg Oral Q EVENING Blayne Verma M.D.   25 mg at 10/07/20 1745   • thiamine tablet 100 mg  100 mg Oral DAILY Blayne Verma M.D.   100 mg at 10/08/20 0457   • senna-docusate (PERICOLACE or SENOKOT S) 8.6-50 MG per tablet 2 Tab  2 Tab Oral BID Blayne Verma M.D.   Stopped at 10/07/20 0600    And   • polyethylene glycol/lytes (MIRALAX) PACKET 1 Packet  1 Packet Oral QDAY PRN Blayne Verma M.D.        And   • magnesium hydroxide (MILK OF MAGNESIA) suspension 30 mL  30 mL Oral QDAY PRN Blayne T Lindstedt, M.D.        And   • bisacodyl (DULCOLAX) suppository 10 mg  10 mg Rectal QDAY PRN Blayne Verma M.D.       • acetaminophen (TYLENOL) tablet 650 mg  650 mg Oral Q6HRS PRN Blayne Verma M.D.   650 mg at 10/07/20 7960       Social History     Socioeconomic History   • Marital status:      Spouse name: Not on file   • Number of children: Not on file   • Years of education: Not on file   • Highest education level: Not on file   Occupational History   • Not on file   Social Needs   • Financial  "resource strain: Not on file   • Food insecurity     Worry: Not on file     Inability: Not on file   • Transportation needs     Medical: Not on file     Non-medical: Not on file   Tobacco Use   • Smoking status: Never Smoker   • Smokeless tobacco: Never Used   Substance and Sexual Activity   • Alcohol use: No     Alcohol/week: 6.0 oz     Types: 10 Standard drinks or equivalent per week     Comment: quit after hospital    • Drug use: No   • Sexual activity: Not on file   Lifestyle   • Physical activity     Days per week: Not on file     Minutes per session: Not on file   • Stress: Not on file   Relationships   • Social connections     Talks on phone: Not on file     Gets together: Not on file     Attends Yazdanism service: Not on file     Active member of club or organization: Not on file     Attends meetings of clubs or organizations: Not on file     Relationship status: Not on file   • Intimate partner violence     Fear of current or ex partner: Not on file     Emotionally abused: Not on file     Physically abused: Not on file     Forced sexual activity: Not on file   Other Topics Concern   • Not on file   Social History Narrative   • Not on file       Family History   Problem Relation Age of Onset   • Stroke Sister 38        CVA       Allergies:  Patient has no known allergies.    Review of Systems:  Multiple medical issues as above rectal bleeding chest pain arrhythmia pneumonia end-stage renal disease shortness of breath rectal bleeding as noted above review otherwise negative    Physical Exam:  /70   Pulse 62   Temp 36.3 °C (97.4 °F) (Temporal)   Resp 16   Ht 1.651 m (5' 5\")   Wt 67.2 kg (148 lb 2.4 oz)   SpO2 93%     Constitutional: Awake, alert, oriented x3. E4 V5 M6.  Head: No bleeding ears nose or mouth   Neck: No tracheal deviation. NoStridor.  Cardiovascular: Normal rate, Skin warm brisk capillary refill.  Pulmonary/Chest: Breath sounds bilaterallyNo tenderness Positive breath sounds " bilaterally.   Abdominal: Soft, nondistended. Nontender to palpation. Pelvis is stable to anterior-posterior compression.   Neurological: Awake alert interactive  Skin: Skin is warm and dry.  Psychiatric:  Normal mood and affect.  Behavior is appropriate.       Labs:  Recent Labs     10/07/20  1804 10/08/20  0208 10/08/20  0925   WBC 13.3* 10.7 10.6   RBC 2.54* 2.44* 2.76*   HEMOGLOBIN 8.3* 7.9* 8.9*   HEMATOCRIT 26.0* 24.5* 28.0*   .4* 100.4* 101.4*   MCH 32.7 32.4 32.2   MCHC 31.9* 32.2* 31.8*   RDW 52.8* 50.5* 51.4*   PLATELETCT 370 311 355   MPV 9.4 9.3 9.6     Recent Labs     10/06/20  0154 10/07/20  0254 10/08/20  0208   SODIUM 133* 133* 135   POTASSIUM 4.7 5.0 4.3   CHLORIDE 92* 90* 90*   CO2 30 25 30   GLUCOSE 122* 71 70   BUN 38* 48* 25*   CREATININE 8.08* 10.27* 6.73*   CALCIUM 9.3 9.5 8.8     Recent Labs     10/05/20  1600 10/06/20  0154 10/07/20  0254   INR 1.77* 1.47* 1.47*     Recent Labs     10/05/20  1600 10/06/20  0154 10/07/20  0254 10/08/20  0208   ASTSGOT  --  20 21 20   ALTSGPT  --  16 16 15   TBILIRUBIN  --  0.5 0.4 0.5   ALKPHOSPHAT  --  111* 97 93   GLOBULIN  --  3.6* 3.5 3.6*   INR 1.77* 1.47* 1.47*  --        Radiology:  US-THORACENTESIS PUNCTURE LEFT   Final Result      Unsuccessful attempt at left-sided thoracentesis.         DX-CHEST-PORTABLE (1 VIEW)   Final Result      1.  Volume loss within the left lung.      2.  Loculated left pleural effusion.      3.  Cardiomegaly.      DX-CHEST-PORTABLE (1 VIEW)   Final Result      Increased opacity on the left may represent a layering pleural effusion with overlying atelectasis/consolidation.      Stable cardiomegaly.         US-EXTREMITY VENOUS UPPER UNILAT RIGHT   Final Result      DX-KNEE COMPLETE 4+ RIGHT   Final Result      1.  Large RIGHT knee joint effusion.   2.  No fracture or dislocation.   3.  Minimal degenerative change.      CT-ABDOMEN-PELVIS WITH   Final Result      1.  Colonic diverticulosis without evidence for  diverticulitis.   2.  Normal appendix.   3.  No focal mesenteric inflammatory process.   4.  Diffuse atherosclerotic calcification of abdominal aorta with prominent celiac trunk and SMA, without evidence for occlusion.   5.  Loculated fluid collection of the LEFT lung base with associated pleural thickening and atelectasis, possibly indicating empyema, new from prior.      NM-CARDIAC STRESS TEST   Final Result      EC-ECHOCARDIOGRAM COMPLETE W/O CONT   Final Result      CT-CHEST (THORAX) W/O   Final Result         1.  Left basilar atelectasis, component of infiltrate not definitively excluded.   2.  Atherosclerosis and atherosclerotic coronary artery disease   3.  Cardiomegaly   4.  Hepatomegaly      DX-CHEST-PORTABLE (1 VIEW)   Final Result         1.  Left basilar atelectasis, no focal infiltrate   2.  Cardiomegaly      CT-CHEST TUBE-EMPYEMA LEFT    (Results Pending)         Assessment: This is a 50 y.o. Male left-sided empyema  Unable to drain Less invasive measures     Plan:   Active Hospital Problems    Diagnosis   • PAF (paroxysmal atrial fibrillation) (HCC) [I48.0]     Priority: High   • Essential hypertension [I10]     Priority: Low   • Empyema of lung (HCC) [J86.9]   • Rectal bleed [K62.5]   • Right knee pain [M25.561]   • Hematuria [R31.9]   • Bacteremia [R78.81]   • Acute pain of left shoulder [M25.512]   • ESRD (end stage renal disease) on dialysis (HCC) [N18.6, Z99.2]   • CAP (community acquired pneumonia) [J18.9]   • Other chest pain [R07.89]     With telemedicine  discussed risk benefits and alternatives      Risk discussed included but not limited to bleeding requiring transfusion, injury to lung, injury to blood vessels or nerves, failure to completely drain collection or recurrence of collection pain wound infection  Discussed possible need for open procedure  All questions answered discussed  Patient demonstrated understanding and wishes to proceed  Plan for left-sided thoracoscopy  possible open    Discussed with patient RN and requesting provider.  Attempted to contact daughter family member noted in EMR number provided no answer no answering machine       Darío Guerra MD, FACS  [facility] [phone]

## 2020-10-09 ENCOUNTER — ANESTHESIA EVENT (OUTPATIENT)
Dept: SURGERY | Facility: MEDICAL CENTER | Age: 50
DRG: 163 | End: 2020-10-09
Payer: MEDICAID

## 2020-10-09 ENCOUNTER — ANESTHESIA (OUTPATIENT)
Dept: SURGERY | Facility: MEDICAL CENTER | Age: 50
DRG: 163 | End: 2020-10-09
Payer: MEDICAID

## 2020-10-09 PROBLEM — R07.89 OTHER CHEST PAIN: Status: RESOLVED | Noted: 2020-09-29 | Resolved: 2020-10-09

## 2020-10-09 LAB
ALBUMIN SERPL BCP-MCNC: 2.9 G/DL (ref 3.2–4.9)
ALBUMIN/GLOB SERPL: 0.8 G/DL
ALP SERPL-CCNC: 88 U/L (ref 30–99)
ALT SERPL-CCNC: 11 U/L (ref 2–50)
ANION GAP SERPL CALC-SCNC: 15 MMOL/L (ref 7–16)
AST SERPL-CCNC: 17 U/L (ref 12–45)
BASOPHILS # BLD AUTO: 0.3 % (ref 0–1.8)
BASOPHILS # BLD: 0.04 K/UL (ref 0–0.12)
BILIRUB SERPL-MCNC: 0.3 MG/DL (ref 0.1–1.5)
BUN SERPL-MCNC: 43 MG/DL (ref 8–22)
CALCIUM SERPL-MCNC: 8.5 MG/DL (ref 8.5–10.5)
CHLORIDE SERPL-SCNC: 86 MMOL/L (ref 96–112)
CO2 SERPL-SCNC: 28 MMOL/L (ref 20–33)
CREAT SERPL-MCNC: 9.98 MG/DL (ref 0.5–1.4)
CYTOLOGY REG CYTOL: NORMAL
EOSINOPHIL # BLD AUTO: 0.12 K/UL (ref 0–0.51)
EOSINOPHIL NFR BLD: 0.8 % (ref 0–6.9)
ERYTHROCYTE [DISTWIDTH] IN BLOOD BY AUTOMATED COUNT: 53.5 FL (ref 35.9–50)
GLOBULIN SER CALC-MCNC: 3.7 G/DL (ref 1.9–3.5)
GLUCOSE SERPL-MCNC: 91 MG/DL (ref 65–99)
GRAM STN SPEC: NORMAL
HCT VFR BLD AUTO: 26.9 % (ref 42–52)
HGB BLD-MCNC: 8.5 G/DL (ref 14–18)
IMM GRANULOCYTES # BLD AUTO: 0.08 K/UL (ref 0–0.11)
IMM GRANULOCYTES NFR BLD AUTO: 0.5 % (ref 0–0.9)
LYMPHOCYTES # BLD AUTO: 1.56 K/UL (ref 1–4.8)
LYMPHOCYTES NFR BLD: 10.5 % (ref 22–41)
MAGNESIUM SERPL-MCNC: 2.3 MG/DL (ref 1.5–2.5)
MCH RBC QN AUTO: 32.6 PG (ref 27–33)
MCHC RBC AUTO-ENTMCNC: 31.6 G/DL (ref 33.7–35.3)
MCV RBC AUTO: 103.1 FL (ref 81.4–97.8)
MONOCYTES # BLD AUTO: 1.05 K/UL (ref 0–0.85)
MONOCYTES NFR BLD AUTO: 7 % (ref 0–13.4)
NEUTROPHILS # BLD AUTO: 12.06 K/UL (ref 1.82–7.42)
NEUTROPHILS NFR BLD: 80.9 % (ref 44–72)
NRBC # BLD AUTO: 0 K/UL
NRBC BLD-RTO: 0 /100 WBC
PLATELET # BLD AUTO: 364 K/UL (ref 164–446)
PMV BLD AUTO: 9.4 FL (ref 9–12.9)
POTASSIUM SERPL-SCNC: 5.1 MMOL/L (ref 3.6–5.5)
PROT SERPL-MCNC: 6.6 G/DL (ref 6–8.2)
RBC # BLD AUTO: 2.61 M/UL (ref 4.7–6.1)
RHODAMINE-AURAMINE STN SPEC: NORMAL
SIGNIFICANT IND 70042: NORMAL
SIGNIFICANT IND 70042: NORMAL
SITE SITE: NORMAL
SITE SITE: NORMAL
SODIUM SERPL-SCNC: 129 MMOL/L (ref 135–145)
SOURCE SOURCE: NORMAL
SOURCE SOURCE: NORMAL
WBC # BLD AUTO: 14.9 K/UL (ref 4.8–10.8)

## 2020-10-09 PROCEDURE — 700111 HCHG RX REV CODE 636 W/ 250 OVERRIDE (IP): Performed by: INTERNAL MEDICINE

## 2020-10-09 PROCEDURE — 700105 HCHG RX REV CODE 258: Performed by: ANESTHESIOLOGY

## 2020-10-09 PROCEDURE — A9270 NON-COVERED ITEM OR SERVICE: HCPCS | Performed by: INTERNAL MEDICINE

## 2020-10-09 PROCEDURE — 700102 HCHG RX REV CODE 250 W/ 637 OVERRIDE(OP): Performed by: PHYSICIAN ASSISTANT

## 2020-10-09 PROCEDURE — 90935 HEMODIALYSIS ONE EVALUATION: CPT | Performed by: INTERNAL MEDICINE

## 2020-10-09 PROCEDURE — 700102 HCHG RX REV CODE 250 W/ 637 OVERRIDE(OP): Performed by: INTERNAL MEDICINE

## 2020-10-09 PROCEDURE — 160002 HCHG RECOVERY MINUTES (STAT): Performed by: INTERNAL MEDICINE

## 2020-10-09 PROCEDURE — A9270 NON-COVERED ITEM OR SERVICE: HCPCS | Performed by: PHYSICIAN ASSISTANT

## 2020-10-09 PROCEDURE — C1729 CATH, DRAINAGE: HCPCS | Performed by: STUDENT IN AN ORGANIZED HEALTH CARE EDUCATION/TRAINING PROGRAM

## 2020-10-09 PROCEDURE — 160203 HCHG ENDO MINUTES - 1ST 30 MINS LEVEL 4: Performed by: INTERNAL MEDICINE

## 2020-10-09 PROCEDURE — 700101 HCHG RX REV CODE 250: Performed by: ANESTHESIOLOGY

## 2020-10-09 PROCEDURE — 0DJD8ZZ INSPECTION OF LOWER INTESTINAL TRACT, VIA NATURAL OR ARTIFICIAL OPENING ENDOSCOPIC: ICD-10-PCS | Performed by: INTERNAL MEDICINE

## 2020-10-09 PROCEDURE — 83735 ASSAY OF MAGNESIUM: CPT

## 2020-10-09 PROCEDURE — 36415 COLL VENOUS BLD VENIPUNCTURE: CPT

## 2020-10-09 PROCEDURE — 160036 HCHG PACU - EA ADDL 30 MINS PHASE I: Performed by: INTERNAL MEDICINE

## 2020-10-09 PROCEDURE — 90935 HEMODIALYSIS ONE EVALUATION: CPT

## 2020-10-09 PROCEDURE — 5A1D70Z PERFORMANCE OF URINARY FILTRATION, INTERMITTENT, LESS THAN 6 HOURS PER DAY: ICD-10-PCS | Performed by: INTERNAL MEDICINE

## 2020-10-09 PROCEDURE — 160035 HCHG PACU - 1ST 60 MINS PHASE I: Performed by: INTERNAL MEDICINE

## 2020-10-09 PROCEDURE — 80053 COMPREHEN METABOLIC PANEL: CPT

## 2020-10-09 PROCEDURE — 160048 HCHG OR STATISTICAL LEVEL 1-5: Performed by: INTERNAL MEDICINE

## 2020-10-09 PROCEDURE — 700105 HCHG RX REV CODE 258: Performed by: INTERNAL MEDICINE

## 2020-10-09 PROCEDURE — 99232 SBSQ HOSP IP/OBS MODERATE 35: CPT | Performed by: STUDENT IN AN ORGANIZED HEALTH CARE EDUCATION/TRAINING PROGRAM

## 2020-10-09 PROCEDURE — 85025 COMPLETE CBC W/AUTO DIFF WBC: CPT

## 2020-10-09 PROCEDURE — 160208 HCHG ENDO MINUTES - EA ADDL 1 MIN LEVEL 4: Performed by: INTERNAL MEDICINE

## 2020-10-09 PROCEDURE — 160009 HCHG ANES TIME/MIN: Performed by: INTERNAL MEDICINE

## 2020-10-09 PROCEDURE — 700111 HCHG RX REV CODE 636 W/ 250 OVERRIDE (IP): Performed by: ANESTHESIOLOGY

## 2020-10-09 PROCEDURE — 770020 HCHG ROOM/CARE - TELE (206)

## 2020-10-09 RX ORDER — KETOROLAC TROMETHAMINE 30 MG/ML
INJECTION, SOLUTION INTRAMUSCULAR; INTRAVENOUS PRN
Status: DISCONTINUED | OUTPATIENT
Start: 2020-10-09 | End: 2020-10-09 | Stop reason: SURG

## 2020-10-09 RX ORDER — DIPHENHYDRAMINE HYDROCHLORIDE 50 MG/ML
12.5 INJECTION INTRAMUSCULAR; INTRAVENOUS
Status: DISCONTINUED | OUTPATIENT
Start: 2020-10-09 | End: 2020-10-09 | Stop reason: HOSPADM

## 2020-10-09 RX ORDER — HYDROMORPHONE HYDROCHLORIDE 1 MG/ML
0.1 INJECTION, SOLUTION INTRAMUSCULAR; INTRAVENOUS; SUBCUTANEOUS
Status: DISCONTINUED | OUTPATIENT
Start: 2020-10-09 | End: 2020-10-09 | Stop reason: HOSPADM

## 2020-10-09 RX ORDER — SODIUM CHLORIDE, SODIUM LACTATE, POTASSIUM CHLORIDE, CALCIUM CHLORIDE 600; 310; 30; 20 MG/100ML; MG/100ML; MG/100ML; MG/100ML
INJECTION, SOLUTION INTRAVENOUS CONTINUOUS
Status: DISCONTINUED | OUTPATIENT
Start: 2020-10-09 | End: 2020-10-09 | Stop reason: HOSPADM

## 2020-10-09 RX ORDER — LIDOCAINE HYDROCHLORIDE 20 MG/ML
INJECTION, SOLUTION EPIDURAL; INFILTRATION; INTRACAUDAL; PERINEURAL PRN
Status: DISCONTINUED | OUTPATIENT
Start: 2020-10-09 | End: 2020-10-09 | Stop reason: SURG

## 2020-10-09 RX ORDER — ONDANSETRON 2 MG/ML
INJECTION INTRAMUSCULAR; INTRAVENOUS PRN
Status: DISCONTINUED | OUTPATIENT
Start: 2020-10-09 | End: 2020-10-09 | Stop reason: SURG

## 2020-10-09 RX ORDER — OXYCODONE HCL 5 MG/5 ML
5 SOLUTION, ORAL ORAL
Status: DISCONTINUED | OUTPATIENT
Start: 2020-10-09 | End: 2020-10-09 | Stop reason: HOSPADM

## 2020-10-09 RX ORDER — IPRATROPIUM BROMIDE AND ALBUTEROL SULFATE 2.5; .5 MG/3ML; MG/3ML
3 SOLUTION RESPIRATORY (INHALATION)
Status: DISCONTINUED | OUTPATIENT
Start: 2020-10-09 | End: 2020-10-09 | Stop reason: HOSPADM

## 2020-10-09 RX ORDER — LABETALOL HYDROCHLORIDE 5 MG/ML
5 INJECTION, SOLUTION INTRAVENOUS
Status: DISCONTINUED | OUTPATIENT
Start: 2020-10-09 | End: 2020-10-09 | Stop reason: HOSPADM

## 2020-10-09 RX ORDER — MIDAZOLAM HYDROCHLORIDE 1 MG/ML
1 INJECTION INTRAMUSCULAR; INTRAVENOUS
Status: DISCONTINUED | OUTPATIENT
Start: 2020-10-09 | End: 2020-10-09 | Stop reason: HOSPADM

## 2020-10-09 RX ORDER — DEXAMETHASONE SODIUM PHOSPHATE 4 MG/ML
INJECTION, SOLUTION INTRA-ARTICULAR; INTRALESIONAL; INTRAMUSCULAR; INTRAVENOUS; SOFT TISSUE PRN
Status: DISCONTINUED | OUTPATIENT
Start: 2020-10-09 | End: 2020-10-09 | Stop reason: SURG

## 2020-10-09 RX ORDER — MIDAZOLAM HYDROCHLORIDE 1 MG/ML
INJECTION INTRAMUSCULAR; INTRAVENOUS PRN
Status: DISCONTINUED | OUTPATIENT
Start: 2020-10-09 | End: 2020-10-09 | Stop reason: SURG

## 2020-10-09 RX ORDER — OXYCODONE HCL 5 MG/5 ML
10 SOLUTION, ORAL ORAL
Status: DISCONTINUED | OUTPATIENT
Start: 2020-10-09 | End: 2020-10-09 | Stop reason: HOSPADM

## 2020-10-09 RX ORDER — HYDROMORPHONE HYDROCHLORIDE 1 MG/ML
0.5 INJECTION, SOLUTION INTRAMUSCULAR; INTRAVENOUS; SUBCUTANEOUS
Status: DISCONTINUED | OUTPATIENT
Start: 2020-10-09 | End: 2020-10-09 | Stop reason: HOSPADM

## 2020-10-09 RX ORDER — ONDANSETRON 2 MG/ML
4 INJECTION INTRAMUSCULAR; INTRAVENOUS
Status: DISCONTINUED | OUTPATIENT
Start: 2020-10-09 | End: 2020-10-09 | Stop reason: HOSPADM

## 2020-10-09 RX ORDER — SODIUM CHLORIDE 9 MG/ML
INJECTION, SOLUTION INTRAVENOUS
Status: DISCONTINUED | OUTPATIENT
Start: 2020-10-09 | End: 2020-10-09 | Stop reason: SURG

## 2020-10-09 RX ORDER — HYDROMORPHONE HYDROCHLORIDE 1 MG/ML
0.2 INJECTION, SOLUTION INTRAMUSCULAR; INTRAVENOUS; SUBCUTANEOUS
Status: DISCONTINUED | OUTPATIENT
Start: 2020-10-09 | End: 2020-10-09 | Stop reason: HOSPADM

## 2020-10-09 RX ORDER — MEPERIDINE HYDROCHLORIDE 25 MG/ML
25 INJECTION INTRAMUSCULAR; INTRAVENOUS; SUBCUTANEOUS
Status: DISCONTINUED | OUTPATIENT
Start: 2020-10-09 | End: 2020-10-09 | Stop reason: HOSPADM

## 2020-10-09 RX ADMIN — AMIODARONE HYDROCHLORIDE 200 MG: 200 TABLET ORAL at 04:18

## 2020-10-09 RX ADMIN — MIDAZOLAM HYDROCHLORIDE 2 MG: 1 INJECTION, SOLUTION INTRAMUSCULAR; INTRAVENOUS at 10:31

## 2020-10-09 RX ADMIN — METOPROLOL SUCCINATE 50 MG: 25 TABLET, EXTENDED RELEASE ORAL at 04:19

## 2020-10-09 RX ADMIN — ATORVASTATIN CALCIUM 40 MG: 40 TABLET, FILM COATED ORAL at 18:14

## 2020-10-09 RX ADMIN — DOCUSATE SODIUM 50 MG AND SENNOSIDES 8.6 MG 2 TABLET: 8.6; 5 TABLET, FILM COATED ORAL at 18:15

## 2020-10-09 RX ADMIN — FUROSEMIDE 40 MG: 40 TABLET ORAL at 04:19

## 2020-10-09 RX ADMIN — Medication 2001 MG: at 18:13

## 2020-10-09 RX ADMIN — SODIUM CHLORIDE: 9 INJECTION, SOLUTION INTRAVENOUS at 10:31

## 2020-10-09 RX ADMIN — PROPOFOL 50 MG: 10 INJECTION, EMULSION INTRAVENOUS at 10:33

## 2020-10-09 RX ADMIN — LIDOCAINE HYDROCHLORIDE 40 MG: 20 INJECTION, SOLUTION EPIDURAL; INFILTRATION; INTRACAUDAL at 10:33

## 2020-10-09 RX ADMIN — EPOETIN ALFA-EPBX 3000 UNITS: 3000 INJECTION, SOLUTION INTRAVENOUS; SUBCUTANEOUS at 14:55

## 2020-10-09 RX ADMIN — OXYCODONE HYDROCHLORIDE 5 MG: 5 TABLET ORAL at 03:43

## 2020-10-09 RX ADMIN — ACETAMINOPHEN 650 MG: 325 TABLET, FILM COATED ORAL at 20:01

## 2020-10-09 RX ADMIN — FOLIC ACID 1 MG: 1 TABLET ORAL at 04:19

## 2020-10-09 RX ADMIN — DEXAMETHASONE SODIUM PHOSPHATE 4 MG: 4 INJECTION, SOLUTION INTRA-ARTICULAR; INTRALESIONAL; INTRAMUSCULAR; INTRAVENOUS; SOFT TISSUE at 10:40

## 2020-10-09 RX ADMIN — FENTANYL CITRATE 100 MCG: 50 INJECTION, SOLUTION INTRAMUSCULAR; INTRAVENOUS at 10:33

## 2020-10-09 RX ADMIN — ONDANSETRON 4 MG: 2 INJECTION INTRAMUSCULAR; INTRAVENOUS at 10:40

## 2020-10-09 RX ADMIN — LOSARTAN POTASSIUM 25 MG: 50 TABLET, FILM COATED ORAL at 18:13

## 2020-10-09 RX ADMIN — Medication 2001 MG: at 13:41

## 2020-10-09 RX ADMIN — KETOROLAC TROMETHAMINE 15 MG: 30 INJECTION, SOLUTION INTRAMUSCULAR at 10:46

## 2020-10-09 RX ADMIN — Medication 100 MG: at 04:18

## 2020-10-09 RX ADMIN — SODIUM CHLORIDE 3 G: 900 INJECTION INTRAVENOUS at 18:15

## 2020-10-09 ASSESSMENT — PAIN SCALES - GENERAL: PAIN_LEVEL: 0

## 2020-10-09 ASSESSMENT — ENCOUNTER SYMPTOMS
COUGH: 0
VOMITING: 0
MEMORY LOSS: 0
BLOOD IN STOOL: 1
MYALGIAS: 1
PALPITATIONS: 0
DIAPHORESIS: 0
SORE THROAT: 0
NAUSEA: 0
BACK PAIN: 0
CHILLS: 0
FOCAL WEAKNESS: 0
ABDOMINAL PAIN: 0
HEADACHES: 0
SHORTNESS OF BREATH: 0
CONSTIPATION: 0
WEAKNESS: 0
HEMOPTYSIS: 0
FEVER: 0
DIZZINESS: 0
NERVOUS/ANXIOUS: 0
BLURRED VISION: 0
FLANK PAIN: 0

## 2020-10-09 NOTE — ANESTHESIA TIME REPORT
Anesthesia Start and Stop Event Times     Date Time Event    10/9/2020 0926 Ready for Procedure     1031 Anesthesia Start     1108 Anesthesia Stop        Responsible Staff  10/09/20    Name Role Begin End    Panda Ferraro M.D. Anesth 1031 1108        Preop Diagnosis (Free Text):  Pre-op Diagnosis     HEMATOCHEZIA        Preop Diagnosis (Codes):    Post op Diagnosis  Melena      Premium Reason  Non-Premium    Comments:

## 2020-10-09 NOTE — PROGRESS NOTES
Progress Note               Author: Na Cervantes M.D. Date & Time created: 10/9/2020  8:16 AM     Interval History:  Feeling well this morning, minimal chest pain at drain site, adequately controlled with PO medications. No SOB.     Review of Systems:  Review of Systems   Constitutional: Negative for chills and fever.   Respiratory: Negative for cough, hemoptysis and shortness of breath.    Gastrointestinal: Negative for abdominal pain, nausea and vomiting.       Physical Exam:  Physical Exam  Constitutional:       Appearance: Normal appearance. He is not ill-appearing or diaphoretic.   HENT:      Head: Normocephalic and atraumatic.   Eyes:      Extraocular Movements: Extraocular movements intact.   Cardiovascular:      Rate and Rhythm: Normal rate.   Pulmonary:      Effort: Pulmonary effort is normal.      Comments: CT in place, 160cc serosanguinous fluid overnight. No air leak.   Abdominal:      General: Abdomen is flat. There is no distension.      Tenderness: There is no abdominal tenderness.   Skin:     General: Skin is warm and dry.      Capillary Refill: Capillary refill takes less than 2 seconds.   Neurological:      General: No focal deficit present.      Mental Status: He is alert.   Psychiatric:         Mood and Affect: Mood normal.         Behavior: Behavior normal.         Labs:          Recent Labs     10/07/20  0254 10/08/20  0208   SODIUM 133* 135   POTASSIUM 5.0 4.3   CHLORIDE 90* 90*   CO2 25 30   BUN 48* 25*   CREATININE 10.27* 6.73*   MAGNESIUM 2.4  --    CALCIUM 9.5 8.8     Recent Labs     10/07/20  0254 10/08/20  0208   ALTSGPT 16 15   ASTSGOT 21 20   ALKPHOSPHAT 97 93   TBILIRUBIN 0.4 0.5   GLUCOSE 71 70     Recent Labs     10/07/20  0254  10/08/20  0208 10/08/20  0925 10/09/20  0731   RBC 2.52*   < > 2.44* 2.76* 2.61*   HEMOGLOBIN 8.1*   < > 7.9* 8.9* 8.5*   HEMATOCRIT 25.7*   < > 24.5* 28.0* 26.9*   PLATELETCT 339   < > 311 355 364   PROTHROMBTM 18.2*  --   --   --   --    INR 1.47*   --   --   --   --     < > = values in this interval not displayed.     Recent Labs     10/07/20  0254  10/08/20  0208 10/08/20  0925 10/09/20  0731   WBC 12.1*   < > 10.7 10.6 14.9*   NEUTSPOLYS 76.20*   < > 70.30 70.60 80.90*   LYMPHOCYTES 11.40*   < > 16.50* 15.60* 10.50*   MONOCYTES 7.40   < > 9.40 10.00 7.00   EOSINOPHILS 4.10   < > 3.00 3.00 0.80   BASOPHILS 0.20   < > 0.30 0.20 0.30   ASTSGOT 21  --  20  --   --    ALTSGPT 16  --  15  --   --    ALKPHOSPHAT 97  --  93  --   --    TBILIRUBIN 0.4  --  0.5  --   --     < > = values in this interval not displayed.     Hemodynamics:  Temp (24hrs), Av.3 °C (97.4 °F), Min:36 °C (96.8 °F), Max:37 °C (98.6 °F)  Temperature: 36.7 °C (98 °F)  Pulse  Av.6  Min: 46  Max: 138   Blood Pressure: 111/49, Arterial BP: (!) 96/29     Respiratory:    Respiration: 16, Pulse Oximetry: 99 %     Work Of Breathing / Effort: Mild  RUL Breath Sounds: Clear, RML Breath Sounds: Clear, RLL Breath Sounds: Diminished, SABAS Breath Sounds: Clear, LLL Breath Sounds: Diminished  Fluids:    Intake/Output Summary (Last 24 hours) at 10/9/2020 0816  Last data filed at 10/8/2020 1900  Gross per 24 hour   Intake 230 ml   Output 195 ml   Net 35 ml        GI/Nutrition:  Orders Placed This Encounter   Procedures   • Diet Order Clear Liquid     Standing Status:   Standing     Number of Occurrences:   1     Order Specific Question:   Diet:     Answer:   Clear Liquid [10]     Medical Decision Making, by Problem:  Active Hospital Problems    Diagnosis   • PAF (paroxysmal atrial fibrillation) (HCC) [I48.0]   • Essential hypertension [I10]   • Empyema of lung (HCC) [J86.9]   • Rectal bleed [K62.5]   • Right knee pain [M25.561]   • Hematuria [R31.9]   • Bacteremia [R78.81]   • Acute pain of left shoulder [M25.512]   • ESRD (end stage renal disease) on dialysis (HCC) [N18.6, Z99.2]   • CAP (community acquired pneumonia) [J18.9]   • Other chest pain [R07.89]       Plan:  POD 1 VATS drainage of empyema. No  air leak. Will leave on suction today, check CXR tomorrow morning and place to water seal if still no air leak. Anticipate leaving tube in place until drainage less than 100cc per 24 hours.   Continue antibiotics.   Appreciate hospitalist's assistance.     Quality-Core Measures

## 2020-10-09 NOTE — ANESTHESIA POSTPROCEDURE EVALUATION
Patient: Giovanni Saini    Procedure Summary     Date: 10/09/20 Room / Location: [facility] ROOM 26 / SURGERY SAME DAY [facility]    Anesthesia Start: 1031 Anesthesia Stop: 1108    Procedure: COLONOSCOPY (N/A Anus) Diagnosis: (DIVERTICULOSIS)    Surgeon: Rah Mancia M.D. Responsible Provider: Panda Ferraro M.D.    Anesthesia Type: general ASA Status: 4          Final Anesthesia Type: general  Last vitals  BP   Blood Pressure: (!) 99/52, Arterial BP: (!) 96/29    Temp   36.3 °C (97.3 °F)    Pulse   Pulse: (!) 50   Resp   12    SpO2   100 %      Anesthesia Post Evaluation    Patient location during evaluation: PACU  Patient participation: complete - patient participated  Level of consciousness: awake and alert  Pain score: 0    Airway patency: patent  Anesthetic complications: no  Cardiovascular status: hemodynamically stable  Respiratory status: acceptable  Hydration status: euvolemic    PONV: none           Nurse Pain Score: 0 (NPRS)

## 2020-10-09 NOTE — PROGRESS NOTES
Chest tube container replaced due to it being nocked over at an unknown time. Total in container was 230ml since it was placed.

## 2020-10-09 NOTE — CARE PLAN
Problem: Communication  Goal: The ability to communicate needs accurately and effectively will improve  Outcome: PROGRESSING AS EXPECTED  Note: Educated pt on POC, medications, and answered any questions the pt had. Reinforced the use of the call light. Encouraged pt to voice any concerns or needs. Will continue to monitor.        Problem: Pain Management  Goal: Pain level will decrease to patient's comfort goal  Outcome: PROGRESSING AS EXPECTED  Flowsheets  Taken 10/8/2020 2009 by Carly Rasheed R.N.  Non Verbal Scale:   Calm   Sleeping   Unlabored Breathing  Taken 10/8/2020 1900 by Carly Rasheed R.N.  Comfort Goal:   Comfort with Movement   Sleep Comfortably  Pain Rating Scale (NPRS): 3  Taken 10/2/2020 1054 by Racquel Frye OT  Therapist Pain Assessment:   6   Post Activity Pain Same as Prior to Activity   Nurse Notified  Note: Pt educated on non-pharmacologic pain management measures. PRN pain medication available. Continuously assessing pts pain rating and need for intervention.

## 2020-10-09 NOTE — ANESTHESIA PROCEDURE NOTES
Airway  Performed by: Panda Ferraro M.D.  Authorized by: Panda Ferraro M.D.     Location:  OR  Urgency:  Elective  Indications for Airway Management:  Anesthesia      Spontaneous Ventilation: absent    Sedation Level:  Deep  Preoxygenated: Yes    Final Airway Type:  Supraglottic airway  Final Supraglottic Airway:  Standard LMA    SGA Size:  3  Number of Attempts at Approach:  1

## 2020-10-09 NOTE — OR NURSING
1105 pt arrived from OR. Report from RN and anesthesia. LMA in place on 8L. Chest tube placed to suction.     1108 LMA d/c'd. Pt resting comfortably. Denies pain.    1144 report called to [place] on Neuro. Pt awaiting transport    1217 transported pt on monitor to Neuro. VSS throughout.

## 2020-10-09 NOTE — ANESTHESIA PREPROCEDURE EVALUATION
Relevant Problems   PULMONARY   (+) CAP (community acquired pneumonia)      NEURO   (+) CVA (cerebral vascular accident) ([facility])      CARDIAC   (+) Essential hypertension   (+) Non-rheumatic mitral regurgitation   (+) Nonrheumatic aortic valve insufficiency: Previously severe but mild to moderate in March 2018   (+) PAF (paroxysmal atrial fibrillation) ([facility])         (+) TREASURE (acute kidney injury) ([facility])   (+) Acute renal failure on dialysis ([facility])   (+) Cardiorenal syndrome   (+) ESRD (end stage renal disease) on dialysis ([facility])       Physical Exam    Airway   Mallampati: II  TM distance: >3 FB  Neck ROM: full       Cardiovascular - normal exam  Rhythm: regular  Rate: normal  (-) murmur     Dental - normal exam           Pulmonary - normal exam  Breath sounds clear to auscultation     Abdominal    Neurological - normal exam                 Anesthesia Plan    ASA 4       Plan - general       Airway plan will be ETT        Induction: intravenous    Postoperative Plan: Postoperative administration of opioids is intended.    Pertinent diagnostic labs and testing reviewed    Informed Consent:    Anesthetic plan and risks discussed with patient.    Use of blood products discussed with: patient whom consented to blood products.

## 2020-10-09 NOTE — PROGRESS NOTES
Pt taken to preop for colonoscopy with transport and ACLS RN.    used to complete morning assessment and discuss POC including colonoscopy and dialysis.

## 2020-10-09 NOTE — PROGRESS NOTES
"Brief Infectious Disease Progress Note    Patient s/p thoracoscopy with chest tube placement 10/8. Drainage 170cc overnight. Patient having colonoscopy, then is scheduled for dialysis later today. Unasyn day11, awaiting 24-hour culture read from OR. WBC noted increased at 14.9, from 10.6.    /53   Pulse (!) 50   Temp 36.5 °C (97.7 °F) (Temporal)   Resp 18   Ht 1.651 m (5' 5\")   Wt 67.2 kg (148 lb 2.4 oz)   SpO2 95%     - Continue Unasyn for now pending OR culture  - Monitor labs  - ID is following  "

## 2020-10-09 NOTE — PROGRESS NOTES
Hospital Medicine Daily Progress Note    Date of Service  10/9/2020    Chief Complaint  50 y.o. male admitted 9/29/2020 with chest pain .    Hospital Course    Admitted with chest pain, pneumonia, elevated troponin.  Known history of paroxysmal atrial fibrillation, on anticoagulation with Coumadin.  D-dimer is negative.  Known history of ESRD, consult placed to nephrology for HD MWF. CT abd/pelvis with concern for empyema, CXR today shows increased effusion. IR and surgery involved for thoracocentesis and pleural effusion culture and analysis. Chest tube in place for drainage;   Supratheraprutic INR is reversed with coumadin on hold for GI bleeding, colonoscopy scheduled.   Right knee effusion, discussed with ID consult, since patient had MSSA + from blood culture, will continue close monitoring and low threshold for arthroscopy.    Interval Problem Update    Denies abd pain; N/V    + R knee mild edema,warmth to touch or no erythema or tenderness  and RUE hematoma improving    INR normalized, coumadin on hold due to GI bleeding    Thoracoscopy and chest tube in place, 170ml overnight drainage    Colonoscopy today    Consultants/Specialty  ID  Cardiology  IR  Renal  GI  Surgery    Code Status  Full Code    Disposition    TBD  F/u cbc q8, s/p ffp and vit K        Review of Systems  Review of Systems   Constitutional: Negative for chills, diaphoresis, fever and malaise/fatigue.   HENT: Negative for congestion and sore throat.    Eyes: Negative for blurred vision.   Respiratory: Negative for shortness of breath.    Cardiovascular: Negative for chest pain, palpitations and leg swelling.   Gastrointestinal: Positive for blood in stool. Negative for abdominal pain, constipation, melena and nausea.   Genitourinary: Negative for dysuria, flank pain, hematuria and urgency.   Musculoskeletal: Positive for myalgias. Negative for back pain.        Denies right knee pain   Neurological: Negative for dizziness, focal weakness,  weakness and headaches.   Psychiatric/Behavioral: Negative for memory loss. The patient is not nervous/anxious.         Physical Exam  Temp:  [36 °C (96.8 °F)-37 °C (98.6 °F)] 36.4 °C (97.5 °F)  Pulse:  [46-58] 50  Resp:  [13-18] 17  BP: ()/(42-64) 131/63  SpO2:  [91 %-100 %] 100 %    Physical Exam  Vitals signs and nursing note reviewed.   Constitutional:       General: He is not in acute distress.     Appearance: Normal appearance. He is not ill-appearing.   HENT:      Head: Normocephalic.      Nose: Nose normal.   Eyes:      Pupils: Pupils are equal, round, and reactive to light.   Neck:      Musculoskeletal: Neck supple.      Thyroid: No thyromegaly.      Vascular: No JVD.   Cardiovascular:      Rate and Rhythm: Normal rate.      Heart sounds: No murmur.   Pulmonary:      Effort: No respiratory distress.      Breath sounds: Normal breath sounds. No stridor. No wheezing or rhonchi.   Abdominal:      General: Bowel sounds are normal. There is no distension.      Palpations: Abdomen is soft.   Musculoskeletal:         General: Swelling (Mild) and tenderness (on right arm improving) present.      Right lower leg: Edema (right knee, no warmth to tough or erythma) present.      Comments: No tenderness of the right knee; mild warmth to touch   Skin:     General: Skin is warm and dry.      Coloration: Skin is not jaundiced or pale.   Neurological:      Mental Status: He is alert and oriented to person, place, and time.      Cranial Nerves: No cranial nerve deficit.      Sensory: No sensory deficit.      Motor: No weakness.   Psychiatric:         Behavior: Behavior normal.         Thought Content: Thought content normal.         Fluids    Intake/Output Summary (Last 24 hours) at 10/9/2020 1039  Last data filed at 10/8/2020 1900  Gross per 24 hour   Intake 230 ml   Output 195 ml   Net 35 ml       Laboratory  Recent Labs     10/08/20  0208 10/08/20  0925 10/09/20  0731   WBC 10.7 10.6 14.9*   RBC 2.44* 2.76* 2.61*    HEMOGLOBIN 7.9* 8.9* 8.5*   HEMATOCRIT 24.5* 28.0* 26.9*   .4* 101.4* 103.1*   MCH 32.4 32.2 32.6   MCHC 32.2* 31.8* 31.6*   RDW 50.5* 51.4* 53.5*   PLATELETCT 311 355 364   MPV 9.3 9.6 9.4     Recent Labs     10/07/20  0254 10/08/20  0208 10/09/20  0731   SODIUM 133* 135 129*   POTASSIUM 5.0 4.3 5.1   CHLORIDE 90* 90* 86*   CO2 25 30 28   GLUCOSE 71 70 91   BUN 48* 25* 43*   CREATININE 10.27* 6.73* 9.98*   CALCIUM 9.5 8.8 8.5     Recent Labs     10/07/20  0254   INR 1.47*               Imaging  DX-CHEST-LIMITED (1 VIEW)   Final Result         1.  Chest tube now appears to be present in the region of the left lower hemithorax on this portable radiograph.      2.  Cardiomegaly and diffuse opacification of left hemithorax are again noted.      3.  No new pneumothorax or consolidation is appreciated.      US-THORACENTESIS PUNCTURE LEFT   Final Result      Unsuccessful attempt at left-sided thoracentesis.         DX-CHEST-PORTABLE (1 VIEW)   Final Result      1.  Volume loss within the left lung.      2.  Loculated left pleural effusion.      3.  Cardiomegaly.      DX-CHEST-PORTABLE (1 VIEW)   Final Result      Increased opacity on the left may represent a layering pleural effusion with overlying atelectasis/consolidation.      Stable cardiomegaly.         US-EXTREMITY VENOUS UPPER UNILAT RIGHT   Final Result      DX-KNEE COMPLETE 4+ RIGHT   Final Result      1.  Large RIGHT knee joint effusion.   2.  No fracture or dislocation.   3.  Minimal degenerative change.      CT-ABDOMEN-PELVIS WITH   Final Result      1.  Colonic diverticulosis without evidence for diverticulitis.   2.  Normal appendix.   3.  No focal mesenteric inflammatory process.   4.  Diffuse atherosclerotic calcification of abdominal aorta with prominent celiac trunk and SMA, without evidence for occlusion.   5.  Loculated fluid collection of the LEFT lung base with associated pleural thickening and atelectasis, possibly indicating empyema, new  from prior.      NM-CARDIAC STRESS TEST   Final Result      EC-ECHOCARDIOGRAM COMPLETE W/O CONT   Final Result      CT-CHEST (THORAX) W/O   Final Result         1.  Left basilar atelectasis, component of infiltrate not definitively excluded.   2.  Atherosclerosis and atherosclerotic coronary artery disease   3.  Cardiomegaly   4.  Hepatomegaly      DX-CHEST-PORTABLE (1 VIEW)   Final Result         1.  Left basilar atelectasis, no focal infiltrate   2.  Cardiomegaly           Assessment/Plan  PAF (paroxysmal atrial fibrillation) ([facility])- (present on admission)  Assessment & Plan  -NSR, on amio and metoprolol  coumadin on hold due to GI bleeding, F/U INR    Empyema of lung (HCC)  Assessment & Plan  Shows on CT left left side alone  Failed ultrasound-guided thoracocentesis  Thoracoscope with chest tube done today  pleural fluid sent out for culture  Overnight drainage 170ml    Rectal bleed  Assessment & Plan   h/h stable  INR normalized, coumadin on  Hold  Colonoscopy today    Right knee pain  Assessment & Plan  warmth to touch or no erythema, nontenderness; lidocaine patch helped yesterday, monitoring closely his right knee for possible septic arthritis and low threshold for Ortho consult for arthrocentesis;        Hematuria  Assessment & Plan  resolved    Acute pain of left shoulder  Assessment & Plan  MSK, avoid nsaids d/t ESRD   denies pain shoulder or knee; be cautious with oral pain management    Bacteremia  Assessment & Plan  one of two sets on 9/29 positive for MSSA, repeat sets on 9/30 NG, 10/5 NGTD  10/5  negative for both bottles; 10/6 negative  c/w unasyn per ID during inpatient  then:Transition to cefazolin for total of 4 weeks for bacteremia end date 10/28 if BC remain negative  ID consult appreciated;   Unasyn sensitive, day #11  - No vegetation reported on TTE  - Patient remains afebrile    CAP (community acquired pneumonia)- (present on admission)  Assessment & Plan  empiric IV unasyn   oral azithromycin  finished  trend WBC/fever curve  blood cx 1/2, GPC(mssa) on 9/29  repeat blood cx on 9/30, 10/5, 10/6 came back negative   sputum cultures ordered on 10/6, pending collection    ESRD (end stage renal disease) on dialysis (HCC)- (present on admission)  Assessment & Plan  -dialyzed M/W/F  -renal consult appreciated  Will check CBC, CMP daily, avoid renal toxicity      Essential hypertension- (present on admission)  Assessment & Plan  -continue outpatient medications       VTE prophylaxis: Compression sequential

## 2020-10-10 ENCOUNTER — APPOINTMENT (OUTPATIENT)
Dept: RADIOLOGY | Facility: MEDICAL CENTER | Age: 50
DRG: 163 | End: 2020-10-10
Attending: SURGERY
Payer: MEDICAID

## 2020-10-10 PROBLEM — D64.9 ANEMIA: Status: ACTIVE | Noted: 2020-10-10

## 2020-10-10 LAB
ALBUMIN SERPL BCP-MCNC: 2.8 G/DL (ref 3.2–4.9)
ALBUMIN/GLOB SERPL: 0.8 G/DL
ALP SERPL-CCNC: 80 U/L (ref 30–99)
ALT SERPL-CCNC: 6 U/L (ref 2–50)
ANION GAP SERPL CALC-SCNC: 11 MMOL/L (ref 7–16)
AST SERPL-CCNC: 16 U/L (ref 12–45)
BACTERIA BLD CULT: NORMAL
BACTERIA BLD CULT: NORMAL
BASOPHILS # BLD AUTO: 0.1 % (ref 0–1.8)
BASOPHILS # BLD: 0.02 K/UL (ref 0–0.12)
BILIRUB SERPL-MCNC: 0.4 MG/DL (ref 0.1–1.5)
BUN SERPL-MCNC: 29 MG/DL (ref 8–22)
CALCIUM SERPL-MCNC: 8.8 MG/DL (ref 8.5–10.5)
CHLORIDE SERPL-SCNC: 89 MMOL/L (ref 96–112)
CO2 SERPL-SCNC: 30 MMOL/L (ref 20–33)
CREAT SERPL-MCNC: 7.3 MG/DL (ref 0.5–1.4)
EOSINOPHIL # BLD AUTO: 0 K/UL (ref 0–0.51)
EOSINOPHIL NFR BLD: 0 % (ref 0–6.9)
ERYTHROCYTE [DISTWIDTH] IN BLOOD BY AUTOMATED COUNT: 53.4 FL (ref 35.9–50)
FERRITIN SERPL-MCNC: 1990 NG/ML (ref 22–322)
GLOBULIN SER CALC-MCNC: 3.3 G/DL (ref 1.9–3.5)
GLUCOSE SERPL-MCNC: 166 MG/DL (ref 65–99)
HCT VFR BLD AUTO: 25.1 % (ref 42–52)
HGB BLD-MCNC: 7.7 G/DL (ref 14–18)
HGB RETIC QN AUTO: 31 PG/CELL (ref 29–35)
IMM GRANULOCYTES # BLD AUTO: 0.11 K/UL (ref 0–0.11)
IMM GRANULOCYTES NFR BLD AUTO: 0.7 % (ref 0–0.9)
IMM RETICS NFR: 16 % (ref 9.3–17.4)
INR PPP: 2.2 (ref 0.87–1.13)
INR PPP: 2.2 (ref 0.87–1.13)
IRON SATN MFR SERPL: 26 % (ref 15–55)
IRON SERPL-MCNC: 36 UG/DL (ref 50–180)
LYMPHOCYTES # BLD AUTO: 0.94 K/UL (ref 1–4.8)
LYMPHOCYTES NFR BLD: 5.9 % (ref 22–41)
MCH RBC QN AUTO: 32.5 PG (ref 27–33)
MCHC RBC AUTO-ENTMCNC: 30.7 G/DL (ref 33.7–35.3)
MCV RBC AUTO: 105.9 FL (ref 81.4–97.8)
MONOCYTES # BLD AUTO: 0.63 K/UL (ref 0–0.85)
MONOCYTES NFR BLD AUTO: 4 % (ref 0–13.4)
NEUTROPHILS # BLD AUTO: 14.1 K/UL (ref 1.82–7.42)
NEUTROPHILS NFR BLD: 89.3 % (ref 44–72)
NRBC # BLD AUTO: 0 K/UL
NRBC BLD-RTO: 0 /100 WBC
PLATELET # BLD AUTO: 406 K/UL (ref 164–446)
PMV BLD AUTO: 9.5 FL (ref 9–12.9)
POTASSIUM SERPL-SCNC: 4.5 MMOL/L (ref 3.6–5.5)
PROT SERPL-MCNC: 6.1 G/DL (ref 6–8.2)
PROTHROMBIN TIME: 25.1 SEC (ref 12–14.6)
PROTHROMBIN TIME: 25.2 SEC (ref 12–14.6)
RBC # BLD AUTO: 2.37 M/UL (ref 4.7–6.1)
RETICS # AUTO: 0.04 M/UL (ref 0.04–0.06)
RETICS/RBC NFR: 1.5 % (ref 0.8–2.1)
SIGNIFICANT IND 70042: NORMAL
SIGNIFICANT IND 70042: NORMAL
SITE SITE: NORMAL
SITE SITE: NORMAL
SODIUM SERPL-SCNC: 130 MMOL/L (ref 135–145)
SOURCE SOURCE: NORMAL
SOURCE SOURCE: NORMAL
TIBC SERPL-MCNC: 138 UG/DL (ref 250–450)
TRANSFERRIN SERPL-MCNC: 107 MG/DL (ref 200–370)
UIBC SERPL-MCNC: 102 UG/DL (ref 110–370)
WBC # BLD AUTO: 15.8 K/UL (ref 4.8–10.8)

## 2020-10-10 PROCEDURE — 82728 ASSAY OF FERRITIN: CPT

## 2020-10-10 PROCEDURE — 36415 COLL VENOUS BLD VENIPUNCTURE: CPT

## 2020-10-10 PROCEDURE — 85046 RETICYTE/HGB CONCENTRATE: CPT

## 2020-10-10 PROCEDURE — 83540 ASSAY OF IRON: CPT

## 2020-10-10 PROCEDURE — 85025 COMPLETE CBC W/AUTO DIFF WBC: CPT

## 2020-10-10 PROCEDURE — 700101 HCHG RX REV CODE 250: Performed by: INTERNAL MEDICINE

## 2020-10-10 PROCEDURE — A9270 NON-COVERED ITEM OR SERVICE: HCPCS | Performed by: INTERNAL MEDICINE

## 2020-10-10 PROCEDURE — 770020 HCHG ROOM/CARE - TELE (206)

## 2020-10-10 PROCEDURE — 700111 HCHG RX REV CODE 636 W/ 250 OVERRIDE (IP): Performed by: INTERNAL MEDICINE

## 2020-10-10 PROCEDURE — 84466 ASSAY OF TRANSFERRIN: CPT

## 2020-10-10 PROCEDURE — 700105 HCHG RX REV CODE 258: Performed by: INTERNAL MEDICINE

## 2020-10-10 PROCEDURE — 99233 SBSQ HOSP IP/OBS HIGH 50: CPT | Performed by: INTERNAL MEDICINE

## 2020-10-10 PROCEDURE — 83550 IRON BINDING TEST: CPT

## 2020-10-10 PROCEDURE — 71045 X-RAY EXAM CHEST 1 VIEW: CPT

## 2020-10-10 PROCEDURE — 700102 HCHG RX REV CODE 250 W/ 637 OVERRIDE(OP): Performed by: INTERNAL MEDICINE

## 2020-10-10 PROCEDURE — 99232 SBSQ HOSP IP/OBS MODERATE 35: CPT | Performed by: STUDENT IN AN ORGANIZED HEALTH CARE EDUCATION/TRAINING PROGRAM

## 2020-10-10 PROCEDURE — 85610 PROTHROMBIN TIME: CPT | Mod: 91

## 2020-10-10 PROCEDURE — 80053 COMPREHEN METABOLIC PANEL: CPT

## 2020-10-10 RX ADMIN — Medication 2001 MG: at 07:41

## 2020-10-10 RX ADMIN — DOCUSATE SODIUM 50 MG AND SENNOSIDES 8.6 MG 2 TABLET: 8.6; 5 TABLET, FILM COATED ORAL at 16:05

## 2020-10-10 RX ADMIN — SODIUM CHLORIDE 3 G: 900 INJECTION INTRAVENOUS at 16:06

## 2020-10-10 RX ADMIN — Medication 2001 MG: at 16:06

## 2020-10-10 RX ADMIN — Medication 100 MG: at 04:41

## 2020-10-10 RX ADMIN — AMIODARONE HYDROCHLORIDE 200 MG: 200 TABLET ORAL at 04:40

## 2020-10-10 RX ADMIN — LOSARTAN POTASSIUM 25 MG: 50 TABLET, FILM COATED ORAL at 16:06

## 2020-10-10 RX ADMIN — FUROSEMIDE 40 MG: 40 TABLET ORAL at 04:40

## 2020-10-10 RX ADMIN — ATORVASTATIN CALCIUM 40 MG: 40 TABLET, FILM COATED ORAL at 16:06

## 2020-10-10 RX ADMIN — DOCUSATE SODIUM 50 MG AND SENNOSIDES 8.6 MG 2 TABLET: 8.6; 5 TABLET, FILM COATED ORAL at 04:39

## 2020-10-10 RX ADMIN — ACETAMINOPHEN 650 MG: 325 TABLET, FILM COATED ORAL at 04:39

## 2020-10-10 RX ADMIN — LIDOCAINE 1 PATCH: 50 PATCH TOPICAL at 16:06

## 2020-10-10 RX ADMIN — Medication 2001 MG: at 12:49

## 2020-10-10 RX ADMIN — LIDOCAINE 1 PATCH: 50 PATCH TOPICAL at 16:07

## 2020-10-10 RX ADMIN — FOLIC ACID 1 MG: 1 TABLET ORAL at 04:38

## 2020-10-10 ASSESSMENT — ENCOUNTER SYMPTOMS
COUGH: 0
HEADACHES: 0
FLANK PAIN: 0
ABDOMINAL PAIN: 0
DIAPHORESIS: 0
BACK PAIN: 0
BLURRED VISION: 0
NERVOUS/ANXIOUS: 0
NAUSEA: 0
BLOOD IN STOOL: 0
DIZZINESS: 0
SHORTNESS OF BREATH: 0
MEMORY LOSS: 0
FOCAL WEAKNESS: 0
DIARRHEA: 0
VOMITING: 0
SORE THROAT: 0
PALPITATIONS: 0
CONSTIPATION: 0
CHILLS: 0
NECK PAIN: 0
FEVER: 0
MYALGIAS: 0
WEAKNESS: 0

## 2020-10-10 ASSESSMENT — PAIN DESCRIPTION - PAIN TYPE
TYPE: SURGICAL PAIN

## 2020-10-10 ASSESSMENT — FIBROSIS 4 INDEX: FIB4 SCORE: 0.84

## 2020-10-10 NOTE — CARE PLAN
Problem: Communication  Goal: The ability to communicate needs accurately and effectively will improve  Outcome: PROGRESSING AS EXPECTED  Note: Educated pt on POC, medications, and answered any questions the pt had. Reinforced the use of the call light. Encouraged pt to voice any concerns or needs.  services in use.  Will continue to monitor.        Problem: Safety  Goal: Will remain free from injury  Outcome: PROGRESSING AS EXPECTED  Note: Educated pt on POC and fall risk. Assessed pts understanding of using call light for assistance. Fall precautions in place. Call light within reach, appropriate signage placed, treaded socks and bed alarm on.

## 2020-10-10 NOTE — PROGRESS NOTES
Infectious Disease Progress Note    Author: Mayte Malhotra M.D. Date & Time of service: 10/10/2020  10:33 AM    Chief Complaint:  MSSA bacteremia, empyema     Interval History:    Review of Systems:  Review of Systems   Constitutional: Negative for chills, fever and malaise/fatigue.   Respiratory: Negative for cough and shortness of breath.    Gastrointestinal: Negative for abdominal pain, constipation, diarrhea, nausea and vomiting.   Musculoskeletal: Negative for back pain, joint pain, myalgias and neck pain.       Hemodynamics:  Temp (24hrs), Av.3 °C (97.3 °F), Min:36.1 °C (96.9 °F), Max:36.5 °C (97.7 °F)  Temperature: 36.4 °C (97.5 °F)  Pulse  Av.6  Min: 46  Max: 138   Blood Pressure: 115/48       Physical Exam:  Physical Exam  Constitutional:       Appearance: Normal appearance.   Cardiovascular:      Rate and Rhythm: Normal rate. Rhythm irregular.      Heart sounds: Normal heart sounds.   Pulmonary:      Effort: Pulmonary effort is normal. No respiratory distress.      Breath sounds: No wheezing or rales.      Comments: Crackles on left -chest tube in place on left  Abdominal:      General: Abdomen is flat. Bowel sounds are normal. There is no distension.      Palpations: Abdomen is soft.      Tenderness: There is no abdominal tenderness. There is no guarding.   Musculoskeletal:      Right lower leg: No edema.      Left lower leg: No edema.   Skin:     General: Skin is warm and dry.   Neurological:      General: No focal deficit present.      Mental Status: He is alert and oriented to person, place, and time.   Psychiatric:         Mood and Affect: Mood normal.         Behavior: Behavior normal.         Meds:    Current Facility-Administered Medications:   •  ampicillin-sulbactam (UNASYN) IV  •  epoetin  •  lidocaine  •  lidocaine  •  metoprolol SR  •  calcium acetate  •  tramadol  •  heparin  •  oxyCODONE immediate-release  •  amiodarone  •  atorvastatin  •  folic acid  •  furosemide  •   losartan  •  thiamine  •  senna-docusate **AND** polyethylene glycol/lytes **AND** magnesium hydroxide **AND** bisacodyl  •  acetaminophen    Labs:  Recent Labs     10/08/20  0925 10/09/20  0731 10/10/20  0939   WBC 10.6 14.9* 15.8*   RBC 2.76* 2.61* 2.37*   HEMOGLOBIN 8.9* 8.5* 7.7*   HEMATOCRIT 28.0* 26.9* 25.1*   .4* 103.1* 105.9*   MCH 32.2 32.6 32.5   RDW 51.4* 53.5* 53.4*   PLATELETCT 355 364 406   MPV 9.6 9.4 9.5   NEUTSPOLYS 70.60 80.90* 89.30*   LYMPHOCYTES 15.60* 10.50* 5.90*   MONOCYTES 10.00 7.00 4.00   EOSINOPHILS 3.00 0.80 0.00   BASOPHILS 0.20 0.30 0.10     Recent Labs     10/08/20  0208 10/09/20  0731   SODIUM 135 129*   POTASSIUM 4.3 5.1   CHLORIDE 90* 86*   CO2 30 28   GLUCOSE 70 91   BUN 25* 43*     Recent Labs     10/08/20  0208 10/09/20  0731   ALBUMIN 2.9* 2.9*   TBILIRUBIN 0.5 0.3   ALKPHOSPHAT 93 88   TOTPROTEIN 6.5 6.6   ALTSGPT 15 11   ASTSGOT 20 17   CREATININE 6.73* 9.98*       Imaging:  Ct-abdomen-pelvis With    Result Date: 10/5/2020  10/5/2020 10:05 AM HISTORY/REASON FOR EXAM:  GI bleed. Rectal bleeding. TECHNIQUE/EXAM DESCRIPTION:   CT scan of the abdomen and pelvis with contrast. Contrast-enhanced helical scanning was obtained from the diaphragmatic domes through the pubic symphysis following the bolus administration of nonionic contrast without complication. 100 mL of Omnipaque 350 nonionic contrast was administered without complication. Low dose optimization technique was utilized for this CT exam including automated exposure control and adjustment of the mA and/or kV according to patient size. COMPARISON: CT chest 9/29/2020 FINDINGS: CT Abdomen: Visualized lung bases show apparent loculated LEFT pleural fluid collection posteriorly with associated mild pleural thickening, apparently new from prior exam. Mild bibasilar atelectasis, worse in the LEFT. Multichamber cardiac enlargement again seen. Coronary artery calcifications. The liver is unremarkable. The spleen is  unremarkable. Adrenal glands are unremarkable. LEFT kidney shows cortical cyst laterally. Both kidneys are slightly heterogeneous. The pancreas is unremarkable. The gallbladder is unremarkable. CT Pelvis: Bladder is unremarkable. Appendix has a normal appearance. No peritoneal fluid or pneumoperitoneum. Small fat-containing umbilical hernia. Diffuse atherosclerotic calcification of abdominal aorta.  Atherosclerotic changes of the superior mesenteric artery without occlusion. Colonic diverticula noted. Lumbar spine degenerative changes.     1.  Colonic diverticulosis without evidence for diverticulitis. 2.  Normal appendix. 3.  No focal mesenteric inflammatory process. 4.  Diffuse atherosclerotic calcification of abdominal aorta with prominent celiac trunk and SMA, without evidence for occlusion. 5.  Loculated fluid collection of the LEFT lung base with associated pleural thickening and atelectasis, possibly indicating empyema, new from prior.    Ct-chest (thorax) W/o    Result Date: 9/29/2020 9/29/2020 4:33 AM HISTORY/REASON FOR EXAM: Chest pain or SOB, pleurisy or effusion suspected TECHNIQUE/EXAM DESCRIPTION:  Transaxial MDCT scan of chest without contrast. Low dose optimization technique was utilized for this CT exam including automated exposure control and adjustment of the mA and/or kV according to patient size. COMPARISON: None FINDINGS Cardiomegaly is seen. The visualized portion of the thyroid appears within normal limits. The trachea and main stem airways are normal in caliber. There are no pathologically enlarged mediastinal lymph nodes. The heart and pericardium appear within normal limits. The aorta and its main branch vessels are normal in caliber and configuration. Linear density at the left lung base is seen. Limited views of the abdomen demonstrates hepatomegaly. 1.7 cm exophytic left renal lesion is seen measuring 15 Hounsfield units, appearance most compatible with a cyst. The bony structures  are age appropriate. Atherosclerosis and atherosclerotic coronary artery calcifications.     1.  Left basilar atelectasis, component of infiltrate not definitively excluded. 2.  Atherosclerosis and atherosclerotic coronary artery disease 3.  Cardiomegaly 4.  Hepatomegaly    Dx-chest-limited (1 View)    Result Date: 10/10/2020  10/10/2020 7:48 AM HISTORY/REASON FOR EXAM: Pleural Effusion. TECHNIQUE/EXAM DESCRIPTION AND NUMBER OF VIEWS: Single portable view of the chest. COMPARISON: October 8 FINDINGS: Left thoracostomy tube terminates over the costophrenic sulcus laterally Cardiomediastinal contours are stable, enlarged Lungs demonstrate unchanged moderate diffuse left hazy opacity with diaphragm effacement No pneumothorax is seen.     Stable left atelectasis, pleural fluid and possible consolidation Left thoracostomy tube terminates over the costophrenic sulcus in stable position    Dx-chest-limited (1 View)    Result Date: 10/8/2020  10/8/2020 6:00 PM HISTORY/REASON FOR EXAM:  Left chest tube placement TECHNIQUE/EXAM DESCRIPTION AND NUMBER OF VIEWS: Single portable view of the chest. COMPARISON: 10/06/2020 FINDINGS: Left-sided chest tube is identified which appears to be located in the region of the left lower hemithorax. Appearance of the cardiac silhouette appears  unchanged compared to the previous exam.  No new pleural fluid collection is identified compared to the prior exam. No new infiltrates or consolidations  have developed in the lungs compared to prior examination. Overall appearance of the lungs is unchanged compared to prior exam. No new pneumothorax or pleural fluid collection is identified.     1.  Chest tube now appears to be present in the region of the left lower hemithorax on this portable radiograph. 2.  Cardiomegaly and diffuse opacification of left hemithorax are again noted. 3.  No new pneumothorax or consolidation is appreciated.    Dx-chest-portable (1 View)    Result Date:  10/6/2020  10/6/2020 3:35 PM HISTORY/REASON FOR EXAM: Post left-sided thoracentesis. TECHNIQUE/EXAM DESCRIPTION AND NUMBER OF VIEWS: Single portable view of the chest. COMPARISON: 10/6/2020 FINDINGS: There is volume loss within the left lung. There is loculated left pleural effusion. The heart is enlarged. No pneumothorax.     1.  Volume loss within the left lung. 2.  Loculated left pleural effusion. 3.  Cardiomegaly.    Dx-chest-portable (1 View)    Result Date: 10/6/2020  10/6/2020 8:08 AM HISTORY/REASON FOR EXAM:  Pleural Effusion. TECHNIQUE/EXAM DESCRIPTION AND NUMBER OF VIEWS: Single portable view of the chest. COMPARISON: 9/29/2020 FINDINGS: The cardiomediastinal silhouette is enlarged. There is increased airspace opacity in the left which may represent a layering pleural effusion with overlying atelectasis/consolidation. No pneumothorax is seen.     Increased opacity on the left may represent a layering pleural effusion with overlying atelectasis/consolidation. Stable cardiomegaly.     Dx-chest-portable (1 View)    Result Date: 9/29/2020 9/29/2020 3:17 AM HISTORY/REASON FOR EXAM: Chest Pain TECHNIQUE/EXAM DESCRIPTION:  Single AP view of the chest. COMPARISON: August 9, 2017 FINDINGS: Overlying cardiac leads are present. Cardiomegaly is observed. The mediastinal contour appears within normal limits.  The central pulmonary vasculature appears normal. The lungs appear well expanded bilaterally.  Hazy linear density in the left lung base is observed. No significant pleural effusions are identified. The bony structures appear age-appropriate.     1.  Left basilar atelectasis, no focal infiltrate 2.  Cardiomegaly    Dx-knee Complete 4+ Right    Result Date: 10/5/2020  10/5/2020 10:39 AM HISTORY/REASON FOR EXAM:  Atraumatic Pain/Swelling/Deformity. RIGHT knee pain and swelling TECHNIQUE/EXAM DESCRIPTION AND NUMBER OF VIEWS:  4 views of the RIGHT knee. COMPARISON: None FINDINGS: Large joint effusion present. Mild  loss of joint space with minimal osteophyte formation. No fracture or dislocation. Vascular calcifications.     1.  Large RIGHT knee joint effusion. 2.  No fracture or dislocation. 3.  Minimal degenerative change.    Us-thoracentesis Puncture Left    Result Date: 10/7/2020  10/6/2020 3:36 PM HISTORY/REASON FOR EXAM:  Fluid collection TECHNIQUE/EXAM DESCRIPTION: Ultrasound-guided thoracentesis. Indication:  LEFT pleural fluid collection. COMPARISON:  Plain film from the same day PROCEDURE:     Informed consent was obtained with the help of an . A timeout was taken. A left pleural effusion was localized with real-time ultrasound guidance. The left posterior chest wall was prepped and draped in a sterile manner. Following local anesthesia with 1% lidocaine, a 5 [employer] pigtail catheter was advanced into the pleural space with trocar technique and pleural fluid was unable to the drained. The patient tolerated the procedure well without evidence of complication. A post thoracentesis chest radiograph is forthcoming. FINDINGS: Left pleural effusion appears loculated.     Unsuccessful attempt at left-sided thoracentesis.     Nm-cardiac Stress Test    Result Date: 10/3/2020   Myocardial Perfusion  Report  NUCLEAR IMAGING INTERPRETATION  Small ill defined, mostly fixed defect in the inferolateral wall, likely  small infarct or artifact.  No reversible defect.  Normal left ventricular size, ejection fraction, and wall motion.  ECG INTERPRETATION  Negative stress ECG for ischemia.  BOUBACAR FERNANDEZ  MRN:    [id number]         Gender:    M  Exam Date: 10/03/2020 06:45  Exam Location:      Inpatient  Ordering Phys:     CLAUDIA BOSS  NucMed Tech:       Alex Benítez RT                     (R,N)  Age:    50    :    1970        Ht (in):     65  Wt (lb):     154    BMI:    25.67       Radiologist  Risk Factors:             Hypertension  Indications:              Angina  ICD Codes:                413.9  Cardiac  History:          Chest Pain, Previous MI, Dyspnea.  EF by recent echo  -                            70%  Cardiac Meds:  Meds Past 24 hrs:  Pretest Chest Pain:       No symptoms  STRESS TEST      Pharmacologdonny Merchantiscan       Dose: 0.4 mg  ol:  Post-Injection Exercise:        No exercise followed the intravenous injection  Resting HR (bpm):      58  Peak HR (bpm):         67  Resting BP (mmHg):       87     /   48  Peak BP (mmHg):       102   /   64  MaxPHR:     168     Target HR (bpm):       143  % MaxPHR:     40  Double Product:       6834  BP Response:  Stress Termination:       Protocol completed  Stress Symptoms:  Dyspnea, Nausea.  100mg Aminophylline given for symptom relief.  ECG  Resting ECG:     Sinus rhythm.  Stress ECG:      No ischemic changes with Regadenoson.  IMAGE PROTOCOL      Rest/Stress 1                      Day          RadiopharmaceuticalDose (mCi)   Imaging  Date      Imaging  Time         Inj to Img Time (min)  Rest:   Tc-99m             7.3          03-Oct-2020        12:23                 20          Tetrofosmin  Stress: Tc-99m             28.6         03-Oct-2020        13:13                 30          Tetrofosmin  Rest:  Administration Site:       Right forearm  Administered by:      Alex Benítez RT (R,N)  Stress:  Administration Site:       Right forearm  Administered by:      Alex Benítez RT (R,N)  % Percent HR Achieved:  SPECT RESULTS  Technical Quality:       Poor  Raw Data Analysis:  Summed Stress Score:    5  Summed Rest Score:    6  Summed Difference Score:        0  PERFUSION:  Small ill defined, mostly fixed defect in the inferolateral wall.  No reversible defect.  FUNCTIONAL RESULTS (calculated via Gated SPECT)  Stress Image LV EF:        66     %  Upper Normal Limit  Stress EDV:      129    ml   EDVI:    73      ml/m2  Stress ESV:      44     ml   ESVI:    25      ml/m2  TID:    1.12   TID - 1.19      TID (ed) - 1.23  LV Function:  Normal left  ventricular wall motion.  LV ejection fraction = 66%.  Chano Espitia MD  Edited by: Wan Mchugh MD  (Electronically Signed)  Final Date:      2020                   14:05  Amended:         2020 17:14    Us-extremity Venous Upper Unilat Right    Result Date: 10/5/2020   Upper Extremity  Venous Duplex Report  Vascular Laboratory  CONCLUSIONS  No acute deep venous thrombosis is identified.  Short segment thrombus in the right antecubital vein  BOUBACAR FERNANDEZ  Exam Date:     10/05/2020 21:55  Room #:     Inpatient  Priority:     Routine  Ht (in):             Wt (lb):  Ordering Physician:        SILVIA CHOWDARY  Referring Physician:       [id number]  Sonographer:               Heri Vidal RDMS,                             RVT  Study Type:                Complete Unilateral  Technical Quality:         Adequate  Age:    50    Gender:     M  MRN:    [id number]  :    1970      BSA:  Indications:     Swelling of Limb, Edema  CPT Codes:       48532  ICD Codes:       729.81  782.3  History:         Right upper extremity swelling/edema. No priors.  Limitations:  PROCEDURES:  Right upper extremity venous duplex imaging.  The following venous structures were evaluated: internal jugular,  subclavian, axillary, brachial, cephalic and basilic veins.  Serial compression, augmentation maneuvers,  color and spectral Doppler  flow evaluations were performed.  FINDINGS:  Right upper extremity.  A small segment of the antecunital vein appears thrombosed.  All other imaged vessels demonstrate complete color filling and  compressibility with normal venous flow dynamics including spontaneous  flow, response to augmentation maneuvers, and respiratory phasicity.  No evidence of deep venous thrombosis.  Flow was evaluated in the contralateral subclavian vein and normal venous  flow dynamics including spontaneous flow, respiratory phasic variation and  augmentation were demonstrated.  Marlee Peña   (Electronically Signed)  Final Date:      2020                   23:23    Ec-echocardiogram Complete W/o Cont    Result Date: 2020  Transthoracic Echo Report Echocardiography Laboratory CONCLUSIONS Normal left ventricular systolic function. Left ventricular ejection fraction is visually estimated to be 70%. Moderate concentric left ventricular hypertrophy. Aortic sclerosis without stenosis. Moderate aortic insufficiency. BOUBACAR FERNANDEZ Exam Date:         2020                    10:06 Exam Location:     Inpatient Priority:          Routine Ordering Physician:        RAUL NOBLE Referring Physician:       AIDE Boyd Sonographer:               Romel CUETO RDCS Age:    50     Gender:    M MRN:    [id number] :    1970 BSA:    1.78   Ht (in):    65     Wt (lb):    156 Exam Type:     Complete Indications:     Chest pain, unspecified ICD Codes:       R07.9 CPT Codes:       02886 BP:   126    /   63     HR:   55 Technical Quality:       Fair MEASUREMENTS  (Male / Female) Normal Values 2D ECHO LV Diastolic Diameter PLAX        4.3 cm                4.2 - 5.9 / 3.9 - 5.3 cm LV Systolic Diameter PLAX         2.6 cm                2.1 - 4.0 cm IVS Diastolic Thickness           1.3 cm                LVPW Diastolic Thickness          1.8 cm                LVOT Diameter                     2 cm                  Estimated LV Ejection Fraction    70 %                  LV Ejection Fraction MOD BP       72.4 %                >= 55  % LV Ejection Fraction MOD 4C       72 %                  LV Ejection Fraction MOD 2C       70.5 %                LV Ejection Fraction 4C AL        75.9 %                LV Ejection Fraction 2C AL        71.3 %                LA Volume Index                   37.9 cm3/m2           16 - 28 cm3/m2 DOPPLER AV Peak Velocity                  1.9 m/s               AV Peak Gradient                  14.4 mmHg             AV Mean Gradient                   7 mmHg                LVOT Peak Velocity                1.3 m/s               AV Area Cont Eq vti               2.4 cm2               Mitral E Point Velocity           1.4 m/s               Mitral E to A Ratio               1.8                   MV Pressure Half Time             89 ms                 MV Area PHT                       2.5 cm2               MV Deceleration Time              302 ms                TR Peak Velocity                  235 cm/s              LV E' Lateral Velocity            4.5 cm/s              Mitral E to LV E' Lateral Ratio   30.7                  LV E' Septal Velocity             7.2 cm/s              Mitral E to LV E' Septal Ratio    19.1                  * Indicates values subject to auto-interpretation LV EF:  70    % FINDINGS Left Ventricle Normal left ventricular chamber size. Moderate concentric left ventricular hypertrophy. Normal left ventricular systolic function. Left ventricular ejection fraction is visually estimated to be 70%. Normal regional wall motion. Indeterminate diastolic function. Right Ventricle The right ventricle was normal in size and function. Right Atrium The right atrium is normal in size.  Normal inferior vena cava size and inspiratory collapse. Left Atrium The left atrium is normal in size.  Left atrial volume index is  35 mL/sq m. Mitral Valve Structurally normal mitral valve without significant stenosis or regurgitation. Aortic Valve Tricuspid aortic valve. Aortic sclerosis without stenosis. Moderate aortic insufficiency. Unable to obtain an accurate pressure half time. Tricuspid Valve Structurally normal tricuspid valve without significant stenosis.  Mild tricuspid regurgitation. Right atrial pressure is estimated to be 3 mmHg. Estimated right ventricular systolic pressure is  23 mmHg. Pulmonic Valve Structurally normal pulmonic valve without significant stenosis or regurgitation. Pericardium Normal pericardium without effusion. Aorta The  aortic root is normal.  Ascending aorta diameter is 2.8 cm. Jan Zendejas M.D. (Electronically Signed) Final Date:     30 September 2020                 11:53      Micro:  Results     Procedure Component Value Units Date/Time    GRAM STAIN [[id number]] Collected: 10/08/20 1339    Order Status: Completed Specimen: Body Fluid Updated: 10/09/20 1842     Significant Indicator .     Source BF     Site Left empyema     Gram Stain Result No organisms seen.    Narrative:      Surgery Specimen    Acid Fast Stain [[id number]] Collected: 10/08/20 1339    Order Status: Completed Specimen: Body Fluid Updated: 10/09/20 1842     Significant Indicator NEG     Source BF     Site Left empyema     AFB Smear Results No acid fast bacilli seen.    Narrative:      Surgery Specimen    Fungal Culture [[id number]] Collected: 10/08/20 1339    Order Status: Completed Specimen: Body Fluid Updated: 10/09/20 1842     Significant Indicator NEG     Source BF     Site Left empyema     Culture Result Culture in progress.    Narrative:      Surgery Specimen    AFB Culture [[id number]] Collected: 10/08/20 1339    Order Status: Completed Specimen: Body Fluid Updated: 10/09/20 1842     Significant Indicator NEG     Source BF     Site Left empyema     Culture Result Culture in progress.     AFB Smear Results No acid fast bacilli seen.    Narrative:      Surgery Specimen    FLUID CULTURE W/GRAM STAIN [[id number]] Collected: 10/08/20 1339    Order Status: Completed Specimen: Body Fluid Updated: 10/09/20 1842     Significant Indicator NEG     Source BF     Site Left empyema     Culture Result No growth at 24 hours.     Gram Stain Result No organisms seen.    Narrative:      Surgery Specimen    Anaerobic Culture [[id number]] Collected: 10/08/20 1339    Order Status: Completed Specimen: Body Fluid Updated: 10/09/20 1842     Significant Indicator NEG     Source BF     Site Left empyema     Culture Result Culture in progress.    Narrative:      Surgery Specimen  "   Blood Culture [[id number]] Collected: 10/06/20 0930    Order Status: Completed Specimen: Blood from Peripheral Updated: 10/07/20 0923     Significant Indicator NEG     Source BLD     Site PERIPHERAL     Culture Result No Growth  Note: Blood cultures are incubated for 5 days and  are monitored continuously.Positive blood cultures  are called to the RN and reported as soon as  they are identified.      Narrative:      Per Hospital Policy: Only change Specimen Src: to \"Line\" if  specified by physician order.  Right Hand    Blood Culture [[id number]] Collected: 10/06/20 0920    Order Status: Completed Specimen: Blood from Peripheral Updated: 10/07/20 0923     Significant Indicator NEG     Source BLD     Site PERIPHERAL     Culture Result No Growth  Note: Blood cultures are incubated for 5 days and  are monitored continuously.Positive blood cultures  are called to the RN and reported as soon as  they are identified.      Narrative:      Per Hospital Policy: Only change Specimen Src: to \"Line\" if  specified by physician order.  Right AC    FLUID CULTURE W/GRAM STAIN [[id number]]     Order Status: No result Specimen: Body Fluid from Thoracentesis Fluid     AFB CULTURE [[id number]]     Order Status: No result Specimen: Body Fluid from Thoracentesis Fluid     FUNGAL CULTURE [[id number]]     Order Status: No result Specimen: Body Fluid from Thoracentesis Fluid     BLOOD CULTURE [[id number]] Collected: 10/05/20 1600    Order Status: Completed Specimen: Blood from Peripheral Updated: 10/06/20 0634     Significant Indicator NEG     Source BLD     Site PERIPHERAL     Culture Result No Growth  Note: Blood cultures are incubated for 5 days and  are monitored continuously.Positive blood cultures  are called to the RN and reported as soon as  they are identified.      Narrative:      Per Hospital Policy: Only change Specimen Src: to \"Line\" if  specified by physician order.  Right AC    BLOOD CULTURE [[id number]] Collected: 10/05/20 1600 " "   Order Status: Completed Specimen: Blood from Peripheral Updated: 10/06/20 0634     Significant Indicator NEG     Source BLD     Site PERIPHERAL     Culture Result No Growth  Note: Blood cultures are incubated for 5 days and  are monitored continuously.Positive blood cultures  are called to the RN and reported as soon as  they are identified.      Narrative:      Per Hospital Policy: Only change Specimen Src: to \"Line\" if  specified by physician order.  Right Hand    SARS-CoV-2, PCR (In-House) [[id number]] Collected: 10/06/20 0430    Order Status: Completed Updated: 10/06/20 0601     SARS-CoV-2 Source NP Swab     SARS-CoV-2 by PCR NotDetected     Comment: [facility] providers: PLEASE REFER TO DE-ESCALATION AND RETESTING PROTOCOL  [facility] [place]  **The [employer] GeneXpert Xpress SARS-CoV-2 Test has been made available for  use under the Emergency Use Authorization (EUA) only.         Narrative:      Collected By:[id number] ALIYA GOMEZ  Is patient being admitted?->Yes  Does this patient meet criteria for Rush/Cepheid per [facility]  Inpatient Workflow? (See workflow link below)->Yes  Expected turn around time?->Rush (Cepheid 2-4 hours)  Is this the patients First SARS CoV-2 test?->No  Is this patient employed in healthcare?->No  Is the patient symptomatic as defined by the CDC?->No  Is the patient hospitalized?->Yes  Is the patient in the ICU?->No  Is the patient a resident in a congregate care setting?->No  Is the patient pregnant?->No    COVID/SARS CoV-2 PCR [[id number]] Collected: 10/06/20 0430    Order Status: Completed Specimen: Respirate from Nasopharyngeal Updated: 10/06/20 0502     COVID Order Status Received     Comment: The order for SARS CoV-2 testing has been received by the  Laboratory. This result is neither positive nor negative.  Final results of testing will report in 24-48 hours, separately.         Narrative:      Collected By:[id number] ALIYA GOMEZ  Is patient being " "admitted?->Yes  Does this patient meet criteria for Rush/Cepheid per [facility]  Inpatient Workflow? (See workflow link below)->Yes  Expected turn around time?->Rush (Cepheid 2-4 hours)  Is this the patients First SARS CoV-2 test?->No  Is this patient employed in healthcare?->No  Is the patient symptomatic as defined by the CDC?->No  Is the patient hospitalized?->Yes  Is the patient in the ICU?->No  Is the patient a resident in a congregate care setting?->No  Is the patient pregnant?->No    Blood Culture [[id number]]     Order Status: No result Specimen: Blood from Peripheral     Blood Culture [[id number]]     Order Status: No result Specimen: Blood from Peripheral     Urinalysis [[id number]]     Order Status: No result Specimen: Urine     BLOOD CULTURE [[id number]] Collected: 09/30/20 1210    Order Status: Completed Specimen: Blood from Peripheral Updated: 10/05/20 1500     Significant Indicator NEG     Source BLD     Site PERIPHERAL     Culture Result No growth after 5 days of incubation.    Narrative:      Per Hospital Policy: Only change Specimen Src: to \"Line\" if  specified by physician order.  Right Hand    BLOOD CULTURE [[id number]] Collected: 09/30/20 1210    Order Status: Completed Specimen: Blood from Peripheral Updated: 10/05/20 1500     Significant Indicator NEG     Source BLD     Site PERIPHERAL     Culture Result No growth after 5 days of incubation.    Narrative:      Per Hospital Policy: Only change Specimen Src: to \"Line\" if  specified by physician order.  Right AC    URINALYSIS [[id number]]  (Abnormal) Collected: 10/04/20 1104    Order Status: Completed Specimen: Urine, Clean Catch Updated: 10/04/20 1224     Color Yellow     Character Clear     Specific Gravity 1.016     Ph 5.5     Glucose Negative mg/dL      Ketones Trace mg/dL      Protein 100 mg/dL      Bilirubin Negative     Urobilinogen, Urine 0.2     Nitrite Negative     Leukocyte Esterase Negative     Occult Blood Trace     Micro Urine Req Microscopic " "   Narrative:      Collected By:[id number] RU GARCIA    BLOOD CULTURE x2 [[id number]] Collected: 09/29/20 0450    Order Status: Completed Specimen: Blood from Peripheral Updated: 10/04/20 0700     Significant Indicator NEG     Source BLD     Site PERIPHERAL     Culture Result No growth after 5 days of incubation.    Narrative:      Per Hospital Policy: Only change Specimen Src: to \"Line\" if  specified by physician order.  No site indicated          Assessment:  Active Hospital Problems    Diagnosis   • PAF (paroxysmal atrial fibrillation) ([facility]) [I48.0]   • Essential hypertension [I10]   • Empyema of lung ([facility]) [J86.9]   • Rectal bleed [K62.5]   • Right knee pain [M25.561]   • Hematuria [R31.9]   • Bacteremia [R78.81]   • Acute pain of left shoulder [M25.512]   • ESRD (end stage renal disease) on dialysis ([facility]) [N18.6, Z99.2]   • CAP (community acquired pneumonia) [J18.9]     Interval 24 hours:      AF, O2 2 L NC   Labs reviewed  Micro reviewed    Patient is doing well this morning with no complaints.  He denies any pain and the knee pain and swelling has resolved.  Continues to have chest tube on left - potentially will be removed tomorrow per hospitalist.  Patient continued on Unasyn renally dosed at 3 g every 24 hours after dialysis.     Assessment and Plan:    Patient with A. fib on Coumadin and recently supratherapeutic.  ESRD on dialysis MWF.  Admitted for chest pain and found to have positive blood cultures with MSSA and left lower lung pneumonia and associated loculated pleural effusion. Thoracentesis by IR was unsuccessful as fluid was too thick.  He is now status post thoracoscopy and decortication on 10/8 with is obtained and chest tube placed. GI bleeding and colonoscopy on 10/8 with finding of internal hemorrhoids but otherwise benign per hospitalist.    Bacteremia, MSSA  - one of two sets on 9/29, repeat sets on 9/30 NG, 10/5 NGTD  - Unasyn sensitive  - No vegetation reported on TTE  - Patient " remains afebrile   Pneumonia, associated large L empyema  - Pleuritic chest pain on presentation  - Chest CT 9/29 with atelectasis. ?fluid collection in fissure, but no empyema  - CT Abd/pelvis 10/5 shows loculated fluid collection of the LEFT lung base with associated pleural thickening. Likely empyema, appears   -Status post thoracoscopy with decortication and chest tube on 10/8  ESRD  - On HD MWF, compliant    Lower GIB, Rectal bleeding, heme positive stool on admission  - Ongoing oozing per nursing report  - Hgb trending down, 7.9   - GI following, colonoscopy on 10/9 -internal hemorrhoids    A. Fib, resuming warfarin     Plan:     --- Continue Unasyn for now-if pleural effusion cultures remain negative tomorrow we will transition to cefazolin complete a total antibiotic course of 4 weeks from the first negative blood culture-this can be given during dialysis on discharge  --- Follow-up cultures from thoracoscopy and empyema drainage on 10/8   --- Monitor for any new head neck or back pain and image appropriately       Discussed with internal medicine Dr. Goddard.  ID will follow.

## 2020-10-10 NOTE — PROGRESS NOTES
Pt back from dialysis and tucked into bed. Evening medications passed with use of . Monitor room called and patient has converted in A.fib. from SB after dialysis.  notified. Will monitor for now as patient tends to convert himself back to SB.

## 2020-10-10 NOTE — OP REPORT
Date of Procedure: 10/9/2020  Referring Physicians:   1. Dr. Madonna ROBLEDO     Procedure performed:  1.Colonoscopy      ====================================  Anesthesiologist: Panda Ferraro M.D  Surgeon: Rah Mancia M.D.     -----------------------------------------------------------------  Preoperative diagnosis:   1.Hematochezia    Postoperative diagnosis:  1. Small internal hemorrhoids  ------------------------------------------------------------------    Impression:    Normal appearing terminal ileum  Normal colonic mucosa  Small internal hemorrhoids    Recommendations:  1. Most likely cause of his rectal bleeding seems to be from hemorrhoids in the setting of supra therapeutic INR  2.  Okay to restart on anticoagulation/if the symptoms recur may consider nuclear tag scan  3. Okay to start on clears and advance as tolerated  4.  GI will sign off call if needed.      ====================================  Indication:     50 y.o. male with the past medical history of end-stage renal disease on dialysis Monday Wednesday Friday, paroxysmal atrial fibrillation on Coumadin, stage C systolic congestive heart failure HFrEF (EF 20%), Tajik-speaking only comes in for chest pain acute onset for approximately 24 hours. He says the pain originates primarily in the left breast and radiates down to the left upper quadrant of his belly sometimes to his left shoulder and is worsened with deep inspiration and notices some mild shortness of breath but no clear actual cough.  Patient states he is compliant with his Coumadin but cannot tell me his last INR. He apparently has been on dialysis for 2 years and does produce some urine. Pt was seen by Cardiology, and NM stress test on 10/3/20 showed Small ill defined, mostly fixed defect in the inferolateral wall, likely  small infarct or artifact.     The patient was also found to have supratherapeutic INR. In the past one week, he has minimal BRBPR. He had another episode of BRBPR 2  weeks ago. Denied FH of CRC. Never had COL in the past.     -------------------------    Medications:  The patient was performed under general anesthesia with monitored anesthesia care.  Propofol sedation was administered under discretion of anesthesiology.  Please refer to their notes for more details.     Universal Precautions:  [place] Precautions were followed throughout the entire procedure.  The patient was continuously monitored on Blood pressure, pulse oximetry, and heart rate monitoring though out the procedure and afterwards.        A time out was taken prior to the start of the procedure to identify the patient's name, date of birth, ID badge, and appropriate procedure site/procedure type.    Informed Consent:  Informed consent was obtained from the patient after the risks, benefits, and indications were explained to the patient in detail.  The alternatives to the procedure were explained as well.  Risks such as bleeding, infection, perforation, complications with sedation, and need for open surgery for complications were explained in detail prior to obtaining consent.  The patient was given opportunity to ask questions.  If need be these same risks, benefits, indication, and complications were explained to the patients family member prior to obtaining consent for the procedure.    Description of procedure:    Patient was brought to endoscopy operating room and met by staff and physicians.  After procedure was confirmed and appropriate procedure was identified and preparations were made for sedation.  A time out was taken to identify the patients name, procedure site/type, and ID badge.  The patient was then sedated, and placed in the left lateral position.  When the patient was appropriately sedated the GIF Olympus colonoscope was used to perform the procedure.    The colonoscope was passed under direct visualization to the level of the cecum.  The cecum was identified by the ileocecal valve and the  appendiceal orifice.  Photographs were obtained.  Digital rectal exam performed prior to scope insertion did not reveal any colonic masses.      PREP QUALITY : good  Attempts were made to intubate the terminal ileum in retrograde.  Terminal Ileum intubation: performed  I After examination of the cecum the colonoscope was placed in the cecum and carefully withdrawn with attention directed to all folds, haustra, and mucosa.    FINDINGS:    Terminal Ileum: unremarkable  Cecum:   unremarkable  Ascending colon:  unremarkable  Transverse colon:  unremarkable  Descending colon: unremarkable  Sigmoid colon: unremarkable    The rectum was then evaluated in forward and retroflexion.    Rectum: mild internal hemorrhoid    After attempted retroflexion in the rectum, the insufflated air was remove from the colon.  The colonoscope was removed.  There were no acute complications.  The patient was taken to recovery.

## 2020-10-10 NOTE — PROGRESS NOTES
3hr HD started @ 1422 and completed @ 1723,tx well tolerated,VSS.LUAAVF + B/T,cannulation sites covered with DD,CDI,report given to Viviana Angel RN.

## 2020-10-10 NOTE — ASSESSMENT & PLAN NOTE
Multifocal: Blood loss, ESRD, anemia of chronic disease  iron study reviewed  More component of anemia of chronic disease  We will follow-up and transfuse as needed

## 2020-10-10 NOTE — PROGRESS NOTES
Inpatient Anticoagulation Service Note    Date: 10/10/2020    Reason for Anticoagulation: Atrial Fibrillation   Target INR: 2.0 to 3.0  [id number] VASc Score: 2  HAS-BLED Score: 1   Hemoglobin Value: (!) 7.7  Hematocrit Value: (!) 25.1  Lab Platelet Value: 406    INR from last 7 days     Date/Time INR Value    10/10/20 1325  (!) 2.2    10/10/20 1139  (!) 2.2    10/07/20 0254  (!) 1.47    10/06/20 0154  (!) 1.47    10/05/20 1600  (!) 1.77    10/05/20 0531  (!) 7.34    10/04/20 0409  (!) 6.57        Dose from last 7 days     Date/Time Dose (mg)    10/10/20 1346  0    10/05/20 1118  0    10/04/20 1357  0        Average Dose (mg): (6mg on Tu/Thurs and 4mg ROW)  Significant Interactions: Amiodarone, Statin, Antibiotics  Bridge Therapy: No (If less than 5 days and overlap therapy discontinued -- document reason (i.e. Bleed Risk))    (If still on overlap therapy, if No -- document reason (i.e. Bleed Risk))    Reversal Agent Administered: Vitamin K  Intravenous(10mg 10/5/20)  Comments: Patient underwent colonoscopy yeasterday and was cleared by GI to restart warfarin; however, INR remains elevated at 2.20 despite patient not receiving warfarin since 9/29. Discussed with MD and will not give patient a dose of warfarin (see i-vent). Unclear why patient is having such labile INR. Will repeat INR in AM. Pharmacy will continue to monitor.    Plan:  HOLD warfarin, repeat INR in AM  Education Material Provided?: No  Pharmacist suggested discharge dosing: HOLD warfarin, repeat INR w/i 48 hours of discharge.     Mackenzie Akhtar, PharmD

## 2020-10-10 NOTE — PROGRESS NOTES
Diagnosis: End-Stage Renal Disease. Patient seen and examined on hemodialysis during treatment. Patient is stable, tolerating hemodialysis. Denies chest pain and shortness of breath. Orders updated as needed. Please refer to flowsheet for full details.    Access: Left RC AVF  UF goal: 1.5L    Plan: Continue HD Monday Wednesday Friday. Discharge planning.     Burton Perales MD  Nephrology

## 2020-10-10 NOTE — PROGRESS NOTES
Progress Note               Author: Na Cervantes M.D. Date & Time created: 10/10/2020  11:05 AM     Interval History:  Spoke with patient with  Aline [id number].   Feeling well today, much better than prior to surgery. MInimal pain around his chest tube, no SOB.     Review of Systems:  Review of Systems   Constitutional: Negative for chills and fever.   Respiratory: Negative for cough and shortness of breath.    Cardiovascular: Positive for chest pain.   Gastrointestinal: Negative for abdominal pain, nausea and vomiting.       Physical Exam:  Physical Exam  Constitutional:       Appearance: Normal appearance. He is not ill-appearing or diaphoretic.   HENT:      Head: Normocephalic and atraumatic.      Right Ear: External ear normal.      Left Ear: External ear normal.      Mouth/Throat:      Mouth: Mucous membranes are moist.   Eyes:      Extraocular Movements: Extraocular movements intact.   Neck:      Musculoskeletal: Normal range of motion.   Cardiovascular:      Rate and Rhythm: Normal rate.   Pulmonary:      Effort: Pulmonary effort is normal.      Comments: Chest tube without air leak, 16cc/24 hours serosanguinous fluid  Abdominal:      General: Abdomen is flat. There is no distension.   Musculoskeletal: Normal range of motion.   Skin:     General: Skin is warm and dry.   Neurological:      General: No focal deficit present.      Mental Status: He is alert.   Psychiatric:         Mood and Affect: Mood normal.         Behavior: Behavior normal.         Labs:          Recent Labs     10/08/20  0208 10/09/20  0731 10/10/20  0939   SODIUM 135 129* 130*   POTASSIUM 4.3 5.1 4.5   CHLORIDE 90* 86* 89*   CO2 30 28 30   BUN 25* 43* 29*   CREATININE 6.73* 9.98* 7.30*   MAGNESIUM  --  2.3  --    CALCIUM 8.8 8.5 8.8     Recent Labs     10/08/20  0208 10/09/20  0731 10/10/20  0939   ALTSGPT 15 11 6   ASTSGOT 20 17 16   ALKPHOSPHAT 93 88 80   TBILIRUBIN 0.5 0.3 0.4   GLUCOSE 70 91 166*     Recent Labs      10/08/20  0925 10/09/20  0731 10/10/20  0939   RBC 2.76* 2.61* 2.37*   HEMOGLOBIN 8.9* 8.5* 7.7*   HEMATOCRIT 28.0* 26.9* 25.1*   PLATELETCT 355 364 406     Recent Labs     10/08/20  0208 10/08/20  0925 10/09/20  0731 10/10/20  0939   WBC 10.7 10.6 14.9* 15.8*   NEUTSPOLYS 70.30 70.60 80.90* 89.30*   LYMPHOCYTES 16.50* 15.60* 10.50* 5.90*   MONOCYTES 9.40 10.00 7.00 4.00   EOSINOPHILS 3.00 3.00 0.80 0.00   BASOPHILS 0.30 0.20 0.30 0.10   ASTSGOT 20  --  17 16   ALTSGPT 15  --  11 6   ALKPHOSPHAT 93  --  88 80   TBILIRUBIN 0.5  --  0.3 0.4     Hemodynamics:  Temp (24hrs), Av.3 °C (97.3 °F), Min:36.1 °C (96.9 °F), Max:36.5 °C (97.7 °F)  Temperature: 36.4 °C (97.5 °F)  Pulse  Av.6  Min: 46  Max: 138   Blood Pressure: 115/48     Respiratory:    Respiration: 16, Pulse Oximetry: 100 %     Work Of Breathing / Effort: Mild  RUL Breath Sounds: Clear, RML Breath Sounds: Clear, RLL Breath Sounds: Diminished, SABAS Breath Sounds: Clear, LLL Breath Sounds: Diminished  Fluids:    Intake/Output Summary (Last 24 hours) at 10/10/2020 1105  Last data filed at 10/10/2020 0400  Gross per 24 hour   Intake 770 ml   Output 2006 ml   Net -1236 ml        GI/Nutrition:  Orders Placed This Encounter   Procedures   • Diet Order Renal, Cardiac     Standing Status:   Standing     Number of Occurrences:   1     Order Specific Question:   Diet:     Answer:   Renal [8]     Order Specific Question:   Diet:     Answer:   Cardiac [6]     Medical Decision Making, by Problem:  Active Hospital Problems    Diagnosis   • PAF (paroxysmal atrial fibrillation) ([facility]) [I48.0]   • Essential hypertension [I10]   • Empyema of lung ([facility]) [J86.9]   • Rectal bleed [K62.5]   • Right knee pain [M25.561]   • Hematuria [R31.9]   • Bacteremia [R78.81]   • Acute pain of left shoulder [M25.512]   • ESRD (end stage renal disease) on dialysis ([facility]) [N18.6, Z99.2]   • CAP (community acquired pneumonia) [J18.9]       Plan:  Chest tube to water seal today - done on rounds  this morning. Place back to suction if patient has increased oxygen requirement, shortness of breath or chest pain and check CXR.     I will order a CXR for tomorrow morning - if no pneumothorax and output remains low, will d/c chest tube.     Ok to restart coumadin today.     D/w Patient and Dr. Goddard.     Quality-Core Measures

## 2020-10-10 NOTE — PROGRESS NOTES
Hospital Medicine Daily Progress Note    Date of Service  10/10/2020    Chief Complaint  50 y.o. male admitted 9/29/2020 with chest pain .    Hospital Course    Admitted with chest pain, pneumonia, elevated troponin.  Known history of paroxysmal atrial fibrillation, on anticoagulation with Coumadin.  D-dimer is negative.  Known history of ESRD, consult placed to nephrology for HD MWF. CT abd/pelvis with concern for empyema, CXR today shows increased effusion. IR and surgery involved for thoracocentesis and pleural effusion culture and analysis. Chest tube in place for drainage;   Supratheraprutic INR is reversed with coumadin on hold for GI bleeding, colonoscopy: Normal appearing terminal ileum  Normal colonic mucosa; Small internal hemorrhoids; warfarin resumed.   Right knee effusion, discussed with ID consult, since patient had MSSA + from blood culture, no tenderness erythema or warmth developed during the stay, no arthroscopy indicated for now'    Interval Problem Update    Denies abd pain; N/V; rectal bleeding    + R knee mild edema,warmth to touch or no erythema or tenderness  and RUE hematoma improving    Colonoscopy done yesterday: Normal appearing terminal ileum  Normal colonic mucosa; Small internal hemorrhoids;  Can resume home Coumadin from GI point of view.    Thoracoscopy and chest tube in place, 14 ml overnight drainage'  X-ray repeated unremarkable;  Discussed with surgery, possible removal tube tomorrow; okay to resume Coumadin    Consultants/Specialty  ID  Cardiology  IR  Renal  GI  Surgery    Code Status  Full Code    Disposition    TBD    Review of Systems  Review of Systems   Constitutional: Negative for chills, diaphoresis, fever and malaise/fatigue.   HENT: Negative for congestion and sore throat.    Eyes: Negative for blurred vision.   Respiratory: Negative for shortness of breath.    Cardiovascular: Negative for chest pain, palpitations and leg swelling.   Gastrointestinal: Negative for  abdominal pain, blood in stool, constipation, melena and nausea.   Genitourinary: Negative for dysuria, flank pain, hematuria and urgency.   Musculoskeletal: Negative for back pain and myalgias.        Denies right knee pain   Neurological: Negative for dizziness, focal weakness, weakness and headaches.   Psychiatric/Behavioral: Negative for memory loss. The patient is not nervous/anxious.         Physical Exam  Temp:  [36.1 °C (96.9 °F)-37.1 °C (98.7 °F)] 36.3 °C (97.4 °F)  Pulse:  [] 50  Resp:  [12-18] 16  BP: ()/(49-63) 112/62  SpO2:  [95 %-100 %] 99 %    Physical Exam  Vitals signs and nursing note reviewed.   Constitutional:       General: He is not in acute distress.     Appearance: Normal appearance. He is not ill-appearing.   HENT:      Head: Normocephalic.      Nose: Nose normal.   Eyes:      Pupils: Pupils are equal, round, and reactive to light.   Neck:      Musculoskeletal: Neck supple.      Thyroid: No thyromegaly.      Vascular: No JVD.   Cardiovascular:      Rate and Rhythm: Normal rate.      Heart sounds: No murmur.   Pulmonary:      Effort: No respiratory distress.      Breath sounds: Normal breath sounds. No stridor. No wheezing or rhonchi.   Abdominal:      General: Bowel sounds are normal. There is no distension.      Palpations: Abdomen is soft.   Musculoskeletal:         General: No swelling (Mild) or tenderness (on right arm improving).      Right lower leg: No edema (right knee, no warmth to tough or erythma).      Comments: No tenderness of the right knee; mild warmth to touch   Skin:     General: Skin is warm and dry.      Coloration: Skin is not jaundiced or pale.   Neurological:      Mental Status: He is alert and oriented to person, place, and time.      Cranial Nerves: No cranial nerve deficit.      Sensory: No sensory deficit.      Motor: No weakness.   Psychiatric:         Behavior: Behavior normal.         Thought Content: Thought content normal.          Fluids    Intake/Output Summary (Last 24 hours) at 10/10/2020 0753  Last data filed at 10/10/2020 0400  Gross per 24 hour   Intake 770 ml   Output 2006 ml   Net -1236 ml       Laboratory  Recent Labs     10/08/20  0208 10/08/20  0925 10/09/20  0731   WBC 10.7 10.6 14.9*   RBC 2.44* 2.76* 2.61*   HEMOGLOBIN 7.9* 8.9* 8.5*   HEMATOCRIT 24.5* 28.0* 26.9*   .4* 101.4* 103.1*   MCH 32.4 32.2 32.6   MCHC 32.2* 31.8* 31.6*   RDW 50.5* 51.4* 53.5*   PLATELETCT 311 355 364   MPV 9.3 9.6 9.4     Recent Labs     10/08/20  0208 10/09/20  0731   SODIUM 135 129*   POTASSIUM 4.3 5.1   CHLORIDE 90* 86*   CO2 30 28   GLUCOSE 70 91   BUN 25* 43*   CREATININE 6.73* 9.98*   CALCIUM 8.8 8.5                   Imaging  DX-CHEST-LIMITED (1 VIEW)   Final Result      Stable left atelectasis, pleural fluid and possible consolidation      Left thoracostomy tube terminates over the costophrenic sulcus in stable position      DX-CHEST-LIMITED (1 VIEW)   Final Result         1.  Chest tube now appears to be present in the region of the left lower hemithorax on this portable radiograph.      2.  Cardiomegaly and diffuse opacification of left hemithorax are again noted.      3.  No new pneumothorax or consolidation is appreciated.      US-THORACENTESIS PUNCTURE LEFT   Final Result      Unsuccessful attempt at left-sided thoracentesis.         DX-CHEST-PORTABLE (1 VIEW)   Final Result      1.  Volume loss within the left lung.      2.  Loculated left pleural effusion.      3.  Cardiomegaly.      DX-CHEST-PORTABLE (1 VIEW)   Final Result      Increased opacity on the left may represent a layering pleural effusion with overlying atelectasis/consolidation.      Stable cardiomegaly.         US-EXTREMITY VENOUS UPPER UNILAT RIGHT   Final Result      DX-KNEE COMPLETE 4+ RIGHT   Final Result      1.  Large RIGHT knee joint effusion.   2.  No fracture or dislocation.   3.  Minimal degenerative change.      CT-ABDOMEN-PELVIS WITH   Final  Result      1.  Colonic diverticulosis without evidence for diverticulitis.   2.  Normal appendix.   3.  No focal mesenteric inflammatory process.   4.  Diffuse atherosclerotic calcification of abdominal aorta with prominent celiac trunk and SMA, without evidence for occlusion.   5.  Loculated fluid collection of the LEFT lung base with associated pleural thickening and atelectasis, possibly indicating empyema, new from prior.      NM-CARDIAC STRESS TEST   Final Result      EC-ECHOCARDIOGRAM COMPLETE W/O CONT   Final Result      CT-CHEST (THORAX) W/O   Final Result         1.  Left basilar atelectasis, component of infiltrate not definitively excluded.   2.  Atherosclerosis and atherosclerotic coronary artery disease   3.  Cardiomegaly   4.  Hepatomegaly      DX-CHEST-PORTABLE (1 VIEW)   Final Result         1.  Left basilar atelectasis, no focal infiltrate   2.  Cardiomegaly           Assessment/Plan  PAF (paroxysmal atrial fibrillation) (HCC)- (present on admission)  Assessment & Plan  -NSR, on amio and metoprolol  coumadin resumed    Anemia  Assessment & Plan  Multifocal: Blood loss, ESRD, anemia of chronic disease  Sent for iron study  We will follow-up    Empyema of lung (HCC)  Assessment & Plan  Shows on CT left left side alone  Failed ultrasound-guided thoracocentesis  Thoracoscope with chest tube overnight drainage 14 ml; possible removal of chest tube tomorrow per surgery.  pleural fluid culture preliminary result: Acid fast stain and Gram stain are negative, culture for fungal bacteriuria negative      Rectal bleed  Assessment & Plan  Resolved  Bleeding likely from internal hemorrhoids  Colonoscopy: Normal appearing terminal ileum; Normal colonic mucosa; Small internal hemorrhoids.    Right knee pain  Assessment & Plan  warmth to touch or no erythema, nontenderness    Hematuria  Assessment & Plan  resolved    Acute pain of left shoulder  Assessment & Plan  MSK, avoid nsaids d/t ESRD   denies pain shoulder  or knee; be cautious with oral pain management    Bacteremia  Assessment & Plan  one of two sets on 9/29 positive for MSSA, repeat sets on 9/30 NG, 10/5 NGTD  10/5  negative for both bottles; 10/6 negative  c/w unasyn per ID during inpatient  then:Transition to cefazolin for total of 4 weeks for bacteremia end date 10/28 if BC remain negative  ID consult appreciated;   Unasyn sensitive, day #12  - No vegetation reported on TTE  - Patient remains afebrile    CAP (community acquired pneumonia)- (present on admission)  Assessment & Plan  empiric IV unasyn   oral azithromycin finished  trend WBC/fever curve  blood cx 1/2, GPC(mssa) on 9/29  repeat blood cx on 9/30, 10/5, 10/6 came back negative       ESRD (end stage renal disease) on dialysis (HCC)- (present on admission)  Assessment & Plan  -dialyzed M/W/F  -renal consult appreciated  -We will need renal consult to order antibiotic during hemodialysis upon discharge  Will check CBC, CMP daily, avoid renal toxicity  -Tried to reach nephrologist outpatient for antibiotic during outpatient hemodialysis; no luck;will try again      Essential hypertension- (present on admission)  Assessment & Plan  -continue outpatient medications       VTE prophylaxis: Compression sequential

## 2020-10-10 NOTE — CARE PLAN
Problem: Communication  Goal: The ability to communicate needs accurately and effectively will improve  Outcome: PROGRESSING AS EXPECTED     Problem: Safety  Goal: Will remain free from injury  Outcome: PROGRESSING AS EXPECTED  Goal: Will remain free from falls  Outcome: PROGRESSING AS EXPECTED     Problem: Infection  Goal: Will remain free from infection  Outcome: PROGRESSING AS EXPECTED     Problem: Venous Thromboembolism (VTW)/Deep Vein Thrombosis (DVT) Prevention:  Goal: Patient will participate in Venous Thrombosis (VTE)/Deep Vein Thrombosis (DVT)Prevention Measures  Outcome: PROGRESSING AS EXPECTED     Problem: Bowel/Gastric:  Goal: Normal bowel function is maintained or improved  Outcome: PROGRESSING AS EXPECTED  Goal: Will not experience complications related to bowel motility  Outcome: PROGRESSING AS EXPECTED     Problem: Knowledge Deficit  Goal: Knowledge of disease process/condition, treatment plan, diagnostic tests, and medications will improve  Outcome: PROGRESSING AS EXPECTED  Goal: Knowledge of the prescribed therapeutic regimen will improve  Outcome: PROGRESSING AS EXPECTED     Problem: Discharge Barriers/Planning  Goal: Patient's continuum of care needs will be met  Outcome: PROGRESSING AS EXPECTED     Problem: Pain Management  Goal: Pain level will decrease to patient's comfort goal  Outcome: PROGRESSING AS EXPECTED     Problem: Psychosocial Needs:  Goal: Level of anxiety will decrease  Outcome: PROGRESSING AS EXPECTED     Problem: Fluid Volume:  Goal: Will maintain balanced intake and output  Outcome: PROGRESSING AS EXPECTED     Problem: Urinary Elimination:  Goal: Ability to reestablish a normal urinary elimination pattern will improve  Outcome: PROGRESSING AS EXPECTED

## 2020-10-10 NOTE — PROGRESS NOTES
Pt down to dialysis with transport. Dialysis RN aware pt has a chest tube that needs to be hooked up to suction. Suction provided and sent down with patient.

## 2020-10-11 ENCOUNTER — APPOINTMENT (OUTPATIENT)
Dept: RADIOLOGY | Facility: MEDICAL CENTER | Age: 50
DRG: 163 | End: 2020-10-11
Attending: SURGERY
Payer: MEDICAID

## 2020-10-11 LAB
ALBUMIN SERPL BCP-MCNC: 2.8 G/DL (ref 3.2–4.9)
ALBUMIN/GLOB SERPL: 0.9 G/DL
ALP SERPL-CCNC: 79 U/L (ref 30–99)
ALT SERPL-CCNC: <5 U/L (ref 2–50)
ANION GAP SERPL CALC-SCNC: 13 MMOL/L (ref 7–16)
AST SERPL-CCNC: 14 U/L (ref 12–45)
BACTERIA BLD CULT: NORMAL
BACTERIA BLD CULT: NORMAL
BACTERIA FLD AEROBE CULT: NORMAL
BASOPHILS # BLD AUTO: 0.2 % (ref 0–1.8)
BASOPHILS # BLD: 0.02 K/UL (ref 0–0.12)
BILIRUB SERPL-MCNC: 0.3 MG/DL (ref 0.1–1.5)
BUN SERPL-MCNC: 36 MG/DL (ref 8–22)
CALCIUM SERPL-MCNC: 8.8 MG/DL (ref 8.5–10.5)
CHLORIDE SERPL-SCNC: 91 MMOL/L (ref 96–112)
CO2 SERPL-SCNC: 30 MMOL/L (ref 20–33)
CREAT SERPL-MCNC: 8.67 MG/DL (ref 0.5–1.4)
EOSINOPHIL # BLD AUTO: 0.09 K/UL (ref 0–0.51)
EOSINOPHIL NFR BLD: 0.7 % (ref 0–6.9)
ERYTHROCYTE [DISTWIDTH] IN BLOOD BY AUTOMATED COUNT: 53.4 FL (ref 35.9–50)
GLOBULIN SER CALC-MCNC: 3.1 G/DL (ref 1.9–3.5)
GLUCOSE SERPL-MCNC: 116 MG/DL (ref 65–99)
GRAM STN SPEC: NORMAL
HCT VFR BLD AUTO: 25.4 % (ref 42–52)
HGB BLD-MCNC: 7.8 G/DL (ref 14–18)
IMM GRANULOCYTES # BLD AUTO: 0.06 K/UL (ref 0–0.11)
IMM GRANULOCYTES NFR BLD AUTO: 0.5 % (ref 0–0.9)
INR PPP: 2.32 (ref 0.87–1.13)
LYMPHOCYTES # BLD AUTO: 1.58 K/UL (ref 1–4.8)
LYMPHOCYTES NFR BLD: 12.3 % (ref 22–41)
MAGNESIUM SERPL-MCNC: 2.1 MG/DL (ref 1.5–2.5)
MCH RBC QN AUTO: 32.1 PG (ref 27–33)
MCHC RBC AUTO-ENTMCNC: 30.7 G/DL (ref 33.7–35.3)
MCV RBC AUTO: 104.5 FL (ref 81.4–97.8)
MONOCYTES # BLD AUTO: 0.89 K/UL (ref 0–0.85)
MONOCYTES NFR BLD AUTO: 6.9 % (ref 0–13.4)
NEUTROPHILS # BLD AUTO: 10.18 K/UL (ref 1.82–7.42)
NEUTROPHILS NFR BLD: 79.4 % (ref 44–72)
NRBC # BLD AUTO: 0 K/UL
NRBC BLD-RTO: 0 /100 WBC
PLATELET # BLD AUTO: 427 K/UL (ref 164–446)
PMV BLD AUTO: 9.2 FL (ref 9–12.9)
POTASSIUM SERPL-SCNC: 3.9 MMOL/L (ref 3.6–5.5)
PROT SERPL-MCNC: 5.9 G/DL (ref 6–8.2)
PROTHROMBIN TIME: 26.2 SEC (ref 12–14.6)
RBC # BLD AUTO: 2.43 M/UL (ref 4.7–6.1)
SIGNIFICANT IND 70042: NORMAL
SITE SITE: NORMAL
SODIUM SERPL-SCNC: 134 MMOL/L (ref 135–145)
SOURCE SOURCE: NORMAL
WBC # BLD AUTO: 12.8 K/UL (ref 4.8–10.8)

## 2020-10-11 PROCEDURE — 71045 X-RAY EXAM CHEST 1 VIEW: CPT

## 2020-10-11 PROCEDURE — 700102 HCHG RX REV CODE 250 W/ 637 OVERRIDE(OP): Performed by: INTERNAL MEDICINE

## 2020-10-11 PROCEDURE — 99231 SBSQ HOSP IP/OBS SF/LOW 25: CPT | Performed by: STUDENT IN AN ORGANIZED HEALTH CARE EDUCATION/TRAINING PROGRAM

## 2020-10-11 PROCEDURE — A9270 NON-COVERED ITEM OR SERVICE: HCPCS | Performed by: INTERNAL MEDICINE

## 2020-10-11 PROCEDURE — 99233 SBSQ HOSP IP/OBS HIGH 50: CPT | Performed by: INTERNAL MEDICINE

## 2020-10-11 PROCEDURE — 700101 HCHG RX REV CODE 250: Performed by: INTERNAL MEDICINE

## 2020-10-11 PROCEDURE — 80053 COMPREHEN METABOLIC PANEL: CPT

## 2020-10-11 PROCEDURE — 99232 SBSQ HOSP IP/OBS MODERATE 35: CPT | Performed by: INTERNAL MEDICINE

## 2020-10-11 PROCEDURE — 36415 COLL VENOUS BLD VENIPUNCTURE: CPT

## 2020-10-11 PROCEDURE — 770020 HCHG ROOM/CARE - TELE (206)

## 2020-10-11 PROCEDURE — 85610 PROTHROMBIN TIME: CPT

## 2020-10-11 PROCEDURE — 85025 COMPLETE CBC W/AUTO DIFF WBC: CPT

## 2020-10-11 PROCEDURE — 83735 ASSAY OF MAGNESIUM: CPT

## 2020-10-11 RX ORDER — CEFAZOLIN SODIUM 2 G/100ML
2 INJECTION, SOLUTION INTRAVENOUS
Status: DISCONTINUED | OUTPATIENT
Start: 2020-10-12 | End: 2020-10-12 | Stop reason: HOSPADM

## 2020-10-11 RX ADMIN — LIDOCAINE 1 PATCH: 50 PATCH TOPICAL at 17:07

## 2020-10-11 RX ADMIN — ATORVASTATIN CALCIUM 40 MG: 40 TABLET, FILM COATED ORAL at 17:07

## 2020-10-11 RX ADMIN — DOCUSATE SODIUM 50 MG AND SENNOSIDES 8.6 MG 2 TABLET: 8.6; 5 TABLET, FILM COATED ORAL at 17:07

## 2020-10-11 RX ADMIN — TRAMADOL HYDROCHLORIDE 50 MG: 50 TABLET, FILM COATED ORAL at 10:26

## 2020-10-11 RX ADMIN — AMIODARONE HYDROCHLORIDE 200 MG: 200 TABLET ORAL at 04:43

## 2020-10-11 RX ADMIN — FOLIC ACID 1 MG: 1 TABLET ORAL at 04:43

## 2020-10-11 RX ADMIN — Medication 2001 MG: at 07:46

## 2020-10-11 RX ADMIN — Medication 2001 MG: at 10:26

## 2020-10-11 RX ADMIN — LOSARTAN POTASSIUM 25 MG: 50 TABLET, FILM COATED ORAL at 17:07

## 2020-10-11 RX ADMIN — FUROSEMIDE 40 MG: 40 TABLET ORAL at 04:43

## 2020-10-11 RX ADMIN — LIDOCAINE 1 PATCH: 50 PATCH TOPICAL at 17:06

## 2020-10-11 RX ADMIN — ACETAMINOPHEN 650 MG: 325 TABLET, FILM COATED ORAL at 02:29

## 2020-10-11 RX ADMIN — Medication 100 MG: at 04:43

## 2020-10-11 RX ADMIN — Medication 2001 MG: at 17:07

## 2020-10-11 ASSESSMENT — PAIN DESCRIPTION - PAIN TYPE
TYPE: ACUTE PAIN
TYPE: SURGICAL PAIN
TYPE: ACUTE PAIN

## 2020-10-11 ASSESSMENT — ENCOUNTER SYMPTOMS
MEMORY LOSS: 0
ABDOMINAL PAIN: 0
COUGH: 0
FLANK PAIN: 0
DIZZINESS: 0
BLOOD IN STOOL: 0
PALPITATIONS: 0
DIAPHORESIS: 0
NAUSEA: 0
FOCAL WEAKNESS: 0
BACK PAIN: 0
BLURRED VISION: 0
VOMITING: 0
DIARRHEA: 0
HEADACHES: 0
NERVOUS/ANXIOUS: 0
FEVER: 0
MYALGIAS: 0
CONSTIPATION: 0
SORE THROAT: 0
CHILLS: 0
WEAKNESS: 0
SHORTNESS OF BREATH: 0

## 2020-10-11 NOTE — PROGRESS NOTES
Monitor Summary: SB 48-58, NM 0.20, QRS 0.10, QT 0.50, without ectopy per strip from monitor room.

## 2020-10-11 NOTE — PROGRESS NOTES
Hospital Medicine Daily Progress Note    Date of Service  10/11/2020    Chief Complaint  50 y.o. male admitted 9/29/2020 with chest pain .    Hospital Course    Admitted with chest pain, pneumonia, elevated troponin.  Known history of paroxysmal atrial fibrillation, on anticoagulation with Coumadin.  D-dimer is negative.  Known history of ESRD, consult placed to nephrology for HD MWF. CT abd/pelvis with concern for empyema, CXR today shows increased effusion. IR and surgery involved for thoracocentesis and pleural effusion culture and analysis. Chest tube in place for drainage;   Supratheraprutic INR is reversed with coumadin on hold for GI bleeding, colonoscopy: Normal appearing terminal ileum  Normal colonic mucosa; Small internal hemorrhoids; warfarin resumed.   Right knee effusion, discussed with ID consult, since patient had MSSA + from blood culture, no tenderness erythema or warmth developed during the stay, no arthroscopy indicated for now. Chest tube was place for pleural fluid drainage and removed after minimal drainage.     Interval Problem Update    Denies abd pain; N/V; rectal bleeding  chest tube removed  Discharge planning    Consultants/Specialty  ID  Cardiology  IR  Renal  GI  Surgery    Code Status  Full Code    Disposition    TBD    Review of Systems  Review of Systems   Constitutional: Negative for chills, diaphoresis, fever and malaise/fatigue.   HENT: Negative for congestion and sore throat.    Eyes: Negative for blurred vision.   Respiratory: Negative for shortness of breath.    Cardiovascular: Negative for chest pain, palpitations and leg swelling.   Gastrointestinal: Negative for abdominal pain, blood in stool, constipation, melena and nausea.   Genitourinary: Negative for dysuria, flank pain, hematuria and urgency.   Musculoskeletal: Negative for back pain and myalgias.        Denies right knee pain   Neurological: Negative for dizziness, focal weakness, weakness and headaches.    Psychiatric/Behavioral: Negative for memory loss. The patient is not nervous/anxious.         Physical Exam  Temp:  [36.3 °C (97.4 °F)-37.1 °C (98.7 °F)] 36.5 °C (97.7 °F)  Pulse:  [49-56] 50  Resp:  [16-18] 16  BP: (106-134)/(45-83) 112/45  SpO2:  [90 %-100 %] 100 %    Physical Exam  Vitals signs and nursing note reviewed.   Constitutional:       General: He is not in acute distress.     Appearance: Normal appearance. He is not ill-appearing.   HENT:      Head: Normocephalic.      Nose: Nose normal.   Eyes:      Pupils: Pupils are equal, round, and reactive to light.   Neck:      Musculoskeletal: Neck supple.      Thyroid: No thyromegaly.      Vascular: No JVD.   Cardiovascular:      Rate and Rhythm: Normal rate.      Heart sounds: No murmur.   Pulmonary:      Effort: No respiratory distress.      Breath sounds: Normal breath sounds. No stridor. No wheezing or rhonchi.   Abdominal:      General: Bowel sounds are normal. There is no distension.      Palpations: Abdomen is soft.   Musculoskeletal:         General: No swelling (Mild) or tenderness (on right arm improving).      Right lower leg: No edema (right knee, no warmth to tough or erythma).      Comments: No tenderness of the right knee; mild warmth to touch   Skin:     General: Skin is warm and dry.      Coloration: Skin is not jaundiced or pale.   Neurological:      Mental Status: He is alert and oriented to person, place, and time.      Cranial Nerves: No cranial nerve deficit.      Sensory: No sensory deficit.      Motor: No weakness.   Psychiatric:         Behavior: Behavior normal.         Thought Content: Thought content normal.         Fluids    Intake/Output Summary (Last 24 hours) at 10/11/2020 0722  Last data filed at 10/11/2020 0400  Gross per 24 hour   Intake 120 ml   Output 12 ml   Net 108 ml       Laboratory  Recent Labs     10/09/20  0731 10/10/20  0939 10/11/20  0406   WBC 14.9* 15.8* 12.8*   RBC 2.61* 2.37* 2.43*   HEMOGLOBIN 8.5* 7.7* 7.8*    HEMATOCRIT 26.9* 25.1* 25.4*   .1* 105.9* 104.5*   MCH 32.6 32.5 32.1   MCHC 31.6* 30.7* 30.7*   RDW 53.5* 53.4* 53.4*   PLATELETCT 364 406 427   MPV 9.4 9.5 9.2     Recent Labs     10/09/20  0731 10/10/20  0939 10/11/20  0406   SODIUM 129* 130* 134*   POTASSIUM 5.1 4.5 3.9   CHLORIDE 86* 89* 91*   CO2 28 30 30   GLUCOSE 91 166* 116*   BUN 43* 29* 36*   CREATININE 9.98* 7.30* 8.67*   CALCIUM 8.5 8.8 8.8     Recent Labs     10/10/20  1139 10/10/20  1325 10/11/20  0406   INR 2.20* 2.20* 2.32*               Imaging  DX-CHEST-LIMITED (1 VIEW)   Final Result         No significant interval change. No appreciable pneumothorax.      DX-CHEST-LIMITED (1 VIEW)   Final Result      Stable left atelectasis, pleural fluid and possible consolidation      Left thoracostomy tube terminates over the costophrenic sulcus in stable position      DX-CHEST-LIMITED (1 VIEW)   Final Result         1.  Chest tube now appears to be present in the region of the left lower hemithorax on this portable radiograph.      2.  Cardiomegaly and diffuse opacification of left hemithorax are again noted.      3.  No new pneumothorax or consolidation is appreciated.      US-THORACENTESIS PUNCTURE LEFT   Final Result      Unsuccessful attempt at left-sided thoracentesis.         DX-CHEST-PORTABLE (1 VIEW)   Final Result      1.  Volume loss within the left lung.      2.  Loculated left pleural effusion.      3.  Cardiomegaly.      DX-CHEST-PORTABLE (1 VIEW)   Final Result      Increased opacity on the left may represent a layering pleural effusion with overlying atelectasis/consolidation.      Stable cardiomegaly.         US-EXTREMITY VENOUS UPPER UNILAT RIGHT   Final Result      DX-KNEE COMPLETE 4+ RIGHT   Final Result      1.  Large RIGHT knee joint effusion.   2.  No fracture or dislocation.   3.  Minimal degenerative change.      CT-ABDOMEN-PELVIS WITH   Final Result      1.  Colonic diverticulosis without evidence for diverticulitis.   2.   Normal appendix.   3.  No focal mesenteric inflammatory process.   4.  Diffuse atherosclerotic calcification of abdominal aorta with prominent celiac trunk and SMA, without evidence for occlusion.   5.  Loculated fluid collection of the LEFT lung base with associated pleural thickening and atelectasis, possibly indicating empyema, new from prior.      NM-CARDIAC STRESS TEST   Final Result      EC-ECHOCARDIOGRAM COMPLETE W/O CONT   Final Result      CT-CHEST (THORAX) W/O   Final Result         1.  Left basilar atelectasis, component of infiltrate not definitively excluded.   2.  Atherosclerosis and atherosclerotic coronary artery disease   3.  Cardiomegaly   4.  Hepatomegaly      DX-CHEST-PORTABLE (1 VIEW)   Final Result         1.  Left basilar atelectasis, no focal infiltrate   2.  Cardiomegaly      DX-CHEST-LIMITED (1 VIEW)    (Results Pending)        Assessment/Plan  PAF (paroxysmal atrial fibrillation) (HCC)- (present on admission)  Assessment & Plan  -NSR, on amio and metoprolol  coumadin resumed    Anemia  Assessment & Plan  Multifocal: Blood loss, ESRD, anemia of chronic disease  iron study reviewed  More component of anemia of chronic disease  We will follow-up and transfuse as needed    Empyema of lung (HCC)  Assessment & Plan  Failed ultrasound-guided thoracocentesis  pleural fluid culture preliminary result: Acid fast stain and Gram stain are negative, culture for fungal bacteriuria negative    Overnight chest tube drainage minimal  Chest x-ray this morning no pneumothorax or interval change  chest tube removed  Discharge planning      Rectal bleed  Assessment & Plan  Resolved  Bleeding likely from internal hemorrhoids  Colonoscopy: Normal appearing terminal ileum; Normal colonic mucosa; Small internal hemorrhoids.    Right knee pain  Assessment & Plan  warmth to touch or no erythema, nontenderness    Hematuria  Assessment & Plan  resolved    Acute pain of left shoulder  Assessment & Plan  MSK, avoid  nsaids d/t ESRD   denies pain shoulder or knee; be cautious with oral pain management    Bacteremia  Assessment & Plan  one of two sets on 9/29 positive for MSSA, repeat sets on 9/30 NG, 10/5 NGTD  10/5  negative for both bottles; 10/6 negative  c/w unasyn per ID during inpatient  then:Transition to cefazolin for total of 4 weeks for bacteremia end date 10/28 if BC remain negative  ID consult appreciated;   Unasyn sensitive, day #13  - No vegetation reported on TTE  - Patient remains afebrile  -changed to ancef, to be finished outpatient on 10/28    CAP (community acquired pneumonia)- (present on admission)  Assessment & Plan  empiric IV unasyn   oral azithromycin finished  trend WBC/fever curve  blood cx 1/2, GPC(mssa) on 9/29  repeat blood cx on 9/30, 10/5, 10/6 came back negative       ESRD (end stage renal disease) on dialysis (HCC)- (present on admission)  Assessment & Plan  -dialyzed M/W/F  -renal consult appreciated  -We will need renal consult to order antibiotic during hemodialysis upon discharge  Will check CBC, CMP daily, avoid renal toxicity  -pt follows with [facility]. Will set up antibiotic during HD      Essential hypertension- (present on admission)  Assessment & Plan  -continue outpatient medications       VTE prophylaxis: Compression sequential

## 2020-10-11 NOTE — PROGRESS NOTES
General Surgery Note - [facility] Acute Care Surgery Service    Follow up CXR after removal of chest tube shows no pneumothorax.   Will sign off.   Please call with questions or concerns.

## 2020-10-11 NOTE — PROGRESS NOTES
Infectious Disease Progress Note    Author: Mayte Malhotra M.D. Date & Time of service: 10/11/2020  11:37 AM    Chief Complaint:  MSSA bacteremia, empyema      Interval History:  10/10 AF, O2 2 L NC, doing well this morning with no complaints.  He denies any pain and the knee pain and swelling has resolved.  Continues to have chest tube on left - potentially will be removed tomorrow per hospitalist.      Review of Systems:  Review of Systems   Constitutional: Negative for chills and fever.   Respiratory: Negative for cough and shortness of breath.    Cardiovascular: Negative for chest pain.   Gastrointestinal: Negative for abdominal pain, constipation, diarrhea, nausea and vomiting.   Musculoskeletal: Positive for joint pain. Negative for myalgias.       Hemodynamics:  Temp (24hrs), Av.7 °C (98 °F), Min:36.3 °C (97.4 °F), Max:37.1 °C (98.7 °F)  Temperature: 36.4 °C (97.5 °F)  Pulse  Av.4  Min: 46  Max: 138   Blood Pressure: 120/71       Physical Exam:  Physical Exam  Constitutional:       Appearance: Normal appearance.   Cardiovascular:      Rate and Rhythm: Normal rate and regular rhythm.      Heart sounds: Normal heart sounds.   Pulmonary:      Effort: Pulmonary effort is normal.      Comments: Crackles on left  Abdominal:      General: Abdomen is flat. Bowel sounds are normal.      Palpations: Abdomen is soft.   Musculoskeletal: Normal range of motion.         General: No swelling, tenderness or deformity.   Skin:     General: Skin is warm and dry.   Neurological:      General: No focal deficit present.      Mental Status: He is alert and oriented to person, place, and time.   Psychiatric:         Mood and Affect: Mood normal.         Behavior: Behavior normal.         Meds:    Current Facility-Administered Medications:   •  MD Alert...Warfarin per Pharmacy  •  ampicillin-sulbactam (UNASYN) IV  •  epoetin  •  lidocaine  •  lidocaine  •  metoprolol SR  •  calcium acetate  •  tramadol  •  heparin  •   oxyCODONE immediate-release  •  amiodarone  •  atorvastatin  •  folic acid  •  furosemide  •  losartan  •  thiamine  •  senna-docusate **AND** polyethylene glycol/lytes **AND** magnesium hydroxide **AND** bisacodyl  •  acetaminophen    Labs:  Recent Labs     10/09/20  0731 10/10/20  0939 10/11/20  0406   WBC 14.9* 15.8* 12.8*   RBC 2.61* 2.37* 2.43*   HEMOGLOBIN 8.5* 7.7* 7.8*   HEMATOCRIT 26.9* 25.1* 25.4*   .1* 105.9* 104.5*   MCH 32.6 32.5 32.1   RDW 53.5* 53.4* 53.4*   PLATELETCT 364 406 427   MPV 9.4 9.5 9.2   NEUTSPOLYS 80.90* 89.30* 79.40*   LYMPHOCYTES 10.50* 5.90* 12.30*   MONOCYTES 7.00 4.00 6.90   EOSINOPHILS 0.80 0.00 0.70   BASOPHILS 0.30 0.10 0.20     Recent Labs     10/09/20  0731 10/10/20  0939 10/11/20  0406   SODIUM 129* 130* 134*   POTASSIUM 5.1 4.5 3.9   CHLORIDE 86* 89* 91*   CO2 28 30 30   GLUCOSE 91 166* 116*   BUN 43* 29* 36*     Recent Labs     10/09/20  0731 10/10/20  0939 10/11/20  0406   ALBUMIN 2.9* 2.8* 2.8*   TBILIRUBIN 0.3 0.4 0.3   ALKPHOSPHAT 88 80 79   TOTPROTEIN 6.6 6.1 5.9*   ALTSGPT 11 6 <5   ASTSGOT 17 16 14   CREATININE 9.98* 7.30* 8.67*       Imaging:  Ct-abdomen-pelvis With    Result Date: 10/5/2020  10/5/2020 10:05 AM HISTORY/REASON FOR EXAM:  GI bleed. Rectal bleeding. TECHNIQUE/EXAM DESCRIPTION:   CT scan of the abdomen and pelvis with contrast. Contrast-enhanced helical scanning was obtained from the diaphragmatic domes through the pubic symphysis following the bolus administration of nonionic contrast without complication. 100 mL of Omnipaque 350 nonionic contrast was administered without complication. Low dose optimization technique was utilized for this CT exam including automated exposure control and adjustment of the mA and/or kV according to patient size. COMPARISON: CT chest 9/29/2020 FINDINGS: CT Abdomen: Visualized lung bases show apparent loculated LEFT pleural fluid collection posteriorly with associated mild pleural thickening, apparently new from  prior exam. Mild bibasilar atelectasis, worse in the LEFT. Multichamber cardiac enlargement again seen. Coronary artery calcifications. The liver is unremarkable. The spleen is unremarkable. Adrenal glands are unremarkable. LEFT kidney shows cortical cyst laterally. Both kidneys are slightly heterogeneous. The pancreas is unremarkable. The gallbladder is unremarkable. CT Pelvis: Bladder is unremarkable. Appendix has a normal appearance. No peritoneal fluid or pneumoperitoneum. Small fat-containing umbilical hernia. Diffuse atherosclerotic calcification of abdominal aorta.  Atherosclerotic changes of the superior mesenteric artery without occlusion. Colonic diverticula noted. Lumbar spine degenerative changes.     1.  Colonic diverticulosis without evidence for diverticulitis. 2.  Normal appendix. 3.  No focal mesenteric inflammatory process. 4.  Diffuse atherosclerotic calcification of abdominal aorta with prominent celiac trunk and SMA, without evidence for occlusion. 5.  Loculated fluid collection of the LEFT lung base with associated pleural thickening and atelectasis, possibly indicating empyema, new from prior.    Ct-chest (thorax) W/o    Result Date: 9/29/2020 9/29/2020 4:33 AM HISTORY/REASON FOR EXAM: Chest pain or SOB, pleurisy or effusion suspected TECHNIQUE/EXAM DESCRIPTION:  Transaxial MDCT scan of chest without contrast. Low dose optimization technique was utilized for this CT exam including automated exposure control and adjustment of the mA and/or kV according to patient size. COMPARISON: None FINDINGS Cardiomegaly is seen. The visualized portion of the thyroid appears within normal limits. The trachea and main stem airways are normal in caliber. There are no pathologically enlarged mediastinal lymph nodes. The heart and pericardium appear within normal limits. The aorta and its main branch vessels are normal in caliber and configuration. Linear density at the left lung base is seen. Limited views  of the abdomen demonstrates hepatomegaly. 1.7 cm exophytic left renal lesion is seen measuring 15 Hounsfield units, appearance most compatible with a cyst. The bony structures are age appropriate. Atherosclerosis and atherosclerotic coronary artery calcifications.     1.  Left basilar atelectasis, component of infiltrate not definitively excluded. 2.  Atherosclerosis and atherosclerotic coronary artery disease 3.  Cardiomegaly 4.  Hepatomegaly    Dx-chest-limited (1 View)    Result Date: 10/11/2020  10/11/2020 6:20 AM HISTORY/REASON FOR EXAM:  Chest tube in place, s/p water seal Left side Chest tube in place, s/p water seal TECHNIQUE/EXAM DESCRIPTION AND NUMBER OF VIEWS: Single portable view of the chest. COMPARISON: 10/10/2020 FINDINGS: Left chest tube in place. Hazy opacity throughout the left lung. There may be small left pleural effusion. No appreciable pneumothorax. Stable cardiopericardial silhouette.     No significant interval change. No appreciable pneumothorax.    Dx-chest-limited (1 View)    Result Date: 10/10/2020  10/10/2020 7:48 AM HISTORY/REASON FOR EXAM: Pleural Effusion. TECHNIQUE/EXAM DESCRIPTION AND NUMBER OF VIEWS: Single portable view of the chest. COMPARISON: October 8 FINDINGS: Left thoracostomy tube terminates over the costophrenic sulcus laterally Cardiomediastinal contours are stable, enlarged Lungs demonstrate unchanged moderate diffuse left hazy opacity with diaphragm effacement No pneumothorax is seen.     Stable left atelectasis, pleural fluid and possible consolidation Left thoracostomy tube terminates over the costophrenic sulcus in stable position    Dx-chest-limited (1 View)    Result Date: 10/8/2020  10/8/2020 6:00 PM HISTORY/REASON FOR EXAM:  Left chest tube placement TECHNIQUE/EXAM DESCRIPTION AND NUMBER OF VIEWS: Single portable view of the chest. COMPARISON: 10/06/2020 FINDINGS: Left-sided chest tube is identified which appears to be located in the region of the left lower  hemithorax. Appearance of the cardiac silhouette appears  unchanged compared to the previous exam.  No new pleural fluid collection is identified compared to the prior exam. No new infiltrates or consolidations  have developed in the lungs compared to prior examination. Overall appearance of the lungs is unchanged compared to prior exam. No new pneumothorax or pleural fluid collection is identified.     1.  Chest tube now appears to be present in the region of the left lower hemithorax on this portable radiograph. 2.  Cardiomegaly and diffuse opacification of left hemithorax are again noted. 3.  No new pneumothorax or consolidation is appreciated.    Dx-chest-portable (1 View)    Result Date: 10/6/2020  10/6/2020 3:35 PM HISTORY/REASON FOR EXAM: Post left-sided thoracentesis. TECHNIQUE/EXAM DESCRIPTION AND NUMBER OF VIEWS: Single portable view of the chest. COMPARISON: 10/6/2020 FINDINGS: There is volume loss within the left lung. There is loculated left pleural effusion. The heart is enlarged. No pneumothorax.     1.  Volume loss within the left lung. 2.  Loculated left pleural effusion. 3.  Cardiomegaly.    Dx-chest-portable (1 View)    Result Date: 10/6/2020  10/6/2020 8:08 AM HISTORY/REASON FOR EXAM:  Pleural Effusion. TECHNIQUE/EXAM DESCRIPTION AND NUMBER OF VIEWS: Single portable view of the chest. COMPARISON: 9/29/2020 FINDINGS: The cardiomediastinal silhouette is enlarged. There is increased airspace opacity in the left which may represent a layering pleural effusion with overlying atelectasis/consolidation. No pneumothorax is seen.     Increased opacity on the left may represent a layering pleural effusion with overlying atelectasis/consolidation. Stable cardiomegaly.     Dx-chest-portable (1 View)    Result Date: 9/29/2020 9/29/2020 3:17 AM HISTORY/REASON FOR EXAM: Chest Pain TECHNIQUE/EXAM DESCRIPTION:  Single AP view of the chest. COMPARISON: August 9, 2017 FINDINGS: Overlying cardiac leads are  present. Cardiomegaly is observed. The mediastinal contour appears within normal limits.  The central pulmonary vasculature appears normal. The lungs appear well expanded bilaterally.  Hazy linear density in the left lung base is observed. No significant pleural effusions are identified. The bony structures appear age-appropriate.     1.  Left basilar atelectasis, no focal infiltrate 2.  Cardiomegaly    Dx-knee Complete 4+ Right    Result Date: 10/5/2020  10/5/2020 10:39 AM HISTORY/REASON FOR EXAM:  Atraumatic Pain/Swelling/Deformity. RIGHT knee pain and swelling TECHNIQUE/EXAM DESCRIPTION AND NUMBER OF VIEWS:  4 views of the RIGHT knee. COMPARISON: None FINDINGS: Large joint effusion present. Mild loss of joint space with minimal osteophyte formation. No fracture or dislocation. Vascular calcifications.     1.  Large RIGHT knee joint effusion. 2.  No fracture or dislocation. 3.  Minimal degenerative change.    Us-thoracentesis Puncture Left    Result Date: 10/7/2020  10/6/2020 3:36 PM HISTORY/REASON FOR EXAM:  Fluid collection TECHNIQUE/EXAM DESCRIPTION: Ultrasound-guided thoracentesis. Indication:  LEFT pleural fluid collection. COMPARISON:  Plain film from the same day PROCEDURE:     Informed consent was obtained with the help of an . A timeout was taken. A left pleural effusion was localized with real-time ultrasound guidance. The left posterior chest wall was prepped and draped in a sterile manner. Following local anesthesia with 1% lidocaine, a 5 Micronesian Yueh pigtail catheter was advanced into the pleural space with trocar technique and pleural fluid was unable to the drained. The patient tolerated the procedure well without evidence of complication. A post thoracentesis chest radiograph is forthcoming. FINDINGS: Left pleural effusion appears loculated.     Unsuccessful attempt at left-sided thoracentesis.     Nm-cardiac Stress Test    Result Date: 10/3/2020   Myocardial Perfusion  Report  NUCLEAR  IMAGING INTERPRETATION  Small ill defined, mostly fixed defect in the inferolateral wall, likely  small infarct or artifact.  No reversible defect.  Normal left ventricular size, ejection fraction, and wall motion.  ECG INTERPRETATION  Negative stress ECG for ischemia.  BOUBACAR FERNANDEZ  MRN:    [id number]         Gender:    M  Exam Date: 10/03/2020 06:45  Exam Location:      Inpatient  Ordering Phys:     CLAUDIA BOSS  NucMed Tech:       Alex Benítez RT                     (R,N)  Age:    50    :    1970        Ht (in):     65  Wt (lb):     154    BMI:    25.67       Radiologist  Risk Factors:             Hypertension  Indications:              Angina  ICD Codes:                413.9  Cardiac History:          Chest Pain, Previous MI, Dyspnea.  EF by recent echo  -                            70%  Cardiac Meds:  Meds Past 24 hrs:  Pretest Chest Pain:       No symptoms  STRESS TEST      Pharmacologi                   c  Bishnu   Lexiscan       Dose: 0.4 mg  ol:  Post-Injection Exercise:        No exercise followed the intravenous injection  Resting HR (bpm):      58  Peak HR (bpm):         67  Resting BP (mmHg):       87     /   48  Peak BP (mmHg):       102   /   64  MaxPHR:     168     Target HR (bpm):       143  % MaxPHR:     40  Double Product:       6834  BP Response:  Stress Termination:       Protocol completed  Stress Symptoms:  Dyspnea, Nausea.  100mg Aminophylline given for symptom relief.  ECG  Resting ECG:     Sinus rhythm.  Stress ECG:      No ischemic changes with Regadenoson.  IMAGE PROTOCOL      Rest/Stress 1                      Day          RadiopharmaceuticalDose (mCi)   Imaging  Date      Imaging  Time         Inj to Img Time (min)  Rest:   Tc-99m             7.3          03-Oct-2020        12:23                 20          Tetrofosmin  Stress: Tc-99m             28.6         03-Oct-2020        13:13                 30          Tetrofosmin  Rest:  Administration Site:       Right forearm   Administered by:      Alex Benítez RT (R,N)  Stress:  Administration Site:       Right forearm  Administered by:      Alex Benítez RT (R,N)  % Percent HR Achieved:  SPECT RESULTS  Technical Quality:       Poor  Raw Data Analysis:  Summed Stress Score:    5  Summed Rest Score:    6  Summed Difference Score:        0  PERFUSION:  Small ill defined, mostly fixed defect in the inferolateral wall.  No reversible defect.  FUNCTIONAL RESULTS (calculated via Gated SPECT)  Stress Image LV EF:        66     %  Upper Normal Limit  Stress EDV:      129    ml   EDVI:    73      ml/m2  Stress ESV:      44     ml   ESVI:    25      ml/m2  TID:    1.12   TID - 1.19      TID (ed) - 1.23  LV Function:  Normal left ventricular wall motion.  LV ejection fraction = 66%.  Chano Espitia MD  Edited by: Wan Mchugh MD  (Electronically Signed)  Final Date:      2020                   14:05  Amended:         2020 17:14    Us-extremity Venous Upper Unilat Right    Result Date: 10/5/2020   Upper Extremity  Venous Duplex Report  Vascular Laboratory  CONCLUSIONS  No acute deep venous thrombosis is identified.  Short segment thrombus in the right antecubital vein  BOUBACAR FERNANDEZ  Exam Date:     10/05/2020 21:55  Room #:     Inpatient  Priority:     Routine  Ht (in):             Wt (lb):  Ordering Physician:        SILVIA CHOWDARY  Referring Physician:       [id number] Sesay  Sonographer:               Heri Vidal RDMS, RVT  Study Type:                Complete Unilateral  Technical Quality:         Adequate  Age:    50    Gender:     M  MRN:    [id number]  :    1970      BSA:  Indications:     Swelling of Limb, Edema  CPT Codes:       18517  ICD Codes:       729.81  782.3  History:         Right upper extremity swelling/edema. No priors.  Limitations:  PROCEDURES:  Right upper extremity venous duplex imaging.  The following venous structures were evaluated: internal jugular,  subclavian,  axillary, brachial, cephalic and basilic veins.  Serial compression, augmentation maneuvers,  color and spectral Doppler  flow evaluations were performed.  FINDINGS:  Right upper extremity.  A small segment of the antecunital vein appears thrombosed.  All other imaged vessels demonstrate complete color filling and  compressibility with normal venous flow dynamics including spontaneous  flow, response to augmentation maneuvers, and respiratory phasicity.  No evidence of deep venous thrombosis.  Flow was evaluated in the contralateral subclavian vein and normal venous  flow dynamics including spontaneous flow, respiratory phasic variation and  augmentation were demonstrated.  Marlee Peña  (Electronically Signed)  Final Date:      2020                   23:23    Ec-echocardiogram Complete W/o Cont    Result Date: 2020  Transthoracic Echo Report Echocardiography Laboratory CONCLUSIONS Normal left ventricular systolic function. Left ventricular ejection fraction is visually estimated to be 70%. Moderate concentric left ventricular hypertrophy. Aortic sclerosis without stenosis. Moderate aortic insufficiency. FERNANDEZBOUBACAR Exam Date:         2020                    10:06 Exam Location:     Inpatient Priority:          Routine Ordering Physician:        RAUL NOBLE Referring Physician:       AIDE Boyd Sonographer:               Romel CUETO RDCS Age:    50     Gender:    M MRN:    [id number] :    1970 BSA:    1.78   Ht (in):    65     Wt (lb):    156 Exam Type:     Complete Indications:     Chest pain, unspecified ICD Codes:       R07.9 CPT Codes:       51839 BP:   126    /   63     HR:   55 Technical Quality:       Fair MEASUREMENTS  (Male / Female) Normal Values 2D ECHO LV Diastolic Diameter PLAX        4.3 cm                4.2 - 5.9 / 3.9 - 5.3 cm LV Systolic Diameter PLAX         2.6 cm                2.1 - 4.0 cm IVS Diastolic Thickness            1.3 cm                LVPW Diastolic Thickness          1.8 cm                LVOT Diameter                     2 cm                  Estimated LV Ejection Fraction    70 %                  LV Ejection Fraction MOD BP       72.4 %                >= 55  % LV Ejection Fraction MOD 4C       72 %                  LV Ejection Fraction MOD 2C       70.5 %                LV Ejection Fraction 4C AL        75.9 %                LV Ejection Fraction 2C AL        71.3 %                LA Volume Index                   37.9 cm3/m2           16 - 28 cm3/m2 DOPPLER AV Peak Velocity                  1.9 m/s               AV Peak Gradient                  14.4 mmHg             AV Mean Gradient                  7 mmHg                LVOT Peak Velocity                1.3 m/s               AV Area Cont Eq vti               2.4 cm2               Mitral E Point Velocity           1.4 m/s               Mitral E to A Ratio               1.8                   MV Pressure Half Time             89 ms                 MV Area PHT                       2.5 cm2               MV Deceleration Time              302 ms                TR Peak Velocity                  235 cm/s              LV E' Lateral Velocity            4.5 cm/s              Mitral E to LV E' Lateral Ratio   30.7                  LV E' Septal Velocity             7.2 cm/s              Mitral E to LV E' Septal Ratio    19.1                  * Indicates values subject to auto-interpretation LV EF:  70    % FINDINGS Left Ventricle Normal left ventricular chamber size. Moderate concentric left ventricular hypertrophy. Normal left ventricular systolic function. Left ventricular ejection fraction is visually estimated to be 70%. Normal regional wall motion. Indeterminate diastolic function. Right Ventricle The right ventricle was normal in size and function. Right Atrium The right atrium is normal in size.  Normal inferior vena cava size and inspiratory collapse. Left Atrium  The left atrium is normal in size.  Left atrial volume index is  35 mL/sq m. Mitral Valve Structurally normal mitral valve without significant stenosis or regurgitation. Aortic Valve Tricuspid aortic valve. Aortic sclerosis without stenosis. Moderate aortic insufficiency. Unable to obtain an accurate pressure half time. Tricuspid Valve Structurally normal tricuspid valve without significant stenosis.  Mild tricuspid regurgitation. Right atrial pressure is estimated to be 3 mmHg. Estimated right ventricular systolic pressure is  23 mmHg. Pulmonic Valve Structurally normal pulmonic valve without significant stenosis or regurgitation. Pericardium Normal pericardium without effusion. Aorta The aortic root is normal.  Ascending aorta diameter is 2.8 cm. Jan Zendejas M.D. (Electronically Signed) Final Date:     30 September 2020                 11:53      Micro:  Results     Procedure Component Value Units Date/Time    FLUID CULTURE W/GRAM STAIN [[id number]] Collected: 10/08/20 1339    Order Status: Completed Specimen: Body Fluid Updated: 10/11/20 0937     Significant Indicator NEG     Source BF     Site Left empyema     Culture Result No growth at 72 hours.     Gram Stain Result No organisms seen.    Narrative:      Surgery Specimen    Anaerobic Culture [[id number]] Collected: 10/08/20 1339    Order Status: Completed Specimen: Body Fluid Updated: 10/11/20 0937     Significant Indicator NEG     Source BF     Site Left empyema     Culture Result Culture in progress.    Narrative:      Surgery Specimen    Fungal Culture [[id number]] Collected: 10/08/20 1339    Order Status: Completed Specimen: Body Fluid Updated: 10/11/20 0937     Significant Indicator NEG     Source BF     Site Left empyema     Culture Result Culture in progress.    Narrative:      Surgery Specimen    AFB Culture [[id number]] Collected: 10/08/20 1339    Order Status: Completed Specimen: Body Fluid Updated: 10/11/20 0937     Significant Indicator NEG     " Source BF     Site Left empyema     Culture Result Culture in progress.     AFB Smear Results No acid fast bacilli seen.    Narrative:      Surgery Specimen    BLOOD CULTURE [[id number]] Collected: 10/05/20 1600    Order Status: Completed Specimen: Blood from Peripheral Updated: 10/10/20 1700     Significant Indicator NEG     Source BLD     Site PERIPHERAL     Culture Result No growth after 5 days of incubation.    Narrative:      Per Hospital Policy: Only change Specimen Src: to \"Line\" if  specified by physician order.  Right Hand    BLOOD CULTURE [[id number]] Collected: 10/05/20 1600    Order Status: Completed Specimen: Blood from Peripheral Updated: 10/10/20 1700     Significant Indicator NEG     Source BLD     Site PERIPHERAL     Culture Result No growth after 5 days of incubation.    Narrative:      Per Hospital Policy: Only change Specimen Src: to \"Line\" if  specified by physician order.  Right AC    GRAM STAIN [[id number]] Collected: 10/08/20 1339    Order Status: Completed Specimen: Body Fluid Updated: 10/09/20 1842     Significant Indicator .     Source BF     Site Left empyema     Gram Stain Result No organisms seen.    Narrative:      Surgery Specimen    Acid Fast Stain [[id number]] Collected: 10/08/20 1339    Order Status: Completed Specimen: Body Fluid Updated: 10/09/20 1842     Significant Indicator NEG     Source BF     Site Left empyema     AFB Smear Results No acid fast bacilli seen.    Narrative:      Surgery Specimen    Blood Culture [[id number]] Collected: 10/06/20 0930    Order Status: Completed Specimen: Blood from Peripheral Updated: 10/07/20 0923     Significant Indicator NEG     Source BLD     Site PERIPHERAL     Culture Result No Growth  Note: Blood cultures are incubated for 5 days and  are monitored continuously.Positive blood cultures  are called to the RN and reported as soon as  they are identified.      Narrative:      Per Hospital Policy: Only change Specimen Src: to \"Line\" if  specified " "by physician order.  Right Hand    Blood Culture [[id number]] Collected: 10/06/20 0920    Order Status: Completed Specimen: Blood from Peripheral Updated: 10/07/20 0923     Significant Indicator NEG     Source BLD     Site PERIPHERAL     Culture Result No Growth  Note: Blood cultures are incubated for 5 days and  are monitored continuously.Positive blood cultures  are called to the RN and reported as soon as  they are identified.      Narrative:      Per Hospital Policy: Only change Specimen Src: to \"Line\" if  specified by physician order.  Right AC    FLUID CULTURE W/GRAM STAIN [[id number]]     Order Status: No result Specimen: Body Fluid from Thoracentesis Fluid     AFB CULTURE [[id number]]     Order Status: No result Specimen: Body Fluid from Thoracentesis Fluid     FUNGAL CULTURE [[id number]]     Order Status: No result Specimen: Body Fluid from Thoracentesis Fluid     SARS-CoV-2, PCR (In-House) [[id number]] Collected: 10/06/20 0430    Order Status: Completed Updated: 10/06/20 0601     SARS-CoV-2 Source NP Swab     SARS-CoV-2 by PCR NotDetected     Comment: [facility] providers: PLEASE REFER TO DE-ESCALATION AND RETESTING PROTOCOL  on [email or web address]  **The [employer] GeneXpert Xpress SARS-CoV-2 Test has been made available for  use under the Emergency Use Authorization (EUA) only.         Narrative:      Collected By:[id number] ALIYA GOMEZ  Is patient being admitted?->Yes  Does this patient meet criteria for Rush/Cepheid per [facility]  Inpatient Workflow? (See workflow link below)->Yes  Expected turn around time?->Rush (Cepheid 2-4 hours)  Is this the patients First SARS CoV-2 test?->No  Is this patient employed in healthcare?->No  Is the patient symptomatic as defined by the CDC?->No  Is the patient hospitalized?->Yes  Is the patient in the ICU?->No  Is the patient a resident in a congregate care setting?->No  Is the patient pregnant?->No    COVID/SARS CoV-2 PCR [[id number]] Collected: 10/06/20 0430    Order " "Status: Completed Specimen: Respirate from Nasopharyngeal Updated: 10/06/20 0502     COVID Order Status Received     Comment: The order for SARS CoV-2 testing has been received by the  Laboratory. This result is neither positive nor negative.  Final results of testing will report in 24-48 hours, separately.         Narrative:      Collected By:[id number] ALIYA ANUM GOMEZ  Is patient being admitted?->Yes  Does this patient meet criteria for Rush/Cepheid per [facility]  Inpatient Workflow? (See workflow link below)->Yes  Expected turn around time?->Rush (Cepheid 2-4 hours)  Is this the patients First SARS CoV-2 test?->No  Is this patient employed in healthcare?->No  Is the patient symptomatic as defined by the CDC?->No  Is the patient hospitalized?->Yes  Is the patient in the ICU?->No  Is the patient a resident in a congregate care setting?->No  Is the patient pregnant?->No    Blood Culture [[id number]]     Order Status: No result Specimen: Blood from Peripheral     Blood Culture [[id number]]     Order Status: No result Specimen: Blood from Peripheral     Urinalysis [[id number]]     Order Status: No result Specimen: Urine     BLOOD CULTURE [[id number]] Collected: 09/30/20 1210    Order Status: Completed Specimen: Blood from Peripheral Updated: 10/05/20 1500     Significant Indicator NEG     Source BLD     Site PERIPHERAL     Culture Result No growth after 5 days of incubation.    Narrative:      Per Hospital Policy: Only change Specimen Src: to \"Line\" if  specified by physician order.  Right Hand    BLOOD CULTURE [[id number]] Collected: 09/30/20 1210    Order Status: Completed Specimen: Blood from Peripheral Updated: 10/05/20 1500     Significant Indicator NEG     Source BLD     Site PERIPHERAL     Culture Result No growth after 5 days of incubation.    Narrative:      Per Hospital Policy: Only change Specimen Src: to \"Line\" if  specified by physician order.  Right AC    URINALYSIS [[id number]]  (Abnormal) Collected: " 10/04/20 1104    Order Status: Completed Specimen: Urine, Clean Catch Updated: 10/04/20 1224     Color Yellow     Character Clear     Specific Gravity 1.016     Ph 5.5     Glucose Negative mg/dL      Ketones Trace mg/dL      Protein 100 mg/dL      Bilirubin Negative     Urobilinogen, Urine 0.2     Nitrite Negative     Leukocyte Esterase Negative     Occult Blood Trace     Micro Urine Req Microscopic    Narrative:      Collected By:[id number] RU GARCIA          Assessment:  Active Hospital Problems    Diagnosis   • PAF (paroxysmal atrial fibrillation) ([facility]) [I48.0]   • Essential hypertension [I10]   • Anemia [D64.9]   • Empyema of lung ([facility]) [J86.9]   • Rectal bleed [K62.5]   • Right knee pain [M25.561]   • Hematuria [R31.9]   • Bacteremia [R78.81]   • Acute pain of left shoulder [M25.512]   • ESRD (end stage renal disease) on dialysis ([facility]) [N18.6, Z99.2]   • CAP (community acquired pneumonia) [J18.9]     Interval 24 hours:      AF, O2 2 L NC - stable   Labs reviewed  Imaging personally reviewed both images and report.  Chest tube removed, some diffuse opacities on the left.  Relatively unchanged per my read.  Micro reviewed    Patient had chest tube removed today.  He was complaining of some mild pain in his right knee but now improved.  No new erythema, swelling or tenderness in the knee.  Antibiotics transitioned as below.      Assessment and Plan:     Patient with A. fib on Coumadin and recently supratherapeutic.  ESRD on dialysis MWF.  Admitted for chest pain and found to have positive blood cultures with MSSA and left lower lung pneumonia and associated loculated pleural effusion. Thoracentesis by IR was unsuccessful as fluid was too thick.  He is now status post thoracoscopy and decortication on 10/8 with is obtained and chest tube placed. GI bleeding and colonoscopy on 10/8 with finding of internal hemorrhoids but otherwise benign per hospitalist.    Leukocytosis, improving  Bacteremia, MSSA  - one  of two sets on 9/29, repeat sets on 9/30 NG, 10/5 NGTD  - Unasyn sensitive  - No vegetation reported on TTE  - Patient remains afebrile   Pneumonia, associated large L empyema  - Pleuritic chest pain on presentation  - Chest CT 9/29 with atelectasis. ?fluid collection in fissure, but no empyema  - CT Abd/pelvis 10/5 shows loculated fluid collection of the LEFT lung base with associated pleural thickening. Likely empyema.  -Status post thoracoscopy with decortication and chest tube on 10/8-chest tube removed at bedside on 10/11-cultures obtained and no growth at 72 hours  ESRD  - On HD MWF, compliant     Lower GIB, Rectal bleeding, heme positive stool on admission  - Ongoing oozing per nursing report  - Hgb trending down, 7.9   - GI following, colonoscopy on 10/9 -internal hemorrhoids     A. Fib, resuming warfarin     Plan:     --- Stopped Unasyn and transitioned to continue cefazolin 2 g every 24 hours after dialysis while inpatient.  On discharge can transition to cefazolin with dialysis 2/2/3.  He will need orders placed by nephrology.   Complete a total antibiotic course of 4 weeks from the first negative blood culture - end 10/28/20   --- Monitor for any new head neck or back pain and image appropriately        Discussed with internal medicine Dr. Goddard.   ID will sign off.

## 2020-10-11 NOTE — CARE PLAN
Problem: Communication  Goal: The ability to communicate needs accurately and effectively will improve  Outcome: PROGRESSING AS EXPECTED  Note: Educated pt on POC, medications, and answered any questions the pt had. Reinforced the use of the call light. Encouraged pt to voice any concerns or needs. Will continue to monitor.        Problem: Infection  Goal: Will remain free from infection  Outcome: PROGRESSING AS EXPECTED  Note: Continuously assessing for signs and symptoms of infection. Standard precautions being implemented. Will continue to monitor.

## 2020-10-11 NOTE — PROGRESS NOTES
Nephrology Daily Progress Note    Date of Service  10/11/2020    Chief Complaint  50 y.o. male with ESRD on hemodialysis Monday Wednesday Friday admitted 9/29/2020 with shortness of breath, chest pain, with course further complicated by GI bleed due to hemorrhoids from a supratherapeutic INR    Interval Problem Update  10/11 -patient complains of pain in his left chest from the chest tube.  Otherwise denies chest pain, shortness of breath, headache, nausea, vomiting    Review of Systems  Review of Systems   Constitutional: Negative for fever.   Respiratory: Negative for shortness of breath.    Cardiovascular: Negative for chest pain.   Gastrointestinal: Negative for abdominal pain.   All other systems reviewed and are negative.       Physical Exam  Temp:  [36.3 °C (97.4 °F)-37.1 °C (98.7 °F)] 36.5 °C (97.7 °F)  Pulse:  [50-97] 95  Resp:  [16-18] 16  BP: (107-134)/(45-71) 107/61  SpO2:  [90 %-100 %] 100 %    Physical Exam   Constitutional: He is oriented to person, place, and time. He appears well-developed. No distress.   HENT:   Mouth/Throat: Oropharynx is clear and moist. No oropharyngeal exudate.   Eyes: EOM are normal. No scleral icterus.   Neck: No tracheal deviation present.   Cardiovascular: Normal rate and normal heart sounds.   No murmur heard.  Pulmonary/Chest: Effort normal and breath sounds normal. No stridor. He has no rales.   Left chest tube in place   Abdominal: Soft. He exhibits no distension. There is no abdominal tenderness.   Musculoskeletal: Normal range of motion.         General: No edema.   Neurological: He is alert and oriented to person, place, and time.   Skin: Skin is warm and dry. He is not diaphoretic.   Psychiatric: He has a normal mood and affect.   Access: Left radiocephalic AV fistula, patent bruit and thrill    Fluids    Intake/Output Summary (Last 24 hours) at 10/11/2020 1352  Last data filed at 10/11/2020 0400  Gross per 24 hour   Intake --   Output 12 ml   Net -12 ml        Laboratory  Labs reviewed, pertinent labs below.  Recent Labs     10/09/20  0731 10/10/20  0939 10/11/20  0406   WBC 14.9* 15.8* 12.8*   RBC 2.61* 2.37* 2.43*   HEMOGLOBIN 8.5* 7.7* 7.8*   HEMATOCRIT 26.9* 25.1* 25.4*   .1* 105.9* 104.5*   MCH 32.6 32.5 32.1   MCHC 31.6* 30.7* 30.7*   RDW 53.5* 53.4* 53.4*   PLATELETCT 364 406 427   MPV 9.4 9.5 9.2     Recent Labs     10/09/20  0731 10/10/20  0939 10/11/20  0406   SODIUM 129* 130* 134*   POTASSIUM 5.1 4.5 3.9   CHLORIDE 86* 89* 91*   CO2 28 30 30   GLUCOSE 91 166* 116*   BUN 43* 29* 36*   CREATININE 9.98* 7.30* 8.67*   CALCIUM 8.5 8.8 8.8     Recent Labs     10/10/20  1139 10/10/20  1325 10/11/20  0406   INR 2.20* 2.20* 2.32*           URINALYSIS:  Lab Results   Component Value Date/Time    COLORURINE Yellow 10/04/2020 1104    CLARITY Clear 10/04/2020 1104    SPECGRAVITY 1.016 10/04/2020 1104    PHURINE 5.5 10/04/2020 1104    KETONES Trace (A) 10/04/2020 1104    PROTEINURIN 100 (A) 10/04/2020 1104    BILIRUBINUR Negative 10/04/2020 1104    UROBILU 0.2 10/04/2020 1104    NITRITE Negative 10/04/2020 1104    LEUKESTERAS Negative 10/04/2020 1104    OCCULTBLOOD Trace (A) 10/04/2020 1104     UP  Lab Results   Component Value Date/Time    TOTPROTUR 28.2 (H) 08/17/2017 1637      Lab Results   Component Value Date/Time    CREATININEU 51.40 08/17/2017 1637         Imaging reviewed  DX-CHEST-LIMITED (1 VIEW)   Final Result         No significant interval change. No appreciable pneumothorax.      DX-CHEST-LIMITED (1 VIEW)   Final Result      Stable left atelectasis, pleural fluid and possible consolidation      Left thoracostomy tube terminates over the costophrenic sulcus in stable position      DX-CHEST-LIMITED (1 VIEW)   Final Result         1.  Chest tube now appears to be present in the region of the left lower hemithorax on this portable radiograph.      2.  Cardiomegaly and diffuse opacification of left hemithorax are again noted.      3.  No new  pneumothorax or consolidation is appreciated.      US-THORACENTESIS PUNCTURE LEFT   Final Result      Unsuccessful attempt at left-sided thoracentesis.         DX-CHEST-PORTABLE (1 VIEW)   Final Result      1.  Volume loss within the left lung.      2.  Loculated left pleural effusion.      3.  Cardiomegaly.      DX-CHEST-PORTABLE (1 VIEW)   Final Result      Increased opacity on the left may represent a layering pleural effusion with overlying atelectasis/consolidation.      Stable cardiomegaly.         US-EXTREMITY VENOUS UPPER UNILAT RIGHT   Final Result      DX-KNEE COMPLETE 4+ RIGHT   Final Result      1.  Large RIGHT knee joint effusion.   2.  No fracture or dislocation.   3.  Minimal degenerative change.      CT-ABDOMEN-PELVIS WITH   Final Result      1.  Colonic diverticulosis without evidence for diverticulitis.   2.  Normal appendix.   3.  No focal mesenteric inflammatory process.   4.  Diffuse atherosclerotic calcification of abdominal aorta with prominent celiac trunk and SMA, without evidence for occlusion.   5.  Loculated fluid collection of the LEFT lung base with associated pleural thickening and atelectasis, possibly indicating empyema, new from prior.      NM-CARDIAC STRESS TEST   Final Result      EC-ECHOCARDIOGRAM COMPLETE W/O CONT   Final Result      CT-CHEST (THORAX) W/O   Final Result         1.  Left basilar atelectasis, component of infiltrate not definitively excluded.   2.  Atherosclerosis and atherosclerotic coronary artery disease   3.  Cardiomegaly   4.  Hepatomegaly      DX-CHEST-PORTABLE (1 VIEW)   Final Result         1.  Left basilar atelectasis, no focal infiltrate   2.  Cardiomegaly      DX-CHEST-LIMITED (1 VIEW)    (Results Pending)         Current Facility-Administered Medications   Medication Dose Route Frequency Provider Last Rate Last Dose   • ceFAZolin in dextrose (ANCEF) IVPB premix 2 g  2 g Intravenous Q24HRS Mayte Malhotra M.D.       • MD Alert...Warfarin per  Pharmacy   Other PHARMACY TO DOSE Brianna Goddard M.D.       • epoetin (Retacrit) injection (Dialysis Use Only) 3,000 Units  3,000 Units Intravenous MO, WE + FR Burton Perales M.D.   3,000 Units at 10/09/20 1455   • lidocaine (LIDODERM) 5 % 1 Patch  1 Patch Transdermal Q24HR HAKEEM BallardORaisa   1 Patch at 10/10/20 1606   • lidocaine (LIDODERM) 5 % 1 Patch  1 Patch Transdermal Q24HR CAMILO Ballard.ORaisa   1 Patch at 10/10/20 1607   • metoprolol SR (TOPROL XL) tablet 50 mg  50 mg Oral DAILY Na Bro P.A.-C.   Stopped at 10/10/20 0440   • calcium acetate (PHOS-LO) 667 MG tablet 2,001 mg  2,001 mg Oral TID AC Katrina Merchant M.D.   2,001 mg at 10/11/20 1026   • tramadol (ULTRAM) 50 MG tablet 50 mg  50 mg Oral Q12HRS PRN Aissatou Peacock D.ORaisa   50 mg at 10/11/20 1026   • heparin injection 1,500 Units  1,500 Units Intravenous ACUTE DIALYSIS PRN Katrnia Merchant M.D.   1,500 Units at 10/02/20 1148   • oxyCODONE immediate-release (ROXICODONE) tablet 5 mg  5 mg Oral Q6HRS PRN HAKEEM BallardORaisa   5 mg at 10/09/20 0343   • amiodarone (CORDARONE) tablet 200 mg  200 mg Oral DAILY Blanye Verma M.D.   200 mg at 10/11/20 0443   • atorvastatin (LIPITOR) tablet 40 mg  40 mg Oral Q EVENING Blayne Verma M.D.   40 mg at 10/10/20 1606   • folic acid (FOLVITE) tablet 1 mg  1 mg Oral DAILY Blayne Verma M.D.   1 mg at 10/11/20 0443   • furosemide (LASIX) tablet 40 mg  40 mg Oral DAILY Blayne Verma M.D.   40 mg at 10/11/20 0443   • losartan (COZAAR) tablet 25 mg  25 mg Oral Q EVENING Blayne Verma M.D.   25 mg at 10/10/20 1606   • thiamine tablet 100 mg  100 mg Oral DAILY Blayne Verma M.D.   100 mg at 10/11/20 0443   • senna-docusate (PERICOLACE or SENOKOT S) 8.6-50 MG per tablet 2 Tab  2 Tab Oral BID Blayne Verma M.D.   Stopped at 10/11/20 0600    And   • polyethylene glycol/lytes (MIRALAX) PACKET 1 Packet  1 Packet Oral QDAY PRN Blayne Verma M.D.        And   • magnesium hydroxide (MILK OF MAGNESIA) suspension  30 mL  30 mL Oral QDAY PRN Blayne Verma M.D.        And   • bisacodyl (DULCOLAX) suppository 10 mg  10 mg Rectal QDAY PRKALINA Verma M.D.       • acetaminophen (TYLENOL) tablet 650 mg  650 mg Oral Q6HRS PRKALINA Verma M.D.   650 mg at 10/11/20 0229         Assessment/Plan  50 y.o. male with ESRD on hemodialysis Monday Wednesday Friday admitted 9/29/2020 with shortness of breath, chest pain, with course further complicated by GI bleed due to hemorrhoids from a supratherapeutic INR    1.  ESRD on hemodialysis Monday Wednesday Friday.  No acute need for dialysis today.  Plan for dialysis tomorrow.  Avoid nephrotoxins.  Check labs daily.    2.  Access: Left radiocephalic AV fistula, patent.  Left arm precautions.    3.  Pneumonia with associated empyema.  Patient plans to be on cefazolin.  Please notify nephrology when patient is close to discharge so we can arrange cefazolin with dialysis 2 g after dialysis on Tuesdays and Thursdays, 3 g after dialysis on Saturday, with a stop date of October 28, 2020.    4.  Acute blood loss anemia from GI bleed, improving.  Check CBC daily.    5.  Anemia of chronic disease from ESRD.  Continue Epogen thrice weekly with dialysis.  Check CBC daily.    6.  Leukocytosis, improving.  Unclear etiology.  Check CBC daily.    7.  Hyponatremia, somewhat improving but persistent.  Limit hypotonic fluids.  Check labs daily.    8.  Hypertension, controlled.  Continue ultrafiltration with dialysis as tolerated.    Following  Dr. Najjar assumes consultative care tomorrow      Burton Perales MD  Nephrology

## 2020-10-11 NOTE — DISCHARGE SUMMARY
Discharge Summary    CHIEF COMPLAINT ON ADMISSION  Chief Complaint   Patient presents with   • Chest Pain       Reason for Admission  EMS     Admission Date  9/29/2020    CODE STATUS  Full Code    HPI & HOSPITAL COURSE     Admitted with chest pain, pneumonia, elevated troponin.  Known history of paroxysmal atrial fibrillation, on anticoagulation with Coumadin.  D-dimer is negative.  Known history of ESRD, consult placed to nephrology for HD MWF. CT abd/pelvis with concern for empyema, CXR today shows increased effusion. IR and surgery involved for thoracocentesis and pleural effusion culture and analysis. Chest tube in place for drainage;   Supratheraprutic INR is reversed with coumadin on hold for GI bleeding, colonoscopy: Normal appearing terminal ileum  Normal colonic mucosa; Small internal hemorrhoids; warfarin resumed.   Right knee effusion, discussed with ID consult, since patient had MSSA + from blood culture, no tenderness erythema or warmth developed during the stay, no arthroscopy indicated for now. Chest tube was place for pleural fluid drainage and removed after minimal drainage.     Interval Problem Update    Denies abd pain; N/V; rectal bleeding  chest tube removed  Discharge planning    Consultants/Specialty  ID  Cardiology  IR  Renal  GI  Surgery    Code Status  Full Code    Disposition    TBD    Review of Systems  Review of Systems   Constitutional: Negative for chills, diaphoresis, fever and malaise/fatigue.   HENT: Negative for congestion and sore throat.    Eyes: Negative for blurred vision.   Respiratory: Negative for shortness of breath.    Cardiovascular: Negative for chest pain, palpitations and leg swelling.   Gastrointestinal: Negative for abdominal pain, blood in stool, constipation, melena and nausea.   Genitourinary: Negative for dysuria, flank pain, hematuria and urgency.   Musculoskeletal: Negative for back pain and myalgias.        Denies right knee pain   Neurological: Negative for  dizziness, focal weakness, weakness and headaches.   Psychiatric/Behavioral: Negative for memory loss. The patient is not nervous/anxious.         Physical Exam  Temp:  [36.3 °C (97.4 °F)-37.1 °C (98.7 °F)] 36.5 °C (97.7 °F)  Pulse:  [49-56] 50  Resp:  [16-18] 16  BP: (106-134)/(45-83) 112/45  SpO2:  [90 %-100 %] 100 %    Physical Exam  Vitals signs and nursing note reviewed.   Constitutional:       General: He is not in acute distress.     Appearance: Normal appearance. He is not ill-appearing.   HENT:      Head: Normocephalic.      Nose: Nose normal.   Eyes:      Pupils: Pupils are equal, round, and reactive to light.   Neck:      Musculoskeletal: Neck supple.      Thyroid: No thyromegaly.      Vascular: No JVD.    Cardiovascular:      Rate and Rhythm: Normal rate.      Heart sounds: No murmur.   Pulmonary:      Effort: No respiratory distress.      Breath sounds: Normal breath sounds. No stridor. No wheezing or rhonchi.   Abdominal:      General: Bowel sounds are normal. There is no distension.      Palpations: Abdomen is soft.   Musculoskeletal:         General: No swelling (Mild) or tenderness (on right arm improving).      Right lower leg: No edema (right knee, no warmth to tough or erythma).      Comments: No tenderness of the right knee; mild warmth to touch   Skin:     General: Skin is warm and dry.      Coloration: Skin is not jaundiced or pale.   Neurological:      Mental Status: He is alert and oriented to person, place, and time.      Cranial Nerves: No cranial nerve deficit.      Sensory: No sensory deficit.      Motor: No weakness.   Psychiatric:         Behavior: Behavior normal.         Thought Content: Thought content normal.         Fluids    Intake/Output Summary (Last 24 hours) at 10/11/2020 0722  Last data filed at 10/11/2020 0400  Gross per 24 hour   Intake 120 ml   Output 12 ml   Net 108 ml       Laboratory  Recent Labs     10/09/20  0731 10/10/20  0939 10/11/20  0406   WBC 14.9* 15.8* 12.8*    RBC 2.61* 2.37* 2.43*   HEMOGLOBIN 8.5* 7.7* 7.8*   HEMATOCRIT 26.9* 25.1* 25.4*   .1* 105.9* 104.5*   MCH 32.6 32.5 32.1   MCHC 31.6* 30.7* 30.7*   RDW 53.5* 53.4* 53.4*   PLATELETCT 364 406 427   MPV 9.4 9.5 9.2     Recent Labs     10/09/20  0731 10/10/20  0939 10/11/20  0406   SODIUM 129* 130* 134*   POTASSIUM 5.1 4.5 3.9   CHLORIDE 86* 89* 91*   CO2 28 30 30   GLUCOSE 91 166* 116*   BUN 43* 29* 36*   CREATININE 9.98* 7.30* 8.67*   CALCIUM 8.5 8.8 8.8     Recent Labs     10/10/20  1139 10/10/20  1325 10/11/20  0406   INR 2.20* 2.20* 2.32*               Imaging  DX-CHEST-LIMITED (1 VIEW)   Final Result         No significant interval change. No appreciable pneumothorax.      DX-CHEST-LIMITED (1 VIEW)   Final Result      Stable left atelectasis, pleural fluid and possible consolidation      Left thoracostomy tube terminates over the costophrenic sulcus in stable position      DX-CHEST-LIMITED (1 VIEW)   Final Result         1.  Chest tube now appears to be present in the region of the left lower hemithorax on this portable radiograph.      2.  Cardiomegaly and diffuse opacification of left hemithorax are again noted.      3.  No new pneumothorax or consolidation is appreciated.      US-THORACENTESIS PUNCTURE LEFT   Final Result      Unsuccessful attempt at left-sided thoracentesis.         DX-CHEST-PORTABLE (1 VIEW)   Final Result      1.  Volume loss within the left lung.      2.  Loculated left pleural effusion.      3.  Cardiomegaly.      DX-CHEST-PORTABLE (1 VIEW)   Final Result      Increased opacity on the left may represent a layering pleural effusion with overlying atelectasis/consolidation.      Stable cardiomegaly.         US-EXTREMITY VENOUS UPPER UNILAT RIGHT   Final Result      DX-KNEE COMPLETE 4+ RIGHT   Final Result      1.  Large RIGHT knee joint effusion.   2.  No fracture or dislocation.   3.  Minimal degenerative change.      CT-ABDOMEN-PELVIS WITH   Final Result      1.  Colonic  diverticulosis without evidence for diverticulitis.   2.  Normal appendix.   3.  No focal mesenteric inflammatory process.   4.  Diffuse atherosclerotic calcification of abdominal aorta with prominent celiac trunk and SMA, without evidence for occlusion.   5.  Loculated fluid collection of the LEFT lung base with associated pleural thickening and atelectasis, possibly indicating empyema, new from prior.      NM-CARDIAC STRESS TEST   Final Result      EC-ECHOCARDIOGRAM COMPLETE W/O CONT   Final Result      CT-CHEST (THORAX) W/O   Final Result         1.  Left basilar atelectasis, component of infiltrate not definitively excluded.   2.  Atherosclerosis and atherosclerotic coronary artery disease   3.  Cardiomegaly   4.  Hepatomegaly      DX-CHEST-PORTABLE (1 VIEW)   Final Result         1.  Left basilar atelectasis, no focal infiltrate   2.  Cardiomegaly      DX-CHEST-LIMITED (1 VIEW)    (Results Pending)        Assessment/Plan  PAF (paroxysmal atrial fibrillation) (HCC)- (present on admission)  Assessment & Plan   NSR, on amio and metoprolol   coumadin 2.5mg, repeat INR in 48-72 hours    Anemia  Assessment & Plan  Multifocal: Blood loss, ESRD, anemia of chronic disease  iron study reviewed  More component of anemia of chronic disease  We will follow-up and transfuse as needed    Empyema of lung (HCC)  Assessment & Plan  Failed ultrasound-guided thoracocentesis  pleural fluid culture preliminary result: Acid fast stain and Gram stain are negative, culture for fungal bacteriuria negative    Overnight chest tube drainage minimal  Chest x-ray this morning no pneumothorax or interval change  chest tube removed  Discharge planning      Rectal bleed  Assessment & Plan  Resolved  Bleeding likely from internal hemorrhoids  Colonoscopy: Normal appearing terminal ileum; Normal colonic mucosa; Small internal hemorrhoids.    Right knee pain  Assessment & Plan  warmth to touch or no erythema, nontenderness    Hematuria  Assessment  & Plan  resolved    Acute pain of left shoulder  Assessment & Plan  MSK, avoid nsaids d/t ESRD   denies pain shoulder or knee; be cautious with oral pain management    Bacteremia  Assessment & Plan  one of two sets on 9/29 positive for MSSA, repeat sets on 9/30 NG, 10/5 NGTD  10/5  negative for both bottles; 10/6 negative  - No vegetation reported on TTE  - Patient remains afebrile  - cefazolin with dialysis 2 g after dialysis on Monday and Wednesday, 3 g after dialysis on Friday, with a stop date of October 28, 2020.    CAP (community acquired pneumonia)- (present on admission)  Assessment & Plan  empiric IV unasyn   oral azithromycin finished  trend WBC/fever curve  blood cx 1/2, GPC(mssa) on 9/29  repeat blood cx on 9/30, 10/5, 10/6 came back negative       ESRD (end stage renal disease) on dialysis ([facility])- (present on admission)  Assessment & Plan  -dialyzed M/W/F  -renal consult appreciated  -pt follows with [facility]. Will set up antibiotic during HD      Essential hypertension- (present on admission)  Assessment & Plan  -continue outpatient medications       VTE prophylaxis: Compression sequential          Therefore, he is discharged in good and stable condition to home with close outpatient follow-up.    The patient met 2-midnight criteria for an inpatient stay at the time of discharge.    Discharge Date  10/12/2020    FOLLOW UP ITEMS POST DISCHARGE  Cardiology  Nephrology  warfarin 2.5 mg daily and INR within 48-72 hours of discharge      DISCHARGE DIAGNOSES  Active Problems:    PAF (paroxysmal atrial fibrillation) ([facility]) POA: Yes    ESRD (end stage renal disease) on dialysis ([facility]) POA: Yes    CAP (community acquired pneumonia) POA: Yes    Bacteremia POA: Unknown    Acute pain of left shoulder POA: Unknown    Hematuria POA: Unknown    Right knee pain POA: Unknown    Rectal bleed POA: Unknown    Empyema of lung (HCC) POA: Unknown    Anemia POA: Unknown    Essential hypertension (Chronic) POA:  Yes  Resolved Problems:    ACC/AHA stage C systolic heart failure (HCC) (Chronic) POA: Yes    Other chest pain POA: Yes      FOLLOW UP  coumadin 2.5mg, repeat INR in 48-72 hours    Laury Angeles P.A.-C.  [address] [place] [id number]  [phone]            MEDICATIONS ON DISCHARGE     Medication List      START taking these medications      Instructions   calcium acetate 667 MG Tabs tablet  Commonly known as: PHOS-LO   Take 3 Tabs by mouth 3 times a day before meals.  Dose: 2,001 mg     ceFAZolin in dextrose 2-4 GM/100ML-% IVPB  Commonly known as: ANCEF   Doctor's comments: cefazolin with dialysis 2 g after dialysis on Monday and Wednesday, 3 g after dialysis on Friday, with a stop date of October 28, 2020.  100 mL by Intravenous route every Monday, Wednesday, and Friday.  Dose: 2 g        CHANGE how you take these medications      Instructions   metoprolol SR 50 MG Tb24  What changed:   · medication strength  · how much to take  Commonly known as: TOPROL XL   Take 1 Tab by mouth every day.  Dose: 50 mg     warfarin 2.5 MG Tabs  What changed:   · medication strength  · how much to take  · when to take this  · additional instructions  Commonly known as: COUMADIN   Doctor's comments: 2.5 mg daily and INR within 48-72 hours of discharge  Take 1 Tab by mouth every day for 5 days.  Dose: 2.5 mg        CONTINUE taking these medications      Instructions   amiodarone 200 MG Tabs  Commonly known as: Cordarone   Take 1 Tab by mouth every day.  Dose: 200 mg     atorvastatin 40 MG Tabs  Commonly known as: LIPITOR   Take 1 Tab by mouth every evening.  Dose: 40 mg     furosemide 40 MG Tabs  Commonly known as: LASIX   Take 1 Tab by mouth every day.  Dose: 40 mg     isosorbide mononitrate SR 60 MG Tb24  Commonly known as: IMDUR   Take 1 Tab by mouth every day.  Dose: 60 mg     losartan 25 MG Tabs  Commonly known as: COZAAR   TAKE ONE TABLET BY MOUTH EVERY DAY AT BEDTIME FOR BLOOD PRESSURE - BRING ALL MEDICATIONS TO  EVERY APPOINTMENT - AS PER DR. STRICKLAND            Allergies  No Known Allergies    DIET  Orders Placed This Encounter   Procedures   • Diet Order Renal, Cardiac     Standing Status:   Standing     Number of Occurrences:   1     Order Specific Question:   Diet:     Answer:   Renal [8]     Order Specific Question:   Diet:     Answer:   Cardiac [6]       ACTIVITY  As tolerated.  Weight bearing as tolerated    CONSULTATIONS  ID  Cardiology  IR  Renal  GI  Surgery    PROCEDURES  Colonoscopy  Thoracoscope with Chest tube    LABORATORY  Lab Results   Component Value Date    SODIUM 134 (L) 10/11/2020    POTASSIUM 3.9 10/11/2020    CHLORIDE 91 (L) 10/11/2020    CO2 30 10/11/2020    GLUCOSE 116 (H) 10/11/2020    BUN 36 (H) 10/11/2020    CREATININE 8.67 (HH) 10/11/2020        Lab Results   Component Value Date    WBC 12.8 (H) 10/11/2020    HEMOGLOBIN 7.8 (L) 10/11/2020    HEMATOCRIT 25.4 (L) 10/11/2020    PLATELETCT 427 10/11/2020        Total time of the discharge process exceeds 40 minutes.

## 2020-10-11 NOTE — PROGRESS NOTES
Inpatient Anticoagulation Service Note    Date: 10/11/2020    Reason for Anticoagulation: Atrial Fibrillation   Target INR: 2.0 to 3.0  [id number] VASc Score: 2  HAS-BLED Score: 1   Hemoglobin Value: (!) 7.8  Hematocrit Value: (!) 25.4  Lab Platelet Value: 427    INR from last 7 days     Date/Time INR Value    10/11/20 0406  (!) 2.32    10/10/20 1325  (!) 2.2    10/10/20 1139  (!) 2.2    10/07/20 0254  (!) 1.47    10/06/20 0154  (!) 1.47    10/05/20 1600  (!) 1.77    10/05/20 0531  (!) 7.34        Dose from last 7 days     Date/Time Dose (mg)    10/11/20 1030  0    10/10/20 1346  0    10/05/20 1118  0    10/04/20 1357  0        Average Dose (mg): 6mg on Tu/Thurs and 4mg ROW  Significant Interactions: Amiodarone, Statin, Antibiotics  Bridge Therapy: No   Reversal Agent Administered: Vitamin K  Intravenous (10mg 10/5/20)    Comments: INR continues to be in therapeutic range, despite the fact that pt has been without warfarin since 9/29 and has received vitamin K 10 mg IV on 10/5. The only new DDI upon admission is Unasyn and would not expect INR to be elevated because of this interaction. H/H remain low but stable with no overt bleeding noted overnight. Will continue to hold warfarin until INR decreases    Plan:  Hold warfarin and recheck INR tomorrow  Education Material Provided?: No  Pharmacist suggested discharge dosing: Hold warfarin until follow up INR obtained, ideally within 48 hours of discharge     Sole Chapman, PharmD, BCPS

## 2020-10-11 NOTE — PROGRESS NOTES
Progress Note               Author: aN Cervantes M.D. Date & Time created: 10/11/2020  11:16 AM     Interval History:  Spoke with patient with  Eric [id number].   Patient having chest pain when he coughs, and pain at the site of the chest tube. Feels otherwise well and much better than prior to surgery. No increase in O2 requirement overnight.     Review of Systems:  Review of Systems   Constitutional: Negative for chills and fever.   Gastrointestinal: Negative for nausea and vomiting.       Physical Exam:  Physical Exam  Constitutional:       General: He is not in acute distress.     Appearance: Normal appearance. He is obese. He is not ill-appearing or toxic-appearing.   HENT:      Head: Normocephalic and atraumatic.      Mouth/Throat:      Mouth: Mucous membranes are moist.   Eyes:      Extraocular Movements: Extraocular movements intact.   Cardiovascular:      Rate and Rhythm: Normal rate and regular rhythm.   Pulmonary:      Effort: Pulmonary effort is normal.      Comments: Chest tube in place, scant serosang output. No air leak.   Abdominal:      General: Abdomen is flat. There is no distension.      Tenderness: There is no abdominal tenderness.   Skin:     General: Skin is warm and dry.      Capillary Refill: Capillary refill takes less than 2 seconds.   Neurological:      General: No focal deficit present.      Mental Status: He is alert.   Psychiatric:         Mood and Affect: Mood normal.         Labs:          Recent Labs     10/09/20  0731 10/10/20  0939 10/11/20  0406   SODIUM 129* 130* 134*   POTASSIUM 5.1 4.5 3.9   CHLORIDE 86* 89* 91*   CO2 28 30 30   BUN 43* 29* 36*   CREATININE 9.98* 7.30* 8.67*   MAGNESIUM 2.3  --  2.1   CALCIUM 8.5 8.8 8.8     Recent Labs     10/09/20  0731 10/10/20  0939 10/11/20  0406   ALTSGPT 11 6 <5   ASTSGOT 17 16 14   ALKPHOSPHAT 88 80 79   TBILIRUBIN 0.3 0.4 0.3   GLUCOSE 91 166* 116*     Recent Labs     10/09/20  0731 10/10/20  0939 10/10/20  1139  10/10/20  1325 10/11/20  0406   RBC 2.61* 2.37*  --   --  2.43*   HEMOGLOBIN 8.5* 7.7*  --   --  7.8*   HEMATOCRIT 26.9* 25.1*  --   --  25.4*   PLATELETCT 364 406  --   --  427   PROTHROMBTM  --   --  25.2* 25.1* 26.2*   INR  --   --  2.20* 2.20* 2.32*   IRON  --   --   --  36*  --    FERRITIN  --   --   --  1990.0*  --    TOTIRONBC  --   --   --  138*  --      Recent Labs     10/09/20  0731 10/10/20  0939 10/11/20  0406   WBC 14.9* 15.8* 12.8*   NEUTSPOLYS 80.90* 89.30* 79.40*   LYMPHOCYTES 10.50* 5.90* 12.30*   MONOCYTES 7.00 4.00 6.90   EOSINOPHILS 0.80 0.00 0.70   BASOPHILS 0.30 0.10 0.20   ASTSGOT 17 16 14   ALTSGPT 11 6 <5   ALKPHOSPHAT 88 80 79   TBILIRUBIN 0.3 0.4 0.3     Hemodynamics:  Temp (24hrs), Av.7 °C (98 °F), Min:36.3 °C (97.4 °F), Max:37.1 °C (98.7 °F)  Temperature: 36.4 °C (97.5 °F)  Pulse  Av.4  Min: 46  Max: 138   Blood Pressure: 120/71     Respiratory:    Respiration: 16, Pulse Oximetry: 100 %     Work Of Breathing / Effort: Mild  RUL Breath Sounds: Clear, RML Breath Sounds: Clear, RLL Breath Sounds: Diminished, SABAS Breath Sounds: Clear, LLL Breath Sounds: Diminished  Fluids:    Intake/Output Summary (Last 24 hours) at 10/11/2020 1116  Last data filed at 10/11/2020 0400  Gross per 24 hour   Intake 120 ml   Output 12 ml   Net 108 ml     Weight: 67.8 kg (149 lb 7.6 oz)  GI/Nutrition:  Orders Placed This Encounter   Procedures   • Diet Order Renal, Cardiac     Standing Status:   Standing     Number of Occurrences:   1     Order Specific Question:   Diet:     Answer:   Renal [8]     Order Specific Question:   Diet:     Answer:   Cardiac [6]     Medical Decision Making, by Problem:  Active Hospital Problems    Diagnosis   • PAF (paroxysmal atrial fibrillation) (HCC) [I48.0]   • Essential hypertension [I10]   • Anemia [D64.9]   • Empyema of lung (HCC) [J86.9]   • Rectal bleed [K62.5]   • Right knee pain [M25.561]   • Hematuria [R31.9]   • Bacteremia [R78.81]   • Acute pain of left shoulder  [M25.512]   • ESRD (end stage renal disease) on dialysis (HCC) [N18.6, Z99.2]   • CAP (community acquired pneumonia) [J18.9]       Plan:  CXR without PTX after placing to water seal yesterday. Scant output.   Chest tube removed without difficulty at bedside and dressed with occlusive dressing.   F/u CXR ordered for 1PM.   Will follow up.       Quality-Core Measures

## 2020-10-12 VITALS
WEIGHT: 149.47 LBS | OXYGEN SATURATION: 98 % | BODY MASS INDEX: 24.9 KG/M2 | HEART RATE: 56 BPM | RESPIRATION RATE: 17 BRPM | SYSTOLIC BLOOD PRESSURE: 109 MMHG | DIASTOLIC BLOOD PRESSURE: 45 MMHG | TEMPERATURE: 97 F | HEIGHT: 65 IN

## 2020-10-12 LAB
ANION GAP SERPL CALC-SCNC: 9 MMOL/L (ref 7–16)
BASOPHILS # BLD AUTO: 0.2 % (ref 0–1.8)
BASOPHILS # BLD: 0.02 K/UL (ref 0–0.12)
BUN SERPL-MCNC: 43 MG/DL (ref 8–22)
CALCIUM SERPL-MCNC: 9.1 MG/DL (ref 8.5–10.5)
CHLORIDE SERPL-SCNC: 92 MMOL/L (ref 96–112)
CO2 SERPL-SCNC: 33 MMOL/L (ref 20–33)
CREAT SERPL-MCNC: 10.94 MG/DL (ref 0.5–1.4)
EOSINOPHIL # BLD AUTO: 0.38 K/UL (ref 0–0.51)
EOSINOPHIL NFR BLD: 3.9 % (ref 0–6.9)
ERYTHROCYTE [DISTWIDTH] IN BLOOD BY AUTOMATED COUNT: 54.2 FL (ref 35.9–50)
GLUCOSE SERPL-MCNC: 80 MG/DL (ref 65–99)
HCT VFR BLD AUTO: 23.3 % (ref 42–52)
HGB BLD-MCNC: 7.1 G/DL (ref 14–18)
IMM GRANULOCYTES # BLD AUTO: 0.05 K/UL (ref 0–0.11)
IMM GRANULOCYTES NFR BLD AUTO: 0.5 % (ref 0–0.9)
INR PPP: 1.76 (ref 0.87–1.13)
LYMPHOCYTES # BLD AUTO: 1.57 K/UL (ref 1–4.8)
LYMPHOCYTES NFR BLD: 15.9 % (ref 22–41)
MCH RBC QN AUTO: 32.1 PG (ref 27–33)
MCHC RBC AUTO-ENTMCNC: 30.5 G/DL (ref 33.7–35.3)
MCV RBC AUTO: 105.4 FL (ref 81.4–97.8)
MONOCYTES # BLD AUTO: 0.94 K/UL (ref 0–0.85)
MONOCYTES NFR BLD AUTO: 9.5 % (ref 0–13.4)
NEUTROPHILS # BLD AUTO: 6.9 K/UL (ref 1.82–7.42)
NEUTROPHILS NFR BLD: 70 % (ref 44–72)
NRBC # BLD AUTO: 0 K/UL
NRBC BLD-RTO: 0 /100 WBC
PLATELET # BLD AUTO: 542 K/UL (ref 164–446)
PMV BLD AUTO: 9.6 FL (ref 9–12.9)
POTASSIUM SERPL-SCNC: 4.6 MMOL/L (ref 3.6–5.5)
PROTHROMBIN TIME: 21.1 SEC (ref 12–14.6)
RBC # BLD AUTO: 2.21 M/UL (ref 4.7–6.1)
SODIUM SERPL-SCNC: 134 MMOL/L (ref 135–145)
WBC # BLD AUTO: 9.9 K/UL (ref 4.8–10.8)

## 2020-10-12 PROCEDURE — 700102 HCHG RX REV CODE 250 W/ 637 OVERRIDE(OP): Performed by: PHYSICIAN ASSISTANT

## 2020-10-12 PROCEDURE — A9270 NON-COVERED ITEM OR SERVICE: HCPCS | Performed by: INTERNAL MEDICINE

## 2020-10-12 PROCEDURE — 3E02340 INTRODUCTION OF INFLUENZA VACCINE INTO MUSCLE, PERCUTANEOUS APPROACH: ICD-10-PCS | Performed by: STUDENT IN AN ORGANIZED HEALTH CARE EDUCATION/TRAINING PROGRAM

## 2020-10-12 PROCEDURE — 700111 HCHG RX REV CODE 636 W/ 250 OVERRIDE (IP)

## 2020-10-12 PROCEDURE — 90686 IIV4 VACC NO PRSV 0.5 ML IM: CPT | Performed by: STUDENT IN AN ORGANIZED HEALTH CARE EDUCATION/TRAINING PROGRAM

## 2020-10-12 PROCEDURE — 99239 HOSP IP/OBS DSCHRG MGMT >30: CPT | Performed by: STUDENT IN AN ORGANIZED HEALTH CARE EDUCATION/TRAINING PROGRAM

## 2020-10-12 PROCEDURE — 90935 HEMODIALYSIS ONE EVALUATION: CPT

## 2020-10-12 PROCEDURE — 85025 COMPLETE CBC W/AUTO DIFF WBC: CPT

## 2020-10-12 PROCEDURE — 700111 HCHG RX REV CODE 636 W/ 250 OVERRIDE (IP): Performed by: STUDENT IN AN ORGANIZED HEALTH CARE EDUCATION/TRAINING PROGRAM

## 2020-10-12 PROCEDURE — 700102 HCHG RX REV CODE 250 W/ 637 OVERRIDE(OP): Performed by: INTERNAL MEDICINE

## 2020-10-12 PROCEDURE — 90471 IMMUNIZATION ADMIN: CPT

## 2020-10-12 PROCEDURE — 36415 COLL VENOUS BLD VENIPUNCTURE: CPT

## 2020-10-12 PROCEDURE — 80048 BASIC METABOLIC PNL TOTAL CA: CPT

## 2020-10-12 PROCEDURE — 85610 PROTHROMBIN TIME: CPT

## 2020-10-12 PROCEDURE — 90935 HEMODIALYSIS ONE EVALUATION: CPT | Performed by: INTERNAL MEDICINE

## 2020-10-12 PROCEDURE — A9270 NON-COVERED ITEM OR SERVICE: HCPCS | Performed by: PHYSICIAN ASSISTANT

## 2020-10-12 PROCEDURE — 5A1D70Z PERFORMANCE OF URINARY FILTRATION, INTERMITTENT, LESS THAN 6 HOURS PER DAY: ICD-10-PCS | Performed by: INTERNAL MEDICINE

## 2020-10-12 RX ORDER — CEFAZOLIN SODIUM 2 G/100ML
2 INJECTION, SOLUTION INTRAVENOUS
Qty: 3000 ML | Refills: 0 | Status: ON HOLD | OUTPATIENT
Start: 2020-10-12 | End: 2022-08-29

## 2020-10-12 RX ORDER — WARFARIN SODIUM 2.5 MG/1
2.5 TABLET ORAL
Status: DISCONTINUED | OUTPATIENT
Start: 2020-10-12 | End: 2020-10-12 | Stop reason: HOSPADM

## 2020-10-12 RX ORDER — CEFAZOLIN SODIUM 2 G/100ML
2 INJECTION, SOLUTION INTRAVENOUS
Qty: 3000 ML | Refills: 0 | Status: SHIPPED | OUTPATIENT
Start: 2020-10-12 | End: 2020-10-12

## 2020-10-12 RX ORDER — CALCIUM ACETATE 667 MG/1
2001 TABLET ORAL
Qty: 180 TAB | Refills: 0 | Status: SHIPPED | OUTPATIENT
Start: 2020-10-12 | End: 2022-08-25

## 2020-10-12 RX ORDER — WARFARIN SODIUM 2.5 MG/1
2.5 TABLET ORAL DAILY
Qty: 30 TAB | Refills: 0 | Status: SHIPPED | OUTPATIENT
Start: 2020-10-12 | End: 2020-10-17

## 2020-10-12 RX ORDER — METOPROLOL SUCCINATE 50 MG/1
50 TABLET, EXTENDED RELEASE ORAL DAILY
Qty: 30 TAB | Refills: 0 | Status: SHIPPED | OUTPATIENT
Start: 2020-10-12 | End: 2021-10-19 | Stop reason: SDUPTHER

## 2020-10-12 RX ADMIN — METOPROLOL SUCCINATE 50 MG: 25 TABLET, EXTENDED RELEASE ORAL at 05:37

## 2020-10-12 RX ADMIN — FOLIC ACID 1 MG: 1 TABLET ORAL at 05:37

## 2020-10-12 RX ADMIN — INFLUENZA A VIRUS A/GUANGDONG-MAONAN/SWL1536/2019 CNIC-1909 (H1N1) ANTIGEN (FORMALDEHYDE INACTIVATED), INFLUENZA A VIRUS A/HONG KONG/2671/2019 (H3N2) ANTIGEN (FORMALDEHYDE INACTIVATED), INFLUENZA B VIRUS B/PHUKET/3073/2013 ANTIGEN (FORMALDEHYDE INACTIVATED), AND INFLUENZA B VIRUS B/WASHINGTON/02/2019 ANTIGEN (FORMALDEHYDE INACTIVATED) 0.5 ML: 15; 15; 15; 15 INJECTION, SUSPENSION INTRAMUSCULAR at 14:04

## 2020-10-12 RX ADMIN — EPOETIN ALFA-EPBX 3000 UNITS: 3000 INJECTION, SOLUTION INTRAVENOUS; SUBCUTANEOUS at 11:05

## 2020-10-12 RX ADMIN — FUROSEMIDE 40 MG: 40 TABLET ORAL at 05:37

## 2020-10-12 RX ADMIN — DOCUSATE SODIUM 50 MG AND SENNOSIDES 8.6 MG 2 TABLET: 8.6; 5 TABLET, FILM COATED ORAL at 05:36

## 2020-10-12 RX ADMIN — AMIODARONE HYDROCHLORIDE 200 MG: 200 TABLET ORAL at 05:37

## 2020-10-12 RX ADMIN — Medication 100 MG: at 05:37

## 2020-10-12 ASSESSMENT — COGNITIVE AND FUNCTIONAL STATUS - GENERAL
SUGGESTED CMS G CODE MODIFIER MOBILITY: CI
SUGGESTED CMS G CODE MODIFIER DAILY ACTIVITY: CH
MOBILITY SCORE: 23
DAILY ACTIVITIY SCORE: 24
CLIMB 3 TO 5 STEPS WITH RAILING: A LITTLE

## 2020-10-12 NOTE — PROGRESS NOTES
Inpatient Anticoagulation Service Note    Date: 10/12/2020    Reason for Anticoagulation: Atrial Fibrillation   Target INR: 2.0 to 3.0  [id number] VASc Score: 2  HAS-BLED Score: 1   Hemoglobin Value: (!) 7.1  Hematocrit Value: (!) 23.3  Lab Platelet Value: (!) 542    INR from last 7 days     Date/Time INR Value    10/12/20 0746  (!) 1.76    10/11/20 0406  (!) 2.32    10/10/20 1325  (!) 2.2    10/10/20 1139  (!) 2.2    10/07/20 0254  (!) 1.47    10/06/20 0154  (!) 1.47    10/05/20 1600  (!) 1.77        Dose from last 7 days     Date/Time Dose (mg)    10/12/20 0844  2.5    10/11/20 1030  0    10/10/20 1346  0    10/05/20 1118  0        Average Dose (mg): 6 mg on /Thurs and 4 mg ROW  Significant Interactions: Amiodarone, Antibiotics, Statin  Bridge Therapy: No   Reversal Agent Administered: Vitamin K  Intravenous (10mg 10/5/20)    Comments: INR dropped into subtherapeutic range today after not having received warfarin since  and getting vitamin K on 10/5. Abx switched to Ancef from Unasyn, but don't expect much difference to INR with this. Pt's PO intake is variable per charting - eating only 25% of some meals and up to 75% of others. Will give pt a reduced dose of warfarin tonight. If INR continues to drop tomorrow, would consider resuming home dosing or at least 4 mg daily    Plan:  Warfarin 2.5 mg tonight and INR tomorrow AM  Education Material Provided?: No  Pharmacist suggested discharge dosin.5 mg daily and INR within 48-72 hours of discharge     Sole Chapman, PharmD, BCPS

## 2020-10-12 NOTE — CARE PLAN
Problem: Communication  Goal: The ability to communicate needs accurately and effectively will improve  Outcome: MET     Problem: Safety  Goal: Will remain free from injury  Outcome: MET  Goal: Will remain free from falls  Outcome: MET     Problem: Infection  Goal: Will remain free from infection  Outcome: MET     Problem: Venous Thromboembolism (VTW)/Deep Vein Thrombosis (DVT) Prevention:  Goal: Patient will participate in Venous Thrombosis (VTE)/Deep Vein Thrombosis (DVT)Prevention Measures  Outcome: MET     Problem: Bowel/Gastric:  Goal: Normal bowel function is maintained or improved  Outcome: MET  Goal: Will not experience complications related to bowel motility  Outcome: MET     Problem: Knowledge Deficit  Goal: Knowledge of disease process/condition, treatment plan, diagnostic tests, and medications will improve  Outcome: MET  Goal: Knowledge of the prescribed therapeutic regimen will improve  Outcome: MET     Problem: Discharge Barriers/Planning  Goal: Patient's continuum of care needs will be met  Outcome: MET     Problem: Pain Management  Goal: Pain level will decrease to patient's comfort goal  Outcome: MET     Problem: Psychosocial Needs:  Goal: Level of anxiety will decrease  Outcome: MET     Problem: Fluid Volume:  Goal: Will maintain balanced intake and output  Outcome: MET     Problem: Urinary Elimination:  Goal: Ability to reestablish a normal urinary elimination pattern will improve  Outcome: MET     Problem: Respiratory:  Goal: Respiratory status will improve  Outcome: MET

## 2020-10-12 NOTE — PROGRESS NOTES
Pt discharged home. Daughter at bedside. Pt declined interpretor service, wanted daughter to translate for pt. All d/c paperwork gone over with pt. Verbalized understanding of d/c plan. IV removed. Tele removed.     Pt sent home w/ prescriptions. All belongings sent home w/ pt, including cell phone. Flu shot given prior to d/c.

## 2020-10-12 NOTE — PROGRESS NOTES
Pt with ESRD, presented with GI bleed.  Pt is doing better.  Seen and examined while getting HD.

## 2020-10-12 NOTE — DISCHARGE INSTRUCTIONS
followup with cardiology for coumadin dose  followup with nephrology for HD  warfarin 2.5 mg daily and INR within 48-72 hours of discharge    Discharge Instructions    Discharged to home by car with relative. Discharged via wheelchair, hospital escort: Yes.  Special equipment needed: Not Applicable    Be sure to schedule a follow-up appointment with your primary care doctor or any specialists as instructed.     Discharge Plan:   Diet Plan: Discussed  Activity Level: Discussed  Confirmed Follow up Appointment: Patient to Call and Schedule Appointment  Confirmed Symptoms Management: Discussed  Medication Reconciliation Updated: Yes    I understand that a diet low in cholesterol, fat, and sodium is recommended for good health. Unless I have been given specific instructions below for another diet, I accept this instruction as my diet prescription.   Other diet: Renal Cardiac diet    Special Instructions: None    · Is patient discharged on Warfarin / Coumadin?   Yes    You are receiving the drug warfarin. Please understand the importance of monitoring warfarin with scheduled PT/INR blood draws.  Follow-up with a call to your personal Doctor's office in 3 days to schedule a PT/INR. .    IMPORTANT: HOW TO USE THIS INFORMATION:  This is a summary and does NOT have all possible information about this product. This information does not assure that this product is safe, effective, or appropriate for you. This information is not individual medical advice and does not substitute for the advice of your health care professional. Always ask your health care professional for complete information about this product and your specific health needs.      WARFARIN - ORAL (WARF-uh-rin)      COMMON BRAND NAME(S): Coumadin      WARNING:  Warfarin can cause very serious (possibly fatal) bleeding. This is more likely to occur when you first start taking this medication or if you take too much warfarin. To decrease your risk for bleeding, your  "doctor or other health care provider will monitor you closely and check your lab results (INR test) to make sure you are not taking too much warfarin. Keep all medical and laboratory appointments. Tell your doctor right away if you notice any signs of serious bleeding. See also Side Effects section.      USES:  This medication is used to treat blood clots (such as in deep vein thrombosis-DVT or pulmonary embolus-PE) and/or to prevent new clots from forming in your body. Preventing harmful blood clots helps to reduce the risk of a stroke or heart attack. Conditions that increase your risk of developing blood clots include a certain type of irregular heart rhythm (atrial fibrillation), heart valve replacement, recent heart attack, and certain surgeries (such as hip/knee replacement). Warfarin is commonly called a \"blood thinner,\" but the more correct term is \"anticoagulant.\" It helps to keep blood flowing smoothly in your body by decreasing the amount of certain substances (clotting proteins) in your blood.      HOW TO USE:  Read the Medication Guide provided by your pharmacist before you start taking warfarin and each time you get a refill. If you have any questions, ask your doctor or pharmacist. Take this medication by mouth with or without food as directed by your doctor or other health care professional, usually once a day. It is very important to take it exactly as directed. Do not increase the dose, take it more frequently, or stop using it unless directed by your doctor. Dosage is based on your medical condition, laboratory tests (such as INR), and response to treatment. Your doctor or other health care provider will monitor you closely while you are taking this medication to determine the right dose for you. Use this medication regularly to get the most benefit from it. To help you remember, take it at the same time each day. It is important to eat a balanced, consistent diet while taking warfarin. Some foods " can affect how warfarin works in your body and may affect your treatment and dose. Avoid sudden large increases or decreases in your intake of foods high in vitamin K (such as broccoli, cauliflower, cabbage, brussels sprouts, kale, spinach, and other green leafy vegetables, liver, green tea, certain vitamin supplements). If you are trying to lose weight, check with your doctor before you try to go on a diet. Cranberry products may also affect how your warfarin works. Limit the amount of cranberry juice (16 ounces/480 milliliters a day) or other cranberry products you may drink or eat.      SIDE EFFECTS:  Nausea, loss of appetite, or stomach/abdominal pain may occur. If any of these effects persist or worsen, tell your doctor or pharmacist promptly. Remember that your doctor has prescribed this medication because he or she has judged that the benefit to you is greater than the risk of side effects. Many people using this medication do not have serious side effects. This medication can cause serious bleeding if it affects your blood clotting proteins too much (shown by unusually high INR lab results). Even if your doctor stops your medication, this risk of bleeding can continue for up to a week. Tell your doctor right away if you have any signs of serious bleeding, including: unusual pain/swelling/discomfort, unusual/easy bruising, prolonged bleeding from cuts or gums, persistent/frequent nosebleeds, unusually heavy/prolonged menstrual flow, pink/dark urine, coughing up blood, vomit that is bloody or looks like coffee grounds, severe headache, dizziness/fainting, unusual or persistent tiredness/weakness, bloody/black/tarry stools, chest pain, shortness of breath, difficulty swallowing. Tell your doctor right away if any of these unlikely but serious side effects occur: persistent nausea/vomiting, severe stomach/abdominal pain, yellowing eyes/skin. This drug rarely has caused very serious (possibly fatal) problems if  its effects lead to small blood clots (usually at the beginning of treatment). This can lead to severe skin/tissue damage that may require surgery or amputation if left untreated. Patients with certain blood conditions (protein C or S deficiency) may be at greater risk. Get medical help right away if any of these rare but serious side effects occur: painful/red/purplish patches on the skin (such as on the toe, breast, abdomen), change in the amount of urine, vision changes, confusion, slurred speech, weakness on one side of the body. A very serious allergic reaction to this drug is rare. However, get medical help right away if you notice any symptoms of a serious allergic reaction, including: rash, itching/swelling (especially of the face/tongue/throat), severe dizziness, trouble breathing. This is not a complete list of possible side effects. If you notice other effects not listed above, contact your doctor or pharmacist. In the [place] - Call your doctor for medical advice about side effects. You may report side effects to FDA at 3-293-ZDS-6090. In [place] - Call your doctor for medical advice about side effects. You may report side effects to [facility] [place] at 1-543.562.4283.      PRECAUTIONS:  Before taking warfarin, tell your doctor or pharmacist if you are allergic to it; or if you have any other allergies. This product may contain inactive ingredients, which can cause allergic reactions or other problems. Talk to your pharmacist for more details. Before using this medication, tell your doctor or pharmacist your medical history, especially of: blood disorders (such as anemia, hemophilia), bleeding problems (such as bleeding of the stomach/intestines, bleeding in the brain), blood vessel disorders (such as aneurysms), recent major injury/surgery, liver disease, alcohol use, mental/mood disorders (including memory problems), frequent falls/injuries. It is important that all your doctors and dentists know that you take  warfarin. Before having surgery or any medical/dental procedures, tell your doctor or dentist that you are taking this medication and about all the products you use (including prescription drugs, nonprescription drugs, and herbal products). Avoid getting injections into the muscles. If you must have an injection into a muscle (for example, a flu shot), it should be given in the arm. This way, it will be easier to check for bleeding and/or apply pressure bandages. This medication may cause stomach bleeding. Daily use of alcohol while using this medicine will increase your risk for stomach bleeding and may also affect how this medication works. Limit or avoid alcoholic beverages. If you have not been eating well, if you have an illness or infection that causes fever, vomiting, or diarrhea for more than 2 days, or if you start using any antibiotic medications, contact your doctor or pharmacist immediately because these conditions can affect how warfarin works. This medication can cause heavy bleeding. To lower the chance of getting cut, bruised, or injured, use great caution with sharp objects like safety razors and nail cutters. Use an electric razor when shaving and a soft toothbrush when brushing your teeth. Avoid activities such as contact sports. If you fall or injure yourself, especially if you hit your head, call your doctor immediately. Your doctor may need to check you. The Food & Drug Administration has stated that generic warfarin products are interchangeable. However, consult your doctor or pharmacist before switching warfarin products. Be careful not to take more than one medication that contains warfarin unless specifically directed by the doctor or health care provider who is monitoring your warfarin treatment. Older adults may be at greater risk for bleeding while using this drug. This medication is not recommended for use during pregnancy because of serious (possibly fatal) harm to an unborn baby.  "Discuss the use of reliable forms of birth control with your doctor. If you become pregnant or think you may be pregnant, tell your doctor immediately. If you are planning pregnancy, discuss a plan for managing your condition with your doctor before you become pregnant. Your doctor may switch the type of medication you use during pregnancy. Very small amounts of this medication may pass into breast milk but is unlikely to harm a nursing infant. Consult your doctor before breast-feeding.      DRUG INTERACTIONS:  Drug interactions may change how your medications work or increase your risk for serious side effects. This document does not contain all possible drug interactions. Keep a list of all the products you use (including prescription/nonprescription drugs and herbal products) and share it with your doctor and pharmacist. Do not start, stop, or change the dosage of any medicines without your doctor's approval. Warfarin interacts with many prescription, nonprescription, vitamin, and herbal products. This includes medications that are applied to the skin or inside the vagina or rectum. The interactions with warfarin usually result in an increase or decrease in the \"blood-thinning\" (anticoagulant) effect. Your doctor or other health care professional should closely monitor you to prevent serious bleeding or clotting problems. While taking warfarin, it is very important to tell your doctor or pharmacist of any changes in medications, vitamins, or herbal products that you are taking. Some products that may interact with this drug include: capecitabine, imatinib, mifepristone. Aspirin, aspirin-like drugs (salicylates), and nonsteroidal anti-inflammatory drugs (NSAIDs such as ibuprofen, naproxen, celecoxib) may have effects similar to warfarin. These drugs may increase the risk of bleeding problems if taken during treatment with warfarin. Carefully check all prescription/nonprescription product labels (including drugs " applied to the skin such as pain-relieving creams) since the products may contain NSAIDs or salicylates. Talk to your doctor about using a different medication (such as acetaminophen) to treat pain/fever. Low-dose aspirin and related drugs (such as clopidogrel, ticlopidine) should be continued if prescribed by your doctor for specific medical reasons such as heart attack or stroke prevention. Consult your doctor or pharmacist for more details. Many herbal products interact with warfarin. Tell your doctor before taking any herbal products, especially bromelains, coenzyme Q10, cranberry, danshen, dong quai, fenugreek, garlic, ginkgo biloba, ginseng, and Rafael's wort, among others. This medication may interfere with a certain laboratory test to measure theophylline levels, possibly causing false test results. Make sure laboratory personnel and all your doctors know you use this drug.      OVERDOSE:  If overdose is suspected, contact a poison control center or emergency room immediately.  residents can call the [place] Poison Hotline at 1-578.681.6127. [place] residents can call a provincial poison control center. Symptoms of overdose may include: bloody/black/tarry stools, pink/dark urine, unusual/prolonged bleeding.      NOTES:  Do not share this medication with others. Laboratory and/or medical tests (such as INR, complete blood count) must be performed periodically to monitor your progress or check for side effects. Consult your doctor for more details.      MISSED DOSE:  For the best possible benefit, do not miss any doses. If you do miss a dose and remember on the same day, take it as soon as you remember. If you remember on the next day, skip the missed dose and resume your usual dosing schedule. Do not double the dose to catch up because this could increase your risk for bleeding. Keep a record of missed doses to give to your doctor or pharmacist. Contact your doctor or pharmacist if you miss 2 or more  doses in a row.      STORAGE:  Store at room temperature away from light and moisture. Do not store in the bathroom. Keep all medications away from children and pets. Do not flush medications down the toilet or pour them into a drain unless instructed to do so. Properly discard this product when it is  or no longer needed. Consult your pharmacist or local waste disposal company for more details about how to safely discard your product.      MEDICAL ALERT:  Your condition and medication can cause complications in a medical emergency. For information about enrolling in MedicAlert, call 1-801.387.5600 ([place]) or 1-369.328.3979 ([place]).      Information last revised 2010 Copyright(c)  [employer]             Depression / Suicide Risk    As you are discharged from this [facility] facility, it is important to learn how to keep safe from harming yourself.    Recognize the warning signs:  · Abrupt changes in personality, positive or negative- including increase in energy   · Giving away possessions  · Change in eating patterns- significant weight changes-  positive or negative  · Change in sleeping patterns- unable to sleep or sleeping all the time   · Unwillingness or inability to communicate  · Depression  · Unusual sadness, discouragement and loneliness  · Talk of wanting to die  · Neglect of personal appearance   · Rebelliousness- reckless behavior  · Withdrawal from people/activities they love  · Confusion- inability to concentrate     If you or a loved one observes any of these behaviors or has concerns about self-harm, here's what you can do:  · Talk about it- your feelings and reasons for harming yourself  · Remove any means that you might use to hurt yourself (examples: pills, rope, extension cords, firearm)  · Get professional help from the community (Mental Health, Substance Abuse, psychological counseling)  · Do not be alone:Call your Safe Contact- someone whom you trust who will be  there for you.  · Call your local CRISIS HOTLINE 362-2227 or 989-105-7265  · Call your local Children's Mobile Crisis Response Team [place] (903) 671-0849 or www.[facility]  · Call the toll free National Suicide Prevention Hotlines   · National Suicide Prevention Lifeline 843-936-MVCA (6116)  · National [place] Line Network 800-SUICIDE (923-8312)

## 2020-10-12 NOTE — THERAPY
Occupational Therapy Contact Note:    OT re-orders received. Per nursing staff pt has not had decline in function since initial eval. Re-eval not warranted at this time.    Na Santos, OTR/L

## 2020-10-12 NOTE — PROGRESS NOTES
Call dialysis coordinator and confirmed that pt get dialysis MWF. Confirmed post dialysis pt to receive antibiotic dosing.

## 2020-10-12 NOTE — CARE PLAN
Problem: Safety  Goal: Will remain free from falls  Outcome: PROGRESSING AS EXPECTED  Call light and belongings within reach. Bed locked in lowest position. Bed alarm on. Fall precautions in place. Pt calls appropriately.      Problem: Pain Management  Goal: Pain level will decrease to patient's comfort goal  Outcome: PROGRESSING AS EXPECTED  Desired comfort goal discussed with pt. Pt educated on pharm/non pharm measures of managing pain. Pain within pt's comfort goal.

## 2020-10-12 NOTE — PROGRESS NOTES
Monitor Summary: SB-SR with a period of Afib from [id number] 51-99, [place] --, QRS 0.08, [place] --, with frequent PVCs and 2 4 beat runs of Vtach while in Afib per strip from monitor room.

## 2020-10-13 LAB
BACTERIA SPEC ANAEROBE CULT: NORMAL
SIGNIFICANT IND 70042: NORMAL
SITE SITE: NORMAL
SOURCE SOURCE: NORMAL

## 2020-10-13 NOTE — PROGRESS NOTES
Hemodialysis ordered by Dr. Najjar. Treatment started at 0854 and ended at 1154. Pt stable, vss, no c/o post tx. See flow sheets for details. Net UF 1.8 L. Reported to UDAY Lawrence RN. Lt fa avf dressing clean, dry, intact.

## 2020-10-19 NOTE — CARE PLAN
Problem: Communication  Goal: The ability to communicate needs accurately and effectively will improve    Intervention: Educate patient and significant other/support system about the plan of care, procedures, treatments, medications and allow for questions  Patient's white board is updated. Patient is updated on plan of care. All questions were answered.       Problem: Knowledge Deficit  Goal: Knowledge of disease process/condition, treatment plan, diagnostic tests, and medications will improve  Outcome: PROGRESSING AS EXPECTED  Pt educated about disease process and plan of care for the day.  Pt verbalized understanding.        set-up required/1 person assist

## 2020-11-04 LAB
FUNGUS SPEC CULT: NORMAL
SIGNIFICANT IND 70042: NORMAL
SITE SITE: NORMAL
SOURCE SOURCE: NORMAL

## 2020-12-07 LAB
MYCOBACTERIUM SPEC CULT: NORMAL
RHODAMINE-AURAMINE STN SPEC: NORMAL
SIGNIFICANT IND 70042: NORMAL
SITE SITE: NORMAL
SOURCE SOURCE: NORMAL

## 2021-01-25 NOTE — CARE PLAN
Problem: Knowledge Deficit  Goal: Knowledge of disease process/condition, treatment plan, diagnostic tests, and medications will improve    Intervention: Explain information regarding disease process/condition, treatment plan, diagnostic tests, and medications and document in education  Pt educated to dialysis schedule and indication of dialysis. Pt verbalized understanding.       Problem: Acute Care of the Heart Failure Patient  Goal: Optimal Outcome for the HF Patient    Intervention: 2 gm Sodium Diet, Consider 1500 mg Sodium Diet  Pt educated to fluid and sodium restriction r/t disease process. Pt verbalized understanding.          Pt A&OX4, pt reports HA has improved and states she feels a lot better. Pt denies dizziness, lightheadedness, cp, sob, n/v. Discharge paperwork and follow-up discussed with pt, pt verbally understands them.  Pt discharged ambulatory with steady gait - no di

## 2021-10-19 ENCOUNTER — OFFICE VISIT (OUTPATIENT)
Dept: CARDIOLOGY | Facility: MEDICAL CENTER | Age: 51
End: 2021-10-19

## 2021-10-19 VITALS
OXYGEN SATURATION: 98 % | HEART RATE: 58 BPM | SYSTOLIC BLOOD PRESSURE: 120 MMHG | DIASTOLIC BLOOD PRESSURE: 60 MMHG | BODY MASS INDEX: 26.13 KG/M2 | WEIGHT: 157 LBS

## 2021-10-19 DIAGNOSIS — I48.0 PAF (PAROXYSMAL ATRIAL FIBRILLATION) (HCC): ICD-10-CM

## 2021-10-19 DIAGNOSIS — I35.1 NONRHEUMATIC AORTIC VALVE INSUFFICIENCY: ICD-10-CM

## 2021-10-19 DIAGNOSIS — Z99.2 ESRD (END STAGE RENAL DISEASE) ON DIALYSIS (HCC): ICD-10-CM

## 2021-10-19 DIAGNOSIS — I63.19 CEREBROVASCULAR ACCIDENT (CVA) DUE TO EMBOLISM OF OTHER PRECEREBRAL ARTERY (HCC): ICD-10-CM

## 2021-10-19 DIAGNOSIS — I50.20 NYHA CLASS 1 HEART FAILURE WITH REDUCED EJECTION FRACTION (HCC): ICD-10-CM

## 2021-10-19 DIAGNOSIS — N18.6 ESRD (END STAGE RENAL DISEASE) ON DIALYSIS (HCC): ICD-10-CM

## 2021-10-19 DIAGNOSIS — Z79.899 HIGH RISK MEDICATION USE: ICD-10-CM

## 2021-10-19 DIAGNOSIS — I50.20 ACC/AHA STAGE C SYSTOLIC HEART FAILURE (HCC): ICD-10-CM

## 2021-10-19 PROCEDURE — 99214 OFFICE O/P EST MOD 30 MIN: CPT | Performed by: NURSE PRACTITIONER

## 2021-10-19 RX ORDER — METOPROLOL SUCCINATE 50 MG/1
50 TABLET, EXTENDED RELEASE ORAL DAILY
Qty: 30 TABLET | Refills: 11 | Status: ON HOLD | OUTPATIENT
Start: 2021-10-19 | End: 2022-08-29

## 2021-10-19 RX ORDER — GABAPENTIN 100 MG/1
100 CAPSULE ORAL 3 TIMES DAILY
COMMUNITY

## 2021-10-19 RX ORDER — CHOLECALCIFEROL (VITAMIN D3) 125 MCG
500 CAPSULE ORAL DAILY
COMMUNITY

## 2021-10-19 RX ORDER — ISOSORBIDE MONONITRATE 60 MG/1
60 TABLET, EXTENDED RELEASE ORAL DAILY
Qty: 30 TABLET | Refills: 11 | Status: SHIPPED | OUTPATIENT
Start: 2021-10-19

## 2021-10-19 RX ORDER — TAMSULOSIN HYDROCHLORIDE 0.4 MG/1
0.4 CAPSULE ORAL
COMMUNITY

## 2021-10-19 RX ORDER — FUROSEMIDE 40 MG/1
40 TABLET ORAL DAILY
Qty: 30 TABLET | Refills: 11 | Status: ON HOLD | OUTPATIENT
Start: 2021-10-19 | End: 2022-08-29

## 2021-10-19 RX ORDER — AMIODARONE HYDROCHLORIDE 200 MG/1
200 TABLET ORAL DAILY
Qty: 30 TABLET | Refills: 11 | Status: SHIPPED | OUTPATIENT
Start: 2021-10-19 | End: 2022-09-06

## 2021-10-19 RX ORDER — LOSARTAN POTASSIUM 25 MG/1
TABLET ORAL
Qty: 30 TABLET | Refills: 11 | Status: ON HOLD | OUTPATIENT
Start: 2021-10-19 | End: 2022-08-29

## 2021-10-19 RX ORDER — ATORVASTATIN CALCIUM 40 MG/1
40 TABLET, FILM COATED ORAL EVERY EVENING
Qty: 30 TABLET | Refills: 11 | Status: SHIPPED | OUTPATIENT
Start: 2021-10-19

## 2021-10-19 RX ORDER — FOLIC ACID 1 MG/1
1 TABLET ORAL DAILY
COMMUNITY

## 2021-10-19 RX ORDER — MULTIVIT WITH MINERALS/LUTEIN
1000 TABLET ORAL DAILY
Status: ON HOLD | COMMUNITY
End: 2022-08-29

## 2021-10-19 RX ORDER — WARFARIN SODIUM 3 MG/1
3 TABLET ORAL DAILY
Status: ON HOLD | COMMUNITY
End: 2022-08-29

## 2021-10-19 ASSESSMENT — FIBROSIS 4 INDEX: FIB4 SCORE: 0.65

## 2021-10-19 NOTE — PROGRESS NOTES
Chief Complaint   Patient presents with   • Atrial Fibrillation     F/V Dx: PAF (paroxysmal atrial fibrillation) (HCC)   • Congestive Heart Failure     F/V Dx: NYHA class 1 heart failure with reduced ejection fraction (HCC)       Subjective     Giovanni Saini is a 51 y.o. male who presents today proximal afib, aortic insufficiency, end-stage renal disease on hemodialysis.  Patient was last seen by Dr. Nuñez on 3/20/2020.    Patient feels well, denies chest pain, shortness of breath, palpitations, dizziness/lightheadedness, orthopnea, PND or Edema.  Today, based on physical examination findings, patient is euvolemic. No JVD, lungs are clear to auscultation, no pitting edema in bilateral lower extremities, no ascites.    Patient reports he receives dialysis Monday Wednesday Friday and his blood pressure is doing well with dialysis.  Patient endorses medication compliance, but is currently out of refills.  We will refill his medication and ordered updated lab work for high risk medication use including amiodarone surveillance with chest x-ray and thyroid testing.  Patient verbalizes understanding.    Past Medical History:   Diagnosis Date   • Heart murmur    • Valvular heart disease     aortic insufficiency     Past Surgical History:   Procedure Laterality Date   • PB COLONOSCOPY,DIAGNOSTIC N/A 10/9/2020    Procedure: COLONOSCOPY;  Surgeon: Rah Mancia M.D.;  Location: SURGERY SAME DAY [facility];  Service: Gastroenterology   • PB THORACOSCOPY,DX NO BX Left 10/8/2020    Procedure: THORACOSCOPY;  Surgeon: Darío Guerra M.D.;  Location: SURGERY [facility];  Service: General   • CATH PLACEMENT  8/27/2017    Procedure: CATH PLACEMENT - perm cath;  Surgeon: Hiren Ayon M.D.;  Location: SURGERY [facility];  Service: General   • AV FISTULA CREATION  8/27/2017    Procedure: AV FISTULA CREATION - left radial cephalic;  Surgeon: Hiren Ayon M.D.;  Location: [facility];  Service: General      Family History   Problem Relation Age of Onset   • Stroke Sister 38        CVA     Social History     Socioeconomic History   • Marital status:      Spouse name: Not on file   • Number of children: Not on file   • Years of education: Not on file   • Highest education level: Not on file   Occupational History   • Not on file   Tobacco Use   • Smoking status: Never Smoker   • Smokeless tobacco: Never Used   Substance and Sexual Activity   • Alcohol use: No     Alcohol/week: 6.0 oz     Types: 10 Standard drinks or equivalent per week     Comment: quit after hospital    • Drug use: No   • Sexual activity: Not on file   Other Topics Concern   • Not on file   Social History Narrative   • Not on file     Social Determinants of Health     Financial Resource Strain:    • Difficulty of Paying Living Expenses:    Food Insecurity:    • Worried About Running Out of Food in the Last Year:    • Ran Out of Food in the Last Year:    Transportation Needs:    • Lack of Transportation (Medical):    • Lack of Transportation (Non-Medical):    Physical Activity:    • Days of Exercise per Week:    • Minutes of Exercise per Session:    Stress:    • Feeling of Stress :    Social Connections:    • Frequency of Communication with Friends and Family:    • Frequency of Social Gatherings with Friends and Family:    • Attends Church Services:    • Active Member of Clubs or Organizations:    • Attends Club or Organization Meetings:    • Marital Status:    Intimate Partner Violence:    • Fear of Current or Ex-Partner:    • Emotionally Abused:    • Physically Abused:    • Sexually Abused:      No Known Allergies  Outpatient Encounter Medications as of 10/19/2021   Medication Sig Dispense Refill   • gabapentin (NEURONTIN) 100 MG Cap Take 100 mg by mouth 3 times a day.     • Ascorbic Acid (VITAMIN C) 1000 MG Tab Take  by mouth.     • cyanocobalamin (VITAMIN B-12) 500 MCG Tab Take 500 mcg by mouth every day.     • tamsulosin (FLOMAX) 0.4  MG capsule Take 0.4 mg by mouth 1/2 hour after breakfast.     • folic acid (FOLVITE) 1 MG Tab Take 1 mg by mouth every day.     • warfarin (COUMADIN) 3 MG Tab Take 3 mg by mouth every day.     • amiodarone (CORDARONE) 200 MG Tab Take 1 Tablet by mouth every day. 30 Tablet 11   • atorvastatin (LIPITOR) 40 MG Tab Take 1 Tablet by mouth every evening. 30 Tablet 11   • furosemide (LASIX) 40 MG Tab Take 1 Tablet by mouth every day. 30 Tablet 11   • isosorbide mononitrate SR (IMDUR) 60 MG TABLET SR 24 HR Take 1 Tablet by mouth every day. 30 Tablet 11   • losartan (COZAAR) 25 MG Tab TAKE ONE TABLET BY MOUTH EVERY DAY AT BEDTIME FOR BLOOD PRESSURE - BRING ALL MEDICATIONS TO EVERY APPOINTMENT - AS PER DR. STRICKLAND 30 Tablet 11   • metoprolol SR (TOPROL XL) 50 MG TABLET SR 24 HR Take 1 Tablet by mouth every day. 30 Tablet 11   • calcium acetate (PHOS-LO) 667 MG Tab tablet Take 3 Tabs by mouth 3 times a day before meals. 180 Tab 0   • ceFAZolin in dextrose (ANCEF) 2-4 GM/100ML-% IVPB 100 mL by Intravenous route every Monday, Wednesday, and Friday. 3000 mL 0   • [DISCONTINUED] metoprolol SR (TOPROL XL) 50 MG TABLET SR 24 HR Take 1 Tab by mouth every day. 30 Tab 0   • [DISCONTINUED] isosorbide mononitrate SR (IMDUR) 60 MG TABLET SR 24 HR Take 1 Tab by mouth every day. 30 Tab 11   • [DISCONTINUED] losartan (COZAAR) 25 MG Tab TAKE ONE TABLET BY MOUTH EVERY DAY AT BEDTIME FOR BLOOD PRESSURE - BRING ALL MEDICATIONS TO EVERY APPOINTMENT - AS PER DR. STRICKLAND 30 Tab 6   • [DISCONTINUED] furosemide (LASIX) 40 MG Tab Take 1 Tab by mouth every day. 30 Tab 11   • [DISCONTINUED] amiodarone (CORDARONE) 200 MG Tab Take 1 Tab by mouth every day. 30 Tab 11   • [DISCONTINUED] atorvastatin (LIPITOR) 40 MG Tab Take 1 Tab by mouth every evening. 30 Tab 11     No facility-administered encounter medications on file as of 10/19/2021.     Review of Systems   Constitutional: Negative for fever, malaise/fatigue and weight loss.   Eyes: Negative for blurred  vision.   Respiratory: Negative for cough and shortness of breath.    Cardiovascular: Negative for chest pain, palpitations, orthopnea, claudication, leg swelling and PND.   Gastrointestinal: Negative for abdominal pain, blood in stool, nausea and vomiting.   Genitourinary: Negative for dysuria, frequency and hematuria.   Musculoskeletal: Negative for falls and myalgias.   Neurological: Negative for dizziness, tingling and loss of consciousness.   Endo/Heme/Allergies: Does not bruise/bleed easily.              Objective     /60 (BP Location: Left arm, Patient Position: Sitting, BP Cuff Size: Adult)   Pulse (!) 58   Wt 71.2 kg (157 lb)   SpO2 98%   BMI 26.13 kg/m²     Physical Exam  Vitals reviewed.   Constitutional:       Appearance: He is well-developed.   HENT:      Head: Normocephalic and atraumatic.   Eyes:      Pupils: Pupils are equal, round, and reactive to light.   Neck:      Vascular: No JVD.   Cardiovascular:      Rate and Rhythm: Normal rate and regular rhythm.      Pulses: Normal pulses.      Heart sounds: Normal heart sounds. No murmur heard.   No friction rub. No gallop.    Pulmonary:      Effort: Pulmonary effort is normal. No respiratory distress.      Breath sounds: Normal breath sounds.   Abdominal:      General: Bowel sounds are normal. There is no distension.      Palpations: Abdomen is soft.   Musculoskeletal:      Right lower leg: No edema.      Left lower leg: No edema.   Skin:     General: Skin is warm and dry.      Findings: No erythema.   Neurological:      General: No focal deficit present.      Mental Status: He is alert and oriented to person, place, and time. Mental status is at baseline.   Psychiatric:         Mood and Affect: Mood normal.         Behavior: Behavior normal.            Lab Results   Component Value Date/Time    CHOLSTRLTOT 111 02/22/2020 08:38 AM    LDL 37 02/22/2020 08:38 AM    HDL 61 02/22/2020 08:38 AM    TRIGLYCERIDE 65 02/22/2020 08:38 AM       Lab  Results   Component Value Date/Time    SODIUM 134 (L) 10/12/2020 07:46 AM    POTASSIUM 4.6 10/12/2020 07:46 AM    CHLORIDE 92 (L) 10/12/2020 07:46 AM    CO2 33 10/12/2020 07:46 AM    GLUCOSE 80 10/12/2020 07:46 AM    BUN 43 (H) 10/12/2020 07:46 AM    CREATININE 10.94 (HH) 10/12/2020 07:46 AM     Lab Results   Component Value Date/Time    ALKPHOSPHAT 79 10/11/2020 04:06 AM    ASTSGOT 14 10/11/2020 04:06 AM    ALTSGPT <5 10/11/2020 04:06 AM    TBILIRUBIN 0.3 10/11/2020 04:06 AM      transthoracic echocardiogram in March 2020 at [place] [facility] showed normal LV function, moderate aortic regurgitation.    TTE (9/33/2020): Normal left ventricular systolic function.  Left ventricular ejection fraction is visually estimated to be 70%.  Moderate concentric left ventricular hypertrophy.  Aortic sclerosis without stenosis.  Moderate aortic insufficiency.  Assessment & Plan     1. PAF (paroxysmal atrial fibrillation) (HCC)  DX-CHEST-2 VIEWS    TSH    Comp Metabolic Panel    Lipid Profile   2. ACC/AHA stage C systolic heart failure (HCC)     3. High risk medication use  DX-CHEST-2 VIEWS    TSH    Comp Metabolic Panel    Lipid Profile   4. ESRD (end stage renal disease) on dialysis (HCC)  DX-CHEST-2 VIEWS    TSH    Comp Metabolic Panel    Lipid Profile   5. Nonrheumatic aortic valve insufficiency  DX-CHEST-2 VIEWS    TSH    Comp Metabolic Panel    Lipid Profile   6. NYHA class 1 heart failure with reduced ejection fraction (HCC)     7. Cerebrovascular accident (CVA) due to embolism of other precerebral artery (HCC)         Medical Decision Making: Today's Assessment/Status/Plan:        HFrEF, Stage C, Class I, LVEF 70% recovered EF:   -ACE-I/ARB/ARNI: Continue losartan 25 mg daily  -Evidence Based Beta-blocker: Continue metoprolol XL 50 mg daily  -Aldosterone Antagonist: Held due to dialysis patient  -SGLT2-I: Held due to GFR  -Diuretic: Continue furosemide 40 mg daily  -Labs: Update CMP and lipid panel  -No AICD due to  recovered EF  -Reinforced s/sx of worsening heart failure with patient and weight monitoring. Pt verbalizes understanding. Pt to call office if present.      Paroxysmal Atrial Fibrillation (PAF); Hx CVA  - Asymptomatic, denies AF breakthrough, rate controlled.   -Rhythm appears regular today  - On OAC with Coumadin, continue.  - Continue amiodarone 200 mg daily    Nonrheumatic aortic valve insufficiency  -Denies symptoms    Hypertension  -Isosorbide mononitrate 60 mg daily  -Today in office blood pressure is well controlled  -Encouraged to continue home BP monitoring/log.  -Medication recommendations per above.    ESRD  -Followed by Dr. Merchant  -Dialysis Monday Wednesday Friday    High risk medication use; chronic OAC use  -This includes amiodarone, atorvastatin, furosemide, losartan  -Patient denies signs or symptoms of bleeding  -Will continue to closely monitor for side effects of patient's high risk medication(s) including liver, pulmonary, thyroid, and renal function and electrolytes  -Close monitoring discussed with patient.  Lab work ordered.    FU in clinic in 1 year. Sooner if needed.    Patient verbalizes understanding and agrees with the plan of care.     Collaborating MD: Dr. Kirk MD    PLEASE NOTE: This Note was created using voice recognition Software. I have made every reasonable attempt to correct obvious errors, but I expect that there are errors of grammar and possibly content that I did not discover before finalizing the note

## 2021-10-19 NOTE — PATIENT INSTRUCTIONS
Continue medications as prescribed    Complete blood work and chest xray prior to follow-up in 3 months

## 2021-10-21 ASSESSMENT — ENCOUNTER SYMPTOMS
TINGLING: 0
VOMITING: 0
MYALGIAS: 0
COUGH: 0
WEIGHT LOSS: 0
DIZZINESS: 0
FEVER: 0
PALPITATIONS: 0
LOSS OF CONSCIOUSNESS: 0
CLAUDICATION: 0
FALLS: 0
SHORTNESS OF BREATH: 0
BLOOD IN STOOL: 0
BLURRED VISION: 0
PND: 0
ABDOMINAL PAIN: 0
ORTHOPNEA: 0
BRUISES/BLEEDS EASILY: 0
NAUSEA: 0

## 2022-01-31 ENCOUNTER — TELEPHONE (OUTPATIENT)
Dept: CARDIOLOGY | Facility: MEDICAL CENTER | Age: 52
End: 2022-01-31

## 2022-04-12 NOTE — PROGRESS NOTES
Call to Hortencia at Janay Roldan's office.  No addendum noted to 4/1 H&P regarding clearance for cataract surgery.  Per Hortencia, a message was sent high priority to Janay yesterday and a call was made to her cell phone today as she is not in the office.  Writer then left a message for Rosalva from Dr. Banerjee's office regarding H&P.    Monitor Summary: A-fib ,SB 51-54, WI .20, QRS .08, QT .52 with triplet,couplet,frequent PVC&PAC per strip from monitor room

## 2022-08-25 ENCOUNTER — APPOINTMENT (OUTPATIENT)
Dept: RADIOLOGY | Facility: MEDICAL CENTER | Age: 52
DRG: 368 | End: 2022-08-25
Attending: STUDENT IN AN ORGANIZED HEALTH CARE EDUCATION/TRAINING PROGRAM
Payer: MEDICAID

## 2022-08-25 ENCOUNTER — HOSPITAL ENCOUNTER (INPATIENT)
Facility: MEDICAL CENTER | Age: 52
LOS: 4 days | DRG: 368 | End: 2022-08-29
Attending: STUDENT IN AN ORGANIZED HEALTH CARE EDUCATION/TRAINING PROGRAM | Admitting: STUDENT IN AN ORGANIZED HEALTH CARE EDUCATION/TRAINING PROGRAM
Payer: MEDICAID

## 2022-08-25 DIAGNOSIS — I48.0 PAF (PAROXYSMAL ATRIAL FIBRILLATION) (HCC): ICD-10-CM

## 2022-08-25 DIAGNOSIS — K92.1 GASTROINTESTINAL HEMORRHAGE WITH MELENA: ICD-10-CM

## 2022-08-25 DIAGNOSIS — E87.5 HYPERKALEMIA: ICD-10-CM

## 2022-08-25 DIAGNOSIS — R79.1 SUPRATHERAPEUTIC INR: ICD-10-CM

## 2022-08-25 DIAGNOSIS — D50.0 BLOOD LOSS ANEMIA: ICD-10-CM

## 2022-08-25 DIAGNOSIS — I21.4 NSTEMI (NON-ST ELEVATED MYOCARDIAL INFARCTION) (HCC): ICD-10-CM

## 2022-08-25 PROBLEM — K92.2 GI BLEEDING: Status: ACTIVE | Noted: 2022-08-25

## 2022-08-25 LAB
ABO GROUP BLD: NORMAL
ALBUMIN SERPL BCP-MCNC: 3.9 G/DL (ref 3.2–4.9)
ALBUMIN/GLOB SERPL: 1.6 G/DL
ALP SERPL-CCNC: 69 U/L (ref 30–99)
ALT SERPL-CCNC: 17 U/L (ref 2–50)
ANION GAP SERPL CALC-SCNC: 29 MMOL/L (ref 7–16)
ANISOCYTOSIS BLD QL SMEAR: ABNORMAL
AST SERPL-CCNC: 20 U/L (ref 12–45)
BASOPHILS # BLD AUTO: 0 % (ref 0–1.8)
BASOPHILS # BLD: 0 K/UL (ref 0–0.12)
BILIRUB SERPL-MCNC: 0.6 MG/DL (ref 0.1–1.5)
BLD GP AB SCN SERPL QL: NORMAL
BUN SERPL-MCNC: 79 MG/DL (ref 8–22)
CALCIUM SERPL-MCNC: 9.1 MG/DL (ref 8.5–10.5)
CHLORIDE SERPL-SCNC: 94 MMOL/L (ref 96–112)
CO2 SERPL-SCNC: 20 MMOL/L (ref 20–33)
CREAT SERPL-MCNC: 11.25 MG/DL (ref 0.5–1.4)
EOSINOPHIL # BLD AUTO: 0 K/UL (ref 0–0.51)
EOSINOPHIL NFR BLD: 0 % (ref 0–6.9)
ERYTHROCYTE [DISTWIDTH] IN BLOOD BY AUTOMATED COUNT: 66.2 FL (ref 35.9–50)
GFR SERPLBLD CREATININE-BSD FMLA CKD-EPI: 5 ML/MIN/1.73 M 2
GLOBULIN SER CALC-MCNC: 2.4 G/DL (ref 1.9–3.5)
GLUCOSE SERPL-MCNC: 129 MG/DL (ref 65–99)
HCT VFR BLD AUTO: 20.2 % (ref 42–52)
HGB BLD-MCNC: 6.5 G/DL (ref 14–18)
HYPOCHROMIA BLD QL SMEAR: ABNORMAL
INR PPP: 4.44 (ref 0.87–1.13)
LYMPHOCYTES # BLD AUTO: 0.57 K/UL (ref 1–4.8)
LYMPHOCYTES NFR BLD: 3.5 % (ref 22–41)
MACROCYTES BLD QL SMEAR: ABNORMAL
MAGNESIUM SERPL-MCNC: 2.4 MG/DL (ref 1.5–2.5)
MANUAL DIFF BLD: NORMAL
MCH RBC QN AUTO: 33.7 PG (ref 27–33)
MCHC RBC AUTO-ENTMCNC: 32.2 G/DL (ref 33.7–35.3)
MCV RBC AUTO: 104.7 FL (ref 81.4–97.8)
MONOCYTES # BLD AUTO: 0 K/UL (ref 0–0.85)
MONOCYTES NFR BLD AUTO: 0 % (ref 0–13.4)
MORPHOLOGY BLD-IMP: NORMAL
NEUTROPHILS # BLD AUTO: 15.63 K/UL (ref 1.82–7.42)
NEUTROPHILS NFR BLD: 96.5 % (ref 44–72)
NRBC # BLD AUTO: 0 K/UL
NRBC BLD-RTO: 0 /100 WBC
NT-PROBNP SERPL IA-MCNC: ABNORMAL PG/ML (ref 0–125)
OVALOCYTES BLD QL SMEAR: NORMAL
PHOSPHATE SERPL-MCNC: 8.6 MG/DL (ref 2.5–4.5)
PLATELET # BLD AUTO: 210 K/UL (ref 164–446)
PMV BLD AUTO: 10.7 FL (ref 9–12.9)
POIKILOCYTOSIS BLD QL SMEAR: NORMAL
POLYCHROMASIA BLD QL SMEAR: NORMAL
POTASSIUM SERPL-SCNC: 6.6 MMOL/L (ref 3.6–5.5)
PROT SERPL-MCNC: 6.3 G/DL (ref 6–8.2)
PROTHROMBIN TIME: 40.5 SEC (ref 12–14.6)
RBC # BLD AUTO: 1.93 M/UL (ref 4.7–6.1)
RBC BLD AUTO: PRESENT
RH BLD: NORMAL
SODIUM SERPL-SCNC: 143 MMOL/L (ref 135–145)
TROPONIN T SERPL-MCNC: 138 NG/L (ref 6–19)
WBC # BLD AUTO: 16.2 K/UL (ref 4.8–10.8)

## 2022-08-25 PROCEDURE — 96365 THER/PROPH/DIAG IV INF INIT: CPT

## 2022-08-25 PROCEDURE — 93005 ELECTROCARDIOGRAM TRACING: CPT

## 2022-08-25 PROCEDURE — 84100 ASSAY OF PHOSPHORUS: CPT

## 2022-08-25 PROCEDURE — 770022 HCHG ROOM/CARE - ICU (200)

## 2022-08-25 PROCEDURE — 80053 COMPREHEN METABOLIC PANEL: CPT

## 2022-08-25 PROCEDURE — 84484 ASSAY OF TROPONIN QUANT: CPT

## 2022-08-25 PROCEDURE — 86900 BLOOD TYPING SEROLOGIC ABO: CPT

## 2022-08-25 PROCEDURE — 700105 HCHG RX REV CODE 258: Performed by: STUDENT IN AN ORGANIZED HEALTH CARE EDUCATION/TRAINING PROGRAM

## 2022-08-25 PROCEDURE — 93005 ELECTROCARDIOGRAM TRACING: CPT | Performed by: STUDENT IN AN ORGANIZED HEALTH CARE EDUCATION/TRAINING PROGRAM

## 2022-08-25 PROCEDURE — 85007 BL SMEAR W/DIFF WBC COUNT: CPT

## 2022-08-25 PROCEDURE — 99291 CRITICAL CARE FIRST HOUR: CPT

## 2022-08-25 PROCEDURE — 36415 COLL VENOUS BLD VENIPUNCTURE: CPT

## 2022-08-25 PROCEDURE — 85025 COMPLETE CBC W/AUTO DIFF WBC: CPT

## 2022-08-25 PROCEDURE — 83880 ASSAY OF NATRIURETIC PEPTIDE: CPT

## 2022-08-25 PROCEDURE — 700102 HCHG RX REV CODE 250 W/ 637 OVERRIDE(OP): Performed by: STUDENT IN AN ORGANIZED HEALTH CARE EDUCATION/TRAINING PROGRAM

## 2022-08-25 PROCEDURE — 71045 X-RAY EXAM CHEST 1 VIEW: CPT

## 2022-08-25 PROCEDURE — 700111 HCHG RX REV CODE 636 W/ 250 OVERRIDE (IP): Performed by: STUDENT IN AN ORGANIZED HEALTH CARE EDUCATION/TRAINING PROGRAM

## 2022-08-25 PROCEDURE — C9113 INJ PANTOPRAZOLE SODIUM, VIA: HCPCS | Performed by: STUDENT IN AN ORGANIZED HEALTH CARE EDUCATION/TRAINING PROGRAM

## 2022-08-25 PROCEDURE — 30233N1 TRANSFUSION OF NONAUTOLOGOUS RED BLOOD CELLS INTO PERIPHERAL VEIN, PERCUTANEOUS APPROACH: ICD-10-PCS | Performed by: STUDENT IN AN ORGANIZED HEALTH CARE EDUCATION/TRAINING PROGRAM

## 2022-08-25 PROCEDURE — 86850 RBC ANTIBODY SCREEN: CPT

## 2022-08-25 PROCEDURE — 85610 PROTHROMBIN TIME: CPT

## 2022-08-25 PROCEDURE — 83735 ASSAY OF MAGNESIUM: CPT

## 2022-08-25 PROCEDURE — 96375 TX/PRO/DX INJ NEW DRUG ADDON: CPT

## 2022-08-25 PROCEDURE — 86901 BLOOD TYPING SEROLOGIC RH(D): CPT

## 2022-08-25 RX ORDER — SEVELAMER CARBONATE 800 MG/1
1600 TABLET, FILM COATED ORAL
Status: DISCONTINUED | OUTPATIENT
Start: 2022-08-26 | End: 2022-08-25

## 2022-08-25 RX ORDER — PANTOPRAZOLE SODIUM 40 MG/10ML
40 INJECTION, POWDER, LYOPHILIZED, FOR SOLUTION INTRAVENOUS 2 TIMES DAILY
Status: DISCONTINUED | OUTPATIENT
Start: 2022-08-26 | End: 2022-08-28

## 2022-08-25 RX ORDER — ACETAMINOPHEN 325 MG/1
650 TABLET ORAL EVERY 6 HOURS PRN
Status: DISCONTINUED | OUTPATIENT
Start: 2022-08-25 | End: 2022-08-29 | Stop reason: HOSPADM

## 2022-08-25 RX ORDER — CALCIUM ACETATE 667 MG/1
1334 TABLET ORAL
COMMUNITY

## 2022-08-25 RX ORDER — CALCIUM GLUCONATE 20 MG/ML
2 INJECTION, SOLUTION INTRAVENOUS ONCE
Status: COMPLETED | OUTPATIENT
Start: 2022-08-25 | End: 2022-08-26

## 2022-08-25 RX ADMIN — FENTANYL CITRATE 50 MCG: 50 INJECTION, SOLUTION INTRAMUSCULAR; INTRAVENOUS at 22:58

## 2022-08-25 RX ADMIN — SODIUM BICARBONATE 50 MEQ: 84 INJECTION, SOLUTION INTRAVENOUS at 23:08

## 2022-08-25 RX ADMIN — INSULIN HUMAN 4 UNITS: 100 INJECTION, SOLUTION PARENTERAL at 23:00

## 2022-08-25 RX ADMIN — CALCIUM GLUCONATE 2 G: 20 INJECTION, SOLUTION INTRAVENOUS at 23:05

## 2022-08-25 RX ADMIN — DEXTROSE MONOHYDRATE 25 G: 100 INJECTION, SOLUTION INTRAVENOUS at 23:03

## 2022-08-25 RX ADMIN — SODIUM CHLORIDE 80 MG: 9 INJECTION, SOLUTION INTRAVENOUS at 23:36

## 2022-08-25 ASSESSMENT — FIBROSIS 4 INDEX: FIB4 SCORE: 0.66

## 2022-08-26 ENCOUNTER — APPOINTMENT (OUTPATIENT)
Dept: RADIOLOGY | Facility: MEDICAL CENTER | Age: 52
DRG: 368 | End: 2022-08-26
Attending: INTERNAL MEDICINE
Payer: MEDICAID

## 2022-08-26 ENCOUNTER — APPOINTMENT (OUTPATIENT)
Dept: CARDIOLOGY | Facility: MEDICAL CENTER | Age: 52
DRG: 368 | End: 2022-08-26
Attending: INTERNAL MEDICINE
Payer: MEDICAID

## 2022-08-26 ENCOUNTER — TELEPHONE (OUTPATIENT)
Dept: CARDIOLOGY | Facility: MEDICAL CENTER | Age: 52
End: 2022-08-26

## 2022-08-26 LAB
ALBUMIN SERPL BCP-MCNC: 4.5 G/DL (ref 3.2–4.9)
ALBUMIN/GLOB SERPL: 1.8 G/DL
ALP SERPL-CCNC: 75 U/L (ref 30–99)
ALT SERPL-CCNC: 16 U/L (ref 2–50)
ANION GAP SERPL CALC-SCNC: 16 MMOL/L (ref 7–16)
AST SERPL-CCNC: 26 U/L (ref 12–45)
BARCODED ABORH UBTYP: 5100
BARCODED ABORH UBTYP: 9500
BARCODED ABORH UBTYP: 9500
BARCODED PRD CODE UBPRD: NORMAL
BARCODED UNIT NUM UBUNT: NORMAL
BILIRUB SERPL-MCNC: 2 MG/DL (ref 0.1–1.5)
BUN SERPL-MCNC: 28 MG/DL (ref 8–22)
CALCIUM SERPL-MCNC: 9 MG/DL (ref 8.5–10.5)
CFT BLD TEG: 6.2 MIN (ref 4.6–9.1)
CFT P HPASE BLD TEG: 4.5 MIN (ref 4.3–8.3)
CHLORIDE SERPL-SCNC: 96 MMOL/L (ref 96–112)
CLOT ANGLE BLD TEG: 76.6 DEGREES (ref 63–78)
CLOT LYSIS 30M P MA LENFR BLD TEG: 1.6 % (ref 0–2.6)
CO2 SERPL-SCNC: 27 MMOL/L (ref 20–33)
COMPONENT R 8504R: NORMAL
CREAT SERPL-MCNC: 3.73 MG/DL (ref 0.5–1.4)
CT.EXTRINSIC BLD ROTEM: 0.9 MIN (ref 0.8–2.1)
EKG IMPRESSION: NORMAL
EKG IMPRESSION: NORMAL
ERYTHROCYTE [DISTWIDTH] IN BLOOD BY AUTOMATED COUNT: 63.6 FL (ref 35.9–50)
EST. AVERAGE GLUCOSE BLD GHB EST-MCNC: 114 MG/DL
GFR SERPLBLD CREATININE-BSD FMLA CKD-EPI: 18 ML/MIN/1.73 M 2
GLOBULIN SER CALC-MCNC: 2.5 G/DL (ref 1.9–3.5)
GLUCOSE SERPL-MCNC: 109 MG/DL (ref 65–99)
HAV IGM SERPL QL IA: NORMAL
HBA1C MFR BLD: 5.6 % (ref 4–5.6)
HBV CORE IGM SER QL: NORMAL
HBV SURFACE AB SERPL IA-ACNC: 93.9 MIU/ML (ref 0–10)
HBV SURFACE AG SER QL: NORMAL
HCT VFR BLD AUTO: 19.1 % (ref 42–52)
HCT VFR BLD AUTO: 29.2 % (ref 42–52)
HCT VFR BLD AUTO: 29.3 % (ref 42–52)
HCT VFR BLD AUTO: 30.4 % (ref 42–52)
HCV AB SER QL: NORMAL
HGB BLD-MCNC: 10.1 G/DL (ref 14–18)
HGB BLD-MCNC: 10.5 G/DL (ref 14–18)
HGB BLD-MCNC: 6.3 G/DL (ref 14–18)
HGB BLD-MCNC: 9.9 G/DL (ref 14–18)
INR PPP: 1.57 (ref 0.87–1.13)
MAGNESIUM SERPL-MCNC: 1.9 MG/DL (ref 1.5–2.5)
MCF BLD TEG: 63.9 MM (ref 52–69)
MCF.PLATELET INHIB BLD ROTEM: 27 MM (ref 15–32)
MCH RBC QN AUTO: 31 PG (ref 27–33)
MCHC RBC AUTO-ENTMCNC: 34.5 G/DL (ref 33.7–35.3)
MCV RBC AUTO: 89.7 FL (ref 81.4–97.8)
PA AA BLD-ACNC: 96.6 % (ref 0–11)
PA ADP BLD-ACNC: 37.3 % (ref 0–17)
PHOSPHATE SERPL-MCNC: 2.9 MG/DL (ref 2.5–4.5)
PLATELET # BLD AUTO: 192 K/UL (ref 164–446)
PMV BLD AUTO: 10.7 FL (ref 9–12.9)
POTASSIUM SERPL-SCNC: 3.8 MMOL/L (ref 3.6–5.5)
POTASSIUM SERPL-SCNC: 5 MMOL/L (ref 3.6–5.5)
PRODUCT TYPE UPROD: NORMAL
PROT SERPL-MCNC: 7 G/DL (ref 6–8.2)
PROTHROMBIN TIME: 18.4 SEC (ref 12–14.6)
RBC # BLD AUTO: 3.39 M/UL (ref 4.7–6.1)
SODIUM SERPL-SCNC: 139 MMOL/L (ref 135–145)
TEG ALGORITHM TGALG: ABNORMAL
TROPONIN T SERPL-MCNC: 144 NG/L (ref 6–19)
TROPONIN T SERPL-MCNC: 185 NG/L (ref 6–19)
TROPONIN T SERPL-MCNC: 205 NG/L (ref 6–19)
UNIT STATUS USTAT: NORMAL
WBC # BLD AUTO: 16.7 K/UL (ref 4.8–10.8)

## 2022-08-26 PROCEDURE — 96375 TX/PRO/DX INJ NEW DRUG ADDON: CPT

## 2022-08-26 PROCEDURE — 90935 HEMODIALYSIS ONE EVALUATION: CPT

## 2022-08-26 PROCEDURE — 85347 COAGULATION TIME ACTIVATED: CPT

## 2022-08-26 PROCEDURE — 93306 TTE W/DOPPLER COMPLETE: CPT

## 2022-08-26 PROCEDURE — 5A1D70Z PERFORMANCE OF URINARY FILTRATION, INTERMITTENT, LESS THAN 6 HOURS PER DAY: ICD-10-PCS | Performed by: INTERNAL MEDICINE

## 2022-08-26 PROCEDURE — 99291 CRITICAL CARE FIRST HOUR: CPT | Performed by: STUDENT IN AN ORGANIZED HEALTH CARE EDUCATION/TRAINING PROGRAM

## 2022-08-26 PROCEDURE — 99255 IP/OBS CONSLTJ NEW/EST HI 80: CPT | Performed by: HOSPITALIST

## 2022-08-26 PROCEDURE — 84132 ASSAY OF SERUM POTASSIUM: CPT

## 2022-08-26 PROCEDURE — 83036 HEMOGLOBIN GLYCOSYLATED A1C: CPT

## 2022-08-26 PROCEDURE — 700102 HCHG RX REV CODE 250 W/ 637 OVERRIDE(OP): Performed by: STUDENT IN AN ORGANIZED HEALTH CARE EDUCATION/TRAINING PROGRAM

## 2022-08-26 PROCEDURE — 80074 ACUTE HEPATITIS PANEL: CPT

## 2022-08-26 PROCEDURE — 85027 COMPLETE CBC AUTOMATED: CPT

## 2022-08-26 PROCEDURE — 85576 BLOOD PLATELET AGGREGATION: CPT | Mod: 91

## 2022-08-26 PROCEDURE — 85014 HEMATOCRIT: CPT | Mod: 91

## 2022-08-26 PROCEDURE — 84100 ASSAY OF PHOSPHORUS: CPT

## 2022-08-26 PROCEDURE — 93005 ELECTROCARDIOGRAM TRACING: CPT | Performed by: STUDENT IN AN ORGANIZED HEALTH CARE EDUCATION/TRAINING PROGRAM

## 2022-08-26 PROCEDURE — 36415 COLL VENOUS BLD VENIPUNCTURE: CPT

## 2022-08-26 PROCEDURE — 99292 CRITICAL CARE ADDL 30 MIN: CPT | Performed by: INTERNAL MEDICINE

## 2022-08-26 PROCEDURE — 84484 ASSAY OF TROPONIN QUANT: CPT

## 2022-08-26 PROCEDURE — 93010 ELECTROCARDIOGRAM REPORT: CPT | Mod: 76 | Performed by: INTERNAL MEDICINE

## 2022-08-26 PROCEDURE — 700117 HCHG RX CONTRAST REV CODE 255: Performed by: STUDENT IN AN ORGANIZED HEALTH CARE EDUCATION/TRAINING PROGRAM

## 2022-08-26 PROCEDURE — 770000 HCHG ROOM/CARE - INTERMEDIATE ICU *

## 2022-08-26 PROCEDURE — 99254 IP/OBS CNSLTJ NEW/EST MOD 60: CPT | Performed by: INTERNAL MEDICINE

## 2022-08-26 PROCEDURE — 70450 CT HEAD/BRAIN W/O DYE: CPT

## 2022-08-26 PROCEDURE — 700111 HCHG RX REV CODE 636 W/ 250 OVERRIDE (IP): Performed by: STUDENT IN AN ORGANIZED HEALTH CARE EDUCATION/TRAINING PROGRAM

## 2022-08-26 PROCEDURE — 86923 COMPATIBILITY TEST ELECTRIC: CPT | Mod: 91

## 2022-08-26 PROCEDURE — 99253 IP/OBS CNSLTJ NEW/EST LOW 45: CPT | Performed by: INTERNAL MEDICINE

## 2022-08-26 PROCEDURE — 93010 ELECTROCARDIOGRAM REPORT: CPT | Performed by: INTERNAL MEDICINE

## 2022-08-26 PROCEDURE — 80053 COMPREHEN METABOLIC PANEL: CPT

## 2022-08-26 PROCEDURE — 36430 TRANSFUSION BLD/BLD COMPNT: CPT

## 2022-08-26 PROCEDURE — 83735 ASSAY OF MAGNESIUM: CPT

## 2022-08-26 PROCEDURE — 700111 HCHG RX REV CODE 636 W/ 250 OVERRIDE (IP)

## 2022-08-26 PROCEDURE — 700102 HCHG RX REV CODE 250 W/ 637 OVERRIDE(OP): Performed by: INTERNAL MEDICINE

## 2022-08-26 PROCEDURE — 85018 HEMOGLOBIN: CPT | Mod: 91

## 2022-08-26 PROCEDURE — 86706 HEP B SURFACE ANTIBODY: CPT

## 2022-08-26 PROCEDURE — 85384 FIBRINOGEN ACTIVITY: CPT

## 2022-08-26 PROCEDURE — P9016 RBC LEUKOCYTES REDUCED: HCPCS | Mod: 91

## 2022-08-26 PROCEDURE — A9270 NON-COVERED ITEM OR SERVICE: HCPCS | Performed by: INTERNAL MEDICINE

## 2022-08-26 PROCEDURE — A9270 NON-COVERED ITEM OR SERVICE: HCPCS | Performed by: STUDENT IN AN ORGANIZED HEALTH CARE EDUCATION/TRAINING PROGRAM

## 2022-08-26 PROCEDURE — C9113 INJ PANTOPRAZOLE SODIUM, VIA: HCPCS | Performed by: STUDENT IN AN ORGANIZED HEALTH CARE EDUCATION/TRAINING PROGRAM

## 2022-08-26 PROCEDURE — 85610 PROTHROMBIN TIME: CPT

## 2022-08-26 RX ORDER — PHYTONADIONE 5 MG/1
5 TABLET ORAL ONCE
Status: COMPLETED | OUTPATIENT
Start: 2022-08-26 | End: 2022-08-26

## 2022-08-26 RX ORDER — ATORVASTATIN CALCIUM 40 MG/1
40 TABLET, FILM COATED ORAL EVERY EVENING
Status: DISCONTINUED | OUTPATIENT
Start: 2022-08-26 | End: 2022-08-29 | Stop reason: HOSPADM

## 2022-08-26 RX ORDER — ISOSORBIDE MONONITRATE 30 MG/1
30 TABLET, EXTENDED RELEASE ORAL
Status: DISCONTINUED | OUTPATIENT
Start: 2022-08-26 | End: 2022-08-29 | Stop reason: HOSPADM

## 2022-08-26 RX ORDER — AMIODARONE HYDROCHLORIDE 200 MG/1
200 TABLET ORAL DAILY
Status: DISCONTINUED | OUTPATIENT
Start: 2022-08-26 | End: 2022-08-29 | Stop reason: HOSPADM

## 2022-08-26 RX ADMIN — AMIODARONE HYDROCHLORIDE 200 MG: 200 TABLET ORAL at 10:03

## 2022-08-26 RX ADMIN — PHYTONADIONE 5 MG: 5 TABLET ORAL at 08:40

## 2022-08-26 RX ADMIN — FENTANYL CITRATE 25 MCG: 50 INJECTION, SOLUTION INTRAMUSCULAR; INTRAVENOUS at 04:00

## 2022-08-26 RX ADMIN — PANTOPRAZOLE SODIUM 40 MG: 40 INJECTION, POWDER, LYOPHILIZED, FOR SOLUTION INTRAVENOUS at 17:21

## 2022-08-26 RX ADMIN — ISOSORBIDE MONONITRATE 30 MG: 30 TABLET, EXTENDED RELEASE ORAL at 10:03

## 2022-08-26 RX ADMIN — ATORVASTATIN CALCIUM 40 MG: 40 TABLET, FILM COATED ORAL at 17:21

## 2022-08-26 RX ADMIN — METOPROLOL TARTRATE 12.5 MG: 25 TABLET, FILM COATED ORAL at 10:03

## 2022-08-26 RX ADMIN — HUMAN ALBUMIN MICROSPHERES AND PERFLUTREN 3 ML: 10; .22 INJECTION, SOLUTION INTRAVENOUS at 20:45

## 2022-08-26 RX ADMIN — PANTOPRAZOLE SODIUM 40 MG: 40 INJECTION, POWDER, LYOPHILIZED, FOR SOLUTION INTRAVENOUS at 05:18

## 2022-08-26 RX ADMIN — PROTHROMBIN, COAGULATION FACTOR VII HUMAN, COAGULATION FACTOR IX HUMAN, COAGULATION FACTOR X HUMAN, PROTEIN C, PROTEIN S HUMAN, AND WATER 2000 UNITS: KIT at 00:37

## 2022-08-26 RX ADMIN — METOPROLOL TARTRATE 12.5 MG: 25 TABLET, FILM COATED ORAL at 17:21

## 2022-08-26 ASSESSMENT — FIBROSIS 4 INDEX
FIB4 SCORE: 1.247326071615426721
FIB4 SCORE: 1.247326071615426721

## 2022-08-26 ASSESSMENT — COGNITIVE AND FUNCTIONAL STATUS - GENERAL
CLIMB 3 TO 5 STEPS WITH RAILING: A LOT
SUGGESTED CMS G CODE MODIFIER DAILY ACTIVITY: CH
MOBILITY SCORE: 21
DAILY ACTIVITIY SCORE: 24
WALKING IN HOSPITAL ROOM: A LITTLE
SUGGESTED CMS G CODE MODIFIER MOBILITY: CJ

## 2022-08-26 ASSESSMENT — LIFESTYLE VARIABLES
TOTAL SCORE: 0
HAVE YOU EVER FELT YOU SHOULD CUT DOWN ON YOUR DRINKING: NO
ALCOHOL_USE: NO
TOTAL SCORE: 0
AVERAGE NUMBER OF DAYS PER WEEK YOU HAVE A DRINK CONTAINING ALCOHOL: 0
DOES PATIENT WANT TO STOP DRINKING: NO
HAVE PEOPLE ANNOYED YOU BY CRITICIZING YOUR DRINKING: NO
ON A TYPICAL DAY WHEN YOU DRINK ALCOHOL HOW MANY DRINKS DO YOU HAVE: 0
TOTAL SCORE: 0
HOW MANY TIMES IN THE PAST YEAR HAVE YOU HAD 5 OR MORE DRINKS IN A DAY: 0
EVER FELT BAD OR GUILTY ABOUT YOUR DRINKING: NO
EVER HAD A DRINK FIRST THING IN THE MORNING TO STEADY YOUR NERVES TO GET RID OF A HANGOVER: NO
CONSUMPTION TOTAL: NEGATIVE

## 2022-08-26 ASSESSMENT — PAIN DESCRIPTION - PAIN TYPE
TYPE: ACUTE PAIN

## 2022-08-26 ASSESSMENT — ENCOUNTER SYMPTOMS
VOMITING: 0
WEAKNESS: 1
HEMOPTYSIS: 0
NAUSEA: 0
NAUSEA: 1
VOMITING: 1
SPUTUM PRODUCTION: 0
HEADACHES: 0
CHILLS: 0
DIZZINESS: 0
COUGH: 0
FOCAL WEAKNESS: 0
SHORTNESS OF BREATH: 0
FEVER: 0
BLOOD IN STOOL: 1
MUSCULOSKELETAL NEGATIVE: 1
ABDOMINAL PAIN: 0
WEAKNESS: 0
EYES NEGATIVE: 1

## 2022-08-26 ASSESSMENT — PATIENT HEALTH QUESTIONNAIRE - PHQ9
SUM OF ALL RESPONSES TO PHQ9 QUESTIONS 1 AND 2: 0
1. LITTLE INTEREST OR PLEASURE IN DOING THINGS: NOT AT ALL
2. FEELING DOWN, DEPRESSED, IRRITABLE, OR HOPELESS: NOT AT ALL

## 2022-08-26 NOTE — ASSESSMENT & PLAN NOTE
Previous rectal bleeding thought due to hemorrhoids  Had colonoscopy 10/2020 that was normal aside from small hemorrhoids  Baseline Hgb in 7s, today arrives at 6.5, not too far off baseline despite reported bleeding x4 days  On warfarin for afib    Plan:  - KCENTRA in ED  - NPO   - PPI BID  - Follow TEG, replete products as needed  - q4 H/H  - s/p 1u pRBC in ED    Transfuse Hgb >8  GI consult in am unless decompensates overnight    Most likely rectal bleeding from hemorrhoids in setting of supratherapeutic INR, given hgb relatively close to baseline after 4 days bleeding I do not suspect he requires emergent intervention overnight.      If bleeding persists after reversal will order CTA and arrange dialysis

## 2022-08-26 NOTE — ASSESSMENT & PLAN NOTE
Possibly combination upper and lower  No prior EGD noted  Colonoscopy in 10/2020 noted small internal hemorrhoids only  INR 4.4 (taking for afib)  Has missed dialysis sessions  Baseline hgb ~7s, today 6.5 after 4 days bleeding    Plan:  - PPI BID  - KCENTRA for warfarin reversal  - trend H/H and K  - Transfuse Hgb >8  - TEG post KCENTRA to correct any other coagulopathy  - If continued rectal bleeding after reversal will order CTA  - Dialysis  - NPO    If continued bleeding will consult GI emergently overnight, but given 4 days of bleeding and mild decrease in Hgb likely stable to wait until tomorrow unless there is a clinical change.    If he cannot get dialysis will consider a dose of DDAVP for uremia

## 2022-08-26 NOTE — ED TRIAGE NOTES
"Chief Complaint   Patient presents with    Blood in Vomit     Pt started to notice blood in his vomit since Monday. Today, it became worse.     Bloody Stools     Since Monday. Said it's been happening a lot.     Chest Pain     Pt states this also started on Monday     Said he was scared to get out of bed because he would get dizzy and nausea.     Pt takes coumadin.    Hx of CKD on HD (M,W,F), CAD    /59   Pulse 74   Temp 36.9 °C (98.4 °F) (Temporal)   Resp 18   Ht 1.651 m (5' 5\")   Wt 65 kg (143 lb 4.8 oz)   SpO2 99%   BMI 23.85 kg/m²           "

## 2022-08-26 NOTE — PROGRESS NOTES
RCC - Day Intensivist Note    Case reviewed at length with Dr. Rios my associate  EHR reviewed at length  Indication facilitated by online , patient mostly [place]ish-speaking  Patient seen and examined  Team rounds performed  Patient alert and oriented x1-2, predialysis he was oriented x3-4  Reported lethargic in the middle the night after dialysis but he is awake and alert now  Exam nonfocal although there is some history of prior stroke?  No bleeding overnight and no other gastrointestinal symptoms  No cardiac dysrhythmias overnight  Work of breathing low, patient 100% saturated on 2 L nasal cannula, room air trial ongoing  Hemoglobin 6.5 and received 1 unit pRBCs and dropped to 6.3 and subsequently received 2 further units pRBCs and hemoglobin now 10.5  Patient received Kcentra for reversal of warfarin but no vitamin K which she will get this morning.  INR 4.44 now improved to 1.57.  TEG with mapping revealing significant inhibition of AAA at 96.6% and patient received a unit of platelets by report  Patient denies any chest pain and has a normal cardiac exam.  F/u EKG after transfusion revealed sinus rhythm and LVH with prolonged QT and improved ST abnormalities versus initial EKG, troponin elevated  Prior echocardiogram 9/30/2020 revealed EF 70% with moderate concentric LVH and moderate AI, repeat echo pending    GI bleed, unknown source-blood per rectum historically with history of hemorrhoids  Over anticoagulated initial INR 4.4  History of atrial fibrillation on warfarin status post Kcentra and platelets  Platelet inhibition by TEG/mapping status post platelet transfusion  Anemia with hemoglobin lenora 6.3 status post 3 units now 10.5  History of colonoscopy by Dr. Rah Mancia 10/9/2020-internal hemorrhoids only  Elevated troponin, query NSTEMI, EKG improved  Altered mentation after dialysis improving with nonfocal exam   ESRD status post HD, had missed dialysis  Hyperkalemia improved with  pharmacotherapy and HD  Prolonged QT improved but still elevated at 513  History of hypertension    Continue Protonix bolus therapy twice daily  Continue every 6 hemoglobin x24 hours and reassess  Transfuse to Hgb >= 8 ideally while in HD, C/o coronary ischemia  HD per nephrology M/W/F  NSTEMI versus demand ischemia from anemia?  Repeat echocardiogram pending  Repeat EKG improved after transfusion and patient asymptomatic  Altered LOC after Kcentra and dialysis, exam nonfocal-CT head pending  Every 2-4hr neurochecks, consider neuro consult  Call out to family attempted, voicemail not set up, RN/CM to help with communication  Avoid QT prolonging agents  Follow-up troponin level  Serial BMP  Follow-up on INR  IS/sequentials, no heparins    May benefit from cardiology consult, echo pending  Call GI consultation now-[facility] on call [phone]    Begin resuming some home meds, patient hypertensive post dialysis and transfusion, will start with half his usual dose of Imdur and metoprolol in the face of GI bleeding and possible NSTEMI and hold losartan for now and resume all home medicines at home doses when clinically appropriate    Spoke with Dr. Horton with [facility].  He will see patient but requested cardiology clearance for anesthesia/EGD.    Spoke with Dr Mon with cardiology, no intervention, no anti-plts for now, can't get full history yet so labeling high risk and may benefit from [employer] in future.    CT head negative for bleed, mass or CVA    Case reviewed with RN, RT, charge RN, clinical pharmacist,  and patient    The patient remains critically ill.  Critical care time = 60 minutes in directly providing and coordinating critical care and extensive data review.  No time overlap and excludes procedures.

## 2022-08-26 NOTE — CONSULTS
CARDIOLOGY CONSULTATION NOTE      Date of Consultation: 8/26/2022  Consulting Provider: Dr Vaz    Patient Name: Giovanni Saini  YOB: 1970  MRN: [id number]    Reason for Consultation     Pre-operative evaluation prior to EGD    History of Present Illness     Pt is a 53 yo M with PMHx HTN, a-fib (home warfarin), CVA (residual b/l LE weakness), ESRD on HD (MWF) moderate AR presenting 8/25 for c/c hematemesis/ hematochezia.     Of note, pt is a poor historian at time of history since from dialysis. Per nursing, was A&Ox4 but s/p HD is A&Ox2. Pt notes onset of nausea vomiting Monday PTA- black vomitus, multiple episodes but is unsure how many. Reports chest pain that was left sided/ substernal felt like pressure 8/10 in severity that occurred Monday after vomiting. He does not feel it has recurred since then but is unsure. Last episode of CP was last year although he is unsure about this as well. Denies alcohol/ ibuprofen use although is not confident in what meds he takes at home. Endorses melenic stool x 1 Monday PTA. States he uses walker at baseline and is able to garden few days of the week but would not be able to run/ participate in moderate intensity sports adequately.     At time of consult, patient has received 3u pRBCs with stable vitals and no repeat hematemesis per nursing.    Cardiology is being consulted for pre-operative evaluation prior to potential EGD by GI team in setting of h/o chest pain found to have troponin elevation (136 > 144 > 185). EKG 8/25 with prolonged QT/Qtc with some anterolateral T wave inversion, otherwise no signs of ischemia.     Medical and Surgical History     Past Medical History:   Diagnosis Date    Heart murmur     Valvular heart disease     aortic insufficiency     Past Surgical History:   Procedure Laterality Date    PB COLONOSCOPY,DIAGNOSTIC N/A 10/9/2020    Procedure: COLONOSCOPY;  Surgeon: Rah Mancia M.D.;  Location: SURGERY SAME DAY  [facility];  Service: Gastroenterology    PB THORACOSCOPY,DX NO BX Left 10/8/2020    Procedure: THORACOSCOPY;  Surgeon: Darío Guerra M.D.;  [facility] SURG[facility];  Service: General    CATH PLACEMENT  8/27/2017    Procedure: CATH PLACEMENT - perm cath;  Surgeon: Hiren Ayon M.D.;  Location: SURGERY [facility];  Service: General    AV FISTULA CREATION  8/27/2017    Procedure: AV FISTULA CREATION - left radial cephalic;  Surgeon: Hiren Ayon M.D.;  Location: SURGERY [facility];  Service: General       Family History     Family History   Problem Relation Age of Onset    Stroke Sister 38        CVA       Social History    reports that he has never smoked. He has never used smokeless tobacco. He reports that he does not drink alcohol and does not use drugs.      Medications and Allergies     Current Outpatient Medications   Medication Instructions    amiodarone (CORDARONE) 200 mg, Oral, DAILY    atorvastatin (LIPITOR) 40 mg, Oral, EVERY EVENING    calcium acetate (PHOS-LO) 1,334 mg, Oral, 3 TIMES DAILY WITH MEALS, 2001 mg = 3 tablets    ceFAZolin in dextrose (ANCEF) 2-4 GM/100ML-% IVPB 2 g, Intravenous, EVERY MO, WE , FR    cyanocobalamin (VITAMIN B-12) 500 mcg, Oral, DAILY    folic acid (FOLVITE) 1 mg, Oral, DAILY    furosemide (LASIX) 40 mg, Oral, DAILY    gabapentin (NEURONTIN) 100 mg, Oral, 3 TIMES DAILY    isosorbide mononitrate SR (IMDUR) 60 mg, Oral, DAILY    losartan (COZAAR) 25 MG Tab TAKE ONE TABLET BY MOUTH EVERY DAY AT BEDTIME FOR BLOOD PRESSURE - BRING ALL MEDICATIONS TO EVERY APPOINTMENT - AS PER DR. STRICKLAND    metoprolol SR (TOPROL XL) 50 mg, Oral, DAILY    tamsulosin (FLOMAX) 0.4 mg, Oral, AFTER BREAKFAST    Vitamin C 1,000 mg, Oral, DAILY    warfarin (COUMADIN) 3 mg, Oral, DAILY       No Known Allergies    Review of Systems   A pertinent review of systems was performed and was unremarkable except as per HPI above.  Review of Systems   Constitutional:  Negative for chills and  "fever.   HENT: Negative.     Eyes: Negative.    Respiratory:  Negative for sputum production and shortness of breath.    Cardiovascular:  Negative for chest pain and leg swelling.   Gastrointestinal:  Positive for blood in stool and vomiting. Negative for abdominal pain and nausea.   Genitourinary: Negative.    Musculoskeletal: Negative.    Neurological:  Negative for focal weakness, weakness and headaches.       Vital Signs   BP (!) 141/67   Pulse (!) 56   Temp 36.8 °C (98.3 °F) (Temporal)   Resp (!) 39   Ht 1.651 m (5' 5\")   Wt 64.1 kg (141 lb 5 oz)   SpO2 96%   BMI 23.52 kg/m²   Vitals:    08/26/22 0900 08/26/22 1000 08/26/22 1100 08/26/22 1200   BP: (!) 148/65 134/63 (!) 142/68 (!) 141/67   Pulse: 61 70 64 (!) 56   Resp: 19 (!) 50 15 (!) 39   Temp:  36.9 °C (98.5 °F)  36.8 °C (98.3 °F)   TempSrc:  Temporal  Temporal   SpO2: 96% 99% 98% 96%   Weight:       Height:         Body mass index is 23.52 kg/m².  Oxygen Therapy:  Pulse Oximetry: 96 %, O2 (LPM): 2, O2 Delivery Device: Room air w/o2 available    Physical Exam  Constitutional:       General: He is not in acute distress.     Appearance: Normal appearance. He is not ill-appearing or diaphoretic.   HENT:      Head: Normocephalic and atraumatic.      Mouth/Throat:      Mouth: Mucous membranes are moist.   Eyes:      Extraocular Movements: Extraocular movements intact.      Pupils: Pupils are equal, round, and reactive to light.   Cardiovascular:      Rate and Rhythm: Normal rate and regular rhythm.      Heart sounds:     No friction rub. No gallop.      Comments: Possible s4  Pulmonary:      Effort: No respiratory distress.      Breath sounds: No stridor. No wheezing, rhonchi or rales.   Abdominal:      General: Bowel sounds are normal. There is no distension.      Tenderness: There is no abdominal tenderness. There is no guarding or rebound.   Musculoskeletal:         General: No swelling.      Right lower leg: No edema.      Left lower leg: No edema.    "   Comments: L AVF + thrill   Skin:     Coloration: Skin is not jaundiced or pale.   Neurological:      General: No focal deficit present.      Mental Status: He is alert.      Comments: A&Ox2   Psychiatric:         Mood and Affect: Mood normal.         Behavior: Behavior normal.       Laboratories:   Estimated Creatinine Clearance: 19.7 mL/min (A) (by C-G formula based on SCr of 3.73 mg/dL (H)).  Recent Labs     08/25/22 2041 08/26/22  0052 08/26/22 0449   CREATININE 11.25*  --  3.73*   BUN 79*  --  28*   POTASSIUM 6.6* 5.0 3.8   SODIUM 143  --  139   CALCIUM 9.1  --  9.0   MAGNESIUM 2.4  --  1.9   CO2 20  --  27   ALBUMIN 3.9  --  4.5     Recent Labs     08/25/22 2041 08/26/22 0449   GLUCOSE 129* 109*     Recent Labs     08/25/22 2041 08/26/22 0449 08/26/22  0555   ASTSGOT 20 26  --    ALTSGPT 17 16  --    ALKPHOSPHAT 69 75  --    INR 4.44*  --  1.57*     Recent Labs     08/25/22 2041 08/26/22  0143 08/26/22 0449 08/26/22  1020   WBC 16.2*  --  16.7*  --    HEMOGLOBIN 6.5* 6.3* 10.5* 9.9*   PLATELETCT 210  --  192  --      Recent Labs     08/25/22 2041 08/26/22  0449 08/26/22  1020   TROPONINT 138* 144* 185*   NTPROBNP >40143*  --   --      Lab Results   Component Value Date/Time    LDL 37 02/22/2020 0838    LDL 50 08/10/2017 0016     Lab Results   Component Value Date/Time    HDL 61 02/22/2020 0838    HDL 44 08/10/2017 0016       Lab Results   Component Value Date/Time    TRIGLYCERIDE 65 02/22/2020 0838    TRIGLYCERIDE 48 08/10/2017 0016       Lab Results   Component Value Date/Time    CHOLSTRLTOT 111 02/22/2020 0838    CHOLSTRLTOT 104 08/10/2017 0016       Studies:   Echocardiography  Pending, last TTE 9/30/2020- LVEF 70% and moderate concentric LVH and moderate AR     Stress Testing  10/3/2020- small ill defined mostly fixed defect inferolateral wall, likely small infarct or artifact (no reversible defect) otherwise WNL    Coronary Angiography/Cardiac Catheterization  None    X-ray/CT/MRI  CT head-  cerebral atrophy, small vessel ischemic changes  CXR- cardiomegaly    Electrophysiology  prolonged QT/Qtc with some anterolateral T wave inversion, otherwise no signs of ischemia.     Telemetry  Infrequent PVC/PAC otherwise NSR     Assessment and Recommendations:     Pt is a 53 yo M with PMHx HTN, a-fib (home warfarin), CVA (residual b/l LE weakness), ESRD on HD (MWF) moderate AR presenting 8/25 for c/c hematemesis/ hematochezia with cardiology consulted for pre-operative evaluation for endoscopy.     #Pre-operative evaluation   #Non-ST elevation myocardial infarction, likely type 2  #Atrial fibrillation, paroxysmal  #Prolonged Qtc/ QT- 475ms   -H/o CP with vomiting earlier this week, last similar episode of CP one year prior although pt poor historian  -Trop 138 > 144 > 185  -EKG 8/25 with anterolateral T wave inversions but otherwise no signs of ischemia  -RCRI 1%, 3 METs activity level  -TTE 9/30/2020- LVEF 70% and moderate concentric LVH and moderate AR   -Stress test 10/3/2020- small ill defined mostly fixed defect inferolateral wall, likely small infarct or artifact (no reversible defect) otherwise WNL  PLAN  -Metoprolol tartrate, IMDUR (both lower dose than home rx)  -Home atorvastatin, amiodarone  -Hold home warfarin pending eval for active GI bleed  -TTE   -At this time, patient has risk factors that make him high risk for intervention but given his CP/ trop elevation is likely demand in setting of acute blood loss anemia, it is reasonable to proceed with EGD- a low risk procedure  -Cardiology to follow up as outpt for possible stress test    #Acute blood loss anemia s/p 3u pRBC with hgb stable- GI on consult, hold warfarin while evaluating for active bleed  #Hyperbilirubinemia- CTM    #ESRD, MWF HD    Cardiology will sign off.   Thank you for allowing me to participate in the care of this patient.  Please contact me with any questions.    Please see Dr Godwin addendum for final assessment and plan.      [employer], PGY-2   [employer] [facility]     [facility], [address]  [address] [place] [id number]  Phone: [phone]

## 2022-08-26 NOTE — ED NOTES
Med Rec Complete per patient's daughter at the bedside  Allergies Reviewed with patient's daughter  No antibiotics within the last 30 days  Patient's Preferred Pharmacy: [facility]    Patient currently takes Coumadin 3 mg daily, according to the daughter.

## 2022-08-26 NOTE — CARE PLAN
Problem: Knowledge Deficit - Standard  Goal: Patient and family/care givers will demonstrate understanding of plan of care, disease process/condition, diagnostic tests and medications  Outcome: Progressing     Problem: Pain - Standard  Goal: Alleviation of pain or a reduction in pain to the patient’s comfort goal  Outcome: Progressing     Problem: Fall Risk  Goal: Patient will remain free from falls  Outcome: Progressing       The patient is Stable - Low risk of patient condition declining or worsening    Shift Goals  Clinical Goals: monitor vital signs  Patient Goals: pain control    Progress made toward(s) clinical / shift goals:  Vital signs have remained stable.    Patient is not progressing towards the following goals:

## 2022-08-26 NOTE — ASSESSMENT & PLAN NOTE
Likely has underlying CAD  New T wave inversions V6 and flattened in V5  No STEMI    Probably precipitated by bleeding  Not a candidate for DAPT or heparin gtt currently given GIB  Trend ECG  Trend Trop    Reverse warfarin and transfuse Hgb >8  If ECG changes to STEMI will activate cath team    If no improvement can discuss with cardiology tomorrow

## 2022-08-26 NOTE — ASSESSMENT & PLAN NOTE
Blood pressure has been elevated therefore home meds have been restarted and will be titrated up accordingly

## 2022-08-26 NOTE — ASSESSMENT & PLAN NOTE
Has missed 1-2 sessions of dialysis  S/p insulin/dextrose/calcium in ED  Nephrology consult for dialysis

## 2022-08-26 NOTE — ED PROVIDER NOTES
"ED Provider Note    CHIEF COMPLAINT  Chief Complaint   Patient presents with    Blood in Vomit     Pt started to notice blood in his vomit since Monday. Today, it became worse.     Bloody Stools     Since Monday. Said it's been happening a lot.     Chest Pain     Pt states this also started on Monday       HPI  Giovanni Saini is a 52 y.o. male who presents with multiple complaints.  Patient endorses melanic stools and coffee-ground emesis ongoing since Monday.  He feels weak and lightheaded. He endorses multiple episodes per day.   He has also been having some constant, severe left-sided chest pressure which he denies any worsening with exertion.  He does endorse associated nausea and diaphoresis.  He says it radiates to his chin. Chest pain also started on Monday.   Last received dialysis on Friday. (Schedule is MW)    Patient is a very challenging historian.  Extensive chart review reveals that he has a history of bleeding which was thought to be due from bleeding internal hemorrhoids.  He underwent a colonoscopy when he was admitted last October which was notable for: \"Normal appearing terminal ileum  Normal colonic mucosa; Small internal hemorrhoids; warfarin resumed.    He also has a known history of paroxysmal atrial fibrillation and is on Coumadin.    History obtained with     REVIEW OF SYSTEMS  As per HPI, otherwise a 10 point review of systems is negative    PAST MEDICAL HISTORY  Past Medical History:   Diagnosis Date    Heart murmur     Valvular heart disease     aortic insufficiency   ESRD   Atrial Fibrillation    SOCIAL HISTORY  Social History     Tobacco Use    Smoking status: Never    Smokeless tobacco: Never   Substance Use Topics    Alcohol use: No     Alcohol/week: 6.0 oz     Types: 10 Standard drinks or equivalent per week     Comment: quit after hospital     Drug use: No       SURGICAL HISTORY  Past Surgical History:   Procedure Laterality Date    PB COLONOSCOPY,DIAGNOSTIC " "N/A 10/9/2020    Procedure: COLONOSCOPY;  Surgeon: Rah Mancia M.D.;  Location: SURGERY SAME DAY [facility];  Service: Gastroenterology    PB THORACOSCOPY,DX NO BX Left 10/8/2020    Procedure: THORACOSCOPY;  Surgeon: Darío Guerra M.D.;  Location: SURGERY [facility];  Service: General    CATH PLACEMENT  8/27/2017    Procedure: CATH PLACEMENT - perm cath;  Surgeon: Hiren Ayon M.D.;  Location: SURGERY [facility];  Service: General    AV FISTULA CREATION  8/27/2017    Procedure: AV FISTULA CREATION - left radial cephalic;  Surgeon: Hiren Ayon M.D.;  Location: SURGERY [facility];  Service: General       CURRENT MEDICATIONS  Home Medications       Reviewed by Madison Parisi (Pharmacy Tech) on 08/25/22 at 2353  Med List Status: Complete     Medication Last Dose Status   amiodarone (CORDARONE) 200 MG Tab 8/24/2022 Active   Ascorbic Acid (VITAMIN C) 1000 MG Tab 8/24/2022 Active   atorvastatin (LIPITOR) 40 MG Tab 8/24/2022 Active   calcium acetate (PHOS-LO) 667 MG Tab tablet 8/24/2022 Active   ceFAZolin in dextrose (ANCEF) 2-4 GM/100ML-% IVPB 8/24/2022 Active   cyanocobalamin (VITAMIN B-12) 500 MCG Tab 8/24/2022 Active   folic acid (FOLVITE) 1 MG Tab 8/24/2022 Active   furosemide (LASIX) 40 MG Tab 8/24/2022 Active   gabapentin (NEURONTIN) 100 MG Cap 8/24/2022 Active   isosorbide mononitrate SR (IMDUR) 60 MG TABLET SR 24 HR 8/24/2022 Active   losartan (COZAAR) 25 MG Tab 8/24/2022 Active   metoprolol SR (TOPROL XL) 50 MG TABLET SR 24 HR 8/24/2022 Active   tamsulosin (FLOMAX) 0.4 MG capsule 8/24/2022 Active   warfarin (COUMADIN) 3 MG Tab 8/24/2022 Active                    ALLERGIES  No Known Allergies    PHYSICAL EXAM  VITAL SIGNS: BP (!) 147/62   Pulse 79   Temp 36.6 °C (97.8 °F) (Temporal)   Resp 19   Ht 1.651 m (5' 5\")   Wt 64.1 kg (141 lb 5 oz)   SpO2 100%   BMI 23.52 kg/m²    Constitutional: Awake and alert . Chronically ill appearing   HENT: Normal inspection  Eyes: Normal " inspection  Neck: Grossly normal range of motion.  Cardiovascular: Normal heart rate, Normal rhythm.  Symmetric peripheral pulses.   Thorax & Lungs: No respiratory distress, No wheezing, No rales, No rhonchi, No chest tenderness.   Abdomen: Soft, non-distended, nontender, no mass  Rectal: grossly bloody stool   Skin: No obvious rash.  Back: No tenderness, No CVA tenderness.   Extremities: Warm, well perfused. No clubbing, cyanosis, edema,   Neurologic: Grossly normal   Psychiatric: Normal for situation    RADIOLOGY/PROCEDURES  DX-CHEST-PORTABLE (1 VIEW)   Final Result         1.  No acute cardiopulmonary disease.   2.  Cardiomegaly   3.  Atherosclerosis           Imaging is interpreted by radiologist    Labs:  Results for orders placed or performed during the hospital encounter of 08/25/22   CBC with Differential   Result Value Ref Range    WBC 16.2 (H) 4.8 - 10.8 K/uL    RBC 1.93 (L) 4.70 - 6.10 M/uL    Hemoglobin 6.5 (L) 14.0 - 18.0 g/dL    Hematocrit 20.2 (L) 42.0 - 52.0 %    .7 (H) 81.4 - 97.8 fL    MCH 33.7 (H) 27.0 - 33.0 pg    MCHC 32.2 (L) 33.7 - 35.3 g/dL    RDW 66.2 (H) 35.9 - 50.0 fL    Platelet Count 210 164 - 446 K/uL    MPV 10.7 9.0 - 12.9 fL    Neutrophils-Polys 96.50 (H) 44.00 - 72.00 %    Lymphocytes 3.50 (L) 22.00 - 41.00 %    Monocytes 0.00 0.00 - 13.40 %    Eosinophils 0.00 0.00 - 6.90 %    Basophils 0.00 0.00 - 1.80 %    Nucleated RBC 0.00 /100 WBC    Neutrophils (Absolute) 15.63 (H) 1.82 - 7.42 K/uL    Lymphs (Absolute) 0.57 (L) 1.00 - 4.80 K/uL    Monos (Absolute) 0.00 0.00 - 0.85 K/uL    Eos (Absolute) 0.00 0.00 - 0.51 K/uL    Baso (Absolute) 0.00 0.00 - 0.12 K/uL    NRBC (Absolute) 0.00 K/uL    Hypochromia 1+     Anisocytosis 1+     Macrocytosis 1+    Complete Metabolic Panel (CMP)   Result Value Ref Range    Sodium 143 135 - 145 mmol/L    Potassium 6.6 (HH) 3.6 - 5.5 mmol/L    Chloride 94 (L) 96 - 112 mmol/L    Co2 20 20 - 33 mmol/L    Anion Gap 29.0 (H) 7.0 - 16.0    Glucose 129 (H)  65 - 99 mg/dL    Bun 79 (H) 8 - 22 mg/dL    Creatinine 11.25 (HH) 0.50 - 1.40 mg/dL    Calcium 9.1 8.5 - 10.5 mg/dL    AST(SGOT) 20 12 - 45 U/L    ALT(SGPT) 17 2 - 50 U/L    Alkaline Phosphatase 69 30 - 99 U/L    Total Bilirubin 0.6 0.1 - 1.5 mg/dL    Albumin 3.9 3.2 - 4.9 g/dL    Total Protein 6.3 6.0 - 8.2 g/dL    Globulin 2.4 1.9 - 3.5 g/dL    A-G Ratio 1.6 g/dL   Troponin   Result Value Ref Range    Troponin T 138 (H) 6 - 19 ng/L   MAGNESIUM   Result Value Ref Range    Magnesium 2.4 1.5 - 2.5 mg/dL   PHOSPHORUS   Result Value Ref Range    Phosphorus 8.6 (H) 2.5 - 4.5 mg/dL   ESTIMATED GFR   Result Value Ref Range    GFR (CKD-EPI) 5 (A) >60 mL/min/1.73 m 2   DIFFERENTIAL MANUAL   Result Value Ref Range    Manual Diff Status PERFORMED    PERIPHERAL SMEAR REVIEW   Result Value Ref Range    Peripheral Smear Review see below    MORPHOLOGY   Result Value Ref Range    RBC Morphology Present     Polychromia 1+     Poikilocytosis 1+     Ovalocytes 1+    COD - Adult (Type and Screen)   Result Value Ref Range    ABO Grouping Only O     Rh Grouping Only POS     Antibody Screen-Cod NEG    COMPONENT CELLULAR   Result Value Ref Range    Component R       R3.                 Red Blood Cells     [id number]   issued       08/26/22   00:55      Product Type Red Blood Cells  LR Pheresis     Dispense Status issued     Unit Number (Barcoded) [id number]     Product Code (Barcoded) F0489L23     Blood Type (Barcoded) 9500     Component R       R99                 Red Cells, LR       [id number]   issued       08/26/22   02:23      Product Type R99     Dispense Status issued     Unit Number (Barcoded) [id number]     Product Code (Barcoded) X7354C83     Blood Type (Barcoded) 9500     Component R       R99                 Red Cells, LR       [id number]   issued       08/26/22   03:11      Product Type R99     Dispense Status issued     Unit Number (Barcoded) [id number]     Product Code (Barcoded) J7235R02     Blood Type  (Barcoded) 9109    PT/INR   Result Value Ref Range    PT 40.5 (H) 12.0 - 14.6 sec    INR 4.44 (H) 0.87 - 1.13   proBrain Natriuretic Peptide, NT   Result Value Ref Range    NT-proBNP >20905 (H) 0 - 125 pg/mL   HEMOGLOBIN AND HEMATOCRIT   Result Value Ref Range    Hemoglobin 6.3 (L) 14.0 - 18.0 g/dL    Hematocrit 19.1 (L) 42.0 - 52.0 %   POTASSIUM SERUM (K)   Result Value Ref Range    Potassium 5.0 3.6 - 5.5 mmol/L   PLATELET MAPPING WITH BASIC TEG   Result Value Ref Range    Reaction Time Initial-R 6.2 4.6 - 9.1 min    React Time Initial Hep 4.5 4.3 - 8.3 min    Clot Kinetics-K 0.9 0.8 - 2.1 min    Clot Angle-Angle 76.6 63.0 - 78.0 degrees    Maximum Clot Strength-MA 63.9 52.0 - 69.0 mm    TEG Functional Fibrinogen(MA) 27.0 15.0 - 32.0 mm    Lysis 30 minutes-LY30 1.6 0.0 - 2.6 %    % Inhibition ADP 37.3 (H) 0.0 - 17.0 %    % Inhibition AA 96.6 (H) 0.0 - 11.0 %    TEG Algorithm Link Algorithm    HEP B SURFACE AB   Result Value Ref Range    Hep B Surface Antibody Quant 93.90 (H) 0.00 - 10.00 mIU/mL   HEPATITIS PANEL ACUTE(4 COMPONENTS)   Result Value Ref Range    Hepatitis B Surface Antigen Non-Reactive Non-Reactive    Hepatitis B Cors Ab,IgM Non-Reactive Non-Reactive    Hepatitis A Virus Ab, IgM Non-Reactive Non-Reactive    Hepatitis C Antibody Non-Reactive Non-Reactive   EKG   Result Value Ref Range    Report       [facility]    Test Date:  2022  Pt Name:    BOUBACAR FERNANDEZ                 Department: ER  MRN:        [id number]                      Room:  Gender:     Male                         Technician: [id number]  :        1970                   Requested By:ER TRIAGE PROTOCOL  Order #:    [id number]                    Trent MD:    Measurements  Intervals                                Axis  Rate:       70                           P:          30  DE:         174                          QRS:        -40  QRSD:       113                          T:          112  QT:         491  QTc:         530    Interpretive Statements  Sinus rhythm  Borderline IVCD with LAD  Abnormal R-wave progression, late transition  Abnormal T, consider ischemia, lateral leads  Prolonged QT interval  Compared to ECG 10/05/2020 16:47:45  T-wave abnormality now present  Possible ischemia now present  Prolonged QT interval now present  Atrial fibrillation no longer present  ST (T wave) deviation no longer pres ent       The EKG was reviewed by me and interpreted as documented above    Medications   acetaminophen (Tylenol) tablet 650 mg (has no administration in time range)   pantoprazole (Protonix) injection 40 mg (has no administration in time range)   fentaNYL (SUBLIMAZE) injection 25-50 mcg (25 mcg Intravenous Given 8/26/22 0400)   dextrose 10 % BOLUS 25 g (25 g Intravenous Given 8/25/22 2303)     And   insulin regular (HumuLIN R,NovoLIN R) injection (4 Units Intravenous Given 8/25/22 2300)   calcium GLUConate 2 g in NaCl IVPB premix (0 g Intravenous Stopped 8/26/22 0015)   sodium bicarbonate 8.4 % injection 50 mEq (50 mEq Intravenous Given 8/25/22 2308)   pantoprazole (Protonix) 80 mg in  mL IVPB (0 mg Intravenous Stopped 8/26/22 0014)   fentaNYL (SUBLIMAZE) injection 50 mcg (50 mcg Intravenous Given 8/25/22 2258)   prothrombin complex conc human (Kcentra) 1000 units KIT 2,000 Units (2,000 Units Intravenous Given 8/26/22 0037)       Measures:  HTN/IDDM FOLLOW UP:  The patient has known hypertension and is being followed by their primary care doctor    COURSE & MEDICAL DECISION MAKING    This is a 52-year-old end-stage renal disease who presents with coffee-ground emesis and melena.  He arrives mildly hypertensive but otherwise has normal vital signs and is hemodynamically normal.  Patient without abdominal pain to suggest mesenteric ischemia or other acute intraabdominal process (e.g. bowel obstruction, diverticulitis, appendicitis etc.)  to warrant advanced imaging.   Of note he has many laboratory derangements including  a leukocytosis and a hemoglobin of 6.5.  I suspect he is anemic in the setting of GI bleed (likely also chronically anemia in setting of ESRD).  Patient consented to transfusion and this was administered without event.  He was given a bolus of pantoprazole.  Patient without known history of liver disease.  Patient did remained stable from a hemodynamic standpoint but will need endoscopy/colonoscopy as an inpatient.   Of note as well patient with new T wave inversions in the lateral leads and troponin is also elevated at 153.  His presentation is most consistent with an NSTEMI which I suspect is demand in the setting of anemia.  He is currently not on candidate for intervention or dual antiplatelet therapy in the setting of acute blood loss. No STEMI on EKG.  His pain was treated with fentanyl in the emergency department.  I have low suspicion for pulmonary embolism as he is anticoagulated and further his presentation is not consistent with aortic pathology.  X-ray also without acute cardiopulmonary process.  Additionally, patient also was found to be supratherapeutic with an INR over 4.  After discussion with the intensivist I reversed his warfarin with PCC as this is likely exacerbating his blood loss anemia.   Patient significantly volume overloaded as indicated by his BNP his potassium is also elevated at 6.6.  He was ordered for temporizing measures for his potassium however I did consult Dr. Merchant with nephrology in order to arrange urgent dialysis overnight.    Patient was admitted to the intensive care unit in guarded condition.    FINAL IMPRESSION  1. Gastrointestinal hemorrhage with melena Acute       2. Blood loss anemia Acute       3. NSTEMI (non-ST elevated myocardial infarction) (HCC) Acute       4. Hyperkalemia Acute       5. Supratherapeutic INR            Critical care time : Giovanni presents with an acute critical illness and in my judgement had significant potential for imminent deterioration. I  provided  40 minutes of Critical Care time to this patient, exclusive of any separately billable procedures. This included bedside direction of care, frequent re-evaluations, time spent coordinating ongoing consultant care and time of medical documentation.  Additional critical needs were transfusing blood and management of hyperkalemia.         This dictation was created using voice recognition software. The accuracy of the dictation is limited to the abilities of the software.  The nursing notes were reviewed and certain aspects of this information were incorporated into this note.      Electronically signed by: Brenda Cintron M.D., 8/25/2022 10:08 PM

## 2022-08-26 NOTE — CARE PLAN
The patient is Watcher - Medium risk of patient condition declining or worsening    Shift Goals  Clinical Goals: Stable neuro, CT, stable labs  Patient Goals: Comfort  Family Goals: No family present    Progress made toward(s) clinical / shift goals:   Plan of care explained to patient, neuro status stable and improving, hemodynamically stable at this time.    Problem: Knowledge Deficit - Standard  Goal: Patient and family/care givers will demonstrate understanding of plan of care, disease process/condition, diagnostic tests and medications  Outcome: Progressing     Problem: Neuro Status  Goal: Neuro status will remain stable or improve  Outcome: Progressing     Problem: Hemodynamics  Goal: Patient's hemodynamics, fluid balance and neurologic status will be stable or improve  Outcome: Progressing       Patient is not progressing towards the following goals:

## 2022-08-26 NOTE — TELEPHONE ENCOUNTER
----- Message from Kvng Godwin M.D. sent at 8/26/2022 11:48 AM PDT -----  Regarding: EP follow up  Team,    Can you please set this patient up in EP clinic to discuss Watchman device. Thank you.

## 2022-08-26 NOTE — H&P
[facility] ICU History of Presenting Illness      Admit Date: 8/25/2022    Resident(s): Darío Conde M.D.   Attending:  GHADA HERNANDEZ/ Dr. Rios    Patient ID:    Name:  Giovanni Saini   YOB: 1970  Age:  52 y.o.  male   MRN:  [id number]    History of Presenting Illness:         Giovanni Saini is a 52 y.o. male with past medical history of hypertension, atrial fibrillation on warfarin, prior CVA with residual bilateral lower extremity weakness,  ESRD on HD MWF who presented to the ED on 08/25/22 for hematemesis, hematochezia and left sided chest pain worse with exertion.           Patient notes weakness and difficulty walking today. He normally uses a walker to help get around, which he has done since having a stroke 3 years ago. He notes starting to vomit 3 days prior to admission with blood in vomiting starting 2 days prior to admission. He noticed black colored stool started today. He was seen in October 2020 for hematochezia, colonoscopy showed small internal hemorrhoids. Hemoglobin on arrival was 6.5 compared to baseline of 7-8 for the past year. INR was elevated at 4.44. K elevated at 6.6 with BUN 79 and creatinine of 11.25. ECG showed no peaked T waves or P wave flattening, QRS or SC widening. Patient given 2g calcium gluconate, D10 and insulin.             Patient noted some chest pain with vomiting starting Monday. He notes few minutes of shortness of breath on Tuesday. He finally presented today because of weakness, not being able to walk. Trop T elevated at 138, BNP >35,000. ECG showed no ST elevation. Initial vitals show patient is afebrile, heart rate 74, respiratory rate 18, blood pressure 120/59 mmHg, satting 94% on room air. Patient to be admitted to the ICU.     Consultants:  Critical Care    Interval Events:    08/26/22: Admitted to ICU    NEURO:   AAOx3  CARDIOVASC:  VSS  RESPIRATORY:  RA  GI/NUTRITION:  Melena, hematemesis  RENAL/FLUID/LYTES: Hyperkalemia, ESRD,  "Hyperphosphatemia  HEME/ONC:   INR 4.44  INFECTIOUS D:  N/A  ENDOCRINE:   N/A    Social history:  No alcohol, tobacco or drugs.     Family History:  Diabetes, Hypertension in both parents    Vitals Range last 24h:  Temp:  [36.9 °C (98.4 °F)] 36.9 °C (98.4 °F)  Pulse:  [74] 74  Resp:  [18] 18  BP: (120)/(59) 120/59  SpO2:  [99 %] 99 %    No intake or output data in the 24 hours ending 08/25/22 5785     Review of Systems   Constitutional:  Negative for chills and fever.   Respiratory:  Negative for hemoptysis and shortness of breath (None currently, briefly 2 days prior).    Cardiovascular:  Positive for chest pain (Intermittent chest pain, associated with vomiting/walking).   Gastrointestinal:  Positive for blood in stool, melena, nausea and vomiting. Negative for abdominal pain.   Neurological:  Positive for weakness. Negative for dizziness.     PHYSICAL EXAM:  Vitals:    08/25/22 2018   BP: 120/59   Pulse: 74   Resp: 18   Temp: 36.9 °C (98.4 °F)   TempSrc: Temporal   SpO2: 99%   Weight: 65 kg (143 lb 4.8 oz)   Height: 1.651 m (5' 5\")    Body mass index is 23.85 kg/m².    O2 therapy: Pulse Oximetry: 99 %         Physical Exam  Constitutional:       Appearance: Normal appearance.   HENT:      Head: Normocephalic and atraumatic.   Cardiovascular:      Rate and Rhythm: Normal rate and regular rhythm.      Heart sounds: No murmur heard.  Pulmonary:      Effort: Pulmonary effort is normal.      Breath sounds: Normal breath sounds. No wheezing.   Abdominal:      General: Abdomen is flat. Bowel sounds are normal. There is no distension.      Palpations: Abdomen is soft.      Tenderness: There is no abdominal tenderness. There is no guarding.   Musculoskeletal:      Right lower leg: No edema.      Left lower leg: No edema.   Skin:     General: Skin is warm and dry.   Neurological:      General: No focal deficit present.      Mental Status: He is alert and oriented to person, place, and time.   Psychiatric:         Mood and " Affect: Mood normal.         Behavior: Behavior normal.           Recent Labs     08/25/22 2041   SODIUM 143   POTASSIUM 6.6*   CHLORIDE 94*   CO2 20   BUN 79*   CREATININE 11.25*   MAGNESIUM 2.4   PHOSPHORUS 8.6*   CALCIUM 9.1     Recent Labs     08/25/22 2041   ALTSGPT 17   ASTSGOT 20   ALKPHOSPHAT 69   TBILIRUBIN 0.6   GLUCOSE 129*     Recent Labs     08/25/22 2041   RBC 1.93*   HEMOGLOBIN 6.5*   HEMATOCRIT 20.2*   PLATELETCT 210   PROTHROMBTM 40.5*   INR 4.44*     Recent Labs     08/25/22 2041   WBC 16.2*   NEUTSPOLYS 96.50*   LYMPHOCYTES 3.50*   MONOCYTES 0.00   EOSINOPHILS 0.00   BASOPHILS 0.00   ASTSGOT 20   ALTSGPT 17   ALKPHOSPHAT 69   TBILIRUBIN 0.6       Meds:   calcium GLUConate-NaCl  2 g 2 g (08/25/22 2305)    prothrombin complex conc human  2,000 Units      acetaminophen  650 mg      [START ON 8/26/2022] pantoprazole  40 mg          Procedures:  None.    Imaging:  DX-CHEST-PORTABLE (1 VIEW)   Final Result         1.  No acute cardiopulmonary disease.   2.  Cardiomegaly   3.  Atherosclerosis          ASSESSEMENT and PLAN:    * GI bleeding- (present on admission)  Assessment & Plan  No prior EGD noted  Colonoscopy in 10/2020 noted small internal hemorrhoids only  INR 4.4 (taking for afib)  Has missed dialysis sessions  Baseline hgb ~7s, today 6.5      Rectal bleed- (present on admission)  Assessment & Plan  Previous rectal bleeding thought due to hemorrhoids  Had colonoscopy 10/2020 that was normal aside from small hemorrhoids  Baseline Hgb in 7s, today arrives at 6.5, not too far off baseline despite reported bleeding x4 days  On warfarin for afib    Plan:  - KCENTRA in ED  - NPO   - PPI BID  - Follow TEG, replete products as needed  - q4 H/H  - s/p 1u pRBC in ED    Transfuse Hgb >7  GI consult in am unless decompensates overnight    Most likely rectal bleeding from hemorrhoids in setting of supratherapeutic INR, given hgb relatively close to baseline after 4 days bleeding I do not suspect he  requires emergent intervention overnight.      If bleeding persists after reversal will order CTA and arrange dialysis    Hyperkalemia- (present on admission)  Assessment & Plan  Has missed 1-2 sessions of dialysis  S/p insulin/dextrose/calcium in ED  Nephrology consult for dialysis    Prolonged Q-T interval on ECG- (present on admission)  Assessment & Plan  530ms today  Avoid QT prolonging agents including anti-emetics        DISPO: ICU    CODE STATUS: Full Code    Quality Measures:  Feeding: NPO  Analgesia: None  Sedation: None  Thromboprophylaxis: SCDs  Head of bed: >30 degrees  Ulcer prophylaxis: Pantoprazole  Glycemic control: Correctional: None / Basal: None  Bowel care: bowel regimen  Indwelling lines: None  Deescalation of antibiotics: None      Darío Conde M.D.

## 2022-08-26 NOTE — CONSULTS
DATE OF SERVICE:  08/26/2022     GASTROENTEROLOGY CONSULTATION     REQUESTING PHYSICIAN:  Nuno Rios IV, MD     REASON FOR REQUEST:  Hematemesis.     HISTORY OF PRESENT ILLNESS:  The patient is a 52-year-old Upper sor[place] only   speaking male with multiple medical problems who presents now with primary   complaint of hematemesis.  He has a history of end-stage renal disease, on   hemodialysis for years as well as atrial fibrillation and diabetes mellitus.    He is on Coumadin for his atrial fibrillation.  He has a history of chest pain   and indeed on this admission, he was noted to have an NSTEMI with elevated   troponins.  In addition, he has a history of congestive heart failure with a   left ventricular ejection fraction 2 years ago measured at 20%.  We are being   asked to evaluate the patient because of his hematemesis and anemia.  He   through  denies any abdominal pain.  He states he only threw up   blood one time.  He describes it as dark blood.  No other episodes of nausea   and vomiting preceding or since.  He describes change in his bowel habits   around same time describing dark blood and black stools as well.  No NSAID   use.  He is not an alcohol consumer.  He does admit to some lightheadedness   and some shortness of breath and indeed has been noted to be mildly confused   while here.     REVIEW OF SYSTEMS:  Positive as noted above, also positive for back pain, but   otherwise complete review of systems negative other than that noted in HPI   above.  It should be noted that he had a colonoscopy in 10/20 that was normal   to the terminal ileum showing internal hemorrhoids.     PAST MEDICAL HISTORY:  End-stage renal disease, on hemodialysis; atrial   fibrillation, on Coumadin; diabetes mellitus, hypertension, coronary artery   disease, congestive heart failure.     ALLERGIES:  No known drug allergies.     MEDICATIONS:  As an outpatient amiodarone, Lipitor, cefazolin, Lasix,   gabapentin,  isosorbide, losartan, metoprolol, Coumadin, Flomax.     PAST SURGICAL HISTORY:  Fistula creation and thoracostomy.     SOCIAL HISTORY:  He is a nonsmoker, does not drink alcohol.  No drug use.     FAMILY MEDICAL HISTORY:  Noncontributory.     PHYSICAL EXAMINATION:  VITAL SIGNS:  Blood pressure 148/65 with a heart rate of 61, respiratory rate   of 19, satting 96% on room air.  GENERAL:  He is a mildly confused  male in no acute distress.  HEENT:  Reveals that his extraocular movements are intact bilaterally.  His   sclerae are anicteric bilaterally.  Oral exam reveals a Mallampati 3 score   without oral lesions.  CARDIOVASCULAR:  Irregularly irregular.  No murmurs appreciated.  RESPIRATORY:  Clear to auscultation bilaterally in the anterior lung fields.  ABDOMEN:  Soft, nontender, nondistended.  No organomegaly or masses   appreciated.  No rebound tenderness or guarding.  He does have a small   periumbilical hernia.  EXTREMITIES:  Shows no evidence of peripheral edema.  SKIN:  Grossly free of rashes.     LABORATORY DATA:  CBC on admission showed a white blood cell count of 16,000,   hemoglobin 6.5, hematocrit 20, platelet count 210. After 3 units transfusion,   his hemoglobin is 10 now.  BMP reveals sodium 139, potassium 3.8, chloride 96,   bicarbonate 27, BUN 28, creatinine 3.73 with a glucose of 109.  Liver enzymes   are normal, except for total bilirubin 2.0, however, this came on after his   transfusion and prior to that was normal.  Troponins 138 and 144.  BNP greater   than 35,000.  INR on admission was 4.4, after reversal was 1.5.     IMAGING:  CT scan of the abdomen and pelvis reveals a left pleural effusion   and diverticulosis.  EKG reveals new T-wave inversions.     IMPRESSION/PLAN/MEDICAL DECISION MAKIN.  Hematemesis, etiology not clear.  He does not have any symptoms to suggest   a cause, so differential is quite broad especially given his INR on   presentation, certainly could include  peptic disease, gastritis, esophagitis   from reflux, malignancy, among others.  He does describe some melena, so I do   think this is the primary  of his increased anemia.  I do not suspect   significant colonic contribution other than he has known internal hemorrhoids   that likely are bleeding also with his anticoagulated state and in addition,   he had a colonoscopy less than 2 years ago that was normal, all the way to the   terminal ileum other than those internal hemorrhoids.  Hence, I do think an   EGD is warranted. Clearly stable at this point, likely secondary to his   reversal of his over anticoagulation.  I agree with the PPIs and transfusions,   but given his NSTEMI and what appears to be congestive heart failure, I think   he needs to have cardiac optimization prior to any consideration of a   nonurgent EGD.  We will continue to follow and check on him again tomorrow and   consider potential EGD on Sunday versus Monday?  2.  Anemia.  See discussion above.  Of course, there may be a contribution as   well from his end-stage renal disease.  3.  Non-ST-elevation myocardial infarction.  He is getting an echo at this   point to check for any wall motion abnormalities.  There is suspicion that   certainly is demand related ischemic event, although my understanding is   Cardiology is going to be involved as well.  4.  End-stage renal disease, on hemodialysis.  He just had hemodialysis last   night.  5.  Atrial fibrillation.  Coumadin has been held and his over anticoagulation   has been reversed.  6.  Anticoagulation therapy.  See above.  7.  Congestive heart failure.  See above.        ______________________________  MD JUANY GUTIERREZ/THALIA/ISABELA    DD:  08/26/2022 11:10  DT:  08/26/2022 11:53    Job#:  [id number]

## 2022-08-26 NOTE — CONSULTS
Hospital Medicine Consultation    Date of Service  8/26/2022    Referring Physician  Jan Vaz M.D.    Consulting Physician  Dash Roe M.D.    Reason for Consultation  GI bleed    History of Presenting Illness  52 y.o. male who presented 8/25/2022 with chest pain and vomiting blood.   Mr. Saini has a past medical history of ESRD on dialysis, atrial fibrillation on coumadin, and hypertension that presented to the ER on 8/25 with left-sided chest pain, black stools, and black vomitus. The aforementioned symptom all had started 3 days prior. His last Hb in Epic was Oct. 2020 at which time it was 7.1. His Hb in the ER was 6.5 therefore he underwent transfusion of RBCs. His INR was 4 for which he was given KCENTRA in the ER. A TEG platelet mapping revealed 97% inhibition of AA therefore he was transfused a unit of platelets.  He had missed his Monday dialysis and his K was 5. Nephrology was consulted. He was admitted to the ICU with GI consultation.     Review of Systems  Review of Systems   Constitutional:  Negative for chills and fever.   Respiratory:  Negative for cough.    Cardiovascular:  Negative for chest pain.   Gastrointestinal:  Negative for nausea and vomiting.        No BM overnight and no BM today   All other systems reviewed and are negative.    Past Medical History   has a past medical history of Heart murmur and Valvular heart disease.    Surgical History   has a past surgical history that includes cath placement (8/27/2017); av fistula creation (8/27/2017); pr thoracoscopy,dx no bx (Left, 10/8/2020); and pr colonoscopy,diagnostic (N/A, 10/9/2020).    Family History  family history includes Stroke (age of onset: 38) in his sister.    Social History   reports that he has never smoked. He has never used smokeless tobacco. He reports that he does not drink alcohol and does not use drugs.he lives with his daughter and her family.    Medications  Prior to Admission Medications   Prescriptions  Last Dose Informant Patient Reported? Taking?   Ascorbic Acid (VITAMIN C) 1000 MG Tab 2022 at AM Family Member Yes No   Sig: Take 1,000 mg by mouth every day.   amiodarone (CORDARONE) 200 MG Tab 2022 at AM Family Member No No   Sig: Take 1 Tablet by mouth every day.   atorvastatin (LIPITOR) 40 MG Tab 2022 at AM Family Member No No   Sig: Take 1 Tablet by mouth every evening.   calcium acetate (PHOS-LO) 667 MG Tab tablet 2022 at PM Family Member Yes Yes   Sig: Take 1,334 mg by mouth 3 times a day with meals. 2001 mg = 3 tablets   ceFAZolin in dextrose (ANCEF) 2-4 GM/100ML-% IVPB 2022 at PM Family Member No No   Si mL by Intravenous route every Monday, Wednesday, and Friday.   cyanocobalamin (VITAMIN B-12) 500 MCG Tab 2022 at AM Family Member Yes No   Sig: Take 500 mcg by mouth every day.   folic acid (FOLVITE) 1 MG Tab 2022 at AM Family Member Yes No   Sig: Take 1 mg by mouth every day.   furosemide (LASIX) 40 MG Tab 2022 at PM Family Member No No   Sig: Take 1 Tablet by mouth every day.   gabapentin (NEURONTIN) 100 MG Cap 2022 at PM Family Member Yes No   Sig: Take 100 mg by mouth 3 times a day.   isosorbide mononitrate SR (IMDUR) 60 MG TABLET SR 24 HR 2022 at PM Family Member No No   Sig: Take 1 Tablet by mouth every day.   losartan (COZAAR) 25 MG Tab 2022 at PM Family Member No No   Sig: TAKE ONE TABLET BY MOUTH EVERY DAY AT BEDTIME FOR BLOOD PRESSURE - BRING ALL MEDICATIONS TO EVERY APPOINTMENT - AS PER DR. STRICKLAND   metoprolol SR (TOPROL XL) 50 MG TABLET SR 24 HR 2022 at PM Family Member No No   Sig: Take 1 Tablet by mouth every day.   tamsulosin (FLOMAX) 0.4 MG capsule 2022 at AM Family Member Yes No   Sig: Take 0.4 mg by mouth 1/2 hour after breakfast.   warfarin (COUMADIN) 3 MG Tab 2022 at PM Family Member Yes No   Sig: Take 3 mg by mouth every day.      Facility-Administered Medications: None       Allergies  No Known  Allergies    Physical Exam  Temp:  [36.6 °C (97.8 °F)-37.1 °C (98.8 °F)] 36.8 °C (98.3 °F)  Pulse:  [56-79] 56  Resp:  [11-50] 39  BP: (118-166)/(53-87) 141/67  SpO2:  [96 %-100 %] 96 %    Physical Exam  Vitals and nursing note reviewed.   Constitutional:       Appearance: Normal appearance.   HENT:      Head: Normocephalic and atraumatic.      Mouth/Throat:      Mouth: Mucous membranes are moist.      Pharynx: Oropharynx is clear.   Cardiovascular:      Rate and Rhythm: Normal rate and regular rhythm.      Heart sounds: Murmur heard.   Pulmonary:      Effort: Pulmonary effort is normal.      Breath sounds: Normal breath sounds.      Comments: Room air  Abdominal:      General: There is no distension.      Tenderness: There is no abdominal tenderness.   Musculoskeletal:      Cervical back: Normal range of motion and neck supple.      Right lower leg: No edema.      Left lower leg: No edema.      Comments: Left arm fistula with a thrill   Skin:     General: Skin is warm and dry.   Neurological:      General: No focal deficit present.      Mental Status: He is alert and oriented to person, place, and time.   Psychiatric:         Mood and Affect: Mood normal.         Behavior: Behavior normal.       Fluids  Date 08/26/22 0700 - 08/27/22 0659   Shift 5248-1995 1148-9468 6788-0049 24 Hour Total   INTAKE   Other 150   150   Shift Total 150   150   OUTPUT   Shift Total       Weight (kg) 64.1 64.1 64.1 64.1       Laboratory  Recent Labs     08/25/22  2041 08/26/22  0143 08/26/22  0449 08/26/22  1020 08/26/22  1540   WBC 16.2*  --  16.7*  --   --    RBC 1.93*  --  3.39*  --   --    HEMOGLOBIN 6.5*   < > 10.5* 9.9* 10.1*   HEMATOCRIT 20.2*   < > 30.4* 29.2* 29.3*   .7*  --  89.7  --   --    MCH 33.7*  --  31.0  --   --    MCHC 32.2*  --  34.5  --   --    RDW 66.2*  --  63.6*  --   --    PLATELETCT 210  --  192  --   --    MPV 10.7  --  10.7  --   --     < > = values in this interval not displayed.     Recent Labs      08/25/22 2041 08/26/22  0052 08/26/22  0449   SODIUM 143  --  139   POTASSIUM 6.6* 5.0 3.8   CHLORIDE 94*  --  96   CO2 20  --  27   GLUCOSE 129*  --  109*   BUN 79*  --  28*   CREATININE 11.25*  --  3.73*   CALCIUM 9.1  --  9.0     Recent Labs     08/25/22 2041 08/26/22  0555   INR 4.44* 1.57*                 Imaging  CT-HEAD W/O   Final Result      1. Cerebral atrophy.   2. White matter lucencies most consistent with small vessel ischemic change versus demyelination or gliosis.   3. Otherwise, Head CT without contrast with no acute findings. No evidence of acute cerebral infarction, hemorrhage or mass lesion.         DX-CHEST-PORTABLE (1 VIEW)   Final Result         1.  No acute cardiopulmonary disease.   2.  Cardiomegaly   3.  Atherosclerosis      EC-ECHOCARDIOGRAM COMPLETE W/O CONT    (Results Pending)       Assessment/Plan  * GI bleeding- (present on admission)  Assessment & Plan  With acute blood loss anemia  Status post 3 units of RBCs transfused  Serial Hb levels  GI consulted  EGD to be scheduled w/anesthesia on 8/27    NSTEMI (non-ST elevated myocardial infarction) ([facility])- (present on admission)  Assessment & Plan  Type II  Normal EF  Cards consulted    PAF (paroxysmal atrial fibrillation) ([facility])- (present on admission)  Assessment & Plan  Hx of  Metoprolol and amiodarone for rate control  Stopped coumadin  Given INR of 4 he was reversed with KCENTRA    ESRD (end stage renal disease) on dialysis ([facility])- (present on admission)  Assessment & Plan  Left arm fistula  Last dialysis was 8/26    Hyperkalemia- (present on admission)  Assessment & Plan  Resolved with dialysis    Essential hypertension- (present on admission)  Assessment & Plan  Blood pressure has been elevated therefore home meds have been restarted and will be titrated up accordingly

## 2022-08-26 NOTE — ED NOTES
Bedside handoff report given to KRYSTIN Cardona and tech. Kcentra infusion started and blood released per his request. Dialysis nurse Phyllis aware of pt heading to room and will begin her set up for his arrival. Belongings packed and paperwork sent

## 2022-08-26 NOTE — PROGRESS NOTES
Pt arrived to SICU from ED.  HR 65  /68  100% 2L O2  Resp 12    Belongings at bedside, shorts shirt, sandals    4 Eyes Skin Assessment Completed by Adalberto RN and Brendan RN.    Head WDL  Ears WDL  Nose WDL  Mouth WDL  Neck WDL  Breast/Chest WDL  Shoulder Blades WDL  Spine WDL  (R) Arm/Elbow/Hand WDL  (L) Arm/Elbow/Hand WDL  Abdomen WDL  Groin WDL  Scrotum/Coccyx/Buttocks WDL  (R) Leg WDL  (L) Leg WDL  (R) Heel/Foot/Toe WDL  (L) Heel/Foot/Toe WDL          Devices In Places Nasal Cannula      Interventions In Place Sacral Mepilex    Possible Skin Injury No    Pictures Uploaded Into Epic N/A  Wound Consult Placed N/A  RN Wound Prevention Protocol Ordered Yes

## 2022-08-26 NOTE — PROGRESS NOTES
[facility] Services Progress Note         STAT HD today x 3 hours per Dr. QAMAR Merchant. Tx initiated at 01:55 and ended at 0455    Pt is A/O x 2, fatigued, drowsy, not in distress, no SOB, on O2 at 2 L/min, denies chest pain, and has no edema. Hgb at 6.5 needing blood transfusion during dialysis. With LUE AVF (+) bruit and thrill. Consent secured for HD tx, signed by pt's daughter.       NET UF: 2000 ml    Patient tolerated tx. 2 units of PRBC was successfully transfused during HD, as ordered, without transfusion reaction.      03:15 am, pt c/o leg pain, (+) restlessness VS was stable. UFG was decreased to 2L net. Pt denies chest pain at this time. PN gave pain medication as ordered. Pt was relieved thereafter.    04:45 am: few minutes before HD ended, pt became restless and confused. HR became bradycardic at 45-49 bpm, EKG was done, and MD was updated. Pt was observed and lab samples were taken.     All blood was returned aseptically. HD needles removed from LUE AVF. Dry gauze applied and changed without bleeding issue.(+) Bruit and thrill post tx. Instructed ICU nurse to draw Chem panel 6 hours post HD. See eflow sheets for further details.       Report given to VIOLA Redmond RN

## 2022-08-26 NOTE — ED NOTES
EKG completed and presented to ERP. Verbal orders for magnesium and phosphorus added to lab work.

## 2022-08-26 NOTE — ED NOTES
Pt provided with pillow and readjusted in bed. Daughter is at bedside. Callbell provided. Connected to monitor. POC updated

## 2022-08-26 NOTE — ASSESSMENT & PLAN NOTE
Has missed several sessions  K 6.6 without ECG changes  Insulin/dextrose in ED  Nephrology consult for dialysis

## 2022-08-26 NOTE — CONSULTS
DATE OF SERVICE:  08/26/2022     NEPHROLOGY CONSULTATION     REQUESTING PHYSICIAN:  Brenda Cintron MD     REASON FOR CONSULTATION:  To evaluate and provide dialysis for patient with   end-stage renal disease.     HISTORY OF PRESENT ILLNESS:  The patient is a 52-year-old male patient of mine   with end-stage renal disease, on hemodialysis, has been receiving his   dialysis treatments in [facility], presented to the emergency   room with nausea, vomiting with blood, also bloody stools with complaints of   weakness and lightheadedness.  Currently, is doing better, still with some   mild epigastric pain, no nausea or vomiting.     REVIEW OF SYSTEMS:  GENERAL:  Positive for fatigue.  No fever or chills.  HENT:  No nosebleeds, no sore throat, no sinus pain.  EYES:  No double or blurry vision.  NECK:  No pain, no stiffness.  RESPIRATORY:  No cough, hemoptysis, no wheezes.  CARDIOVASCULAR:  No edema, no chest pain, no dyspnea.  GASTROINTESTINAL:  Positive for nausea, vomiting with blood, bloody stools.   Positive for abdominal pain.  All other systems reviewed, all negative.     PAST MEDICAL HISTORY:  End-stage renal disease, on hemodialysis, hypertension,   atrial fibrillation.     PAST SURGICAL HISTORY:  AV fistula creation, colonoscopy, thoracoscopy.     ALLERGIES:  No known drug allergies.     OUTPATIENT MEDICATIONS:  Reviewed.     PHYSICAL EXAMINATION:  VITAL SIGNS:  Blood pressure 166/67, heart rate 65, temperature 37.1 Celsius.  GENERAL APPEARANCE:  Well-developed, well-nourished male, in acute distress.  HEENT:  Normocephalic, atraumatic.  Pupils equal, round, reactive to light.    Extraocular movement intact.  Nares patent.  Oropharynx clear, moist mucosa,   no erythema or exudate.  NECK:  Supple.  No lymphadenopathy, no thyromegaly appreciated.  LUNGS:  Clear to auscultation bilaterally.  No rales, wheezes, no rhonchi.  HEART:  Regular rate.  No rub or gallop.  ABDOMEN:  Soft.  Mild tenderness to  palpation in epigastric area.  No palpable   mass.  Bowel sounds present.  EXTREMITIES:  No cyanosis, no clubbing, no edema.  SKIN:  Clear, no erythema or rash.  NEUROLOGIC:  Alert, oriented x3, no focal deficit.  Cranial nerves II-XII   grossly intact.     LABORATORY DATA:   Laboratory results reviewed, revealed hemoglobin level 6.5.   Sodium 138, potassium 5.0, CO2 of 27, BUN 79 and creatinine level 11.2.     ASSESSMENT AND PLAN:  The patient is a very pleasant 52-year-old male with   end-stage renal disease, on hemodialysis, admitted with gastrointestinal   bleeding.  1.  End-stage renal disease.  We will continue dialysis while the patient is   in the hospital 3 times a week.  2.  Electrolytes, hyperkalemia, correcting with hemodialysis.  3.  Volume well controlled.  4.  Anemia due to gastrointestinal bleeding, status post blood transfusion, to   monitor.  5.  Hypertension.  Blood pressure remains well controlled.     RECOMMENDATIONS:  1.  Hemodialysis 3 times a week.  2.  Renal diet.  3.  All medications adjust to renal doses.  4.  To monitor hemoglobin level, basic metabolic panel.     We will follow the patient closely.  Thank you for the consult.        ______________________________  MD ELROY MULLER/THALIA/ISABELA    DD:  08/26/2022 12:44  DT:  08/26/2022 13:06    Job#:  [id number]

## 2022-08-26 NOTE — CONSULTS
Critical Care History & Physical    Date of consult: 08/26/22    Referring Physician  Nuno Rios M.D.    Reason for Consultation  Chief Complaint   Patient presents with    Blood in Vomit     Pt started to notice blood in his vomit since Monday. Today, it became worse.     Bloody Stools     Since Monday. Said it's been happening a lot.     Chest Pain     Pt states this also started on Monday       History of Presenting Illness (obtained with )  52 y.o. male with a history of ESRD on dialysis, A. fib on warfarin, HTN, previous GI bleeding with colonoscopy in 10/2020 showing small internal hemorrhoids, and a prior CVA with residual lower extremity weakness for which he uses a walker when ambulating at home.  He is presenting to the emergency department complaining of 4 days of coffee-ground emesis and hematochezia along with left-sided chest pain that is worse with exertion.    Hemoglobin on arrival is 6.5 with a baseline in the sevens, he has missed several sessions of dialysis, INR elevated at 4.4 with a potassium of 6.6 and elevated BUN of 79.  No ECG changes regarding hyperkalemia though he does have new T wave inversions in V6.  At rest his chest pain has resolved.  He was given Kcentra, insulin/dextrose/calcium and will be given 2 units of blood.    Given proclivity for decline, we will move him to the ICU tonight given NSTEMI and active GI bleeding.    Code Status  Full Code    Review of Systems  Review of Systems   Constitutional:  Negative for chills and fever.   HENT: Negative.     Eyes: Negative.    Respiratory:  Negative for sputum production and shortness of breath.    Cardiovascular:  Positive for chest pain. Negative for leg swelling.   Gastrointestinal:  Positive for blood in stool, nausea and vomiting. Negative for abdominal pain.   Genitourinary: Negative.    Musculoskeletal: Negative.    Neurological:  Positive for weakness. Negative for focal weakness and headaches.     Past  Medical History   has a past medical history of Heart murmur and Valvular heart disease.    Surgical History   has a past surgical history that includes cath placement (8/27/2017); av fistula creation (8/27/2017); pr thoracoscopy,dx no bx (Left, 10/8/2020); and pr colonoscopy,diagnostic (N/A, 10/9/2020).    Family History  Reviewed and not pertinent    Social History   reports that he has never smoked. He has never used smokeless tobacco. He reports that he does not drink alcohol and does not use drugs.    Medications  Home Medications       Reviewed by Madison Parisi (Pharmacy Tech) on 08/25/22 at 2353  Med List Status: Complete     Medication Last Dose Status   amiodarone (CORDARONE) 200 MG Tab 8/24/2022 Active   Ascorbic Acid (VITAMIN C) 1000 MG Tab 8/24/2022 Active   atorvastatin (LIPITOR) 40 MG Tab 8/24/2022 Active   calcium acetate (PHOS-LO) 667 MG Tab tablet 8/24/2022 Active   ceFAZolin in dextrose (ANCEF) 2-4 GM/100ML-% IVPB 8/24/2022 Active   cyanocobalamin (VITAMIN B-12) 500 MCG Tab 8/24/2022 Active   folic acid (FOLVITE) 1 MG Tab 8/24/2022 Active   furosemide (LASIX) 40 MG Tab 8/24/2022 Active   gabapentin (NEURONTIN) 100 MG Cap 8/24/2022 Active   isosorbide mononitrate SR (IMDUR) 60 MG TABLET SR 24 HR 8/24/2022 Active   losartan (COZAAR) 25 MG Tab 8/24/2022 Active   metoprolol SR (TOPROL XL) 50 MG TABLET SR 24 HR 8/24/2022 Active   tamsulosin (FLOMAX) 0.4 MG capsule 8/24/2022 Active   warfarin (COUMADIN) 3 MG Tab 8/24/2022 Active                    Allergies  No Known Allergies      Vital Signs last 24 hours  Temp:  [36.9 °C (98.4 °F)] 36.9 °C (98.4 °F)  Pulse:  [74] 74  Resp:  [18] 18  BP: (120)/(59) 120/59  SpO2:  [99 %] 99 %      Physical Exam  Physical Exam  Vitals and nursing note reviewed. Exam conducted with a chaperone present.   Constitutional:       General: He is not in acute distress.     Appearance: Normal appearance.   HENT:      Head: Normocephalic.      Mouth/Throat:      Mouth: Mucous  membranes are moist.   Eyes:      Extraocular Movements: Extraocular movements intact.   Cardiovascular:      Rate and Rhythm: Normal rate and regular rhythm.      Pulses: Normal pulses.   Pulmonary:      Effort: Pulmonary effort is normal. No respiratory distress.   Abdominal:      General: There is no distension.      Palpations: Abdomen is soft.      Tenderness: There is no abdominal tenderness. There is no guarding or rebound.   Musculoskeletal:         General: Normal range of motion.      Cervical back: Normal range of motion and neck supple.   Skin:     General: Skin is warm and dry.      Capillary Refill: Capillary refill takes less than 2 seconds.   Neurological:      General: No focal deficit present.      Mental Status: He is alert and oriented to person, place, and time. Mental status is at baseline.         Fluids  No intake or output data in the 24 hours ending 22 0040      Laboratory  Recent Results (from the past 48 hour(s))   EKG    Collection Time: 22  8:33 PM   Result Value Ref Range    Report       [facility]    Test Date:  2022  Pt Name:    BOUBACAR FERNANDEZ                 Department: ER  MRN:        [id number]                      Room:  Gender:     Male                         Technician: [id number]  :        1970                   Requested By:ER TRIAGE PROTOCOL  Order #:    [id number]                    Reading MD:    Measurements  Intervals                                Axis  Rate:       70                           P:          30  MI:         174                          QRS:        -40  QRSD:       113                          T:          112  QT:         491  QTc:        530    Interpretive Statements  Sinus rhythm  Borderline IVCD with LAD  Abnormal R-wave progression, late transition  Abnormal T, consider ischemia, lateral leads  Prolonged QT interval  Compared to ECG 10/05/2020 16:47:45  T-wave abnormality now present  Possible ischemia now present  Prolonged QT  interval now present  Atrial fibrillation no longer present  ST (T wave) deviation no longer pres ent     CBC with Differential    Collection Time: 08/25/22  8:41 PM   Result Value Ref Range    WBC 16.2 (H) 4.8 - 10.8 K/uL    RBC 1.93 (L) 4.70 - 6.10 M/uL    Hemoglobin 6.5 (L) 14.0 - 18.0 g/dL    Hematocrit 20.2 (L) 42.0 - 52.0 %    .7 (H) 81.4 - 97.8 fL    MCH 33.7 (H) 27.0 - 33.0 pg    MCHC 32.2 (L) 33.7 - 35.3 g/dL    RDW 66.2 (H) 35.9 - 50.0 fL    Platelet Count 210 164 - 446 K/uL    MPV 10.7 9.0 - 12.9 fL    Neutrophils-Polys 96.50 (H) 44.00 - 72.00 %    Lymphocytes 3.50 (L) 22.00 - 41.00 %    Monocytes 0.00 0.00 - 13.40 %    Eosinophils 0.00 0.00 - 6.90 %    Basophils 0.00 0.00 - 1.80 %    Nucleated RBC 0.00 /100 WBC    Neutrophils (Absolute) 15.63 (H) 1.82 - 7.42 K/uL    Lymphs (Absolute) 0.57 (L) 1.00 - 4.80 K/uL    Monos (Absolute) 0.00 0.00 - 0.85 K/uL    Eos (Absolute) 0.00 0.00 - 0.51 K/uL    Baso (Absolute) 0.00 0.00 - 0.12 K/uL    NRBC (Absolute) 0.00 K/uL    Hypochromia 1+     Anisocytosis 1+     Macrocytosis 1+    Complete Metabolic Panel (CMP)    Collection Time: 08/25/22  8:41 PM   Result Value Ref Range    Sodium 143 135 - 145 mmol/L    Potassium 6.6 (HH) 3.6 - 5.5 mmol/L    Chloride 94 (L) 96 - 112 mmol/L    Co2 20 20 - 33 mmol/L    Anion Gap 29.0 (H) 7.0 - 16.0    Glucose 129 (H) 65 - 99 mg/dL    Bun 79 (H) 8 - 22 mg/dL    Creatinine 11.25 (HH) 0.50 - 1.40 mg/dL    Calcium 9.1 8.5 - 10.5 mg/dL    AST(SGOT) 20 12 - 45 U/L    ALT(SGPT) 17 2 - 50 U/L    Alkaline Phosphatase 69 30 - 99 U/L    Total Bilirubin 0.6 0.1 - 1.5 mg/dL    Albumin 3.9 3.2 - 4.9 g/dL    Total Protein 6.3 6.0 - 8.2 g/dL    Globulin 2.4 1.9 - 3.5 g/dL    A-G Ratio 1.6 g/dL   Troponin    Collection Time: 08/25/22  8:41 PM   Result Value Ref Range    Troponin T 138 (H) 6 - 19 ng/L   MAGNESIUM    Collection Time: 08/25/22  8:41 PM   Result Value Ref Range    Magnesium 2.4 1.5 - 2.5 mg/dL   PHOSPHORUS    Collection Time:  08/25/22  8:41 PM   Result Value Ref Range    Phosphorus 8.6 (H) 2.5 - 4.5 mg/dL   ESTIMATED GFR    Collection Time: 08/25/22  8:41 PM   Result Value Ref Range    GFR (CKD-EPI) 5 (A) >60 mL/min/1.73 m 2   DIFFERENTIAL MANUAL    Collection Time: 08/25/22  8:41 PM   Result Value Ref Range    Manual Diff Status PERFORMED    PERIPHERAL SMEAR REVIEW    Collection Time: 08/25/22  8:41 PM   Result Value Ref Range    Peripheral Smear Review see below    MORPHOLOGY    Collection Time: 08/25/22  8:41 PM   Result Value Ref Range    RBC Morphology Present     Polychromia 1+     Poikilocytosis 1+     Ovalocytes 1+    COD - Adult (Type and Screen)    Collection Time: 08/25/22  8:41 PM   Result Value Ref Range    ABO Grouping Only O     Rh Grouping Only POS     Antibody Screen-Cod NEG    PT/INR    Collection Time: 08/25/22  8:41 PM   Result Value Ref Range    PT 40.5 (H) 12.0 - 14.6 sec    INR 4.44 (H) 0.87 - 1.13   proBrain Natriuretic Peptide, NT    Collection Time: 08/25/22  8:41 PM   Result Value Ref Range    NT-proBNP >31631 (H) 0 - 125 pg/mL         Imaging  DX-CHEST-PORTABLE (1 VIEW)   Final Result         1.  No acute cardiopulmonary disease.   2.  Cardiomegaly   3.  Atherosclerosis            Assessment/Plan  * GI bleeding- (present on admission)  Assessment & Plan  Possibly combination upper and lower  No prior EGD noted  Colonoscopy in 10/2020 noted small internal hemorrhoids only  INR 4.4 (taking for afib)  Has missed dialysis sessions  Baseline hgb ~7s, today 6.5 after 4 days bleeding    Plan:  - PPI BID  - KCENTRA for warfarin reversal  - trend H/H and K  - Transfuse Hgb >8  - TEG post KCENTRA to correct any other coagulopathy  - If continued rectal bleeding after reversal will order CTA  - Dialysis  - NPO    If continued bleeding will consult GI emergently overnight, but given 4 days of bleeding and mild decrease in Hgb likely stable to wait until tomorrow unless there is a clinical change.    If he cannot get  dialysis will consider a dose of DDAVP for uremia    NSTEMI (non-ST elevated myocardial infarction) (HCC)  Assessment & Plan  Likely has underlying CAD  New T wave inversions V6 and flattened in V5  No STEMI    Probably precipitated by bleeding  Not a candidate for DAPT or heparin gtt currently given GIB  Trend ECG  Trend Trop    Reverse warfarin and transfuse Hgb >8  If ECG changes to STEMI will activate cath team    If no improvement can discuss with cardiology tomorrow    Rectal bleed- (present on admission)  Assessment & Plan  Previous rectal bleeding thought due to hemorrhoids  Had colonoscopy 10/2020 that was normal aside from small hemorrhoids  Baseline Hgb in 7s, today arrives at 6.5, not too far off baseline despite reported bleeding x4 days  On warfarin for afib    Plan:  - KCENTRA in ED  - NPO   - PPI BID  - Follow TEG, replete products as needed  - q4 H/H  - s/p 1u pRBC in ED    Transfuse Hgb >8  GI consult in am unless decompensates overnight    Most likely rectal bleeding from hemorrhoids in setting of supratherapeutic INR, given hgb relatively close to baseline after 4 days bleeding I do not suspect he requires emergent intervention overnight.      If bleeding persists after reversal will order CTA and arrange dialysis    Hyperkalemia- (present on admission)  Assessment & Plan  Has missed 1-2 sessions of dialysis  S/p insulin/dextrose/calcium in ED  Nephrology consult for dialysis    ESRD (end stage renal disease) on dialysis ([facility])- (present on admission)  Assessment & Plan  Has missed several sessions  K 6.6 without ECG changes  Insulin/dextrose in ED  Nephrology consult for dialysis    Prolonged Q-T interval on ECG- (present on admission)  Assessment & Plan  530ms today  Avoid QT prolonging agents including anti-emetics    Essential hypertension- (present on admission)  Assessment & Plan  Hold all home antiHTN meds for now      DVT prophylaxis: Hold for GI bleed  PUD prophylaxis: PPI twice  daily  Glycemic control: SSI if needed  Nutrition: N.p.o. for now  Lines: N/A  Truong: N/A    Discussed patient condition and risk of morbidity and/or mortality with Family, RN, RT, Pharmacy, Code status disscussed, Charge nurse / hot rounds, Patient, and nephrology.      The patient remains critically ill.  Critical care time = 65 minutes in directly providing and coordinating critical care and extensive data review.  No time overlap and excludes procedures.

## 2022-08-26 NOTE — DISCHARGE PLANNING
Outpatient Dialysis Note     Confirmed patient is active at:     [facility]   [address] [place] [id number]    Schedule: Monday, Wednesday, Friday  Time: 5:45 AM      Patient is seen by Dr. Merchant in HD clinic.     Spoke with Franchesca at facility who confirmed.     Forwarded records for review.     Xitlaly Reyes   Dialysis Coordinator / [facility]  Ph#: [phone]

## 2022-08-27 LAB
ALBUMIN SERPL BCP-MCNC: 4.2 G/DL (ref 3.2–4.9)
ALBUMIN/GLOB SERPL: 1.7 G/DL
ALP SERPL-CCNC: 72 U/L (ref 30–99)
ALT SERPL-CCNC: 17 U/L (ref 2–50)
ANION GAP SERPL CALC-SCNC: 16 MMOL/L (ref 7–16)
AST SERPL-CCNC: 21 U/L (ref 12–45)
BILIRUB SERPL-MCNC: 1.1 MG/DL (ref 0.1–1.5)
BUN SERPL-MCNC: 58 MG/DL (ref 8–22)
CALCIUM SERPL-MCNC: 8.9 MG/DL (ref 8.5–10.5)
CHLORIDE SERPL-SCNC: 94 MMOL/L (ref 96–112)
CO2 SERPL-SCNC: 29 MMOL/L (ref 20–33)
CREAT SERPL-MCNC: 8.95 MG/DL (ref 0.5–1.4)
ERYTHROCYTE [DISTWIDTH] IN BLOOD BY AUTOMATED COUNT: 72.1 FL (ref 35.9–50)
GFR SERPLBLD CREATININE-BSD FMLA CKD-EPI: 6 ML/MIN/1.73 M 2
GLOBULIN SER CALC-MCNC: 2.5 G/DL (ref 1.9–3.5)
GLUCOSE SERPL-MCNC: 88 MG/DL (ref 65–99)
HCT VFR BLD AUTO: 29.4 % (ref 42–52)
HCT VFR BLD AUTO: 31.4 % (ref 42–52)
HGB BLD-MCNC: 10.5 G/DL (ref 14–18)
HGB BLD-MCNC: 9.9 G/DL (ref 14–18)
INR PPP: 1.46 (ref 0.87–1.13)
LV EJECT FRACT MOD 2C 99903: 49.16
LV EJECT FRACT MOD 4C 99902: 52.19
LV EJECT FRACT MOD BP 99901: 50.2
MAGNESIUM SERPL-MCNC: 2.4 MG/DL (ref 1.5–2.5)
MCH RBC QN AUTO: 31.4 PG (ref 27–33)
MCHC RBC AUTO-ENTMCNC: 33.4 G/DL (ref 33.7–35.3)
MCV RBC AUTO: 94 FL (ref 81.4–97.8)
PHOSPHATE SERPL-MCNC: 6.8 MG/DL (ref 2.5–4.5)
PLATELET # BLD AUTO: 208 K/UL (ref 164–446)
PMV BLD AUTO: 10.1 FL (ref 9–12.9)
POTASSIUM SERPL-SCNC: 4.5 MMOL/L (ref 3.6–5.5)
PROT SERPL-MCNC: 6.7 G/DL (ref 6–8.2)
PROTHROMBIN TIME: 17.4 SEC (ref 12–14.6)
RBC # BLD AUTO: 3.34 M/UL (ref 4.7–6.1)
SODIUM SERPL-SCNC: 139 MMOL/L (ref 135–145)
WBC # BLD AUTO: 10 K/UL (ref 4.8–10.8)

## 2022-08-27 PROCEDURE — 85027 COMPLETE CBC AUTOMATED: CPT

## 2022-08-27 PROCEDURE — 90935 HEMODIALYSIS ONE EVALUATION: CPT | Performed by: INTERNAL MEDICINE

## 2022-08-27 PROCEDURE — 84100 ASSAY OF PHOSPHORUS: CPT

## 2022-08-27 PROCEDURE — 700111 HCHG RX REV CODE 636 W/ 250 OVERRIDE (IP): Performed by: STUDENT IN AN ORGANIZED HEALTH CARE EDUCATION/TRAINING PROGRAM

## 2022-08-27 PROCEDURE — C9113 INJ PANTOPRAZOLE SODIUM, VIA: HCPCS | Performed by: STUDENT IN AN ORGANIZED HEALTH CARE EDUCATION/TRAINING PROGRAM

## 2022-08-27 PROCEDURE — 83735 ASSAY OF MAGNESIUM: CPT

## 2022-08-27 PROCEDURE — A9270 NON-COVERED ITEM OR SERVICE: HCPCS | Performed by: INTERNAL MEDICINE

## 2022-08-27 PROCEDURE — 90935 HEMODIALYSIS ONE EVALUATION: CPT

## 2022-08-27 PROCEDURE — 770020 HCHG ROOM/CARE - TELE (206)

## 2022-08-27 PROCEDURE — 93306 TTE W/DOPPLER COMPLETE: CPT | Mod: 26 | Performed by: STUDENT IN AN ORGANIZED HEALTH CARE EDUCATION/TRAINING PROGRAM

## 2022-08-27 PROCEDURE — 99232 SBSQ HOSP IP/OBS MODERATE 35: CPT | Performed by: HOSPITALIST

## 2022-08-27 PROCEDURE — 80053 COMPREHEN METABOLIC PANEL: CPT

## 2022-08-27 PROCEDURE — 85610 PROTHROMBIN TIME: CPT

## 2022-08-27 PROCEDURE — 700102 HCHG RX REV CODE 250 W/ 637 OVERRIDE(OP): Performed by: INTERNAL MEDICINE

## 2022-08-27 RX ADMIN — ISOSORBIDE MONONITRATE 30 MG: 30 TABLET, EXTENDED RELEASE ORAL at 05:45

## 2022-08-27 RX ADMIN — AMIODARONE HYDROCHLORIDE 200 MG: 200 TABLET ORAL at 05:45

## 2022-08-27 RX ADMIN — PANTOPRAZOLE SODIUM 40 MG: 40 INJECTION, POWDER, LYOPHILIZED, FOR SOLUTION INTRAVENOUS at 05:45

## 2022-08-27 RX ADMIN — PANTOPRAZOLE SODIUM 40 MG: 40 INJECTION, POWDER, LYOPHILIZED, FOR SOLUTION INTRAVENOUS at 18:00

## 2022-08-27 RX ADMIN — ATORVASTATIN CALCIUM 40 MG: 40 TABLET, FILM COATED ORAL at 18:00

## 2022-08-27 RX ADMIN — METOPROLOL TARTRATE 12.5 MG: 25 TABLET, FILM COATED ORAL at 05:45

## 2022-08-27 ASSESSMENT — ENCOUNTER SYMPTOMS
PSYCHIATRIC NEGATIVE: 1
CHILLS: 0
EYES NEGATIVE: 1
NAUSEA: 0
VOMITING: 0
GASTROINTESTINAL NEGATIVE: 1
WEAKNESS: 1
FEVER: 0
MUSCULOSKELETAL NEGATIVE: 1
BLOOD IN STOOL: 0
ABDOMINAL PAIN: 0
CARDIOVASCULAR NEGATIVE: 1
SHORTNESS OF BREATH: 0
RESPIRATORY NEGATIVE: 1

## 2022-08-27 ASSESSMENT — FIBROSIS 4 INDEX: FIB4 SCORE: 1.32

## 2022-08-27 ASSESSMENT — PAIN DESCRIPTION - PAIN TYPE: TYPE: ACUTE PAIN

## 2022-08-27 NOTE — PROCEDURES
Nephrology/Hemodialysis note    Patient with ESRD/HD admitted with GIB  Seen and examined during dialysis  Doing well, no complaints  Noticed arrhythmias during treatment - chase/tachy-irregular  No UF  Please see dialysis flow sheet for details  Continue HD on MWF schedule

## 2022-08-27 NOTE — PROGRESS NOTES
HD treatment today using LFAAVF.Treatment fairly tolerated.Noted cardiac rhythm changes during treatment,from bradycardia to Afib.Dr Merchant notified and aware.No UF removed.Report given to primary Rn.

## 2022-08-27 NOTE — PROGRESS NOTES
Gastroenterology Progress Note     Author: KAYDEN Short/Danae Larose MD   Date & Time Created: 8/27/2022 8:35 AM    Chief Complaint:  Hematemesis    Interval History:    Stable. No acute overnight events Had a bowel movement last night but flushed it before RN can evaluate the color. No abdominal pain. No further N/V. H/H stable. Cardiology cleared patient for endoscopy..     Review of Systems:  Review of Systems   Constitutional:  Positive for malaise/fatigue.   HENT: Negative.     Eyes: Negative.    Respiratory: Negative.     Cardiovascular: Negative.    Gastrointestinal: Negative.    Genitourinary: Negative.    Musculoskeletal: Negative.    Skin: Negative.    Neurological:  Positive for weakness.   Psychiatric/Behavioral: Negative.       Physical Exam:  Physical Exam  Constitutional:       Appearance: He is ill-appearing.   HENT:      Head: Normocephalic and atraumatic.      Mouth/Throat:      Mouth: Mucous membranes are moist.   Eyes:      Extraocular Movements: Extraocular movements intact.      Pupils: Pupils are equal, round, and reactive to light.   Cardiovascular:      Rate and Rhythm: Rhythm irregular.      Pulses: Normal pulses.      Heart sounds: Normal heart sounds.   Pulmonary:      Effort: Pulmonary effort is normal.      Breath sounds: Normal breath sounds.   Abdominal:      General: Bowel sounds are normal. There is no distension.      Palpations: Abdomen is soft.      Tenderness: There is no abdominal tenderness.   Musculoskeletal:         General: Normal range of motion.   Skin:     General: Skin is warm and dry.      Capillary Refill: Capillary refill takes less than 2 seconds.   Neurological:      General: No focal deficit present.      Mental Status: He is alert and oriented to person, place, and time.       Labs:          Recent Labs     08/25/22  2041 08/26/22  0052 08/26/22  0449 08/27/22  0430   SODIUM 143  --  139 139   POTASSIUM 6.6* 5.0 3.8 4.5   CHLORIDE 94*  --  96  94*   CO2 20  --  27 29   BUN 79*  --  28* 58*   CREATININE 11.25*  --  3.73* 8.95*   MAGNESIUM 2.4  --  1.9 2.4   PHOSPHORUS 8.6*  --  2.9 6.8*   CALCIUM 9.1  --  9.0 8.9     Recent Labs     22  043   ALTSGPT 17 16 17   ASTSGOT    ALKPHOSPHAT 69 75 72   TBILIRUBIN 0.6 2.0* 1.1   GLUCOSE 129* 109* 88     Recent Labs     22  0143 22  0555 22  1020 22  1540 22  2349 22  0430   RBC 1.93*  --  3.39*  --   --   --   --  3.34*   HEMOGLOBIN 6.5*   < > 10.5*  --    < > 10.1* 9.9* 10.5*   HEMATOCRIT 20.2*   < > 30.4*  --    < > 29.3* 29.4* 31.4*   PLATELETCT 210  --  192  --   --   --   --  208   PROTHROMBTM 40.5*  --   --  18.4*  --   --   --  17.4*   INR 4.44*  --   --  1.57*  --   --   --  1.46*    < > = values in this interval not displayed.     Recent Labs     22   WBC 16.2* 16.7* 10.0   NEUTSPOLYS 96.50*  --   --    LYMPHOCYTES 3.50*  --   --    MONOCYTES 0.00  --   --    EOSINOPHILS 0.00  --   --    BASOPHILS 0.00  --   --    ASTSGOT    ALTSGPT 17 16 17   ALKPHOSPHAT 69 75 72   TBILIRUBIN 0.6 2.0* 1.1     Hemodynamics:  Temp (24hrs), Av.8 °C (98.2 °F), Min:36.5 °C (97.7 °F), Max:36.9 °C (98.5 °F)  Temperature: 36.5 °C (97.7 °F)  Pulse  Av.1  Min: 52  Max: 79   Blood Pressure: 126/60     Respiratory:    Respiration: 20, Pulse Oximetry: 91 %        RUL Breath Sounds: Clear, RML Breath Sounds: Clear, RLL Breath Sounds: Clear, SABAS Breath Sounds: Clear, LLL Breath Sounds: Clear  Fluids:    Intake/Output Summary (Last 24 hours) at 2022 0835  Last data filed at 2022 1800  Gross per 24 hour   Intake 300 ml   Output --   Net 300 ml        GI/Nutrition:  Orders Placed This Encounter   Procedures    Diet NPO Restrict to: Sips with Medications     Standing Status:   Standing     Number of Occurrences:   1     Order Specific Question:   Diet NPO Restrict  to:     Answer:   Sips with Medications [3]     Medical Decision Making, by Problem:  Active Hospital Problems    Diagnosis     *GI bleeding [K92.2]     NSTEMI (non-ST elevated myocardial infarction) (formerly [facility]) [I21.4]     Rectal bleed [K62.5]     ESRD (end stage renal disease) on dialysis (formerly [facility]) [N18.6, Z99.2]     Hyperkalemia [E87.5]     Prolonged Q-T interval on ECG [R94.31]     Essential hypertension [I10]     PAF (paroxysmal atrial fibrillation) (formerly [facility]) [I48.0]     CVA (cerebral vascular accident) (formerly [facility]) [I63.9]        Plan:    1.  Hematemesis, etiology not clear.  He does not have any symptoms to suggest a cause, so differential is quite broad especially given his INR on presentation, certainly could include peptic disease, gastritis, esophagitis from reflux, malignancy, among others.  He does describe some melena, so I do think this is the primary  of his increased anemia.  I do not suspect significant colonic contribution other than he has known internal hemorrhoids that likely are bleeding also with his anticoagulated state and in addition, he had a colonoscopy less than 2 years ago that was normal, all the way to the terminal ileum other than those internal hemorrhoids.  Hence, I do think an EGD is warranted. Clearly stable at this point, likely secondary to his reversal of his over anticoagulation.  I agree with the PPIs and transfusions, but given his NSTEMI and what appears to be congestive heart failure, I think he needs to have cardiac optimization prior to any consideration of a nonurgent EGD. * Cardiology cleared patient for endoscopic procedures.  Plan for EGD tomorrow at 0730    Risks, benefits, and alternatives were discussed with patient. Consenting persons were given an opportunity to ask questions and discuss other options. Risks including but not limited to failed or incomplete endoscopy, ineffective therapy, perforation, infection, bleeding, missed lesion(s), cardiac and/or pulmonary event,  aspiration, stroke, possible need for surgery, hospitalization possibly prolonged, discomfort, unsuccessful and/or incomplete procedure, possible need for repeat procedures and/or additional testings, damage to adjacent organs and/or vascular structures, medication reaction, disability, death, and other adverse events possibly life-threatening. Discussion was undertaken with Layman's terms. Consenting persons stated understanding and acceptance of these risks, and wished to proceed. Consent was given in clear state of mind. All questions were answered.    2.  Anemia.  See discussion above.  Of course, there may be a contribution as well from his end-stage renal disease.  3.  Non-ST-elevation myocardial infarction.  He is getting an echo at this point to check for any wall motion abnormalities.  There is suspicion that certainly is demand related ischemic event, although my understanding is Cardiology is going to be involved as well.  4.  End-stage renal disease, on hemodialysis.  He just had hemodialysis last night.  5.  Atrial fibrillation.  Coumadin has been held and his over anticoagulation has been reversed.  6.  Anticoagulation therapy.  See above.  7.  Congestive heart failure.  See above.    Quality-Core Measures    ===  I have seen and examined the patient today.  I have reviewed the medical record, laboratory data, imaging, and all relevant studies.  I have discussed the assessment and plan of care with Alissa MICHAUD and agree with their note and plan of care as documented.   Danae Larose M.D.

## 2022-08-27 NOTE — CARE PLAN
Problem: Knowledge Deficit - Standard  Goal: Patient and family/care givers will demonstrate understanding of plan of care, disease process/condition, diagnostic tests and medications  Outcome: Progressing     Problem: Pain - Standard  Goal: Alleviation of pain or a reduction in pain to the patient’s comfort goal  Outcome: Progressing     Problem: Fall Risk  Goal: Patient will remain free from falls  Outcome: Progressing     Problem: Neuro Status  Goal: Neuro status will remain stable or improve  Outcome: Progressing     Problem: Hemodynamics  Goal: Patient's hemodynamics, fluid balance and neurologic status will be stable or improve  Outcome: Progressing   The patient is Stable - Low risk of patient condition declining or worsening    Shift Goals  Clinical Goals: neuor checks  Patient Goals: comfort  Family Goals: No family present    Progress made toward(s) clinical / shift goals:  neuro checks improving patient is able to answer more questions appropriately     Patient is not progressing towards the following goals:

## 2022-08-27 NOTE — PROGRESS NOTES
Hospital Medicine Daily Progress Note    Date of Service  8/27/2022    Chief Complaint  Giovanni Saini is a 52 y.o. male admitted 8/25/2022 with GI bleed.    Hospital Course  Mr. Saini has a past medical history of ESRD on dialysis, atrial fibrillation on coumadin, and hypertension that presented to the ER on 8/25 with left-sided chest pain, black stools, and black vomitus. The aforementioned symptom all had started 3 days prior. His last Hb in Epic was Oct. 2020 at which time it was 7.1. His Hb in the ER was 6.5 therefore he underwent transfusion of RBCs. His INR was 4 for which he was given KCENTRA in the ER. A TEG platelet mapping revealed 97% inhibition of AA therefore he was transfused a unit of platelets.  He had missed his Monday dialysis and his K was 5. Nephrology was consulted. He was admitted to the ICU with GI consultation.        Interval Problem Update  8/27: Mr. Saini was evaluated and examined in the IMCU. INR this mornign is 1.46. Hb is 10.5. He is bradycardic therefore metoprolol will be stopped. He had dialysis this morning. He will be NPO for an EGD tomorrow.     I have discussed this patient's plan of care and discharge plan at IDT rounds today with Case Management, Nursing, Nursing leadership, and other members of the IDT team.    Consultants/Specialty  critical care  GI  Nephrology   Code Status  Full Code    Disposition  Patient is not medically cleared for discharge.   Anticipate discharge to to home with close outpatient follow-up.  I have placed the appropriate orders for post-discharge needs.    Review of Systems  Review of Systems   Constitutional:  Negative for chills and fever.   Respiratory:  Negative for shortness of breath.    Cardiovascular:  Negative for chest pain and leg swelling.   Gastrointestinal:  Negative for abdominal pain, blood in stool, melena, nausea and vomiting.   All other systems reviewed and are negative.     Physical Exam  Temp:  [36.5 °C (97.7 °F)-37.1 °C  (98.7 °F)] 36.5 °C (97.7 °F)  Pulse:  [52-70] 53  Resp:  [12-50] 20  BP: (113-166)/(55-72) 126/60  SpO2:  [91 %-99 %] 91 %    Physical Exam  Vitals and nursing note reviewed.   Constitutional:       General: He is not in acute distress.     Appearance: Normal appearance. He is not ill-appearing.   HENT:      Head: Normocephalic and atraumatic.      Mouth/Throat:      Mouth: Mucous membranes are dry.      Pharynx: Oropharynx is clear.   Eyes:      General: No scleral icterus.     Conjunctiva/sclera: Conjunctivae normal.   Cardiovascular:      Rate and Rhythm: Bradycardia present. Rhythm irregular.      Heart sounds: Murmur heard.   Pulmonary:      Effort: Pulmonary effort is normal.      Breath sounds: Normal breath sounds.   Abdominal:      General: There is no distension.      Tenderness: There is no abdominal tenderness.   Musculoskeletal:      Cervical back: Normal range of motion and neck supple.      Right lower leg: No edema.      Left lower leg: No edema.      Comments: Left arm fistula with a thrill   Neurological:      General: No focal deficit present.      Mental Status: He is alert and oriented to person, place, and time. Mental status is at baseline.   Psychiatric:         Mood and Affect: Mood normal.         Behavior: Behavior normal.       Fluids    Intake/Output Summary (Last 24 hours) at 8/27/2022 0736  Last data filed at 8/26/2022 1800  Gross per 24 hour   Intake 300 ml   Output --   Net 300 ml       Laboratory  Recent Labs     08/25/22  2041 08/26/22  0143 08/26/22  0449 08/26/22  1020 08/26/22  1540 08/26/22  2349 08/27/22  0430   WBC 16.2*  --  16.7*  --   --   --  10.0   RBC 1.93*  --  3.39*  --   --   --  3.34*   HEMOGLOBIN 6.5*   < > 10.5*   < > 10.1* 9.9* 10.5*   HEMATOCRIT 20.2*   < > 30.4*   < > 29.3* 29.4* 31.4*   .7*  --  89.7  --   --   --  94.0   MCH 33.7*  --  31.0  --   --   --  31.4   MCHC 32.2*  --  34.5  --   --   --  33.4*   RDW 66.2*  --  63.6*  --   --   --  72.1*    PLATELETCT 210  --  192  --   --   --  208   MPV 10.7  --  10.7  --   --   --  10.1    < > = values in this interval not displayed.     Recent Labs     08/25/22 2041 08/26/22  0052 08/26/22  0449 08/27/22  0430   SODIUM 143  --  139 139   POTASSIUM 6.6* 5.0 3.8 4.5   CHLORIDE 94*  --  96 94*   CO2 20  --  27 29   GLUCOSE 129*  --  109* 88   BUN 79*  --  28* 58*   CREATININE 11.25*  --  3.73* 8.95*   CALCIUM 9.1  --  9.0 8.9     Recent Labs     08/25/22 2041 08/26/22  0555 08/27/22  0430   INR 4.44* 1.57* 1.46*               Imaging  EC-ECHOCARDIOGRAM COMPLETE W/ CONT   Final Result      CT-HEAD W/O   Final Result      1. Cerebral atrophy.   2. White matter lucencies most consistent with small vessel ischemic change versus demyelination or gliosis.   3. Otherwise, Head CT without contrast with no acute findings. No evidence of acute cerebral infarction, hemorrhage or mass lesion.         DX-CHEST-PORTABLE (1 VIEW)   Final Result         1.  No acute cardiopulmonary disease.   2.  Cardiomegaly   3.  Atherosclerosis           Assessment/Plan  * GI bleeding- (present on admission)  Assessment & Plan  With acute blood loss anemia  Status post 3 units of RBCs transfused  Serial Hb levels ordered.  GI consulted  EGD to be scheduled w/anesthesia on 8/28    NSTEMI (non-ST elevated myocardial infarction) ([facility])- (present on admission)  Assessment & Plan  Type II  Normal EF  Cards consulted    PAF (paroxysmal atrial fibrillation) ([facility])- (present on admission)  Assessment & Plan  Hx of  Amiodarone for rate control. Stop metoprolol due to bradycardia.   Stopped coumadin  Given INR of 4 he was reversed with KCENTRA    ESRD (end stage renal disease) on dialysis ([facility])- (present on admission)  Assessment & Plan  Left arm fistula  Last dialysis was 8/27    Hyperkalemia- (present on admission)  Assessment & Plan  Resolved with dialysis    Essential hypertension- (present on admission)  Assessment & Plan  Blood pressure has been  elevated therefore home meds have been restarted and will be titrated up accordingly       VTE prophylaxis: SCDs/TEDs    I have performed a physical exam and reviewed and updated ROS and Plan today (8/27/2022). In review of yesterday's note (8/26/2022), there are no changes except as documented above.

## 2022-08-28 ENCOUNTER — ANESTHESIA EVENT (OUTPATIENT)
Dept: SURGERY | Facility: MEDICAL CENTER | Age: 52
DRG: 368 | End: 2022-08-28
Payer: MEDICAID

## 2022-08-28 ENCOUNTER — ANESTHESIA (OUTPATIENT)
Dept: SURGERY | Facility: MEDICAL CENTER | Age: 52
DRG: 368 | End: 2022-08-28
Payer: MEDICAID

## 2022-08-28 LAB
ANION GAP SERPL CALC-SCNC: 13 MMOL/L (ref 7–16)
BUN SERPL-MCNC: 38 MG/DL (ref 8–22)
CALCIUM SERPL-MCNC: 8.4 MG/DL (ref 8.5–10.5)
CHLORIDE SERPL-SCNC: 95 MMOL/L (ref 96–112)
CO2 SERPL-SCNC: 27 MMOL/L (ref 20–33)
CREAT SERPL-MCNC: 7.11 MG/DL (ref 0.5–1.4)
GFR SERPLBLD CREATININE-BSD FMLA CKD-EPI: 8 ML/MIN/1.73 M 2
GLUCOSE SERPL-MCNC: 90 MG/DL (ref 65–99)
HGB BLD-MCNC: 9.9 G/DL (ref 14–18)
POTASSIUM SERPL-SCNC: 4.4 MMOL/L (ref 3.6–5.5)
SODIUM SERPL-SCNC: 135 MMOL/L (ref 135–145)

## 2022-08-28 PROCEDURE — 700102 HCHG RX REV CODE 250 W/ 637 OVERRIDE(OP): Performed by: INTERNAL MEDICINE

## 2022-08-28 PROCEDURE — 00731 ANES UPR GI NDSC PX NOS: CPT | Performed by: STUDENT IN AN ORGANIZED HEALTH CARE EDUCATION/TRAINING PROGRAM

## 2022-08-28 PROCEDURE — 160048 HCHG OR STATISTICAL LEVEL 1-5: Performed by: INTERNAL MEDICINE

## 2022-08-28 PROCEDURE — 700102 HCHG RX REV CODE 250 W/ 637 OVERRIDE(OP): Performed by: STUDENT IN AN ORGANIZED HEALTH CARE EDUCATION/TRAINING PROGRAM

## 2022-08-28 PROCEDURE — 700105 HCHG RX REV CODE 258: Performed by: STUDENT IN AN ORGANIZED HEALTH CARE EDUCATION/TRAINING PROGRAM

## 2022-08-28 PROCEDURE — A9270 NON-COVERED ITEM OR SERVICE: HCPCS | Performed by: STUDENT IN AN ORGANIZED HEALTH CARE EDUCATION/TRAINING PROGRAM

## 2022-08-28 PROCEDURE — 160035 HCHG PACU - 1ST 60 MINS PHASE I: Performed by: INTERNAL MEDICINE

## 2022-08-28 PROCEDURE — 160009 HCHG ANES TIME/MIN: Performed by: INTERNAL MEDICINE

## 2022-08-28 PROCEDURE — 770020 HCHG ROOM/CARE - TELE (206)

## 2022-08-28 PROCEDURE — 700111 HCHG RX REV CODE 636 W/ 250 OVERRIDE (IP): Performed by: STUDENT IN AN ORGANIZED HEALTH CARE EDUCATION/TRAINING PROGRAM

## 2022-08-28 PROCEDURE — A9270 NON-COVERED ITEM OR SERVICE: HCPCS | Performed by: INTERNAL MEDICINE

## 2022-08-28 PROCEDURE — 36415 COLL VENOUS BLD VENIPUNCTURE: CPT

## 2022-08-28 PROCEDURE — 160002 HCHG RECOVERY MINUTES (STAT): Performed by: INTERNAL MEDICINE

## 2022-08-28 PROCEDURE — 99232 SBSQ HOSP IP/OBS MODERATE 35: CPT | Performed by: STUDENT IN AN ORGANIZED HEALTH CARE EDUCATION/TRAINING PROGRAM

## 2022-08-28 PROCEDURE — C9113 INJ PANTOPRAZOLE SODIUM, VIA: HCPCS | Performed by: STUDENT IN AN ORGANIZED HEALTH CARE EDUCATION/TRAINING PROGRAM

## 2022-08-28 PROCEDURE — 88312 SPECIAL STAINS GROUP 1: CPT

## 2022-08-28 PROCEDURE — 80048 BASIC METABOLIC PNL TOTAL CA: CPT

## 2022-08-28 PROCEDURE — 160203 HCHG ENDO MINUTES - 1ST 30 MINS LEVEL 4: Performed by: INTERNAL MEDICINE

## 2022-08-28 PROCEDURE — 85018 HEMOGLOBIN: CPT

## 2022-08-28 PROCEDURE — 0DB68ZX EXCISION OF STOMACH, VIA NATURAL OR ARTIFICIAL OPENING ENDOSCOPIC, DIAGNOSTIC: ICD-10-PCS | Performed by: INTERNAL MEDICINE

## 2022-08-28 PROCEDURE — 88305 TISSUE EXAM BY PATHOLOGIST: CPT

## 2022-08-28 RX ORDER — OMEPRAZOLE 20 MG/1
20 CAPSULE, DELAYED RELEASE ORAL 2 TIMES DAILY
Status: DISCONTINUED | OUTPATIENT
Start: 2022-08-28 | End: 2022-08-29 | Stop reason: HOSPADM

## 2022-08-28 RX ORDER — SODIUM CHLORIDE 9 MG/ML
INJECTION, SOLUTION INTRAVENOUS
Status: DISCONTINUED | OUTPATIENT
Start: 2022-08-28 | End: 2022-08-28 | Stop reason: SURG

## 2022-08-28 RX ORDER — HYDRALAZINE HYDROCHLORIDE 20 MG/ML
5 INJECTION INTRAMUSCULAR; INTRAVENOUS
Status: DISCONTINUED | OUTPATIENT
Start: 2022-08-28 | End: 2022-08-28 | Stop reason: HOSPADM

## 2022-08-28 RX ORDER — OXYCODONE HCL 5 MG/5 ML
5 SOLUTION, ORAL ORAL
Status: DISCONTINUED | OUTPATIENT
Start: 2022-08-28 | End: 2022-08-28 | Stop reason: HOSPADM

## 2022-08-28 RX ORDER — DIPHENHYDRAMINE HYDROCHLORIDE 50 MG/ML
12.5 INJECTION INTRAMUSCULAR; INTRAVENOUS
Status: DISCONTINUED | OUTPATIENT
Start: 2022-08-28 | End: 2022-08-28 | Stop reason: HOSPADM

## 2022-08-28 RX ORDER — SUCRALFATE ORAL 1 G/10ML
1 SUSPENSION ORAL EVERY 6 HOURS
Status: DISCONTINUED | OUTPATIENT
Start: 2022-08-28 | End: 2022-08-29 | Stop reason: HOSPADM

## 2022-08-28 RX ORDER — HALOPERIDOL 5 MG/ML
1 INJECTION INTRAMUSCULAR
Status: DISCONTINUED | OUTPATIENT
Start: 2022-08-28 | End: 2022-08-28 | Stop reason: HOSPADM

## 2022-08-28 RX ORDER — ALBUTEROL SULFATE 2.5 MG/3ML
2.5 SOLUTION RESPIRATORY (INHALATION)
Status: DISCONTINUED | OUTPATIENT
Start: 2022-08-28 | End: 2022-08-28 | Stop reason: HOSPADM

## 2022-08-28 RX ORDER — OXYCODONE HCL 5 MG/5 ML
10 SOLUTION, ORAL ORAL
Status: DISCONTINUED | OUTPATIENT
Start: 2022-08-28 | End: 2022-08-28 | Stop reason: HOSPADM

## 2022-08-28 RX ORDER — SODIUM CHLORIDE, SODIUM LACTATE, POTASSIUM CHLORIDE, CALCIUM CHLORIDE 600; 310; 30; 20 MG/100ML; MG/100ML; MG/100ML; MG/100ML
INJECTION, SOLUTION INTRAVENOUS CONTINUOUS
Status: DISCONTINUED | OUTPATIENT
Start: 2022-08-28 | End: 2022-08-28 | Stop reason: HOSPADM

## 2022-08-28 RX ORDER — LABETALOL HYDROCHLORIDE 5 MG/ML
5 INJECTION, SOLUTION INTRAVENOUS
Status: DISCONTINUED | OUTPATIENT
Start: 2022-08-28 | End: 2022-08-28 | Stop reason: HOSPADM

## 2022-08-28 RX ADMIN — PANTOPRAZOLE SODIUM 40 MG: 40 INJECTION, POWDER, LYOPHILIZED, FOR SOLUTION INTRAVENOUS at 04:49

## 2022-08-28 RX ADMIN — SUCRALFATE 1 G: 1 SUSPENSION ORAL at 17:49

## 2022-08-28 RX ADMIN — PROPOFOL 150 MCG/KG/MIN: 10 INJECTION, EMULSION INTRAVENOUS at 09:14

## 2022-08-28 RX ADMIN — OMEPRAZOLE 20 MG: 20 CAPSULE, DELAYED RELEASE ORAL at 17:49

## 2022-08-28 RX ADMIN — ATORVASTATIN CALCIUM 40 MG: 40 TABLET, FILM COATED ORAL at 17:49

## 2022-08-28 RX ADMIN — ACETAMINOPHEN 650 MG: 325 TABLET, FILM COATED ORAL at 11:17

## 2022-08-28 RX ADMIN — ACETAMINOPHEN 650 MG: 325 TABLET, FILM COATED ORAL at 01:11

## 2022-08-28 RX ADMIN — SUCRALFATE 1 G: 1 SUSPENSION ORAL at 11:13

## 2022-08-28 RX ADMIN — ACETAMINOPHEN 650 MG: 325 TABLET, FILM COATED ORAL at 21:40

## 2022-08-28 RX ADMIN — SODIUM CHLORIDE: 9 INJECTION, SOLUTION INTRAVENOUS at 09:11

## 2022-08-28 ASSESSMENT — ENCOUNTER SYMPTOMS
CHILLS: 0
CONSTIPATION: 0
ABDOMINAL PAIN: 0
SHORTNESS OF BREATH: 0
BACK PAIN: 0
BLOOD IN STOOL: 0
NAUSEA: 0
MYALGIAS: 0
FEVER: 0
VOMITING: 0

## 2022-08-28 ASSESSMENT — PAIN DESCRIPTION - PAIN TYPE: TYPE: ACUTE PAIN

## 2022-08-28 ASSESSMENT — PAIN SCALES - GENERAL: PAIN_LEVEL: 0

## 2022-08-28 ASSESSMENT — FIBROSIS 4 INDEX: FIB4 SCORE: 1.32

## 2022-08-28 NOTE — PROGRESS NOTES
Bedside report received from NOC RN. Assumed care of pt. Pt awake, laying in bed. A/Ox4, VSS. No concerns, complaints or distress. Pt educated to call before getting out of bed. POC reviewed and white board updated. Tele box on. Afib 110 on the monitor. Call light in reach. Bed locked in lowest position with 2 upper bed rails up. Bed alarm on.

## 2022-08-28 NOTE — OR NURSING
Patient is awake, alert, oriented, vital signs stable, on room air, denies pain, denies nausea. Returning to room, report given to Teresa MCCLELLAND

## 2022-08-28 NOTE — CARE PLAN
The patient is Stable - Low risk of patient condition declining or worsening    Shift Goals  Clinical Goals: Decrease Bld in stool and no hematemsis  Patient Goals: Comfort  Family Goals: NA, no family at bedside    Progress made toward(s) clinical / shift goals:      Patient is not progressing towards the following goals:      Problem: Knowledge Deficit - Standard  Goal: Patient and family/care givers will demonstrate understanding of plan of care, disease process/condition, diagnostic tests and medications  Outcome: Progressing     Problem: Pain - Standard  Goal: Alleviation of pain or a reduction in pain to the patient’s comfort goal  Outcome: Progressing     Problem: Fall Risk  Goal: Patient will remain free from falls  Outcome: Progressing     Problem: Hemodynamics  Goal: Patient's hemodynamics, fluid balance and neurologic status will be stable or improve  Outcome: Progressing

## 2022-08-28 NOTE — PROGRESS NOTES
Pt transported back to floor. Pt placed back on tele monitor. Post op vitals started. Pt is A/Ox4, denies pain, no SOB.  Will continue to monitor.

## 2022-08-28 NOTE — ANESTHESIA POSTPROCEDURE EVALUATION
Patient: Giovanni Saini    Procedure Summary     Date: 08/28/22 Room / Location: [facility] 06 / SURGERY [facility]    Anesthesia Start: 0911 Anesthesia Stop: 0932    Procedures:       GASTROSCOPY (Esophagus)      GASTROSCOPY, WITH BIOPSY (Esophagus) Diagnosis: (gastritis and shannon funez tear x 2)    Surgeons: Danae Larose M.D. Responsible Provider: Lazara Neil M.D.    Anesthesia Type: MAC ASA Status: 3          Final Anesthesia Type: MAC  Last vitals  BP   Blood Pressure: 139/78    Temp   36.3 °C (97.3 °F)    Pulse   (!) 111   Resp   18    SpO2   99 %      Anesthesia Post Evaluation    Patient location during evaluation: PACU  Patient participation: complete - patient participated  Level of consciousness: sleepy but conscious  Pain score: 0    Airway patency: patent  Anesthetic complications: no  Cardiovascular status: hemodynamically stable  Respiratory status: acceptable and face mask  Hydration status: euvolemic    PONV: none          No notable events documented.     Nurse Pain Score: 0 (NPRS)

## 2022-08-28 NOTE — PROGRESS NOTES
Hospital Medicine Daily Progress Note    Date of Service  8/28/2022    Chief Complaint  Giovanni Saini is a 52 y.o. male admitted 8/25/2022 with GI bleed.    Hospital Course  Mr. Saini has a past medical history of ESRD on dialysis, atrial fibrillation on coumadin, and hypertension that presented to the ER on 8/25 with left-sided chest pain, black stools, and black vomitus. The aforementioned symptom all had started 3 days prior. His last Hb in Epic was Oct. 2020 at which time it was 7.1. His Hb in the ER was 6.5 therefore he underwent transfusion of RBCs. His INR was 4 for which he was given KCENTRA in the ER. A TEG platelet mapping revealed 97% inhibition of AA therefore he was transfused a unit of platelets.  He had missed his Monday dialysis and his K was 5. Nephrology was consulted. He was admitted to the ICU with GI consultation.        Interval Problem Update  8/27: Mr. Saini was evaluated and examined in the IMCU. INR this mornign is 1.46. Hb is 10.5. He is bradycardic therefore metoprolol will be stopped. He had dialysis this morning. He will be NPO for an EGD tomorrow.   8/28: No further episodes of bleeding.  He is status post EGD they found a grade 2 Cari-Salazar tear which is no longer bleeding.  Also gastritis noted. Start diet.  Patient will need a outpatient follow-up with cardiology for watchman placement.  Likely discharge tomorrow    I have discussed this patient's plan of care and discharge plan at IDT rounds today with Case Management, Nursing, Nursing leadership, and other members of the IDT team.    Consultants/Specialty  critical care  GI  Nephrology   Code Status  Full Code    Disposition  Patient is not medically cleared for discharge.   Anticipate discharge to to home with close outpatient follow-up.  I have placed the appropriate orders for post-discharge needs.    Review of Systems  Review of Systems   Constitutional:  Negative for chills and fever.   Respiratory:  Negative for  shortness of breath.    Cardiovascular:  Negative for chest pain and leg swelling.   Gastrointestinal:  Negative for abdominal pain, blood in stool, constipation, melena, nausea and vomiting.   Musculoskeletal:  Negative for back pain, joint pain and myalgias.   All other systems reviewed and are negative.     Physical Exam  Temp:  [36.2 °C (97.2 °F)-37.1 °C (98.7 °F)] 36.8 °C (98.2 °F)  Pulse:  [] 110  Resp:  [18-37] 18  BP: ()/(53-81) 120/70  SpO2:  [92 %-100 %] 97 %    Physical Exam  Vitals and nursing note reviewed.   Constitutional:       General: He is not in acute distress.     Appearance: Normal appearance. He is not ill-appearing.   HENT:      Head: Normocephalic and atraumatic.      Mouth/Throat:      Mouth: Mucous membranes are dry.      Pharynx: Oropharynx is clear.   Eyes:      General: No scleral icterus.     Conjunctiva/sclera: Conjunctivae normal.   Cardiovascular:      Rate and Rhythm: Bradycardia present. Rhythm irregular.      Heart sounds: Murmur heard.   Pulmonary:      Effort: Pulmonary effort is normal. No respiratory distress.      Breath sounds: Normal breath sounds. No wheezing.   Abdominal:      General: There is no distension.      Tenderness: There is no abdominal tenderness.   Musculoskeletal:      Cervical back: Normal range of motion and neck supple.      Right lower leg: No edema.      Left lower leg: No edema.      Comments: Left arm fistula with a thrill   Neurological:      General: No focal deficit present.      Mental Status: He is alert and oriented to person, place, and time. Mental status is at baseline.   Psychiatric:         Mood and Affect: Mood normal.         Behavior: Behavior normal.       Fluids    Intake/Output Summary (Last 24 hours) at 8/28/2022 1211  Last data filed at 8/28/2022 0932  Gross per 24 hour   Intake 100 ml   Output --   Net 100 ml         Laboratory  Recent Labs     08/25/22  2041 08/26/22  0143 08/26/22  0449 08/26/22  1020 08/26/22  1540  08/26/22  2349 08/27/22  0430 08/28/22  0031   WBC 16.2*  --  16.7*  --   --   --  10.0  --    RBC 1.93*  --  3.39*  --   --   --  3.34*  --    HEMOGLOBIN 6.5*   < > 10.5*   < > 10.1* 9.9* 10.5* 9.9*   HEMATOCRIT 20.2*   < > 30.4*   < > 29.3* 29.4* 31.4*  --    .7*  --  89.7  --   --   --  94.0  --    MCH 33.7*  --  31.0  --   --   --  31.4  --    MCHC 32.2*  --  34.5  --   --   --  33.4*  --    RDW 66.2*  --  63.6*  --   --   --  72.1*  --    PLATELETCT 210  --  192  --   --   --  208  --    MPV 10.7  --  10.7  --   --   --  10.1  --     < > = values in this interval not displayed.       Recent Labs     08/26/22  0449 08/27/22  0430 08/28/22  0711   SODIUM 139 139 135   POTASSIUM 3.8 4.5 4.4   CHLORIDE 96 94* 95*   CO2 27 29 27   GLUCOSE 109* 88 90   BUN 28* 58* 38*   CREATININE 3.73* 8.95* 7.11*   CALCIUM 9.0 8.9 8.4*       Recent Labs     08/25/22  2041 08/26/22  0555 08/27/22  0430   INR 4.44* 1.57* 1.46*                 Imaging  EC-ECHOCARDIOGRAM COMPLETE W/ CONT   Final Result      CT-HEAD W/O   Final Result      1. Cerebral atrophy.   2. White matter lucencies most consistent with small vessel ischemic change versus demyelination or gliosis.   3. Otherwise, Head CT without contrast with no acute findings. No evidence of acute cerebral infarction, hemorrhage or mass lesion.         DX-CHEST-PORTABLE (1 VIEW)   Final Result         1.  No acute cardiopulmonary disease.   2.  Cardiomegaly   3.  Atherosclerosis             Assessment/Plan  * GI bleeding- (present on admission)  Assessment & Plan  With acute blood loss anemia  Status post 3 units of RBCs transfused  Serial Hb levels ordered.  GI consulted-start twice daily PPI and Carafate x7 days.  GI soft diet  EGD showed a healing Cari-Salazar tear and gastritis.    NSTEMI (non-ST elevated myocardial infarction) ([facility])- (present on admission)  Assessment & Plan  Type II  Normal EF  Cards consulted    ESRD (end stage renal disease) on dialysis ([facility])-  (present on admission)  Assessment & Plan  Left arm fistula  Last dialysis was 8/27    Hyperkalemia- (present on admission)  Assessment & Plan  Resolved with dialysis    Essential hypertension- (present on admission)  Assessment & Plan  Blood pressure has been elevated therefore home meds have been restarted and will be titrated up accordingly    PAF (paroxysmal atrial fibrillation) ([facility])- (present on admission)  Assessment & Plan  Hx of  Amiodarone for rate control. Stop metoprolol due to bradycardia.   Stopped coumadin  Given INR of 4 he was reversed with [facility]  Cardiology recommending outpatient watchman device placement.         VTE prophylaxis: SCDs/TEDs    I have performed a physical exam and reviewed and updated ROS and Plan today (8/28/2022). In review of yesterday's note (8/27/2022), there are no changes except as documented above.

## 2022-08-28 NOTE — ANESTHESIA TIME REPORT
Anesthesia Start and Stop Event Times     Date Time Event    8/28/2022 0911 Ready for Procedure     0911 Anesthesia Start     0932 Anesthesia Stop        Responsible Staff  08/28/22    Name Role Begin End    Lazara Neil M.D. Anesth 0911 0932        Overtime Reason:  no overtime (within assigned shift)    Comments:

## 2022-08-28 NOTE — OP REPORT
OP Note  Procedure Date: 8/28/2022     PreOp Diagnosis: Hematemesis, anemia    PostOp Diagnosis:   Esophagus: Two 2-cm longitudinal Cari Salazar tears, in healing process.   Stomach: Patchy gastritis s/p cold forceps biopsy   Duodenum: Grossly normal  No fresh blood nor blood clot was seen in the entire exam.    Procedure(s):  GASTROSCOPY - Wound Class: Clean Contaminated  GASTROSCOPY, WITH BIOPSY - Wound Class: Clean Contaminated    Surgeon(s):  Danae Larose M.D.    Anesthesiologist/Type of Anesthesia:  Anesthesiologist: Lazara Neil M.D./MAC    Surgical Staff:  Endoscopy Technician: Hussain Vanessa  Endoscopy Nurse: Aissatou Marcos R.N.    Specimens removed if any:  ID Type Source Tests Collected by Time Destination   A : bx, r/o h. pylori Tissue Gastric PATHOLOGY SPECIMEN Danae Larose M.D. 8/28/2022  9:21 AM        Estimated Blood Loss: Nil    Anesthesia/Medications:  see anesthesia note     COMPLICATIONS:  No immediate complications.    PROCEDURE IN DETAIL, Findings and ENDOSCOPIC DIAGNOSIS:      -Prior to the procedure, a History and Physical was performed, and patient medications and allergies were reviewed. The patient’s tolerance of previous anesthesia was also reviewed. The risks and benefits of the procedure and the sedation options and risks were discussed with the patient. All questions were answered, and informed consent was obtained. The patient was deemed in satisfactory condition to undergo the procedure.    -Prior Anticoagulants: the patient has taken no previous anticoagulant or antiplatelet agents.    -ASA Grade Assessment: see anesthesia note     -The patient was placed in the left lateral decubitus position. The scope was passed under direct vision. Continuous oxygen was provided via nasal cannula and intravenous sedation was administered in divided doses throughout the procedure. The patient’s blood pressure, pulse, and oxygen saturation were monitored continuously throughout the  procedure.    -The gastroscope was gently advanced under direct visualization over the tongue, down the esophagus, through the stomach and into the 2nd portion of the duodenum. The color, texture, mucosa and anatomy of esophagus, stomach and duodenum were carefully examined with the scope. The scope was then withdrawn from the patient and the procedure terminated. Further details are in the finding section, based on anatomical location.    -The patient tolerated the procedure well and there were no immediate complications. The patient was then transferred to the recovery room in stable condition.    Findings:  Esophagus: Two 2-cm longitudinal Cari Salazar tears, in healing process.   Stomach: Patchy gastritis s/p cold forceps biopsy   Duodenum: Grossly normal  No fresh blood nor blood clot was seen in the entire exam.    RECOMMENDATIONS:    1. A letter will be sent regarding to the biopsy result in about 2 weeks.  2. Carafate liq 1g QID X 7 days  3. PPI BIDAC  4. Monior H/H and vitals  5. GI soft diet  6. GI TO SIGN OFF / STAND BY - Thank you very much for allowing me to participate in the care of your patient.  GI to sign off at this time.  If the patient develops changes in clinical course/decompensation or you have any additional questions or concerns, please don't hesitate to reengage GI Consultants.  If necessary, follow up with GI Consultants will be arranged  The patient will be called to schedule an appointment time.  If the patient does not receive a call from GI consultants scheduling or they have additional questions, recommend the patient call the clinic at [phone] or [phone] to schedule an appointment.         8/28/2022 9:28 AM Danae Larose M.D.

## 2022-08-28 NOTE — ANESTHESIA PREPROCEDURE EVALUATION
Case: [id number] Date/Time: 08/28/22 0845    Procedure: GASTROSCOPY    Location: [facility] 06 / SURGERY [facility]    Surgeons: Danae Larose M.D.        53 yo man p/w GIB s/p transfusion of RBCs, Plts, and KCENTRA for INR of 4.    - ESRD on MWF dialysis: last dialyzed yesterday 8/27. Tolerated well.   - Afib on coumadin, since held for bleeding.  - HTN  - CVA with residual bilateral LE weakness per chart. Pt denies past CVA.  - Labs today: Hb 9.9, Plt 208, K 4.4.   INR 1.46 on 8/27.    Relevant Problems   PULMONARY   (positive) CAP (community acquired pneumonia)      NEURO   (positive) CVA (cerebral vascular accident) ([facility])      CARDIAC   (positive) Essential hypertension   (positive) NSTEMI (non-ST elevated myocardial infarction) ([facility])   (positive) Non-rheumatic mitral regurgitation   (positive) Nonrheumatic aortic valve insufficiency: Previously severe but mild to moderate in March 2018   (positive) PAF (paroxysmal atrial fibrillation) ([facility])         (positive) TREASURE (acute kidney injury) ([facility])   (positive) Acute renal failure on dialysis ([facility])   (positive) Cardiorenal syndrome   (positive) ESRD (end stage renal disease) on dialysis ([facility])       Physical Exam    Airway   Mallampati: II  TM distance: >3 FB  Neck ROM: full       Cardiovascular - normal exam  Rhythm: regular  Rate: normal  (-) murmur     Dental - normal exam  (+) upper dentures           Pulmonary - normal exam  Breath sounds clear to auscultation     Abdominal    Neurological - normal exam         Other findings: No loose teeth           Anesthesia Plan    ASA 3   ASA physical status 3 criteria: ESRD undergoing regularly scheduled dialysis, CAD/stents (> 3 months) and CVA or TIA - history (> 3 months)    Plan - MAC               Induction: intravenous    Postoperative Plan: Postoperative administration of opioids is intended.    Pertinent diagnostic labs and testing reviewed    Informed Consent:    Anesthetic plan and risks discussed with  patient.    Use of blood products discussed with: patient whom consented to blood products.

## 2022-08-28 NOTE — PROGRESS NOTES
Donaldo #[id number] utilized to communicate with pt about transfer to T701. Call phone, chargee and belongings placed in Pt bed with pt. Pt stated understanding.

## 2022-08-28 NOTE — PROGRESS NOTES
Patient seen and examined prior to going back to [place] [facility]/Endo.  Indication of anemia hematemesis. Plan: EGD with intervention.     Risks, benefits, and alternatives were discussed with patient.  Consenting person was given an opportunity to ask questions and discuss other options.  Risks including but not limited to perforation, infection, bleeding, missed lesion(s), cardiac and/or pulmonary event, aspiration, stroke, possible need for surgery, hospitalization possibly prolonged, discomfort, unsuccessful and/or incomplete procedure, ineffective therapy or persistent symptoms, possible need for repeat procedures and/or additional testings, damage to adjacent organs and/or vascular structures, medication reaction, disability, death, and other adverse events possibly life-threatening.  Discussion was undertaken with Layman's terms.  Consenting person stated understanding and acceptance of these risks, and wished to proceed.  Consent was given in clear state of mind.

## 2022-08-29 ENCOUNTER — PHARMACY VISIT (OUTPATIENT)
Dept: PHARMACY | Facility: MEDICAL CENTER | Age: 52
End: 2022-08-29
Payer: COMMERCIAL

## 2022-08-29 VITALS
OXYGEN SATURATION: 100 % | HEIGHT: 65 IN | TEMPERATURE: 96.5 F | WEIGHT: 158.73 LBS | BODY MASS INDEX: 26.45 KG/M2 | RESPIRATION RATE: 16 BRPM | SYSTOLIC BLOOD PRESSURE: 130 MMHG | DIASTOLIC BLOOD PRESSURE: 59 MMHG | HEART RATE: 95 BPM

## 2022-08-29 LAB
ANION GAP SERPL CALC-SCNC: 16 MMOL/L (ref 7–16)
BUN SERPL-MCNC: 51 MG/DL (ref 8–22)
CALCIUM SERPL-MCNC: 8.2 MG/DL (ref 8.5–10.5)
CHLORIDE SERPL-SCNC: 94 MMOL/L (ref 96–112)
CO2 SERPL-SCNC: 27 MMOL/L (ref 20–33)
CREAT SERPL-MCNC: 9.41 MG/DL (ref 0.5–1.4)
ERYTHROCYTE [DISTWIDTH] IN BLOOD BY AUTOMATED COUNT: 64.4 FL (ref 35.9–50)
GFR SERPLBLD CREATININE-BSD FMLA CKD-EPI: 6 ML/MIN/1.73 M 2
GLUCOSE SERPL-MCNC: 83 MG/DL (ref 65–99)
HCT VFR BLD AUTO: 30 % (ref 42–52)
HGB BLD-MCNC: 9.9 G/DL (ref 14–18)
INR PPP: 1.54 (ref 0.87–1.13)
MCH RBC QN AUTO: 31.4 PG (ref 27–33)
MCHC RBC AUTO-ENTMCNC: 33 G/DL (ref 33.7–35.3)
MCV RBC AUTO: 95.2 FL (ref 81.4–97.8)
PATHOLOGY CONSULT NOTE: NORMAL
PLATELET # BLD AUTO: 253 K/UL (ref 164–446)
PMV BLD AUTO: 10.4 FL (ref 9–12.9)
POTASSIUM SERPL-SCNC: 4.3 MMOL/L (ref 3.6–5.5)
PROTHROMBIN TIME: 18.2 SEC (ref 12–14.6)
RBC # BLD AUTO: 3.15 M/UL (ref 4.7–6.1)
SODIUM SERPL-SCNC: 137 MMOL/L (ref 135–145)
WBC # BLD AUTO: 8.7 K/UL (ref 4.8–10.8)

## 2022-08-29 PROCEDURE — 90935 HEMODIALYSIS ONE EVALUATION: CPT

## 2022-08-29 PROCEDURE — A9270 NON-COVERED ITEM OR SERVICE: HCPCS | Performed by: STUDENT IN AN ORGANIZED HEALTH CARE EDUCATION/TRAINING PROGRAM

## 2022-08-29 PROCEDURE — 85610 PROTHROMBIN TIME: CPT

## 2022-08-29 PROCEDURE — A9270 NON-COVERED ITEM OR SERVICE: HCPCS | Performed by: INTERNAL MEDICINE

## 2022-08-29 PROCEDURE — 700102 HCHG RX REV CODE 250 W/ 637 OVERRIDE(OP): Performed by: INTERNAL MEDICINE

## 2022-08-29 PROCEDURE — 700102 HCHG RX REV CODE 250 W/ 637 OVERRIDE(OP): Performed by: STUDENT IN AN ORGANIZED HEALTH CARE EDUCATION/TRAINING PROGRAM

## 2022-08-29 PROCEDURE — 90935 HEMODIALYSIS ONE EVALUATION: CPT | Performed by: INTERNAL MEDICINE

## 2022-08-29 PROCEDURE — RXMED WILLOW AMBULATORY MEDICATION CHARGE: Performed by: STUDENT IN AN ORGANIZED HEALTH CARE EDUCATION/TRAINING PROGRAM

## 2022-08-29 PROCEDURE — 36415 COLL VENOUS BLD VENIPUNCTURE: CPT

## 2022-08-29 PROCEDURE — 85027 COMPLETE CBC AUTOMATED: CPT

## 2022-08-29 PROCEDURE — 80048 BASIC METABOLIC PNL TOTAL CA: CPT

## 2022-08-29 PROCEDURE — 99239 HOSP IP/OBS DSCHRG MGMT >30: CPT | Performed by: STUDENT IN AN ORGANIZED HEALTH CARE EDUCATION/TRAINING PROGRAM

## 2022-08-29 RX ORDER — OMEPRAZOLE 20 MG/1
20 CAPSULE, DELAYED RELEASE ORAL 2 TIMES DAILY
Qty: 30 CAPSULE | Refills: 0 | Status: SHIPPED | OUTPATIENT
Start: 2022-08-29

## 2022-08-29 RX ORDER — SUCRALFATE ORAL 1 G/10ML
1 SUSPENSION ORAL EVERY 6 HOURS
Qty: 560 ML | Refills: 0 | Status: SHIPPED | OUTPATIENT
Start: 2022-08-29 | End: 2022-09-12

## 2022-08-29 RX ADMIN — ISOSORBIDE MONONITRATE 30 MG: 30 TABLET, EXTENDED RELEASE ORAL at 05:43

## 2022-08-29 RX ADMIN — OMEPRAZOLE 20 MG: 20 CAPSULE, DELAYED RELEASE ORAL at 05:40

## 2022-08-29 RX ADMIN — OMEPRAZOLE 20 MG: 20 CAPSULE, DELAYED RELEASE ORAL at 16:59

## 2022-08-29 RX ADMIN — ACETAMINOPHEN 650 MG: 325 TABLET, FILM COATED ORAL at 12:58

## 2022-08-29 RX ADMIN — ATORVASTATIN CALCIUM 40 MG: 40 TABLET, FILM COATED ORAL at 16:59

## 2022-08-29 RX ADMIN — SUCRALFATE 1 G: 1 SUSPENSION ORAL at 12:14

## 2022-08-29 RX ADMIN — SUCRALFATE 1 G: 1 SUSPENSION ORAL at 16:59

## 2022-08-29 RX ADMIN — AMIODARONE HYDROCHLORIDE 200 MG: 200 TABLET ORAL at 05:43

## 2022-08-29 RX ADMIN — SUCRALFATE 1 G: 1 SUSPENSION ORAL at 00:15

## 2022-08-29 RX ADMIN — SUCRALFATE 1 G: 1 SUSPENSION ORAL at 05:43

## 2022-08-29 NOTE — CARE PLAN
Problem: Knowledge Deficit - Standard  Goal: Patient and family/care givers will demonstrate understanding of plan of care, disease process/condition, diagnostic tests and medications  Outcome: Progressing     Problem: Pain - Standard  Goal: Alleviation of pain or a reduction in pain to the patient’s comfort goal  Outcome: Progressing     Problem: Fall Risk  Goal: Patient will remain free from falls  Outcome: Progressing     Problem: Neuro Status  Goal: Neuro status will remain stable or improve  Outcome: Progressing     Problem: Hemodynamics  Goal: Patient's hemodynamics, fluid balance and neurologic status will be stable or improve  Outcome: Progressing     Problem: Psychosocial  Goal: Patient's level of anxiety will decrease  Outcome: Progressing  Goal: Patient's ability to verbalize feelings about condition will improve  Outcome: Progressing  Goal: Patient's ability to re-evaluate and adapt role responsibilities will improve  Outcome: Progressing  Goal: Patient and family will demonstrate ability to cope with life altering diagnosis and/or procedure  Outcome: Progressing  Goal: Spiritual and cultural needs incorporated into hospitalization  Outcome: Progressing     Problem: Communication  Goal: The ability to communicate needs accurately and effectively will improve  Outcome: Progressing     Problem: Discharge Barriers/Planning  Goal: Patient's continuum of care needs are met  Outcome: Progressing     Problem: Respiratory  Goal: Patient will achieve/maintain optimum respiratory ventilation and gas exchange  Outcome: Progressing     Problem: Chest Tube Management  Goal: Complications related to chest tube will be avoided or minimized  Outcome: Progressing     Problem: Fluid Volume  Goal: Fluid volume balance will be maintained  Outcome: Progressing     Problem: Mechanical Ventilation  Goal: Safe management of artificial airway and ventilation  Outcome: Progressing  Goal: Successful weaning off mechanical  ventilator, spontaneously maintains adequate gas exchange  Outcome: Progressing  Goal: Patient will be able to express needs and understand communication  Outcome: Progressing     Problem: Dysphagia  Goal: Dysphagia will improve  Outcome: Progressing     Problem: Risk for Aspiration  Goal: Patient's risk for aspiration will be absent or decrease  Outcome: Progressing     Problem: Nutrition  Goal: Patient's nutritional and fluid intake will be adequate or improve  Outcome: Progressing  Goal: Enteral nutrition will be maintained or improve  Outcome: Progressing  Goal: Enteral nutrition will be maintained or improve  Outcome: Progressing     Problem: Urinary Elimination  Goal: Establish and maintain regular urinary output  Outcome: Progressing     Problem: Bowel Elimination  Goal: Establish and maintain regular bowel function  Outcome: Progressing     Problem: Gastrointestinal Irritability  Goal: Nausea and vomiting will be absent or improve  Outcome: Progressing  Goal: Diarrhea will be absent or improved  Outcome: Progressing     Problem: Rectal Tube  Goal: Fecal output will be contained and skin will remain free from irritation  Outcome: Progressing     Problem: Mobility  Goal: Patient's capacity to carry out activities will improve  Outcome: Progressing     Problem: Self Care  Goal: Patient will have the ability to perform ADLs independently or with assistance (bathe, groom, dress, toilet and feed)  Outcome: Progressing     Problem: Infection - Standard  Goal: Patient will remain free from infection  Outcome: Progressing     Problem: Wound/ / Incision Healing  Goal: Patient's wound/surgical incision will decrease in size and heals properly  Outcome: Progressing   The patient is Stable - Low risk of patient condition declining or worsening      Shift Goals  Clinical Goals: NPO, EGD, hemodynamic stability  Patient Goals: d/c home soon  Family Goals: TAHIR    Progress made toward(s) clinical / shift goals:  Restful night  with stable VS. Took amio this AM. No signs of rebleed. R knee pain relieved with prn acetaminophen.    Patient is not progressing towards the following goals:

## 2022-08-29 NOTE — PROGRESS NOTES
[facility] Progress Note     HD treatment ordered by Dr Najjar x 3 hours.  Treatment Start time: 1242          End time: 1542       Net UF 1000 ml    Patient tolerated treatment well. VS stable all through out.     All blood was returned. 15 g HD needle removed from L forearm AVF. Dry gauze dressing applied and changed without bleeding issue. + Bruit/Thrill pre-post Treatment. See flow sheet for details.     Report given to CHELLY Schuster RN.

## 2022-08-29 NOTE — DISCHARGE SUMMARY
Discharge Summary    CHIEF COMPLAINT ON ADMISSION  Chief Complaint   Patient presents with    Blood in Vomit     Pt started to notice blood in his vomit since Monday. Today, it became worse.     Bloody Stools     Since Monday. Said it's been happening a lot.     Chest Pain     Pt states this also started on Monday       Reason for Admission  Blood in vomit     Admission Date  8/25/2022    CODE STATUS  Full Code    HPI & HOSPITAL COURSE  Mr. Saini has a past medical history of ESRD on dialysis, atrial fibrillation on coumadin, and hypertension that presented to the ER on 8/25 with left-sided chest pain, black stools, and black vomitus. The aforementioned symptom all had started 3 days prior. His last Hb in Epic was Oct. 2020 at which time it was 7.1. His Hb in the ER was 6.5 therefore he underwent transfusion of RBCs. His INR was 4 for which he was given KCENTRA in the ER. A TEG platelet mapping revealed 97% inhibition of AA therefore he was transfused a unit of platelets.  He had missed his Monday dialysis and his K was 5. Nephrology was consulted. He was admitted to the ICU with GI consultation.     EGD performed on 8/28 patient found to have a Cari-Salazar tear with gastritis.  Patient will discharge on Carafate and Protonix for 14 days.  He will follow-up with GI as an outpatient.  Warfarin was discontinued he will follow-up with cardiology for watchman device evaluation.    Therefore, he is discharged in good and stable condition to home with close outpatient follow-up.    The patient met 2-midnight criteria for an inpatient stay at the time of discharge.    Discharge Date  8/29/22    FOLLOW UP ITEMS POST DISCHARGE  Cari-Salazar tear, gastritis-take Carafate and Protonix and follow-up with GI as an outpatient  Due to the recent bleed warfarin was discontinued.  He will need to follow-up with cardiology for watchman evaluation.    DISCHARGE DIAGNOSES  Principal Problem:    GI bleeding POA: Yes  Active  Problems:    PAF (paroxysmal atrial fibrillation) ([facility]) POA: Yes    Essential hypertension (Chronic) POA: Yes    CVA (cerebral vascular accident) ([facility]) POA: Yes    Prolonged Q-T interval on ECG POA: Yes    Hyperkalemia POA: Yes    ESRD (end stage renal disease) on dialysis ([facility]) POA: Yes    Rectal bleed POA: Yes    NSTEMI (non-ST elevated myocardial infarction) ([facility]) POA: Yes  Resolved Problems:    * No resolved hospital problems. *      FOLLOW UP  Future Appointments   Date Time Provider Department Center   9/6/2022  9:20 AM Darío Cotton M.D. [facility] None     ROSANNE Vigil-VERITO  [address] [facility] [address] [phone]    Schedule an appointment as soon as possible for a visit      Danae Larose M.D.  [address] [facility] [address]  [phone]    Schedule an appointment as soon as possible for a visit        MEDICATIONS ON DISCHARGE     Medication List        START taking these medications        Instructions   omeprazole 20 MG delayed-release capsule  Commonly known as: PRILOSEC   Take 1 Capsule by mouth 2 times a day.  Dose: 20 mg     sucralfate 1 GM/10ML Susp  Commonly known as: CARAFATE   Take 10 mL by mouth every 6 hours for 14 days.  Dose: 1 g            CONTINUE taking these medications        Instructions   amiodarone 200 MG Tabs  Commonly known as: Cordarone   Take 1 Tablet by mouth every day.  Dose: 200 mg     atorvastatin 40 MG Tabs  Commonly known as: LIPITOR   Take 1 Tablet by mouth every evening.  Dose: 40 mg     calcium acetate 667 MG Tabs tablet  Commonly known as: PHOS-LO   Take 1,334 mg by mouth 3 times a day with meals. 2001 mg = 3 tablets  Dose: 1,334 mg     cyanocobalamin 500 MCG Tabs  Commonly known as: VITAMIN B-12   Take 500 mcg by mouth every day.  Dose: 500 mcg     folic acid 1 MG Tabs  Commonly known as: FOLVITE   Take 1 mg by mouth every day.  Dose: 1 mg     gabapentin 100 MG Caps  Commonly known as: NEURONTIN   Take 100 mg by mouth 3 times a day.  Dose: 100 mg      isosorbide mononitrate SR 60 MG Tb24  Commonly known as: IMDUR   Take 1 Tablet by mouth every day.  Dose: 60 mg     tamsulosin 0.4 MG capsule  Commonly known as: FLOMAX   Take 0.4 mg by mouth 1/2 hour after breakfast.  Dose: 0.4 mg            STOP taking these medications      ceFAZolin in dextrose 2-4 GM/100ML-% IVPB  Commonly known as: Ancef     furosemide 40 MG Tabs  Commonly known as: LASIX     losartan 25 MG Tabs  Commonly known as: COZAAR     metoprolol SR 50 MG Tb24  Commonly known as: TOPROL XL     Vitamin C 1000 MG Tabs     warfarin 3 MG Tabs  Commonly known as: COUMADIN              Allergies  No Known Allergies    DIET  Orders Placed This Encounter   Procedures    Diet Order Diet: Low Fiber(GI Soft); Second Modifier: (optional): Renal     Standing Status:   Standing     Number of Occurrences:   1     Order Specific Question:   Diet:     Answer:   Low Fiber(GI Soft) [2]     Order Specific Question:   Second Modifier: (optional)     Answer:   Renal [8]       ACTIVITY  As tolerated.  Weight bearing as tolerated    CONSULTATIONS  GI Dr. Larose  Cardiology-Dr Godwin  Critical care-Dr Rios  Nephro-Dr Merchant    PROCEDURES  Hemodialysis 8/27 and 8/29  EGD 8/28    LABORATORY  Lab Results   Component Value Date    SODIUM 137 08/29/2022    POTASSIUM 4.3 08/29/2022    CHLORIDE 94 (L) 08/29/2022    CO2 27 08/29/2022    GLUCOSE 83 08/29/2022    BUN 51 (H) 08/29/2022    CREATININE 9.41 (HH) 08/29/2022        Lab Results   Component Value Date    WBC 8.7 08/29/2022    HEMOGLOBIN 9.9 (L) 08/29/2022    HEMATOCRIT 30.0 (L) 08/29/2022    PLATELETCT 253 08/29/2022        Total time of the discharge process exceeds 33 minutes.

## 2022-08-29 NOTE — CARE PLAN
The patient is Stable - Low risk of patient condition declining or worsening    Shift Goals  Clinical Goals: NPO, EGD, hemodynamic stability  Patient Goals: rest, comfort  Family Goals: NA, no family at bedside    Progress made toward(s) clinical / shift goals:  yes    Patient is not progressing towards the following goals:n/a    Problem: Hemodynamics  Goal: Patient's hemodynamics, fluid balance and neurologic status will be stable or improve  Outcome: Progressing     Problem: Psychosocial  Goal: Patient's level of anxiety will decrease  Outcome: Progressing     Problem: Communication  Goal: The ability to communicate needs accurately and effectively will improve  Outcome: Progressing     Problem: Gastrointestinal Irritability  Goal: Nausea and vomiting will be absent or improve  Outcome: Progressing

## 2022-08-29 NOTE — PROCEDURES
Pt with ESRD, presented with GI bleed.  Pt is doing better.  Seen and examined while getting HD.

## 2022-08-29 NOTE — DISCHARGE INSTRUCTIONS
Gastrointestinal Bleeding  Gastrointestinal (GI) bleeding is bleeding somewhere along the path that food travels through the body (digestive tract). This path is anywhere between the mouth and the opening of the butt (anus). You may have blood in your poop (stool) or have black poop. If you throw up (vomit), there may be blood in it.  This condition can be mild, serious, or even life-threatening. If you have a lot of bleeding, you may need to stay in the hospital.  What are the causes?  This condition may be caused by:  Irritation and swelling of the esophagus (esophagitis). The esophagus is part of the body that moves food from your mouth to your stomach.  Swollen veins in the butt (hemorrhoids).  Areas of painful tearing in the opening of the butt (anal fissures). These are often caused by passing hard poop.  Pouches that form on the colon over time (diverticulosis).  Irritation and swelling (diverticulitis) in areas where pouches have formed on the colon.  Growths (polyps) or cancer. Colon cancer often starts out as growths that are not cancer.  Irritation of the stomach lining (gastritis).  Sores (ulcers) in the stomach.  What increases the risk?  You are more likely to develop this condition if you:  Have a certain type of infection in your stomach (Helicobacter pylori infection).  Take certain medicines.  Smoke.  Drink alcohol.  What are the signs or symptoms?  Common symptoms of this condition include:  Throwing up (vomiting) material that has bright red blood in it. It may look like coffee grounds.  Changes in your poop. The poop may:  Have red blood in it.  Be black, look like tar, and smell stronger than normal.  Be red.  Pain or cramping in the belly (abdomen).  How is this treated?  Treatment for this condition depends on the cause of the bleeding. For example:  Sometimes, the bleeding can be stopped during a procedure that is done to find the problem (endoscopy or colonoscopy).  Medicines can be used  to:  Help control irritation, swelling, or infection.  Reduce acid in your stomach.  Certain problems can be treated with:  Creams.  Medicines that are put in the butt (suppositories).  Warm baths.  Surgery is sometimes needed.  If you lose a lot of blood, you may need a blood transfusion.  If bleeding is mild, you may be allowed to go home. If there is a lot of bleeding, you will need to stay in the hospital.  Follow these instructions at home:    Take over-the-counter and prescription medicines only as told by your doctor.  Eat foods that have a lot of fiber in them. These foods include beans, whole grains, and fresh fruits and vegetables. You can also try eating 1-3 prunes each day.  Drink enough fluid to keep your pee (urine) pale yellow.  Keep all follow-up visits as told by your doctor. This is important.  Contact a doctor if:  Your symptoms do not get better.  Get help right away if:  Your bleeding does not stop.  You feel dizzy or you pass out (faint).  You feel weak.  You have very bad cramps in your back or belly.  You pass large clumps of blood (clots) in your poop.  Your symptoms are getting worse.  You have chest pain or fast heartbeats.  Summary  GI bleeding is bleeding somewhere along the path that food travels through the body (digestive tract).  This bleeding can be caused by many things. Treatment depends on the cause of the bleeding.  Take medicines only as told by your doctor.  Keep all follow-up visits as told by your doctor. This is important.  This information is not intended to replace advice given to you by your health care provider. Make sure you discuss any questions you have with your health care provider.  Document Released: 09/26/2009 Document Revised: 07/31/2019 Document Reviewed: 07/31/2019  Elsevier Patient Education © 2020 [employer]

## 2022-08-29 NOTE — PROGRESS NOTES
Bedside report received from NOC RN. Assumed care of pt. Pt awake, laying in bed. A/Ox4, VSS. No concerns, complaints or distress. Pt educated to call before getting out of bed. POC reviewed and white board updated. Tele box on. Afib 115 on the monitor. Call light in reach. Bed locked in lowest position with 2 upper bed rails up. Bed alarm on.

## 2022-08-29 NOTE — DISCHARGE PLANNING
Case Management Discharge Planning    Admission Date: 8/25/2022  GMLOS: 4.6  ALOS: 4    6-Clicks ADL Score: 24  6-Clicks Mobility Score: 21      Anticipated Discharge Dispo: Discharge Disposition: Discharged to home/self care (01)    DME Needed: No    Action(s) Taken: Patient discussed during morning discharge rounds, patient can DC today after dialysis.    [facility] pharmacy reached out regarding meds to bed.  Patient only has Emergency Medicaid FFS, no medication coverage.  Approved services prices are as follows:  sucralfate (CARAFATE) 1 GM/10ML Suspension      $19.26  omeprazole (PRILOSEC) 20 MG delayed-release capsule       $7.77    Approved services total is $27.03.  Approved services form faxed to [facility] at [phone].  RN CM informed [facility] that the patient will discharge this afternoon after dialysis.    Escalations Completed: Pharmacy    Medically Clear: Yes    Next Steps: Case coordination available to discuss any further discharge barriers.    Barriers to Discharge: Medication delivery, dialysis completion    Is the patient up for discharge tomorrow: Discharging this afternoon

## 2022-08-30 NOTE — PROGRESS NOTES
Patient discharged. A&O x 4. Discharge instructions, given using LLS  [place] [id number].Personal belongings in possession of a patient. PIV and tele monitor removed.  Copy of discharge instructions in the patient chart, signed and reviewed. Patient verbalizes the understanding of the discharge instructions. Questions / concerns addressed prior to leaving the unit. Patient is instructed to follow up with PCP. Transported via wheelchair.  Patient is discharged to home. Family is present.

## 2022-08-30 NOTE — DISCHARGE PLANNING
Meds-to-Beds: Discharge prescription orders listed below delivered to patient's bedside. KRYSTIN Moore notified. Written information regarding the dispensed prescriptions was provided to the patient including the phone number of the pharmacy to call for any questions. Approved Services per NICHOLE Martinez.    Current Outpatient Medications   Medication Sig Dispense Refill    omeprazole (PRILOSEC) 20 MG delayed-release capsule Take 1 Capsule by mouth 2 times a day. 30 Capsule 0    sucralfate (CARAFATE) 1 GM/10ML Suspension Take 10 mL by mouth every 6 hours for 14 days. 560 mL 0      Alie Davey, PharmD

## 2022-09-06 ENCOUNTER — OFFICE VISIT (OUTPATIENT)
Dept: CARDIOLOGY | Facility: MEDICAL CENTER | Age: 52
End: 2022-09-06

## 2022-09-06 VITALS
RESPIRATION RATE: 12 BRPM | DIASTOLIC BLOOD PRESSURE: 88 MMHG | WEIGHT: 141 LBS | SYSTOLIC BLOOD PRESSURE: 122 MMHG | BODY MASS INDEX: 23.49 KG/M2 | OXYGEN SATURATION: 98 % | HEART RATE: 42 BPM | HEIGHT: 65 IN

## 2022-09-06 DIAGNOSIS — I48.0 PAF (PAROXYSMAL ATRIAL FIBRILLATION) (HCC): ICD-10-CM

## 2022-09-06 PROCEDURE — 93000 ELECTROCARDIOGRAM COMPLETE: CPT | Performed by: INTERNAL MEDICINE

## 2022-09-06 PROCEDURE — 99214 OFFICE O/P EST MOD 30 MIN: CPT | Performed by: INTERNAL MEDICINE

## 2022-09-06 RX ORDER — AMIODARONE HYDROCHLORIDE 200 MG/1
200 TABLET ORAL DAILY
Qty: 30 TABLET | Refills: 11 | Status: SHIPPED | OUTPATIENT
Start: 2022-09-06

## 2022-09-06 ASSESSMENT — FIBROSIS 4 INDEX: FIB4 SCORE: 1.09

## 2022-09-06 NOTE — PROGRESS NOTES
Arrhythmia Clinic Note (New patient)     DOS: 9/6/2022    Referring physician: Dr Godwin    Chief complaint/Reason for consult: GI bleed, Afib    HPI: 53 y/o M with pAF, prior amiodarone use, and ESRD, prior CVA, admitted last week to [facility] with acute UGIB and found to have Cari Salazar tear successfully intervened upon. Discharged off warfarin with FV made with me to discuss OAC and ANDREA-C. Pt Chinese speaking only, family member present and pt refuses  services otherwise. No acute complaints, having leticia pain in the back of this right knee. He has stopped taking almost all his medications in preparation for this visit, apparently per the advise of his PCP, despite hospital DC recommendations to resume medications.     ROS (+ highlighted in bold):  Constitutional: Fevers/chills/fatigue/weightloss  HEENT: Blurry vision/eye pain/sore throat/hearing loss  Respiratory: Shortness of breath/cough  Cardiovascular: Chest pain/palpitations/edema/orthopnea/syncope  GI: Nausea/vomitting/diarrhea  MSK: Arthralgias/myagias/muscle weakness  Skin: Rash/sores  Neurological: Numbness/tremors/vertigo  Endocrine: Excessive thirst/polyuria/cold intolerance/heat intolerance  Psych: Depression/anxiety    Past Medical History:   Diagnosis Date    Dialysis patient (HCC)     Heart murmur     Valvular heart disease     aortic insufficiency       Past Surgical History:   Procedure Laterality Date    UT UPPER GI ENDOSCOPY,DIAGNOSIS N/A 8/28/2022    Procedure: GASTROSCOPY;  Surgeon: Danae Larose M.D.;  Location: SURGERY [facility];  Service: Gastroenterology    UT UPPER GI ENDOSCOPY,BIOPSY N/A 8/28/2022    Procedure: GASTROSCOPY, WITH BIOPSY;  Surgeon: Danae Larose M.D.;  Location: SURGERY [facility];  Service: Gastroenterology    UT COLONOSCOPY,DIAGNOSTIC N/A 10/9/2020    Procedure: COLONOSCOPY;  Surgeon: Rah Mancia M.D.;  Location: SURGERY SAME DAY [facility];  Service: Gastroenterology    UT THORACOSCOPY,DX NO BX Left  10/8/2020    Procedure: THORACOSCOPY;  Surgeon: Darío Guerra M.D.;  Location: SURGERY [facility];  Service: General    CATH PLACEMENT  8/27/2017    Procedure: CATH PLACEMENT - perm cath;  Surgeon: Hiren Ayon M.D.;  Location: SURGERY [facility];  Service: General    AV FISTULA CREATION  8/27/2017    Procedure: AV FISTULA CREATION - left radial cephalic;  Surgeon: Hiren Ayon M.D.;  Location: SURGERY [facility];  Service: General       Social History     Socioeconomic History    Marital status:      Spouse name: Not on file    Number of children: Not on file    Years of education: Not on file    Highest education level: Not on file   Occupational History    Not on file   Tobacco Use    Smoking status: Never    Smokeless tobacco: Never   Substance and Sexual Activity    Alcohol use: No     Alcohol/week: 6.0 oz     Types: 10 Standard drinks or equivalent per week     Comment: quit after hospital     Drug use: No    Sexual activity: Not on file   Other Topics Concern    Not on file   Social History Narrative    Not on file     Social Determinants of Health     Financial Resource Strain: Not on file   Food Insecurity: Not on file   Transportation Needs: Not on file   Physical Activity: Not on file   Stress: Not on file   Social Connections: Not on file   Intimate Partner Violence: Not on file   Housing Stability: Not on file       Family History   Problem Relation Age of Onset    Stroke Sister 38        CVA       No Known Allergies    Current Outpatient Medications   Medication Sig Dispense Refill    amiodarone (CORDARONE) 200 MG Tab Take 1 Tablet by mouth every day. 30 Tablet 11    omeprazole (PRILOSEC) 20 MG delayed-release capsule Take 1 Capsule by mouth 2 times a day. 30 Capsule 0    sucralfate (CARAFATE) 1 GM/10ML Suspension Take 10 mL by mouth every 6 hours for 14 days. 560 mL 0    calcium acetate (PHOS-LO) 667 MG Tab tablet Take 1,334 mg by mouth 3 times a day with meals. 2001  "mg = 3 tablets      gabapentin (NEURONTIN) 100 MG Cap Take 100 mg by mouth 3 times a day.      cyanocobalamin (VITAMIN B-12) 500 MCG Tab Take 500 mcg by mouth every day. (Patient not taking: Reported on 9/6/2022)      tamsulosin (FLOMAX) 0.4 MG capsule Take 0.4 mg by mouth 1/2 hour after breakfast. (Patient not taking: Reported on 9/6/2022)      folic acid (FOLVITE) 1 MG Tab Take 1 mg by mouth every day. (Patient not taking: Reported on 9/6/2022)      atorvastatin (LIPITOR) 40 MG Tab Take 1 Tablet by mouth every evening. (Patient not taking: Reported on 9/6/2022) 30 Tablet 11    isosorbide mononitrate SR (IMDUR) 60 MG TABLET SR 24 HR Take 1 Tablet by mouth every day. (Patient not taking: Reported on 9/6/2022) 30 Tablet 11     No current facility-administered medications for this visit.       Physical Exam:  Vitals:    09/06/22 0928   BP: 122/88   BP Location: Right arm   Patient Position: Sitting   BP Cuff Size: Adult   Pulse: (!) 42   Resp: 12   SpO2: 98%   Weight: 64 kg (141 lb)   Height: 1.651 m (5' 5\")     General appearance: NAD, conversant   Eyes: anicteric sclerae, moist conjunctivae; no lid-lag; PERRLA  HENT: Atraumatic; oropharynx clear with moist mucous membranes and no mucosal ulcerations; normal hard and soft palate  Neck: Trachea midline; FROM, supple, no thyromegaly or lymphadenopathy  Lungs: CTA, with normal respiratory effort and no intercostal retractions  CV: Irregular, no MRGs, no JVD   Abdomen: Soft, non-tender; no masses or HSM  Extremities: No peripheral edema or extremity lymphadenopathy  Skin: Normal temperature, turgor and texture; no rash, ulcers or subcutaneous nodules  Psych: Appropriate affect, alert and oriented to person, place and time    Data:  Lipids:   Lab Results   Component Value Date/Time    CHOLSTRLTOT 111 02/22/2020 08:38 AM    TRIGLYCERIDE 65 02/22/2020 08:38 AM    HDL 61 02/22/2020 08:38 AM    LDL 37 02/22/2020 08:38 AM        BMP:  Lab Results   Component Value Date/Time    " SODIUM 137 08/29/2022 0034    POTASSIUM 4.3 08/29/2022 0034    CHLORIDE 94 (L) 08/29/2022 0034    CO2 27 08/29/2022 0034    GLUCOSE 83 08/29/2022 0034    BUN 51 (H) 08/29/2022 0034    CREATININE 9.41 (HH) 08/29/2022 0034    CALCIUM 8.2 (L) 08/29/2022 0034    ANION 16.0 08/29/2022 0034        TSH:   Lab Results   Component Value Date/Time    TSHULTRASEN 1.390 02/22/2020 0838        THYROXINE (T4):   No results found for: VERÓNICAIR     CBC:   Lab Results   Component Value Date/Time    WBC 8.7 08/29/2022 12:34 AM    RBC 3.15 (L) 08/29/2022 12:34 AM    HEMOGLOBIN 9.9 (L) 08/29/2022 12:34 AM    HEMATOCRIT 30.0 (L) 08/29/2022 12:34 AM    MCV 95.2 08/29/2022 12:34 AM    MCH 31.4 08/29/2022 12:34 AM    MCHC 33.0 (L) 08/29/2022 12:34 AM    RDW 64.4 (H) 08/29/2022 12:34 AM    PLATELETCT 253 08/29/2022 12:34 AM    MPV 10.4 08/29/2022 12:34 AM    NEUTSPOLYS 96.50 (H) 08/25/2022 08:41 PM    LYMPHOCYTES 3.50 (L) 08/25/2022 08:41 PM    MONOCYTES 0.00 08/25/2022 08:41 PM    EOSINOPHILS 0.00 08/25/2022 08:41 PM    BASOPHILS 0.00 08/25/2022 08:41 PM    IMMGRAN 0.50 10/12/2020 07:46 AM    NRBC 0.00 08/25/2022 08:41 PM    NEUTS 15.63 (H) 08/25/2022 08:41 PM    LYMPHS 0.57 (L) 08/25/2022 08:41 PM    MONOS 0.00 08/25/2022 08:41 PM    EOS 0.00 08/25/2022 08:41 PM    BASO 0.00 08/25/2022 08:41 PM    IMMGRANAB 0.05 10/12/2020 07:46 AM    NRBCAB 0.00 08/25/2022 08:41 PM        CBC w/o DIFF  Lab Results   Component Value Date/Time    WBC 8.7 08/29/2022 12:34 AM    RBC 3.15 (L) 08/29/2022 12:34 AM    HEMOGLOBIN 9.9 (L) 08/29/2022 12:34 AM    MCV 95.2 08/29/2022 12:34 AM    MCH 31.4 08/29/2022 12:34 AM    MCHC 33.0 (L) 08/29/2022 12:34 AM    RDW 64.4 (H) 08/29/2022 12:34 AM    MPV 10.4 08/29/2022 12:34 AM       Prior echo/stress results reviewed: Normal EF    EKG interpreted by me: Afib, compared to ECG 8/26 rhythm is no longer sinus    Impression/Plan:  1. PAF (paroxysmal atrial fibrillation) (HCC)  EKG    Comp Metabolic Panel    TSH WITH REFLEX  TO FT4        pAF  High risk medication use, amiodarone  GI bleed  ESRD  Prior CVA in 2017    Given recent GIB warfarin currently held. He is in afib today. Recommend resuming amiodarone and check TSH and CMP in 6 months for high risk drug monitoring. [id number] is 2 given prior CVA. He should resume OAC if able, and if his GIB is not determined to be high risk for recurrence this would be preferable. However if he is not a candidate for long term warfarin use, we can consider ANDREA-C. He has not yet seen GI to make this determination, will defer to GI recommendations first. Of note he would need to tolerate 45 days OAC following ANDREA-C if this route were chosen.    Will arrange FV 6 months, sooner if needed to discuss ANDREA-C pending GI evaluation      Darío Cotton MD  Cardiac Electrophysiology

## 2022-09-13 ENCOUNTER — TELEPHONE (OUTPATIENT)
Dept: CARDIOLOGY | Facility: MEDICAL CENTER | Age: 52
End: 2022-09-13

## 2022-09-16 ENCOUNTER — TELEPHONE (OUTPATIENT)
Dept: CARDIOLOGY | Facility: MEDICAL CENTER | Age: 52
End: 2022-09-16
Payer: MEDICAID

## 2022-09-16 NOTE — TELEPHONE ENCOUNTER
Caller: Dr. Boland from [facility]  Topic/issue: Their office was calling about his medications that he was prescribed and they stated he had a GI bleed recently and was asking for a call back to discuss his medications  Callback Number: [phone]      Thank you    -Indra

## 2022-09-16 NOTE — TELEPHONE ENCOUNTER
S/w Dr. Boland and she states that pt has been cleared by GI 9/6 to restart oac. They would like to start him on Eliquis at a dose of 2.5mg with ESRD. She is also concerned as he does not have insurance so this may become a factor due to cost of OAC.     Discussed with Dr. Cotton and he states per the guidelines pt should be on eliquis 5mg BID for full coverage.     Informed Dr. Boland of recommendations. They will increase the dose to 5mg.

## 2022-10-03 LAB — EKG IMPRESSION: NORMAL

## 2023-03-27 ENCOUNTER — APPOINTMENT (OUTPATIENT)
Dept: RADIOLOGY | Facility: MEDICAL CENTER | Age: 53
End: 2023-03-27
Attending: EMERGENCY MEDICINE
Payer: COMMERCIAL

## 2023-03-27 ENCOUNTER — HOSPITAL ENCOUNTER (EMERGENCY)
Facility: MEDICAL CENTER | Age: 53
End: 2023-03-27
Attending: EMERGENCY MEDICINE
Payer: COMMERCIAL

## 2023-03-27 VITALS
BODY MASS INDEX: 25.42 KG/M2 | HEIGHT: 65 IN | HEART RATE: 127 BPM | OXYGEN SATURATION: 96 % | DIASTOLIC BLOOD PRESSURE: 58 MMHG | TEMPERATURE: 98.8 F | WEIGHT: 152.56 LBS | RESPIRATION RATE: 13 BRPM | SYSTOLIC BLOOD PRESSURE: 93 MMHG

## 2023-03-27 DIAGNOSIS — I48.91 ATRIAL FIBRILLATION WITH RVR (HCC): ICD-10-CM

## 2023-03-27 DIAGNOSIS — E87.79 OTHER HYPERVOLEMIA: ICD-10-CM

## 2023-03-27 DIAGNOSIS — N18.5 CHRONIC RENAL FAILURE, STAGE 5 (HCC): ICD-10-CM

## 2023-03-27 DIAGNOSIS — R60.9 DEPENDENT EDEMA: ICD-10-CM

## 2023-03-27 LAB
ALBUMIN SERPL BCP-MCNC: 3.7 G/DL (ref 3.2–4.9)
ALBUMIN/GLOB SERPL: 1.4 G/DL
ALP SERPL-CCNC: 103 U/L (ref 30–99)
ALT SERPL-CCNC: 19 U/L (ref 2–50)
ANION GAP SERPL CALC-SCNC: 17 MMOL/L (ref 7–16)
ANISOCYTOSIS BLD QL SMEAR: ABNORMAL
AST SERPL-CCNC: 11 U/L (ref 12–45)
BASOPHILS # BLD AUTO: 0 % (ref 0–1.8)
BASOPHILS # BLD: 0 K/UL (ref 0–0.12)
BILIRUB SERPL-MCNC: 0.9 MG/DL (ref 0.1–1.5)
BUN SERPL-MCNC: 52 MG/DL (ref 8–22)
BURR CELLS BLD QL SMEAR: NORMAL
CALCIUM ALBUM COR SERPL-MCNC: 9.4 MG/DL (ref 8.5–10.5)
CALCIUM SERPL-MCNC: 9.2 MG/DL (ref 8.5–10.5)
CHLORIDE SERPL-SCNC: 90 MMOL/L (ref 96–112)
CO2 SERPL-SCNC: 28 MMOL/L (ref 20–33)
CREAT SERPL-MCNC: 6.4 MG/DL (ref 0.5–1.4)
EKG IMPRESSION: NORMAL
EOSINOPHIL # BLD AUTO: 0.1 K/UL (ref 0–0.51)
EOSINOPHIL NFR BLD: 0.9 % (ref 0–6.9)
ERYTHROCYTE [DISTWIDTH] IN BLOOD BY AUTOMATED COUNT: 64.5 FL (ref 35.9–50)
GFR SERPLBLD CREATININE-BSD FMLA CKD-EPI: 10 ML/MIN/1.73 M 2
GLOBULIN SER CALC-MCNC: 2.6 G/DL (ref 1.9–3.5)
GLUCOSE SERPL-MCNC: 67 MG/DL (ref 65–99)
HCT VFR BLD AUTO: 32 % (ref 42–52)
HGB BLD-MCNC: 10.3 G/DL (ref 14–18)
LYMPHOCYTES # BLD AUTO: 0.46 K/UL (ref 1–4.8)
LYMPHOCYTES NFR BLD: 4.3 % (ref 22–41)
MACROCYTES BLD QL SMEAR: ABNORMAL
MANUAL DIFF BLD: NORMAL
MCH RBC QN AUTO: 32.2 PG (ref 27–33)
MCHC RBC AUTO-ENTMCNC: 32.2 G/DL (ref 33.7–35.3)
MCV RBC AUTO: 100 FL (ref 81.4–97.8)
METAMYELOCYTES NFR BLD MANUAL: 0.9 %
MONOCYTES # BLD AUTO: 0.28 K/UL (ref 0–0.85)
MONOCYTES NFR BLD AUTO: 2.6 % (ref 0–13.4)
MORPHOLOGY BLD-IMP: NORMAL
NEUTROPHILS # BLD AUTO: 9.68 K/UL (ref 1.82–7.42)
NEUTROPHILS NFR BLD: 86.1 % (ref 44–72)
NEUTS BAND NFR BLD MANUAL: 5.2 % (ref 0–10)
NRBC # BLD AUTO: 0 K/UL
NRBC BLD-RTO: 0 /100 WBC
OVALOCYTES BLD QL SMEAR: NORMAL
PLATELET # BLD AUTO: 140 K/UL (ref 164–446)
PLATELET BLD QL SMEAR: NORMAL
PMV BLD AUTO: 10.5 FL (ref 9–12.9)
POIKILOCYTOSIS BLD QL SMEAR: NORMAL
POTASSIUM SERPL-SCNC: 4.3 MMOL/L (ref 3.6–5.5)
PROT SERPL-MCNC: 6.3 G/DL (ref 6–8.2)
RBC # BLD AUTO: 3.2 M/UL (ref 4.7–6.1)
RBC BLD AUTO: PRESENT
SODIUM SERPL-SCNC: 135 MMOL/L (ref 135–145)
WBC # BLD AUTO: 10.6 K/UL (ref 4.8–10.8)

## 2023-03-27 PROCEDURE — 700111 HCHG RX REV CODE 636 W/ 250 OVERRIDE (IP): Performed by: EMERGENCY MEDICINE

## 2023-03-27 PROCEDURE — 93005 ELECTROCARDIOGRAM TRACING: CPT | Performed by: EMERGENCY MEDICINE

## 2023-03-27 PROCEDURE — 80053 COMPREHEN METABOLIC PANEL: CPT

## 2023-03-27 PROCEDURE — 700102 HCHG RX REV CODE 250 W/ 637 OVERRIDE(OP): Performed by: EMERGENCY MEDICINE

## 2023-03-27 PROCEDURE — 36415 COLL VENOUS BLD VENIPUNCTURE: CPT

## 2023-03-27 PROCEDURE — 99285 EMERGENCY DEPT VISIT HI MDM: CPT

## 2023-03-27 PROCEDURE — 96376 TX/PRO/DX INJ SAME DRUG ADON: CPT

## 2023-03-27 PROCEDURE — 85025 COMPLETE CBC W/AUTO DIFF WBC: CPT

## 2023-03-27 PROCEDURE — 85007 BL SMEAR W/DIFF WBC COUNT: CPT

## 2023-03-27 PROCEDURE — 96374 THER/PROPH/DIAG INJ IV PUSH: CPT

## 2023-03-27 PROCEDURE — 71045 X-RAY EXAM CHEST 1 VIEW: CPT

## 2023-03-27 PROCEDURE — A9270 NON-COVERED ITEM OR SERVICE: HCPCS | Performed by: EMERGENCY MEDICINE

## 2023-03-27 PROCEDURE — 93970 EXTREMITY STUDY: CPT

## 2023-03-27 RX ORDER — DILTIAZEM HYDROCHLORIDE 5 MG/ML
0.35 INJECTION INTRAVENOUS ONCE
Status: COMPLETED | OUTPATIENT
Start: 2023-03-27 | End: 2023-03-27

## 2023-03-27 RX ORDER — DILTIAZEM HYDROCHLORIDE 5 MG/ML
0.25 INJECTION INTRAVENOUS ONCE
Status: COMPLETED | OUTPATIENT
Start: 2023-03-27 | End: 2023-03-27

## 2023-03-27 RX ORDER — DILTIAZEM HYDROCHLORIDE 60 MG/1
60 TABLET, FILM COATED ORAL ONCE
Status: COMPLETED | OUTPATIENT
Start: 2023-03-27 | End: 2023-03-27

## 2023-03-27 RX ORDER — HYDROCODONE BITARTRATE AND ACETAMINOPHEN 5; 325 MG/1; MG/1
1 TABLET ORAL ONCE
Status: COMPLETED | OUTPATIENT
Start: 2023-03-27 | End: 2023-03-27

## 2023-03-27 RX ADMIN — DILTIAZEM HYDROCHLORIDE 17.3 MG: 5 INJECTION INTRAVENOUS at 19:50

## 2023-03-27 RX ADMIN — DILTIAZEM HYDROCHLORIDE 24.2 MG: 5 INJECTION INTRAVENOUS at 20:26

## 2023-03-27 RX ADMIN — HYDROCODONE BITARTRATE AND ACETAMINOPHEN 1 TABLET: 5; 325 TABLET ORAL at 20:49

## 2023-03-27 RX ADMIN — DILTIAZEM HYDROCHLORIDE 60 MG: 60 TABLET, FILM COATED ORAL at 20:25

## 2023-03-27 ASSESSMENT — FIBROSIS 4 INDEX: FIB4 SCORE: 1.09

## 2023-03-27 ASSESSMENT — LIFESTYLE VARIABLES: DO YOU DRINK ALCOHOL: NO

## 2023-03-27 NOTE — ED PROVIDER NOTES
ER Provider Note    Scribed for Jim Oconnor D.O. by Lorena Pope. 3/27/2023  4:01 PM    Primary Care Provider: Laury Angeles P.A.-C.    CHIEF COMPLAINT  Chief Complaint   Patient presents with    Leg Swelling     BL lower extremities. Associated with pain. Pt has been experiencing symptoms since Friday. Pt states it is affecting his ability to get around and walk. Hx of ESRD, dialysis MWF. Pt had dialysis today.        HPI/ROS  LIMITATION TO HISTORY   Bolivian speaker; family at bedside translating  OUTSIDE HISTORIAN(S):  Daughter translating    Giovanni Hurd is a 52 y.o. male who presents to the Emergency Department for for evaluation of bilateral leg swelling onset Friday. Patient states that he is on dialysis and not missed a session. The leg swelling is new. The swelling is affecting his ability to get around and walk.  He denies shortness of breath. No alleviating factors were reported.      ROS as per HPI.    PAST MEDICAL HISTORY  Past Medical History:   Diagnosis Date    Dialysis patient (HCC)     Heart murmur     Valvular heart disease     aortic insufficiency       SURGICAL HISTORY  Past Surgical History:   Procedure Laterality Date    CA UPPER GI ENDOSCOPY,DIAGNOSIS N/A 8/28/2022    Procedure: GASTROSCOPY;  Surgeon: Danae Larose M.D.;  Location: SURGERY [facility];  Service: Gastroenterology    CA UPPER GI ENDOSCOPY,BIOPSY N/A 8/28/2022    Procedure: GASTROSCOPY, WITH BIOPSY;  Surgeon: Danae Larose M.D.;  Location: [facility];  Service: Gastroenterology    CA COLONOSCOPY,DIAGNOSTIC N/A 10/9/2020    Procedure: COLONOSCOPY;  Surgeon: Rah Mancia M.D.;  Location: SURGERY SAME DAY [facility];  Service: Gastroenterology    CA THORACOSCOPY,DX NO BX Left 10/8/2020    Procedure: THORACOSCOPY;  Surgeon: Darío Guerra M.D.;  Location: [facility];  Service: General    CATH PLACEMENT  8/27/2017    Procedure: CATH PLACEMENT - perm cath;  Surgeon: Hiren Ayon M.D.;   "Location: SURGERY [facility];  Service: General    AV FISTULA CREATION  8/27/2017    Procedure: AV FISTULA CREATION - left radial cephalic;  Surgeon: Hiren Ayon M.D.;  Location: SURGERY [facility];  Service: General       FAMILY HISTORY  Family History   Problem Relation Age of Onset    Stroke Sister 38        CVA       SOCIAL HISTORY   reports that he has never smoked. He has never used smokeless tobacco. He reports that he does not drink alcohol and does not use drugs.    CURRENT MEDICATIONS  Previous Medications    AMIODARONE (CORDARONE) 200 MG TAB    Take 1 Tablet by mouth every day.    APIXABAN (ELIQUIS) 5MG TAB    Take 1 Tablet by mouth 2 times a day.    ATORVASTATIN (LIPITOR) 40 MG TAB    Take 1 Tablet by mouth every evening.    CALCIUM ACETATE (PHOS-LO) 667 MG TAB TABLET    Take 1,334 mg by mouth 3 times a day with meals. 2001 mg = 3 tablets    CYANOCOBALAMIN (VITAMIN B-12) 500 MCG TAB    Take 500 mcg by mouth every day.    FOLIC ACID (FOLVITE) 1 MG TAB    Take 1 mg by mouth every day.    GABAPENTIN (NEURONTIN) 100 MG CAP    Take 100 mg by mouth 3 times a day.    ISOSORBIDE MONONITRATE SR (IMDUR) 60 MG TABLET SR 24 HR    Take 1 Tablet by mouth every day.    OMEPRAZOLE (PRILOSEC) 20 MG DELAYED-RELEASE CAPSULE    Take 1 Capsule by mouth 2 times a day.    TAMSULOSIN (FLOMAX) 0.4 MG CAPSULE    Take 0.4 mg by mouth 1/2 hour after breakfast.       ALLERGIES  Patient has no known allergies.    PHYSICAL EXAM  /79   Pulse (!) 127   Temp 36.8 °C (98.2 °F)   Resp 16   Ht 1.651 m (5' 5\")   Wt 69.2 kg (152 lb 8.9 oz)   SpO2 98%   BMI 25.39 kg/m²     General: No acute distress.  HENT: Normocephalic, Mucus membranes are moist.   Chest: Lungs have even and unlabored respirations, Clear to auscultation.   Cardiovascular: Regular rate and regular rhythm, No peripheral cyanosis.  Abdomen: Non distended.  Extremities: 2+ pedal edema.   Neuro: Awake, Conversive, Able to relay recent " events.  Psychiatric: Calm and cooperative.     EXTERNAL RECORDS REVIEWED  History of chronic renal disease.     INITIAL ASSESSMENT  Patient has chronic renal disease. He is on dialysis and he has not missed dialysis. The bilateral lower extremity swelling is new. There is concern for fluid overload and DVT. Will check with ultra sound, chest X-ray, and labs.     ED Observation Status? Yes; I am placing the patient in to an observation status due to a diagnostic uncertainty as well as therapeutic intensity. Patient placed in observation status at 4:04 PM, 3/27/2023.     Observation plan is as follows: Will evaluate for DVT and fluid overload.     Upon Reevaluation, the patient's condition has: Improved; and will be discharged.    Patient discharged from ED Observation status at 3/27/2023 at 20 50  Labs:   Results for orders placed or performed during the hospital encounter of 03/27/23   CBC WITH DIFFERENTIAL   Result Value Ref Range    WBC 10.6 4.8 - 10.8 K/uL    RBC 3.20 (L) 4.70 - 6.10 M/uL    Hemoglobin 10.3 (L) 14.0 - 18.0 g/dL    Hematocrit 32.0 (L) 42.0 - 52.0 %    .0 (H) 81.4 - 97.8 fL    MCH 32.2 27.0 - 33.0 pg    MCHC 32.2 (L) 33.7 - 35.3 g/dL    RDW 64.5 (H) 35.9 - 50.0 fL    Platelet Count 140 (L) 164 - 446 K/uL    MPV 10.5 9.0 - 12.9 fL    Neutrophils-Polys 86.10 (H) 44.00 - 72.00 %    Lymphocytes 4.30 (L) 22.00 - 41.00 %    Monocytes 2.60 0.00 - 13.40 %    Eosinophils 0.90 0.00 - 6.90 %    Basophils 0.00 0.00 - 1.80 %    Nucleated RBC 0.00 /100 WBC    Neutrophils (Absolute) 9.68 (H) 1.82 - 7.42 K/uL    Lymphs (Absolute) 0.46 (L) 1.00 - 4.80 K/uL    Monos (Absolute) 0.28 0.00 - 0.85 K/uL    Eos (Absolute) 0.10 0.00 - 0.51 K/uL    Baso (Absolute) 0.00 0.00 - 0.12 K/uL    NRBC (Absolute) 0.00 K/uL    Anisocytosis 1+     Macrocytosis 1+    COMP METABOLIC PANEL   Result Value Ref Range    Sodium 135 135 - 145 mmol/L    Potassium 4.3 3.6 - 5.5 mmol/L    Chloride 90 (L) 96 - 112 mmol/L    Co2 28 20 - 33  mmol/L    Anion Gap 17.0 (H) 7.0 - 16.0    Glucose 67 65 - 99 mg/dL    Bun 52 (H) 8 - 22 mg/dL    Creatinine 6.40 (HH) 0.50 - 1.40 mg/dL    Calcium 9.2 8.5 - 10.5 mg/dL    AST(SGOT) 11 (L) 12 - 45 U/L    ALT(SGPT) 19 2 - 50 U/L    Alkaline Phosphatase 103 (H) 30 - 99 U/L    Total Bilirubin 0.9 0.1 - 1.5 mg/dL    Albumin 3.7 3.2 - 4.9 g/dL    Total Protein 6.3 6.0 - 8.2 g/dL    Globulin 2.6 1.9 - 3.5 g/dL    A-G Ratio 1.4 g/dL   CORRECTED CALCIUM   Result Value Ref Range    Correct Calcium 9.4 8.5 - 10.5 mg/dL   ESTIMATED GFR   Result Value Ref Range    GFR (CKD-EPI) 10 (A) >60 mL/min/1.73 m 2   DIFFERENTIAL MANUAL   Result Value Ref Range    Bands-Stabs 5.20 0.00 - 10.00 %    Metamyelocytes 0.90 %    Manual Diff Status PERFORMED    PERIPHERAL SMEAR REVIEW   Result Value Ref Range    Peripheral Smear Review see below    PLATELET ESTIMATE   Result Value Ref Range    Plt Estimation Decreased    MORPHOLOGY   Result Value Ref Range    RBC Morphology Present     Poikilocytosis 1+     Ovalocytes 1+     Echinocytes 1+    EKG (NOW)   Result Value Ref Range    Report       [facility] Emergency Dept.    Test Date:  2023  Pt Name:    BOUBACAR SAENZ          Department: ER  MRN:        [id number]                      Room:       [facility]  Gender:     Male                         Technician: [id number]  :        1970                   Requested By:LEESA VASQUEZ  Order #:    [id number]                    Reading MD: LEESA VASQUEZ, D.O.    Measurements  Intervals                                Axis  Rate:       131                          P:  TX:                                      QRS:        4  QRSD:       109                          T:          104  QT:         345  QTc:        510    Interpretive Statements  Atrial fibrillation  Nonspecific repol abnormality, lateral leads  Prolonged QT interval  Baseline wander in lead(s) V3  Compared to ECG 2022 09:43:41  Early repolarization now  present  T-wave abnormality no longer present  Possible ischemia no longer present  Electronically Signed On 3- 20:43:50 PDT  by LEESA VASQUEZ D.O.          EKG:   I have independently interpreted the above EKG.    Radiology:   The attending emergency physician has independently interpreted the diagnostic imaging associated with this visit and am waiting the final reading from the radiologist.   Preliminary interpretation is as follows: Vascular prominence  Radiologist interpretation:   DX-CHEST-PORTABLE (1 VIEW)   Final Result      1.  Enlarged cardiac silhouette with mild perihilar bronchial wall thickening suggesting vascular congestion/edema.   2.  There is atherosclerosis.      US-EXTREMITY VENOUS LOWER BILAT   Final Result            COURSE & MEDICAL DECISION MAKING     COURSE AND PLAN  4:01 PM - Patient seen and examined at bedside. Discussed plan of care, including ultrasound, X-ray, and lab. Patient agrees to the plan of care. Ordered for US-extremity, DX-chest, CBC with diff, and CMP to evaluate his symptoms.     6:30 PM - Through , I spoke with patient. He does not know who his nephrologist is. Records do not show this. Will attempt to call nephrology to arrange for additional output dialysis.     1900 Case was discussed with , nephrologist on-call for the group.  Patient does not require admission he is not hypoxic he has no respiratory distress and has no complaints of shortness of breath.  I do believe he is mildly overloaded and can follow this up as an outpatient.  The plan will be to the patient call the nephrologist tomorrow to schedule an additional outpatient dialysis treatment.    His heart rate is elevated, he does have A-fib and EKG shows A-fib with RVR.  He is being given Cardizem to treat the tachycardia.  Records reviewed shows that his last echocardiogram had an ejection fraction of 55% so he can receive calcium channel blocker for rate control    ED  Summary: Patient presents with lower extremity swelling.  Has a history of atrial fibrillation he has had rapid ventricular response, use given Cardizem and his heart rate is slowly improving is received p.o. Cardizem and this will improve his heart rate over time.    He is a dialysis patient, his chest x-ray showed possible vascular congestion concerning for fluid overload.  He only gets dialysis twice a week, the patient has no shortness of breath he is not hypoxic and does not need admission for his fluid overload I spoke with the on-call nephrologist who recommends the patient call his nephrologist tomorrow to arrange for additional outpatient dialysis.  I believe this will improve his atrial fibrillation and rapid heart rate by taking strain of the heart.    He is not any chest pain or shortness of breath.    His EKG has no ischemic changes.  With this he is stable for discharge home.  I spoke with the daughter about this plan to make arrangements for outpatient dialysis.       The patient will return for new or worsening symptoms and is stable at the time of discharge.    The patient is referred to a primary physician for blood pressure management, diabetic screening, and for all other preventative health concerns.        DISPOSITION:  Patient will be discharged home in stable condition.    FOLLOW UP:  Laury Angeles P.A.-C.  [address] [place] [id number] [phone]    In 1 week        OUTPATIENT MEDICATIONS:  New Prescriptions    No medications on file         FINAL DIAGNOSIS  1. Dependent edema    2. Other hypervolemia    3. Chronic renal failure, stage 5 (HCC)    4. Atrial fibrillation with RVR (HCC)         Lorena MCCOLLUM), am scribing for, and in the presence of, Jim Oconnor D.O..    Electronically signed by: Lorena Pope (Remberto), 3/27/2023    Jim MCCOLLUM D.O. personally performed the services described in this documentation, as scribed by Lorena Pope in my presence, and  it is both accurate and complete.    The note accurately reflects work and decisions made by me.  Jim Oconnor D.O.  3/27/2023  8:55 PM

## 2023-03-27 NOTE — ED TRIAGE NOTES
"Chief Complaint   Patient presents with    Leg Swelling     BL lower extremities. Associated with pain. Pt has been experiencing symptoms since Friday. Pt states it is affecting his ability to get around and walk. Hx of ESRD, dialysis MWF. Pt had dialysis today.        /79   Pulse (!) 127   Temp 36.8 °C (98.2 °F)   Resp 16   Ht 1.651 m (5' 5\")   Wt 69.2 kg (152 lb 8.9 oz)   SpO2 98%   BMI 25.39 kg/m²     "

## 2023-03-28 NOTE — DISCHARGE INSTRUCTIONS
Spoke with the on-call nephrologist, her instructions are for you to call your nephrologist tomorrow to arrange for an additional dialysis to help remove additional fluid in the legs.  This is healing with this will come out.    A referral has been placed to cardiology to get referral for a fast heart rate with his atrial fibrillation and continue follow-up with allergy

## 2023-04-25 ENCOUNTER — APPOINTMENT (OUTPATIENT)
Dept: CARDIOLOGY | Facility: MEDICAL CENTER | Age: 53
End: 2023-04-25
Payer: COMMERCIAL

## 2023-09-29 NOTE — PROGRESS NOTES
Monitor Summary:    Medical   maximum assist (25% patients effort) Itraconazole Counseling:  I discussed with the patient the risks of itraconazole including but not limited to liver damage, nausea/vomiting, neuropathy, and severe allergy.  The patient understands that this medication is best absorbed when taken with acidic beverages such as non-diet cola or ginger ale.  The patient understands that monitoring is required including baseline LFTs and repeat LFTs at intervals.  The patient understands that they are to contact us or the primary physician if concerning signs are noted.

## 2025-01-16 NOTE — PROGRESS NOTES
Patient Name: Kenyatta Jay  : 1959    MRN: [id number]                              Today's Date: 2025       Admit Date: 1/15/2025    Visit Dx:     ICD-10-CM ICD-9-CM   1. Disorientation  R41.0 780.99   2. Left arm weakness  R29.898 729.89   3. Thyroid nodule  E04.1 241.0   4. TIA (transient ischemic attack)  G45.9 435.9   5. Impaired cognition  R41.89 294.9     Patient Active Problem List   Diagnosis    Essential hypertension    Type 2 diabetes mellitus with diabetic polyneuropathy, without long-term current use of insulin    Morbid obesity due to excess calories    Recurrent major depressive disorder, in partial remission    Osteoporosis    Acquired hypothyroidism    TIA (transient ischemic attack)    Hyperlipidemia    Class 3 severe obesity with body mass index (BMI) of 45.0 to 49.9 in adult     Past Medical History:   Diagnosis Date    Cervical spondylosis with radiculopathy     Cervicalgia     Diabetes mellitus, type 2     Hypertension     Obesity due to excess calories     Prescription refill     Radiculopathy, cervical      Past Surgical History:   Procedure Laterality Date    CHOLECYSTECTOMY      HYSTERECTOMY      REPLACEMENT TOTAL KNEE Left 2016      General Information       Row Name 25 1021 25 1020       OT Time and Intention    Document Type evaluation  -CS evaluation  -CS    Mode of Treatment occupational therapy;co-treatment  Pt presents with facial droop, dysarthria, LUE weakness, N/V.  Dx: TIA, hyperlipidemia  -CS occupational therapy;co-treatment  -CS    Patient Effort good  -CS good  -CS      Row Name 25 1021 25 1020       General Information    Patient Profile Reviewed yes  -CS yes  -CS    Prior Level of Function independent:;all household mobility;community mobility;ADL's;bathing;dressing;driving  -CS --    Existing Precautions/Restrictions fall  -CS fall  -CS      Row Name 25 1021          Occupational Profile    Environmental Supports and  Report received by day shift RN. Pt laying in bed awake alert and oriented x 4 with no c/o pain. Updated to POC and verbalized understanding. Bed locked in low position with fall precautions in place. Call light in place with bed side table in reach.    Barriers (Occupational Profile) ramp  -       Row Name 01/16/25 1021          Living Environment    People in Home child(sheryl), adult;grandchild(sheryl)  -       Row Name 01/16/25 1021          Home Main Entrance    Number of Stairs, Main Entrance none  -       Row Name 01/16/25 1021          Stairs Within Home, Primary    Number of Stairs, Within Home, Primary none  -       Row Name 01/16/25 1021 01/16/25 1020       Cognition    Orientation Status (Cognition) oriented x 4  -CS oriented x 4  -CS      Row Name 01/16/25 1021          Safety Issues/Impairments Affecting Functional Mobility    Impairments Affecting Function (Mobility) balance;endurance/activity tolerance;strength;pain  -               User Key  (r) = Recorded By, (t) = Taken By, (c) = Cosigned By      Initials Name Provider Type    CS Yasmine Sales S, OTR/L, CNT Occupational Therapist                     Mobility/ADL's       Row Name 01/16/25 1021          Bed Mobility    Bed Mobility supine-sit;sit-supine  -     Supine-Sit [place] (Bed Mobility) supervision  -     Sit-Supine [place] (Bed Mobility) supervision  -     Assistive Device (Bed Mobility) bed rails;head of bed elevated  -       Row Name 01/16/25 1021          Transfers    Transfers sit-stand transfer;stand-sit transfer  -       Row Name 01/16/25 1021          Sit-Stand Transfer    Sit-Stand [place] (Transfers) standby assist;contact guard;verbal cues  -       Row Name 01/16/25 1021          Stand-Sit Transfer    Stand-Sit [place] (Transfers) contact guard;verbal cues  -       Row Name 01/16/25 1021          Functional Mobility    Functional Mobility- Ind. Level contact guard assist;minimum assist (75% patient effort);verbal cues required  -     Functional Mobility- Comment Pt walked just outside of her room door in the hallway and back to bedside.  She required hand held assist and her distance was limited due to light headedness.  -       Row Name  01/16/25 1021          Activities of Daily Living    BADL Assessment/Intervention lower body dressing  -CS       Row Name 01/16/25 1021          Lower Body Dressing Assessment/Training    [place] Level (Lower Body Dressing) don;doff;socks;modified independence  -CS     Position (Lower Body Dressing) edge of bed sitting  -CS     Comment, (Lower Body Dressing) increased time required  -CS               User Key  (r) = Recorded By, (t) = Taken By, (c) = Cosigned By      Initials Name Provider Type    Yasmine Quintana OTR/L, PHILLIP Occupational Therapist                   Obj/Interventions       Row Name 01/16/25 1021          Sensory Assessment (Somatosensory)    Sensory Assessment (Somatosensory) UE sensation intact  -       Row Name 01/16/25 1021          Vision Assessment/Intervention    Visual Impairment/Limitations WFL  -CS       Row Name 01/16/25 1021          Range of Motion Comprehensive    General Range of Motion bilateral upper extremity ROM WNL  -       Row Name 01/16/25 1021          Strength Comprehensive (MMT)    Comment, General Manual Muscle Testing (MMT) Assessment BUE shoulder strength: 4/5 due to arthritic pain, all other joints 5/5  -       Row Name 01/16/25 1021          Motor Skills    Motor Skills coordination  -CS     Coordination bilateral;upper extremity;WFL  mild intention tremor noted in BUEs  -CS       Row Name 01/16/25 1021          Balance    Balance Assessment sitting static balance;sitting dynamic balance;standing static balance;standing dynamic balance  -CS     Static Sitting Balance independent  -CS     Dynamic Sitting Balance independent  -CS     Position, Sitting Balance sitting edge of bed  -CS     Static Standing Balance contact guard  -CS     Dynamic Standing Balance contact guard;minimal assist  hand held assist required  -CS               User Key  (r) = Recorded By, (t) = Taken By, (c) = Cosigned By      Initials Name Provider Type    Yasmine Quintana, OTR/L,  CNT Occupational Therapist                   Goals/Plan       Row Name 01/16/25 1021          Transfer Goal 1 (OT)    Activity/Assistive Device (Transfer Goal 1, OT) toilet;bed-to-chair/chair-to-bed  -CS     [place] Level/Cues Needed (Transfer Goal 1, OT) independent  -CS     Time Frame (Transfer Goal 1, OT) long term goal (LTG)  -CS     Progress/Outcome (Transfer Goal 1, OT) new goal  -CS       Row Name 01/16/25 1021          Bathing Goal 1 (OT)    Activity/Device (Bathing Goal 1, OT) bathing skills, all  -CS     [place] Level/Cues Needed (Bathing Goal 1, OT) independent  -CS     Time Frame (Bathing Goal 1, OT) long term goal (LTG)  -CS     Strategies/Barriers (Bathing Goal 1, OT) no vcs required for energy conservation  -CS     Progress/Outcomes (Bathing Goal 1, OT) new goal  -CS       Row Name 01/16/25 1021          Toileting Goal 1 (OT)    Activity/Device (Toileting Goal 1, OT) toileting skills, all  -CS     [place] Level/Cues Needed (Toileting Goal 1, OT) independent  -CS     Time Frame (Toileting Goal 1, OT) long term goal (LTG)  -CS     Progress/Outcome (Toileting Goal 1, OT) new goal  -CS       Row Name 01/16/25 1021          Therapy Assessment/Plan (OT)    Planned Therapy Interventions (OT) activity tolerance training;BADL retraining;functional balance retraining;occupation/activity based interventions;patient/caregiver education/training;ROM/therapeutic exercise;strengthening exercise;transfer/mobility retraining  -CS               User Key  (r) = Recorded By, (t) = Taken By, (c) = Cosigned By      Initials Name Provider Type    CS Yasmine Sales, OTR/L, CNT Occupational Therapist                   Clinical Impression       Row Name 01/16/25 1021          Pain Assessment    Pretreatment Pain Rating 9/10  -CS     Pain Location back;chest;head  -CS     Pain Management Interventions activity modification encouraged;exercise or physical activity utilized;movement retraining  implemented;positioning techniques utilized  -       Row Name 01/16/25 1021          Plan of Care Review    Plan of Care Reviewed With patient  -CS     Progress no change  -CS     Outcome Evaluation OT evaluation completed. Pt is A&Ox4. She complains of chest pain, headache, back pain, and BUE shoulder arthritic pain.  Per RN her work up for chest pain has been normal so far. Pt able to complete bed mobility with SBA.  She completed LB dressing sitting EOB with mod I due to increased time.  Pt demo no focal strength, coordination or sensation deficits in her UEs, however she does demo a mild intention tremor of BUEs during activity.  Pt completed functional transfers with SBA and functional mobility with CGA/min A.  Pt walked just outside of her room door in the hallway and back to bedside. She required hand held assist and her distance was limited due to light headedness.  Pt demo decreased balance, generalized weakness, decreased endurance, and pain that limits her independence in functional mobility and ADLs.  OT will cont to follow to maximize her I and safety with ADLs and functional mobility.  -       Row Name 01/16/25 1021          Therapy Assessment/Plan (OT)    Patient/Family Therapy Goal Statement (OT) to go home  -CS     Rehab Potential (OT) good  -CS     Criteria for Skilled Therapeutic Interventions Met (OT) yes;skilled treatment is necessary  -CS     Therapy Frequency (OT) 5 times/wk  -CS     Predicted Duration of Therapy Intervention (OT) until hospital discharge  -       Row Name 01/16/25 1021          Therapy Plan Review/Discharge Plan (OT)    Anticipated Discharge Disposition (OT) home with assist  -       Row Name 01/16/25 1021          Vital Signs    Pre SpO2 (%) 97  -CS     O2 Delivery Pre Treatment room air  -CS     Post SpO2 (%) 94  -CS     O2 Delivery Post Treatment room air  -CS     Pre Patient Position Supine  -CS       Row Name 01/16/25 1021          Positioning and Restraints     Pre-Treatment Position in bed  -CS     Post Treatment Position bed  -CS     In Bed fowlers;call light within reach;encouraged to call for assist;notified nsg;side rails up x2;with family/caregiver;SCD pump applied  -CS               User Key  (r) = Recorded By, (t) = Taken By, (c) = Cosigned By      Initials Name Provider Type    Yasmine Quintana, OTR/L, PHILLIP Occupational Therapist                   Outcome Measures       Row Name 01/16/25 1021          How much help from another is currently needed...    Putting on and taking off regular lower body clothing? 3  -CS     Bathing (including washing, rinsing, and drying) 3  -CS     Toileting (which includes using toilet bed pan or urinal) 3  -CS     Putting on and taking off regular upper body clothing 4  -CS     Taking care of personal grooming (such as brushing teeth) 4  -CS     Eating meals 4  -CS     AM-PAC 6 Clicks Score (OT) 21  -CS       Row Name 01/16/25 0857 01/16/25 0335       How much help from another person do you currently need...    Turning from your back to your side while in flat bed without using bedrails? 4  -SS 4  -AD    Moving from lying on back to sitting on the side of a flat bed without bedrails? 4  -SS 4  -AD    Moving to and from a bed to a chair (including a wheelchair)? 4  -SS 4  -AD    Standing up from a chair using your arms (e.g., wheelchair, bedside chair)? 3  -SS 3  -AD    Climbing 3-5 steps with a railing? 3  -SS 3  -AD    To walk in hospital room? 3  -SS 3  -AD    AM-PAC 6 Clicks Score (PT) 21  -SS 21  -AD      Row Name 01/16/25 1021          Functional Assessment    Outcome Measure Options AM-PAC 6 Clicks Daily Activity (OT)  -CS               User Key  (r) = Recorded By, (t) = Taken By, (c) = Cosigned By      Initials Name Provider Type    Yasmine Quintana OTR/L, PHILLIP Occupational Therapist    AD Radha Rivera RN Registered Nurse    Christiane Perez RN Registered Nurse                    Occupational Therapy Education        Title: PT OT SLP Therapies (Done)       Topic: Occupational Therapy (Done)       Point: ADL training (Done)       Description:   Instruct learner(s) on proper safety adaptation and remediation techniques during self care or transfers.   Instruct in proper use of assistive devices.                  Learning Progress Summary            Patient Acceptance, E, VU by  at 1/16/2025 1114                      Point: Home exercise program (Done)       Description:   Instruct learner(s) on appropriate technique for monitoring, assisting and/or progressing therapeutic exercises/activities.                  Learning Progress Summary            Patient Acceptance, E, VU by  at 1/16/2025 1114                      Point: Precautions (Done)       Description:   Instruct learner(s) on prescribed precautions during self-care and functional transfers.                  Learning Progress Summary            Patient Acceptance, E, VU by  at 1/16/2025 1114                      Point: Body mechanics (Done)       Description:   Instruct learner(s) on proper positioning and spine alignment during self-care, functional mobility activities and/or exercises.                  Learning Progress Summary            Patient Acceptance, E, VU by  at 1/16/2025 1114                                      User Key       Initials Effective Dates Name Provider Type Discipline     02/03/23 -  Yasmine Sales, OTR/L, CNT Occupational Therapist OT                  OT Recommendation and Plan  Planned Therapy Interventions (OT): activity tolerance training, BADL retraining, functional balance retraining, occupation/activity based interventions, patient/caregiver education/training, ROM/therapeutic exercise, strengthening exercise, transfer/mobility retraining  Therapy Frequency (OT): 5 times/wk  Plan of Care Review  Plan of Care Reviewed With: patient  Progress: no change  Outcome Evaluation: OT evaluation completed. Pt is A&Ox4. She complains of chest  pain, headache, back pain, and BUE shoulder arthritic pain.  Per RN her work up for chest pain has been normal so far. Pt able to complete bed mobility with SBA.  She completed LB dressing sitting EOB with mod I due to increased time.  Pt demo no focal strength, coordination or sensation deficits in her UEs, however she does demo a mild intention tremor of BUEs during activity.  Pt completed functional transfers with SBA and functional mobility with CGA/min A.  Pt walked just outside of her room door in the hallway and back to bedside. She required hand held assist and her distance was limited due to light headedness.  Pt demo decreased balance, generalized weakness, decreased endurance, and pain that limits her independence in functional mobility and ADLs.  OT will cont to follow to maximize her I and safety with ADLs and functional mobility.     Time Calculation:         Time Calculation- OT       Row Name 01/16/25 1112             Time Calculation- OT    OT Start Time 1021  -CS      OT Stop Time 1100  -CS      OT Time Calculation (min) 39 min  -CS      OT Received On 01/16/25  -CS      OT Goal Re-Cert Due Date 01/26/25  -CS         Untimed Charges    OT Eval/Re-eval Minutes 39  -CS         Total Minutes    Untimed Charges Total Minutes 39  -CS       Total Minutes 39  -CS                User Key  (r) = Recorded By, (t) = Taken By, (c) = Cosigned By      Initials Name Provider Type    CS Yasmine Sales OTR/L, PHILLIP Occupational Therapist                  Therapy Charges for Today       Code Description Service Date Service Provider Modifiers Qty    40656797119 HC OT EVAL MOD COMPLEXITY 3 1/16/2025 Yasmine Sales OTR/L, CNT GO 1                 Yasmine S. Sales, OTR/L, CNT  1/16/2025

## 2025-05-11 NOTE — THERAPY
PT reorders received. Per RN, pt has been mobilizing well to the bathroom. Reports no acute change in function at this time. PT re-consult not warranted as pt has experienced no acute change. Inital PT evaluation recommendations remain. Nursing staff to ambulate pt in hallway, and assist pt to chair for all meals daily to maintain baseline strength and function.     Lorie Chu, PT, DPT Voalte: [phone]   Attending Attestation (For Attendings USE Only)...

## (undated) DEVICE — MASK ANESTHESIA ADULT  - (100/CA)

## (undated) DEVICE — TROCAR 12MM THORACOPORT (6EA/BX)

## (undated) DEVICE — TRAY SURESTEP FOLEY TEMP SENSING 16FR (10EA/CA) ORDER  #18764 FOR TEMP FOLEY ONLY

## (undated) DEVICE — GLOVE BIOGEL PI INDICATOR SZ 6.5 SURGICAL PF LF - (50/BX 4BX/CA)

## (undated) DEVICE — MEDICINE CUP STERILE 2 OZ - (100/CA)

## (undated) DEVICE — TROCAR Z THREAD11MM OPTICAL - NON BLADED(6/BX)

## (undated) DEVICE — GLOVE BIOGEL INDICATOR SZ 8 SURGICAL PF LTX - (50/BX 4BX/CA)

## (undated) DEVICE — SYRINGE SAFETY 5 ML 18 GA X 1-1/2 BLUNT LL (100/BX 4BX/CA)

## (undated) DEVICE — WATER IRRIGATION STERILE 1000ML (12EA/CA)

## (undated) DEVICE — FILM CASSETTE ENDO

## (undated) DEVICE — LACTATED RINGERS INJ 1000 ML - (14EA/CA 60CA/PF)

## (undated) DEVICE — PACK MINOR BASIN - (2EA/CA)

## (undated) DEVICE — PAD PREP 24 X 48 CUFFED - (100/CA)

## (undated) DEVICE — GLOVE BIOGEL PI INDICATOR SZ 8.0 SURGICAL PF LF -(50/BX 4BX/CA)

## (undated) DEVICE — SPONGE GAUZESTER. 2X2 4-PL - (2/PK 50PK/BX 30BX/CS)

## (undated) DEVICE — SUTURE GENERAL

## (undated) DEVICE — DRESSING XEROFORM 1X8 - (50/BX 4BX/CA)

## (undated) DEVICE — TROCAR SEPARATOR 5X55 - 6/BX

## (undated) DEVICE — SET EXTENSION 31IN APPX 3.2ML WITH CLAMP (50/CA)WAS 4610-03

## (undated) DEVICE — SODIUM CHL IRRIGATION 0.9% 1000ML (12EA/CA)

## (undated) DEVICE — DRESSING PETROLEUM GAUZE 5 X 9" (50EA/BX 4BX/CA)"

## (undated) DEVICE — SUTURE 2-0 VICRYL PLUS CT-1 36 (36PK/BX)"

## (undated) DEVICE — DRESSING TRANSPARENT FILM TEGADERM 2.375 X 2.75"  (100EA/BX)"

## (undated) DEVICE — TUBE CONNECTING SUCTION - CLEAR PLASTIC STERILE 72 IN (50EA/CA)

## (undated) DEVICE — CONTAINER, SPECIMEN, STERILE

## (undated) DEVICE — SUTURE 4-0 VICRYL PLUS RB-1 - 27 INCH (36/BX)

## (undated) DEVICE — DRAPE LARGE 3 QUARTER - (20/CA)

## (undated) DEVICE — SUTURE 0 SILK TIES (36PK/BX)

## (undated) DEVICE — CLIP SM INTNL HRZN TI ESCP LGT - (24EA/PK 25PK/BX)

## (undated) DEVICE — SUCTION INSTRUMENT YANKAUER BULBOUS TIP W/O VENT (50EA/CA)

## (undated) DEVICE — GLOVE BIOGEL PI ORTHO SZ 7 PF LF (40PR/BX)

## (undated) DEVICE — GOWN SURGEONS LARGE - (32/CA)

## (undated) DEVICE — CANISTER SUCTION 3000ML MECHANICAL FILTER AUTO SHUTOFF MEDI-VAC NONSTERILE LF DISP  (40EA/CA)

## (undated) DEVICE — PACK LAP CHOLE OR - (2EA/CA)

## (undated) DEVICE — SYRINGE SAFETY 3 ML 18 GA X 1 1/2 BLUNT LL (100/BX 8BX/CA)

## (undated) DEVICE — ELECTRODE 850 FOAM ADHESIVE - HYDROGEL RADIOTRNSPRNT (50/PK)

## (undated) DEVICE — DRAPE SURGICAL U 77X120 - (10/CA)

## (undated) DEVICE — TUBE E-T HI-LO CUFF 7.0MM (10EA/PK)

## (undated) DEVICE — PEN SKIN MARKER W/RULER - (50EA/BX)

## (undated) DEVICE — NEPTUNE 4 PORT MANIFOLD - (20/PK)

## (undated) DEVICE — KIT ANESTHESIA W/CIRCUIT & 3/LT BAG W/FILTER (20EA/CA)

## (undated) DEVICE — SLEEVE, VASO, THIGH, MED

## (undated) DEVICE — SPONGE GAUZESTER 4 X 4 4PLY - (128PK/CA)

## (undated) DEVICE — TUBING CLEARLINK DUO-VENT - C-FLO (48EA/CA)

## (undated) DEVICE — SET LEADWIRE 5 LEAD BEDSIDE DISPOSABLE ECG (1SET OF 5/EA)

## (undated) DEVICE — NEEDLE NON SAFETY 25 GA X 1 1/2 IN HYPO (100EA/BX)

## (undated) DEVICE — CLIP MED INTNL HRZN TI ESCP - (25/BX)

## (undated) DEVICE — DRAPESURG STERI-DRAPE LONG - (10/BX 4BX/CA)

## (undated) DEVICE — SUTURE 7-0 PROLENE BV175-6 (36PK/BX)

## (undated) DEVICE — BLANKET WARMING FULL BODY - (10/CA)

## (undated) DEVICE — CANISTER SUCTION RIGID RED 1500CC (40EA/CA)

## (undated) DEVICE — BOVIE BLADE COATED - (50/PK)

## (undated) DEVICE — GOWN SURGEONS X-LARGE - DISP. (30/CA)

## (undated) DEVICE — VESSELOOP MINI BLUE STERILE - SURG-I-LOOP (10EA/BX)

## (undated) DEVICE — SWAB CULTURE AMIES ESWAB (50EA/PK)

## (undated) DEVICE — MASK PANORAMIC OXYGEN PRO2 (30EA/CA)

## (undated) DEVICE — GLOVE BIOGEL INDICATOR SZ 8.5 SURGICAL PF LTX - (50/BX 4BX/CA)

## (undated) DEVICE — CATHETER IV 20 GA X 1-1/4 ---SURG.& SDS ONLY--- (50EA/BX)

## (undated) DEVICE — KIT SURGIFLO W/OUT THROMBIN - (6EA/CA)

## (undated) DEVICE — COVER PROBE STERILE CONE (12EA/CA)

## (undated) DEVICE — ANTI-FOG SOLUTION - 60BTL/CA

## (undated) DEVICE — SOD. CHL. INJ. 0.9% 1000 ML - (14EA/CA 60CA/PF)

## (undated) DEVICE — SUTURE 0 SILK MO-7 C/R 8 X 18 (12PK/BX)"

## (undated) DEVICE — HEAD HOLDER JUNIOR/ADULT

## (undated) DEVICE — SYRINGE SAFETY 10 ML 18 GA X 1 1/2 BLUNT LL (100/BX 4BX/CA)

## (undated) DEVICE — PROTECTOR ULNA NERVE - (36PR/CA)

## (undated) DEVICE — DRESSING NON-ADHERING 8 X 3 - (50/BX)

## (undated) DEVICE — CONNECTOR Y TBG CRTY 5 IN 1 STERILE (50EA/CA)

## (undated) DEVICE — SUCTION INSTRUMENT YANKAUER OPEN TIP W/O VENT (50EA/CA)

## (undated) DEVICE — CHLORAPREP 26 ML APPLICATOR - ORANGE TINT(25/CA)

## (undated) DEVICE — DRESSING ANTIMICROBIAL BIOPATCH 4.0M (40EA/CA)

## (undated) DEVICE — KIT CUSTOM PROCEDURE SINGLE FOR ENDO  (15/CA)

## (undated) DEVICE — FORCEP RADIAL JAW 4 STANDARD CAPACITY W/NEEDLE 240CM (40EA/BX)

## (undated) DEVICE — SUTURE CV

## (undated) DEVICE — SENSOR SPO2 NEO LNCS ADHESIVE (20/BX) SEE USER NOTES

## (undated) DEVICE — CORDS BIPOLAR COAGULATION - 12FT STERILE DISP. (10EA/BX)

## (undated) DEVICE — GLOVE BIOGEL SZ 8.5 SURGICAL PF LTX - (50PR/BX 4BX/CA)

## (undated) DEVICE — PENCIL ELECTSURG 10FT BTN SWH - (50/CA)

## (undated) DEVICE — SUTURE 6-0 PROLENE BV-1 D/A 24 (36PK/BX)"

## (undated) DEVICE — CANNULA O2 COMFORT SOFT EAR ADULT 7 FT TUBING (50/CA)

## (undated) DEVICE — SENSOR SPO2 ADULT LNCS ADTX (20/BX) ORDER ITEM #19593

## (undated) DEVICE — DRAPE MAGNETIC (INSTRA-MAG) - (30/CA)

## (undated) DEVICE — SUTURE 2-0 SILK FS (12EA/BX)

## (undated) DEVICE — TUBE CONNECT SUCTION CLEAR 120 X 1/4" (50EA/CA)"

## (undated) DEVICE — GELAQUASONIC 100 ULTRASOUND - 48/BX 20GM STERILE FOIL POUCH

## (undated) DEVICE — SUTURE 4-0 PROLENE PS-2 18 (36PK/BX)"

## (undated) DEVICE — TOWELS CLOTH SURGICAL - (4/PK 20PK/CA)

## (undated) DEVICE — SET SUCTION/IRRIGATION WITH DISPOSABLE TIP (6/CA )PART #0250-070-520 IS A SUB

## (undated) DEVICE — SPONGE GAUZE NON-STERILE 4X4 - (2000/CA 10PK/CA)

## (undated) DEVICE — SHEET THYROID - (10EA/CA)

## (undated) DEVICE — SYRINGE 30 ML LL (56/BX)

## (undated) DEVICE — GOWN WARMING STANDARD FLEX - (30/CA)

## (undated) DEVICE — SPONGE XRAY 8X4 STERL. 12PL - (10EA/TY 80TY/CA)

## (undated) DEVICE — GLOVE, LITE (PAIR)

## (undated) DEVICE — BAG DECANTER (50EA/CS)

## (undated) DEVICE — GLOVE SZ 8 BIOGEL PI MICRO - PF LF (50PR/BX)

## (undated) DEVICE — TOWEL STOP TIMEOUT SAFETY FLAG (40EA/CA)

## (undated) DEVICE — KIT ROOM DECONTAMINATION

## (undated) DEVICE — GLOVE BIOGEL SZ 6.5 SURGICAL PF LTX (50PR/BX 4BX/CA)

## (undated) DEVICE — CONNECTOR 5 IN 1 STRAIGHT - (50/BX)

## (undated) DEVICE — BLADE SURGICAL #15 - (50/BX 3BX/CA)

## (undated) DEVICE — GLOVE SZ 6.5 BIOGEL PI MICRO - PF LF (50PR/BX)

## (undated) DEVICE — DETERGENT RENUZYME PLUS 10 OZ PACKET (50/BX)

## (undated) DEVICE — SET EXTENSION WITH 2 PORTS (48EA/CA) ***PART #2C8610 IS A SUBSTITUTE*****

## (undated) DEVICE — ELECTRODE DUAL RETURN W/ CORD - (50/PK)

## (undated) DEVICE — GLOVE BIOGEL SZ 8 SURGICAL PF LTX - (50PR/BX 4BX/CA)

## (undated) DEVICE — TRAP SPECIMEN MUCUS STERILE - (50/CA)

## (undated) DEVICE — STAPLER SKIN DISP - (6/BX 10BX/CA) VISISTAT

## (undated) DEVICE — SUTURE 3-0 ETHILON FS-1 - (36/BX) 30 INCH

## (undated) DEVICE — SODIUM CHL. INJ. 0.9% 500ML (24EA/CA 50CA/PF)

## (undated) DEVICE — KIT  I.V. START (100EA/CA)

## (undated) DEVICE — SYRINGE 10 ML CONTROL LL (25EA/BX 4BX/CA)

## (undated) DEVICE — SHEET TRANSVERSE LAP - (12EA/CA)

## (undated) DEVICE — TROCAR 5X100 NON BLADED Z-TH - READ KII (6/BX)

## (undated) DEVICE — DRAPE C-ARM LARGE 41IN X 74 IN - (10/BX 2BX/CA)

## (undated) DEVICE — VESSELOOP MAXI BLUE STERILE- SURG-I-LOOP (10EA/BX)

## (undated) DEVICE — SOD. CHL. INJ. 0.9% 250 ML - (36/CA 50CA/PF)

## (undated) DEVICE — PAD LAP STERILE 18 X 18 - (5/PK 40PK/CA)

## (undated) DEVICE — SUTURE 3-0 SILK 12 X 18 IN - (36/BX)

## (undated) DEVICE — TUBE E-T HI-LO CUFF 6.5MM (10EA/BX)